# Patient Record
Sex: MALE | Race: WHITE | Employment: OTHER | URBAN - METROPOLITAN AREA
[De-identification: names, ages, dates, MRNs, and addresses within clinical notes are randomized per-mention and may not be internally consistent; named-entity substitution may affect disease eponyms.]

---

## 2017-01-16 ENCOUNTER — OFFICE VISIT (OUTPATIENT)
Dept: FAMILY MEDICINE | Facility: CLINIC | Age: 77
End: 2017-01-16
Payer: COMMERCIAL

## 2017-01-16 VITALS
BODY MASS INDEX: 41.95 KG/M2 | TEMPERATURE: 97 F | WEIGHT: 293 LBS | SYSTOLIC BLOOD PRESSURE: 123 MMHG | HEIGHT: 70 IN | DIASTOLIC BLOOD PRESSURE: 83 MMHG | OXYGEN SATURATION: 97 % | HEART RATE: 70 BPM

## 2017-01-16 DIAGNOSIS — G70.00 MYASTHENIA GRAVIS WITHOUT EXACERBATION (H): ICD-10-CM

## 2017-01-16 DIAGNOSIS — A31.0 MYCOBACTERIUM AVIUM COMPLEX (H): ICD-10-CM

## 2017-01-16 DIAGNOSIS — I10 BENIGN ESSENTIAL HYPERTENSION: Primary | ICD-10-CM

## 2017-01-16 LAB
BASOPHILS # BLD AUTO: 0 10E9/L (ref 0–0.2)
BASOPHILS NFR BLD AUTO: 0.4 %
DIFFERENTIAL METHOD BLD: NORMAL
EOSINOPHIL # BLD AUTO: 0.2 10E9/L (ref 0–0.7)
EOSINOPHIL NFR BLD AUTO: 3.1 %
ERYTHROCYTE [DISTWIDTH] IN BLOOD BY AUTOMATED COUNT: 14.2 % (ref 10–15)
HCT VFR BLD AUTO: 44.9 % (ref 40–53)
HGB BLD-MCNC: 14.5 G/DL (ref 13.3–17.7)
LYMPHOCYTES # BLD AUTO: 1.8 10E9/L (ref 0.8–5.3)
LYMPHOCYTES NFR BLD AUTO: 26.2 %
MCH RBC QN AUTO: 29.5 PG (ref 26.5–33)
MCHC RBC AUTO-ENTMCNC: 32.3 G/DL (ref 31.5–36.5)
MCV RBC AUTO: 91 FL (ref 78–100)
MONOCYTES # BLD AUTO: 0.6 10E9/L (ref 0–1.3)
MONOCYTES NFR BLD AUTO: 9.6 %
NEUTROPHILS # BLD AUTO: 4.1 10E9/L (ref 1.6–8.3)
NEUTROPHILS NFR BLD AUTO: 60.7 %
PLATELET # BLD AUTO: 223 10E9/L (ref 150–450)
RBC # BLD AUTO: 4.91 10E12/L (ref 4.4–5.9)
WBC # BLD AUTO: 6.7 10E9/L (ref 4–11)

## 2017-01-16 PROCEDURE — 83516 IMMUNOASSAY NONANTIBODY: CPT | Mod: 90 | Performed by: PREVENTIVE MEDICINE

## 2017-01-16 PROCEDURE — 85025 COMPLETE CBC W/AUTO DIFF WBC: CPT | Performed by: PREVENTIVE MEDICINE

## 2017-01-16 PROCEDURE — 99000 SPECIMEN HANDLING OFFICE-LAB: CPT | Performed by: PREVENTIVE MEDICINE

## 2017-01-16 PROCEDURE — 83519 RIA NONANTIBODY: CPT | Mod: 90 | Performed by: PREVENTIVE MEDICINE

## 2017-01-16 PROCEDURE — 99214 OFFICE O/P EST MOD 30 MIN: CPT | Performed by: PREVENTIVE MEDICINE

## 2017-01-16 PROCEDURE — 80053 COMPREHEN METABOLIC PANEL: CPT | Performed by: PREVENTIVE MEDICINE

## 2017-01-16 PROCEDURE — 86255 FLUORESCENT ANTIBODY SCREEN: CPT | Mod: 90 | Performed by: PREVENTIVE MEDICINE

## 2017-01-16 PROCEDURE — 36415 COLL VENOUS BLD VENIPUNCTURE: CPT | Performed by: PREVENTIVE MEDICINE

## 2017-01-16 RX ORDER — LOSARTAN POTASSIUM 50 MG/1
50 TABLET ORAL DAILY
Qty: 60 TABLET | Refills: 3 | Status: SHIPPED | OUTPATIENT
Start: 2017-01-16 | End: 2017-01-16

## 2017-01-16 RX ORDER — HYDROCHLOROTHIAZIDE 25 MG/1
25 TABLET ORAL DAILY
Qty: 90 TABLET | Refills: 1 | Status: SHIPPED | OUTPATIENT
Start: 2017-01-16 | End: 2017-07-13

## 2017-01-16 RX ORDER — LOSARTAN POTASSIUM 50 MG/1
50 TABLET ORAL DAILY
Qty: 90 TABLET | Refills: 3 | Status: SHIPPED | OUTPATIENT
Start: 2017-01-16 | End: 2017-10-16

## 2017-01-16 NOTE — PROGRESS NOTES
"SUBJECTIVE:  Thanh JUAN Kp, a 76 year old male scheduled an appointment to discuss the following issues:     Myasthenia gravis without exacerbation (H)  Mycobacterium avium complex (H)  Benign essential hypertension  Pt here for follow up feels well    Medical, social, surgical, and family histories reviewed.    ROS:  C: NEGATIVE for fever, chills  E: NEGATIVE for vision changes   R: NEGATIVE for significant cough or SOB  CV: NEGATIVE for chest pain, palpitations   GI: NEGATIVE for nausea, abdominal pain, heartburn, or change in bowel habits  : NEGATIVE for frequency, dysuria, or hematuria  M: NEGATIVE for significant arthralgias or myalgia  N: NEGATIVE for weakness, dizziness or paresthesias or headache    OBJECTIVE:  /83 mmHg  Pulse 70  Temp(Src) 97  F (36.1  C) (Oral)  Ht 5' 10\" (1.778 m)  Wt 293 lb (132.904 kg)  BMI 42.04 kg/m2  SpO2 97%  EXAM:  GENERAL APPEARANCE: healthy, alert and no distress  EYES: EOMI,  PERRL  HENT: ear canals and TM's normal and nose and mouth without ulcers or lesions  RESP: lungs clear to auscultation - no rales, rhonchi or wheezes  CV: regular rates and rhythm, normal S1 S2, no S3 or S4 and no murmur, click or rub -  ABDOMEN:  soft, nontender, no HSM or masses and bowel sounds normal    ASSESSMENT/PLAN:  (I10) Benign essential hypertension  (primary encounter diagnosis)  Plan: hydrochlorothiazide (HYDRODIURIL) 25 MG tablet,        CBC with platelets and differential,         Comprehensive metabolic panel, losartan         (COZAAR) 50 MG tablet    (G70.00) Myasthenia gravis without exacerbation (H)  Plan: Striated muscle antibody IgG, Acetylcholine         modulating antibody, Acetylcholine receptor         binding    (A31.0) Mycobacterium avium complex (H)    Above issues are stable, med changes pending labs    25 minutes spent with patient, over 50% time counseling, coordinating care and explaining about nature of the patient's conditions.    All risks, benefits of " treatment and further evaluation was reviewed with patient.  Pt expressed understanding.  Pt was in agreement with this plan.  Christ Haile MD

## 2017-01-16 NOTE — MR AVS SNAPSHOT
"              After Visit Summary   1/16/2017    Thanh Goodrich    MRN: [id number]           Patient Information     Date Of Birth          1940        Visit Information        Provider Department      1/16/2017 3:30 PM Christ Haile MD [facility]        Today's Diagnoses     HYPERTENSION    -  1        Follow-ups after your visit        Who to contact     If you have questions or need follow up information about today's clinic visit or your schedule please contact [facility] directly at [phone].  Normal or non-critical lab and imaging results will be communicated to you by MyChart, letter or phone within 4 business days after the clinic has received the results. If you do not hear from us within 7 days, please contact the clinic through [employer] or phone. If you have a critical or abnormal lab result, we will notify you by phone as soon as possible.  Submit refill requests through [employer] or call your pharmacy and they will forward the refill request to us. Please allow 3 business days for your refill to be completed.          Additional Information About Your Visit        MyChart Information     [employer] gives you secure access to your electronic health record. If you see a primary care provider, you can also send messages to your care team and make appointments. If you have questions, please call your primary care clinic.  If you do not have a primary care provider, please call [phone] and they will assist you.        Care EveryWhere ID     This is your Care EveryWhere ID. This could be used by other organizations to access your [place] medical records  [id number]        Your Vitals Were     Pulse Temperature Height BMI (Body Mass Index) Pulse Oximetry       70 97  F (36.1  C) (Oral) 5' 10\" (1.778 m) 42.04 kg/m2 97%        Blood Pressure from Last 3 Encounters:   01/16/17 123/83   06/23/16 124/71   04/18/16 134/77    Weight from Last 3 Encounters: " "  01/16/17 293 lb (132.904 kg)   06/23/16 295 lb (133.811 kg)   04/18/16 295 lb (133.811 kg)              Today, you had the following     No orders found for display       Primary Care Provider Office Phone # Fax #    Christ Leiva Branden Haile [phone]       [facility] [address] [facility] [address]  [facility] [address]        Thank you!     Thank you for choosing [facility]  for your care. Our goal is always to provide you with excellent care. Hearing back from our patients is one way we can continue to improve our services. Please take a few minutes to complete the written survey that you may receive in the mail after your visit with us. Thank you!             Your Updated Medication List - Protect others around you: Learn how to safely use, store and throw away your medicines at www.disposemymeds.org.          This list is accurate as of: 1/16/17  3:42 PM.  Always use your most recent med list.                   Brand Name Dispense Instructions for use    ascorbic acid 1000 MG Tabs    vitamin C     Take 1 tablet by mouth daily.       ASPIRIN PO      Take 81 mg by mouth 2 times daily       calcium carbonate-vitamin D 600-400 MG-UNIT Chew      Take 1 chew tab by mouth 2 times daily.       CELLCEPT tablet      Take 1,000 mg by mouth 2 times daily 2 tabs in Am. ! Tab in Pm       CHROMIUM PICOLINATE      Take 1 tablet by mouth daily.       hydrochlorothiazide 25 MG tablet    HYDRODIURIL    90 tablet    TAKE 1 TABLET BY MOUTH DAILY       losartan 50 MG tablet    COZAAR    60 tablet    TAKE 1 TABLET BY MOUTH EVERY DAY       NONFORMULARY      \"Life extension Multivitamin\" Take one tablet by mouth twice daily.       POMEGRANATE PO      Take 1 tablet by mouth 2 times daily.       TYLENOL PO          VITAMIN D3 PO      Take 2,000 Units by mouth 2 times daily.         "

## 2017-01-16 NOTE — NURSING NOTE
"Chief Complaint   Patient presents with     Follow Up For     lab test     Recheck Medication     mycophenolate (CELLCEPT) 500 MG tablet       Initial /83 mmHg  Pulse 70  Temp(Src) 97  F (36.1  C) (Oral)  Ht 5' 10\" (1.778 m)  Wt 293 lb (132.904 kg)  BMI 42.04 kg/m2  SpO2 97% Estimated body mass index is 42.04 kg/(m^2) as calculated from the following:    Height as of this encounter: 5' 10\" (1.778 m).    Weight as of this encounter: 293 lb (132.904 kg).  BP completed using cuff size: regular, left arm.    Desi Hancock CMA      "

## 2017-01-17 LAB
ALBUMIN SERPL-MCNC: 3.6 G/DL (ref 3.4–5)
ALP SERPL-CCNC: 69 U/L (ref 40–150)
ALT SERPL W P-5'-P-CCNC: 18 U/L (ref 0–70)
ANION GAP SERPL CALCULATED.3IONS-SCNC: 5 MMOL/L (ref 3–14)
AST SERPL W P-5'-P-CCNC: 15 U/L (ref 0–45)
BILIRUB SERPL-MCNC: 0.5 MG/DL (ref 0.2–1.3)
BUN SERPL-MCNC: 19 MG/DL (ref 7–30)
CALCIUM SERPL-MCNC: 9.3 MG/DL (ref 8.5–10.1)
CHLORIDE SERPL-SCNC: 103 MMOL/L (ref 94–109)
CO2 SERPL-SCNC: 32 MMOL/L (ref 20–32)
CREAT SERPL-MCNC: 0.86 MG/DL (ref 0.66–1.25)
GFR SERPL CREATININE-BSD FRML MDRD: 86 ML/MIN/1.7M2
GLUCOSE SERPL-MCNC: 96 MG/DL (ref 70–99)
POTASSIUM SERPL-SCNC: 4.2 MMOL/L (ref 3.4–5.3)
PROT SERPL-MCNC: 7.2 G/DL (ref 6.8–8.8)
SODIUM SERPL-SCNC: 140 MMOL/L (ref 133–144)

## 2017-01-17 RX ORDER — LOSARTAN POTASSIUM 50 MG/1
TABLET ORAL
Qty: 90 TABLET | Refills: 3 | OUTPATIENT
Start: 2017-01-17

## 2017-01-20 LAB — ACHR BIND AB SER-SCNC: 21 NMOL/L

## 2017-01-21 LAB — ACHR MOD AB/ACHR TOTAL SFR SER: 87 %

## 2017-01-22 LAB
STRIA MUS IGG SER QL IF: ABNORMAL
STRIA MUS IGG TITR SER IF: ABNORMAL {TITER}

## 2017-02-08 RX ORDER — LOSARTAN POTASSIUM 50 MG/1
TABLET ORAL
Qty: 60 TABLET | Refills: 0
Start: 2017-02-08

## 2017-03-22 ENCOUNTER — TRANSFERRED RECORDS (OUTPATIENT)
Dept: HEALTH INFORMATION MANAGEMENT | Facility: CLINIC | Age: 77
End: 2017-03-22

## 2017-06-01 ENCOUNTER — TELEPHONE (OUTPATIENT)
Dept: FAMILY MEDICINE | Facility: CLINIC | Age: 77
End: 2017-06-01

## 2017-06-01 NOTE — TELEPHONE ENCOUNTER
Dr. Haile are you able to fit this pt in, or is team ok?          Appointment Request From: Thanh Goodrich           With Provider: Christ Haile MD, MD [-Primary Care Physician-]          Preferred Date Range: From 6/2/2017 To 6/22/2017            Preferred Times: Monday Afternoon, Tuesday Afternoon, Wednesday Afternoon, Thursday Afternoon, Friday Afternoon          Reason for visit: Request an Appointment          Comments:     I would like to see Dr. Haile regarding my painful shoulders. It seems to be tendinitis in the area of the rotator cuffs, especially with the left arm.        The only date that does not work for me is Monday, June 12th.

## 2017-06-14 ENCOUNTER — RADIANT APPOINTMENT (OUTPATIENT)
Dept: GENERAL RADIOLOGY | Facility: CLINIC | Age: 77
End: 2017-06-14
Attending: PREVENTIVE MEDICINE
Payer: COMMERCIAL

## 2017-06-14 ENCOUNTER — OFFICE VISIT (OUTPATIENT)
Dept: FAMILY MEDICINE | Facility: CLINIC | Age: 77
End: 2017-06-14
Payer: COMMERCIAL

## 2017-06-14 VITALS
HEART RATE: 85 BPM | SYSTOLIC BLOOD PRESSURE: 112 MMHG | TEMPERATURE: 97.3 F | HEIGHT: 70 IN | WEIGHT: 296 LBS | BODY MASS INDEX: 42.37 KG/M2 | DIASTOLIC BLOOD PRESSURE: 81 MMHG | OXYGEN SATURATION: 97 %

## 2017-06-14 DIAGNOSIS — A31.0 MYCOBACTERIUM AVIUM COMPLEX (H): ICD-10-CM

## 2017-06-14 DIAGNOSIS — R09.89 ABNORMAL LUNG SOUNDS: Primary | ICD-10-CM

## 2017-06-14 DIAGNOSIS — I10 ESSENTIAL HYPERTENSION, BENIGN: ICD-10-CM

## 2017-06-14 DIAGNOSIS — R09.89 ABNORMAL LUNG SOUNDS: ICD-10-CM

## 2017-06-14 DIAGNOSIS — Z00.00 ENCOUNTER FOR ROUTINE ADULT HEALTH EXAMINATION WITHOUT ABNORMAL FINDINGS: ICD-10-CM

## 2017-06-14 DIAGNOSIS — L30.4 INTERTRIGO: ICD-10-CM

## 2017-06-14 DIAGNOSIS — C61 MALIGNANT NEOPLASM OF PROSTATE (H): ICD-10-CM

## 2017-06-14 DIAGNOSIS — G70.00 MYASTHENIA GRAVIS WITHOUT EXACERBATION (H): ICD-10-CM

## 2017-06-14 LAB
BASOPHILS # BLD AUTO: 0 10E9/L (ref 0–0.2)
BASOPHILS NFR BLD AUTO: 0.6 %
DIFFERENTIAL METHOD BLD: NORMAL
EOSINOPHIL # BLD AUTO: 0.1 10E9/L (ref 0–0.7)
EOSINOPHIL NFR BLD AUTO: 2.3 %
ERYTHROCYTE [DISTWIDTH] IN BLOOD BY AUTOMATED COUNT: 14.2 % (ref 10–15)
HCT VFR BLD AUTO: 45 % (ref 40–53)
HGB BLD-MCNC: 14.6 G/DL (ref 13.3–17.7)
LYMPHOCYTES # BLD AUTO: 1.6 10E9/L (ref 0.8–5.3)
LYMPHOCYTES NFR BLD AUTO: 25.5 %
MCH RBC QN AUTO: 29.1 PG (ref 26.5–33)
MCHC RBC AUTO-ENTMCNC: 32.4 G/DL (ref 31.5–36.5)
MCV RBC AUTO: 90 FL (ref 78–100)
MONOCYTES # BLD AUTO: 0.6 10E9/L (ref 0–1.3)
MONOCYTES NFR BLD AUTO: 9.6 %
NEUTROPHILS # BLD AUTO: 3.8 10E9/L (ref 1.6–8.3)
NEUTROPHILS NFR BLD AUTO: 62 %
PLATELET # BLD AUTO: 224 10E9/L (ref 150–450)
RBC # BLD AUTO: 5.02 10E12/L (ref 4.4–5.9)
WBC # BLD AUTO: 6.2 10E9/L (ref 4–11)

## 2017-06-14 PROCEDURE — 99000 SPECIMEN HANDLING OFFICE-LAB: CPT | Performed by: PREVENTIVE MEDICINE

## 2017-06-14 PROCEDURE — 85025 COMPLETE CBC W/AUTO DIFF WBC: CPT | Performed by: PREVENTIVE MEDICINE

## 2017-06-14 PROCEDURE — 83516 IMMUNOASSAY NONANTIBODY: CPT | Mod: 59 | Performed by: PREVENTIVE MEDICINE

## 2017-06-14 PROCEDURE — 83519 RIA NONANTIBODY: CPT | Mod: 90 | Performed by: PREVENTIVE MEDICINE

## 2017-06-14 PROCEDURE — 36415 COLL VENOUS BLD VENIPUNCTURE: CPT | Performed by: PREVENTIVE MEDICINE

## 2017-06-14 PROCEDURE — 71020 XR CHEST 2 VW: CPT

## 2017-06-14 PROCEDURE — 80053 COMPREHEN METABOLIC PANEL: CPT | Performed by: PREVENTIVE MEDICINE

## 2017-06-14 PROCEDURE — 99397 PER PM REEVAL EST PAT 65+ YR: CPT | Performed by: PREVENTIVE MEDICINE

## 2017-06-14 PROCEDURE — 86255 FLUORESCENT ANTIBODY SCREEN: CPT | Mod: 90 | Performed by: PREVENTIVE MEDICINE

## 2017-06-14 PROCEDURE — 80061 LIPID PANEL: CPT | Performed by: PREVENTIVE MEDICINE

## 2017-06-14 RX ORDER — NYSTATIN 100000 [USP'U]/G
POWDER TOPICAL 3 TIMES DAILY PRN
Qty: 60 G | Refills: 1 | Status: SHIPPED | OUTPATIENT
Start: 2017-06-14 | End: 2017-10-16

## 2017-06-14 NOTE — PROGRESS NOTES
SUBJECTIVE:                                                            Thanh Goodrich is a 76 year old male who presents for Preventive Visit.      Are you in the first 12 months of your Medicare coverage?  No    Physical   Annual:     Getting at least 3 servings of Calcium per day::  Yes    Bi-annual eye exam::  Yes    Dental care twice a year::  NO    Sleep apnea or symptoms of sleep apnea::  None    Frequency of exercise::  1 day/week    Duration of exercise::  Less than 15 minutes    Taking medications regularly::  Yes    Medication side effects::  None    Additional concerns today::  YES      COGNITIVE SCREEN  1) Repeat 3 items (Banana, Sunrise, Chair)    2) Clock draw: NORMAL  3) 3 item recall: Recalls 3 objects  Results: 3 items recalled: COGNITIVE IMPAIRMENT LESS LIKELY    Mini-CogTM Copyright S [employer]. Licensed by the author for use in [employer] [employer]; reprinted with permission (jackson@[facility]). All rights reserved.        Reviewed and updated as needed this visit by clinical staff  Tobacco  Allergies  Meds  Med Hx  Surg Hx  Fam Hx  Soc Hx        Reviewed and updated as needed this visit by Provider        Social History   Substance Use Topics     Smoking status: Never Smoker     Smokeless tobacco: Never Used     Alcohol use 0.0 oz/week     0 Standard drinks or equivalent per week      Comment: once monthly       The patient does not drink >3 drinks per day nor >7 drinks per week.            Today's PHQ-2 Score:   PHQ-2 ( 1999 [employer]) 6/12/2017   Q1: Little interest or pleasure in doing things Several days   Q2: Feeling down, depressed or hopeless Several days   PHQ-2 Score 2       Do you feel safe in your environment - Yes    Do you have a Health Care Directive?: No    Current providers sharing in care for this patient include:   Patient Care Team:  Christ Haile MD as PCP - General (Internal Medicine)      Hearing impairment: Yes, some pitches are hard to hear    Ability  "to successfully perform activities of daily living: Yes, no assistance needed     Fall risk:       Home safety:  none identified  Nicky Rowland MA      The following health maintenance items are reviewed in Epic and correct as of today:  Health Maintenance   Topic Date Due     AORTIC ANEURYSM SCREENING (SYSTEM ASSIGNED)  12/05/2007     ADVANCE DIRECTIVE PLANNING Q5 YRS  06/06/2017     INFLUENZA VACCINE (SYSTEM ASSIGNED)  09/01/2017     FALL RISK ASSESSMENT  01/16/2018     DEXA Q2 YR  05/10/2018     LIPID SCREEN Q5 YR MALE (SYSTEM ASSIGNED)  04/18/2021     TETANUS IMMUNIZATION (SYSTEM ASSIGNED)  06/06/2022     COLON CANCER SCREEN (SYSTEM ASSIGNED)  06/06/2022     PNEUMOCOCCAL  Completed              ROS:  Constitutional, HEENT, cardiovascular, pulmonary, GI, , musculoskeletal, neuro, skin, endocrine and psych systems are negative, except as otherwise noted.    Problem list, Medication list, Allergies, and Medical/Social/Surgical histories reviewed in [facility] and updated as appropriate.  OBJECTIVE:                                                            /81 (BP Location: Right arm, Cuff Size: Adult Large)  Pulse 85  Temp 97.3  F (36.3  C) (Oral)  Ht 5' 10\" (1.778 m)  Wt 296 lb (134.3 kg)  SpO2 97%  BMI 42.47 kg/m2 Estimated body mass index is 42.47 kg/(m^2) as calculated from the following:    Height as of this encounter: 5' 10\" (1.778 m).    Weight as of this encounter: 296 lb (134.3 kg).  EXAM:   GENERAL: healthy, alert and no distress  EYES: Eyes grossly normal to inspection, PERRL and conjunctivae and sclerae normal  HENT: ear canals and TM's normal, nose and mouth without ulcers or lesions  NECK: no adenopathy, no asymmetry, masses, or scars and thyroid normal to palpation  RESP: lungs with decreased breath sounds at right base  CV: regular rate and rhythm, normal S1 S2, no S3 or S4, no murmur, click or rub, no peripheral edema and peripheral pulses strong  ABDOMEN: soft, nontender, no " "hepatosplenomegaly, no masses and bowel sounds normal  MS: no gross musculoskeletal defects noted, no edema  SKIN: no suspicious lesions or rashes except irritation in r inguinal crease  NEURO: Normal strength and tone, mentation intact and speech normal  PSYCH: mentation appears normal, affect normal/bright    ASSESSMENT / PLAN:                                                            1. Abnormal lung sounds  ?effusion, will get cxr  - XR Chest 2 Views; Future    2. Mycobacterium avium complex (H)      3. HYPERTENSION      4. 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 Myasthenia gravis without exacerbation (H)  Stable  - Striated muscle antibody IgG  - Acetylcholine modulating antibody  - Acetylcholine receptor binding    6. Encounter for routine adult health examination without abnormal findings  - CBC with platelets and differential  - Comprehensive metabolic panel  - Lipid Profile with reflex to direct LDL    7. Intertrigo  Will prescribe nystatin powder  - nystatin (MYCOSTATIN) 709828 UNIT/GM POWD; Apply topically 3 times daily as needed  Dispense: 60 g; Refill: 1    End of Life Planning:  Patient currently has an advanced directive: Yes.  Practitioner is supportive of decision.    COUNSELING:  Reviewed preventive health counseling, as reflected in patient instructions       Regular exercise       Healthy diet/nutrition       Vision screening       Hearing screening       Dental care       Safe sex practices/STD prevention       Hepatitis C screening       HIV screening for high risk patient       Consider lung cancer screening for ages 55-80 years and 30 pack-year smoking history        Colon cancer screening       Prostate cancer screening        Estimated body mass index is 42.47 kg/(m^2) as calculated from the following:    Height as of this encounter: 5' 10\" (1.778 m).    Weight as of this encounter: 296 lb (134.3 kg).  Weight management plan: Discussed healthy diet and exercise guidelines " and patient will follow up in 6 months in clinic to re-evaluate.   reports that he has never smoked. He has never used smokeless tobacco.      Appropriate preventive services were discussed with this patient, including applicable screening as appropriate for cardiovascular disease, diabetes, osteopenia/osteoporosis, and glaucoma.  As appropriate for age/gender, discussed screening for colorectal cancer, prostate cancer, breast cancer, and cervical cancer. Checklist reviewing preventive services available has been given to the patient.    Reviewed patients plan of care and provided an AVS. The Basic Care Plan (routine screening as documented in Health Maintenance) for Thanh meets the Care Plan requirement. This Care Plan has been established and reviewed with the Patient.    Counseling Resources:  ATP IV Guidelines  Pooled Cohorts Equation Calculator  Breast Cancer Risk Calculator  FRAX Risk Assessment  ICSI Preventive Guidelines  Dietary Guidelines for Americans, 2010  USDA's MyPlate  ASA Prophylaxis  Lung CA Screening    Christ Haile MD, MD  [facility]

## 2017-06-14 NOTE — NURSING NOTE
"Chief Complaint   Patient presents with     Wellness Visit       Initial /81 (BP Location: Right arm, Cuff Size: Adult Large)  Pulse 85  Temp 97.3  F (36.3  C) (Oral)  Ht 5' 10\" (1.778 m)  Wt 296 lb (134.3 kg)  SpO2 97%  BMI 42.47 kg/m2 Estimated body mass index is 42.47 kg/(m^2) as calculated from the following:    Height as of this encounter: 5' 10\" (1.778 m).    Weight as of this encounter: 296 lb (134.3 kg).  Medication Reconciliation: complete     Nicky Rowland MA    "

## 2017-06-14 NOTE — MR AVS SNAPSHOT
"              After Visit Summary   6/14/2017    Thanh Goodrich    MRN: [id number]           Patient Information     Date Of Birth          1940        Visit Information        Provider Department      6/14/2017 2:30 PM [facility]         Follow-ups after your visit        Who to contact     If you have questions or need follow up information about today's clinic visit or your schedule please contact [facility] directly at [phone].  Normal or non-critical lab and imaging results will be communicated to you by MyChart, letter or phone within 4 business days after the clinic has received the results. If you do not hear from us within 7 days, please contact the clinic through [employer] or phone. If you have a critical or abnormal lab result, we will notify you by phone as soon as possible.  Submit refill requests through [employer] or call your pharmacy and they will forward the refill request to us. Please allow 3 business days for your refill to be completed.          Additional Information About Your Visit        MyChart Information     [employer] gives you secure access to your electronic health record. If you see a primary care provider, you can also send messages to your care team and make appointments. If you have questions, please call your primary care clinic.  If you do not have a primary care provider, please call [phone] and they will assist you.        Care EveryWhere ID     This is your Care EveryWhere ID. This could be used by other organizations to access your [place] medical records  [id number]        Your Vitals Were     Pulse Temperature Height Pulse Oximetry BMI (Body Mass Index)       85 97.3  F (36.3  C) (Oral) 5' 10\" (1.778 m) 97% 42.47 kg/m2        Blood Pressure from Last 3 Encounters:   06/14/17 112/81   01/16/17 123/83   06/23/16 124/71    Weight from Last 3 Encounters:   06/14/17 296 lb (134.3 kg)   01/16/17 293 lb " "(132.9 kg)   06/23/16 295 lb (133.8 kg)              Today, you had the following     No orders found for display         Today's Medication Changes          These changes are accurate as of: 6/14/17  2:35 PM.  If you have any questions, ask your nurse or doctor.               Stop taking these medicines if you haven't already. Please contact your care team if you have questions.     ascorbic acid 1000 MG Tabs   Commonly known as:  vitamin C   Stopped by:  Christ Haile MD                    Primary Care Provider Office Phone # Fax #    Christ Haile [phone]       [facility] [address] [facility] [address]  [facility] [address]        Thank you!     Thank you for choosing [facility]  for your care. Our goal is always to provide you with excellent care. Hearing back from our patients is one way we can continue to improve our services. Please take a few minutes to complete the written survey that you may receive in the mail after your visit with us. Thank you!             Your Updated Medication List - Protect others around you: Learn how to safely use, store and throw away your medicines at www.disposemymeds.org.          This list is accurate as of: 6/14/17  2:35 PM.  Always use your most recent med list.                   Brand Name Dispense Instructions for use    ASPIRIN PO      Take 81 mg by mouth 2 times daily       calcium carbonate-vitamin D 600-400 MG-UNIT Chew      Take 1 chew tab by mouth 2 times daily.       CELLCEPT tablet      Take 1,000 mg by mouth 2 times daily 2 tabs in Am. ! Tab in Pm       CHROMIUM PICOLINATE      Take 1 tablet by mouth daily.       hydrochlorothiazide 25 MG tablet    HYDRODIURIL    90 tablet    Take 1 tablet (25 mg) by mouth daily       losartan 50 MG tablet    COZAAR    90 tablet    Take 1 tablet (50 mg) by mouth daily       NONFORMULARY      \"Life extension Multivitamin\" Take one tablet by mouth twice " daily.       POMEGRANATE PO      Take 1 tablet by mouth 2 times daily.       TYLENOL PO          VITAMIN D3 PO      Take 2,000 Units by mouth 2 times daily.

## 2017-06-15 LAB
ALBUMIN SERPL-MCNC: 3.9 G/DL (ref 3.4–5)
ALP SERPL-CCNC: 71 U/L (ref 40–150)
ALT SERPL W P-5'-P-CCNC: 15 U/L (ref 0–70)
ANION GAP SERPL CALCULATED.3IONS-SCNC: 11 MMOL/L (ref 3–14)
AST SERPL W P-5'-P-CCNC: 14 U/L (ref 0–45)
BILIRUB SERPL-MCNC: 0.7 MG/DL (ref 0.2–1.3)
BUN SERPL-MCNC: 19 MG/DL (ref 7–30)
CALCIUM SERPL-MCNC: 9.3 MG/DL (ref 8.5–10.1)
CHLORIDE SERPL-SCNC: 104 MMOL/L (ref 94–109)
CHOLEST SERPL-MCNC: 184 MG/DL
CO2 SERPL-SCNC: 25 MMOL/L (ref 20–32)
CREAT SERPL-MCNC: 0.76 MG/DL (ref 0.66–1.25)
GFR SERPL CREATININE-BSD FRML MDRD: NORMAL ML/MIN/1.7M2
GLUCOSE SERPL-MCNC: 98 MG/DL (ref 70–99)
HDLC SERPL-MCNC: 63 MG/DL
LDLC SERPL CALC-MCNC: 106 MG/DL
NONHDLC SERPL-MCNC: 121 MG/DL
POTASSIUM SERPL-SCNC: 3.8 MMOL/L (ref 3.4–5.3)
PROT SERPL-MCNC: 7.3 G/DL (ref 6.8–8.8)
SODIUM SERPL-SCNC: 140 MMOL/L (ref 133–144)
TRIGL SERPL-MCNC: 76 MG/DL

## 2017-06-16 LAB — ACHR BIND AB SER-SCNC: 23.5 NMOL/L

## 2017-06-17 LAB
STRIA MUS IGG SER QL IF: ABNORMAL
STRIA MUS IGG TITR SER IF: ABNORMAL {TITER}

## 2017-06-20 LAB — ACHR MOD AB/ACHR TOTAL SFR SER: 84 %

## 2017-07-13 DIAGNOSIS — I10 BENIGN ESSENTIAL HYPERTENSION: ICD-10-CM

## 2017-07-13 NOTE — TELEPHONE ENCOUNTER
Pending Prescriptions:                       Disp   Refills    hydrochlorothiazide (HYDRODIURIL) 25 MG t*90 tab*1            Sig: Take 1 tablet (25 mg) by mouth daily      Last Written Prescription Date: 01/16/2017  Last Fill Quantity: 90, # refills: 1  Last Office Visit with [facility], [facility] or [facility] prescribing provider: 06/14/2017       Potassium   Date Value Ref Range Status   06/14/2017 3.8 3.4 - 5.3 mmol/L Final     Creatinine   Date Value Ref Range Status   06/14/2017 0.76 0.66 - 1.25 mg/dL Final     BP Readings from Last 3 Encounters:   06/14/17 112/81   01/16/17 123/83   06/23/16 124/71

## 2017-07-13 NOTE — TELEPHONE ENCOUNTER
RAMON ([facility]),  Please advise on refill  Former Dr Haile patient.  Up to date on visit and labs.  Please advise.  Shantelle DESAI RN

## 2017-07-14 RX ORDER — HYDROCHLOROTHIAZIDE 25 MG/1
25 TABLET ORAL DAILY
Qty: 90 TABLET | Refills: 0 | Status: SHIPPED | OUTPATIENT
Start: 2017-07-14 | End: 2017-10-16

## 2017-07-14 NOTE — TELEPHONE ENCOUNTER
Script filled for 90 days. Will need to establish care with new provider (assuming Dr. Toledo per below) before further refills will be issued. Please help him schedule.     Richelle Sotelo RN

## 2017-07-14 NOTE — TELEPHONE ENCOUNTER
Pt is calling and is very upset that he does not  Have the medication now. He said Mic told  Him that he would fix it so he would have enough  For another 6 months. He wants to see Dr Toledo   Now. Or have a MA take his BP and have them  Fill the med.  Could a nurse please call him back.

## 2017-08-07 ENCOUNTER — TRANSFERRED RECORDS (OUTPATIENT)
Dept: HEALTH INFORMATION MANAGEMENT | Facility: CLINIC | Age: 77
End: 2017-08-07

## 2017-10-16 ENCOUNTER — OFFICE VISIT (OUTPATIENT)
Dept: FAMILY MEDICINE | Facility: CLINIC | Age: 77
End: 2017-10-16
Payer: COMMERCIAL

## 2017-10-16 VITALS
BODY MASS INDEX: 42.37 KG/M2 | HEART RATE: 71 BPM | HEIGHT: 70 IN | OXYGEN SATURATION: 98 % | DIASTOLIC BLOOD PRESSURE: 79 MMHG | SYSTOLIC BLOOD PRESSURE: 124 MMHG | WEIGHT: 296 LBS | TEMPERATURE: 97.1 F

## 2017-10-16 DIAGNOSIS — C61 MALIGNANT NEOPLASM OF PROSTATE (H): Primary | ICD-10-CM

## 2017-10-16 DIAGNOSIS — I10 BENIGN ESSENTIAL HYPERTENSION: ICD-10-CM

## 2017-10-16 DIAGNOSIS — I77.810 DILATED AORTIC ROOT (H): ICD-10-CM

## 2017-10-16 DIAGNOSIS — I51.89 DIASTOLIC DYSFUNCTION: ICD-10-CM

## 2017-10-16 DIAGNOSIS — A31.0 MYCOBACTERIUM AVIUM COMPLEX (H): ICD-10-CM

## 2017-10-16 DIAGNOSIS — G70.00 MYASTHENIA GRAVIS WITHOUT EXACERBATION (H): ICD-10-CM

## 2017-10-16 DIAGNOSIS — M81.0 AGE-RELATED OSTEOPOROSIS WITHOUT CURRENT PATHOLOGICAL FRACTURE: ICD-10-CM

## 2017-10-16 PROCEDURE — 99214 OFFICE O/P EST MOD 30 MIN: CPT | Performed by: INTERNAL MEDICINE

## 2017-10-16 RX ORDER — HYDROCHLOROTHIAZIDE 25 MG/1
25 TABLET ORAL DAILY
Qty: 90 TABLET | Refills: 3 | Status: SHIPPED | OUTPATIENT
Start: 2017-10-16 | End: 2017-12-12

## 2017-10-16 RX ORDER — LOSARTAN POTASSIUM 50 MG/1
50 TABLET ORAL DAILY
Qty: 90 TABLET | Refills: 3 | Status: SHIPPED | OUTPATIENT
Start: 2017-10-16 | End: 2017-12-12

## 2017-10-16 NOTE — NURSING NOTE
"Chief Complaint   Patient presents with     New Patient       Initial /79 (BP Location: Right arm, Patient Position: Sitting, Cuff Size: Adult Regular)  Pulse 71  Temp 97.1  F (36.2  C) (Tympanic)  Ht 5' 10\" (1.778 m)  Wt 296 lb (134.3 kg)  SpO2 98%  BMI 42.47 kg/m2 Estimated body mass index is 42.47 kg/(m^2) as calculated from the following:    Height as of this encounter: 5' 10\" (1.778 m).    Weight as of this encounter: 296 lb (134.3 kg).  Medication Reconciliation: complete   Chika Casting- CMA      "

## 2017-10-16 NOTE — MR AVS SNAPSHOT
After Visit Summary   10/16/2017    Thanh Goodrich    MRN: [id number]           Patient Information     Date Of Birth          1940        Visit Information        Provider Department      10/16/2017 1:30 PM Luis Oliver MD [facility]        Today's Diagnoses     MALIGN NEOPL PROSTATE-3/07    -  1    Myasthenia gravis without exacerbation (H)        Mycobacterium avium complex (H)        Benign essential hypertension        Diastolic dysfunction        Dilated aortic root (H)        Age-related osteoporosis without current pathological fracture           Follow-ups after your visit        Who to contact     If you have questions or need follow up information about today's clinic visit or your schedule please contact [facility] directly at [phone].  Normal or non-critical lab and imaging results will be communicated to you by Ocutechart, letter or phone within 4 business days after the clinic has received the results. If you do not hear from us within 7 days, please contact the clinic through Ocutechart or phone. If you have a critical or abnormal lab result, we will notify you by phone as soon as possible.  Submit refill requests through [employer] or call your pharmacy and they will forward the refill request to us. Please allow 3 business days for your refill to be completed.          Additional Information About Your Visit        MyChart Information     [employer] gives you secure access to your electronic health record. If you see a primary care provider, you can also send messages to your care team and make appointments. If you have questions, please call your primary care clinic.  If you do not have a primary care provider, please call [phone] and they will assist you.        Care EveryWhere ID     This is your Care EveryWhere ID. This could be used by other organizations to access your [place] medical records  [id number]        Your Vitals Were     Pulse  "Temperature Height Pulse Oximetry BMI (Body Mass Index)       71 97.1  F (36.2  C) (Tympanic) 5' 10\" (1.778 m) 98% 42.47 kg/m2        Blood Pressure from Last 3 Encounters:   10/16/17 124/79   06/14/17 112/81   01/16/17 123/83    Weight from Last 3 Encounters:   10/16/17 296 lb (134.3 kg)   06/14/17 296 lb (134.3 kg)   01/16/17 293 lb (132.9 kg)              Today, you had the following     No orders found for display         Where to get your medicines      These medications were sent to [facility] [id number] [place], [place] [address] AT [facility] [place] [address] [facility] [address], [facility] [address]     Phone:  [phone]     hydrochlorothiazide 25 MG tablet    losartan 50 MG tablet          Primary Care Provider Office Phone # Fax #    Luis Oliver [phone] [address] [address]        Equal Access to Services     [facility] : Hadii david ku hadasho Soholger, waaxda luqadaha, qaybta kaalmada adeyoshiyada, amadeo chao . So [facility] [phone].    ATENCIÓN: Si habla español, tiene a de la paz disposición servicios gratuitos de asistencia lingüística. [facility] [phone].    We comply with applicable federal civil rights laws and [place] laws. We do not discriminate on the basis of race, color, national origin, age, disability, sex, sexual orientation, or gender identity.            Thank you!     Thank you for choosing [facility]  for your care. Our goal is always to provide you with excellent care. Hearing back from our patients is one way we can continue to improve our services. Please take a few minutes to complete the written survey that you may receive in the mail after your visit with us. Thank you!             Your Updated Medication List - Protect others around you: Learn how to safely use, store and throw away your medicines at www.Vistaaremymeds.org.          This list is accurate " "as of: 10/16/17 11:59 PM.  Always use your most recent med list.                   Brand Name Dispense Instructions for use Diagnosis    ASPIRIN PO      Take 81 mg by mouth 2 times daily        calcium carbonate-vitamin D 600-400 MG-UNIT Chew      Take 1 chew tab by mouth 2 times daily.        CELLCEPT 500 MG tablet      Take 1,000 mg by mouth 2 times daily 2 tabs in Am. ! Tab in Pm        CHROMIUM PICOLINATE      Take 1 tablet by mouth daily.        hydrochlorothiazide 25 MG tablet    HYDRODIURIL    90 tablet    Take 1 tablet (25 mg) by mouth daily    Benign essential hypertension       losartan 50 MG tablet    COZAAR    90 tablet    Take 1 tablet (50 mg) by mouth daily    Benign essential hypertension       NONFORMULARY      \"Life extension Multivitamin\" Take one tablet by mouth twice daily.        POMEGRANATE PO      Take 1 tablet by mouth 2 times daily.        TYLENOL PO           VITAMIN D3 PO      Take 2,000 Units by mouth 2 times daily.          "

## 2017-10-16 NOTE — PROGRESS NOTES
"  SUBJECTIVE:   Thanh Goodrich is a 76 year old male who presents to clinic today for the following health issues:      Patient with history of myasthenia gravis presents to the clinic to follow up on his toe pain and for medications. states that the myasthenia gravis is under control uses Cellcept for symptoms. Recommended increasing his calcium intake to 1,000 MG daily. Patient reports left toe pain with activity. States he loosened his shoe and pain has since resolved.    Problem list and histories reviewed & adjusted, as indicated.  Additional history: as documented    Current Outpatient Prescriptions   Medication Sig Dispense Refill     hydrochlorothiazide (HYDRODIURIL) 25 MG tablet Take 1 tablet (25 mg) by mouth daily 90 tablet 0     losartan (COZAAR) 50 MG tablet Take 1 tablet (50 mg) by mouth daily 90 tablet 3     Acetaminophen (TYLENOL PO)        mycophenolate (CELLCEPT) 500 MG tablet Take 1,000 mg by mouth 2 times daily 2 tabs in Am. ! Tab in Pm       ASPIRIN PO Take 81 mg by mouth 2 times daily        Cholecalciferol (VITAMIN D3 PO) Take 2,000 Units by mouth 2 times daily.         CHROMIUM PICOLINATE Take 1 tablet by mouth daily.         Pomegranate, Punica granatum, (POMEGRANATE PO) Take 1 tablet by mouth 2 times daily.         calcium carbonate-vitamin D 600-400 MG-UNIT CHEW Take 1 chew tab by mouth 2 times daily.         NONFORMULARY \"Life extension Multivitamin\" Take one tablet by mouth twice daily.        Allergies   Allergen Reactions     Ciprofloxacin Other (See Comments)     Contraindicated for myasthenia gravis patients     Procainamide Other (See Comments)     Contraindicated for myasthenia gravis patients     Quinidine Other (See Comments)     Contraindicated for myasthenia gravis patients     Quinine Other (See Comments)     Contraindicated for myasthenia gravis patients     Reviewed and updated as needed this visit by clinical staff  Allergies  Meds       Reviewed and updated as needed " "this visit by Provider         ROS:  Constitutional, HEENT, cardiovascular, pulmonary, gi and gu systems are negative, except as otherwise noted.    This document serves as a record of the services and decisions personally performed and made by Luis Oliver MD. It was created on his/her behalf by Kimberly Poon, a trained medical scribe. The creation of this document is based the provider's statements to the medical scribe.  Scribe Kimberly Poon 2:07 PM, October 16, 2017    OBJECTIVE:   /79 (BP Location: Right arm, Patient Position: Sitting, Cuff Size: Adult Regular)  Pulse 71  Temp 97.1  F (36.2  C) (Tympanic)  Ht 1.778 m (5' 10\")  Wt 134.3 kg (296 lb)  SpO2 98%  BMI 42.47 kg/m2  Body mass index is 42.47 kg/(m^2).    Pleasant morbidly obese white male in no acute distress  Neck was supple without adenopathy or thyromegaly his carotids were normal without bruits  Chest clear to auscultation and percussion  Cardiovascular S1 and S2 are physiologic without murmurs or gallops  Abdomen obese, bowel sounds were normal.  There is no palpable mass or organomegaly  Extremities nontender with 1+ pretibial edema and chronic venus stasis changes, right foot was normal  Pulses pedal pulses are as described otherwise his pulses are bilaterally symmetrical throughout without bruits  Skin without significant abnormality    Diagnostic Test Results:  none     ASSESSMENT/PLAN:     1. MALIGN NEOPL PROSTATE-3/07  -Patient taking pomegranate for preventive measures.    2. Myasthenia gravis without exacerbation (H)  -Will be reevaluated at next visit in December.    3. Mycobacterium avium complex (H)    4. Benign essential hypertension  -Well managed with current therapies.  - hydrochlorothiazide (HYDRODIURIL) 25 MG tablet; Take 1 tablet (25 mg) by mouth daily  Dispense: 90 tablet; Refill: 3  - losartan (COZAAR) 50 MG tablet; Take 1 tablet (50 mg) by mouth daily  Dispense: 90 tablet; Refill: 3    5. Diastolic " dysfunction    6. Dilated aortic root (H)    7. Age-related osteoporosis without current pathological fracture  -Recommended daily calcium intake of 1,000 MG.      -Patient will return in December to follow up on his myasthenia labs.    Luis Oliver MD  [facility]    The information in this document, created by the medical scribe for me, accurately reflects the services I personally performed and the decisions made by me. I have reviewed and approved this document for accuracy prior to leaving the patient care area.  Luis Oliver MD  2:16 PM, 10/16/17

## 2017-12-12 ENCOUNTER — OFFICE VISIT (OUTPATIENT)
Dept: FAMILY MEDICINE | Facility: CLINIC | Age: 77
End: 2017-12-12
Payer: COMMERCIAL

## 2017-12-12 VITALS
HEART RATE: 70 BPM | OXYGEN SATURATION: 98 % | BODY MASS INDEX: 42.76 KG/M2 | WEIGHT: 298 LBS | TEMPERATURE: 96.6 F | SYSTOLIC BLOOD PRESSURE: 131 MMHG | DIASTOLIC BLOOD PRESSURE: 81 MMHG

## 2017-12-12 DIAGNOSIS — M81.0 AGE-RELATED OSTEOPOROSIS WITHOUT CURRENT PATHOLOGICAL FRACTURE: ICD-10-CM

## 2017-12-12 DIAGNOSIS — I10 BENIGN ESSENTIAL HYPERTENSION: ICD-10-CM

## 2017-12-12 DIAGNOSIS — E78.5 HYPERLIPIDEMIA LDL GOAL <130: ICD-10-CM

## 2017-12-12 DIAGNOSIS — C61 MALIGNANT NEOPLASM OF PROSTATE (H): ICD-10-CM

## 2017-12-12 DIAGNOSIS — I10 ESSENTIAL HYPERTENSION, BENIGN: ICD-10-CM

## 2017-12-12 DIAGNOSIS — I77.810 DILATED AORTIC ROOT (H): ICD-10-CM

## 2017-12-12 DIAGNOSIS — G70.00 MYASTHENIA GRAVIS WITHOUT EXACERBATION (H): Primary | ICD-10-CM

## 2017-12-12 DIAGNOSIS — A31.0 MYCOBACTERIUM AVIUM COMPLEX (H): ICD-10-CM

## 2017-12-12 LAB
ERYTHROCYTE [DISTWIDTH] IN BLOOD BY AUTOMATED COUNT: 14.3 % (ref 10–15)
HCT VFR BLD AUTO: 44.1 % (ref 40–53)
HGB BLD-MCNC: 13.9 G/DL (ref 13.3–17.7)
MCH RBC QN AUTO: 29.2 PG (ref 26.5–33)
MCHC RBC AUTO-ENTMCNC: 31.5 G/DL (ref 31.5–36.5)
MCV RBC AUTO: 93 FL (ref 78–100)
PLATELET # BLD AUTO: 232 10E9/L (ref 150–450)
RBC # BLD AUTO: 4.76 10E12/L (ref 4.4–5.9)
WBC # BLD AUTO: 5.7 10E9/L (ref 4–11)

## 2017-12-12 PROCEDURE — 99214 OFFICE O/P EST MOD 30 MIN: CPT | Performed by: INTERNAL MEDICINE

## 2017-12-12 PROCEDURE — 83516 IMMUNOASSAY NONANTIBODY: CPT | Mod: 90 | Performed by: INTERNAL MEDICINE

## 2017-12-12 PROCEDURE — 85027 COMPLETE CBC AUTOMATED: CPT | Performed by: INTERNAL MEDICINE

## 2017-12-12 PROCEDURE — 80053 COMPREHEN METABOLIC PANEL: CPT | Performed by: INTERNAL MEDICINE

## 2017-12-12 PROCEDURE — 36415 COLL VENOUS BLD VENIPUNCTURE: CPT | Performed by: INTERNAL MEDICINE

## 2017-12-12 PROCEDURE — 99000 SPECIMEN HANDLING OFFICE-LAB: CPT | Performed by: INTERNAL MEDICINE

## 2017-12-12 PROCEDURE — 86255 FLUORESCENT ANTIBODY SCREEN: CPT | Mod: 90 | Performed by: INTERNAL MEDICINE

## 2017-12-12 RX ORDER — HYDROCHLOROTHIAZIDE 25 MG/1
25 TABLET ORAL DAILY
Qty: 90 TABLET | Refills: 3 | Status: SHIPPED | OUTPATIENT
Start: 2017-12-12 | End: 2018-10-12

## 2017-12-12 RX ORDER — LOSARTAN POTASSIUM 50 MG/1
50 TABLET ORAL DAILY
Qty: 90 TABLET | Refills: 3 | Status: SHIPPED | OUTPATIENT
Start: 2017-12-12 | End: 2018-12-26

## 2017-12-12 NOTE — PROGRESS NOTES
SUBJECTIVE:   Thanh Goodrich is a 77 year old male who presents to clinic today for the following health issues:    Hypertension Follow-up      Outpatient blood pressures are being checked at home.  Results are 120's-130's/ 80's.    Low Salt Diet: not monitoring salt but tries to do low salt    Amount of exercise or physical activity: 1-2 day/week for an average of 15 minutes    Problems taking medications regularly: No    Medication side effects: none    Diet: [employer] 1200 calories per day ( but does eat outside of that)    Patient has a history of hypertension and reports his blood pressure is generally slightly lower at home than in the clinic. In regards to exercise patient notes he will achieve 1833-0186 steps per day. He expresses difficultly walking due to body habitus. Denies exertional dyspnea. Patient follows a low salt diet which is limited to 1200 calories a day, and excludes red met and pork. Patient reports some dietary indiscretion outside of his prepared meal plan.    Patient notes right shoulder discomfort due to over use while exercising 2 weeks ago.  He reports previous tendon tear in right shoulder.    Problem list and histories reviewed & adjusted, as indicated.  Additional history: as documented    Current Outpatient Prescriptions   Medication Sig Dispense Refill     hydrochlorothiazide (HYDRODIURIL) 25 MG tablet Take 1 tablet (25 mg) by mouth daily 90 tablet 3     losartan (COZAAR) 50 MG tablet Take 1 tablet (50 mg) by mouth daily 90 tablet 3     Acetaminophen (TYLENOL PO)        mycophenolate (CELLCEPT) 500 MG tablet Take 1,000 mg by mouth 2 times daily 2 tabs in Am. ! Tab in Pm       ASPIRIN PO Take 81 mg by mouth 2 times daily        Cholecalciferol (VITAMIN D3 PO) Take 2,000 Units by mouth 2 times daily.         CHROMIUM PICOLINATE Take 1 tablet by mouth daily.         Pomegranate, Punica granatum, (POMEGRANATE PO) Take 1 tablet by mouth 2 times daily.         calcium  "carbonate-vitamin D 600-400 MG-UNIT CHEW Take 1 chew tab by mouth 2 times daily.         NONFORMULARY \"Life extension Multivitamin\" Take one tablet by mouth twice daily.        [DISCONTINUED] hydrochlorothiazide (HYDRODIURIL) 25 MG tablet Take 1 tablet (25 mg) by mouth daily 90 tablet 3     [DISCONTINUED] losartan (COZAAR) 50 MG tablet Take 1 tablet (50 mg) by mouth daily 90 tablet 3     Allergies   Allergen Reactions     Ciprofloxacin Other (See Comments)     Contraindicated for myasthenia gravis patients     Procainamide Other (See Comments)     Contraindicated for myasthenia gravis patients     Quinidine Other (See Comments)     Contraindicated for myasthenia gravis patients     Quinine Other (See Comments)     Contraindicated for myasthenia gravis patients       Reviewed and updated as needed this visit by clinical staffTobacco  Allergies  Meds  Soc Hx      Reviewed and updated as needed this visit by Provider         ROS:  Constitutional, HEENT, cardiovascular, pulmonary, gi and gu systems are negative, except as otherwise noted.    This document serves as a record of the services and decisions personally performed and made by Luis Oliver MD. It was created on his/her behalf by Colleen Blum, a trained medical scribe. The creation of this document is based the provider's statements to the medical scribe.  Scribsaul Blum 3:03 PM, December 12, 2017     OBJECTIVE:   /81 (BP Location: Left arm, Patient Position: Sitting, Cuff Size: Adult Regular)  Pulse 70  Temp 96.6  F (35.9  C) (Oral)  Wt 135.2 kg (298 lb)  SpO2 98%  BMI 42.76 kg/m2  Body mass index is 42.76 kg/(m^2).  Neck was supple without adenopathy or thyromegaly his carotids were normal without bruits  Chest clear to auscultation and percussion  Cardiovascular S1 and S2 are physiologic without murmurs or gallops  Abdomen mobidbowel sounds were normal.  There is no palpable mass or organomegaly  Extremities nontender with " trace-1+ pretibial edema  Good ROM crepitation with internal and external rotation External rotation is limited to 70 degrees. Unable to detect if he had a previous bicipital tear.  Pulses pedal pulses are as described otherwise his pulses are bilaterally symmetrical throughout without bruits  Skin without significant abnormality     ASSESSMENT/PLAN:   1. Myasthenia gravis without exacerbation (H)  Follow up in 6 months unless otherwise indicated with return of lab results.  - CBC with platelets  - Comprehensive metabolic panel  - Striated muscle antibody IgG  - ACETYLCHOLINE MODULATING ANTIBODY    2. Dilated aortic root (H)    3. Ulcer (H)    4. Mycobacterium avium complex (H)    5. MALIGN NEOPL PROSTATE-3/07    6. Hyperlipidemia LDL goal <130    7. HYPERTENSION    8. Age-related osteoporosis without current pathological fracture    9. Benign essential hypertension  Reportedly under good control outside of the office.   - hydrochlorothiazide (HYDRODIURIL) 25 MG tablet; Take 1 tablet (25 mg) by mouth daily  Dispense: 90 tablet; Refill: 3  - losartan (COZAAR) 50 MG tablet; Take 1 tablet (50 mg) by mouth daily  Dispense: 90 tablet; Refill: 3    Luis Oliver MD  [facility]    The information in this document, created by the medical scribe for me, accurately reflects the services I personally performed and the decisions made by me. I have reviewed and approved this document for accuracy prior to leaving the patient care area.  Luis Oliver MD  2:57 PM, 12/12/17

## 2017-12-12 NOTE — NURSING NOTE
"Chief Complaint   Patient presents with     RECHECK       Initial /81 (BP Location: Left arm, Patient Position: Sitting, Cuff Size: Adult Regular)  Pulse 70  Temp 96.6  F (35.9  C) (Oral)  Wt 298 lb (135.2 kg)  SpO2 98%  BMI 42.76 kg/m2 Estimated body mass index is 42.76 kg/(m^2) as calculated from the following:    Height as of 10/16/17: 5' 10\" (1.778 m).    Weight as of this encounter: 298 lb (135.2 kg).  Medication Reconciliation: complete   Chika [place]- CMA      "

## 2017-12-12 NOTE — MR AVS SNAPSHOT
After Visit Summary   12/12/2017    Thanh Goodrich    MRN: [id number]           Patient Information     Date Of Birth          1940        Visit Information        Provider Department      12/12/2017 2:30 PM Luis Oliver MD [facility]        Today's Diagnoses     Myasthenia gravis without exacerbation (H)    -  1    Dilated aortic root (H)        Ulcer (H)        Mycobacterium avium complex (H)        MALIGN NEOPL PROSTATE-3/07        Hyperlipidemia LDL goal <130        HYPERTENSION        Age-related osteoporosis without current pathological fracture        Benign essential hypertension           Follow-ups after your visit        Who to contact     If you have questions or need follow up information about today's clinic visit or your schedule please contact [facility] directly at [phone].  Normal or non-critical lab and imaging results will be communicated to you by Ask[employer], letter or phone within 4 business days after the clinic has received the results. If you do not hear from us within 7 days, please contact the clinic through MyChart or phone. If you have a critical or abnormal lab result, we will notify you by phone as soon as possible.  Submit refill requests through [employer] or call your pharmacy and they will forward the refill request to us. Please allow 3 business days for your refill to be completed.          Additional Information About Your Visit        MyChart Information     [employer] gives you secure access to your electronic health record. If you see a primary care provider, you can also send messages to your care team and make appointments. If you have questions, please call your primary care clinic.  If you do not have a primary care provider, please call [phone] and they will assist you.        Care EveryWhere ID     This is your Care EveryWhere ID. This could be used by other organizations to access your [facility]  records  [id number]        Your Vitals Were     Pulse Temperature Pulse Oximetry BMI (Body Mass Index)          70 96.6  F (35.9  C) (Oral) 98% 42.76 kg/m2         Blood Pressure from Last 3 Encounters:   12/12/17 131/81   10/16/17 124/79   06/14/17 112/81    Weight from Last 3 Encounters:   12/12/17 298 lb (135.2 kg)   10/16/17 296 lb (134.3 kg)   06/14/17 296 lb (134.3 kg)              We Performed the Following     ACETYLCHOLINE MODULATING ANTIBODY     CBC with platelets     Comprehensive metabolic panel     Striated muscle antibody IgG     Striated Muscle Antibody Titer          Where to get your medicines      These medications were sent to [facility] [address] [place], [place] [address] [address] [facility] [address] [place]  [address] [facility] [address] [facility] [address], [facility] [address]     Phone:  [phone]     hydrochlorothiazide 25 MG tablet    losartan 50 MG tablet          Primary Care Provider Office Phone # Fax #    Luis Oliver [phone] [address]  [facility] [address]        Equal Access to Services     NGUYEN LOAIZA AH: Hadii aad ku hadasho Sojhoanali, waaxda luqadaha, qaybta kaalmada adeegyada, amadeo grady. So [facility] [phone].    ATENCIÓN: Si habla español, tiene a de la paz disposición servicios gratuitos de asistencia lingüística. [facility] [phone].    We comply with applicable federal civil rights laws and [place] laws. We do not discriminate on the basis of race, color, national origin, age, disability, sex, sexual orientation, or gender identity.            Thank you!     Thank you for choosing [facility]  for your care. Our goal is always to provide you with excellent care. Hearing back from our patients is one way we can continue to improve our services. Please take a few minutes to complete the written survey that you may receive in the mail after your visit with us. Thank you!             Your Updated  "Medication List - Protect others around you: Learn how to safely use, store and throw away your medicines at www.disposemymeds.org.          This list is accurate as of: 12/12/17 11:59 PM.  Always use your most recent med list.                   Brand Name Dispense Instructions for use Diagnosis    ASPIRIN PO      Take 81 mg by mouth 2 times daily        calcium carbonate-vitamin D 600-400 MG-UNIT Chew      Take 1 chew tab by mouth 2 times daily.        CELLCEPT 500 MG tablet      Take 1,000 mg by mouth 2 times daily 2 tabs in Am. ! Tab in Pm        CHROMIUM PICOLINATE      Take 1 tablet by mouth daily.        hydrochlorothiazide 25 MG tablet    HYDRODIURIL    90 tablet    Take 1 tablet (25 mg) by mouth daily    Benign essential hypertension       losartan 50 MG tablet    COZAAR    90 tablet    Take 1 tablet (50 mg) by mouth daily    Benign essential hypertension       NONFORMULARY      \"Life extension Multivitamin\" Take one tablet by mouth twice daily.        POMEGRANATE PO      Take 1 tablet by mouth 2 times daily.        TYLENOL PO           VITAMIN D3 PO      Take 2,000 Units by mouth 2 times daily.          "

## 2017-12-13 LAB
ALBUMIN SERPL-MCNC: 3.4 G/DL (ref 3.4–5)
ALP SERPL-CCNC: 66 U/L (ref 40–150)
ALT SERPL W P-5'-P-CCNC: 15 U/L (ref 0–70)
ANION GAP SERPL CALCULATED.3IONS-SCNC: 5 MMOL/L (ref 3–14)
AST SERPL W P-5'-P-CCNC: 10 U/L (ref 0–45)
BILIRUB SERPL-MCNC: 0.5 MG/DL (ref 0.2–1.3)
BUN SERPL-MCNC: 20 MG/DL (ref 7–30)
CALCIUM SERPL-MCNC: 9.4 MG/DL (ref 8.5–10.1)
CHLORIDE SERPL-SCNC: 102 MMOL/L (ref 94–109)
CO2 SERPL-SCNC: 32 MMOL/L (ref 20–32)
CREAT SERPL-MCNC: 0.77 MG/DL (ref 0.66–1.25)
GFR SERPL CREATININE-BSD FRML MDRD: >90 ML/MIN/1.7M2
GLUCOSE SERPL-MCNC: 83 MG/DL (ref 70–99)
POTASSIUM SERPL-SCNC: 4.4 MMOL/L (ref 3.4–5.3)
PROT SERPL-MCNC: 6.9 G/DL (ref 6.8–8.8)
SODIUM SERPL-SCNC: 139 MMOL/L (ref 133–144)

## 2017-12-14 LAB
STRIA MUS IGG SER QL IF: DETECTED
STRIA MUS IGG TITR SER IF: ABNORMAL {TITER}

## 2017-12-15 LAB — ACHR MOD AB/ACHR TOTAL SFR SER: 71 %

## 2018-04-09 ENCOUNTER — TRANSFERRED RECORDS (OUTPATIENT)
Dept: HEALTH INFORMATION MANAGEMENT | Facility: CLINIC | Age: 78
End: 2018-04-09

## 2018-08-07 ENCOUNTER — DOCUMENTATION ONLY (OUTPATIENT)
Dept: LAB | Facility: CLINIC | Age: 78
End: 2018-08-07

## 2018-08-07 NOTE — PROGRESS NOTES
Patient has a lab appointment tomorrow at 1:00pm, but no orders are in.  Patient is requesting for the 6 month lab work order.  Please place orders for lab, if needed.  Thank you lab,

## 2018-08-08 ENCOUNTER — OFFICE VISIT (OUTPATIENT)
Dept: FAMILY MEDICINE | Facility: CLINIC | Age: 78
End: 2018-08-08
Payer: COMMERCIAL

## 2018-08-08 ENCOUNTER — TELEPHONE (OUTPATIENT)
Dept: FAMILY MEDICINE | Facility: CLINIC | Age: 78
End: 2018-08-08

## 2018-08-08 VITALS
TEMPERATURE: 97.9 F | BODY MASS INDEX: 41.09 KG/M2 | OXYGEN SATURATION: 97 % | DIASTOLIC BLOOD PRESSURE: 78 MMHG | HEIGHT: 70 IN | SYSTOLIC BLOOD PRESSURE: 127 MMHG | HEART RATE: 71 BPM | WEIGHT: 287 LBS

## 2018-08-08 DIAGNOSIS — I10 ESSENTIAL HYPERTENSION, BENIGN: ICD-10-CM

## 2018-08-08 DIAGNOSIS — C61 MALIGNANT NEOPLASM OF PROSTATE (H): ICD-10-CM

## 2018-08-08 DIAGNOSIS — I49.3 PVC'S (PREMATURE VENTRICULAR CONTRACTIONS): ICD-10-CM

## 2018-08-08 DIAGNOSIS — A31.0 MYCOBACTERIUM AVIUM COMPLEX (H): ICD-10-CM

## 2018-08-08 DIAGNOSIS — G70.00 MYASTHENIA GRAVIS WITHOUT EXACERBATION (H): Primary | ICD-10-CM

## 2018-08-08 DIAGNOSIS — M81.0 AGE-RELATED OSTEOPOROSIS WITHOUT CURRENT PATHOLOGICAL FRACTURE: ICD-10-CM

## 2018-08-08 DIAGNOSIS — L97.909 ULCER OF LOWER EXTREMITY, UNSPECIFIED LATERALITY, UNSPECIFIED ULCER STAGE (H): ICD-10-CM

## 2018-08-08 DIAGNOSIS — I10 BENIGN ESSENTIAL HYPERTENSION: ICD-10-CM

## 2018-08-08 DIAGNOSIS — I77.810 DILATED AORTIC ROOT (H): ICD-10-CM

## 2018-08-08 DIAGNOSIS — E78.5 HYPERLIPIDEMIA LDL GOAL <130: ICD-10-CM

## 2018-08-08 LAB
ERYTHROCYTE [DISTWIDTH] IN BLOOD BY AUTOMATED COUNT: 14 % (ref 10–15)
HCT VFR BLD AUTO: 45.1 % (ref 40–53)
HGB BLD-MCNC: 14.3 G/DL (ref 13.3–17.7)
MCH RBC QN AUTO: 28.9 PG (ref 26.5–33)
MCHC RBC AUTO-ENTMCNC: 31.7 G/DL (ref 31.5–36.5)
MCV RBC AUTO: 91 FL (ref 78–100)
PLATELET # BLD AUTO: 226 10E9/L (ref 150–450)
RBC # BLD AUTO: 4.95 10E12/L (ref 4.4–5.9)
WBC # BLD AUTO: 5.5 10E9/L (ref 4–11)

## 2018-08-08 PROCEDURE — 82306 VITAMIN D 25 HYDROXY: CPT | Performed by: INTERNAL MEDICINE

## 2018-08-08 PROCEDURE — 85027 COMPLETE CBC AUTOMATED: CPT | Performed by: INTERNAL MEDICINE

## 2018-08-08 PROCEDURE — 83516 IMMUNOASSAY NONANTIBODY: CPT | Mod: 90 | Performed by: INTERNAL MEDICINE

## 2018-08-08 PROCEDURE — 86255 FLUORESCENT ANTIBODY SCREEN: CPT | Mod: 90 | Performed by: INTERNAL MEDICINE

## 2018-08-08 PROCEDURE — 36415 COLL VENOUS BLD VENIPUNCTURE: CPT | Performed by: INTERNAL MEDICINE

## 2018-08-08 PROCEDURE — 80053 COMPREHEN METABOLIC PANEL: CPT | Performed by: INTERNAL MEDICINE

## 2018-08-08 PROCEDURE — 99214 OFFICE O/P EST MOD 30 MIN: CPT | Performed by: INTERNAL MEDICINE

## 2018-08-08 PROCEDURE — 99000 SPECIMEN HANDLING OFFICE-LAB: CPT | Performed by: INTERNAL MEDICINE

## 2018-08-08 PROCEDURE — 84153 ASSAY OF PSA TOTAL: CPT | Performed by: INTERNAL MEDICINE

## 2018-08-08 PROCEDURE — 80061 LIPID PANEL: CPT | Performed by: INTERNAL MEDICINE

## 2018-08-08 NOTE — PROGRESS NOTES
"  SUBJECTIVE:   Thanh Goodrich is a 77 year old male who presents to clinic today for the following health issues:    Obesity  For the past week patient claims that he has been on a diet program called [employer], who provides 5 meals a week. Thinks that it is working.     Back Pain  For the past week Thanh has experienced non sever right side low back pain that is exacerbated by standing up.     No symptoms of myasthenia     Exercise: Has been trying to increase the amount he walks. Currently 10 min at a time. Partial squats.     Problem list and histories reviewed & adjusted, as indicated.  Additional history: as documented    Current Outpatient Prescriptions   Medication Sig Dispense Refill     Acetaminophen (TYLENOL PO)        ASPIRIN PO Take 325 mg by mouth daily        calcium carbonate-vitamin D 600-400 MG-UNIT CHEW Take 1 chew tab by mouth 2 times daily.         Cholecalciferol (VITAMIN D3 PO) Take 2,000 Units by mouth 2 times daily.         CHROMIUM PICOLINATE Take 1 tablet by mouth daily.         hydrochlorothiazide (HYDRODIURIL) 25 MG tablet Take 1 tablet (25 mg) by mouth daily 90 tablet 3     losartan (COZAAR) 50 MG tablet Take 1 tablet (50 mg) by mouth daily 90 tablet 3     mycophenolate (CELLCEPT) 500 MG tablet Take 1,000 mg by mouth 2 times daily 2 tabs in Am. ! Tab in Pm       NONFORMULARY \"Life extension Multivitamin\" Take one tablet by mouth twice daily.        Pomegranate, Punica granatum, (POMEGRANATE PO) Take 1 tablet by mouth 2 times daily.         Allergies   Allergen Reactions     Ciprofloxacin Other (See Comments)     Contraindicated for myasthenia gravis patients     Procainamide Other (See Comments)     Contraindicated for myasthenia gravis patients     Quinidine Other (See Comments)     Contraindicated for myasthenia gravis patients     Quinine Other (See Comments)     Contraindicated for myasthenia gravis patients       Reviewed and updated as needed this visit by " "clinical staff  Tobacco  Allergies  Meds  Problems       Reviewed and updated as needed this visit by Provider         ROS:  CONSTITUTIONAL: POSITIVE for slight weight loss  ENT/MOUTH: NEGATIVE for ear, mouth and throat problems  RESP: POSITIVE for improvement in breathing the more he walks.   CV: POSITIVE for heart racing if he does not hydrate regularly  GI: NEGATIVE for nausea, abdominal pain, heartburn, or change in bowel habits  : NEGATIVE for frequency, dysuria, or hematuria  MUSCULOSKELETAL: POSITIVE for minor ankle swelling  NEURO: NEGATIVE for weakness, dizziness or paresthesias  PSYCHIATRIC: NEGATIVE for changes in mood or affect    This document serves as a record of the services and decisions personally performed and made by Luis Oliver MD. It was created on his behalf by Brendan Mark, a trained medical scribe.  The creation of this document is based on the scribe's personal observations and the provider's statements to the medical scribe.  Brendan Mark, August 8, 2018 1:20 PM    OBJECTIVE:     /78 (BP Location: Right arm, Cuff Size: Adult Large)  Pulse 71  Temp 97.9  F (36.6  C) (Tympanic)  Ht 1.778 m (5' 10\")  Wt 130.2 kg (287 lb)  SpO2 97%  BMI 41.18 kg/m2  Body mass index is 41.18 kg/(m^2).  GENERAL: healthy, alert and no distress, slight weight loss since previous visit   NECK: no adenopathy, no asymmetry, masses, or scars and thyroid normal to palpation  RESP: lungs clear to auscultation - no rales, rhonchi or wheezes  CV: regular rate and rhythm, normal S1 S2, no S3 or S4, no murmur, click or rub, peripheral pulses strong  ABDOMEN: soft, nontender, no hepatosplenomegaly, no masses and bowel sounds normal, Morbidly obese, without CVA tenderness  MS: no gross musculoskeletal defects noted,extremities non tender no edema, Use of walker to ambulate, unable to get onto examination table due to lack of strength.   SKIN: no suspicious lesions or rashes  NEURO: Normal strength and " tone, mentation intact and speech normal  PSYCH: mentation appears normal, affect normal/bright    Diagnostic Test Results:  Wt Readings from Last 10 Encounters:   08/08/18 130.2 kg (287 lb)   12/12/17 135.2 kg (298 lb)   10/16/17 134.3 kg (296 lb)   06/14/17 134.3 kg (296 lb)   01/16/17 132.9 kg (293 lb)   06/23/16 133.8 kg (295 lb)   04/18/16 133.8 kg (295 lb)   03/10/15 128.7 kg (283 lb 12.8 oz)   02/18/15 130 kg (286 lb 9.6 oz)   01/20/15 132.9 kg (293 lb)       Results for orders placed or performed in visit on 08/08/18 (from the past 24 hour(s))   CBC with platelets   Result Value Ref Range    WBC 5.5 4.0 - 11.0 10e9/L    RBC Count 4.95 4.4 - 5.9 10e12/L    Hemoglobin 14.3 13.3 - 17.7 g/dL    Hematocrit 45.1 40.0 - 53.0 %    MCV 91 78 - 100 fl    MCH 28.9 26.5 - 33.0 pg    MCHC 31.7 31.5 - 36.5 g/dL    RDW 14.0 10.0 - 15.0 %    Platelet Count 226 150 - 450 10e9/L       ASSESSMENT/PLAN:   1. Myasthenia gravis without exacerbation (H)  Labs today  - Striated muscle antibody IgG; Future  - Acetylcholine modulating antibody; Future  - Striated muscle antibody IgG  - Acetylcholine modulating antibody    2. 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 HYPERTENSION  controlled  BP Readings from Last 6 Encounters:   08/08/18 127/78   12/12/17 131/81   10/16/17 124/79   06/14/17 112/81   01/16/17 123/83   06/23/16 124/71   - CBC with platelets; Future  - Comprehensive metabolic panel; Future  - CBC with platelets  - Comprehensive metabolic panel    4. Hyperlipidemia LDL goal <130  Not on statin     - Lipid panel reflex to direct LDL Fasting; Future  - Lipid panel reflex to direct LDL Fasting    5. PVC's (premature ventricular contractions)  Controlled, continue current medication     6. Ulcer of lower extremity, unspecified laterality, unspecified ulcer stage (H)  Improving    7. Age-related osteoporosis without current pathological fracture    -  Vitamin D Deficiency; Future  - Vitamin D Deficiency    8. Dilated aortic root (H)  Controlled, continue current medication     9. Mycobacterium avium complex (H)      10. Benign essential hypertension  - CBC with platelets; Future  - Comprehensive metabolic panel; Future  - CBC with platelets  - Comprehensive metabolic panel    11. Low back pain   Apply heat to low back 2x daily to area of discomfort    The information in this document, created by a medical scribe for me, accurately reflects the services I personally performed and the decisions made by me. I have reviewed and approved this document for accuracy.  Dr. Luis Oliver, August 8, 2018 1:32 PM       Luis Oliver MD  [facility]

## 2018-08-08 NOTE — MR AVS SNAPSHOT
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  Normal or non-critical lab and imaging results will be communicated to you by Ernie'shart, letter or phone within 4 business days after the clinic has received the results. If you do not hear from us within 7 days, please contact the clinic through Ernie'shart or phone. If you have a critical or abnormal lab result, we will notify you by phone as soon as possible.  Submit refill requests through [employer] or call your pharmacy and they will forward the refill request to us. Please allow 3 business days for your refill to be completed.          Additional Information About Your Visit        MyChart Information     [employer] gives you secure access to your electronic health record. If you see a primary care provider, you can also send messages to your care team and make appointments. If you have questions, please call your primary care clinic.  If you do not have a primary care provider, please call [phone] and they will assist you.        Care EveryWhere ID     This is  "your Care EveryWhere ID. This could be used by other organizations to access your [place] medical records  [id number]        Your Vitals Were     Pulse Temperature Height Pulse Oximetry BMI (Body Mass Index)       71 97.9  F (36.6  C) (Tympanic) 5' 10\" (1.778 m) 97% 41.18 kg/m2        Blood Pressure from Last 3 Encounters:   08/08/18 127/78   12/12/17 131/81   10/16/17 124/79    Weight from Last 3 Encounters:   08/08/18 287 lb (130.2 kg)   12/12/17 298 lb (135.2 kg)   10/16/17 296 lb (134.3 kg)              We Performed the Following     Acetylcholine modulating antibody     CBC with platelets     Comprehensive metabolic panel     Lipid panel reflex to direct LDL Fasting     Prostate spec antigen screen     Striated muscle antibody IgG     Vitamin D Deficiency        Primary Care Provider Office Phone # Fax #    Luis Oliver [phone] [address] [address]        Equal Access to Services     [facility] : Hadii aad ku hadasho Soomaali, waaxda luqadaha, qaybta kaalmada adeegyada, waxay kristanin haykatelin chao . So [facility] [phone].    ATENCIÓN: Si habla español, tiene a de la paz disposición servicios gratuitos de asistencia lingüística. [facility] [phone].    We comply with applicable federal civil rights laws and [place] laws. We do not discriminate on the basis of race, color, national origin, age, disability, sex, sexual orientation, or gender identity.            Thank you!     Thank you for choosing [facility]  for your care. Our goal is always to provide you with excellent care. Hearing back from our patients is one way we can continue to improve our services. Please take a few minutes to complete the written survey that you may receive in the mail after your visit with us. Thank you!             Your Updated Medication List - Protect others around you: Learn how to safely use, store and throw away your medicines at " "www.disposemymeds.org.          This list is accurate as of 8/8/18 11:59 PM.  Always use your most recent med list.                   Brand Name Dispense Instructions for use Diagnosis    ASPIRIN PO      Take 325 mg by mouth daily        calcium carbonate-vitamin D 600-400 MG-UNIT Chew      Take 1 chew tab by mouth 2 times daily.        CELLCEPT 500 MG tablet      Take 1,000 mg by mouth 2 times daily 2 tabs in Am. ! Tab in Pm        CHROMIUM PICOLINATE      Take 1 tablet by mouth daily.        hydrochlorothiazide 25 MG tablet    HYDRODIURIL    90 tablet    Take 1 tablet (25 mg) by mouth daily    Benign essential hypertension       losartan 50 MG tablet    COZAAR    90 tablet    Take 1 tablet (50 mg) by mouth daily    Benign essential hypertension       NONFORMULARY      \"Life extension Multivitamin\" Take one tablet by mouth twice daily.        POMEGRANATE PO      Take 1 tablet by mouth 2 times daily.        TYLENOL PO           VITAMIN D3 PO      Take 2,000 Units by mouth 2 times daily.          "

## 2018-08-08 NOTE — TELEPHONE ENCOUNTER
Reason for Call:  Other appointment    Detailed comments: The patient was a Dr Haile patient and now is seeing Dr Oliver  He would like to become a patient of Dr Toledo because Dr Haile referred him and he waited   To make an appointment because that is what we told him to do at first and when he needed an appt  Dr Toledo was no longer taking new patient     Phone Number Patient can be reached at: Home number on file [phone] (home)    Best Time: anytime    Can we leave a detailed message on this number? YES    Call taken on 8/8/2018 at 1:24 PM by Yamileth Cruz

## 2018-08-09 LAB
ALBUMIN SERPL-MCNC: 3.8 G/DL (ref 3.4–5)
ALP SERPL-CCNC: 61 U/L (ref 40–150)
ALT SERPL W P-5'-P-CCNC: 16 U/L (ref 0–70)
ANION GAP SERPL CALCULATED.3IONS-SCNC: 8 MMOL/L (ref 3–14)
AST SERPL W P-5'-P-CCNC: 16 U/L (ref 0–45)
BILIRUB SERPL-MCNC: 0.5 MG/DL (ref 0.2–1.3)
BUN SERPL-MCNC: 17 MG/DL (ref 7–30)
CALCIUM SERPL-MCNC: 9.2 MG/DL (ref 8.5–10.1)
CHLORIDE SERPL-SCNC: 99 MMOL/L (ref 94–109)
CHOLEST SERPL-MCNC: 175 MG/DL
CO2 SERPL-SCNC: 32 MMOL/L (ref 20–32)
CREAT SERPL-MCNC: 0.77 MG/DL (ref 0.66–1.25)
DEPRECATED CALCIDIOL+CALCIFEROL SERPL-MC: 101 UG/L (ref 20–75)
GFR SERPL CREATININE-BSD FRML MDRD: >90 ML/MIN/1.7M2
GLUCOSE SERPL-MCNC: 91 MG/DL (ref 70–99)
HDLC SERPL-MCNC: 53 MG/DL
LDLC SERPL CALC-MCNC: 104 MG/DL
NONHDLC SERPL-MCNC: 122 MG/DL
POTASSIUM SERPL-SCNC: 3.8 MMOL/L (ref 3.4–5.3)
PROT SERPL-MCNC: 7.1 G/DL (ref 6.8–8.8)
PSA SERPL-ACNC: <0.01 UG/L (ref 0–4)
SODIUM SERPL-SCNC: 139 MMOL/L (ref 133–144)
TRIGL SERPL-MCNC: 90 MG/DL

## 2018-08-11 LAB
ACHR MOD AB/ACHR TOTAL SFR SER: 58 %
STRIA MUS IGG SER QL IF: DETECTED
STRIA MUS IGG TITR SER IF: ABNORMAL {TITER}

## 2018-08-24 ENCOUNTER — OFFICE VISIT (OUTPATIENT)
Dept: FAMILY MEDICINE | Facility: CLINIC | Age: 78
End: 2018-08-24
Payer: COMMERCIAL

## 2018-08-24 VITALS
WEIGHT: 288.1 LBS | DIASTOLIC BLOOD PRESSURE: 66 MMHG | HEIGHT: 70 IN | TEMPERATURE: 97 F | BODY MASS INDEX: 41.24 KG/M2 | HEART RATE: 74 BPM | OXYGEN SATURATION: 96 % | SYSTOLIC BLOOD PRESSURE: 122 MMHG

## 2018-08-24 DIAGNOSIS — G70.00 MYASTHENIA GRAVIS WITHOUT EXACERBATION (H): Primary | ICD-10-CM

## 2018-08-24 DIAGNOSIS — E66.01 MORBID OBESITY (H): ICD-10-CM

## 2018-08-24 DIAGNOSIS — M54.50 ACUTE LEFT-SIDED LOW BACK PAIN WITHOUT SCIATICA: ICD-10-CM

## 2018-08-24 DIAGNOSIS — I10 BENIGN ESSENTIAL HYPERTENSION: ICD-10-CM

## 2018-08-24 DIAGNOSIS — C61 MALIGNANT NEOPLASM OF PROSTATE (H): ICD-10-CM

## 2018-08-24 DIAGNOSIS — M81.0 AGE-RELATED OSTEOPOROSIS WITHOUT CURRENT PATHOLOGICAL FRACTURE: ICD-10-CM

## 2018-08-24 DIAGNOSIS — I77.810 DILATED AORTIC ROOT (H): ICD-10-CM

## 2018-08-24 PROBLEM — A31.0 MYCOBACTERIUM AVIUM COMPLEX (H): Status: RESOLVED | Noted: 2017-01-16 | Resolved: 2018-08-24

## 2018-08-24 PROCEDURE — 99214 OFFICE O/P EST MOD 30 MIN: CPT | Performed by: INTERNAL MEDICINE

## 2018-08-24 NOTE — MR AVS SNAPSHOT
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             Follow-ups after your visit        Future tests that were ordered for you today     Open Future Orders        Priority Expected Expires Ordered    Echocardiogram Complete Routine  8/24/2019 8/24/2018            Who to contact     If you have questions or need follow up information about today's clinic visit or your schedule please contact [facility] directly at [phone].  Normal or non-critical lab and imaging results will be communicated to you by BoostSuitehart, letter or phone within 4 business days after the clinic has received the results. If you do not hear from us within 7 days, please contact the clinic through [email or web address] or phone. If you have a critical or abnormal lab result, we will notify you by phone as soon as possible.  Submit refill requests through [employer] or call your pharmacy and they will forward the refill request to us. Please allow 3 business days for your refill to be completed.          Additional Information About Your Visit        MyChart Information     [employer] gives you secure access to your electronic health record. If you see a primary care provider, you can also send messages to your care team and make appointments. If you have questions, please call your primary care clinic.  If you do not have a primary care provider, " "please call [phone] and they will assist you.        Care EveryWhere ID     This is your Care EveryWhere ID. This could be used by other organizations to access your [place] medical records  [id number]        Your Vitals Were     Pulse Temperature Height Pulse Oximetry BMI (Body Mass Index)       74 97  F (36.1  C) (Oral) 5' 10\" (1.778 m) 96% 41.34 kg/m2        Blood Pressure from Last 3 Encounters:   08/24/18 122/66   08/08/18 127/78   12/12/17 131/81    Weight from Last 3 Encounters:   08/24/18 288 lb 1.6 oz (130.7 kg)   08/08/18 287 lb (130.2 kg)   12/12/17 298 lb (135.2 kg)               Primary Care Provider Office Phone # Fax #    Martinez Toledo [phone] [address]  [place] [address]        Equal Access to Services     [facility]: Hadii aad ku hadasho Soomaali, waaxda luqadaha, qaybta kaalmada adeegyada, waxay idiin hayedvinn clarissa chao . So [facility] [phone].    ATENCIÓN: Si habla español, tiene a de la paz disposición servicios gratuitos de asistencia lingüística. Llame al [phone].    We comply with applicable federal civil rights laws and [place] laws. We do not discriminate on the basis of race, color, national origin, age, disability, sex, sexual orientation, or gender identity.            Thank you!     Thank you for choosing [facility]  for your care. Our goal is always to provide you with excellent care. Hearing back from our patients is one way we can continue to improve our services. Please take a few minutes to complete the written survey that you may receive in the mail after your visit with us. Thank you!             Your Updated Medication List - Protect others around you: Learn how to safely use, store and throw away your medicines at www.disposemymeds.org.          This list is accurate as of 8/24/18  3:12 PM.  Always use your most recent med list.                   Brand Name Dispense Instructions for use Diagnosis    " "ASPIRIN PO      Take 325 mg by mouth daily        calcium carbonate-vitamin D 600-400 MG-UNIT Chew      Take 1 chew tab by mouth 2 times daily.        CELLCEPT 500 MG tablet      Take 1,000 mg by mouth 2 times daily 2 tabs in Am. ! Tab in Pm        CHROMIUM PICOLINATE      Take 1 tablet by mouth daily.        hydrochlorothiazide 25 MG tablet    HYDRODIURIL    90 tablet    Take 1 tablet (25 mg) by mouth daily    Benign essential hypertension       losartan 50 MG tablet    COZAAR    90 tablet    Take 1 tablet (50 mg) by mouth daily    Benign essential hypertension       NONFORMULARY      \"Life extension Multivitamin\" Take one tablet by mouth twice daily.        POMEGRANATE PO      Take 1 tablet by mouth 2 times daily.        TYLENOL PO           VITAMIN D3 PO      Take 2,000 Units by mouth 2 times daily.          "

## 2018-08-24 NOTE — PROGRESS NOTES
SUBJECTIVE:   Thanh Goodrich is a 77 year old male who presents to clinic today for the following health issues:      New Patient/Transfer of Care    Musculoskeletal problem/pain      Duration: 3 weeks    Description  Location: Mid-low back pain    Intensity:  moderate    Accompanying signs and symptoms: none    History  Previous similar problem: no   Previous evaluation:  none    Precipitating or alleviating factors:  Trauma or overuse: no   Aggravating factors include: sitting and position change in bed    Therapies tried and outcome: heat and acetaminophen somewhat helpful     Really nice juan with MG and morbid obesity  He was on prednisone for his MG and gained a lot of weight on prednisone  He has osteoporosis, but declines bisphosphonate therapy  He now takes cellcept for MG  He was discovered to have a dilated ascending aorta and has not had imaging for 4 years  He has hypertension as well    His back has been hurting mild to moderate pain with no injury or trauma antecedent   APAP and heat help  [place] [facility] [place] [facility] [place] and wanted to make sure his kidney was not the cause  He denies dysuria, hematuria, nausea, vomiting   His pain does no radiate to his legs  He denies leg numbness or weakness     Problem list and histories reviewed & adjusted, as indicated.  Additional history: as documented    Patient Active Problem List   Diagnosis     HYPERTENSION     Obesity     Myasthenia gravis without exacerbation (H)     MALIGN NEOPL PROSTATE-3/07     Edema     Osteoporosis     Incontinence of urine     Hyperlipidemia LDL goal <130     PVC's (premature ventricular contractions)     Advanced directives, counseling/discussion     Leg ulcer (H)     Cellulitis of left leg     Microscopic hematuria     Panniculitis     Ulcer (H)     Skin nodule     History of steroid therapy     Atypical mycobacterial infection     Pain of left scapula     Osteoporosis     RBBB     Diastolic dysfunction      "Dilated aortic root (H)     Neck pain     History of mycobacterial infection     Mycobacterium avium complex (H)     Benign essential hypertension     Past Surgical History:   Procedure Laterality Date     Prostate Cancer Cryotherapy  2007     TONSILLECTOMY  1945       Social History   Substance Use Topics     Smoking status: Never Smoker     Smokeless tobacco: Never Used     Alcohol use 0.0 oz/week     0 Standard drinks or equivalent per week      Comment: once monthly     Family History   Problem Relation Age of Onset     Hypertension Mother      Hypertension Father      Cancer Father      Prostate     Diabetes Maternal Grandmother      C.A.D. Maternal Grandmother      Cerebrovascular Disease Maternal Grandfather      Cancer Paternal Uncle      Prostate         Current Outpatient Prescriptions   Medication Sig Dispense Refill     Acetaminophen (TYLENOL PO)        ASPIRIN PO Take 325 mg by mouth daily        calcium carbonate-vitamin D 600-400 MG-UNIT CHEW Take 1 chew tab by mouth 2 times daily.         Cholecalciferol (VITAMIN D3 PO) Take 2,000 Units by mouth 2 times daily.         CHROMIUM PICOLINATE Take 1 tablet by mouth daily.         hydrochlorothiazide (HYDRODIURIL) 25 MG tablet Take 1 tablet (25 mg) by mouth daily 90 tablet 3     losartan (COZAAR) 50 MG tablet Take 1 tablet (50 mg) by mouth daily 90 tablet 3     mycophenolate (CELLCEPT) 500 MG tablet Take 1,000 mg by mouth 2 times daily 2 tabs in Am. ! Tab in Pm       NONFORMULARY \"Life extension Multivitamin\" Take one tablet by mouth twice daily.        Pomegranate, Punica granatum, (POMEGRANATE PO) Take 1 tablet by mouth 2 times daily.         Allergies   Allergen Reactions     Ciprofloxacin Other (See Comments)     Contraindicated for myasthenia gravis patients     Procainamide Other (See Comments)     Contraindicated for myasthenia gravis patients     Quinidine Other (See Comments)     Contraindicated for myasthenia gravis patients     Quinine Other " "(See Comments)     Contraindicated for myasthenia gravis patients       Reviewed and updated as needed this visit by clinical staff  Tobacco  Allergies  Meds  Med Hx  Surg Hx  Fam Hx  Soc Hx      Reviewed and updated as needed this visit by Provider  Tobacco  Med Hx  Surg Hx  Fam Hx  Soc Hx        ROS:  Constitutional, HEENT, cardiovascular, pulmonary, gi and gu systems are negative, except as otherwise noted.    OBJECTIVE:     /66 (BP Location: Right arm, Cuff Size: Adult Large)  Pulse 74  Temp 97  F (36.1  C) (Oral)  Ht 5' 10\" (1.778 m)  Wt 288 lb 1.6 oz (130.7 kg)  SpO2 96%  BMI 41.34 kg/m2  Body mass index is 41.34 kg/(m^2).  GENERAL: healthy, alert and no distress,   NECK: no adenopathy, no asymmetry, masses, or scars and thyroid normal to palpation  RESP: lungs clear to auscultation - no rales, rhonchi or wheezes  CV: regular rate and rhythm, normal S1 S2, no S3 or S4, no murmur, click or rub, peripheral pulses strong  ABDOMEN: soft, nontender, no hepatosplenomegaly, no masses and bowel sounds normal, Morbidly obese, without CVA tenderness  MS: Mild area of reproducible muscle tenderness in left lower back Paraspinous muscles no midline spine tenderness  SKIN: no suspicious lesions or rashes  NEURO: Normal strength and tone, mentation intact and speech normal  PSYCH: mentation appears normal, affect normal/bright    Diagnostic Test Results:  Results for orders placed or performed in visit on 08/08/18   CBC with platelets   Result Value Ref Range    WBC 5.5 4.0 - 11.0 10e9/L    RBC Count 4.95 4.4 - 5.9 10e12/L    Hemoglobin 14.3 13.3 - 17.7 g/dL    Hematocrit 45.1 40.0 - 53.0 %    MCV 91 78 - 100 fl    MCH 28.9 26.5 - 33.0 pg    MCHC 31.7 31.5 - 36.5 g/dL    RDW 14.0 10.0 - 15.0 %    Platelet Count 226 150 - 450 10e9/L   Comprehensive metabolic panel   Result Value Ref Range    Sodium 139 133 - 144 mmol/L    Potassium 3.8 3.4 - 5.3 mmol/L    Chloride 99 94 - 109 mmol/L    Carbon Dioxide 32 " 20 - 32 mmol/L    Anion Gap 8 3 - 14 mmol/L    Glucose 91 70 - 99 mg/dL    Urea Nitrogen 17 7 - 30 mg/dL    Creatinine 0.77 0.66 - 1.25 mg/dL    GFR Estimate >90 >60 mL/min/1.7m2    GFR Estimate If Black >90 >60 mL/min/1.7m2    Calcium 9.2 8.5 - 10.1 mg/dL    Bilirubin Total 0.5 0.2 - 1.3 mg/dL    Albumin 3.8 3.4 - 5.0 g/dL    Protein Total 7.1 6.8 - 8.8 g/dL    Alkaline Phosphatase 61 40 - 150 U/L    ALT 16 0 - 70 U/L    AST 16 0 - 45 U/L   Lipid panel reflex to direct LDL Fasting   Result Value Ref Range    Cholesterol 175 <200 mg/dL    Triglycerides 90 <150 mg/dL    HDL Cholesterol 53 >39 mg/dL    LDL Cholesterol Calculated 104 (H) <100 mg/dL    Non HDL Cholesterol 122 <130 mg/dL   Vitamin D Deficiency   Result Value Ref Range    Vitamin D Deficiency screening 101 (H) 20 - 75 ug/L   Prostate spec antigen screen   Result Value Ref Range    PSA <0.01 0 - 4 ug/L   Striated muscle antibody IgG   Result Value Ref Range    Striated Muscle Antibody IgG Detected (A) <1:40   Acetylcholine modulating antibody   Result Value Ref Range    AcetChol Modul Nadiya 58 (H) <=45 %   Striated Muscle Antibody Titer   Result Value Ref Range    Striated Muscle Antibody IgG Titer 1:320 (A) <1:40       ASSESSMENT/PLAN:       1. Myasthenia gravis without exacerbation (H)  Stable; continue follow up with neurologist ([facility]_    2. Morbid obesity (H)  Counseled on diet and exercise interventions to promote weight loss     3. Dilated aortic root (H)  Recheck echo   - Echocardiogram Complete; Future    4. Benign essential hypertension  Well controlled     5. Age-related osteoporosis without current pathological fracture  Discussed reducing vitamin D dose      6. MALIGN NEOPL PROSTATE-3/07  Undetectable PSA earlier this month    7. Acute left-sided low back pain without sciatica  Suspect lumbar strain (muscular etiology)  Recommended x-ray to exclude compression fracture given history of osteoporosis; he declined that test today, but  would consider if pain does not improve or symptoms worsen  No evidence to suggest a renal cause for pain       Shingrix recommended     He declines screening for colon cancer     Martinez Toledo MD  [facility]

## 2018-09-27 DIAGNOSIS — I10 BENIGN ESSENTIAL HYPERTENSION: ICD-10-CM

## 2018-09-27 RX ORDER — HYDROCHLOROTHIAZIDE 25 MG/1
TABLET ORAL
Qty: 90 TABLET | Refills: 3 | Status: SHIPPED | OUTPATIENT
Start: 2018-09-27 | End: 2019-07-05

## 2018-09-27 NOTE — TELEPHONE ENCOUNTER
"Last Written Prescription Date:  12/12/17  Last Fill Quantity: 90 tablet,  # refills: 3   Last office visit: 8/8/2018 with prescribing provider:  Melody   Future Office Visit:      Requested Prescriptions   Pending Prescriptions Disp Refills     hydrochlorothiazide (HYDRODIURIL) 25 MG tablet [Pharmacy Med Name: HYDROCHLOROTHIAZIDE 25MG TABLETS] 90 tablet 0     Sig: TAKE 1 TABLET(25 MG) BY MOUTH DAILY    Diuretics (Including Combos) Protocol Passed    9/27/2018 10:05 AM       Passed - Blood pressure under 140/90 in past 12 months    BP Readings from Last 3 Encounters:   08/24/18 122/66   08/08/18 127/78   12/12/17 131/81                Passed - Recent (12 mo) or future (30 days) visit within the authorizing provider's specialty    Patient had office visit in the last 12 months or has a visit in the next 30 days with authorizing provider or within the authorizing provider's specialty.  See \"Patient Info\" tab in inbasket, or \"Choose Columns\" in Meds & Orders section of the refill encounter.           Passed - Patient is age 18 or older       Passed - Normal serum creatinine on file in past 12 months    Recent Labs   Lab Test  08/08/18   1245   CR  0.77             Passed - Normal serum potassium on file in past 12 months    Recent Labs   Lab Test  08/08/18   1245   POTASSIUM  3.8                   Passed - Normal serum sodium on file in past 12 months    Recent Labs   Lab Test  08/08/18   1245   NA  139                "

## 2018-09-28 ENCOUNTER — HOSPITAL ENCOUNTER (OUTPATIENT)
Dept: CARDIOLOGY | Facility: CLINIC | Age: 78
Discharge: HOME | End: 2018-09-28
Attending: INTERNAL MEDICINE | Admitting: INTERNAL MEDICINE
Payer: MEDICARE

## 2018-09-28 DIAGNOSIS — I77.810 DILATED AORTIC ROOT (H): ICD-10-CM

## 2018-09-28 PROCEDURE — 93306 TTE W/DOPPLER COMPLETE: CPT

## 2018-09-28 PROCEDURE — 93306 TTE W/DOPPLER COMPLETE: CPT | Mod: 26 | Performed by: INTERNAL MEDICINE

## 2018-10-02 NOTE — PROGRESS NOTES
The following letter pertains to your most recent diagnostic tests:    The aortic root is moderately dilated, but not wide enough to warrant surgical repair.  We should recheck in one year to follow it closely.       Sincerely,    Dr. Toledo

## 2018-10-12 ENCOUNTER — OFFICE VISIT (OUTPATIENT)
Dept: FAMILY MEDICINE | Facility: CLINIC | Age: 78
End: 2018-10-12
Payer: COMMERCIAL

## 2018-10-12 ENCOUNTER — RADIANT APPOINTMENT (OUTPATIENT)
Dept: GENERAL RADIOLOGY | Facility: CLINIC | Age: 78
End: 2018-10-12
Attending: INTERNAL MEDICINE
Payer: COMMERCIAL

## 2018-10-12 VITALS
HEIGHT: 70 IN | BODY MASS INDEX: 41.82 KG/M2 | OXYGEN SATURATION: 97 % | TEMPERATURE: 97 F | DIASTOLIC BLOOD PRESSURE: 74 MMHG | SYSTOLIC BLOOD PRESSURE: 127 MMHG | HEART RATE: 66 BPM | WEIGHT: 292.1 LBS

## 2018-10-12 DIAGNOSIS — M54.9 LEFT-SIDED BACK PAIN, UNSPECIFIED BACK LOCATION, UNSPECIFIED CHRONICITY: ICD-10-CM

## 2018-10-12 DIAGNOSIS — M54.9 LEFT-SIDED BACK PAIN, UNSPECIFIED BACK LOCATION, UNSPECIFIED CHRONICITY: Primary | ICD-10-CM

## 2018-10-12 PROCEDURE — 72100 X-RAY EXAM L-S SPINE 2/3 VWS: CPT

## 2018-10-12 PROCEDURE — 72072 X-RAY EXAM THORAC SPINE 3VWS: CPT

## 2018-10-12 PROCEDURE — 99213 OFFICE O/P EST LOW 20 MIN: CPT | Performed by: INTERNAL MEDICINE

## 2018-10-12 NOTE — LETTER
[facility]  [address] [facility]  [address]  [place]  [address]  Tel: [phone]    October 15, 2018    Thanh Goodrich  [address]   [facility] [address]        Dear Mr. Goodrich,    The following letter pertains to your most recent diagnostic tests:    Good news! No obvious dangerous problems are identified in the lumbar spine.  There are severe degenerative changes noted in the lower back.  The upper back or thoracic spine looks normal.      Bottom line:  I think you should proceed with physical therapy as we discussed in clinic.  If the pain worsens with therapy or the pain fails to improved after 3 - 4 weeks of participating in therapy activities, then please contact me by MyChart to arrange for an MRI of the lumbar spine.     If you have any further questions or problems, please contact our office.      Sincerely,    Martinez Toledo MD/ Chrissy Brooks CMA  Results for orders placed or performed in visit on 10/12/18   XR Thoracic Spine 3 Views    Narrative    THORACIC SPINE THREE VIEWS 10/12/2018 5:43 PM     HISTORY: Left-sided back pain, unspecified back location, unspecified  chronicity.    COMPARISON: April 4, 2013    FINDINGS: Mild multilevel degenerative changes. Mild scoliosis of the  thoracic spine.  Unremarkable thoracic kyphosis. There is no acute  fracture or malalignment. There are no destructive or worrisome  sclerotic bony lesions.      Impression    IMPRESSION: No acute osseous abnormality demonstrated.    SAHRA GARZA MD               Enclosure: Lab Results

## 2018-10-12 NOTE — PROGRESS NOTES
SUBJECTIVE:   Thanh Goodrich is a 77 year old male who presents to clinic today for the following health issues:      F/up Left Low back pain x 2 months    This is a pleasant 77-year-old man with myasthenia gravis who had been on prednisone in the past, morbid obesity, hyperlipidemia, hypertension, diastolic dysfunction, dilated aortic root, prostate cancer.  He is a non-smoker.  He presents with a 2-month history of persistent pain in his left lower back that does not radiate down his legs.  The pain is moderate and intermittent.  Usually the pain is controlled by Tylenol.  Most days he does not have to take Tylenol.  He is most bothered by the fact that he is not sure what is causing the pain.  He denies associated leg numbness or weakness.  He denies new bowel or bladder symptoms.  He denies a rash overlying the area that is described as painful.    Problem list and histories reviewed & adjusted, as indicated.  Additional history: as documented    Patient Active Problem List   Diagnosis     HYPERTENSION     Myasthenia gravis without exacerbation (H)     MALIGN NEOPL PROSTATE-3/07     Edema     Osteoporosis     Incontinence of urine     Hyperlipidemia LDL goal <130     PVC's (premature ventricular contractions)     Advanced directives, counseling/discussion     Leg ulcer (H)     Cellulitis of left leg     Microscopic hematuria     Panniculitis     Ulcer     Skin nodule     History of steroid therapy     Atypical mycobacterial infection     Pain of left scapula     Osteoporosis     RBBB     Diastolic dysfunction     Dilated aortic root (H)     Neck pain     History of mycobacterial infection     Benign essential hypertension     Morbid obesity (H)     Past Surgical History:   Procedure Laterality Date     Prostate Cancer Cryotherapy  2007     TONSILLECTOMY  1945       Social History   Substance Use Topics     Smoking status: Never Smoker     Smokeless tobacco: Never Used     Alcohol use 0.0 oz/week     0  "Standard drinks or equivalent per week      Comment: once monthly     Family History   Problem Relation Age of Onset     Hypertension Mother      Hypertension Father      Cancer Father      Prostate     Diabetes Maternal Grandmother      C.A.D. Maternal Grandmother      Cerebrovascular Disease Maternal Grandfather      Cancer Paternal Uncle      Prostate         Current Outpatient Prescriptions   Medication Sig Dispense Refill     Acetaminophen (TYLENOL PO)        ASPIRIN PO Take 325 mg by mouth daily        calcium carbonate-vitamin D 600-400 MG-UNIT CHEW Take 1 chew tab by mouth 2 times daily.         Cholecalciferol (VITAMIN D3 PO) Take 2,000 Units by mouth 2 times daily.         CHROMIUM PICOLINATE Take 1 tablet by mouth daily.         hydrochlorothiazide (HYDRODIURIL) 25 MG tablet TAKE 1 TABLET(25 MG) BY MOUTH DAILY 90 tablet 3     losartan (COZAAR) 50 MG tablet Take 1 tablet (50 mg) by mouth daily 90 tablet 3     mycophenolate (CELLCEPT) 500 MG tablet Take 1,000 mg by mouth 2 times daily 2 tabs in Am. ! Tab in Pm       NONFORMULARY \"Life extension Multivitamin\" Take one tablet by mouth twice daily.        Pomegranate, Punica granatum, (POMEGRANATE PO) Take 1 tablet by mouth 2 times daily.         [DISCONTINUED] hydrochlorothiazide (HYDRODIURIL) 25 MG tablet Take 1 tablet (25 mg) by mouth daily 90 tablet 3     Allergies   Allergen Reactions     Ciprofloxacin Other (See Comments)     Contraindicated for myasthenia gravis patients     Procainamide Other (See Comments)     Contraindicated for myasthenia gravis patients     Quinidine Other (See Comments)     Contraindicated for myasthenia gravis patients     Quinine Other (See Comments)     Contraindicated for myasthenia gravis patients       Reviewed and updated as needed this visit by clinical staff       Reviewed and updated as needed this visit by Provider         ROS:  Constitutional, HEENT, cardiovascular, pulmonary, gi and gu systems are negative, except as " "otherwise noted.    OBJECTIVE:     /74 (BP Location: Right arm, Cuff Size: Adult Large)  Pulse 66  Temp 97  F (36.1  C) (Oral)  Ht 5' 10\" (1.778 m)  Wt 292 lb 1.6 oz (132.5 kg)  SpO2 97%  BMI 41.91 kg/m2  Body mass index is 41.91 kg/(m^2).  General: This is a well-appearing man in no acute distress.  BACK:  No tenderness to palpation over the spinous processes of the thoracic and lumbar spine, deep tendon reflexes are 2+ at the bilateral patella and Achilles tendons, there is 5 out of 5 muscle strength in all lower extremity muscle groups, there is normal sensation to light touch in all lower extremity dermatomes, straight leg raise does not elicit radicular symptoms bilaterally, there was mild tenderness to palpation over the paraspinous muscles of the lumbar spine. Gait is normal.     Diagnostic Test Results:  X-rays of the thoracic and lumbar spine are pending    ASSESSMENT/PLAN:     1. Left-sided back pain, unspecified back location, unspecified chronicity  Check x-rays to exclude compression fracture or metastatic disease.  If x-rays do not show any acutely concerning findings, trial of physical therapy.  Consider MRI imaging if pain persists despite 3-4 weeks of physical therapy or sooner if pain worsens with physical therapy.  Patient is to contact me if that is the case.  - [facility] PT, HAND, AND CHIROPRACTIC REFERRAL; Future  - XR Lumbar Spine 2/3 Views; Future  - XR Thoracic Spine 3 Views; Future        Martinez Toledo MD  [facility]  "

## 2018-10-12 NOTE — LETTER
[facility]  [address] [facility]  [address]  [place]  [address]  Tel: [phone]    October 15, 2018    Thanh Goodrich  [address]   [facility] [address]        Dear Mr. Goodrich,    The following letter pertains to your most recent diagnostic tests:    Good news! No obvious dangerous problems are identified in the lumbar spine.  There are severe degenerative changes noted in the lower back.  The upper back or thoracic spine looks normal.      Bottom line:  I think you should proceed with physical therapy as we discussed in clinic.  If the pain worsens with therapy or the pain fails to improved after 3 - 4 weeks of participating in therapy activities, then please contact me by MyChart to arrange for an MRI of the lumbar spine.     If you have any further questions or problems, please contact our office.      Sincerely,    Martinez Toledo MD/ Chrissy Brooks CMA  Results for orders placed or performed in visit on 10/12/18   XR Lumbar Spine 2/3 Views    Narrative    LUMBAR SPINE TWO-THREE  VIEWS  10/12/2018 5:43 PM     HISTORY: Left-sided back pain, unspecified back location, unspecified  chronicity.    COMPARISON: None.      Impression    IMPRESSION: Degenerative scoliosis with convexity towards the left in  the lumbar spine. Severe multilevel degenerative disc disease. No  gross fracture. If fracture is suspected, CT or MRI should be  performed.    SAHRA GARZA MD               Enclosure: Lab Results

## 2018-10-12 NOTE — MR AVS SNAPSHOT
"              After Visit Summary   10/12/2018    Thanh Goodrich    MRN: [id number]           Patient Information     Date Of Birth          1940        Visit Information        Provider Department      10/12/2018 4:30 PM Martinez Toledo MD [facility]        Today's Diagnoses     Left-sided back pain, unspecified back location, unspecified chronicity    -  1      Care Instructions    You should get the new shingles vaccine \"SHINGRIX\" (not Zostavax) at your pharmacy.            Follow-ups after your visit        Additional Services     [facility] PT, HAND, AND CHIROPRACTIC REFERRAL       Physical Therapy, Hand Therapy and Chiropractic Care are available through:  *[facility]  *Hand Therapy (Occupational Therapy or Physical Therapy)  *[facility]    Call one number to schedule at any of the above locations: [phone].    Physical therapy, Hand therapy and/or Chiropractic care has been recommended by your physician as an excellent treatment option to reduce pain and help people return to normal activities, including sports.  Therapy and/or chiropractic care services are a great complement or alternative to expensive and invasive surgery, injections, or long-term use of prescription medications. The primary goal is to identify the underlying problem and provide you the tools to manage your condition on your own.     Please be aware that coverage of these services is subject to the terms and limitations of your health insurance plan.  Call member services at your health plan with any benefit or coverage questions.      Please bring the following to your appointment:  *Your personal calendar for scheduling future appointments  *Comfortable clothing                  Follow-up notes from your care team     Return for 3-4 weeks if back pain no better with PT; preventive exam in 8/2019.      Future tests that were ordered for you today     Open Future Orders        " "Priority Expected Expires Ordered    XR Lumbar Spine 2/3 Views Routine 10/12/2018 10/12/2019 10/12/2018    XR Thoracic Spine 3 Views Routine 10/12/2018 10/12/2019 10/12/2018    COLE PT, HAND, AND CHIROPRACTIC REFERRAL Routine  10/12/2019 10/12/2018            Who to contact     If you have questions or need follow up information about today's clinic visit or your schedule please contact [facility] directly at [phone].  Normal or non-critical lab and imaging results will be communicated to you by [employer], letter or phone within 4 business days after the clinic has received the results. If you do not hear from us within 7 days, please contact the clinic through [employer] or phone. If you have a critical or abnormal lab result, we will notify you by phone as soon as possible.  Submit refill requests through [employer] or call your pharmacy and they will forward the refill request to us. Please allow 3 business days for your refill to be completed.          Additional Information About Your Visit        [employer] Information     [employer] gives you secure access to your electronic health record. If you see a primary care provider, you can also send messages to your care team and make appointments. If you have questions, please call your primary care clinic.  If you do not have a primary care provider, please call [phone] and they will assist you.        Care EveryWhere ID     This is your Care EveryWhere ID. This could be used by other organizations to access your [place] medical records  [id number]        Your Vitals Were     Pulse Temperature Height Pulse Oximetry BMI (Body Mass Index)       66 97  F (36.1  C) (Oral) 5' 10\" (1.778 m) 97% 41.91 kg/m2        Blood Pressure from Last 3 Encounters:   10/12/18 127/74   08/24/18 122/66   08/08/18 127/78    Weight from Last 3 Encounters:   10/12/18 292 lb 1.6 oz (132.5 kg)   08/24/18 288 lb 1.6 oz (130.7 kg)   08/08/18 287 lb (130.2 kg)                 Today's " Medication Changes          These changes are accurate as of 10/12/18  5:10 PM.  If you have any questions, ask your nurse or doctor.               These medicines have changed or have updated prescriptions.        Dose/Directions    hydrochlorothiazide 25 MG tablet   Commonly known as:  HYDRODIURIL   This may have changed:  Another medication with the same name was removed. Continue taking this medication, and follow the directions you see here.   Used for:  Benign essential hypertension   Changed by:  Martinez Toledo MD        TAKE 1 TABLET(25 MG) BY MOUTH DAILY   Quantity:  90 tablet   Refills:  3                Primary Care Provider Office Phone # Fax #    Martinez Toledo [phone] [address] [address]        Equal Access to Services     [facility]: Hadii david ray hadasho Soomaali, waaxda luqadaha, qaybta kaalmada adeegyada, amadeo chao . So [facility] [phone].    ATENCIÓN: Si habla español, tiene a de la paz disposición servicios gratuitos de asistencia lingüística. Llame al [phone].    We comply with applicable federal civil rights laws and [place] laws. We do not discriminate on the basis of race, color, national origin, age, disability, sex, sexual orientation, or gender identity.            Thank you!     Thank you for choosing [facility]  for your care. Our goal is always to provide you with excellent care. Hearing back from our patients is one way we can continue to improve our services. Please take a few minutes to complete the written survey that you may receive in the mail after your visit with us. Thank you!             Your Updated Medication List - Protect others around you: Learn how to safely use, store and throw away your medicines at www.disposemymeds.org.          This list is accurate as of 10/12/18  5:10 PM.  Always use your most recent med list.                   Brand Name Dispense Instructions for use  "Diagnosis    ASPIRIN PO      Take 325 mg by mouth daily        calcium carbonate-vitamin D 600-400 MG-UNIT Chew      Take 1 chew tab by mouth 2 times daily.        CELLCEPT 500 MG tablet      Take 1,000 mg by mouth 2 times daily 2 tabs in Am. ! Tab in Pm        CHROMIUM PICOLINATE      Take 1 tablet by mouth daily.        hydrochlorothiazide 25 MG tablet    HYDRODIURIL    90 tablet    TAKE 1 TABLET(25 MG) BY MOUTH DAILY    Benign essential hypertension       losartan 50 MG tablet    COZAAR    90 tablet    Take 1 tablet (50 mg) by mouth daily    Benign essential hypertension       NONFORMULARY      \"Life extension Multivitamin\" Take one tablet by mouth twice daily.        POMEGRANATE PO      Take 1 tablet by mouth 2 times daily.        TYLENOL PO           VITAMIN D3 PO      Take 2,000 Units by mouth 2 times daily.          "

## 2018-10-14 NOTE — PROGRESS NOTES
The following letter pertains to your most recent diagnostic tests:    Good news! No obvious dangerous problems are identified in the lumbar spine.  There are severe degenerative changes noted in the lower back.  The upper back or thoracic spine looks normal.      Bottom line:  I think you should proceed with physical therapy as we discussed in clinic.  If the pain worsens with therapy or the pain fails to improved after 3 - 4 weeks of participating in therapy activities, then please contact me by MyChart to arrange for an MRI of the lumbar spine.       Sincerely,    Dr. Toledo

## 2018-10-29 ENCOUNTER — THERAPY VISIT (OUTPATIENT)
Dept: PHYSICAL THERAPY | Facility: CLINIC | Age: 78
End: 2018-10-29
Payer: MEDICARE

## 2018-10-29 DIAGNOSIS — M54.50 LEFT-SIDED LOW BACK PAIN WITHOUT SCIATICA: ICD-10-CM

## 2018-10-29 PROCEDURE — G8982 BODY POS GOAL STATUS: HCPCS | Mod: GP | Performed by: PHYSICAL THERAPIST

## 2018-10-29 PROCEDURE — G8981 BODY POS CURRENT STATUS: HCPCS | Mod: GP | Performed by: PHYSICAL THERAPIST

## 2018-10-29 PROCEDURE — 97161 PT EVAL LOW COMPLEX 20 MIN: CPT | Mod: GP | Performed by: PHYSICAL THERAPIST

## 2018-10-29 PROCEDURE — 97112 NEUROMUSCULAR REEDUCATION: CPT | Mod: GP | Performed by: PHYSICAL THERAPIST

## 2018-10-29 PROCEDURE — 97110 THERAPEUTIC EXERCISES: CPT | Mod: GP | Performed by: PHYSICAL THERAPIST

## 2018-10-29 NOTE — PROGRESS NOTES
[facility] Initial Evaluation  Subjective:  Patient is a 77 year old male presenting with rehab back hpi.   Thanh Goodrich is a 77 year old male with a lumbar condition.      This is a chronic condition  10/12/18 saw MD for low back pain that began end of July 2018 for unknown reasons.  Has had episodes of R LBP about 1x/year for several years but this current pain is different as is only on the L side.  Due to myasthenia gravis has been on long term prednisone with weakness and weight gain.  Has been off prednisone for a couple years..    Patient reports pain:  Lumbar spine left.  Radiates to:  No radiation.  Pain is described as sharp and aching (dull/sharp ache, deep pain) and is intermittent and reported as 8/10.  Associated symptoms:  Loss of motion/stiffness. Pain is worse in the P.M. (when less active).  Symptoms are exacerbated by sitting (sitting 30 min, patient goal to improve endurance/activity tolerance) and relieved by other (sets timer at home so doesn't sit longer than 30 min, walking 10 min with walker (weakness limits)).  Since onset symptoms are gradually worsening.  Special tests:  X-ray (degenrative scoliosis L convex lumbar, severe multilevel DDD).      General health as reported by patient is fair.  Pertinent medical history includes:  Overweight, cancer, osteoporosis and high blood pressure (excess weight/skin mid section limits ability to be active (reports used to weight 100# more)morbid obesity).  Medical allergies: no.  Other surgeries include:  Cancer surgery (prostate-cryosurgery).  Current medications:  High blood pressure medication (meds for myasthenia gravis).  Current occupation is Retired  .        Barriers include:  Other (L[facility] in [facility], can do 3-4 stairs with railing).    Red flags:  Pain at night/rest.                        Objective:  Standing Alignment:        Lumbar:  Convex scoliosis L            Gait:    Weight Bearing Status:  WBAT   Assistive  Devices:  Walker  Deviations:  General Deviations:  Lisset decr               Lumbar/SI Evaluation  ROM:    AROM Lumbar:   Flexion:          Min loss with use of walker for balance  Ext:                    Mod loss with L LBP   Side Bend:        Left:     Right:   Rotation:           Left:     Right:   Side Glide:        Left:  No loss    Right:  No loss          Lumbar Myotomes:    T12-L3 (Hip Flex):  Left: 5-    Right: 5-  L2-4 (Quads):  Left:  5    Right:  5  L4 (Ankle DF):  Left:  4+    Right:  4+  L5 (Great Toe Ext): Left: 4+    Right: 4+   S1 (Toe Raise):  Left: 4+    Right: 4+      Lumbar Dermtomes:  not assessed                Neural Tension/Mobility:    Left side:  Slump positive.   Right side:   Slump positive.  Lumbar Palpation:    Tenderness present at Left:    Quadratus Lumborum and Erector Spinae  Tenderness not present at Left:    PSIS    Tenderness not present at Right:  Quadratus Lumborum; Erector Spinae or PSIS                                                       Osbaldo Lumbar Evaluation    Posture:  Sitting: fair  Standing: fair      Correction of Posture: no effect  Other Observations: no pain sitting    Test Movements:  FIS: Pretest Movements: 0/10    EIS: During: no effect  After: no effect    Repeat EIS: During: no effect  After: no effect  Mechanical Response: IncROM            Conclusion: derangement (potential derangement unilateral asymmetrical above knee)  Principle of Treatment:  Posture Correction: Educate keep neutral spine, use of lumbar support, slouch/over-correct, over-correct and back off 10% to neutral unsupported sitting, frequent change of position, educate how posture can affect pain, lifetime awareness of posture for prevention, educate (+) neural tension.        Other: initial slouch/over-correct for HEP, may progress to EIS and strengthening                                       ROS    Assessment/Plan:    Patient is a 77 year old male with lumbar complaints.    Patient  has the following significant findings with corresponding treatment plan.                Diagnosis 1:  L LBP    Pain -  manual therapy, self management, education and home program  Decreased ROM/flexibility - manual therapy, therapeutic exercise and home program  Decreased strength - therapeutic exercise, therapeutic activities and home program  Impaired balance - neuro re-education, gait training, therapeutic activities, adaptive equipment/assistive device and home program  Decreased proprioception - neuro re-education, gait training, therapeutic activities and home program  Decreased function - therapeutic activities and home program  Impaired posture - neuro re-education and home program    Therapy Evaluation Codes:   1) History comprised of:   Personal factors that impact the plan of care:      Past/current experiences.    Comorbidity factors that impact the plan of care are:      Overweight, Pain at night/rest, Weakness and myasthenia gravis.     Medications impacting care: Steroids.  2) Examination of Body Systems comprised of:   Body structures and functions that impact the plan of care:      Lumbar spine.   Activity limitations that impact the plan of care are:      Sitting, Walking and transition sit to stand.  3) Clinical presentation characteristics are:   Evolving/Changing.  4) Decision-Making    Low complexity using standardized patient assessment instrument and/or measureable assessment of functional outcome.  Cumulative Therapy Evaluation is: Low complexity.    Previous and current functional limitations:  (See Goal Flow Sheet for this information)    Short term and Long term goals: (See Goal Flow Sheet for this information)     Communication ability:  Patient appears to be able to clearly communicate and understand verbal and written communication and follow directions correctly.  Treatment Explanation - The following has been discussed with the patient:   RX ordered/plan of care  Anticipated  outcomes  Possible risks and side effects  This patient would benefit from PT intervention to resume normal activities.   Rehab potential is good.    Frequency:  1 X week, once daily  Duration:  for 6 weeks  Discharge Plan:  Achieve all LTG.  Independent in home treatment program.  Reach maximal therapeutic benefit.    Please refer to the daily flowsheet for treatment today, total treatment time and time spent performing 1:1 timed codes.

## 2018-10-29 NOTE — MR AVS SNAPSHOT
After Visit Summary   10/29/2018    Thanh Goodrich    MRN: [id number]           Patient Information     Date Of Birth          1940        Visit Information        Provider Department      10/29/2018 2:50 PM Jalyn Hartley PT [facility] Physical Therapy        Today's Diagnoses     Left-sided low back pain without sciatica           Follow-ups after your visit        Your next 10 appointments already scheduled     Nov 08, 2018  1:20 PM CST   COLE Spine with Jalyn Hartley PT   [facility] Physical Therapy ([facility] [place])    [address]  [facility] [place] [address] [phone]              Who to contact     If you have questions or need follow up information about today's clinic visit or your schedule please contact [facility] PH[facility] directly at [phone].  Normal or non-critical lab and imaging results will be communicated to you by [employer], letter or phone within 4 business days after the clinic has received the results. If you do not hear from us within 7 days, please contact the clinic through [employer] or phone. If you have a critical or abnormal lab result, we will notify you by phone as soon as possible.  Submit refill requests through [employer] or call your pharmacy and they will forward the refill request to us. Please allow 3 business days for your refill to be completed.          Additional Information About Your Visit        MyChart Information     [employer] gives you secure access to your electronic health record. If you see a primary care provider, you can also send messages to your care team and make appointments. If you have questions, please call your primary care clinic.  If you do not have a primary care provider, please call [phone] and they will assist you.        Care EveryWhere ID     This is your Care EveryWhere ID. This could  be used by other organizations to access your [place] medical records  [id number]         Blood Pressure from Last 3 Encounters:   10/12/18 127/74   08/24/18 122/66   08/08/18 127/78    Weight from Last 3 Encounters:   10/12/18 132.5 kg (292 lb 1.6 oz)   08/24/18 130.7 kg (288 lb 1.6 oz)   08/08/18 130.2 kg (287 lb)              We Performed the Following     HC PT EVAL, LOW COMPLEXITY     COLE CERT REPORT     COLE INITIAL EVAL REPORT     NEUROMUSCULAR RE-EDUCATION     THERAPEUTIC EXERCISES        Primary Care Provider Office Phone # Fax #    Martinez Toledo [phone] [address] [facility] [address]  [facility] [address]        Equal Access to Services     [facility] : Hadii david ray hadasho Soomaali, waaxda luqadaha, qaybta kaalmada adeegyada, waxay kristanin haykatelin chao . So [facility] [place] [phone].    ATENCIÓN: Si habla español, tiene a de la paz disposición servicios gratuitos de asistencia lingüística. LlSt. [facility] [phone].    We comply with applicable federal civil rights laws and [place] laws. We do not discriminate on the basis of race, color, national origin, age, disability, sex, sexual orientation, or gender identity.            Thank you!     Thank you for choosing [facility]  for your care. Our goal is always to provide you with excellent care. Hearing back from our patients is one way we can continue to improve our services. Please take a few minutes to complete the written survey that you may receive in the mail after your visit with us. Thank you!             Your Updated Medication List - Protect others around you: Learn how to safely use, store and throw away your medicines at www.disposemymeds.org.          This list is accurate as of 10/29/18  4:46 PM.  Always use your most recent med list.                   Brand Name Dispense Instructions for use Diagnosis    ASPIRIN PO      Take 325 mg by mouth daily        calcium  "carbonate-vitamin D 600-400 MG-UNIT Chew      Take 1 chew tab by mouth 2 times daily.        CELLCEPT 500 MG tablet      Take 1,000 mg by mouth 2 times daily 2 tabs in Am. ! Tab in Pm        CHROMIUM PICOLINATE      Take 1 tablet by mouth daily.        hydrochlorothiazide 25 MG tablet    HYDRODIURIL    90 tablet    TAKE 1 TABLET(25 MG) BY MOUTH DAILY    Benign essential hypertension       losartan 50 MG tablet    COZAAR    90 tablet    Take 1 tablet (50 mg) by mouth daily    Benign essential hypertension       NONFORMULARY      \"Life extension Multivitamin\" Take one tablet by mouth twice daily.        POMEGRANATE PO      Take 1 tablet by mouth 2 times daily.        TYLENOL PO           VITAMIN D3 PO      Take 2,000 Units by mouth 2 times daily.          "

## 2018-10-29 NOTE — LETTER
DEPARTMENT OF HEALTH AND HUMAN SERVICES  CENTERS FOR MEDICARE & MEDICAID SERVICES    PLAN/UPDATED PLAN OF PROGRESS FOR OUTPATIENT REHABILITATION    PATIENTS NAME:  Thanh Goodrich   : 1940  PROVIDER NUMBER:    [id number]    HICN:  [id number]      PROVIDER NAME: [place] [facility] [place] [facility]    MEDICAL RECORD NUMBER: [id number]     START OF CARE DATE:  SOC Date: 10/29/18   TYPE:  PT    PRIMARY/TREATMENT DIAGNOSIS: (Pertinent Medical Diagnosis)  Left-sided low back pain without sciatica    VISITS FROM START OF CARE:  Rxs Used: 1     [place] [facility] Initial Evaluation    Subjective:  Patient is a 77 year old male presenting with rehab back hpi.   Thanh Goodrich is a 77 year old male with a lumbar condition. This is a chronic condition  10/12/18 saw MD for low back pain that began end of 2018 for unknown reasons.  Has had episodes of R LBP about 1x/year for several years but this current pain is different as is only on the L side.  Due to myasthenia gravis has been on long term prednisone with weakness and weight gain.  Has been off prednisone for a couple years.    Patient reports pain:  Lumbar spine left.  Radiates to:  No radiation.  Pain is described as sharp and aching (dull/sharp ache, deep pain) and is intermittent and reported as 8/10.  Associated symptoms:  Loss of motion/stiffness. Pain is worse in the P.M. (when less active).  Symptoms are exacerbated by sitting (sitting 30 min, patient goal to improve endurance/activity tolerance) and relieved by other (sets timer at home so doesn't sit longer than 30 min, walking 10 min with walker (weakness limits)).  Since onset symptoms are gradually worsening.  Special tests:  X-ray (degenrative scoliosis L convex lumbar, severe multilevel DDD). General health as reported by patient is fair.  Pertinent medical history includes:  Overweight, cancer, osteoporosis and high blood pressure (excess weight/skin  mid section limits ability to be active (reports used to weight 100# more)morbid obesity).  Medical allergies: no.  Other surgeries include:  Cancer surgery (prostate-cryosurgery).  Current medications:  High blood pressure medication (meds for myasthenia gravis).  Current occupation is Retired.      Barriers include:  Other (Lives in [facility], can do 3-4 stairs with railing).  Red flags:  Pain at night/rest.                 Objective:  Standing Alignment:    Lumbar:  Convex scoliosis L  Gait:    Weight Bearing Status:  WBAT   Assistive Devices:  Walker    PATIENTS NAME:  Thanh Goodrich   : 1940    Deviations:  General Deviations:  Lisset decr       Lumbar/SI Evaluation  ROM:    AROM Lumbar:   Flexion:          Min loss with use of walker for balance  Ext:                    Mod loss with L LBP   Side Bend:        Left:     Right:   Rotation:           Left:     Right:   Side Glide:        Left:  No loss    Right:  No loss     Lumbar Myotomes:    T12-L3 (Hip Flex):  Left: 5-    Right: 5-  L2-4 (Quads):  Left:  5    Right:  5  L4 (Ankle DF):  Left:  4+    Right:  4+  L5 (Great Toe Ext): Left: 4+    Right: 4+   S1 (Toe Raise):  Left: 4+    Right: 4+  Lumbar Dermtomes:  not assessed  Neural Tension/Mobility:    Left side:  Slump positive.   Right side:   Slump positive.  Lumbar Palpation:    Tenderness present at Left:    Quadratus Lumborum and Erector Spinae  Tenderness not present at Left:    PSIS  Tenderness not present at Right:  Quadratus Lumborum; Erector Spinae or PSIS    Osbaldo Lumbar Evaluation  Posture:  Sitting: fair  Standing: fair  Correction of Posture: no effect  Other Observations: no pain sitting  Test Movements:  FIS: Pretest Movements: 0/10  EIS: During: no effect  After: no effect    Repeat EIS: During: no effect  After: no effect  Mechanical Response: IncROM  Conclusion: derangement (potential derangement unilateral asymmetrical above knee)  Principle of Treatment:  Posture Correction:  Educate keep neutral spine, use of lumbar support, slouch/over-correct, over-correct and back off 10% to neutral unsupported sitting, frequent change of position, educate how posture can affect pain, lifetime awareness of posture for prevention, educate (+) neural tension.  Other: initial slouch/over-correct for HEP, may progress to EIS and strengthening      Assessment/Plan:    Patient is a 77 year old male with lumbar complaints.    Patient has the following significant findings with corresponding treatment plan.                Diagnosis 1:  L LBP  Pain -  manual therapy, self management, education and home program  Decreased ROM/flexibility - manual therapy, therapeutic exercise and home program  Decreased strength - therapeutic exercise, therapeutic activities and home program  Impaired balance - neuro re-education, gait training, therapeutic activities, adaptive PATIENTS NAME:  Thanh Goodrich   : 1940    equipment/assistive device and home program  Decreased proprioception - neuro re-education, gait training, therapeutic activities and home program  Decreased function - therapeutic activities and home program  Impaired posture - neuro re-education and home program    Therapy Evaluation Codes:   1) History comprised of:   Personal factors that impact the plan of care:      Past/current experiences.    Comorbidity factors that impact the plan of care are:      Overweight, Pain at night/rest, Weakness and myasthenia gravis.     Medications impacting care: Steroids.  2) Examination of Body Systems comprised of:   Body structures and functions that impact the plan of care:      Lumbar spine.   Activity limitations that impact the plan of care are:      Sitting, Walking and transition sit to stand.  3) Clinical presentation characteristics are:   Evolving/Changing.  4) Decision-Making    Low complexity using standardized patient assessment instrument and/or measureable assessment of functional  "outcome.  Cumulative Therapy Evaluation is: Low complexity.    Previous and current functional limitations:  (See Goal Flow Sheet for this information)    Short term and Long term goals: (See Goal Flow Sheet for this information)     Communication ability:  Patient appears to be able to clearly communicate and understand verbal and written communication and follow directions correctly.  Treatment Explanation - The following has been discussed with the patient:   RX ordered/plan of care  Anticipated outcomes  Possible risks and side effects  This patient would benefit from PT intervention to resume normal activities.   Rehab potential is good.    Frequency:  1 X week, once daily  Duration:  for 6 weeks  Discharge Plan:  Achieve all LTG.  Independent in home treatment program.  Reach maximal therapeutic benefit.    Caregiver Signature/Credentials _____________________________ Date ________       Treating Provider: Jalyn Hartley DPT     I have reviewed and certified the need for these services and plan of treatment while under my care.        PATIENTS NAME:  Thanh Goodrich   : 1940    PHYSICIAN'S SIGNATURE:   _________________________________________  Date___________   Martinez Toledo MD    Certification period:  Beginning of Cert date period: 10/29/18 to  End of Cert period date: 19     Functional Level Progress Report: Please see attached \"Goal Flow sheet for Functional level.\"    ____X____ Continue Services or       ________ DC Services                Service dates: From  SOC Date: 10/29/18 date to present                         "

## 2018-12-26 DIAGNOSIS — I10 BENIGN ESSENTIAL HYPERTENSION: ICD-10-CM

## 2018-12-26 RX ORDER — LOSARTAN POTASSIUM 50 MG/1
TABLET ORAL
Qty: 90 TABLET | Refills: 0 | Status: SHIPPED | OUTPATIENT
Start: 2018-12-26 | End: 2019-03-25

## 2018-12-26 NOTE — TELEPHONE ENCOUNTER
"Last Written Prescription Date:  12/12/17  Last Fill Quantity: 90,  # refills: 3   Last office visit: 10/12/2018 with prescribing provider:     Future Office Visit:    Requested Prescriptions   Pending Prescriptions Disp Refills     losartan (COZAAR) 50 MG tablet [Pharmacy Med Name: LOSARTAN 50MG TABLETS] 90 tablet 0     Sig: TAKE 1 TABLET(50 MG) BY MOUTH DAILY    Angiotensin-II Receptors Passed - 12/26/2018  3:18 AM       Passed - Blood pressure under 140/90 in past 12 months    BP Readings from Last 3 Encounters:   10/12/18 127/74   08/24/18 122/66   08/08/18 127/78                Passed - Recent (12 mo) or future (30 days) visit within the authorizing provider's specialty    Patient had office visit in the last 12 months or has a visit in the next 30 days with authorizing provider or within the authorizing provider's specialty.  See \"Patient Info\" tab in inbasket, or \"Choose Columns\" in Meds & Orders section of the refill encounter.             Passed - Patient is age 18 or older       Passed - Normal serum creatinine on file in past 12 months    Recent Labs   Lab Test 08/08/18  1245   CR 0.77            Passed - Normal serum potassium on file in past 12 months    Recent Labs   Lab Test 08/08/18  1245   POTASSIUM 3.8                      "

## 2019-01-14 ENCOUNTER — TRANSFERRED RECORDS (OUTPATIENT)
Dept: HEALTH INFORMATION MANAGEMENT | Facility: CLINIC | Age: 79
End: 2019-01-14

## 2019-03-25 DIAGNOSIS — I10 BENIGN ESSENTIAL HYPERTENSION: ICD-10-CM

## 2019-03-25 NOTE — TELEPHONE ENCOUNTER
"losartan (COZAAR) 50 MG tablet    Last Written Prescription Date:  12/26/2018  Last Fill Quantity: 90,  # refills: 0   Last office visit: 10/12/2018 with prescribing provider:  Tre   Future Office Visit: unknown      Requested Prescriptions   Pending Prescriptions Disp Refills     losartan (COZAAR) 50 MG tablet [Pharmacy Med Name: LOSARTAN 50MG TABLETS] 90 tablet 0     Sig: TAKE 1 TABLET(50 MG) BY MOUTH DAILY    Angiotensin-II Receptors Passed - 3/25/2019  1:01 PM       Passed - Blood pressure under 140/90 in past 12 months    BP Readings from Last 3 Encounters:   10/12/18 127/74   08/24/18 122/66   08/08/18 127/78                Passed - Recent (12 mo) or future (30 days) visit within the authorizing provider's specialty    Patient had office visit in the last 12 months or has a visit in the next 30 days with authorizing provider or within the authorizing provider's specialty.  See \"Patient Info\" tab in inbasket, or \"Choose Columns\" in Meds & Orders section of the refill encounter.             Passed - Medication is active on med list       Passed - Patient is age 18 or older       Passed - Normal serum creatinine on file in past 12 months    Recent Labs   Lab Test 08/08/18  1245   CR 0.77            Passed - Normal serum potassium on file in past 12 months    Recent Labs   Lab Test 08/08/18  1245   POTASSIUM 3.8                      "

## 2019-03-27 RX ORDER — LOSARTAN POTASSIUM 50 MG/1
TABLET ORAL
Qty: 90 TABLET | Refills: 0 | Status: SHIPPED | OUTPATIENT
Start: 2019-03-27 | End: 2019-06-21

## 2019-04-18 PROBLEM — M54.50 LEFT-SIDED LOW BACK PAIN WITHOUT SCIATICA: Status: RESOLVED | Noted: 2018-10-29 | Resolved: 2019-04-18

## 2019-06-05 ENCOUNTER — TRANSFERRED RECORDS (OUTPATIENT)
Dept: HEALTH INFORMATION MANAGEMENT | Facility: CLINIC | Age: 79
End: 2019-06-05

## 2019-06-21 DIAGNOSIS — I10 BENIGN ESSENTIAL HYPERTENSION: ICD-10-CM

## 2019-06-21 RX ORDER — LOSARTAN POTASSIUM 50 MG/1
TABLET ORAL
Qty: 30 TABLET | Refills: 0 | Status: SHIPPED | OUTPATIENT
Start: 2019-06-21 | End: 2019-07-05

## 2019-06-21 NOTE — TELEPHONE ENCOUNTER
"losartan (COZAAR) 50 MG tablet    Last Written Prescription Date:  03/27/2019  Last Fill Quantity: 90,  # refills: 0   Last office visit: 10/12/2018 with prescribing provider:  Tre   Future Office Visit:   Next 5 appointments (look out 90 days)    Jul 05, 2019  3:00 PM CDT  Office Visit with Martinez Toledo MD  [facility] ([facility] [address] [facility] [id number] [phone]           Requested Prescriptions   Pending Prescriptions Disp Refills     losartan (COZAAR) 50 MG tablet [Pharmacy Med Name: LOSARTAN 50MG TABLETS] 90 tablet 0     Sig: TAKE 1 TABLET(50 MG) BY MOUTH DAILY       Angiotensin-II Receptors Passed - 6/21/2019 11:10 AM        Passed - Blood pressure under 140/90 in past 12 months     BP Readings from Last 3 Encounters:   10/12/18 127/74   08/24/18 122/66   08/08/18 127/78                 Passed - Recent (12 mo) or future (30 days) visit within the authorizing provider's specialty     Patient had office visit in the last 12 months or has a visit in the next 30 days with authorizing provider or within the authorizing provider's specialty.  See \"Patient Info\" tab in inbasket, or \"Choose Columns\" in Meds & Orders section of the refill encounter.              Passed - Medication is active on med list        Passed - Patient is age 18 or older        Passed - Normal serum creatinine on file in past 12 months     Recent Labs   Lab Test 08/08/18  1245   CR 0.77             Passed - Normal serum potassium on file in past 12 months     Recent Labs   Lab Test 08/08/18  1245   POTASSIUM 3.8                      "

## 2019-07-05 ENCOUNTER — OFFICE VISIT (OUTPATIENT)
Dept: FAMILY MEDICINE | Facility: CLINIC | Age: 79
End: 2019-07-05
Payer: COMMERCIAL

## 2019-07-05 VITALS
TEMPERATURE: 97.2 F | HEIGHT: 69 IN | SYSTOLIC BLOOD PRESSURE: 120 MMHG | OXYGEN SATURATION: 97 % | HEART RATE: 80 BPM | BODY MASS INDEX: 43.62 KG/M2 | WEIGHT: 294.5 LBS | DIASTOLIC BLOOD PRESSURE: 73 MMHG

## 2019-07-05 DIAGNOSIS — R27.0 ATAXIA: ICD-10-CM

## 2019-07-05 DIAGNOSIS — I10 BENIGN ESSENTIAL HYPERTENSION: ICD-10-CM

## 2019-07-05 DIAGNOSIS — M81.0 AGE-RELATED OSTEOPOROSIS WITHOUT CURRENT PATHOLOGICAL FRACTURE: ICD-10-CM

## 2019-07-05 DIAGNOSIS — L30.4 INTERTRIGO: ICD-10-CM

## 2019-07-05 DIAGNOSIS — Z00.00 ROUTINE GENERAL MEDICAL EXAMINATION AT A HEALTH CARE FACILITY: Primary | ICD-10-CM

## 2019-07-05 DIAGNOSIS — E66.01 MORBID OBESITY (H): ICD-10-CM

## 2019-07-05 DIAGNOSIS — C61 MALIGNANT NEOPLASM OF PROSTATE (H): ICD-10-CM

## 2019-07-05 DIAGNOSIS — I77.810 DILATED AORTIC ROOT (H): ICD-10-CM

## 2019-07-05 DIAGNOSIS — E78.5 HYPERLIPIDEMIA LDL GOAL <130: ICD-10-CM

## 2019-07-05 DIAGNOSIS — Z12.11 SCREEN FOR COLON CANCER: ICD-10-CM

## 2019-07-05 DIAGNOSIS — G70.00 MYASTHENIA GRAVIS WITHOUT EXACERBATION (H): ICD-10-CM

## 2019-07-05 DIAGNOSIS — H91.90 HEARING LOSS, UNSPECIFIED HEARING LOSS TYPE, UNSPECIFIED LATERALITY: ICD-10-CM

## 2019-07-05 LAB
ALBUMIN SERPL-MCNC: 3.4 G/DL (ref 3.4–5)
ALP SERPL-CCNC: 77 U/L (ref 40–150)
ALT SERPL W P-5'-P-CCNC: 16 U/L (ref 0–70)
ANION GAP SERPL CALCULATED.3IONS-SCNC: 8 MMOL/L (ref 3–14)
AST SERPL W P-5'-P-CCNC: 12 U/L (ref 0–45)
BASOPHILS # BLD AUTO: 0 10E9/L (ref 0–0.2)
BASOPHILS NFR BLD AUTO: 0.3 %
BILIRUB SERPL-MCNC: 0.8 MG/DL (ref 0.2–1.3)
BUN SERPL-MCNC: 14 MG/DL (ref 7–30)
CALCIUM SERPL-MCNC: 9.2 MG/DL (ref 8.5–10.1)
CHLORIDE SERPL-SCNC: 102 MMOL/L (ref 94–109)
CHOLEST SERPL-MCNC: 174 MG/DL
CO2 SERPL-SCNC: 27 MMOL/L (ref 20–32)
CREAT SERPL-MCNC: 0.79 MG/DL (ref 0.66–1.25)
DIFFERENTIAL METHOD BLD: NORMAL
EOSINOPHIL # BLD AUTO: 0.1 10E9/L (ref 0–0.7)
EOSINOPHIL NFR BLD AUTO: 1 %
ERYTHROCYTE [DISTWIDTH] IN BLOOD BY AUTOMATED COUNT: 14.6 % (ref 10–15)
GFR SERPL CREATININE-BSD FRML MDRD: 87 ML/MIN/{1.73_M2}
GLUCOSE SERPL-MCNC: 86 MG/DL (ref 70–99)
HCT VFR BLD AUTO: 44.4 % (ref 40–53)
HDLC SERPL-MCNC: 57 MG/DL
HGB BLD-MCNC: 14.3 G/DL (ref 13.3–17.7)
LDLC SERPL CALC-MCNC: 105 MG/DL
LYMPHOCYTES # BLD AUTO: 1.8 10E9/L (ref 0.8–5.3)
LYMPHOCYTES NFR BLD AUTO: 23.2 %
MCH RBC QN AUTO: 29 PG (ref 26.5–33)
MCHC RBC AUTO-ENTMCNC: 32.2 G/DL (ref 31.5–36.5)
MCV RBC AUTO: 90 FL (ref 78–100)
MONOCYTES # BLD AUTO: 0.8 10E9/L (ref 0–1.3)
MONOCYTES NFR BLD AUTO: 10.6 %
NEUTROPHILS # BLD AUTO: 5 10E9/L (ref 1.6–8.3)
NEUTROPHILS NFR BLD AUTO: 64.9 %
NONHDLC SERPL-MCNC: 117 MG/DL
PLATELET # BLD AUTO: 226 10E9/L (ref 150–450)
POTASSIUM SERPL-SCNC: 3.8 MMOL/L (ref 3.4–5.3)
PROT SERPL-MCNC: 7.3 G/DL (ref 6.8–8.8)
RBC # BLD AUTO: 4.93 10E12/L (ref 4.4–5.9)
SODIUM SERPL-SCNC: 137 MMOL/L (ref 133–144)
TRIGL SERPL-MCNC: 59 MG/DL
WBC # BLD AUTO: 7.6 10E9/L (ref 4–11)

## 2019-07-05 PROCEDURE — 80053 COMPREHEN METABOLIC PANEL: CPT | Performed by: INTERNAL MEDICINE

## 2019-07-05 PROCEDURE — 36415 COLL VENOUS BLD VENIPUNCTURE: CPT | Performed by: INTERNAL MEDICINE

## 2019-07-05 PROCEDURE — 85025 COMPLETE CBC W/AUTO DIFF WBC: CPT | Performed by: INTERNAL MEDICINE

## 2019-07-05 PROCEDURE — 99213 OFFICE O/P EST LOW 20 MIN: CPT | Mod: 25 | Performed by: INTERNAL MEDICINE

## 2019-07-05 PROCEDURE — G0439 PPPS, SUBSEQ VISIT: HCPCS | Performed by: INTERNAL MEDICINE

## 2019-07-05 PROCEDURE — 80061 LIPID PANEL: CPT | Performed by: INTERNAL MEDICINE

## 2019-07-05 RX ORDER — HYDROCHLOROTHIAZIDE 25 MG/1
25 TABLET ORAL DAILY
Qty: 90 TABLET | Refills: 3 | Status: SHIPPED | OUTPATIENT
Start: 2019-07-05 | End: 2020-01-01

## 2019-07-05 RX ORDER — PHENOL 1.4 %
1 AEROSOL, SPRAY (ML) MUCOUS MEMBRANE DAILY
COMMUNITY

## 2019-07-05 RX ORDER — NYSTATIN 100000 [USP'U]/G
POWDER TOPICAL 2 TIMES DAILY PRN
Qty: 60 G | Refills: 11 | Status: SHIPPED | OUTPATIENT
Start: 2019-07-05 | End: 2020-01-01

## 2019-07-05 RX ORDER — LOSARTAN POTASSIUM 50 MG/1
50 TABLET ORAL DAILY
Qty: 90 TABLET | Refills: 3 | Status: SHIPPED | OUTPATIENT
Start: 2019-07-05 | End: 2019-09-10

## 2019-07-05 ASSESSMENT — MIFFLIN-ST. JEOR: SCORE: 2056.22

## 2019-07-05 ASSESSMENT — ACTIVITIES OF DAILY LIVING (ADL): CURRENT_FUNCTION: HOUSEWORK REQUIRES ASSISTANCE

## 2019-07-05 NOTE — PROGRESS NOTES
"SUBJECTIVE:   Thanh Goodrich is a 78 year old male who presents for Preventive Visit.      Healthy Habits:     In general, how would you rate your overall health?  Fair    Frequency of exercise:  1 day/week (Walks hallway)    Duration of exercise:  Less than 15 minutes    Do you usually eat at least 4 servings of fruit and vegetables a day, include whole grains    & fiber and avoid regularly eating high fat or \"junk\" foods?  No (2 servings)    Taking medications regularly:  Yes    Barriers to taking medications:  None    Medication side effects:  None    Ability to successfully perform activities of daily living:  Housework requires assistance    Home Safety:  No safety concerns identified    Hearing Impairment:  Difficulty understanding soft or whispered speech and need to ask people to speak up or repeat themselves    In the past 6 months, have you been bothered by leaking of urine? Yes (Few times)    In general, how would you rate your overall mental or emotional health?  Good      PHQ-2 Total Score: 1    Additional concerns today:  Yes (Hearing assessment for hearing aids)      This is a really nice man with prostate cancer, morbid obesity, hypertension, hyperlipidemia and myasthenia gravis.  He is a non-smoker.  He presents for a preventive exam but has all list of questions.  He is concerned about what he refers to as \"intertrigo\" characterized by a red itchy rash between his skin folds.  He has been using barrier cream to manage symptoms, but wonders if there is anything that might work better?  He also has been noticing progressive hearing loss in both ears over the last several years and wonders if he might be hearing aids?  He wonders if he might need a hearing exam?  He has dilated aortic root and is due for a follow-up echocardiogram in September 2019.  While he was on prednisone for his myasthenia gravis he did have a low bone mineral density noted most recently in 2016 with high fracture risk but did " not tolerate bisphosphonates and wonders whether he might need a follow-up DEXA scan?  He has been taking calcium and vitamin D supplements.  He wonders if he needs a referral to physical therapy?  He describes general weakness in his legs and difficulty maintaining his balance due to what he perceives as weakness in his lower legs and ankles.  This weakness is present bilaterally.  He uses a walker.  He has not had any recent falls.        Do you feel safe in your environment? Yes    Do you have a Health Care Directive? Yes: Advance Directive has been received and scanned.      Fall risk  Fallen 2 or more times in the past year?: No  Any fall with injury in the past year?: No    Cognitive Screening   1) Repeat 3 items (Leader, Season, Table)    2) Clock draw: NORMAL  3) 3 item recall: Recalls 2 objects   Results: NORMAL clock, 1-2 items recalled: COGNITIVE IMPAIRMENT LESS LIKELY    Mini-CogTM Copyright S [employer]. Licensed by the author for use in [facility]; reprinted with permission (jackson@[facility]). All rights reserved.      Do you have sleep apnea, excessive snoring or daytime drowsiness?: no    Reviewed and updated as needed this visit by clinical staff  Tobacco  Allergies  Med Hx  Surg Hx  Fam Hx  Soc Hx        Reviewed and updated as needed this visit by Provider  Tobacco  Med Hx  Surg Hx  Fam Hx  Soc Hx       Social History     Tobacco Use     Smoking status: Never Smoker     Smokeless tobacco: Never Used   Substance Use Topics     Alcohol use: Yes     Alcohol/week: 0.0 oz     Comment: once monthly     If you drink alcohol do you typically have >3 drinks per day or >7 drinks per week? No    Alcohol Use 7/5/2019   Prescreen: >3 drinks/day or >7 drinks/week? No         Current providers sharing in care for this patient include:   Patient Care Team:  Martinez Toledo MD as PCP - General (Internal Medicine)  Martinez Toledo MD as Assigned PCP    The following health maintenance items are  reviewed in Epic and correct as of today:  Health Maintenance   Topic Date Due     ADVANCE CARE PLANNING  06/06/2017     MEDICARE ANNUAL WELLNESS VISIT  06/14/2018     INFLUENZA VACCINE (1) 09/01/2019     FALL RISK ASSESSMENT  10/12/2019     COLONOSCOPY  06/06/2022     DTAP/TDAP/TD IMMUNIZATION (4 - Td) 06/06/2022     LIPID  08/08/2023     PHQ-2  Completed     ZOSTER IMMUNIZATION  Completed     IPV IMMUNIZATION  Aged Out     MENINGITIS IMMUNIZATION  Aged Out     BP Readings from Last 3 Encounters:   07/05/19 120/73   10/12/18 127/74   08/24/18 122/66    Wt Readings from Last 3 Encounters:   07/05/19 133.6 kg (294 lb 8 oz)   10/12/18 132.5 kg (292 lb 1.6 oz)   08/24/18 130.7 kg (288 lb 1.6 oz)                  Patient Active Problem List   Diagnosis     HYPERTENSION     Myasthenia gravis without exacerbation (H)     MALIGN NEOPL PROSTATE-3/07     Edema     Osteoporosis     Incontinence of urine     Hyperlipidemia LDL goal <130     PVC's (premature ventricular contractions)     Advanced directives, counseling/discussion     Leg ulcer (H)     Cellulitis of left leg     Microscopic hematuria     Panniculitis     Ulcer     Skin nodule     History of steroid therapy     Atypical mycobacterial infection     Pain of left scapula     Osteoporosis     RBBB     Diastolic dysfunction     Dilated aortic root (H)     Neck pain     History of mycobacterial infection     Benign essential hypertension     Morbid obesity (H)     Past Surgical History:   Procedure Laterality Date     Prostate Cancer Cryotherapy  2007     TONSILLECTOMY  1945       Social History     Tobacco Use     Smoking status: Never Smoker     Smokeless tobacco: Never Used   Substance Use Topics     Alcohol use: Yes     Alcohol/week: 0.0 oz     Comment: once monthly     Family History   Problem Relation Age of Onset     Hypertension Mother      Hypertension Father      Cancer Father         Prostate     Diabetes Maternal Grandmother      C.A.D. Maternal Grandmother  "     Cerebrovascular Disease Maternal Grandfather      Cancer Paternal Uncle         Prostate         Current Outpatient Medications   Medication Sig Dispense Refill     Acetaminophen (TYLENOL PO)        calcium carbonate-vitamin D 600-400 MG-UNIT CHEW Take 1 chew tab by mouth 2 times daily.         Cholecalciferol (VITAMIN D3 PO) Take 2,000 Units by mouth 2 times daily.         CHROMIUM PICOLINATE Take 1 tablet by mouth daily.         hydrochlorothiazide (HYDRODIURIL) 25 MG tablet Take 1 tablet (25 mg) by mouth daily For profile only 90 tablet 3     losartan (COZAAR) 50 MG tablet Take 1 tablet (50 mg) by mouth daily 90 tablet 3     Multiple Vitamins-Minerals (MULTIVITAMIN ADULTS 50+) TABS Take by mouth daily       mycophenolate (CELLCEPT) 500 MG tablet Take 1,000 mg by mouth 2 times daily 2 tabs in Am. ! Tab in Pm       nystatin (MYCOSTATIN) 289632 UNIT/GM external powder Apply topically 2 times daily as needed 60 g 11     ASPIRIN PO Take 325 mg by mouth daily            Review of Systems  Constitutional, HEENT, cardiovascular, pulmonary, gi and gu systems are negative, except as otherwise noted.    OBJECTIVE:   /73 (BP Location: Right arm, Cuff Size: Adult Large)   Pulse 80   Temp 97.2  F (36.2  C) (Oral)   Ht 1.753 m (5' 9\")   Wt 133.6 kg (294 lb 8 oz)   SpO2 97%   BMI 43.49 kg/m   Estimated body mass index is 43.49 kg/m  as calculated from the following:    Height as of this encounter: 1.753 m (5' 9\").    Weight as of this encounter: 133.6 kg (294 lb 8 oz).  Physical Exam  GENERAL: healthy, alert and no distress  EYES: Eyes grossly normal to inspection, PERRL and conjunctivae and sclerae normal  HENT: ear canals and TM's normal, nose and mouth without ulcers or lesions  NECK: no adenopathy, no asymmetry, masses, or scars and thyroid normal to palpation  RESP: lungs clear to auscultation - no rales, rhonchi or wheezes  CV: Heart with regular rate and rhythm. No obvious murmur, but exam is difficult " due to body habitus.  No carotid bruits.    ABDOMEN: Obese, The abdomen is soft, without distention and non-tender with normal bowel sounds.   MS: no gross musculoskeletal defects noted, no edema  SKIN: he preferred not to show me the rash today because it is hard to get to   NEURO: mentation intact, symmetric strength, he walks with a walker   PSYCH: mentation appears normal, affect normal/bright    Labs pending     ASSESSMENT / PLAN:   1. Routine general medical examination at a health care facility        3. Myasthenia gravis without exacerbation (H)  Continue follow up with neurology     4. Dilated aortic root (H)  Recheck echocardiogram   - Echocardiogram Complete; Future    5. Morbid obesity (H)  Counseled on diet and exercise interventions to promote weight loss     6. Malignant neoplasm of prostate (H)  Continue follow up with urology     7. Benign essential hypertension  Well controlled; check labs   - hydrochlorothiazide (HYDRODIURIL) 25 MG tablet; Take 1 tablet (25 mg) by mouth daily For profile only  Dispense: 90 tablet; Refill: 3  - losartan (COZAAR) 50 MG tablet; Take 1 tablet (50 mg) by mouth daily  Dispense: 90 tablet; Refill: 3    8. Hyperlipidemia LDL goal <130  Check tody   - CBC with platelets and differential  - Comprehensive metabolic panel  - Lipid panel reflex to direct LDL Fasting    9. Intertrigo  Try Nystatin powder   - nystatin (MYCOSTATIN) 885625 UNIT/GM external powder; Apply topically 2 times daily as needed  Dispense: 60 g; Refill: 11    10. Age-related osteoporosis without current pathological fracture  Recheck DEXA, hope for improvement with having stopped prednisone   - DX Hip/Pelvis/Spine; Future    11. Ataxia  Try some PT for imbalance and deconditioning   - [facility] PT, HAND, AND CHIROPRACTIC REFERRAL; Future    12. Screen for colon cancer    - Fecal colorectal cancer screen FIT; Future    13. Hearing loss, unspecified hearing loss type, unspecified laterality  Referred for hearing  "test       End of Life Planning:  Patient currently has an advanced directive: Yes.  Practitioner is supportive of decision.    COUNSELING:  Reviewed preventive health counseling, as reflected in patient instructions  Special attention given to:       Regular exercise       Healthy diet/nutrition       Immunizations    Vaccines are up to date              Consider lung cancer screening for ages 55-80 years and 30 pack-year smoking history ; never smoker        Colon cancer screening; he has never had colon cancer screening; he again has great concerns about a colonoscopy, he fears that if there were a complication from the colonoscopy a laparotomy would not be able to be performed due to his body habitus, after a lengthy discussion he is willing to consider colonoscopy, but hopes he may have a negative FIT test which would allow him to not to have to have a colonoscopy for a year        Prostate cancer screening; he has prostate cancer and had a recent PSA that was undetectable with his urologist     Estimated body mass index is 43.49 kg/m  as calculated from the following:    Height as of this encounter: 1.753 m (5' 9\").    Weight as of this encounter: 133.6 kg (294 lb 8 oz).    Weight management plan: Discussed healthy diet and exercise guidelines     reports that he has never smoked. He has never used smokeless tobacco.      Appropriate preventive services were discussed with this patient, including applicable screening as appropriate for cardiovascular disease, diabetes, osteopenia/osteoporosis, and glaucoma.  As appropriate for age/gender, discussed screening for colorectal cancer, prostate cancer, breast cancer, and cervical cancer. Checklist reviewing preventive services available has been given to the patient.    Reviewed patients plan of care and provided an AVS. The Basic Care Plan (routine screening as documented in Health Maintenance) for Thanh meets the Care Plan requirement. This Care Plan has been " established and reviewed with the Patient.    Counseling Resources:  ATP IV Guidelines  Pooled Cohorts Equation Calculator  Breast Cancer Risk Calculator  FRAX Risk Assessment  ICSI Preventive Guidelines  Dietary Guidelines for Americans, 2010  USDA's MyPlate  ASA Prophylaxis  Lung CA Screening    Martinez Toledo MD  [facility]    Identified Health Risks:

## 2019-07-05 NOTE — LETTER
"[facility]  [address] [facility]  [address]  [place]  [address]  Tel: [phone]    July 9, 2019    Thanh Goodrich  [address]   [facility] [address]        Dear Mr. Goodrich,    The following letter pertains to your most recent diagnostic tests:    -Your total cholesterol is 174 which is at your goal of total cholesterol less than 200.    -Your triglycerides are 59 which are at your goal of triglycerides less than 150.    -Your HDL or \"good cholesterol\" is 57 which is at your goal of HDL cholesterol greater than 40.    -Your LDL cholesterol or \"bad cholesterol\" is 105 which is above  your goal of LDL cholesterol less than <100.  Your LDL goal is based on your risk factors for artery disease.     -Liver and gallbladder tests are normal for you. (ALT,AST, Alk phos, bilirubin), kidney function is normal for you (Creatinine, GFR), Sodium is normal, Potassium is normal for you, Calcium is normal for you, Glucose (blood sugar) is normal for you.      -Your complete blood counts including your hemoglobin returned normal for you.         Bottom line:  Based on your cholesterol levels and other risk factors, your calculated statistical risk for having a heart attack over the next 10 years is elevated.  I estimate a 25.3% chance of heart attack over the next 10 years.  Because we believe that statin medications such as atorvastatin (Lipitor) have protective effects in the blood vessels that extend beyond their ability to lower cholesterol, if you took atorvastatin (Lipitor) regularly, you could bring that 10-year statistical risk for heart attack down significantly.  Generally, we recommend statin cholesterol-lowering medications such as atorvastatin (Lipitor) to individuals with 10 year statistical risk for heart attack greater than 7.5%.  As such, I would recommend you starting that drug for prevention purposes.  You should be informed that a very small minority of people who take " atorvastatin can develop muscle pain and weakness as a side effect from that drug.  If you experience those side effects, then stop the  drug and contact me.  If you start the medication, you should have a follow up fasting cholesterol panel after you have been taking the medication for 2 months.  You can schedule a lab appointment for that purpose.  Please contact me if you would like to get that medication started so we can send an prescritpion and arrange for appropriate follow up.        The other lab results are stable and healthy.      Follow up:  Return for follow up cholesterol panel in 2 months if you decide to start the atorvastatin (Lipitor).      Sincerely,    Dr. Toledo    The 10-year ASCVD risk score (Naimaselvin GREWAL JrBrian, et al., 2013) is: 25.3%    Values used to calculate the score:      Age: 78 years      Sex: Male      Is Non- : No      Diabetic: No      Tobacco smoker: No      Systolic Blood Pressure: 120 mmHg      Is BP treated: Yes      HDL Cholesterol: 57 mg/dL      Total Cholesterol: 174 mg/dL    If you have any further questions or problems, please contact our office.      Sincerely,    Martinez Toledo MD/ Chrissy Brooks CMA  Results for orders placed or performed in visit on 07/05/19   CBC with platelets and differential   Result Value Ref Range    WBC 7.6 4.0 - 11.0 10e9/L    RBC Count 4.93 4.4 - 5.9 10e12/L    Hemoglobin 14.3 13.3 - 17.7 g/dL    Hematocrit 44.4 40.0 - 53.0 %    MCV 90 78 - 100 fl    MCH 29.0 26.5 - 33.0 pg    MCHC 32.2 31.5 - 36.5 g/dL    RDW 14.6 10.0 - 15.0 %    Platelet Count 226 150 - 450 10e9/L    % Neutrophils 64.9 %    % Lymphocytes 23.2 %    % Monocytes 10.6 %    % Eosinophils 1.0 %    % Basophils 0.3 %    Absolute Neutrophil 5.0 1.6 - 8.3 10e9/L    Absolute Lymphocytes 1.8 0.8 - 5.3 10e9/L    Absolute Monocytes 0.8 0.0 - 1.3 10e9/L    Absolute Eosinophils 0.1 0.0 - 0.7 10e9/L    Absolute Basophils 0.0 0.0 - 0.2 10e9/L    Diff Method Automated Method     Comprehensive metabolic panel   Result Value Ref Range    Sodium 137 133 - 144 mmol/L    Potassium 3.8 3.4 - 5.3 mmol/L    Chloride 102 94 - 109 mmol/L    Carbon Dioxide 27 20 - 32 mmol/L    Anion Gap 8 3 - 14 mmol/L    Glucose 86 70 - 99 mg/dL    Urea Nitrogen 14 7 - 30 mg/dL    Creatinine 0.79 0.66 - 1.25 mg/dL    GFR Estimate 87 >60 mL/min/[1.73_m2]    GFR Estimate If Black >90 >60 mL/min/[1.73_m2]    Calcium 9.2 8.5 - 10.1 mg/dL    Bilirubin Total 0.8 0.2 - 1.3 mg/dL    Albumin 3.4 3.4 - 5.0 g/dL    Protein Total 7.3 6.8 - 8.8 g/dL    Alkaline Phosphatase 77 40 - 150 U/L    ALT 16 0 - 70 U/L    AST 12 0 - 45 U/L   Lipid panel reflex to direct LDL Fasting   Result Value Ref Range    Cholesterol 174 <200 mg/dL    Triglycerides 59 <150 mg/dL    HDL Cholesterol 57 >39 mg/dL    LDL Cholesterol Calculated 105 (H) <100 mg/dL    Non HDL Cholesterol 117 <130 mg/dL               Enclosure: Lab Results

## 2019-07-09 NOTE — RESULT ENCOUNTER NOTE
"The following letter pertains to your most recent diagnostic tests:    -Your total cholesterol is 174 which is at your goal of total cholesterol less than 200.    -Your triglycerides are 59 which are at your goal of triglycerides less than 150.    -Your HDL or \"good cholesterol\" is 57 which is at your goal of HDL cholesterol greater than 40.    -Your LDL cholesterol or \"bad cholesterol\" is 105 which is above  your goal of LDL cholesterol less than <100.  Your LDL goal is based on your risk factors for artery disease.     -Liver and gallbladder tests are normal for you. (ALT,AST, Alk phos, bilirubin), kidney function is normal for you (Creatinine, GFR), Sodium is normal, Potassium is normal for you, Calcium is normal for you, Glucose (blood sugar) is normal for you.      -Your complete blood counts including your hemoglobin returned normal for you.         Bottom line:  Based on your cholesterol levels and other risk factors, your calculated statistical risk for having a heart attack over the next 10 years is elevated.  I estimate a 25.3% chance of heart attack over the next 10 years.  Because we believe that statin medications such as atorvastatin (Lipitor) have protective effects in the blood vessels that extend beyond their ability to lower cholesterol, if you took atorvastatin (Lipitor) regularly, you could bring that 10-year statistical risk for heart attack down significantly.  Generally, we recommend statin cholesterol-lowering medications such as atorvastatin (Lipitor) to individuals with 10 year statistical risk for heart attack greater than 7.5%.  As such, I would recommend you starting that drug for prevention purposes.  You should be informed that a very small minority of people who take atorvastatin can develop muscle pain and weakness as a side effect from that drug.  If you experience those side effects, then stop the  drug and contact me.  If you start the medication, you should have a follow up " fasting cholesterol panel after you have been taking the medication for 2 months.  You can schedule a lab appointment for that purpose.  Please contact me if you would like to get that medication started so we can send an prescritpion and arrange for appropriate follow up.        The other lab results are stable and healthy.      Follow up:  Return for follow up cholesterol panel in 2 months if you decide to start the atorvastatin (Lipitor).      Sincerely,    Dr. Toledo    The 10-year ASCVD risk score (Naima GREWAL Jr., et al., 2013) is: 25.3%    Values used to calculate the score:      Age: 78 years      Sex: Male      Is Non- : No      Diabetic: No      Tobacco smoker: No      Systolic Blood Pressure: 120 mmHg      Is BP treated: Yes      HDL Cholesterol: 57 mg/dL      Total Cholesterol: 174 mg/dL

## 2019-08-07 ENCOUNTER — OFFICE VISIT (OUTPATIENT)
Dept: FAMILY MEDICINE | Facility: CLINIC | Age: 79
End: 2019-08-07
Payer: COMMERCIAL

## 2019-08-07 VITALS
OXYGEN SATURATION: 97 % | DIASTOLIC BLOOD PRESSURE: 73 MMHG | TEMPERATURE: 97 F | HEART RATE: 71 BPM | WEIGHT: 291.4 LBS | BODY MASS INDEX: 43.16 KG/M2 | SYSTOLIC BLOOD PRESSURE: 132 MMHG | HEIGHT: 69 IN

## 2019-08-07 DIAGNOSIS — S80.12XA TRAUMATIC ECCHYMOSIS OF LEFT LOWER LEG, INITIAL ENCOUNTER: Primary | ICD-10-CM

## 2019-08-07 PROCEDURE — 99213 OFFICE O/P EST LOW 20 MIN: CPT | Performed by: NURSE PRACTITIONER

## 2019-08-07 ASSESSMENT — MIFFLIN-ST. JEOR: SCORE: 2042.16

## 2019-08-07 NOTE — PROGRESS NOTES
Subjective     Thanh Goodrich is a 78 year old male who presents to clinic today for the following health issues:    HPI   Chief Complaint   Patient presents with     Fall     pt states that slip last Sunday around noon, has some edema (left leg) and states that has hematoma.       This past Sunday the patient was standing in his bathroom with his leg on top of his bathtub while bathing when his left leg slipped and his medial lower leg violently struck the bathtub wall.  He had minimal swelling initially but did notice bruising.  Walking is not painful.  Bruised are is sore     Reports chronic swelling to his legs, with the left leg worse.  He reports worsening leg swelling recent following vacation to [place].  While on recent trip, he says that the combination of the car ride and diet change resulted in his left leg being quite swollen prior to his accident  He also reported he had a previous leg ulcer to his left lower leg after striking his shin in 2013, that was seen by a wound clinic. Repots it initially healed well but developed into cellulitis, resulting in hospitalization and followed by development additional leg sores to his lower left leg.    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     Cellulitis of left leg 9/23/2012     Edema 7/7/2009     History of steroid therapy 2/22/2010     Hyperlipidemia LDL goal <130 10/31/2010     HYPERTENSION 7/8/2003     Incontinence of urine 10/25/2010     Leg ulcer (H) 9/20/2012     MALIGN NEOPL PROSTATE-3/07 4/2/2007    T1C, [place] 8, initial PSA 39, s/p radiation seed implants 2007      Myasthenia gravis (H)      OBESITY 7/8/2003     Osteoporosis 8/18/2009    Refused bisphosphonates 2011      PVC's (premature ventricular contractions) 11/23/2011     RIGHT OCCIPITAL PAIN 7/8/2003     ROTATOR CUFF SYND NOS- RT 9/19/2005     Skin nodule 3/18/2013     ulcer left shin 10/8/2007     Family History   Problem Relation  Age of Onset     Hypertension Mother      Hypertension Father      Cancer Father         Prostate     Diabetes Maternal Grandmother      C.A.D. Maternal Grandmother      Cerebrovascular Disease Maternal Grandfather      Cancer Paternal Uncle         Prostate     Past Surgical History:   Procedure Laterality Date     Prostate Cancer Cryotherapy  2007     TONSILLECTOMY  1945     Social History     Tobacco Use     Smoking status: Never Smoker     Smokeless tobacco: Never Used   Substance Use Topics     Alcohol use: Yes     Alcohol/week: 0.0 oz     Comment: once monthly     Current Outpatient Medications   Medication Sig Dispense Refill     Acetaminophen (TYLENOL PO)        ASPIRIN PO Take 325 mg by mouth daily        calcium carbonate-vitamin D 600-400 MG-UNIT CHEW Take 1 chew tab by mouth 2 times daily.         Cholecalciferol (VITAMIN D3 PO) Take 2,000 Units by mouth 2 times daily.         CHROMIUM PICOLINATE Take 1 tablet by mouth daily.         hydrochlorothiazide (HYDRODIURIL) 25 MG tablet Take 1 tablet (25 mg) by mouth daily For profile only 90 tablet 3     losartan (COZAAR) 50 MG tablet Take 1 tablet (50 mg) by mouth daily 90 tablet 3     Multiple Vitamins-Minerals (MULTIVITAMIN ADULTS 50+) TABS Take by mouth daily       mycophenolate (CELLCEPT) 500 MG tablet Take 1,000 mg by mouth 2 times daily 2 tabs in Am. ! Tab in Pm       nystatin (MYCOSTATIN) 394607 UNIT/GM external powder Apply topically 2 times daily as needed 60 g 11     Allergies   Allergen Reactions     Ciprofloxacin Other (See Comments)     Contraindicated for myasthenia gravis patients     Procainamide Other (See Comments)     Contraindicated for myasthenia gravis patients     Quinidine Other (See Comments)     Contraindicated for myasthenia gravis patients     Quinine Other (See Comments)     Contraindicated for myasthenia gravis patients       Reviewed and updated as needed this visit by clinical staff and provider     Review of Systems   Detailed  "as above    Objective    /73 (BP Location: Left arm, Patient Position: Sitting, Cuff Size: Adult Large)   Pulse 71   Temp 97  F (36.1  C) (Oral)   Ht 1.753 m (5' 9\")   Wt 132.2 kg (291 lb 6.4 oz)   SpO2 97%   BMI 43.03 kg/m    Body mass index is 43.03 kg/m .  Physical Exam   Constitutional: He appears well-developed.   HENT:   Head: Normocephalic.   Cardiovascular:   Pulses:       Dorsalis pedis pulses are 2+ on the left side.   Pulmonary/Chest: Effort normal.   Musculoskeletal: Normal range of motion. He exhibits edema and tenderness.   Neurological: He is alert.   Skin: Skin is warm and dry.   Large area of ecchymosis to medial side of left lower leg expanding from his ankle to tibial tuberosity with an approximate 4 cm blood blister to central medial calf.   Psychiatric: He has a normal mood and affect. His behavior is normal. Judgment and thought content normal.        Assessment and Plan:       ICD-10-CM    1. Traumatic ecchymosis of left lower leg, initial encounter S80.12XA      Patient currently has significant swelling to his left lower leg, though he is ambulating with difficulty. Area does not appear to have a current infection and does not warrant antibiotics. Encouraged him to continue to use compression stockings to his lower legs, and to elevated his legs above the level of his heart as well to attempt to avoid any further trauma to the legs and to avoid puncturing the area of collected blood to his medial calf. If this area was to be punctured, he should cover area with gauze. He felt comfortable monitoring the area and will return with any new or worrisome concerns.    Pt seen in conjunction with Alexx Ybarra NP Student    ANIYA Davey, CNP  [facility]      "

## 2019-08-13 ENCOUNTER — HOSPITAL ENCOUNTER (EMERGENCY)
Facility: CLINIC | Age: 79
Discharge: HOME | End: 2019-08-13
Admitting: PHYSICIAN ASSISTANT
Payer: COMMERCIAL

## 2019-08-13 ENCOUNTER — APPOINTMENT (OUTPATIENT)
Dept: ULTRASOUND IMAGING | Facility: CLINIC | Age: 79
End: 2019-08-13
Attending: PHYSICIAN ASSISTANT
Payer: COMMERCIAL

## 2019-08-13 VITALS
TEMPERATURE: 97.7 F | HEART RATE: 89 BPM | RESPIRATION RATE: 18 BRPM | DIASTOLIC BLOOD PRESSURE: 78 MMHG | OXYGEN SATURATION: 97 % | SYSTOLIC BLOOD PRESSURE: 156 MMHG

## 2019-08-13 DIAGNOSIS — L03.116 CELLULITIS OF LEFT LOWER EXTREMITY: ICD-10-CM

## 2019-08-13 PROCEDURE — 93971 EXTREMITY STUDY: CPT | Mod: LT

## 2019-08-13 PROCEDURE — 25000132 ZZH RX MED GY IP 250 OP 250 PS 637: Performed by: PHYSICIAN ASSISTANT

## 2019-08-13 PROCEDURE — 99284 EMERGENCY DEPT VISIT MOD MDM: CPT | Mod: 25

## 2019-08-13 RX ORDER — CEPHALEXIN 500 MG/1
500 CAPSULE ORAL 4 TIMES DAILY
Qty: 39 CAPSULE | Refills: 0 | Status: SHIPPED | OUTPATIENT
Start: 2019-08-13 | End: 2019-08-23

## 2019-08-13 RX ORDER — CEPHALEXIN 500 MG/1
500 CAPSULE ORAL ONCE
Status: COMPLETED | OUTPATIENT
Start: 2019-08-13 | End: 2019-08-13

## 2019-08-13 RX ADMIN — CEPHALEXIN 500 MG: 500 CAPSULE ORAL at 10:17

## 2019-08-13 ASSESSMENT — ENCOUNTER SYMPTOMS
WOUND: 1
MYALGIAS: 1
SHORTNESS OF BREATH: 0
COLOR CHANGE: 1
FEVER: 1

## 2019-08-13 NOTE — ED AVS SNAPSHOT
Emergency Department  [address] [place] [id number]  Phone:  [phone]  Fax:  [phone]                                    Thanh Goodrich   MRN: [id number]    Department:   Emergency Department   Date of Visit:  8/13/2019           After Visit Summary Signature Page    I have received my discharge instructions, and my questions have been answered. I have discussed any challenges I see with this plan with the nurse or doctor.    ..........................................................................................................................................  Patient/Patient Representative Signature      ..........................................................................................................................................  Patient Representative Print Name and Relationship to Patient    ..................................................               ................................................  Date                                   Time    ..........................................................................................................................................  Reviewed by Signature/Title    ...................................................              ..............................................  Date                                               Time          22EPIC Rev 08/18

## 2019-08-13 NOTE — ED PROVIDER NOTES
History     Chief Complaint:  Leg Pain      HPI   Thanh Goodrich is a 78 year old male with a history of left leg cellulitis with a left leg ulcer, atypical mycobacterial infection, malignant neoplasm of prostate s/p cryotherapy prostate, and myasthenia gravis on Cellcept who presents to the emergency department for evaluation of leg pain. Patient states that he slipped in his bathtub 9 days ago, and sustained a hematoma to his left leg. Patient saw an NP five days ago, who did not think that patient had cellulitis at that time. However, the swelling has increased with significant redness to the leg.  He is concerned for cellulitis--he has called his PCP about this, who he is scheduled to see tomorrow. However, because of the worsening swelling and redness he comes in today for evaluation. He has pain to palpation to the leg, but not at rest.  The patient does note that he frequently develops edema in the lower extremities which is often more prominent on the left side as compared to the right.  Patient also adds that his temperature went to 99 and he endorses subjective fever. Denies chest pain and shortness of breath. Denies a history of diabetes.      PE/DVT Risk Factors:   Hx of PE/DVT:                        negative  Hx of clotting disorder:            negative  Hx of cancer:                          positive  Tobacco use:                          negative  Hormone therapy:                   negative  Pregnancy:                              negative  Prolonged immobilization:       negative  Recent surgery:                       negative  Recent travel:                          negative  Familial Hx of PE/DVT:           negative    Allergies:  Ciprofloxacin  Procainamide  Quinidine  Quinine      Medications:    Aspirin    Hydrochlorothiazide   Losartan   Cellcept  Nystatin powder      Past Medical History:    Atypical mycobacterial infection  Cellulitis of left leg   Edema   History of steroid therapy    Hypertension  Hyperlipidemia    Incontinence of urine   Leg ulcer   Malignant neoplasm of prostate  Myasthenia gravis  Obesity  Osteoporosis   Premature ventricular contractions  Right occipital pain   Rotator cuff syndrome   Skin nodule   Dilated aortic root   Diastolic dysfunction  Panniculitis     Past Surgical History:    Prostate cancer cryotherapy   Tonsillectomy     Family History:    Mother - hypertension  Father - hypertension, prostate cancer     Social History:  The patient was accompanied to the ED by a friend.  Smoking Status: Never  Smokeless Tobacco: Never  Alcohol Use: Yes  Drug use: No    Marital Status:  Single      Review of Systems   Constitutional: Positive for fever (subjective).   Respiratory: Negative for shortness of breath.    Cardiovascular: Positive for leg swelling. Negative for chest pain.   Musculoskeletal: Positive for myalgias.   Skin: Positive for color change and wound (hematoma - no skin break).   All other systems reviewed and are negative.    Physical Exam     Patient Vitals for the past 24 hrs:   BP Temp Temp src Pulse Resp SpO2   08/13/19 0955 (!) 156/78 97.7  F (36.5  C) Oral 89 18 97 %        Physical Exam  General: Alert and cooperative with exam. Resting comfortably on gurney  Head:  Scalp is NC/AT  Eyes:  No scleral icterus, PERRL  ENT:  The external nose and ears are normal.   Neck:  Normal range of motion without rigidity.  CV:  Regular rate and rhythm    No pathologic murmur, rubs, or gallops.  Resp:  Breath sounds are clear bilaterally.  No crackles, wheezes, rhonchi.    Non-labored, no retractions or accessory muscle use  GI:  Abdomen is soft, no distension, no tenderness, no masses. No peritoneal signs.  Bowel sounds present in all quadrants  :  No suprapubic or flank tenderness  MS:  Prominent unilateral pitting edema of the LLE with redness surrounding a small hematoma over the left anterior shin.  (Area was outlined)    Mildly tender to palpation. No  crepitance.     No pain out of proportion to exam. 2+ DP and PT pulses.   Skin:  Warm and dry, No rash or lesions noted.  Neuro: Oriented. No gross motor deficits.  Psych: Awake. Alert. Normal affect. Appropriate interactions.      Emergency Department Course     Imaging:  Radiology findings were communicated with the patient and family who voiced understanding of the findings.    US Lower Extremity Venous Duplex Left  IMPRESSION:  1. No evidence for DVT within the visualized left lower extremity  veins. The posterior tibial and peroneal veins are not visualized,  likely due to the presence of subcutaneous edema within the left lower  extremity.  2. Mildly complex left Baker's cyst.  Report per radiology     Interventions:  1017 - Keflex 500mg PO     Emergency Department Course:  Nursing notes and vitals reviewed.  The patient was sent for a US Lower Extremity Venous Duplex Left while in the emergency department, results above.      1003: I performed an exam of the patient as documented above.     1012: Outlined area of redness on patient's left leg.      1140: Patient rechecked and updated.      Findings and plan explained to the Patient. Patient discharged home with instructions regarding supportive care, medications, and reasons to return. The importance of close follow-up was reviewed. The patient was prescribed Keflex.     I personally reviewed the imaging results with the Patient and answered all related questions prior to discharge.    Impression & Plan      Medical Decision Makin-year-old male with a history of myasthenia gravis who presents with swelling and redness of his left lower leg.  Patient history and records reviewed.  On examination there is prominent redness, edema and mild tenderness that is suggestive of a possible cellulitis versus venous stasis.  There is no evidence of abscess or necrotizing soft tissue infection.  Given the unilateral swelling I did obtain an ultrasound to evaluate  for DVT which was negative.  He has no evidence of more serious systemic infection such as sepsis or septic shock and he is afebrile and not tachycardic.  Per the patient there has not been significant progression of the redness or pain over the last several days.  I will start him on Keflex and I do feel he is safe for outpatient management at this time.  He will follow-up with his primary care provider for wound recheck in 2 to 3 days.  He will return to the ED immediately if he develops significant spreading redness, severe increase in pain, fever or other new or worsening symptoms.    Diagnosis:    ICD-10-CM    1. Cellulitis of left lower extremity L03.116        Disposition:  Discharged to home    Discharge Medications:   Details   cephALEXin (KEFLEX) 500 MG capsule Take 1 capsule (500 mg) by mouth 4 times daily for 10 days, Disp-39 capsule, R-0, Local Print             Scribe Disclosure:  Betsy LANCE, am serving as a scribe at 9:59 AM on 8/13/2019 to document services personally performed by Julio Gupta PA-C based on my observations and the provider's statements to me.   8/13/2019    EMERGENCY DEPARTMENT       Julio Gupta PA-C  08/13/19 1211

## 2019-08-14 ENCOUNTER — ANCILLARY PROCEDURE (OUTPATIENT)
Dept: GENERAL RADIOLOGY | Facility: CLINIC | Age: 79
End: 2019-08-14
Attending: INTERNAL MEDICINE
Payer: COMMERCIAL

## 2019-08-14 ENCOUNTER — OFFICE VISIT (OUTPATIENT)
Dept: FAMILY MEDICINE | Facility: CLINIC | Age: 79
End: 2019-08-14
Payer: COMMERCIAL

## 2019-08-14 VITALS
OXYGEN SATURATION: 98 % | SYSTOLIC BLOOD PRESSURE: 131 MMHG | HEIGHT: 69 IN | BODY MASS INDEX: 42.52 KG/M2 | TEMPERATURE: 98.3 F | DIASTOLIC BLOOD PRESSURE: 74 MMHG | HEART RATE: 71 BPM | WEIGHT: 287.1 LBS

## 2019-08-14 DIAGNOSIS — I87.2 VENOUS (PERIPHERAL) INSUFFICIENCY: ICD-10-CM

## 2019-08-14 DIAGNOSIS — M79.662 PAIN OF LEFT LOWER LEG: Primary | ICD-10-CM

## 2019-08-14 DIAGNOSIS — T14.8XXA HEMATOMA OF SKIN: ICD-10-CM

## 2019-08-14 DIAGNOSIS — E66.01 MORBID OBESITY (H): ICD-10-CM

## 2019-08-14 DIAGNOSIS — M79.662 PAIN OF LEFT LOWER LEG: ICD-10-CM

## 2019-08-14 DIAGNOSIS — L03.116 CELLULITIS OF LEFT LEG: ICD-10-CM

## 2019-08-14 PROCEDURE — 99213 OFFICE O/P EST LOW 20 MIN: CPT | Performed by: INTERNAL MEDICINE

## 2019-08-14 PROCEDURE — 99207 C PAF COMPLETED  NO CHARGE: CPT | Performed by: INTERNAL MEDICINE

## 2019-08-14 PROCEDURE — 73590 X-RAY EXAM OF LOWER LEG: CPT | Mod: LT

## 2019-08-14 ASSESSMENT — MIFFLIN-ST. JEOR: SCORE: 2022.66

## 2019-08-14 NOTE — PROGRESS NOTES
Subjective     Thanh Goodrich is a 78 year old male who presents to clinic today for the following health issues:    HPI   ED/UC Followup:    Facility:   ED  Date of visit: 08/13/2019  Reason for visit:     Cellulitis of left lower extremity    Current Status: still having pain       Pleasant 78-year-old man with myasthenia gravis, morbid obesity, chronic venous insufficiency.  He was vacationing on [place] [employer] and admits to diet indiscretions with respect to diet sodium intake.  He developed bilateral leg swelling following his vacation.  Then he slipped in the shower and injured his distal left leg.  He was seen by a nurse practitioner in this clinic and diagnosed with a hematoma.  Later, he was seen in the emergency department yesterday and diagnosed with cellulitis.  He was started on oral Keflex.  His symptoms have not improved significantly since starting that medication just yesterday.  He describes moderate to severe pain in the distal leg associated with swelling and redness without fevers or chills.  He admits that he has not been elevating his leg as much as possible.  He had an ultrasound that excluded a DVT yesterday, but has not had a plain film of the tibia and fibula to exclude a fracture.    Patient Active Problem List   Diagnosis     HYPERTENSION     Myasthenia gravis without exacerbation (H)     MALIGN NEOPL PROSTATE-3/07     Edema     Osteoporosis     Incontinence of urine     Hyperlipidemia LDL goal <130     PVC's (premature ventricular contractions)     Advanced directives, counseling/discussion     Leg ulcer (H)     Cellulitis of left leg     Microscopic hematuria     Panniculitis     Ulcer     Skin nodule     History of steroid therapy     Atypical mycobacterial infection     Pain of left scapula     Osteoporosis     RBBB     Diastolic dysfunction     Dilated aortic root (H)     Neck pain     History of mycobacterial infection     Benign essential hypertension     Morbid obesity (H)      Past Surgical History:   Procedure Laterality Date     Prostate Cancer Cryotherapy  2007     TONSILLECTOMY  1945       Social History     Tobacco Use     Smoking status: Never Smoker     Smokeless tobacco: Never Used   Substance Use Topics     Alcohol use: Yes     Alcohol/week: 0.0 oz     Comment: once monthly     Family History   Problem Relation Age of Onset     Hypertension Mother      Hypertension Father      Cancer Father         Prostate     Diabetes Maternal Grandmother      C.A.D. Maternal Grandmother      Cerebrovascular Disease Maternal Grandfather      Cancer Paternal Uncle         Prostate         Current Outpatient Medications   Medication Sig Dispense Refill     Acetaminophen (TYLENOL PO)        calcium carbonate-vitamin D 600-400 MG-UNIT CHEW Take 1 chew tab by mouth 2 times daily.         cephALEXin (KEFLEX) 500 MG capsule Take 1 capsule (500 mg) by mouth 4 times daily for 10 days 39 capsule 0     Cholecalciferol (VITAMIN D3 PO) Take 2,000 Units by mouth 2 times daily.         CHROMIUM PICOLINATE Take 1 tablet by mouth daily.         hydrochlorothiazide (HYDRODIURIL) 25 MG tablet Take 1 tablet (25 mg) by mouth daily For profile only 90 tablet 3     losartan (COZAAR) 50 MG tablet Take 1 tablet (50 mg) by mouth daily 90 tablet 3     Multiple Vitamins-Minerals (MULTIVITAMIN ADULTS 50+) TABS Take by mouth daily       mycophenolate (CELLCEPT) 500 MG tablet Take 1,000 mg by mouth 2 times daily 2 tabs in Am. ! Tab in Pm       nystatin (MYCOSTATIN) 380820 UNIT/GM external powder Apply topically 2 times daily as needed 60 g 11     Allergies   Allergen Reactions     Ciprofloxacin Other (See Comments)     Contraindicated for myasthenia gravis patients     Procainamide Other (See Comments)     Contraindicated for myasthenia gravis patients     Quinidine Other (See Comments)     Contraindicated for myasthenia gravis patients     Quinine Other (See Comments)     Contraindicated for myasthenia gravis patients  "      Reviewed and updated as needed this visit by Provider         Review of Systems         Objective    /74 (BP Location: Right arm, Patient Position: Sitting, Cuff Size: Adult Regular)   Pulse 71   Temp 98.3  F (36.8  C) (Tympanic)   Ht 1.753 m (5' 9\")   Wt 130.2 kg (287 lb 1.6 oz)   SpO2 98%   BMI 42.40 kg/m    Body mass index is 42.4 kg/m .  Physical Exam   General: This is a well-appearing, nontoxic appearing man in no acute distress who appears quite comfortable.  There is 2+ pitting edema extending to just below the knee on the left side there is a well-demarcated area of erythema surrounding a large hematoma on the medial aspect of the distal left leg.  Distal circulation sensation and motor function seem to be intact.  The area of skin erythema seems to be well within the pen marks that were drawn on the area out yesterday in the emergency department.    X-ray of the tibia and fibula are pending        Assessment & Plan       ICD-10-CM    1. Pain of left lower leg M79.662 XR Tibia & Fibula Left 2 Views   2. Cellulitis of left leg L03.116    3. Hematoma of skin T14.8XXA    4. Morbid obesity (H) E66.01    5. Venous (peripheral) insufficiency I87.2      This is a patient who has chronic venous insufficiency and lower extremity edema resulting from that.  He suffered a traumatic injury to the distal left leg resulting in a large hematoma and acutely exacerbated swelling in the left leg associated with pain with a negative Doppler ultrasound for DVT.  He should have a plain film to exclude a tibia or fibula fracture associated with this injury.  If the x-ray is negative, continue the course of Keflex as prescribed by the emergency department in case there is an element of infection associated with this injury.  The most important thing is for him to keep his leg elevated.  I suspect that this may take several weeks to over a month before it resolves completely.  I shared this prognosis with him.  " "There should be slow improvement over time and if symptoms worsen despite these interventions, the patient should inform us.  Also he should inform us if new symptoms develop such as fevers or chills or weakness or numbness in the distal lower extremity.     BMI:   Estimated body mass index is 42.4 kg/m  as calculated from the following:    Height as of this encounter: 1.753 m (5' 9\").    Weight as of this encounter: 130.2 kg (287 lb 1.6 oz).   Weight management plan: Discussed healthy diet and exercise guidelines            Return in about 1 month (around 9/14/2019) for If symptoms fail to resolve or sooner pending symptoms and x-ray findings.    Martinez Toledo MD  [facility]      Total face to face contact time was greater than 20 minutes of which more than 50% of this time was spent counseling and coordinating care regarding the above topics.    "

## 2019-08-16 ENCOUNTER — TELEPHONE (OUTPATIENT)
Dept: FAMILY MEDICINE | Facility: CLINIC | Age: 79
End: 2019-08-16

## 2019-08-16 NOTE — TELEPHONE ENCOUNTER
"Called and spoke with patient.     Patient asking for advise for hematoma on distal left leg.   Today--a small area on hematoma opened and \"leaking a small amt of blood\".      Patient was seen in ED 8-13-19---with follow up OV in clinic 8-14-19.   Patient Rx'd Keflex 5--mg QID in ED 8-13-19.      Patient denies any new redness, swelling, pain.     Patient asking how to dress the wound, since it is \"leaking a little blood this morning\".     Patient has gauze dressings at home.  Advised patient to keep area clean and dry---and apply gauze dressing.  Monitor closely and seek med attn if symptoms worsen at all---increased drainage, pain, redness, fever.     Patient stated understanding and agreement with advise/plan.      Felicity HDZ RN,BSN    "

## 2019-08-16 NOTE — TELEPHONE ENCOUNTER
Reason for call:  Patient reporting a symptom    Symptom or request: pt has a hemotoma and said during the night it had started to leak.Could you advise him on how to clean/take care of it. Patient does have it covered now.    Duration (how long have symptoms been present):     Have you been treated for this before? Yes    Additional comments:     Phone Number patient can be reached at:  Home number on file [phone] (home)    Best Time:  any    Can we leave a detailed message on this number:  YES    Call taken on 8/16/2019 at 8:24 AM by Rosa M Sinclair

## 2019-08-18 ENCOUNTER — HOSPITAL ENCOUNTER (EMERGENCY)
Facility: CLINIC | Age: 79
Discharge: HOME | End: 2019-08-18
Attending: EMERGENCY MEDICINE | Admitting: EMERGENCY MEDICINE
Payer: COMMERCIAL

## 2019-08-18 VITALS
RESPIRATION RATE: 16 BRPM | OXYGEN SATURATION: 95 % | DIASTOLIC BLOOD PRESSURE: 75 MMHG | HEART RATE: 103 BPM | SYSTOLIC BLOOD PRESSURE: 135 MMHG | TEMPERATURE: 98.2 F

## 2019-08-18 DIAGNOSIS — T14.8XXA BLEEDING FROM WOUND: ICD-10-CM

## 2019-08-18 PROCEDURE — 99282 EMERGENCY DEPT VISIT SF MDM: CPT

## 2019-08-18 ASSESSMENT — ENCOUNTER SYMPTOMS: WOUND: 1

## 2019-08-18 NOTE — ED PROVIDER NOTES
History     Chief Complaint:  Wound Check      HPI   Thanh Goodrich is a 78 year old male who presents to the emergency department today for evaluation of wound check. The patient arrives here in the ED due to his wound breaking two nights ago and continuously draining blood. He now has 5 more days left of his antibiotic and does see symptoms improving, but is here to seek a follow up on the wound and wound care instructions.  No fever or chills.    The patient was recently seen here in the ED on 8/13. Patient states that he slipped in his bathtub 14 days ago now, and sustained a hematoma to his left leg. Patient saw an NP 10 days ago, who did not think that patient had cellulitis at that time. However, the swelling had increased with significant redness to the leg, prompting his ED visit on 8/13. The patient does note that he frequently develops edema in the lower extremities which is often more prominent on the left side as compared to the right.      Allergies:  Ciprofloxacin  Procainamide  Quinidine  Quinine       Medications:    Aspirin    Hydrochlorothiazide   Losartan   Cellcept  Nystatin powder       Past Medical History:    Atypical mycobacterial infection  Cellulitis of left leg   Edema   History of steroid therapy   Hypertension  Hyperlipidemia    Incontinence of urine   Leg ulcer   Malignant neoplasm of prostate  Myasthenia gravis  Obesity  Osteoporosis   Premature ventricular contractions  Right occipital pain   Rotator cuff syndrome   Skin nodule   Dilated aortic root   Diastolic dysfunction  Panniculitis      Past Surgical History:    Prostate cancer cryotherapy   Tonsillectomy      Family History:    Mother - hypertension  Father - hypertension, prostate cancer      Social History:  The patient was accompanied to the ED by a friend.  Smoking Status: Never  Smokeless Tobacco: Never  Alcohol Use: Yes  Drug use: No    Marital Status:  Single       Review of Systems   Skin: Positive for wound.   All  other systems reviewed and are negative.    Physical Exam     Patient Vitals for the past 24 hrs:   BP Temp Temp src Pulse Resp SpO2   08/18/19 1214 135/75 98.2  F (36.8  C) Oral 103 16 95 %        Physical Exam  Nursing note and vitals reviewed.  Constitutional:  Appears well-developed and well-nourished.   HENT:   Head:    Atraumatic.   Mouth/Throat:   Oropharynx is clear and moist. No oropharyngeal exudate.   Eyes:    Pupils are equal, round, and reactive to light.   Neck:    Normal range of motion. Neck supple.      No tracheal deviation present. No thyromegaly present.   Cardiovascular:  Normal rate, regular rhythm, no murmur   Pulmonary/Chest: Breath sounds are clear and equal without wheezes or crackles.  Abdominal:   Soft. Bowel sounds are normal. Exhibits no distension and      no mass. There is no tenderness.      There is no rebound and no guarding.   Musculoskeletal:  4 cm diameter hematoma to the mid-anterior Tibia with a small open wound to the inferior aspect of the hematoma which is leaking a small amount of bloody fluid. There is pink skin discoloration surround the anterior aspect of the calf.  The redness has receded from the pen markings outline the previous area of redness. No pus seen. No foul smell to the drainage.   Lymphadenopathy:  No cervical adenopathy.   Neurological:   Alert and oriented to person, place, and time.   Skin:    Skin is warm and dry. No rash noted. No pallor.      Emergency Department Course     1211 Nursing notes and vitals reviewed.    1230 I performed an exam of the patient as documented above.     1250 I discussed the treatment plan with the patient. They expressed understanding of this plan and consented to discharge. They will be discharged home with instructions for care and follow up. In addition, the patient will return to the emergency department if their symptoms persist, worsen, if new symptoms arise or if there is any concern.  All questions were answered.      Impression & Plan      Medical Decision Making:  Thanh Goodrich is a 78 year old male who presents to the emergency department today for evaluation of resolving cellulitis with some bloody drainage from the hematoma. The drainage was a small amount since the hematoma has broken open, and I discussed with him, that he should continue and finish the keflex since he is half-way through and the cellulitis has improved. Wound care was performed here and I instructed wound care and dressings with him. He was told to follow up with his primary care provider this week.     Diagnosis:    ICD-10-CM    1. Bleeding from wound T14.8XXA         Disposition:   The patient is discharged to home.     Scribe Disclosure:  I, Mina Mendoza, am serving as a scribe at 12:25 PM on 8/18/2019 to document services personally performed by Karla Lafleur MD based on my observations and the provider's statements to me.      EMERGENCY DEPARTMENT       Karla Lafleur MD  08/18/19 [id number]       Karla Lafleur MD  08/18/19 [id number]

## 2019-08-18 NOTE — ED AVS SNAPSHOT
Emergency Department  [address] [place] [address]  Phone:  [phone]  Fax:  [phone]                                    Thanh Goodrich   MRN: [id number]    Department:   Emergency Department   Date of Visit:  8/18/2019           After Visit Summary Signature Page    I have received my discharge instructions, and my questions have been answered. I have discussed any challenges I see with this plan with the nurse or doctor.    ..........................................................................................................................................  Patient/Patient Representative Signature      ..........................................................................................................................................  Patient Representative Print Name and Relationship to Patient    ..................................................               ................................................  Date                                   Time    ..........................................................................................................................................  Reviewed by Signature/Title    ...................................................              ..............................................  Date                                               Time          22EPIC Rev 08/18

## 2019-08-18 NOTE — ED TRIAGE NOTES
Was seen recent  for wound check with work up; the redness has not gotten worse but the hematoma opened up

## 2019-08-18 NOTE — DISCHARGE INSTRUCTIONS
Clean wound 1-2 times daily and apply gauze dressing    Keep your legs elevated as much as possible.

## 2019-08-19 NOTE — RESULT ENCOUNTER NOTE
The following letter pertains to your most recent diagnostic tests:    Good news! The x-ray does not show a fracture in your tibia or fibula.        Sincerely,    Dr. Toledo

## 2019-08-23 ENCOUNTER — OFFICE VISIT (OUTPATIENT)
Dept: FAMILY MEDICINE | Facility: CLINIC | Age: 79
End: 2019-08-23
Payer: COMMERCIAL

## 2019-08-23 VITALS
DIASTOLIC BLOOD PRESSURE: 73 MMHG | TEMPERATURE: 99.1 F | OXYGEN SATURATION: 95 % | HEIGHT: 69 IN | WEIGHT: 285.1 LBS | HEART RATE: 76 BPM | SYSTOLIC BLOOD PRESSURE: 119 MMHG | BODY MASS INDEX: 42.23 KG/M2

## 2019-08-23 DIAGNOSIS — L03.116 CELLULITIS OF LEFT LEG: Primary | ICD-10-CM

## 2019-08-23 PROCEDURE — 99213 OFFICE O/P EST LOW 20 MIN: CPT | Performed by: INTERNAL MEDICINE

## 2019-08-23 RX ORDER — CEPHALEXIN 500 MG/1
500 CAPSULE ORAL 4 TIMES DAILY
Qty: 40 CAPSULE | Refills: 0 | Status: SHIPPED | OUTPATIENT
Start: 2019-08-23 | End: 2020-01-01

## 2019-08-23 ASSESSMENT — MIFFLIN-ST. JEOR: SCORE: 2013.59

## 2019-08-23 NOTE — PROGRESS NOTES
Subjective     Thanh Goodrich is a 78 year old male who presents to clinic today for the following health issues:    HPI   ED/UC Followup:    Facility:   ED  Date of visit: 8/18/2019  Reason for visit: Wound Check  Current Status: Cellulitis has improved, pt wondering if there's an extent for Cephalexin or wait and see if there's any improve, had fever last night and hematoma seems to be smaller but has been leaking for a while in small amounts with a dark/black spot in it.        Pleasant 78-year-old man with myasthenia gravis, morbid obesity, recent diagnosis of cellulitis of the distal lower extremity after traumatic injury to the lower extremities while slipping in the bathtub.  He was seen in the emergency department because of bleeding associated with his lower extremity cellulitis.  He has completed the course of Keflex, but notes persistent erythema and swelling in the leg although it is much improved from when he saw me last.  Again, his x-rays show no evidence of fracture.  He is trying to keep his leg elevated as much as possible.  He does describe a low-grade temperature elevations when compared to his baseline although no temperatures greater than 101.    Patient Active Problem List   Diagnosis     HYPERTENSION     Myasthenia gravis without exacerbation (H)     MALIGN NEOPL PROSTATE-3/07     Edema     Osteoporosis     Incontinence of urine     Hyperlipidemia LDL goal <130     PVC's (premature ventricular contractions)     Advanced directives, counseling/discussion     Leg ulcer (H)     Cellulitis of left leg     Microscopic hematuria     Panniculitis     Ulcer     Skin nodule     History of steroid therapy     Atypical mycobacterial infection     Pain of left scapula     Osteoporosis     RBBB     Diastolic dysfunction     Dilated aortic root (H)     Neck pain     History of mycobacterial infection     Benign essential hypertension     Morbid obesity (H)     Past Surgical History:   Procedure  Laterality Date     Prostate Cancer Cryotherapy  2007     TONSILLECTOMY  1945       Social History     Tobacco Use     Smoking status: Never Smoker     Smokeless tobacco: Never Used   Substance Use Topics     Alcohol use: Yes     Alcohol/week: 0.0 oz     Comment: once monthly     Family History   Problem Relation Age of Onset     Hypertension Mother      Hypertension Father      Cancer Father         Prostate     Diabetes Maternal Grandmother      C.A.D. Maternal Grandmother      Cerebrovascular Disease Maternal Grandfather      Cancer Paternal Uncle         Prostate         Current Outpatient Medications   Medication Sig Dispense Refill     Acetaminophen (TYLENOL PO)        calcium carbonate-vitamin D 600-400 MG-UNIT CHEW Take 1 chew tab by mouth 2 times daily.         cephALEXin (KEFLEX) 500 MG capsule Take 1 capsule (500 mg) by mouth 4 times daily 40 capsule 0     Cholecalciferol (VITAMIN D3 PO) Take 2,000 Units by mouth 2 times daily.         CHROMIUM PICOLINATE Take 1 tablet by mouth daily.         hydrochlorothiazide (HYDRODIURIL) 25 MG tablet Take 1 tablet (25 mg) by mouth daily For profile only 90 tablet 3     losartan (COZAAR) 50 MG tablet Take 1 tablet (50 mg) by mouth daily 90 tablet 3     Multiple Vitamins-Minerals (MULTIVITAMIN ADULTS 50+) TABS Take by mouth daily       mycophenolate (CELLCEPT) 500 MG tablet Take 1,000 mg by mouth 2 times daily 2 tabs in Am. ! Tab in Pm       nystatin (MYCOSTATIN) 833244 UNIT/GM external powder Apply topically 2 times daily as needed 60 g 11     Allergies   Allergen Reactions     Ciprofloxacin Other (See Comments)     Contraindicated for myasthenia gravis patients     Procainamide Other (See Comments)     Contraindicated for myasthenia gravis patients     Quinidine Other (See Comments)     Contraindicated for myasthenia gravis patients     Quinine Other (See Comments)     Contraindicated for myasthenia gravis patients       Reviewed and updated as needed this visit by  "Provider         Review of Systems   No diarrhea, chest pain or shortness of breath      Objective    /73 (BP Location: Right arm, Patient Position: Sitting, Cuff Size: Adult Regular)   Pulse 76   Temp 99.1  F (37.3  C) (Tympanic)   Ht 1.753 m (5' 9\")   Wt 129.3 kg (285 lb 1.6 oz)   SpO2 95%   BMI 42.10 kg/m    Body mass index is 42.1 kg/m .  Physical Exam   General: This is a well-appearing man who does not appear toxic.  Extremity: There is significant improvement in the erythema and edema noted in the distal left lower extremity, there is coagulated blood overlying what used to be a blister in the skin when I last examined the skin.  There are no areas of fluctuance or obvious skin abscesses.  Homans sign is negative.  Distal circulation sensation and motor function seem to be intact.  Overall, the leg has a much improved appearance from when it was last examined by myself several weeks ago.            Assessment & Plan     1. Cellulitis of left leg  Making slow progress, there is still some soft tissue erythema and edema consistent with mild residual infection that seems to be responding to but not resolved with the Keflex.  At this point, I think it might be reasonable to try a slightly longer course of Keflex.  He was reminded to continue to keep his leg elevated.  He is using topical triple antibiotic ointment on the blister that popped prior to his visit to the emergency department.  I think that this might be irritating the skin around the ruptured blister.  I recommended that he stop using the topical antibiotic and start using bacitracin covered by gauze over the area.  I suspect that it may take an additional 14 to 21 days for symptoms to resolve completely.  He will call sooner if symptoms worsen despite my recommendations and ongoing antibiotics.  - cephALEXin (KEFLEX) 500 MG capsule; Take 1 capsule (500 mg) by mouth 4 times daily  Dispense: 40 capsule; Refill: 0     BMI:   Estimated body " "mass index is 42.1 kg/m  as calculated from the following:    Height as of this encounter: 1.753 m (5' 9\").    Weight as of this encounter: 129.3 kg (285 lb 1.6 oz).   Weight management plan: Discussed healthy diet and exercise guidelines            Return in about 3 weeks (around 9/13/2019) for If symptoms fail to respond to prescribe plan or sooner if symptoms worsen.    Martinez Toledo MD  [facility]    "

## 2019-09-01 ENCOUNTER — HOSPITAL ENCOUNTER (EMERGENCY)
Facility: CLINIC | Age: 79
Discharge: HOME | End: 2019-09-01
Attending: EMERGENCY MEDICINE | Admitting: EMERGENCY MEDICINE
Payer: COMMERCIAL

## 2019-09-01 VITALS
HEART RATE: 99 BPM | WEIGHT: 281 LBS | TEMPERATURE: 98.2 F | SYSTOLIC BLOOD PRESSURE: 137 MMHG | RESPIRATION RATE: 18 BRPM | HEIGHT: 70 IN | BODY MASS INDEX: 40.23 KG/M2 | DIASTOLIC BLOOD PRESSURE: 78 MMHG | OXYGEN SATURATION: 95 %

## 2019-09-01 DIAGNOSIS — S81.802A OPEN WOUND OF LOWER LIMB, LEFT, INITIAL ENCOUNTER: ICD-10-CM

## 2019-09-01 DIAGNOSIS — L03.116 CELLULITIS OF LEFT LOWER EXTREMITY: ICD-10-CM

## 2019-09-01 PROCEDURE — 99283 EMERGENCY DEPT VISIT LOW MDM: CPT

## 2019-09-01 RX ORDER — CEPHALEXIN 500 MG/1
500 CAPSULE ORAL 4 TIMES DAILY
Qty: 28 CAPSULE | Refills: 0 | Status: SHIPPED | OUTPATIENT
Start: 2019-09-01 | End: 2019-09-09

## 2019-09-01 ASSESSMENT — ENCOUNTER SYMPTOMS
FEVER: 1
COLOR CHANGE: 1
WOUND: 1
ROS SKIN COMMENTS: WOUND DRAINAGE

## 2019-09-01 ASSESSMENT — MIFFLIN-ST. JEOR: SCORE: 2010.86

## 2019-09-01 NOTE — ED AVS SNAPSHOT
Emergency Department  [address] [place] [address]  Phone:  [phone]  Fax:  [phone]                                    Thanh Goodrich   MRN: [id number]    Department:   Emergency Department   Date of Visit:  9/1/2019           After Visit Summary Signature Page    I have received my discharge instructions, and my questions have been answered. I have discussed any challenges I see with this plan with the nurse or doctor.    ..........................................................................................................................................  Patient/Patient Representative Signature      ..........................................................................................................................................  Patient Representative Print Name and Relationship to Patient    ..................................................               ................................................  Date                                   Time    ..........................................................................................................................................  Reviewed by Signature/Title    ...................................................              ..............................................  Date                                               Time          22EPIC Rev 08/18

## 2019-09-01 NOTE — ED PROVIDER NOTES
History     Chief Complaint:  Wound Check    HPI   Thanh Goodrich is a 78 year old male who presents to the ED for a wound check. [facility] for a wound check for a wound check and was diagnosed with cellulitis in his left lower leg; he was prescribed Cephalexin (500 mg dose, 4 times per day). He presented again on 8/18/2019 for his wound breaking and draining blood, and was told that his hematoma has broken open. The patient presents today because he feels that he needs more time on antibiotics in order for his wound to keep healing. He reports that he ran a low grade fever for a few days. He also reports wound drainage and color change around the site. He reports that he has been cleaning the wound, using Vaseline and gauze over the top of it.The patient reports that he is set to run out of his Cephalexin after two doses tomorrow. The patient denies any history of kidney problems.    Allergies:  Ciprofloxacin, myasthenia gravis patient  Procainamide, myasthenia gravis patient  Quinidine, myasthenia gravis patient  Quinine, myasthenia gravis patient     Medications:    Keflex  Vitamin D3  Chromium Picolinate  Hydrodiuril  Cozaar  Multivitamins  Cellcept  Mycostatin    Past Medical History:    Atypical mycobacterial infection  Cellulitis of left leg  Edema  Hx of steroid therapy  Hyperlipidemia  Hypertension  Leg ulcer  Malignant neoplasm of prostate  Myasthenia gravis  Obesity  Osteoporosis  Premature ventricular contractions  Rotator cuff synd nos  Skin nodule  RBBB    Past Surgical History:    Prostate cancer cryotherapy  Tonsillectomy     Family History:    Hypertension, mother and father  Prostate cancer, father    Social History:  Negative for tobacco use.  Positive for alcohol use.   Negative for drug use.  Marital Status:  Single [1]     Review of Systems   Constitutional: Positive for fever.   Skin: Positive for color change and wound.        Wound drainage  "  Hematological:        Hematoma   All other systems reviewed and are negative.        Physical Exam     Patient Vitals for the past 24 hrs:   BP Temp Temp src Pulse Resp SpO2 Height Weight   09/01/19 1607 137/78 98.2  F (36.8  C) Oral 99 18 95 % 1.778 m (5' 10\") 127.5 kg (281 lb)     Physical Exam  Eyes:  The pupils are equal and round    Conjunctivae and sclerae are normal  ENT:    The nose is normal    Pinnae are normal  CV:  Regular rate and rhythm     No edema  Resp:  Lungs are clear    Non-labored    No rales    No wheezing   GI:  Abdomen is soft and non-tender, there is no rigidity  MS:  Normal muscular tone  Skin:  Wound on anterior left shin, oozing bloody fluid  Neuro:   Awake, alert.      Speech is normal and fluent.    Face is symmetric.     Moves all extremities    SILT in bilateral lower extremities    Emergency Department Course   Emergency Department Course:  Nursing notes and vitals reviewed. (1610) I performed an exam of the patient as documented above.     1705 I rechecked the patient and discussed the results of his workup thus far.     Findings and plan explained to the Patient. Patient discharged home with instructions regarding supportive care, medications, and reasons to return. The importance of close follow-up was reviewed. The patient was prescribed Cephalexin.    Impression & Plan    Medical Decision Making:  Thanh Goodrich is a 78 year old male who presents the emergency department with wound of his left leg.  He is currently being treated with Keflex and thinks that he will need a longer course.  Overall, he feels improved.  He does take medication called CellCept for his myasthenia gravis.  Is been caring for the wound at home with Vaseline and gauze.  Overall the wound appears well but it does still have erythema surrounding the wound suspicious for healing cellulitis.  Given his immune suppression I do think it is reasonable to continue his antibiotics for several more days.  " Advised him to follow-up with his doctor.  I did place a wound care order for him for assessment at home and also requested our  to follow-up with him next week.  His wound was cleansed here and redressed.  He was discharged home and encouraged return with any new or concerning symptoms.    Diagnosis:    ICD-10-CM    1. Open wound of lower limb, left, initial encounter S81.802A WOUND CARE REFERRAL   2. Cellulitis of left lower extremity L03.116        Disposition:  The patient was discharged to home.    Discharge Medications:  New Prescriptions    CEPHALEXIN (KEFLEX) 500 MG CAPSULE    Take 1 capsule (500 mg) by mouth 4 times daily for 7 days     Scribe Disclosure:  I, Katie Rebollar, am serving as a scribe on 9/1/2019 at 4:10 PM to personally document services performed by Junito Pollock MD based on my observations and the provider's statements to me.     Katie Rebollar  9/1/2019    EMERGENCY DEPARTMENT       Junito Pollock MD  09/01/19 1937

## 2019-09-01 NOTE — ED TRIAGE NOTES
On going cellulitis to LLE, states cellulitis is getting better but still there a little and he could use more abx. States he has a hematoma to the area as well, but now state it is leaking large amts since 2 day ago

## 2019-09-03 ENCOUNTER — PATIENT OUTREACH (OUTPATIENT)
Dept: CARE COORDINATION | Facility: CLINIC | Age: 79
End: 2019-09-03

## 2019-09-03 DIAGNOSIS — L03.116 CELLULITIS OF LEFT LEG: Primary | ICD-10-CM

## 2019-09-03 ASSESSMENT — ACTIVITIES OF DAILY LIVING (ADL): DEPENDENT_IADLS:: INDEPENDENT

## 2019-09-03 NOTE — PROGRESS NOTES
Clinic Care Coordination Contact  [facility]/Voicemail    Referral Source: ED Follow-Up  Clinical Data: Care Coordinator Outreach    Outreach attempted x 1.  Left message on patient's voicemail with call back information and requested return call.    Plan: Care Coordinator will try to reach patient again in 1-2 business days.    NOEMI Moura, [facility]  Clinic Care Coordinator  [facility]  [phone]  [email or web address]

## 2019-09-03 NOTE — PROGRESS NOTES
Clinic Care Coordination Contact    Clinic Care Coordination Contact  OUTREACH    Referral Information:  Referral Source: ED Follow-Up    Primary Diagnosis: Injury/Fall    Chief Complaint   Patient presents with     Clinic Care Coordination - [facility]      [place] Utilization: 3 ED visits in the past 90 days  Clinic Utilization  Difficulty keeping appointments:: No  Compliance Concerns: No  No-Show Concerns: No  No PCP office visit in Past Year: No  Utilization    Last refreshed: 9/3/2019 12:18 PM:  Hospital Admissions 0           Last refreshed: 9/3/2019 12:18 PM:  ED Visits 3           Last refreshed: 9/3/2019 12:18 PM:  No Show Count (past year) 0              Current as of: 9/3/2019 12:18 PM          Clinical Concerns:  ANUJA MENDOZA spoke with pt regarding his recent ED visit. Pt shared that he has questions about how to increase the healing. He was given Keflex to take for 1 more week. He is interested in using Juliann but he would like to consult with Dr. Toledo before starting this. He plans to ask this questing through [facility].    He was given a referral for wound care but after calling the phone number he discovered it is in [place], [place]. He is hesitant to use the wound care in [place] because he has had trouble with them in the past.    Pt plans to attend his appointment with Dr. Toledo on 9/9. He declines additional support needed at this time from ANUJA MENDOZA.     Resources and Interventions:  Community Resources: None  Referrals Placed: None     Future Appointments              In 6 days Martinez Toledo MD [facility]      Plan: No further outreaches will be made at this time unless a new referral is made or a change in the pt's status occurs. Patient was provided with ANUJA MENDOZA contact information and encouraged to call with any questions or concerns.    NOEMI Moura, LGSW  Clinic Care Coordinator  [facility] for [facility]  [phone]  [email or web address]

## 2019-09-03 NOTE — LETTER
[facility] CARE COORDINATION    September 3, 2019    Thanh Goodrich  [address]   [place] [address]      Dear Thanh,    I am a clinic care coordinator who works with Martinez Toledo MD at [facility]. Thank you for speaking with me today. I wanted to provide you with my contact information so that you can call me with questions or concerns about your health care. Below is a description of clinic care coordination and how I can further assist you.     The clinic care coordinator is a registered nurse and/or  who understand the health care system. The goal of clinic care coordination is to help you manage your health and improve access to the [facility] system in the most efficient manner. The registered nurse can assist you in meeting your health care goals by providing education, coordinating services, and strengthening the communication among your providers. The  can assist you with financial, behavioral, psychosocial, chemical dependency, counseling, and/or psychiatric resources.    Please feel free to contact me at [phone], with any questions or concerns. We at [facility] are focused on providing you with the highest-quality healthcare experience possible and that all starts with you.     Sincerely,     NAREN Moura

## 2019-09-09 ENCOUNTER — OFFICE VISIT (OUTPATIENT)
Dept: FAMILY MEDICINE | Facility: CLINIC | Age: 79
End: 2019-09-09
Payer: COMMERCIAL

## 2019-09-09 VITALS
BODY MASS INDEX: 40.11 KG/M2 | DIASTOLIC BLOOD PRESSURE: 76 MMHG | HEART RATE: 76 BPM | WEIGHT: 280.2 LBS | SYSTOLIC BLOOD PRESSURE: 120 MMHG | OXYGEN SATURATION: 97 % | HEIGHT: 70 IN | TEMPERATURE: 97 F

## 2019-09-09 DIAGNOSIS — L03.116 CELLULITIS OF LEFT LEG: Primary | ICD-10-CM

## 2019-09-09 DIAGNOSIS — L97.909 ULCER OF LOWER EXTREMITY, UNSPECIFIED LATERALITY, UNSPECIFIED ULCER STAGE (H): ICD-10-CM

## 2019-09-09 PROCEDURE — 99213 OFFICE O/P EST LOW 20 MIN: CPT | Performed by: INTERNAL MEDICINE

## 2019-09-09 PROCEDURE — 87077 CULTURE AEROBIC IDENTIFY: CPT | Performed by: INTERNAL MEDICINE

## 2019-09-09 PROCEDURE — 87070 CULTURE OTHR SPECIMN AEROBIC: CPT | Performed by: INTERNAL MEDICINE

## 2019-09-09 PROCEDURE — 87186 SC STD MICRODIL/AGAR DIL: CPT | Performed by: INTERNAL MEDICINE

## 2019-09-09 RX ORDER — CEPHALEXIN 500 MG/1
500 CAPSULE ORAL 4 TIMES DAILY
Qty: 56 CAPSULE | Refills: 0 | Status: SHIPPED | OUTPATIENT
Start: 2019-09-09 | End: 2020-01-01

## 2019-09-09 ASSESSMENT — MIFFLIN-ST. JEOR: SCORE: 2007.23

## 2019-09-09 NOTE — LETTER
[place] [address] [facility]  [address]  [place]  [address]  Tel: [phone]    September 12, 2019    Thanh Goodrich  [address]   [place] [address]        Dear Mr. Goodrich,    The following letter pertains to your most recent diagnostic tests:    The wound culture grew two bacteria that should respond the cephalexin (Keflex) that was prescribed for your leg infection.          Sincerely,    Dr. Toledo/JULIO CESAR          Enclosure: Lab Results

## 2019-09-09 NOTE — PROGRESS NOTES
Subjective     Thanh Goodrich is a 78 year old male who presents to clinic today for the following health issues:    HPI   ED/UC Followup:    Facility:   ED  Date of visit: 9/1/2019  Reason for visit: Wound Check   Current Status: Patient reports there's not much bleeding, does not report any pain.        78-year-old man with myasthenia gravis and morbid obesity presents in follow-up for a left leg injury.  He had a large scab on his anterior distal left leg with surrounding skin erythema and was being treated for cellulitis.  Unfortunately, the scab ruptured and now he has a deep ulcer underneath the scab.  He has been using Vaseline gauze with daily dressing changes and taking Keflex.  He was seen in the emergency department and received an extension of his Keflex.  He has now been on Keflex for over a month.  He denies fevers or chills or any systemic symptoms.  Ultrasound was negative for DVT and x-ray did not show any fracture or osteomyelitis.    Patient Active Problem List   Diagnosis     HYPERTENSION     Myasthenia gravis without exacerbation (H)     MALIGN NEOPL PROSTATE-3/07     Edema     Osteoporosis     Incontinence of urine     Hyperlipidemia LDL goal <130     PVC's (premature ventricular contractions)     Advanced directives, counseling/discussion     Leg ulcer (H)     Cellulitis of left leg     Microscopic hematuria     Panniculitis     Ulcer     Skin nodule     History of steroid therapy     Atypical mycobacterial infection     Pain of left scapula     Osteoporosis     RBBB     Diastolic dysfunction     Dilated aortic root (H)     Neck pain     History of mycobacterial infection     Benign essential hypertension     Morbid obesity (H)     Past Surgical History:   Procedure Laterality Date     Prostate Cancer Cryotherapy  2007     TONSILLECTOMY  1945       Social History     Tobacco Use     Smoking status: Never Smoker     Smokeless tobacco: Never Used   Substance Use Topics     Alcohol use: Yes      Alcohol/week: 0.0 oz     Comment: once monthly     Family History   Problem Relation Age of Onset     Hypertension Mother      Hypertension Father      Cancer Father         Prostate     Diabetes Maternal Grandmother      C.A.D. Maternal Grandmother      Cerebrovascular Disease Maternal Grandfather      Cancer Paternal Uncle         Prostate         Current Outpatient Medications   Medication Sig Dispense Refill     Acetaminophen (TYLENOL PO)        calcium carbonate-vitamin D 600-400 MG-UNIT CHEW Take 1 chew tab by mouth 2 times daily.         cephALEXin (KEFLEX) 500 MG capsule Take 1 capsule (500 mg) by mouth 4 times daily 56 capsule 0     cephALEXin (KEFLEX) 500 MG capsule Take 1 capsule (500 mg) by mouth 4 times daily 40 capsule 0     Cholecalciferol (VITAMIN D3 PO) Take 2,000 Units by mouth 2 times daily.         CHROMIUM PICOLINATE Take 1 tablet by mouth daily.         hydrochlorothiazide (HYDRODIURIL) 25 MG tablet Take 1 tablet (25 mg) by mouth daily For profile only 90 tablet 3     losartan (COZAAR) 50 MG tablet Take 1 tablet (50 mg) by mouth daily 90 tablet 3     Multiple Vitamins-Minerals (MULTIVITAMIN ADULTS 50+) TABS Take by mouth daily       mycophenolate (CELLCEPT) 500 MG tablet Take 1,000 mg by mouth 2 times daily 2 tabs in Am. ! Tab in Pm       nystatin (MYCOSTATIN) 662424 UNIT/GM external powder Apply topically 2 times daily as needed 60 g 11     Allergies   Allergen Reactions     Ciprofloxacin Other (See Comments)     Contraindicated for myasthenia gravis patients     Procainamide Other (See Comments)     Contraindicated for myasthenia gravis patients     Quinidine Other (See Comments)     Contraindicated for myasthenia gravis patients     Quinine Other (See Comments)     Contraindicated for myasthenia gravis patients       Reviewed and updated as needed this visit by Provider         Review of Systems   ROS COMP:       Objective    /76 (BP Location: Right arm, Patient Position: Sitting,  "Cuff Size: Adult Regular)   Pulse 76   Temp 97  F (36.1  C) (Oral)   Ht 1.778 m (5' 10\")   Wt 127.1 kg (280 lb 3.2 oz)   SpO2 97%   BMI 40.20 kg/m    Body mass index is 40.2 kg/m .  Physical Exam   General: This is an unchanged appearing, well-appearing man in no acute distress he does not appear toxic.  Extremity: There is a deep ulcer on the anterior surface of the left distal lower extremity with mild and improving amounts of surrounding skin erythema and generalized edema of the left lower leg.  At the base of the ulcer there is granulation tissue without areas of fluctuance.    Wound culture pending        Assessment & Plan     1. Cellulitis of left leg  Extend antibiotic course, check wound culture to make sure that resistant bacteria are not present.  Change antibiotics pending result.  Follow-up wound in 3 weeks.  He would benefit from a wound care clinic, but it may take several weeks to get in.  He was referred to 2 different wound care clinics.  Continue daily dressing changes with Vaseline gauze.  - WOUND CARE REFERRAL  - WOUND CARE REFERRAL  - cephALEXin (KEFLEX) 500 MG capsule; Take 1 capsule (500 mg) by mouth 4 times daily  Dispense: 56 capsule; Refill: 0  - Wound Culture Aerobic Bacterial    2. Ulcer of lower extremity, unspecified laterality, unspecified ulcer stage (H)    Total face to face contact time was greater than 20 minutes of which more than 50% of this time was spent counseling and coordinating care regarding the above topics.       BMI:   Estimated body mass index is 40.2 kg/m  as calculated from the following:    Height as of this encounter: 1.778 m (5' 10\").    Weight as of this encounter: 127.1 kg (280 lb 3.2 oz).   Weight management plan: Discussed healthy diet and exercise guidelines            Return in about 2 weeks (around 9/23/2019) for wound recheck .  Or return sooner as needed    Martinez Toledo MD  [facility]    "

## 2019-09-12 LAB
BACTERIA SPEC CULT: ABNORMAL
BACTERIA SPEC CULT: ABNORMAL
SPECIMEN SOURCE: ABNORMAL

## 2019-09-12 NOTE — RESULT ENCOUNTER NOTE
The following letter pertains to your most recent diagnostic tests:    The wound culture grew two bacteria that should respond the cephalexin (Keflex) that was prescribed for your leg infection.          Sincerely,    Dr. Toledo

## 2019-10-02 ENCOUNTER — HEALTH MAINTENANCE LETTER (OUTPATIENT)
Age: 79
End: 2019-10-02

## 2019-11-20 ENCOUNTER — TRANSFERRED RECORDS (OUTPATIENT)
Dept: HEALTH INFORMATION MANAGEMENT | Facility: CLINIC | Age: 79
End: 2019-11-20

## 2019-12-15 ENCOUNTER — HEALTH MAINTENANCE LETTER (OUTPATIENT)
Age: 79
End: 2019-12-15

## 2020-01-01 ENCOUNTER — MYC MEDICAL ADVICE (OUTPATIENT)
Dept: FAMILY MEDICINE | Facility: CLINIC | Age: 80
End: 2020-01-01

## 2020-01-01 ENCOUNTER — HOSPITAL ENCOUNTER (EMERGENCY)
Facility: CLINIC | Age: 80
Discharge: HOME | End: 2020-12-21
Payer: COMMERCIAL

## 2020-01-01 ENCOUNTER — OFFICE VISIT (OUTPATIENT)
Dept: FAMILY MEDICINE | Facility: CLINIC | Age: 80
End: 2020-01-01
Payer: COMMERCIAL

## 2020-01-01 ENCOUNTER — NURSE TRIAGE (OUTPATIENT)
Dept: FAMILY MEDICINE | Facility: CLINIC | Age: 80
End: 2020-01-01

## 2020-01-01 ENCOUNTER — VIRTUAL VISIT (OUTPATIENT)
Dept: FAMILY MEDICINE | Facility: CLINIC | Age: 80
End: 2020-01-01
Payer: COMMERCIAL

## 2020-01-01 ENCOUNTER — DOCUMENTATION ONLY (OUTPATIENT)
Dept: FAMILY MEDICINE | Facility: CLINIC | Age: 80
End: 2020-01-01

## 2020-01-01 VITALS
BODY MASS INDEX: 43.05 KG/M2 | OXYGEN SATURATION: 96 % | HEART RATE: 74 BPM | DIASTOLIC BLOOD PRESSURE: 90 MMHG | TEMPERATURE: 98.1 F | SYSTOLIC BLOOD PRESSURE: 133 MMHG | WEIGHT: 300 LBS

## 2020-01-01 VITALS
HEART RATE: 74 BPM | BODY MASS INDEX: 42 KG/M2 | SYSTOLIC BLOOD PRESSURE: 163 MMHG | TEMPERATURE: 97.4 F | OXYGEN SATURATION: 96 % | WEIGHT: 300 LBS | DIASTOLIC BLOOD PRESSURE: 65 MMHG | HEIGHT: 71 IN | RESPIRATION RATE: 16 BRPM

## 2020-01-01 VITALS
OXYGEN SATURATION: 97 % | SYSTOLIC BLOOD PRESSURE: 120 MMHG | WEIGHT: 287 LBS | DIASTOLIC BLOOD PRESSURE: 80 MMHG | HEART RATE: 75 BPM | TEMPERATURE: 97.9 F | BODY MASS INDEX: 41.09 KG/M2 | HEIGHT: 70 IN

## 2020-01-01 DIAGNOSIS — G70.00 MYASTHENIA GRAVIS WITHOUT EXACERBATION (H): Primary | ICD-10-CM

## 2020-01-01 DIAGNOSIS — Z85.46 HISTORY OF PROSTATE CANCER: ICD-10-CM

## 2020-01-01 DIAGNOSIS — G70.00 MYASTHENIA GRAVIS WITHOUT EXACERBATION (H): ICD-10-CM

## 2020-01-01 DIAGNOSIS — E87.1 HYPONATREMIA: ICD-10-CM

## 2020-01-01 DIAGNOSIS — E87.1 HYPONATREMIA: Primary | ICD-10-CM

## 2020-01-01 DIAGNOSIS — I77.810 DILATED AORTIC ROOT (H): ICD-10-CM

## 2020-01-01 DIAGNOSIS — M25.461 SWELLING OF JOINT OF RIGHT KNEE: Primary | ICD-10-CM

## 2020-01-01 DIAGNOSIS — I10 BENIGN ESSENTIAL HYPERTENSION: ICD-10-CM

## 2020-01-01 DIAGNOSIS — L30.4 INTERTRIGO: ICD-10-CM

## 2020-01-01 DIAGNOSIS — L03.116 CELLULITIS OF LEFT LEG: ICD-10-CM

## 2020-01-01 DIAGNOSIS — I10 BENIGN ESSENTIAL HYPERTENSION: Primary | ICD-10-CM

## 2020-01-01 DIAGNOSIS — C61 MALIGNANT NEOPLASM OF PROSTATE (H): ICD-10-CM

## 2020-01-01 DIAGNOSIS — E66.01 MORBID OBESITY (H): ICD-10-CM

## 2020-01-01 DIAGNOSIS — Z12.5 SCREENING FOR PROSTATE CANCER: ICD-10-CM

## 2020-01-01 DIAGNOSIS — T79.2XXA SEROMA DUE TO TRAUMA (H): ICD-10-CM

## 2020-01-01 DIAGNOSIS — Z23 NEED FOR PROPHYLACTIC VACCINATION AND INOCULATION AGAINST INFLUENZA: ICD-10-CM

## 2020-01-01 DIAGNOSIS — T14.8XXA HEMATOMA: ICD-10-CM

## 2020-01-01 DIAGNOSIS — E78.5 HYPERLIPIDEMIA LDL GOAL <130: ICD-10-CM

## 2020-01-01 LAB
ALBUMIN SERPL-MCNC: 3.4 G/DL (ref 3.4–5)
ALP SERPL-CCNC: 74 U/L (ref 40–150)
ALT SERPL W P-5'-P-CCNC: 37 U/L (ref 0–70)
ANION GAP SERPL CALCULATED.3IONS-SCNC: 4 MMOL/L (ref 3–14)
ANION GAP SERPL CALCULATED.3IONS-SCNC: 7 MMOL/L (ref 3–14)
AST SERPL W P-5'-P-CCNC: 42 U/L (ref 0–45)
BILIRUB SERPL-MCNC: 0.7 MG/DL (ref 0.2–1.3)
BUN SERPL-MCNC: 11 MG/DL (ref 7–30)
BUN SERPL-MCNC: 14 MG/DL (ref 7–30)
CALCIUM SERPL-MCNC: 8.5 MG/DL (ref 8.5–10.1)
CALCIUM SERPL-MCNC: 8.9 MG/DL (ref 8.5–10.1)
CHLORIDE SERPL-SCNC: 83 MMOL/L (ref 94–109)
CHLORIDE SERPL-SCNC: 96 MMOL/L (ref 94–109)
CHOLEST SERPL-MCNC: 141 MG/DL
CO2 SERPL-SCNC: 31 MMOL/L (ref 20–32)
CO2 SERPL-SCNC: 32 MMOL/L (ref 20–32)
CREAT SERPL-MCNC: 0.6 MG/DL (ref 0.66–1.25)
CREAT SERPL-MCNC: 0.67 MG/DL (ref 0.66–1.25)
ERYTHROCYTE [DISTWIDTH] IN BLOOD BY AUTOMATED COUNT: 13.4 % (ref 10–15)
GFR SERPL CREATININE-BSD FRML MDRD: >90 ML/MIN/{1.73_M2}
GFR SERPL CREATININE-BSD FRML MDRD: >90 ML/MIN/{1.73_M2}
GLUCOSE SERPL-MCNC: 120 MG/DL (ref 70–99)
GLUCOSE SERPL-MCNC: 86 MG/DL (ref 70–99)
HCT VFR BLD AUTO: 38.6 % (ref 40–53)
HDLC SERPL-MCNC: 70 MG/DL
HGB BLD-MCNC: 12.9 G/DL (ref 13.3–17.7)
LDLC SERPL CALC-MCNC: 60 MG/DL
MCH RBC QN AUTO: 29.5 PG (ref 26.5–33)
MCHC RBC AUTO-ENTMCNC: 33.4 G/DL (ref 31.5–36.5)
MCV RBC AUTO: 88 FL (ref 78–100)
NONHDLC SERPL-MCNC: 71 MG/DL
PLATELET # BLD AUTO: 266 10E9/L (ref 150–450)
POTASSIUM SERPL-SCNC: 4 MMOL/L (ref 3.4–5.3)
POTASSIUM SERPL-SCNC: 4.6 MMOL/L (ref 3.4–5.3)
PROT SERPL-MCNC: 6.5 G/DL (ref 6.8–8.8)
PSA SERPL-ACNC: 0.01 UG/L (ref 0–4)
RBC # BLD AUTO: 4.37 10E12/L (ref 4.4–5.9)
SODIUM SERPL-SCNC: 121 MMOL/L (ref 133–144)
SODIUM SERPL-SCNC: 132 MMOL/L (ref 133–144)
TRIGL SERPL-MCNC: 55 MG/DL
WBC # BLD AUTO: 6.7 10E9/L (ref 4–11)

## 2020-01-01 PROCEDURE — 36415 COLL VENOUS BLD VENIPUNCTURE: CPT | Performed by: NURSE PRACTITIONER

## 2020-01-01 PROCEDURE — 80061 LIPID PANEL: CPT | Performed by: NURSE PRACTITIONER

## 2020-01-01 PROCEDURE — G0103 PSA SCREENING: HCPCS | Performed by: NURSE PRACTITIONER

## 2020-01-01 PROCEDURE — 80053 COMPREHEN METABOLIC PANEL: CPT | Performed by: NURSE PRACTITIONER

## 2020-01-01 PROCEDURE — 80048 BASIC METABOLIC PNL TOTAL CA: CPT | Performed by: NURSE PRACTITIONER

## 2020-01-01 PROCEDURE — 90662 IIV NO PRSV INCREASED AG IM: CPT | Performed by: NURSE PRACTITIONER

## 2020-01-01 PROCEDURE — 99442 PR PHYSICIAN TELEPHONE EVALUATION 11-20 MIN: CPT | Mod: 95 | Performed by: INTERNAL MEDICINE

## 2020-01-01 PROCEDURE — G0008 ADMIN INFLUENZA VIRUS VAC: HCPCS | Performed by: NURSE PRACTITIONER

## 2020-01-01 PROCEDURE — 99443 PR PHYSICIAN TELEPHONE EVALUATION 21-30 MIN: CPT | Mod: 95 | Performed by: INTERNAL MEDICINE

## 2020-01-01 PROCEDURE — 99214 OFFICE O/P EST MOD 30 MIN: CPT | Mod: 25 | Performed by: NURSE PRACTITIONER

## 2020-01-01 PROCEDURE — 85027 COMPLETE CBC AUTOMATED: CPT | Performed by: NURSE PRACTITIONER

## 2020-01-01 RX ORDER — TORSEMIDE 5 MG/1
5 TABLET ORAL DAILY
Qty: 90 TABLET | Refills: 3 | Status: SHIPPED | OUTPATIENT
Start: 2020-01-01 | End: 2021-01-01

## 2020-01-01 RX ORDER — CLOTRIMAZOLE 1 %
CREAM (GRAM) TOPICAL 2 TIMES DAILY
Qty: 113 G | Refills: 11 | Status: ON HOLD | OUTPATIENT
Start: 2020-01-01 | End: 2021-01-01

## 2020-01-01 RX ORDER — CEPHALEXIN 500 MG/1
500 CAPSULE ORAL 3 TIMES DAILY
Qty: 21 CAPSULE | Refills: 0 | Status: SHIPPED | OUTPATIENT
Start: 2020-01-01 | End: 2020-01-01

## 2020-01-01 RX ORDER — HYDROCHLOROTHIAZIDE 25 MG/1
25 TABLET ORAL DAILY
Qty: 90 TABLET | Refills: 0 | Status: SHIPPED | OUTPATIENT
Start: 2020-01-01 | End: 2020-01-01

## 2020-01-01 RX ORDER — HYDROCHLOROTHIAZIDE 25 MG/1
TABLET ORAL
Qty: 90 TABLET | Refills: 0 | Status: SHIPPED | OUTPATIENT
Start: 2020-01-01 | End: 2020-01-01

## 2020-01-01 RX ORDER — LOSARTAN POTASSIUM 50 MG/1
50 TABLET ORAL DAILY
Qty: 90 TABLET | Refills: 3 | Status: ON HOLD | OUTPATIENT
Start: 2020-01-01 | End: 2021-01-01

## 2020-01-01 RX ORDER — LOSARTAN POTASSIUM 50 MG/1
TABLET ORAL
Qty: 30 TABLET | Refills: 0 | Status: SHIPPED | OUTPATIENT
Start: 2020-01-01 | End: 2020-01-01

## 2020-01-01 RX ORDER — SULFAMETHOXAZOLE/TRIMETHOPRIM 800-160 MG
1 TABLET ORAL 2 TIMES DAILY
Status: ON HOLD | COMMUNITY
Start: 2020-01-01 | End: 2021-01-01

## 2020-01-01 ASSESSMENT — ENCOUNTER SYMPTOMS
FEVER: 0
CONSTIPATION: 0
DIFFICULTY URINATING: 0
DIARRHEA: 0
SHORTNESS OF BREATH: 0
HEMATURIA: 0
WHEEZING: 0
DIAPHORESIS: 0
FATIGUE: 1
DYSURIA: 0
CHEST TIGHTNESS: 0
APPETITE CHANGE: 0
ACTIVITY CHANGE: 0
VOMITING: 0
ABDOMINAL PAIN: 0
COUGH: 0
NAUSEA: 0
CONFUSION: 0
CHILLS: 0
FLANK PAIN: 0

## 2020-01-01 ASSESSMENT — MIFFLIN-ST. JEOR
SCORE: 2094.98
SCORE: 2033.07

## 2020-01-29 ENCOUNTER — MYC MEDICAL ADVICE (OUTPATIENT)
Dept: FAMILY MEDICINE | Facility: CLINIC | Age: 80
End: 2020-01-29

## 2020-01-29 NOTE — TELEPHONE ENCOUNTER
To PCP please read patient message.   I spoke to him and he states the Tylenol doesn't help the pain.  He asked for Oxycodone and I told him he would need to be seen to have that prescribed.  I encouraged him to try OTC Ibuprofen with food in his stomach and in the meantime I told him I would send this message on to you for your advise.  Says he would need a friend to pick it up as difficult to walk with walker and get to his car right now.  Denies pain in calf or foot, just behind the knee where he thinks he hyperextended it some. Not really swollen, no redness or heat associated.  States now his shoulders are sore from putting weight on his walker more.    Please advise.  Shyann Aquino RN

## 2020-01-29 NOTE — TELEPHONE ENCOUNTER
I can't diagnose this problem by email, I recommend an office visit appointment with me or team provider to evaluate and treat the new problem

## 2020-09-14 NOTE — TELEPHONE ENCOUNTER
Routing refill request to provider for review/approval because:  Labs not current:    Patient needs to be seen because it has been more than 1 year since last office visit.      Added in pharm comments to schedule.    Please authorize if appropriate.  Thanks,  Noelle Wallace RN

## 2020-09-15 NOTE — TELEPHONE ENCOUNTER
Medication is being filled for 1 time refill only due to:  Patient needs to be seen because it has been more than one year since last visit.    Sent [employer] reminder to schedule appointment.    Josefina Carmen RN

## 2020-10-08 NOTE — PROGRESS NOTES
"Thanh Goodrich is a 79 year old male who is being evaluated via a billable telephone visit.      The patient has been notified of following:     \"This telephone visit will be conducted via a call between you and your physician/provider. We have found that certain health care needs can be provided without the need for a physical exam.  This service lets us provide the care you need with a short phone conversation.  If a prescription is necessary we can send it directly to your pharmacy.  If lab work is needed we can place an order for that and you can then stop by our lab to have the test done at a later time.    Telephone visits are billed at different rates depending on your insurance coverage. During this emergency period, for some insurers they may be billed the same as an in-person visit.  Please reach out to your insurance provider with any questions.    If during the course of the call the physician/provider feels a telephone visit is not appropriate, you will not be charged for this service.\"    Patient has given verbal consent for Telephone visit?  Yes    What phone number would you like to be contacted at? [phone]    How would you like to obtain your AVS? MyChart    Subjective     Thanh Goodrich is a 79 year old male who presents via phone visit today for the following health issues:    HPI     Follow up chronic health cares    Red skin and itchiness, mild pain in skin folds present for years   Has been using barrier cream without success  No fevers or chills  Needs refills  Has not been exercising enough               Review of Systems   Constitutional, HEENT, cardiovascular, pulmonary, gi and gu systems are negative, except as otherwise noted.       Objective          Vitals:  No vitals were obtained today due to virtual visit.    healthy, alert and no distress  PSYCH: Alert and oriented times 3; coherent speech, normal   rate and volume, able to articulate logical thoughts, able   to abstract " "reason, no tangential thoughts, no hallucinations   or delusions  His affect is normal  RESP: No cough, no audible wheezing, able to talk in full sentences  Remainder of exam unable to be completed due to telephone visits            Assessment/Plan:    Assessment & Plan     Myasthenia gravis without exacerbation (H)  On immunosuppressive agents, very high risk for severe illness from COVID-19    Dilated aortic root (H)  Recheck when safer vis a vi the pandemic     Morbid obesity (H)  Counseled on diet and exercise interventions to promote weight loss     Malignant neoplasm of prostate (H)  Stable     Benign essential hypertension  Historically has been well controlled; continue current drugs refills provided  Recheck in clinic when safer along with labs     Intertrigo  Try topical clotrimazole cream BID       BMI:   Estimated body mass index is 41.18 kg/m  as calculated from the following:    Height as of this encounter: 1.778 m (5' 10\").    Weight as of this encounter: 130.2 kg (287 lb).   Weight management plan: Discussed healthy diet and exercise guidelines             Return in about 1 year (around 10/8/2021) for Preventive Visit.   Patient instructed to return to clinic or contact us sooner if symptoms worsen or new symptoms develop.   Martinez Toledo MD  [facility]    Phone call duration:  21 minutes              "

## 2020-12-11 NOTE — TELEPHONE ENCOUNTER
I sent an prescription for Keflex  However, I would not just start it based on the presence of a laceration  I would only advise starting and completing Keflex if there is redness, drainage, fever or other signs of infection

## 2020-12-11 NOTE — TELEPHONE ENCOUNTER
Pt had a fall on 12/03.    States he fell from standing as hit R knee on the wheel of his walker. Of note, EMTs came out to lift assist from ground, but pt did not go to the hospital at that time.    Today pt continues to have a knee swelling and swelling of his R leg. Pt states he has had a lot of trouble moving around due to pain in leg. No pain at rest.    Also concerns of skin tear from injury. No redness, warmth, or other signs of infection.    RN recommended UC today per protocol as no OV available. Pt states he will not go to UC today due to transportation issues. States he may seek out [facility] this weekend if not better. RN advised on home care and ED precautions due to concerns as well. Pt states understanding of ED precautions.     At this time pt was scheduled for VV 12/14 morning to discuss continued swelling and risk of infection for skin tear at pt request.     Pt concerns:    1) Pt would like medication for his swelling  2) Pt would like referral for home care from [facility] (PT, home care)  3) Pt would like prophylactic abx for wound.     Concerns regarding skin tear:    See     Additional Information    Negative: Major bleeding (actively dripping or spurting) that can't be stopped    Negative: Bullet, stabbed by knife or other serious penetrating wound    Negative: Looks like a dislocated joint (crooked or deformed)    Negative: Sounds like a life-threatening emergency to the triager    Negative: Wound looks infected    Negative: Can't stand (bear weight) or walk    Negative: Skin is split open or gaping (length > 1/2 inch or 12 mm)    Negative: Bleeding won't stop after 10 minutes of direct pressure (using correct technique)    Negative: Dirt in the wound and not removed after 15 minutes of scrubbing    Negative: Sounds like a serious injury to the triager    Negative: Looks infected (e.g., spreading redness, pus, red streak)    Negative: SEVERE pain (e.g., excruciating)    Negative: No prior  tetanus shots (or is not fully vaccinated) and any wound (e.g., cut or scrape)    Negative: Patient wants to be seen    Negative: Injury interferes with work or school    Negative: A 'snap' or 'pop' was heard at the time of injury    Large swelling or bruise and size > palm of person's hand    Negative: Major bleeding (actively dripping or spurting) that can't be stopped    Negative: Amputation of toe    Negative: Sounds like a life-threatening emergency to the triager    Negative: Looks infected (e.g., spreading redness, pus, red streak)    Negative: High pressure injection injury (e.g., from paint gun, usually work-related    Negative: Looks like a broken bone or dislocated joint (e.g., crooked or deformed)    Negative: Skin is split open or gaping (length > 1/2 inch or 12 mm)    Negative: Bleeding won't stop after 10 minutes of direct pressure (using correct technique)    Negative: Dirt in the wound and not removed after 15 minutes of scrubbing    Negative: Sounds like a serious injury to the triager    Negative: Looks infected (e.g., spreading redness, pus, red streak)    Negative: Toenail is completely torn off    Negative: Base of toenail has popped out from under skin fold    Negative: SEVERE pain (e.g., excruciating)    Negative: MODERATE-SEVERE pain and blood present under the nail (usually > 50% of nail bed)    Negative: No prior tetanus shots (or is not fully vaccinated) and any wound (e.g., cut or scrape)    Negative: Last tetanus shot > 5 years ago and DIRTY cut or scrape    Negative: Last tetanus shot >10 years ago and CLEAN cut or scrape    Negative: Bad limp or can't wear shoes/sandals    Negative: Patient wants to be seen    Negative: Injury interferes with work or school    Negative: Injury and pain has not improved after 3 days    Negative: Injury is still painful or swollen after 2 weeks    Negative: Small cut or scrape and has diabetes mellitus    Minor toe injury    Answer Assessment - Initial  "Assessment Questions  1. MECHANISM: \"How did the injury happen?\" (e.g., twisting injury, direct blow)       Direct blow    2. ONSET: \"When did the injury happen?\" (Minutes or hours ago)       Last Thursday    3. LOCATION: \"Where is the injury located?\"       R knee    4. APPEARANCE of INJURY: \"What does the injury look like?\"       Knee swollen    5. SEVERITY: \"Can you put weight on that leg?\" \"Can you walk?\"       Yes with trouble    6. SIZE: For cuts, bruises, or swelling, ask: \"How large is it?\" (e.g., inches or centimeters;  entire joint)       Whole knee swollen    7. PAIN: \"Is there pain?\" If so, ask: \"How bad is the pain?\"    (e.g., Scale 1-10; or mild, moderate, severe)      Moderate with walking    8. TETANUS: For any breaks in the skin, ask: \"When was the last tetanus booster?\"      N/a    9. OTHER SYMPTOMS: \"Do you have any other symptoms?\"  (e.g., \"pop\" when knee injured, swelling, locking, buckling)       Leg swelling    10. PREGNANCY: \"Is there any chance you are pregnant?\" \"When was your last menstrual period?\"        N/a    Answer Assessment - Initial Assessment Questions  1. MECHANISM: \"How did the injury happen?\"       Tear after injury from walker    2. ONSET: \"When did the injury happen?\" (Minutes or hours ago)       Few days ago    3. LOCATION: \"What part of the toe is injured?\" \"Is the nail damaged?\"       R toe    4. APPEARANCE of TOE INJURY: \"What does the injury look like?\"       Popped hematoma     5. SEVERITY: \"Can you use the foot normally?\" \"Can you walk?\"       Yes    6. SIZE: For cuts, bruises, or swelling, ask: \"How large is it?\" (e.g., inches or centimeters;  entire toe)       Quarter    7. PAIN: \"Is there pain?\" If so, ask: \"How bad is the pain?\"   (e.g., Scale 1-10; or mild, moderate, severe)      N/a    8. TETANUS: For any breaks in the skin, ask: \"When was the last tetanus booster?\"      Na    9. DIABETES: \"Do you have a history of diabetes or poor circulation in the feet?\"      " "N/a    10. OTHER SYMPTOMS: \"Do you have any other symptoms?\"         No    11. PREGNANCY: \"Is there any chance you are pregnant?\" \"When was your last menstrual period?\"        N/a    Protocols used: KNEE INJURY-A-OH, TOE INJURY-A-OH      "

## 2020-12-14 NOTE — PROGRESS NOTES
"Thanh Goodrich is a 80 year old male who is being evaluated via a billable telephone visit.      The patient has been notified of following:     \"This telephone visit will be conducted via a call between you and your physician/provider. We have found that certain health care needs can be provided without the need for a physical exam.  This service lets us provide the care you need with a short phone conversation.  If a prescription is necessary we can send it directly to your pharmacy.  If lab work is needed we can place an order for that and you can then stop by our lab to have the test done at a later time.    Telephone visits are billed at different rates depending on your insurance coverage. During this emergency period, for some insurers they may be billed the same as an in-person visit.  Please reach out to your insurance provider with any questions.    If during the course of the call the physician/provider feels a telephone visit is not appropriate, you will not be charged for this service.\"    Patient has given verbal consent for Telephone visit?  Yes    What phone number would you like to be contacted at? [phone]    How would you like to obtain your AVS? Sampsonhart    Subjective     Thanh Goodrich is a 80 year old male who presents via phone visit today for the following health issues:    [facility]     ED/UC Followup:    Facility:  [facility]  Date of visit: 12-  Reason for visit: Knee  Current Status: Swelling continues     Went to [facility] Friday and X-ray showed no bony changes and was diagnosed with cellulitis involving right lower extremity and treated with Bactrim.  Fell 12/03 and no pain in legs, but swewlling increasing and he is trying to limit sodium intake to help with edema.  He is not taking Keflex which was prescribed earlier.  No fever.  He has not had labs checked since July 2019.  Walking with use of walker.      Review of Systems   Constitutional, HEENT, cardiovascular, pulmonary, gi " and gu systems are negative, except as otherwise noted.       Objective          Vitals:  No vitals were obtained today due to virtual visit.    healthy, alert and no distress  PSYCH: Alert and oriented times 3; coherent speech, normal   rate and volume, able to articulate logical thoughts, able   to abstract reason, no tangential thoughts, no hallucinations   or delusions  His affect is normal  RESP: No cough, no audible wheezing, able to talk in full sentences  Remainder of exam unable to be completed due to telephone visits    No results found for this or any previous visit (from the past 24 hour(s)).        Assessment/Plan:    Assessment & Plan     Myasthenia gravis without exacerbation (H)  Plan to continue Cellcept    Cellulitis of left leg  Continue current antibiotics and will check back in 2 weeks              No follow-ups on file.    Marc Renner MD, MD  [facility]    Phone call duration: 16 minutes

## 2020-12-16 NOTE — PROGRESS NOTES
Subjective     Thanh Goodrich is a 80 year old male who presents to clinic today for the following health issues:    HPI         Musculoskeletal problem/pain  Onset/Duration: last week  Description  Location: knee - right  Joint Swelling: YES  Redness: no  Pain: no  Warmth: YES slightly  Intensity:  moderate, severe  Progression of Symptoms:  worsening  Accompanying signs and symptoms:   Fevers: no  Numbness/tingling/weakness: YES- weakness  History  Trauma to the area: YES  Recent illness:  no  Previous similar problem: no  [facility]:  [facility]  Precipitating or alleviating factors:  Aggravating factors include: sitting and standing  Therapies tried and outcome: bactrim, tylenol      Fall on the 3rd and hit his right knee on a rolling walker, skin remained intact  Went to [facility] on 12/12, diagnosed with cellulitis of right knee and given bactrim rx  Started the Bactrim on Saturday  Started to feel weak, fatigued, and tired Saturday evening  Concerned the antibiotic was causing the decreased urine and weakness, stopped last night  Completed 5 days of bactrim  Usually urinates every couple hours, but had decreased urination Saturday and Monday  Increased urinary frequency last night, voiding every 1-2 hours. Feels like his urination is returning to normal  Knee is not painful, but still swollen and limited ROM  Able to walk    Review of Systems   Constitutional: Positive for fatigue. Negative for activity change, appetite change, chills, diaphoresis and fever.   Respiratory: Negative for cough, chest tightness, shortness of breath and wheezing.    Cardiovascular: Negative for chest pain and peripheral edema.   Gastrointestinal: Negative for abdominal pain, constipation, diarrhea, nausea and vomiting.   Genitourinary: Negative for difficulty urinating, dysuria, flank pain and hematuria.   Psychiatric/Behavioral: Negative for confusion.          Objective    BP (!) 133/90 (BP Location: Right  arm, Cuff Size: Adult Large)   Pulse 74   Temp 98.1  F (36.7  C) (Tympanic)   Wt 136.1 kg (300 lb)   SpO2 96%   BMI 43.05 kg/m    Body mass index is 43.05 kg/m .  Physical Exam  Constitutional:       Appearance: Normal appearance. He is obese.   HENT:      Head: Normocephalic and atraumatic.   Eyes:      Conjunctiva/sclera: Conjunctivae normal.   Cardiovascular:      Rate and Rhythm: Normal rate and regular rhythm.   Pulmonary:      Effort: Pulmonary effort is normal. No respiratory distress.      Breath sounds: Normal breath sounds. No wheezing.   Musculoskeletal:      Comments: Right knee; +3 non-pitting edema localized to anterior knee. No erythema, no warmth, skin intact, no flatulence, no area of induration.    Skin:     General: Skin is warm and dry.   Neurological:      Mental Status: He is alert.   Psychiatric:         Mood and Affect: Mood normal.         Judgment: Judgment normal.          Results for orders placed or performed in visit on 12/16/20   CBC with platelets     Status: Abnormal   Result Value Ref Range    WBC 6.7 4.0 - 11.0 10e9/L    RBC Count 4.37 (L) 4.4 - 5.9 10e12/L    Hemoglobin 12.9 (L) 13.3 - 17.7 g/dL    Hematocrit 38.6 (L) 40.0 - 53.0 %    MCV 88 78 - 100 fl    MCH 29.5 26.5 - 33.0 pg    MCHC 33.4 31.5 - 36.5 g/dL    RDW 13.4 10.0 - 15.0 %    Platelet Count 266 150 - 450 10e9/L           Assessment & Plan     Swelling of joint of right knee  Patient was evaluated by orthopedics on 12/12 with negative X-ray for fracture. DDX includes septic joint, knee sprain or strain. No red flag symptoms for septic joint- no erythema or warmth and patient does not appear toxic. Low suspicion decreased urination was related to antibiotic. Patient decreased fluid consumption prior to ortho appts. Patient's main concerns today were fatigue and infection. Reassured pt R knee does not appear infected. Fatigue may be related to decreased activity. Will complete labs today to further assess kidney  function and WBC  RTC with new or worsening symptoms  - CBC with platelets  - Comprehensive metabolic panel (BMP + Alb, Alk Phos, ALT, AST, Total. Bili, TP)    History of prostate CA, Screening for prostate cancer  - PSA, screen    Hyperlipidemia LDL goal <130  Routine screening. Will call with results  - Lipid panel reflex to direct LDL Fasting    Need for prophylactic vaccination and inoculation against influenza  - FLUZONE HIGH DOSE 65+  [91179]  - ADMIN INFLUENZA (For MEDICARE Patients ONLY) []      Seen in conjunction with Catina Garza, RN, NP student     ANIYA Cabral [facility]

## 2020-12-17 NOTE — TELEPHONE ENCOUNTER
Mely wants pt seen by rashida next week but first opening is 30th.  Will see how pt is doing and go from there.

## 2020-12-17 NOTE — RESULT ENCOUNTER NOTE
Rigboerto, your sodium is really low. This could be part of why you have more swelling. You need to limit your water intake to no more than 1200ml (or about 40oz) per day. This needs to be rechecked again next week. If you are feeling worse, I would recommend going to the ER. This can be a serious thing so I wouldn't want to wait if you feel worse. I do not think the antibiotic will help you. Please also get an appointment with Dr Toledo as soon as possible.   Let me know what citlali you have  Mely

## 2020-12-18 NOTE — TELEPHONE ENCOUNTER
Mely - see below update from patient     Has upcoming appt with Dr Oliver 12/29    Asking if you can approve orders for him to recheck labs?    Elin YOUNG RN

## 2020-12-21 NOTE — ED TRIAGE NOTES
Patient reports injured right knee December 3rd. States swelling and pain since then.     Patient was seen at ortho [facility].    Patient followed up with PMD after that and was told sodium was low.

## 2020-12-22 NOTE — TELEPHONE ENCOUNTER
Looks like labs are already in process for BMP     But per below, pt requesting a CBC     Elin YOUNG RN

## 2020-12-23 NOTE — RESULT ENCOUNTER NOTE
The following letter pertains to your most recent diagnostic tests:    The sodium is out of the danger zone.  That being said, as previously stated, I think we should try a different 'water pill' diuretic to manage your swelling.  I recommend stopping hydrochlorothiazide and starting a medication called demedex (toresemide) one tablet daily as early in the day as you can.   I recommend a follow up sodium level after you have been taking the new diuretic for 7-14 days.  You can schedule a lab appointment for that purpose.   I sent an prescription for torsemide to your pharmacy.        Sincerely,    Dr. Toledo

## 2021-01-01 ENCOUNTER — TELEPHONE (OUTPATIENT)
Dept: GERIATRICS | Facility: CLINIC | Age: 81
End: 2021-01-01

## 2021-01-01 ENCOUNTER — NURSING HOME VISIT (OUTPATIENT)
Dept: GERIATRICS | Facility: CLINIC | Age: 81
End: 2021-01-01
Payer: COMMERCIAL

## 2021-01-01 ENCOUNTER — APPOINTMENT (OUTPATIENT)
Dept: PHYSICAL THERAPY | Facility: CLINIC | Age: 81
DRG: 207 | End: 2021-01-01
Payer: COMMERCIAL

## 2021-01-01 ENCOUNTER — APPOINTMENT (OUTPATIENT)
Dept: ULTRASOUND IMAGING | Facility: CLINIC | Age: 81
DRG: 175 | End: 2021-01-01
Attending: SURGERY
Payer: COMMERCIAL

## 2021-01-01 ENCOUNTER — APPOINTMENT (OUTPATIENT)
Dept: OCCUPATIONAL THERAPY | Facility: CLINIC | Age: 81
DRG: 207 | End: 2021-01-01
Attending: NURSE PRACTITIONER
Payer: COMMERCIAL

## 2021-01-01 ENCOUNTER — LAB REQUISITION (OUTPATIENT)
Dept: LAB | Facility: CLINIC | Age: 81
DRG: 291 | End: 2021-01-01
Payer: COMMERCIAL

## 2021-01-01 ENCOUNTER — APPOINTMENT (OUTPATIENT)
Dept: PHYSICAL THERAPY | Facility: CLINIC | Age: 81
DRG: 056 | End: 2021-01-01
Attending: OBSTETRICS & GYNECOLOGY
Payer: COMMERCIAL

## 2021-01-01 ENCOUNTER — APPOINTMENT (OUTPATIENT)
Dept: GENERAL RADIOLOGY | Facility: CLINIC | Age: 81
DRG: 207 | End: 2021-01-01
Attending: PHYSICIAN ASSISTANT
Payer: COMMERCIAL

## 2021-01-01 ENCOUNTER — LAB REQUISITION (OUTPATIENT)
Dept: LAB | Facility: CLINIC | Age: 81
End: 2021-01-01
Payer: COMMERCIAL

## 2021-01-01 ENCOUNTER — APPOINTMENT (OUTPATIENT)
Dept: OCCUPATIONAL THERAPY | Facility: CLINIC | Age: 81
DRG: 207 | End: 2021-01-01
Payer: COMMERCIAL

## 2021-01-01 ENCOUNTER — HOSPITAL ENCOUNTER (EMERGENCY)
Facility: CLINIC | Age: 81
Discharge: NURSING, REHAB OR LONG-TERM CARE | End: 2021-07-14
Attending: EMERGENCY MEDICINE | Admitting: EMERGENCY MEDICINE
Payer: COMMERCIAL

## 2021-01-01 ENCOUNTER — APPOINTMENT (OUTPATIENT)
Dept: GENERAL RADIOLOGY | Facility: CLINIC | Age: 81
DRG: 207 | End: 2021-01-01
Attending: STUDENT IN AN ORGANIZED HEALTH CARE EDUCATION/TRAINING PROGRAM
Payer: COMMERCIAL

## 2021-01-01 ENCOUNTER — APPOINTMENT (OUTPATIENT)
Dept: OCCUPATIONAL THERAPY | Facility: CLINIC | Age: 81
DRG: 056 | End: 2021-01-01
Payer: COMMERCIAL

## 2021-01-01 ENCOUNTER — APPOINTMENT (OUTPATIENT)
Dept: OCCUPATIONAL THERAPY | Facility: CLINIC | Age: 81
DRG: 056 | End: 2021-01-01
Attending: NURSE PRACTITIONER
Payer: COMMERCIAL

## 2021-01-01 ENCOUNTER — APPOINTMENT (OUTPATIENT)
Dept: GENERAL RADIOLOGY | Facility: CLINIC | Age: 81
DRG: 056 | End: 2021-01-01
Attending: INTERNAL MEDICINE
Payer: COMMERCIAL

## 2021-01-01 ENCOUNTER — PATIENT OUTREACH (OUTPATIENT)
Dept: CARE COORDINATION | Facility: CLINIC | Age: 81
End: 2021-01-01

## 2021-01-01 ENCOUNTER — APPOINTMENT (OUTPATIENT)
Dept: SPEECH THERAPY | Facility: CLINIC | Age: 81
DRG: 056 | End: 2021-01-01
Attending: INTERNAL MEDICINE
Payer: COMMERCIAL

## 2021-01-01 ENCOUNTER — APPOINTMENT (OUTPATIENT)
Dept: PHYSICAL THERAPY | Facility: CLINIC | Age: 81
DRG: 056 | End: 2021-01-01
Payer: COMMERCIAL

## 2021-01-01 ENCOUNTER — ANESTHESIA (OUTPATIENT)
Dept: INTENSIVE CARE | Facility: CLINIC | Age: 81
DRG: 056 | End: 2021-01-01
Payer: COMMERCIAL

## 2021-01-01 ENCOUNTER — APPOINTMENT (OUTPATIENT)
Dept: GENERAL RADIOLOGY | Facility: CLINIC | Age: 81
End: 2021-01-01
Attending: EMERGENCY MEDICINE
Payer: COMMERCIAL

## 2021-01-01 ENCOUNTER — APPOINTMENT (OUTPATIENT)
Dept: OCCUPATIONAL THERAPY | Facility: CLINIC | Age: 81
DRG: 056 | End: 2021-01-01
Attending: OBSTETRICS & GYNECOLOGY
Payer: COMMERCIAL

## 2021-01-01 ENCOUNTER — APPOINTMENT (OUTPATIENT)
Dept: GENERAL RADIOLOGY | Facility: CLINIC | Age: 81
DRG: 056 | End: 2021-01-01
Attending: OBSTETRICS & GYNECOLOGY
Payer: COMMERCIAL

## 2021-01-01 ENCOUNTER — APPOINTMENT (OUTPATIENT)
Dept: SPEECH THERAPY | Facility: CLINIC | Age: 81
DRG: 207 | End: 2021-01-01
Attending: INTERNAL MEDICINE
Payer: COMMERCIAL

## 2021-01-01 ENCOUNTER — APPOINTMENT (OUTPATIENT)
Dept: GENERAL RADIOLOGY | Facility: CLINIC | Age: 81
DRG: 189 | End: 2021-01-01
Attending: EMERGENCY MEDICINE
Payer: COMMERCIAL

## 2021-01-01 ENCOUNTER — APPOINTMENT (OUTPATIENT)
Dept: CT IMAGING | Facility: CLINIC | Age: 81
DRG: 207 | End: 2021-01-01
Attending: EMERGENCY MEDICINE
Payer: COMMERCIAL

## 2021-01-01 ENCOUNTER — HOSPITAL ENCOUNTER (INPATIENT)
Facility: CLINIC | Age: 81
LOS: 11 days | Discharge: NURSING, REHAB OR LONG-TERM CARE | DRG: 207 | End: 2021-03-15
Attending: EMERGENCY MEDICINE | Admitting: STUDENT IN AN ORGANIZED HEALTH CARE EDUCATION/TRAINING PROGRAM
Payer: COMMERCIAL

## 2021-01-01 ENCOUNTER — APPOINTMENT (OUTPATIENT)
Dept: PHYSICAL THERAPY | Facility: CLINIC | Age: 81
DRG: 056 | End: 2021-01-01
Attending: NURSE PRACTITIONER
Payer: COMMERCIAL

## 2021-01-01 ENCOUNTER — HOSPITAL ENCOUNTER (INPATIENT)
Facility: CLINIC | Age: 81
LOS: 1 days | Discharge: NURSING, REHAB OR LONG-TERM CARE | DRG: 189 | End: 2021-07-11
Attending: EMERGENCY MEDICINE | Admitting: INTERNAL MEDICINE
Payer: COMMERCIAL

## 2021-01-01 ENCOUNTER — DOCUMENTATION ONLY (OUTPATIENT)
Dept: OTHER | Facility: CLINIC | Age: 81
End: 2021-01-01

## 2021-01-01 ENCOUNTER — APPOINTMENT (OUTPATIENT)
Dept: CT IMAGING | Facility: CLINIC | Age: 81
DRG: 175 | End: 2021-01-01
Attending: EMERGENCY MEDICINE
Payer: COMMERCIAL

## 2021-01-01 ENCOUNTER — ANESTHESIA EVENT (OUTPATIENT)
Dept: INTENSIVE CARE | Facility: CLINIC | Age: 81
DRG: 056 | End: 2021-01-01
Payer: COMMERCIAL

## 2021-01-01 ENCOUNTER — MEDICAL CORRESPONDENCE (OUTPATIENT)
Dept: HEALTH INFORMATION MANAGEMENT | Facility: CLINIC | Age: 81
End: 2021-01-01

## 2021-01-01 ENCOUNTER — HOSPITAL ENCOUNTER (INPATIENT)
Facility: CLINIC | Age: 81
LOS: 1 days | DRG: 291 | End: 2021-07-18
Attending: EMERGENCY MEDICINE | Admitting: HOSPITALIST
Payer: COMMERCIAL

## 2021-01-01 ENCOUNTER — HEALTH MAINTENANCE LETTER (OUTPATIENT)
Age: 81
End: 2021-01-01

## 2021-01-01 ENCOUNTER — APPOINTMENT (OUTPATIENT)
Dept: GENERAL RADIOLOGY | Facility: CLINIC | Age: 81
DRG: 207 | End: 2021-01-01
Attending: INTERNAL MEDICINE
Payer: COMMERCIAL

## 2021-01-01 ENCOUNTER — DOCUMENTATION ONLY (OUTPATIENT)
Dept: HOME HEALTH SERVICES | Facility: CLINIC | Age: 81
End: 2021-01-01

## 2021-01-01 ENCOUNTER — TRANSFERRED RECORDS (OUTPATIENT)
Dept: HEALTH INFORMATION MANAGEMENT | Facility: CLINIC | Age: 81
End: 2021-01-01

## 2021-01-01 ENCOUNTER — VIRTUAL VISIT (OUTPATIENT)
Dept: FAMILY MEDICINE | Facility: CLINIC | Age: 81
End: 2021-01-01
Payer: COMMERCIAL

## 2021-01-01 ENCOUNTER — TELEPHONE (OUTPATIENT)
Dept: FAMILY MEDICINE | Facility: CLINIC | Age: 81
End: 2021-01-01

## 2021-01-01 ENCOUNTER — ANESTHESIA (OUTPATIENT)
Dept: INTENSIVE CARE | Facility: CLINIC | Age: 81
DRG: 207 | End: 2021-01-01
Payer: COMMERCIAL

## 2021-01-01 ENCOUNTER — APPOINTMENT (OUTPATIENT)
Dept: INTERVENTIONAL RADIOLOGY/VASCULAR | Facility: CLINIC | Age: 81
DRG: 056 | End: 2021-01-01
Attending: INTERNAL MEDICINE
Payer: COMMERCIAL

## 2021-01-01 ENCOUNTER — DOCUMENTATION ONLY (OUTPATIENT)
Dept: CARE COORDINATION | Facility: CLINIC | Age: 81
End: 2021-01-01

## 2021-01-01 ENCOUNTER — HOSPITAL ENCOUNTER (INPATIENT)
Facility: CLINIC | Age: 81
LOS: 24 days | Discharge: NURSING, REHAB OR LONG-TERM CARE | DRG: 056 | End: 2021-04-13
Attending: PHYSICIAN ASSISTANT | Admitting: INTERNAL MEDICINE
Payer: COMMERCIAL

## 2021-01-01 ENCOUNTER — APPOINTMENT (OUTPATIENT)
Dept: GENERAL RADIOLOGY | Facility: CLINIC | Age: 81
DRG: 175 | End: 2021-01-01
Attending: EMERGENCY MEDICINE
Payer: COMMERCIAL

## 2021-01-01 ENCOUNTER — APPOINTMENT (OUTPATIENT)
Dept: GENERAL RADIOLOGY | Facility: CLINIC | Age: 81
DRG: 291 | End: 2021-01-01
Attending: EMERGENCY MEDICINE
Payer: COMMERCIAL

## 2021-01-01 ENCOUNTER — APPOINTMENT (OUTPATIENT)
Dept: ULTRASOUND IMAGING | Facility: CLINIC | Age: 81
DRG: 056 | End: 2021-01-01
Attending: PHYSICIAN ASSISTANT
Payer: COMMERCIAL

## 2021-01-01 ENCOUNTER — APPOINTMENT (OUTPATIENT)
Dept: CARDIOLOGY | Facility: CLINIC | Age: 81
DRG: 207 | End: 2021-01-01
Attending: NURSE PRACTITIONER
Payer: COMMERCIAL

## 2021-01-01 ENCOUNTER — APPOINTMENT (OUTPATIENT)
Dept: GENERAL RADIOLOGY | Facility: CLINIC | Age: 81
DRG: 207 | End: 2021-01-01
Attending: EMERGENCY MEDICINE
Payer: COMMERCIAL

## 2021-01-01 ENCOUNTER — APPOINTMENT (OUTPATIENT)
Dept: CARDIOLOGY | Facility: CLINIC | Age: 81
DRG: 056 | End: 2021-01-01
Payer: COMMERCIAL

## 2021-01-01 ENCOUNTER — APPOINTMENT (OUTPATIENT)
Dept: GENERAL RADIOLOGY | Facility: CLINIC | Age: 81
DRG: 207 | End: 2021-01-01
Attending: NURSE PRACTITIONER
Payer: COMMERCIAL

## 2021-01-01 ENCOUNTER — APPOINTMENT (OUTPATIENT)
Dept: GENERAL RADIOLOGY | Facility: CLINIC | Age: 81
DRG: 056 | End: 2021-01-01
Attending: PHYSICIAN ASSISTANT
Payer: COMMERCIAL

## 2021-01-01 ENCOUNTER — APPOINTMENT (OUTPATIENT)
Dept: SPEECH THERAPY | Facility: CLINIC | Age: 81
DRG: 056 | End: 2021-01-01
Payer: COMMERCIAL

## 2021-01-01 ENCOUNTER — HOSPITAL ENCOUNTER (INPATIENT)
Facility: CLINIC | Age: 81
LOS: 5 days | Discharge: NURSING, REHAB OR LONG-TERM CARE | DRG: 175 | End: 2021-06-14
Attending: EMERGENCY MEDICINE | Admitting: SURGERY
Payer: COMMERCIAL

## 2021-01-01 ENCOUNTER — APPOINTMENT (OUTPATIENT)
Dept: CARDIOLOGY | Facility: CLINIC | Age: 81
DRG: 175 | End: 2021-01-01
Attending: HOSPITALIST
Payer: COMMERCIAL

## 2021-01-01 ENCOUNTER — ANESTHESIA EVENT (OUTPATIENT)
Dept: INTENSIVE CARE | Facility: CLINIC | Age: 81
DRG: 207 | End: 2021-01-01
Payer: COMMERCIAL

## 2021-01-01 VITALS
OXYGEN SATURATION: 97 % | HEART RATE: 69 BPM | WEIGHT: 278.5 LBS | DIASTOLIC BLOOD PRESSURE: 86 MMHG | HEIGHT: 70 IN | TEMPERATURE: 97.3 F | BODY MASS INDEX: 39.87 KG/M2 | RESPIRATION RATE: 18 BRPM | SYSTOLIC BLOOD PRESSURE: 130 MMHG

## 2021-01-01 VITALS
DIASTOLIC BLOOD PRESSURE: 82 MMHG | WEIGHT: 269.6 LBS | OXYGEN SATURATION: 96 % | SYSTOLIC BLOOD PRESSURE: 128 MMHG | HEIGHT: 70 IN | BODY MASS INDEX: 38.6 KG/M2 | RESPIRATION RATE: 18 BRPM | HEART RATE: 77 BPM | TEMPERATURE: 97.9 F

## 2021-01-01 VITALS
DIASTOLIC BLOOD PRESSURE: 58 MMHG | HEART RATE: 89 BPM | RESPIRATION RATE: 20 BRPM | OXYGEN SATURATION: 13 % | BODY MASS INDEX: 39.16 KG/M2 | WEIGHT: 272.93 LBS | TEMPERATURE: 98.7 F | SYSTOLIC BLOOD PRESSURE: 124 MMHG

## 2021-01-01 VITALS
HEART RATE: 68 BPM | TEMPERATURE: 98.1 F | HEIGHT: 70 IN | DIASTOLIC BLOOD PRESSURE: 76 MMHG | OXYGEN SATURATION: 96 % | BODY MASS INDEX: 38.84 KG/M2 | RESPIRATION RATE: 19 BRPM | WEIGHT: 271.3 LBS | SYSTOLIC BLOOD PRESSURE: 147 MMHG

## 2021-01-01 VITALS
HEIGHT: 70 IN | RESPIRATION RATE: 17 BRPM | TEMPERATURE: 98.2 F | DIASTOLIC BLOOD PRESSURE: 65 MMHG | HEART RATE: 76 BPM | OXYGEN SATURATION: 98 % | SYSTOLIC BLOOD PRESSURE: 110 MMHG | BODY MASS INDEX: 38.22 KG/M2 | WEIGHT: 267 LBS

## 2021-01-01 VITALS
OXYGEN SATURATION: 96 % | BODY MASS INDEX: 40 KG/M2 | TEMPERATURE: 98.4 F | RESPIRATION RATE: 18 BRPM | DIASTOLIC BLOOD PRESSURE: 72 MMHG | WEIGHT: 279.4 LBS | SYSTOLIC BLOOD PRESSURE: 126 MMHG | HEART RATE: 89 BPM | HEIGHT: 70 IN

## 2021-01-01 VITALS
BODY MASS INDEX: 38.22 KG/M2 | WEIGHT: 267 LBS | OXYGEN SATURATION: 98 % | HEART RATE: 71 BPM | SYSTOLIC BLOOD PRESSURE: 142 MMHG | HEIGHT: 70 IN | DIASTOLIC BLOOD PRESSURE: 79 MMHG | RESPIRATION RATE: 19 BRPM | TEMPERATURE: 98 F

## 2021-01-01 VITALS
HEART RATE: 67 BPM | OXYGEN SATURATION: 96 % | DIASTOLIC BLOOD PRESSURE: 69 MMHG | RESPIRATION RATE: 16 BRPM | HEIGHT: 70 IN | TEMPERATURE: 97.4 F | WEIGHT: 273.6 LBS | SYSTOLIC BLOOD PRESSURE: 125 MMHG | BODY MASS INDEX: 39.17 KG/M2

## 2021-01-01 VITALS
HEART RATE: 65 BPM | BODY MASS INDEX: 40 KG/M2 | TEMPERATURE: 97.2 F | WEIGHT: 279.4 LBS | SYSTOLIC BLOOD PRESSURE: 143 MMHG | HEIGHT: 70 IN | RESPIRATION RATE: 18 BRPM | DIASTOLIC BLOOD PRESSURE: 89 MMHG | OXYGEN SATURATION: 97 %

## 2021-01-01 VITALS
BODY MASS INDEX: 37.9 KG/M2 | RESPIRATION RATE: 20 BRPM | HEIGHT: 71 IN | DIASTOLIC BLOOD PRESSURE: 70 MMHG | WEIGHT: 270.73 LBS | SYSTOLIC BLOOD PRESSURE: 136 MMHG | TEMPERATURE: 97.7 F | HEART RATE: 86 BPM | OXYGEN SATURATION: 93 %

## 2021-01-01 VITALS
HEART RATE: 72 BPM | RESPIRATION RATE: 16 BRPM | SYSTOLIC BLOOD PRESSURE: 140 MMHG | HEIGHT: 70 IN | DIASTOLIC BLOOD PRESSURE: 68 MMHG | OXYGEN SATURATION: 98 % | WEIGHT: 300.49 LBS | BODY MASS INDEX: 43.02 KG/M2 | TEMPERATURE: 96 F

## 2021-01-01 VITALS
WEIGHT: 267 LBS | DIASTOLIC BLOOD PRESSURE: 81 MMHG | TEMPERATURE: 97.2 F | OXYGEN SATURATION: 95 % | HEART RATE: 90 BPM | BODY MASS INDEX: 38.22 KG/M2 | SYSTOLIC BLOOD PRESSURE: 144 MMHG | HEIGHT: 70 IN | RESPIRATION RATE: 18 BRPM

## 2021-01-01 VITALS
OXYGEN SATURATION: 94 % | DIASTOLIC BLOOD PRESSURE: 68 MMHG | RESPIRATION RATE: 17 BRPM | HEART RATE: 72 BPM | SYSTOLIC BLOOD PRESSURE: 118 MMHG | TEMPERATURE: 97.9 F

## 2021-01-01 VITALS
OXYGEN SATURATION: 98 % | HEIGHT: 70 IN | DIASTOLIC BLOOD PRESSURE: 76 MMHG | SYSTOLIC BLOOD PRESSURE: 138 MMHG | BODY MASS INDEX: 39.8 KG/M2 | TEMPERATURE: 98 F | HEART RATE: 84 BPM | WEIGHT: 278 LBS | RESPIRATION RATE: 18 BRPM

## 2021-01-01 VITALS
WEIGHT: 271.8 LBS | SYSTOLIC BLOOD PRESSURE: 118 MMHG | TEMPERATURE: 97.8 F | HEIGHT: 70 IN | RESPIRATION RATE: 21 BRPM | DIASTOLIC BLOOD PRESSURE: 72 MMHG | BODY MASS INDEX: 38.91 KG/M2 | OXYGEN SATURATION: 91 % | HEART RATE: 77 BPM

## 2021-01-01 VITALS
HEIGHT: 70 IN | HEART RATE: 73 BPM | DIASTOLIC BLOOD PRESSURE: 72 MMHG | RESPIRATION RATE: 16 BRPM | TEMPERATURE: 98.1 F | BODY MASS INDEX: 39.16 KG/M2 | SYSTOLIC BLOOD PRESSURE: 135 MMHG | OXYGEN SATURATION: 94 % | WEIGHT: 273.5 LBS

## 2021-01-01 VITALS
RESPIRATION RATE: 20 BRPM | DIASTOLIC BLOOD PRESSURE: 71 MMHG | SYSTOLIC BLOOD PRESSURE: 140 MMHG | HEIGHT: 70 IN | BODY MASS INDEX: 38.84 KG/M2 | TEMPERATURE: 97.3 F | WEIGHT: 271.3 LBS | HEART RATE: 66 BPM | OXYGEN SATURATION: 92 %

## 2021-01-01 VITALS
RESPIRATION RATE: 22 BRPM | HEART RATE: 107 BPM | SYSTOLIC BLOOD PRESSURE: 142 MMHG | DIASTOLIC BLOOD PRESSURE: 72 MMHG | OXYGEN SATURATION: 92 % | TEMPERATURE: 97.4 F

## 2021-01-01 VITALS
WEIGHT: 282.8 LBS | HEART RATE: 76 BPM | DIASTOLIC BLOOD PRESSURE: 74 MMHG | OXYGEN SATURATION: 95 % | SYSTOLIC BLOOD PRESSURE: 122 MMHG | RESPIRATION RATE: 18 BRPM | HEIGHT: 70 IN | BODY MASS INDEX: 40.49 KG/M2 | TEMPERATURE: 98.2 F

## 2021-01-01 VITALS
SYSTOLIC BLOOD PRESSURE: 121 MMHG | RESPIRATION RATE: 18 BRPM | DIASTOLIC BLOOD PRESSURE: 72 MMHG | BODY MASS INDEX: 38.84 KG/M2 | HEART RATE: 72 BPM | OXYGEN SATURATION: 99 % | TEMPERATURE: 99 F | HEIGHT: 70 IN | WEIGHT: 271.3 LBS

## 2021-01-01 VITALS
BODY MASS INDEX: 39.87 KG/M2 | RESPIRATION RATE: 24 BRPM | DIASTOLIC BLOOD PRESSURE: 83 MMHG | OXYGEN SATURATION: 91 % | SYSTOLIC BLOOD PRESSURE: 150 MMHG | TEMPERATURE: 96.3 F | WEIGHT: 278.5 LBS | HEART RATE: 77 BPM | HEIGHT: 70 IN

## 2021-01-01 VITALS
WEIGHT: 279.98 LBS | TEMPERATURE: 97.9 F | HEIGHT: 70 IN | HEART RATE: 83 BPM | OXYGEN SATURATION: 97 % | DIASTOLIC BLOOD PRESSURE: 67 MMHG | RESPIRATION RATE: 16 BRPM | SYSTOLIC BLOOD PRESSURE: 118 MMHG | BODY MASS INDEX: 40.08 KG/M2

## 2021-01-01 DIAGNOSIS — I50.32 CHRONIC DIASTOLIC CONGESTIVE HEART FAILURE (H): ICD-10-CM

## 2021-01-01 DIAGNOSIS — Z85.46 HISTORY OF PROSTATE CANCER: ICD-10-CM

## 2021-01-01 DIAGNOSIS — J96.21 ACUTE ON CHRONIC RESPIRATORY FAILURE WITH HYPOXIA AND HYPERCAPNIA (H): ICD-10-CM

## 2021-01-01 DIAGNOSIS — G70.00 MYASTHENIA GRAVIS WITHOUT EXACERBATION (H): Primary | ICD-10-CM

## 2021-01-01 DIAGNOSIS — I10 BENIGN ESSENTIAL HYPERTENSION: ICD-10-CM

## 2021-01-01 DIAGNOSIS — R33.9 URINARY RETENTION: ICD-10-CM

## 2021-01-01 DIAGNOSIS — Z97.8 FOLEY CATHETER IN PLACE: ICD-10-CM

## 2021-01-01 DIAGNOSIS — M81.0 AGE-RELATED OSTEOPOROSIS WITHOUT CURRENT PATHOLOGICAL FRACTURE: ICD-10-CM

## 2021-01-01 DIAGNOSIS — G70.00 MYASTHENIA GRAVIS (H): ICD-10-CM

## 2021-01-01 DIAGNOSIS — I50.32 CHRONIC DIASTOLIC HEART FAILURE (H): ICD-10-CM

## 2021-01-01 DIAGNOSIS — J96.22 ACUTE ON CHRONIC RESPIRATORY FAILURE WITH HYPOXIA AND HYPERCAPNIA (H): ICD-10-CM

## 2021-01-01 DIAGNOSIS — G70.00 MYASTHENIA GRAVIS WITHOUT EXACERBATION (H): ICD-10-CM

## 2021-01-01 DIAGNOSIS — J96.11 CHRONIC RESPIRATORY FAILURE WITH HYPOXIA AND HYPERCAPNIA (H): ICD-10-CM

## 2021-01-01 DIAGNOSIS — I77.810 DILATED AORTIC ROOT (H): ICD-10-CM

## 2021-01-01 DIAGNOSIS — I26.94 MULTIPLE SUBSEGMENTAL PULMONARY EMBOLI WITHOUT ACUTE COR PULMONALE (H): Primary | ICD-10-CM

## 2021-01-01 DIAGNOSIS — I50.32 CHRONIC DIASTOLIC (CONGESTIVE) HEART FAILURE (H): ICD-10-CM

## 2021-01-01 DIAGNOSIS — F32.9 REACTIVE DEPRESSION: ICD-10-CM

## 2021-01-01 DIAGNOSIS — R53.81 PHYSICAL DECONDITIONING: ICD-10-CM

## 2021-01-01 DIAGNOSIS — M79.3 PANNICULITIS: ICD-10-CM

## 2021-01-01 DIAGNOSIS — J96.22 ACUTE AND CHRONIC RESPIRATORY FAILURE WITH HYPERCAPNIA (H): ICD-10-CM

## 2021-01-01 DIAGNOSIS — I50.30 HEART FAILURE WITH PRESERVED EJECTION FRACTION, NYHA CLASS I (H): ICD-10-CM

## 2021-01-01 DIAGNOSIS — D64.9 ANEMIA, UNSPECIFIED: ICD-10-CM

## 2021-01-01 DIAGNOSIS — Z86.69 HISTORY OF MYASTHENIA GRAVIS: ICD-10-CM

## 2021-01-01 DIAGNOSIS — J96.22 ACUTE ON CHRONIC RESPIRATORY FAILURE WITH HYPERCAPNIA (H): ICD-10-CM

## 2021-01-01 DIAGNOSIS — E66.812 CLASS 2 SEVERE OBESITY WITH SERIOUS COMORBIDITY AND BODY MASS INDEX (BMI) OF 39.0 TO 39.9 IN ADULT, UNSPECIFIED OBESITY TYPE (H): ICD-10-CM

## 2021-01-01 DIAGNOSIS — R00.0 SINUS TACHYCARDIA: ICD-10-CM

## 2021-01-01 DIAGNOSIS — R31.9 HEMATURIA, UNSPECIFIED TYPE: ICD-10-CM

## 2021-01-01 DIAGNOSIS — I26.94 MULTIPLE SUBSEGMENTAL PULMONARY EMBOLI WITHOUT ACUTE COR PULMONALE (H): ICD-10-CM

## 2021-01-01 DIAGNOSIS — E66.01 MORBID OBESITY (H): ICD-10-CM

## 2021-01-01 DIAGNOSIS — I50.32 CHRONIC DIASTOLIC HEART FAILURE (H): Primary | ICD-10-CM

## 2021-01-01 DIAGNOSIS — D64.9 ANEMIA, UNSPECIFIED TYPE: ICD-10-CM

## 2021-01-01 DIAGNOSIS — J96.21 ACUTE ON CHRONIC RESPIRATORY FAILURE WITH HYPOXIA AND HYPERCAPNIA (H): Primary | ICD-10-CM

## 2021-01-01 DIAGNOSIS — G70.00 MYASTHENIA GRAVIS (H): Primary | ICD-10-CM

## 2021-01-01 DIAGNOSIS — G70.01 MYASTHENIA GRAVIS WITH EXACERBATION (H): Primary | ICD-10-CM

## 2021-01-01 DIAGNOSIS — G47.00 INSOMNIA, UNSPECIFIED TYPE: ICD-10-CM

## 2021-01-01 DIAGNOSIS — J96.22 ACUTE ON CHRONIC RESPIRATORY FAILURE WITH HYPERCAPNIA (H): Primary | ICD-10-CM

## 2021-01-01 DIAGNOSIS — J18.9 PNEUMONIA OF LEFT LOWER LOBE DUE TO INFECTIOUS ORGANISM: ICD-10-CM

## 2021-01-01 DIAGNOSIS — J96.12 CHRONIC RESPIRATORY FAILURE WITH HYPOXIA AND HYPERCAPNIA (H): ICD-10-CM

## 2021-01-01 DIAGNOSIS — I50.9 CONGESTIVE HEART FAILURE, UNSPECIFIED HF CHRONICITY, UNSPECIFIED HEART FAILURE TYPE (H): ICD-10-CM

## 2021-01-01 DIAGNOSIS — I26.99 OTHER PULMONARY EMBOLISM WITHOUT ACUTE COR PULMONALE, UNSPECIFIED CHRONICITY (H): ICD-10-CM

## 2021-01-01 DIAGNOSIS — I50.33 ACUTE ON CHRONIC DIASTOLIC HEART FAILURE (H): Primary | ICD-10-CM

## 2021-01-01 DIAGNOSIS — U07.1 COVID-19: ICD-10-CM

## 2021-01-01 DIAGNOSIS — E66.01 CLASS 2 SEVERE OBESITY WITH SERIOUS COMORBIDITY AND BODY MASS INDEX (BMI) OF 39.0 TO 39.9 IN ADULT, UNSPECIFIED OBESITY TYPE (H): ICD-10-CM

## 2021-01-01 DIAGNOSIS — J81.0 ACUTE PULMONARY EDEMA (H): ICD-10-CM

## 2021-01-01 DIAGNOSIS — I10 ESSENTIAL HYPERTENSION, BENIGN: ICD-10-CM

## 2021-01-01 DIAGNOSIS — G93.41 METABOLIC ENCEPHALOPATHY: ICD-10-CM

## 2021-01-01 DIAGNOSIS — I50.42 CHRONIC COMBINED SYSTOLIC (CONGESTIVE) AND DIASTOLIC (CONGESTIVE) HEART FAILURE (H): ICD-10-CM

## 2021-01-01 DIAGNOSIS — I50.33 ACUTE ON CHRONIC DIASTOLIC HEART FAILURE (H): ICD-10-CM

## 2021-01-01 DIAGNOSIS — C61 MALIGNANT NEOPLASM OF PROSTATE (H): ICD-10-CM

## 2021-01-01 DIAGNOSIS — I49.3 PVC'S (PREMATURE VENTRICULAR CONTRACTIONS): ICD-10-CM

## 2021-01-01 DIAGNOSIS — L30.4 INTERTRIGO: ICD-10-CM

## 2021-01-01 DIAGNOSIS — I10 BENIGN ESSENTIAL HYPERTENSION: Primary | ICD-10-CM

## 2021-01-01 DIAGNOSIS — G70.01 MYASTHENIC CRISIS (H): ICD-10-CM

## 2021-01-01 DIAGNOSIS — A31.9 ATYPICAL MYCOBACTERIAL INFECTION: ICD-10-CM

## 2021-01-01 DIAGNOSIS — F41.9 ANXIETY: ICD-10-CM

## 2021-01-01 DIAGNOSIS — J96.21 ACUTE ON CHRONIC RESPIRATORY FAILURE WITH HYPOXIA (H): ICD-10-CM

## 2021-01-01 DIAGNOSIS — R79.81 ELEVATED CO2 LEVEL: ICD-10-CM

## 2021-01-01 DIAGNOSIS — Z86.19 HISTORY OF MYCOBACTERIAL INFECTION: ICD-10-CM

## 2021-01-01 DIAGNOSIS — M89.8X1 PAIN OF LEFT SCAPULA: ICD-10-CM

## 2021-01-01 DIAGNOSIS — E87.1 HYPONATREMIA: ICD-10-CM

## 2021-01-01 DIAGNOSIS — E78.5 HYPERLIPIDEMIA LDL GOAL <130: ICD-10-CM

## 2021-01-01 DIAGNOSIS — Z29.89 NEED FOR PNEUMOCYSTIS PROPHYLAXIS: ICD-10-CM

## 2021-01-01 DIAGNOSIS — R82.90 ABNORMAL URINALYSIS: ICD-10-CM

## 2021-01-01 DIAGNOSIS — R06.02 SOB (SHORTNESS OF BREATH): Primary | ICD-10-CM

## 2021-01-01 DIAGNOSIS — J96.22 ACUTE ON CHRONIC RESPIRATORY FAILURE WITH HYPOXIA AND HYPERCAPNIA (H): Primary | ICD-10-CM

## 2021-01-01 DIAGNOSIS — R06.02 SHORTNESS OF BREATH: ICD-10-CM

## 2021-01-01 DIAGNOSIS — R41.0 CONFUSION: ICD-10-CM

## 2021-01-01 DIAGNOSIS — E87.6 HYPOKALEMIA: ICD-10-CM

## 2021-01-01 DIAGNOSIS — T14.8XXA HEMATOMA: ICD-10-CM

## 2021-01-01 DIAGNOSIS — M25.469 SWELLING OF KNEE: ICD-10-CM

## 2021-01-01 DIAGNOSIS — I51.89 DIASTOLIC DYSFUNCTION: Primary | ICD-10-CM

## 2021-01-01 LAB
ABO + RH BLD: NORMAL
ABO + RH BLD: NORMAL
ALBUMIN SERPL-MCNC: 2 G/DL (ref 3.4–5)
ALBUMIN SERPL-MCNC: 2.1 G/DL (ref 3.4–5)
ALBUMIN SERPL-MCNC: 2.2 G/DL (ref 3.4–5)
ALBUMIN SERPL-MCNC: 2.4 G/DL (ref 3.4–5)
ALBUMIN SERPL-MCNC: 2.4 G/DL (ref 3.4–5)
ALBUMIN SERPL-MCNC: 2.8 G/DL (ref 3.4–5)
ALBUMIN SERPL-MCNC: 2.9 G/DL (ref 3.4–5)
ALBUMIN SERPL-MCNC: 3 G/DL (ref 3.4–5)
ALBUMIN SERPL-MCNC: 3.6 G/DL (ref 3.4–5)
ALBUMIN UR-MCNC: 30 MG/DL
ALBUMIN UR-MCNC: 70 MG/DL
ALBUMIN UR-MCNC: 70 MG/DL
ALBUMIN UR-MCNC: NEGATIVE MG/DL
ALP SERPL-CCNC: 42 U/L (ref 40–150)
ALP SERPL-CCNC: 45 U/L (ref 40–150)
ALP SERPL-CCNC: 48 U/L (ref 40–150)
ALP SERPL-CCNC: 50 U/L (ref 40–150)
ALP SERPL-CCNC: 51 U/L (ref 40–150)
ALP SERPL-CCNC: 59 U/L (ref 40–150)
ALP SERPL-CCNC: 69 U/L (ref 40–150)
ALT SERPL W P-5'-P-CCNC: 22 U/L (ref 0–70)
ALT SERPL W P-5'-P-CCNC: 23 U/L (ref 0–70)
ALT SERPL W P-5'-P-CCNC: 27 U/L (ref 0–70)
ALT SERPL W P-5'-P-CCNC: 27 U/L (ref 0–70)
ALT SERPL W P-5'-P-CCNC: 30 U/L (ref 0–70)
ALT SERPL W P-5'-P-CCNC: 33 U/L (ref 0–70)
ALT SERPL W P-5'-P-CCNC: 35 U/L (ref 0–70)
ALT SERPL W P-5'-P-CCNC: 52 U/L (ref 0–70)
ALT SERPL W P-5'-P-CCNC: 56 U/L (ref 0–70)
ANION GAP SERPL CALCULATED.3IONS-SCNC: 1 MMOL/L (ref 3–14)
ANION GAP SERPL CALCULATED.3IONS-SCNC: 1 MMOL/L (ref 3–14)
ANION GAP SERPL CALCULATED.3IONS-SCNC: 2 MMOL/L (ref 3–14)
ANION GAP SERPL CALCULATED.3IONS-SCNC: 3 MMOL/L (ref 3–14)
ANION GAP SERPL CALCULATED.3IONS-SCNC: 4 MMOL/L (ref 3–14)
ANION GAP SERPL CALCULATED.3IONS-SCNC: 5 MMOL/L (ref 3–14)
ANION GAP SERPL CALCULATED.3IONS-SCNC: 7 MMOL/L (ref 3–14)
ANION GAP SERPL CALCULATED.3IONS-SCNC: 9 MMOL/L (ref 3–14)
ANION GAP SERPL CALCULATED.3IONS-SCNC: 9 MMOL/L (ref 3–14)
ANION GAP SERPL CALCULATED.3IONS-SCNC: <1 MMOL/L (ref 3–14)
ANION GAP SERPL CALCULATED.3IONS-SCNC: ABNORMAL MMOL/L (ref 3–14)
ANION GAP SERPL CALCULATED.3IONS-SCNC: NORMAL MMOL/L (ref 6–17)
APPEARANCE UR: ABNORMAL
APPEARANCE UR: CLEAR
APTT PPP: 31 SEC (ref 22–37)
APTT PPP: 33 SEC (ref 22–37)
AST SERPL W P-5'-P-CCNC: 15 U/L (ref 0–45)
AST SERPL W P-5'-P-CCNC: 17 U/L (ref 0–45)
AST SERPL W P-5'-P-CCNC: 18 U/L (ref 0–45)
AST SERPL W P-5'-P-CCNC: 21 U/L (ref 0–45)
AST SERPL W P-5'-P-CCNC: 22 U/L (ref 0–45)
AST SERPL W P-5'-P-CCNC: 24 U/L (ref 0–45)
AST SERPL W P-5'-P-CCNC: 30 U/L (ref 0–45)
AST SERPL W P-5'-P-CCNC: 44 U/L (ref 0–45)
AST SERPL W P-5'-P-CCNC: 45 U/L (ref 0–45)
ATRIAL RATE - MUSE: 79 BPM
ATRIAL RATE - MUSE: 87 BPM
ATRIAL RATE - MUSE: 87 BPM
BACTERIA #/AREA URNS HPF: ABNORMAL /HPF
BACTERIA SPEC CULT: ABNORMAL
BACTERIA SPEC CULT: NO GROWTH
BASE EXCESS BLDA CALC-SCNC: 10.1 MMOL/L
BASE EXCESS BLDA CALC-SCNC: 12.4 MMOL/L
BASE EXCESS BLDA CALC-SCNC: 12.5 MMOL/L
BASE EXCESS BLDA CALC-SCNC: 14.1 MMOL/L
BASE EXCESS BLDA CALC-SCNC: 17.9 MMOL/L
BASE EXCESS BLDA CALC-SCNC: 19.1 MMOL/L
BASE EXCESS BLDA CALC-SCNC: 19.8 MMOL/L
BASE EXCESS BLDA CALC-SCNC: 21.8 MMOL/L
BASE EXCESS BLDA CALC-SCNC: 22 MMOL/L
BASE EXCESS BLDA CALC-SCNC: 22.1 MMOL/L
BASE EXCESS BLDA CALC-SCNC: 22.9 MMOL/L (ref -9–1.8)
BASE EXCESS BLDA CALC-SCNC: 22.9 MMOL/L (ref -9–1.8)
BASE EXCESS BLDA CALC-SCNC: 24.8 MMOL/L
BASE EXCESS BLDA CALC-SCNC: 24.8 MMOL/L
BASE EXCESS BLDA CALC-SCNC: 25.4 MMOL/L
BASE EXCESS BLDA CALC-SCNC: 27 MMOL/L
BASE EXCESS BLDA CALC-SCNC: 27.2 MMOL/L
BASE EXCESS BLDA CALC-SCNC: 27.2 MMOL/L
BASE EXCESS BLDA CALC-SCNC: 3.8 MMOL/L
BASE EXCESS BLDA CALC-SCNC: 4.2 MMOL/L
BASE EXCESS BLDA CALC-SCNC: 6.3 MMOL/L
BASE EXCESS BLDA CALC-SCNC: 7.6 MMOL/L
BASE EXCESS BLDV CALC-SCNC: 10.3 MMOL/L
BASE EXCESS BLDV CALC-SCNC: 12.5 MMOL/L
BASE EXCESS BLDV CALC-SCNC: 14.1 MMOL/L
BASE EXCESS BLDV CALC-SCNC: 17.2 MMOL/L
BASE EXCESS BLDV CALC-SCNC: 17.4 MMOL/L
BASE EXCESS BLDV CALC-SCNC: 18 MMOL/L
BASE EXCESS BLDV CALC-SCNC: 18.2 MMOL/L (ref -7.7–1.9)
BASE EXCESS BLDV CALC-SCNC: 18.3 MMOL/L
BASE EXCESS BLDV CALC-SCNC: 18.4 MMOL/L (ref -7.7–1.9)
BASE EXCESS BLDV CALC-SCNC: 18.8 MMOL/L
BASE EXCESS BLDV CALC-SCNC: 21 MMOL/L
BASE EXCESS BLDV CALC-SCNC: 21.5 MMOL/L
BASE EXCESS BLDV CALC-SCNC: 22.9 MMOL/L
BASE EXCESS BLDV CALC-SCNC: 24.4 MMOL/L
BASE EXCESS BLDV CALC-SCNC: 25 MMOL/L
BASE EXCESS BLDV CALC-SCNC: 26.6 MMOL/L
BASE EXCESS BLDV CALC-SCNC: 28.4 MMOL/L
BASE EXCESS BLDV CALC-SCNC: 29.9 MMOL/L
BASE EXCESS BLDV CALC-SCNC: 3.3 MMOL/L
BASE EXCESS BLDV CALC-SCNC: 5.3 MMOL/L
BASE EXCESS BLDV CALC-SCNC: 8.8 MMOL/L
BASOPHILS # BLD AUTO: 0 10E3/UL (ref 0–0.2)
BASOPHILS # BLD AUTO: 0 10E3/UL (ref 0–0.2)
BASOPHILS # BLD AUTO: 0 10E9/L (ref 0–0.2)
BASOPHILS NFR BLD AUTO: 0 %
BASOPHILS NFR BLD AUTO: 0 %
BASOPHILS NFR BLD AUTO: 0.1 %
BASOPHILS NFR BLD AUTO: 0.2 %
BASOPHILS NFR BLD AUTO: 0.3 %
BILIRUB SERPL-MCNC: 0.4 MG/DL (ref 0.2–1.3)
BILIRUB SERPL-MCNC: 0.6 MG/DL (ref 0.2–1.3)
BILIRUB SERPL-MCNC: 0.7 MG/DL (ref 0.2–1.3)
BILIRUB SERPL-MCNC: 0.8 MG/DL (ref 0.2–1.3)
BILIRUB SERPL-MCNC: 1 MG/DL (ref 0.2–1.3)
BILIRUB UR QL STRIP: NEGATIVE
BLD GP AB SCN SERPL QL: NORMAL
BLD PROD TYP BPU: NORMAL
BLD UNIT ID BPU: 0
BLOOD BANK CMNT PATIENT-IMP: NORMAL
BLOOD PRODUCT CODE: NORMAL
BPU ID: NORMAL
BUN SERPL-MCNC: 10 MG/DL (ref 7–30)
BUN SERPL-MCNC: 11 MG/DL (ref 7–30)
BUN SERPL-MCNC: 12 MG/DL (ref 7–30)
BUN SERPL-MCNC: 12 MG/DL (ref 7–30)
BUN SERPL-MCNC: 13 MG/DL (ref 7–30)
BUN SERPL-MCNC: 13 MG/DL (ref 7–30)
BUN SERPL-MCNC: 14 MG/DL (ref 7–30)
BUN SERPL-MCNC: 14 MG/DL (ref 7–30)
BUN SERPL-MCNC: 15 MG/DL (ref 7–30)
BUN SERPL-MCNC: 16 MG/DL (ref 7–30)
BUN SERPL-MCNC: 17 MG/DL (ref 7–30)
BUN SERPL-MCNC: 18 MG/DL (ref 7–30)
BUN SERPL-MCNC: 19 MG/DL (ref 7–30)
BUN SERPL-MCNC: 19 MG/DL (ref 7–30)
BUN SERPL-MCNC: 20 MG/DL (ref 7–30)
BUN SERPL-MCNC: 21 MG/DL (ref 7–30)
BUN SERPL-MCNC: 21 MG/DL (ref 7–30)
BUN SERPL-MCNC: 22 MG/DL (ref 7–26)
BUN SERPL-MCNC: 22 MG/DL (ref 7–30)
BUN SERPL-MCNC: 24 MG/DL (ref 7–30)
BUN SERPL-MCNC: 25 MG/DL (ref 7–30)
BUN SERPL-MCNC: 25 MG/DL (ref 7–30)
BUN SERPL-MCNC: 27 MG/DL (ref 7–30)
BUN SERPL-MCNC: 27 MG/DL (ref 7–30)
BUN SERPL-MCNC: 29 MG/DL (ref 7–30)
BUN SERPL-MCNC: 31 MG/DL (ref 7–30)
BUN SERPL-MCNC: 32 MG/DL (ref 7–30)
BUN SERPL-MCNC: 33 MG/DL (ref 7–30)
BUN SERPL-MCNC: 35 MG/DL (ref 7–30)
BUN SERPL-MCNC: 35 MG/DL (ref 7–30)
BUN SERPL-MCNC: NORMAL MG/DL (ref 7–30)
CALCIUM SERPL-MCNC: 7.8 MG/DL (ref 8.5–10.1)
CALCIUM SERPL-MCNC: 8 MG/DL (ref 8.5–10.1)
CALCIUM SERPL-MCNC: 8.1 MG/DL (ref 8.5–10.1)
CALCIUM SERPL-MCNC: 8.2 MG/DL (ref 8.5–10.1)
CALCIUM SERPL-MCNC: 8.3 MG/DL (ref 8.5–10.1)
CALCIUM SERPL-MCNC: 8.4 MG/DL (ref 8.5–10.1)
CALCIUM SERPL-MCNC: 8.5 MG/DL (ref 8.5–10.1)
CALCIUM SERPL-MCNC: 8.6 MG/DL (ref 8.5–10.1)
CALCIUM SERPL-MCNC: 8.7 MG/DL (ref 8.5–10.1)
CALCIUM SERPL-MCNC: 8.8 MG/DL (ref 8.5–10.1)
CALCIUM SERPL-MCNC: 9 MG/DL (ref 8.4–10.4)
CALCIUM SERPL-MCNC: 9 MG/DL (ref 8.5–10.1)
CALCIUM SERPL-MCNC: 9.1 MG/DL (ref 8.5–10.1)
CALCIUM SERPL-MCNC: 9.1 MG/DL (ref 8.5–10.1)
CALCIUM SERPL-MCNC: 9.2 MG/DL (ref 8.5–10.1)
CALCIUM SERPL-MCNC: 9.3 MG/DL (ref 8.5–10.1)
CALCIUM SERPL-MCNC: 9.9 MG/DL (ref 8.5–10.1)
CALCIUM SERPL-MCNC: NORMAL MG/DL (ref 8.5–10.1)
CAOX CRY #/AREA URNS HPF: ABNORMAL /HPF
CHLORIDE BLD-SCNC: 86 MMOL/L (ref 94–109)
CHLORIDE BLD-SCNC: 87 MMOL/L (ref 94–109)
CHLORIDE BLD-SCNC: 87 MMOL/L (ref 94–109)
CHLORIDE BLD-SCNC: 88 MMOL/L (ref 94–109)
CHLORIDE SERPL-SCNC: 100 MMOL/L (ref 94–109)
CHLORIDE SERPL-SCNC: 101 MMOL/L (ref 94–109)
CHLORIDE SERPL-SCNC: 104 MMOL/L (ref 94–109)
CHLORIDE SERPL-SCNC: 104 MMOL/L (ref 94–109)
CHLORIDE SERPL-SCNC: 105 MMOL/L (ref 94–109)
CHLORIDE SERPL-SCNC: 106 MMOL/L (ref 94–109)
CHLORIDE SERPL-SCNC: 106 MMOL/L (ref 94–109)
CHLORIDE SERPL-SCNC: 84 MMOL/L (ref 94–109)
CHLORIDE SERPL-SCNC: 86 MMOL/L (ref 94–109)
CHLORIDE SERPL-SCNC: 87 MMOL/L (ref 94–109)
CHLORIDE SERPL-SCNC: 88 MMOL/L (ref 94–109)
CHLORIDE SERPL-SCNC: 89 MMOL/L (ref 94–109)
CHLORIDE SERPL-SCNC: 90 MMOL/L (ref 94–109)
CHLORIDE SERPL-SCNC: 91 MMOL/L (ref 94–109)
CHLORIDE SERPL-SCNC: 92 MMOL/L (ref 94–109)
CHLORIDE SERPL-SCNC: 94 MMOL/L (ref 94–109)
CHLORIDE SERPL-SCNC: 95 MMOL/L (ref 94–109)
CHLORIDE SERPL-SCNC: 96 MMOL/L (ref 94–109)
CHLORIDE SERPL-SCNC: 97 MMOL/L (ref 94–109)
CHLORIDE SERPL-SCNC: 98 MMOL/L (ref 94–109)
CHLORIDE SERPL-SCNC: 99 MMOL/L (ref 94–109)
CHLORIDE SERPL-SCNC: NORMAL MMOL/L (ref 94–109)
CHLORIDE SERPLBLD-SCNC: 90 MMOL/L (ref 98–109)
CK SERPL-CCNC: 195 U/L (ref 30–300)
CO2 BLDCOV-SCNC: 49 MMOL/L (ref 21–28)
CO2 BLDCOV-SCNC: 56 MMOL/L (ref 21–28)
CO2 BLDCOV-SCNC: 56 MMOL/L (ref 21–28)
CO2 SERPL-SCNC: 27 MMOL/L (ref 20–32)
CO2 SERPL-SCNC: 29 MMOL/L (ref 20–32)
CO2 SERPL-SCNC: 29 MMOL/L (ref 20–32)
CO2 SERPL-SCNC: 32 MMOL/L (ref 20–32)
CO2 SERPL-SCNC: 32 MMOL/L (ref 20–32)
CO2 SERPL-SCNC: 34 MMOL/L (ref 20–32)
CO2 SERPL-SCNC: 35 MMOL/L (ref 20–32)
CO2 SERPL-SCNC: 37 MMOL/L (ref 20–32)
CO2 SERPL-SCNC: 38 MMOL/L (ref 20–32)
CO2 SERPL-SCNC: 38 MMOL/L (ref 20–32)
CO2 SERPL-SCNC: 39 MMOL/L (ref 20–32)
CO2 SERPL-SCNC: 40 MMOL/L (ref 20–32)
CO2 SERPL-SCNC: 41 MMOL/L (ref 20–32)
CO2 SERPL-SCNC: 42 MMOL/L (ref 20–32)
CO2 SERPL-SCNC: 44 MMOL/L (ref 20–32)
CO2 SERPL-SCNC: 45 MMOL/L (ref 20–32)
CO2 SERPL-SCNC: 45 MMOL/L (ref 20–32)
CO2 SERPL-SCNC: >45 MMOL/L (ref 20–29)
CO2 SERPL-SCNC: >45 MMOL/L (ref 20–32)
CO2 SERPL-SCNC: NORMAL MMOL/L (ref 20–32)
COLOR UR AUTO: ABNORMAL
COLOR UR AUTO: YELLOW
CORTIS SERPL-MCNC: 28.7 UG/DL (ref 4–22)
CPB POCT: NO
CREAT SERPL-MCNC: 0.46 MG/DL (ref 0.66–1.25)
CREAT SERPL-MCNC: 0.48 MG/DL (ref 0.66–1.25)
CREAT SERPL-MCNC: 0.49 MG/DL (ref 0.66–1.25)
CREAT SERPL-MCNC: 0.5 MG/DL (ref 0.66–1.25)
CREAT SERPL-MCNC: 0.52 MG/DL (ref 0.66–1.25)
CREAT SERPL-MCNC: 0.52 MG/DL (ref 0.66–1.25)
CREAT SERPL-MCNC: 0.53 MG/DL (ref 0.66–1.25)
CREAT SERPL-MCNC: 0.53 MG/DL (ref 0.73–1.18)
CREAT SERPL-MCNC: 0.54 MG/DL (ref 0.66–1.25)
CREAT SERPL-MCNC: 0.56 MG/DL (ref 0.66–1.25)
CREAT SERPL-MCNC: 0.56 MG/DL (ref 0.66–1.25)
CREAT SERPL-MCNC: 0.57 MG/DL (ref 0.66–1.25)
CREAT SERPL-MCNC: 0.58 MG/DL (ref 0.66–1.25)
CREAT SERPL-MCNC: 0.58 MG/DL (ref 0.66–1.25)
CREAT SERPL-MCNC: 0.59 MG/DL (ref 0.66–1.25)
CREAT SERPL-MCNC: 0.59 MG/DL (ref 0.66–1.25)
CREAT SERPL-MCNC: 0.6 MG/DL (ref 0.66–1.25)
CREAT SERPL-MCNC: 0.61 MG/DL (ref 0.66–1.25)
CREAT SERPL-MCNC: 0.62 MG/DL (ref 0.66–1.25)
CREAT SERPL-MCNC: 0.63 MG/DL (ref 0.66–1.25)
CREAT SERPL-MCNC: 0.64 MG/DL (ref 0.66–1.25)
CREAT SERPL-MCNC: 0.64 MG/DL (ref 0.66–1.25)
CREAT SERPL-MCNC: 0.65 MG/DL (ref 0.66–1.25)
CREAT SERPL-MCNC: 0.66 MG/DL (ref 0.66–1.25)
CREAT SERPL-MCNC: 0.68 MG/DL (ref 0.66–1.25)
CREAT SERPL-MCNC: 0.7 MG/DL (ref 0.66–1.25)
CREAT SERPL-MCNC: 0.71 MG/DL (ref 0.66–1.25)
CREAT SERPL-MCNC: 0.71 MG/DL (ref 0.66–1.25)
CREAT SERPL-MCNC: 0.72 MG/DL (ref 0.66–1.25)
CREAT SERPL-MCNC: 0.72 MG/DL (ref 0.66–1.25)
CREAT SERPL-MCNC: 0.86 MG/DL (ref 0.66–1.25)
CREAT SERPL-MCNC: 0.97 MG/DL (ref 0.66–1.25)
CREAT SERPL-MCNC: 1.14 MG/DL (ref 0.66–1.25)
CREAT SERPL-MCNC: 1.41 MG/DL (ref 0.66–1.25)
CREAT SERPL-MCNC: NORMAL MG/DL (ref 0.66–1.25)
CRP SERPL-MCNC: 13.9 MG/L (ref 0–8)
D DIMER PPP FEU-MCNC: 1.1 UG/ML FEU (ref 0–0.5)
DIASTOLIC BLOOD PRESSURE - MUSE: NORMAL MMHG
DIFFERENTIAL METHOD BLD: ABNORMAL
EOSINOPHIL # BLD AUTO: 0 10E3/UL (ref 0–0.7)
EOSINOPHIL # BLD AUTO: 0 10E3/UL (ref 0–0.7)
EOSINOPHIL # BLD AUTO: 0 10E9/L (ref 0–0.7)
EOSINOPHIL NFR BLD AUTO: 0 %
EOSINOPHIL NFR BLD AUTO: 0.1 %
EOSINOPHIL NFR BLD AUTO: 0.9 %
EOSINOPHIL NFR BLD AUTO: 1 %
ERYTHROCYTE [DISTWIDTH] IN BLOOD BY AUTOMATED COUNT: 13.2 % (ref 10–15)
ERYTHROCYTE [DISTWIDTH] IN BLOOD BY AUTOMATED COUNT: 13.2 % (ref 10–15)
ERYTHROCYTE [DISTWIDTH] IN BLOOD BY AUTOMATED COUNT: 13.3 % (ref 10–15)
ERYTHROCYTE [DISTWIDTH] IN BLOOD BY AUTOMATED COUNT: 13.3 % (ref 10–15)
ERYTHROCYTE [DISTWIDTH] IN BLOOD BY AUTOMATED COUNT: 13.4 % (ref 10–15)
ERYTHROCYTE [DISTWIDTH] IN BLOOD BY AUTOMATED COUNT: 14.5 % (ref 10–15)
ERYTHROCYTE [DISTWIDTH] IN BLOOD BY AUTOMATED COUNT: 14.6 % (ref 10–15)
ERYTHROCYTE [DISTWIDTH] IN BLOOD BY AUTOMATED COUNT: 14.7 % (ref 10–15)
ERYTHROCYTE [DISTWIDTH] IN BLOOD BY AUTOMATED COUNT: 14.7 % (ref 10–15)
ERYTHROCYTE [DISTWIDTH] IN BLOOD BY AUTOMATED COUNT: 14.8 % (ref 10–15)
ERYTHROCYTE [DISTWIDTH] IN BLOOD BY AUTOMATED COUNT: 14.9 % (ref 10–15)
ERYTHROCYTE [DISTWIDTH] IN BLOOD BY AUTOMATED COUNT: 14.9 % (ref 10–15)
ERYTHROCYTE [DISTWIDTH] IN BLOOD BY AUTOMATED COUNT: 15 % (ref 10–15)
ERYTHROCYTE [DISTWIDTH] IN BLOOD BY AUTOMATED COUNT: 15 % (ref 10–15)
ERYTHROCYTE [DISTWIDTH] IN BLOOD BY AUTOMATED COUNT: 15.1 % (ref 10–15)
ERYTHROCYTE [DISTWIDTH] IN BLOOD BY AUTOMATED COUNT: 15.2 % (ref 10–15)
ERYTHROCYTE [DISTWIDTH] IN BLOOD BY AUTOMATED COUNT: 15.2 % (ref 10–15)
ERYTHROCYTE [DISTWIDTH] IN BLOOD BY AUTOMATED COUNT: 15.3 % (ref 10–15)
ERYTHROCYTE [DISTWIDTH] IN BLOOD BY AUTOMATED COUNT: 15.6 % (ref 11.9–15.5)
ERYTHROCYTE [DISTWIDTH] IN BLOOD BY AUTOMATED COUNT: 15.8 % (ref 10–15)
ERYTHROCYTE [DISTWIDTH] IN BLOOD BY AUTOMATED COUNT: 16.8 % (ref 10–15)
ERYTHROCYTE [DISTWIDTH] IN BLOOD BY AUTOMATED COUNT: 16.8 % (ref 10–15)
ERYTHROCYTE [DISTWIDTH] IN BLOOD BY AUTOMATED COUNT: 17 % (ref 10–15)
ERYTHROCYTE [DISTWIDTH] IN BLOOD BY AUTOMATED COUNT: 17 % (ref 10–15)
ERYTHROCYTE [DISTWIDTH] IN BLOOD BY AUTOMATED COUNT: 17.1 % (ref 10–15)
ERYTHROCYTE [DISTWIDTH] IN BLOOD BY AUTOMATED COUNT: 17.2 % (ref 10–15)
ERYTHROCYTE [DISTWIDTH] IN BLOOD BY AUTOMATED COUNT: 18.3 % (ref 10–15)
ERYTHROCYTE [DISTWIDTH] IN BLOOD BY AUTOMATED COUNT: 18.5 % (ref 10–15)
ERYTHROCYTE [DISTWIDTH] IN BLOOD BY AUTOMATED COUNT: 18.5 % (ref 10–15)
ERYTHROCYTE [DISTWIDTH] IN BLOOD BY AUTOMATED COUNT: 19 % (ref 10–15)
ERYTHROCYTE [DISTWIDTH] IN BLOOD BY AUTOMATED COUNT: 19.9 % (ref 10–15)
ERYTHROCYTE [DISTWIDTH] IN BLOOD BY AUTOMATED COUNT: 20.3 % (ref 10–15)
ERYTHROCYTE [DISTWIDTH] IN BLOOD BY AUTOMATED COUNT: 20.3 % (ref 10–15)
ERYTHROCYTE [DISTWIDTH] IN BLOOD BY AUTOMATED COUNT: 20.4 % (ref 10–15)
ERYTHROCYTE [DISTWIDTH] IN BLOOD BY AUTOMATED COUNT: 20.6 % (ref 10–15)
ERYTHROCYTE [DISTWIDTH] IN BLOOD BY AUTOMATED COUNT: 20.7 % (ref 10–15)
ERYTHROCYTE [DISTWIDTH] IN BLOOD BY AUTOMATED COUNT: 20.9 % (ref 10–15)
ERYTHROCYTE [DISTWIDTH] IN BLOOD BY AUTOMATED COUNT: 21.2 % (ref 10–15)
ERYTHROCYTE [DISTWIDTH] IN BLOOD BY AUTOMATED COUNT: 21.3 % (ref 10–15)
ERYTHROCYTE [DISTWIDTH] IN BLOOD BY AUTOMATED COUNT: 21.4 % (ref 10–15)
ERYTHROCYTE [DISTWIDTH] IN BLOOD BY AUTOMATED COUNT: 21.8 % (ref 10–15)
ERYTHROCYTE [DISTWIDTH] IN BLOOD BY AUTOMATED COUNT: NORMAL % (ref 10–15)
FIBRINOGEN PPP-MCNC: 118 MG/DL (ref 200–420)
FIBRINOGEN PPP-MCNC: 139 MG/DL (ref 200–420)
FOLATE SERPL-MCNC: 29.4 NG/ML
GFR SERPL CREATININE-BSD FRML MDRD: 47 ML/MIN/{1.73_M2}
GFR SERPL CREATININE-BSD FRML MDRD: 61 ML/MIN/{1.73_M2}
GFR SERPL CREATININE-BSD FRML MDRD: 74 ML/MIN/{1.73_M2}
GFR SERPL CREATININE-BSD FRML MDRD: 83 ML/MIN/{1.73_M2}
GFR SERPL CREATININE-BSD FRML MDRD: 89 ML/MIN/{1.73_M2}
GFR SERPL CREATININE-BSD FRML MDRD: 89 ML/MIN/{1.73_M2}
GFR SERPL CREATININE-BSD FRML MDRD: 90 ML/MIN/{1.73_M2}
GFR SERPL CREATININE-BSD FRML MDRD: 90 ML/MIN/{1.73_M2}
GFR SERPL CREATININE-BSD FRML MDRD: >60 ML/MIN/1.73M2
GFR SERPL CREATININE-BSD FRML MDRD: >90 ML/MIN/1.73M2
GFR SERPL CREATININE-BSD FRML MDRD: >90 ML/MIN/{1.73_M2}
GFR SERPL CREATININE-BSD FRML MDRD: NORMAL ML/MIN/{1.73_M2}
GLUCOSE BLD-MCNC: 106 MG/DL (ref 70–99)
GLUCOSE BLD-MCNC: 137 MG/DL (ref 70–99)
GLUCOSE BLD-MCNC: 79 MG/DL (ref 70–99)
GLUCOSE BLD-MCNC: 91 MG/DL (ref 70–99)
GLUCOSE BLDC GLUCOMTR-MCNC: 100 MG/DL (ref 70–99)
GLUCOSE BLDC GLUCOMTR-MCNC: 101 MG/DL (ref 70–99)
GLUCOSE BLDC GLUCOMTR-MCNC: 105 MG/DL (ref 70–99)
GLUCOSE BLDC GLUCOMTR-MCNC: 107 MG/DL (ref 70–99)
GLUCOSE BLDC GLUCOMTR-MCNC: 107 MG/DL (ref 70–99)
GLUCOSE BLDC GLUCOMTR-MCNC: 109 MG/DL (ref 70–99)
GLUCOSE BLDC GLUCOMTR-MCNC: 109 MG/DL (ref 70–99)
GLUCOSE BLDC GLUCOMTR-MCNC: 110 MG/DL (ref 70–99)
GLUCOSE BLDC GLUCOMTR-MCNC: 111 MG/DL (ref 70–99)
GLUCOSE BLDC GLUCOMTR-MCNC: 112 MG/DL (ref 70–99)
GLUCOSE BLDC GLUCOMTR-MCNC: 114 MG/DL (ref 70–99)
GLUCOSE BLDC GLUCOMTR-MCNC: 114 MG/DL (ref 70–99)
GLUCOSE BLDC GLUCOMTR-MCNC: 115 MG/DL (ref 70–99)
GLUCOSE BLDC GLUCOMTR-MCNC: 116 MG/DL (ref 70–99)
GLUCOSE BLDC GLUCOMTR-MCNC: 117 MG/DL (ref 70–99)
GLUCOSE BLDC GLUCOMTR-MCNC: 118 MG/DL (ref 70–99)
GLUCOSE BLDC GLUCOMTR-MCNC: 119 MG/DL (ref 70–99)
GLUCOSE BLDC GLUCOMTR-MCNC: 120 MG/DL (ref 70–99)
GLUCOSE BLDC GLUCOMTR-MCNC: 123 MG/DL (ref 70–99)
GLUCOSE BLDC GLUCOMTR-MCNC: 128 MG/DL (ref 70–99)
GLUCOSE BLDC GLUCOMTR-MCNC: 129 MG/DL (ref 70–99)
GLUCOSE BLDC GLUCOMTR-MCNC: 130 MG/DL (ref 70–99)
GLUCOSE BLDC GLUCOMTR-MCNC: 132 MG/DL (ref 70–99)
GLUCOSE BLDC GLUCOMTR-MCNC: 137 MG/DL (ref 70–99)
GLUCOSE BLDC GLUCOMTR-MCNC: 137 MG/DL (ref 70–99)
GLUCOSE BLDC GLUCOMTR-MCNC: 142 MG/DL (ref 70–99)
GLUCOSE BLDC GLUCOMTR-MCNC: 144 MG/DL (ref 70–99)
GLUCOSE BLDC GLUCOMTR-MCNC: 157 MG/DL (ref 70–99)
GLUCOSE BLDC GLUCOMTR-MCNC: 181 MG/DL (ref 70–99)
GLUCOSE BLDC GLUCOMTR-MCNC: 195 MG/DL (ref 70–99)
GLUCOSE BLDC GLUCOMTR-MCNC: 229 MG/DL (ref 70–99)
GLUCOSE BLDC GLUCOMTR-MCNC: 67 MG/DL (ref 70–99)
GLUCOSE BLDC GLUCOMTR-MCNC: 74 MG/DL (ref 70–99)
GLUCOSE BLDC GLUCOMTR-MCNC: 81 MG/DL (ref 70–99)
GLUCOSE BLDC GLUCOMTR-MCNC: 82 MG/DL (ref 70–99)
GLUCOSE BLDC GLUCOMTR-MCNC: 85 MG/DL (ref 70–99)
GLUCOSE BLDC GLUCOMTR-MCNC: 86 MG/DL (ref 70–99)
GLUCOSE BLDC GLUCOMTR-MCNC: 86 MG/DL (ref 70–99)
GLUCOSE BLDC GLUCOMTR-MCNC: 88 MG/DL (ref 70–99)
GLUCOSE BLDC GLUCOMTR-MCNC: 90 MG/DL (ref 70–99)
GLUCOSE BLDC GLUCOMTR-MCNC: 91 MG/DL (ref 70–99)
GLUCOSE BLDC GLUCOMTR-MCNC: 91 MG/DL (ref 70–99)
GLUCOSE BLDC GLUCOMTR-MCNC: 92 MG/DL (ref 70–99)
GLUCOSE BLDC GLUCOMTR-MCNC: 95 MG/DL (ref 70–99)
GLUCOSE BLDC GLUCOMTR-MCNC: 96 MG/DL (ref 70–99)
GLUCOSE BLDC GLUCOMTR-MCNC: 98 MG/DL (ref 70–99)
GLUCOSE SERPL-MCNC: 100 MG/DL (ref 70–99)
GLUCOSE SERPL-MCNC: 100 MG/DL (ref 70–99)
GLUCOSE SERPL-MCNC: 102 MG/DL (ref 70–99)
GLUCOSE SERPL-MCNC: 102 MG/DL (ref 70–99)
GLUCOSE SERPL-MCNC: 104 MG/DL (ref 70–99)
GLUCOSE SERPL-MCNC: 110 MG/DL (ref 70–99)
GLUCOSE SERPL-MCNC: 111 MG/DL (ref 70–99)
GLUCOSE SERPL-MCNC: 118 MG/DL (ref 70–99)
GLUCOSE SERPL-MCNC: 120 MG/DL (ref 70–99)
GLUCOSE SERPL-MCNC: 124 MG/DL (ref 70–99)
GLUCOSE SERPL-MCNC: 135 MG/DL (ref 70–99)
GLUCOSE SERPL-MCNC: 139 MG/DL (ref 70–99)
GLUCOSE SERPL-MCNC: 142 MG/DL (ref 70–99)
GLUCOSE SERPL-MCNC: 231 MG/DL (ref 70–99)
GLUCOSE SERPL-MCNC: 66 MG/DL (ref 70–99)
GLUCOSE SERPL-MCNC: 69 MG/DL (ref 70–99)
GLUCOSE SERPL-MCNC: 70 MG/DL (ref 70–99)
GLUCOSE SERPL-MCNC: 71 MG/DL (ref 70–99)
GLUCOSE SERPL-MCNC: 72 MG/DL (ref 70–100)
GLUCOSE SERPL-MCNC: 72 MG/DL (ref 70–99)
GLUCOSE SERPL-MCNC: 75 MG/DL (ref 70–99)
GLUCOSE SERPL-MCNC: 75 MG/DL (ref 70–99)
GLUCOSE SERPL-MCNC: 76 MG/DL (ref 70–99)
GLUCOSE SERPL-MCNC: 76 MG/DL (ref 70–99)
GLUCOSE SERPL-MCNC: 77 MG/DL (ref 70–99)
GLUCOSE SERPL-MCNC: 79 MG/DL (ref 70–99)
GLUCOSE SERPL-MCNC: 80 MG/DL (ref 70–99)
GLUCOSE SERPL-MCNC: 81 MG/DL (ref 70–99)
GLUCOSE SERPL-MCNC: 82 MG/DL (ref 70–99)
GLUCOSE SERPL-MCNC: 85 MG/DL (ref 70–99)
GLUCOSE SERPL-MCNC: 85 MG/DL (ref 70–99)
GLUCOSE SERPL-MCNC: 87 MG/DL (ref 70–99)
GLUCOSE SERPL-MCNC: 88 MG/DL (ref 70–99)
GLUCOSE SERPL-MCNC: 88 MG/DL (ref 70–99)
GLUCOSE SERPL-MCNC: 90 MG/DL (ref 70–99)
GLUCOSE SERPL-MCNC: 91 MG/DL (ref 70–99)
GLUCOSE SERPL-MCNC: 93 MG/DL (ref 70–99)
GLUCOSE SERPL-MCNC: 97 MG/DL (ref 70–99)
GLUCOSE SERPL-MCNC: 98 MG/DL (ref 70–99)
GLUCOSE SERPL-MCNC: NORMAL MG/DL (ref 70–99)
GLUCOSE UR STRIP-MCNC: NEGATIVE MG/DL
GRAM STN SPEC: ABNORMAL
GRAM STN SPEC: ABNORMAL
HCO3 BLD-SCNC: 30 MMOL/L (ref 21–28)
HCO3 BLD-SCNC: 31 MMOL/L (ref 21–28)
HCO3 BLD-SCNC: 32 MMOL/L (ref 21–28)
HCO3 BLD-SCNC: 34 MMOL/L (ref 21–28)
HCO3 BLD-SCNC: 37 MMOL/L (ref 21–28)
HCO3 BLD-SCNC: 38 MMOL/L (ref 21–28)
HCO3 BLD-SCNC: 39 MMOL/L (ref 21–28)
HCO3 BLD-SCNC: 41 MMOL/L (ref 21–28)
HCO3 BLD-SCNC: 42 MMOL/L (ref 21–28)
HCO3 BLD-SCNC: 47 MMOL/L (ref 21–28)
HCO3 BLD-SCNC: 50 MMOL/L (ref 21–28)
HCO3 BLD-SCNC: 50 MMOL/L (ref 21–28)
HCO3 BLD-SCNC: 52 MMOL/L (ref 21–28)
HCO3 BLD-SCNC: 53 MMOL/L (ref 21–28)
HCO3 BLD-SCNC: 54 MMOL/L (ref 21–28)
HCO3 BLD-SCNC: 55 MMOL/L (ref 21–28)
HCO3 BLD-SCNC: 56 MMOL/L (ref 21–28)
HCO3 BLDV-SCNC: 34 MMOL/L (ref 21–28)
HCO3 BLDV-SCNC: 36 MMOL/L (ref 21–28)
HCO3 BLDV-SCNC: 38 MMOL/L (ref 21–28)
HCO3 BLDV-SCNC: 45 MMOL/L (ref 21–28)
HCO3 BLDV-SCNC: 47 MMOL/L (ref 21–28)
HCO3 BLDV-SCNC: 47 MMOL/L (ref 21–28)
HCO3 BLDV-SCNC: 48 MMOL/L (ref 21–28)
HCO3 BLDV-SCNC: 49 MMOL/L (ref 21–28)
HCO3 BLDV-SCNC: 49 MMOL/L (ref 21–28)
HCO3 BLDV-SCNC: 50 MMOL/L (ref 21–28)
HCO3 BLDV-SCNC: 50 MMOL/L (ref 21–28)
HCO3 BLDV-SCNC: 52 MMOL/L (ref 21–28)
HCO3 BLDV-SCNC: 52 MMOL/L (ref 21–28)
HCO3 BLDV-SCNC: 54 MMOL/L (ref 21–28)
HCO3 BLDV-SCNC: 58 MMOL/L (ref 21–28)
HCO3 BLDV-SCNC: 58 MMOL/L (ref 21–28)
HCO3 BLDV-SCNC: 59 MMOL/L (ref 21–28)
HCO3 BLDV-SCNC: 59 MMOL/L (ref 21–28)
HCO3 BLDV-SCNC: 60 MMOL/L (ref 21–28)
HCT VFR BLD AUTO: 31.8 % (ref 40–53)
HCT VFR BLD AUTO: 34.2 % (ref 40–53)
HCT VFR BLD AUTO: 35.2 % (ref 40–53)
HCT VFR BLD AUTO: 35.6 % (ref 40–53)
HCT VFR BLD AUTO: 35.9 % (ref 40–53)
HCT VFR BLD AUTO: 36 % (ref 40–53)
HCT VFR BLD AUTO: 36 % (ref 40–53)
HCT VFR BLD AUTO: 36.1 % (ref 40–53)
HCT VFR BLD AUTO: 36.2 % (ref 40–53)
HCT VFR BLD AUTO: 36.5 % (ref 40–53)
HCT VFR BLD AUTO: 36.7 % (ref 40–53)
HCT VFR BLD AUTO: 37 % (ref 40–53)
HCT VFR BLD AUTO: 37.2 % (ref 40–53)
HCT VFR BLD AUTO: 37.2 % (ref 40–53)
HCT VFR BLD AUTO: 37.5 % (ref 40–53)
HCT VFR BLD AUTO: 37.5 % (ref 40–53)
HCT VFR BLD AUTO: 37.8 % (ref 40–53)
HCT VFR BLD AUTO: 37.9 % (ref 40–53)
HCT VFR BLD AUTO: 37.9 % (ref 40–53)
HCT VFR BLD AUTO: 38.1 % (ref 40–53)
HCT VFR BLD AUTO: 38.5 % (ref 40–53)
HCT VFR BLD AUTO: 38.6 % (ref 40–53)
HCT VFR BLD AUTO: 39 % (ref 40–53)
HCT VFR BLD AUTO: 39 % (ref 40–53)
HCT VFR BLD AUTO: 39.2 % (ref 40–53)
HCT VFR BLD AUTO: 39.7 % (ref 40–53)
HCT VFR BLD AUTO: 39.8 % (ref 40–53)
HCT VFR BLD AUTO: 39.9 % (ref 40–53)
HCT VFR BLD AUTO: 39.9 % (ref 40–53)
HCT VFR BLD AUTO: 40.9 % (ref 40–53)
HCT VFR BLD AUTO: 41 % (ref 40–53)
HCT VFR BLD AUTO: 41 % (ref 40–53)
HCT VFR BLD AUTO: 41.7 % (ref 40–53)
HCT VFR BLD AUTO: 41.8 % (ref 40–53)
HCT VFR BLD AUTO: 42 % (ref 40–53)
HCT VFR BLD AUTO: 42.6 % (ref 40–53)
HCT VFR BLD AUTO: 42.7 % (ref 40–53)
HCT VFR BLD AUTO: 42.7 % (ref 40–53)
HCT VFR BLD AUTO: 43.2 % (ref 40–53)
HCT VFR BLD AUTO: 43.7 % (ref 40–53)
HCT VFR BLD AUTO: 43.8 % (ref 40–53)
HCT VFR BLD AUTO: 44.9 % (ref 40–53)
HCT VFR BLD AUTO: 45 % (ref 40–53)
HCT VFR BLD AUTO: 45 % (ref 40–53)
HCT VFR BLD AUTO: 45.7 % (ref 40–53)
HCT VFR BLD AUTO: 46.3 % (ref 38.8–50)
HCT VFR BLD AUTO: 47 % (ref 40–53)
HCT VFR BLD AUTO: 48.3 % (ref 40–53)
HCT VFR BLD AUTO: 48.7 % (ref 40–53)
HCT VFR BLD AUTO: NORMAL % (ref 40–53)
HCT VFR BLD CALC: 42 % (ref 40–53)
HEMOGLOBIN: 12.6 G/DL (ref 13.5–17.5)
HGB BLD-MCNC: 10.2 G/DL (ref 13.3–17.7)
HGB BLD-MCNC: 10.4 G/DL (ref 13.3–17.7)
HGB BLD-MCNC: 10.5 G/DL (ref 13.3–17.7)
HGB BLD-MCNC: 10.5 G/DL (ref 13.3–17.7)
HGB BLD-MCNC: 10.7 G/DL (ref 13.3–17.7)
HGB BLD-MCNC: 10.8 G/DL (ref 13.3–17.7)
HGB BLD-MCNC: 10.9 G/DL (ref 13.3–17.7)
HGB BLD-MCNC: 11 G/DL (ref 13.3–17.7)
HGB BLD-MCNC: 11.2 G/DL (ref 13.3–17.7)
HGB BLD-MCNC: 11.3 G/DL (ref 13.3–17.7)
HGB BLD-MCNC: 11.6 G/DL (ref 13.3–17.7)
HGB BLD-MCNC: 11.7 G/DL (ref 13.3–17.7)
HGB BLD-MCNC: 11.8 G/DL (ref 13.3–17.7)
HGB BLD-MCNC: 11.9 G/DL (ref 13.3–17.7)
HGB BLD-MCNC: 12 G/DL (ref 13.3–17.7)
HGB BLD-MCNC: 12 G/DL (ref 13.3–17.7)
HGB BLD-MCNC: 12.1 G/DL (ref 13.3–17.7)
HGB BLD-MCNC: 12.1 G/DL (ref 13.3–17.7)
HGB BLD-MCNC: 12.2 G/DL (ref 13.3–17.7)
HGB BLD-MCNC: 12.2 G/DL (ref 13.3–17.7)
HGB BLD-MCNC: 12.3 G/DL (ref 13.3–17.7)
HGB BLD-MCNC: 12.5 G/DL (ref 13.3–17.7)
HGB BLD-MCNC: 12.5 G/DL (ref 13.3–17.7)
HGB BLD-MCNC: 12.6 G/DL (ref 13.3–17.7)
HGB BLD-MCNC: 12.8 G/DL (ref 13.3–17.7)
HGB BLD-MCNC: 12.8 G/DL (ref 13.3–17.7)
HGB BLD-MCNC: 13 G/DL (ref 13.3–17.7)
HGB BLD-MCNC: 13.1 G/DL (ref 13.3–17.7)
HGB BLD-MCNC: 13.1 G/DL (ref 13.3–17.7)
HGB BLD-MCNC: 13.2 G/DL (ref 13.3–17.7)
HGB BLD-MCNC: 13.2 G/DL (ref 13.3–17.7)
HGB BLD-MCNC: 13.4 G/DL (ref 13.3–17.7)
HGB BLD-MCNC: 13.4 G/DL (ref 13.3–17.7)
HGB BLD-MCNC: 14.3 G/DL (ref 13.3–17.7)
HGB BLD-MCNC: 9.8 G/DL (ref 13.3–17.7)
HGB BLD-MCNC: NORMAL G/DL (ref 13.3–17.7)
HGB UR QL STRIP: ABNORMAL
HGB UR QL STRIP: NEGATIVE
HOLD SPECIMEN: NORMAL
HYALINE CASTS #/AREA URNS LPF: 3 /LPF (ref 0–2)
IMM GRANULOCYTES # BLD: 0 10E3/UL
IMM GRANULOCYTES # BLD: 0 10E9/L (ref 0–0.4)
IMM GRANULOCYTES # BLD: 0 10E9/L (ref 0–0.4)
IMM GRANULOCYTES # BLD: 0.1 10E3/UL
IMM GRANULOCYTES # BLD: 0.1 10E9/L (ref 0–0.4)
IMM GRANULOCYTES NFR BLD: 0.3 %
IMM GRANULOCYTES NFR BLD: 0.7 %
IMM GRANULOCYTES NFR BLD: 1 %
IMM GRANULOCYTES NFR BLD: 1 %
IMM GRANULOCYTES NFR BLD: 1.1 %
IMM GRANULOCYTES NFR BLD: 1.2 %
IMM GRANULOCYTES NFR BLD: 1.2 %
INR PPP: 1.07 (ref 0.85–1.15)
INR PPP: 1.1 (ref 0.86–1.14)
INR PPP: 1.2 (ref 0.86–1.14)
INTERPRETATION ECG - MUSE: NORMAL
KETONES UR STRIP-MCNC: 5 MG/DL
KETONES UR STRIP-MCNC: NEGATIVE MG/DL
LABORATORY COMMENT REPORT: NORMAL
LACTATE BLD-SCNC: 0.7 MMOL/L (ref 0.7–2)
LACTATE BLD-SCNC: 0.7 MMOL/L (ref 0.7–2.1)
LACTATE BLD-SCNC: 0.8 MMOL/L (ref 0.7–2)
LACTATE BLD-SCNC: 1 MMOL/L (ref 0.7–2.1)
LACTATE BLD-SCNC: 1.2 MMOL/L (ref 0.7–2.1)
LACTATE BLD-SCNC: 1.4 MMOL/L (ref 0.7–2)
LACTATE BLD-SCNC: 1.4 MMOL/L (ref 0.7–2)
LACTATE SERPL-SCNC: 1 MMOL/L (ref 0.7–2)
LEUKOCYTE ESTERASE UR QL STRIP: ABNORMAL
LEUKOCYTE ESTERASE UR QL STRIP: ABNORMAL
LEUKOCYTE ESTERASE UR QL STRIP: NEGATIVE
LEUKOCYTE ESTERASE UR QL STRIP: NEGATIVE
LIPASE SERPL-CCNC: 85 U/L (ref 73–393)
LYMPHOCYTES # BLD AUTO: 0.3 10E9/L (ref 0.8–5.3)
LYMPHOCYTES # BLD AUTO: 0.7 10E9/L (ref 0.8–5.3)
LYMPHOCYTES # BLD AUTO: 0.8 10E3/UL (ref 0.8–5.3)
LYMPHOCYTES # BLD AUTO: 0.9 10E9/L (ref 0.8–5.3)
LYMPHOCYTES # BLD AUTO: 0.9 10E9/L (ref 0.8–5.3)
LYMPHOCYTES # BLD AUTO: 1.5 10E3/UL (ref 0.8–5.3)
LYMPHOCYTES # BLD AUTO: 1.7 10E9/L (ref 0.8–5.3)
LYMPHOCYTES NFR BLD AUTO: 10.4 %
LYMPHOCYTES NFR BLD AUTO: 12 %
LYMPHOCYTES NFR BLD AUTO: 12.5 %
LYMPHOCYTES NFR BLD AUTO: 19.6 %
LYMPHOCYTES NFR BLD AUTO: 22 %
LYMPHOCYTES NFR BLD AUTO: 23.4 %
LYMPHOCYTES NFR BLD AUTO: 4.4 %
Lab: ABNORMAL
Lab: NORMAL
Lab: NORMAL
MAGNESIUM SERPL-MCNC: 1.7 MG/DL (ref 1.6–2.3)
MAGNESIUM SERPL-MCNC: 1.8 MG/DL (ref 1.6–2.3)
MAGNESIUM SERPL-MCNC: 1.9 MG/DL (ref 1.6–2.3)
MAGNESIUM SERPL-MCNC: 2 MG/DL (ref 1.6–2.3)
MAGNESIUM SERPL-MCNC: 2.1 MG/DL (ref 1.6–2.3)
MAGNESIUM SERPL-MCNC: 2.2 MG/DL (ref 1.6–2.3)
MAGNESIUM SERPL-MCNC: 2.2 MG/DL (ref 1.6–2.3)
MAGNESIUM SERPL-MCNC: 2.4 MG/DL (ref 1.6–2.3)
MAGNESIUM SERPL-MCNC: 2.4 MG/DL (ref 1.6–2.3)
MCH RBC QN AUTO: 25.6 PG (ref 26.5–33)
MCH RBC QN AUTO: 25.8 PG (ref 26.5–33)
MCH RBC QN AUTO: 25.9 PG (ref 26.5–33)
MCH RBC QN AUTO: 26 PG (ref 26.5–33)
MCH RBC QN AUTO: 26.1 PG (ref 26.5–33)
MCH RBC QN AUTO: 26.1 PG (ref 26.5–33)
MCH RBC QN AUTO: 26.2 PG (ref 26.5–33)
MCH RBC QN AUTO: 26.2 PG (ref 26.5–33)
MCH RBC QN AUTO: 26.3 PG (ref 26.5–33)
MCH RBC QN AUTO: 26.4 PG (ref 26.5–33)
MCH RBC QN AUTO: 26.5 PG (ref 26.5–33)
MCH RBC QN AUTO: 26.6 PG (ref 26.5–33)
MCH RBC QN AUTO: 26.6 PG (ref 26.5–33)
MCH RBC QN AUTO: 26.6 PG (ref 27.6–33.3)
MCH RBC QN AUTO: 26.7 PG (ref 26.5–33)
MCH RBC QN AUTO: 26.8 PG (ref 26.5–33)
MCH RBC QN AUTO: 26.9 PG (ref 26.5–33)
MCH RBC QN AUTO: 27 PG (ref 26.5–33)
MCH RBC QN AUTO: 27.1 PG (ref 26.5–33)
MCH RBC QN AUTO: 27.1 PG (ref 26.5–33)
MCH RBC QN AUTO: 27.2 PG (ref 26.5–33)
MCH RBC QN AUTO: 27.3 PG (ref 26.5–33)
MCH RBC QN AUTO: 27.4 PG (ref 26.5–33)
MCH RBC QN AUTO: 27.4 PG (ref 26.5–33)
MCH RBC QN AUTO: 30.3 PG (ref 26.5–33)
MCH RBC QN AUTO: 30.4 PG (ref 26.5–33)
MCH RBC QN AUTO: 30.8 PG (ref 26.5–33)
MCH RBC QN AUTO: 30.9 PG (ref 26.5–33)
MCH RBC QN AUTO: 31.1 PG (ref 26.5–33)
MCH RBC QN AUTO: 31.2 PG (ref 26.5–33)
MCH RBC QN AUTO: 31.2 PG (ref 26.5–33)
MCH RBC QN AUTO: 31.3 PG (ref 26.5–33)
MCH RBC QN AUTO: 31.4 PG (ref 26.5–33)
MCH RBC QN AUTO: 31.9 PG (ref 26.5–33)
MCH RBC QN AUTO: NORMAL PG (ref 26.5–33)
MCHC RBC AUTO-ENTMCNC: 26.9 G/DL (ref 31.5–36.5)
MCHC RBC AUTO-ENTMCNC: 27.2 G/DL (ref 31.5–35.2)
MCHC RBC AUTO-ENTMCNC: 27.4 G/DL (ref 31.5–36.5)
MCHC RBC AUTO-ENTMCNC: 27.7 G/DL (ref 31.5–36.5)
MCHC RBC AUTO-ENTMCNC: 27.9 G/DL (ref 31.5–36.5)
MCHC RBC AUTO-ENTMCNC: 27.9 G/DL (ref 31.5–36.5)
MCHC RBC AUTO-ENTMCNC: 28.1 G/DL (ref 31.5–36.5)
MCHC RBC AUTO-ENTMCNC: 28.4 G/DL (ref 31.5–36.5)
MCHC RBC AUTO-ENTMCNC: 28.4 G/DL (ref 31.5–36.5)
MCHC RBC AUTO-ENTMCNC: 28.5 G/DL (ref 31.5–36.5)
MCHC RBC AUTO-ENTMCNC: 28.5 G/DL (ref 31.5–36.5)
MCHC RBC AUTO-ENTMCNC: 28.7 G/DL (ref 31.5–36.5)
MCHC RBC AUTO-ENTMCNC: 28.7 G/DL (ref 31.5–36.5)
MCHC RBC AUTO-ENTMCNC: 28.9 G/DL (ref 31.5–36.5)
MCHC RBC AUTO-ENTMCNC: 29 G/DL (ref 31.5–36.5)
MCHC RBC AUTO-ENTMCNC: 29.1 G/DL (ref 31.5–36.5)
MCHC RBC AUTO-ENTMCNC: 29.3 G/DL (ref 31.5–36.5)
MCHC RBC AUTO-ENTMCNC: 29.5 G/DL (ref 31.5–36.5)
MCHC RBC AUTO-ENTMCNC: 29.6 G/DL (ref 31.5–36.5)
MCHC RBC AUTO-ENTMCNC: 29.7 G/DL (ref 31.5–36.5)
MCHC RBC AUTO-ENTMCNC: 29.8 G/DL (ref 31.5–36.5)
MCHC RBC AUTO-ENTMCNC: 29.9 G/DL (ref 31.5–36.5)
MCHC RBC AUTO-ENTMCNC: 30 G/DL (ref 31.5–36.5)
MCHC RBC AUTO-ENTMCNC: 30 G/DL (ref 31.5–36.5)
MCHC RBC AUTO-ENTMCNC: 30.1 G/DL (ref 31.5–36.5)
MCHC RBC AUTO-ENTMCNC: 30.1 G/DL (ref 31.5–36.5)
MCHC RBC AUTO-ENTMCNC: 30.3 G/DL (ref 31.5–36.5)
MCHC RBC AUTO-ENTMCNC: 30.4 G/DL (ref 31.5–36.5)
MCHC RBC AUTO-ENTMCNC: 30.4 G/DL (ref 31.5–36.5)
MCHC RBC AUTO-ENTMCNC: 30.5 G/DL (ref 31.5–36.5)
MCHC RBC AUTO-ENTMCNC: 30.6 G/DL (ref 31.5–36.5)
MCHC RBC AUTO-ENTMCNC: 30.7 G/DL (ref 31.5–36.5)
MCHC RBC AUTO-ENTMCNC: 30.8 G/DL (ref 31.5–36.5)
MCHC RBC AUTO-ENTMCNC: 31 G/DL (ref 31.5–36.5)
MCHC RBC AUTO-ENTMCNC: 31.1 G/DL (ref 31.5–36.5)
MCHC RBC AUTO-ENTMCNC: 31.2 G/DL (ref 31.5–36.5)
MCHC RBC AUTO-ENTMCNC: 31.3 G/DL (ref 31.5–36.5)
MCHC RBC AUTO-ENTMCNC: 31.3 G/DL (ref 31.5–36.5)
MCHC RBC AUTO-ENTMCNC: NORMAL G/DL (ref 31.5–36.5)
MCV RBC AUTO: 101 FL (ref 78–100)
MCV RBC AUTO: 102 FL (ref 78–100)
MCV RBC AUTO: 103 FL (ref 78–100)
MCV RBC AUTO: 105 FL (ref 78–100)
MCV RBC AUTO: 83 FL (ref 78–100)
MCV RBC AUTO: 84 FL (ref 78–100)
MCV RBC AUTO: 85 FL (ref 78–100)
MCV RBC AUTO: 86 FL (ref 78–100)
MCV RBC AUTO: 86 FL (ref 78–100)
MCV RBC AUTO: 87 FL (ref 78–100)
MCV RBC AUTO: 88 FL (ref 78–100)
MCV RBC AUTO: 89 FL (ref 78–100)
MCV RBC AUTO: 90 FL (ref 78–100)
MCV RBC AUTO: 91 FL (ref 78–100)
MCV RBC AUTO: 92 FL (ref 78–100)
MCV RBC AUTO: 93 FL (ref 78–100)
MCV RBC AUTO: 94 FL (ref 78–100)
MCV RBC AUTO: 95 FL (ref 78–100)
MCV RBC AUTO: 96 FL (ref 78–100)
MCV RBC AUTO: 97 FL (ref 78–100)
MCV RBC AUTO: 97.9 FL (ref 80–100)
MCV RBC AUTO: 99 FL (ref 78–100)
MCV RBC AUTO: NORMAL FL (ref 78–100)
MONOCYTES # BLD AUTO: 0.2 10E9/L (ref 0–1.3)
MONOCYTES # BLD AUTO: 0.3 10E9/L (ref 0–1.3)
MONOCYTES # BLD AUTO: 0.5 10E9/L (ref 0–1.3)
MONOCYTES # BLD AUTO: 0.6 10E9/L (ref 0–1.3)
MONOCYTES # BLD AUTO: 0.7 10E3/UL (ref 0–1.3)
MONOCYTES # BLD AUTO: 0.8 10E3/UL (ref 0–1.3)
MONOCYTES # BLD AUTO: 0.8 10E9/L (ref 0–1.3)
MONOCYTES NFR BLD AUTO: 10.9 %
MONOCYTES NFR BLD AUTO: 11 %
MONOCYTES NFR BLD AUTO: 12 %
MONOCYTES NFR BLD AUTO: 3 %
MONOCYTES NFR BLD AUTO: 5.4 %
MONOCYTES NFR BLD AUTO: 7.6 %
MONOCYTES NFR BLD AUTO: 8.8 %
MUCOUS THREADS #/AREA URNS LPF: PRESENT /LPF
NEUTROPHILS # BLD AUTO: 3 10E9/L (ref 1.6–8.3)
NEUTROPHILS # BLD AUTO: 4.5 10E3/UL (ref 1.6–8.3)
NEUTROPHILS # BLD AUTO: 4.5 10E9/L (ref 1.6–8.3)
NEUTROPHILS # BLD AUTO: 5 10E3/UL (ref 1.6–8.3)
NEUTROPHILS # BLD AUTO: 5 10E9/L (ref 1.6–8.3)
NEUTROPHILS # BLD AUTO: 5.2 10E9/L (ref 1.6–8.3)
NEUTROPHILS # BLD AUTO: 6.9 10E9/L (ref 1.6–8.3)
NEUTROPHILS NFR BLD AUTO: 65 %
NEUTROPHILS NFR BLD AUTO: 67.6 %
NEUTROPHILS NFR BLD AUTO: 67.7 %
NEUTROPHILS NFR BLD AUTO: 75 %
NEUTROPHILS NFR BLD AUTO: 80.2 %
NEUTROPHILS NFR BLD AUTO: 80.8 %
NEUTROPHILS NFR BLD AUTO: 91.2 %
NITRATE UR QL: NEGATIVE
NITRATE UR QL: POSITIVE
NRBC # BLD AUTO: 0 10*3/UL
NRBC # BLD AUTO: 0 10E3/UL
NRBC # BLD AUTO: 0 10E3/UL
NRBC BLD AUTO-RTO: 0 /100
NT-PROBNP SERPL-MCNC: 1323 PG/ML (ref 0–1800)
NT-PROBNP SERPL-MCNC: 1639 PG/ML (ref 0–1800)
NT-PROBNP SERPL-MCNC: 3369 PG/ML (ref 0–1800)
NT-PROBNP SERPL-MCNC: 3996 PG/ML (ref 0–1800)
NT-PROBNP SERPL-MCNC: 741 PG/ML (ref 0–1800)
NT-PROBNP SERPL-MCNC: 862 PG/ML (ref 0–1800)
NT-PROBNP SERPL-MCNC: 938 PG/ML (ref 0–1800)
O2/TOTAL GAS SETTING VFR VENT: 100 %
O2/TOTAL GAS SETTING VFR VENT: 2 %
O2/TOTAL GAS SETTING VFR VENT: 30 %
O2/TOTAL GAS SETTING VFR VENT: ABNORMAL %
OXYHGB MFR BLD: 100 % (ref 92–100)
OXYHGB MFR BLD: 78 % (ref 92–100)
OXYHGB MFR BLD: 95 % (ref 92–100)
OXYHGB MFR BLD: 95 % (ref 92–100)
OXYHGB MFR BLD: 96 % (ref 92–100)
OXYHGB MFR BLD: 97 % (ref 92–100)
OXYHGB MFR BLD: 98 % (ref 92–100)
OXYHGB MFR BLD: 99 % (ref 92–100)
OXYHGB MFR BLDV: 25 %
OXYHGB MFR BLDV: 26 %
OXYHGB MFR BLDV: 28 %
OXYHGB MFR BLDV: 38 %
OXYHGB MFR BLDV: 39 %
OXYHGB MFR BLDV: 40 % (ref 70–75)
OXYHGB MFR BLDV: 51 %
OXYHGB MFR BLDV: 57 %
OXYHGB MFR BLDV: 61 %
OXYHGB MFR BLDV: 63 %
OXYHGB MFR BLDV: 64 %
OXYHGB MFR BLDV: 69 %
OXYHGB MFR BLDV: 74 %
OXYHGB MFR BLDV: 75 %
OXYHGB MFR BLDV: 77 %
OXYHGB MFR BLDV: 85 %
OXYHGB MFR BLDV: 85 %
OXYHGB MFR BLDV: 94 %
OXYHGB MFR BLDV: 95 %
OXYHGB MFR BLDV: 96 %
P AXIS - MUSE: 20 DEGREES
P AXIS - MUSE: 45 DEGREES
P AXIS - MUSE: NORMAL DEGREES
PCO2 BLD: 107 MM HG (ref 35–45)
PCO2 BLD: 116 MM HG (ref 35–45)
PCO2 BLD: 122 MM HG (ref 35–45)
PCO2 BLD: 36 MM HG (ref 35–45)
PCO2 BLD: 41 MM HG (ref 35–45)
PCO2 BLD: 44 MM HG (ref 35–45)
PCO2 BLD: 51 MM HG (ref 35–45)
PCO2 BLD: 51 MM HG (ref 35–45)
PCO2 BLD: 52 MM HG (ref 35–45)
PCO2 BLD: 54 MM HG (ref 35–45)
PCO2 BLD: 54 MM HG (ref 35–45)
PCO2 BLD: 58 MM HG (ref 35–45)
PCO2 BLD: 60 MM HG (ref 35–45)
PCO2 BLD: 60 MM HG (ref 35–45)
PCO2 BLD: 63 MM HG (ref 35–45)
PCO2 BLD: 64 MM HG (ref 35–45)
PCO2 BLD: 65 MM HG (ref 35–45)
PCO2 BLD: 72 MM HG (ref 35–45)
PCO2 BLD: 90 MM HG (ref 35–45)
PCO2 BLD: 94 MM HG (ref 35–45)
PCO2 BLD: 99 MM HG (ref 35–45)
PCO2 BLD: 99 MM HG (ref 35–45)
PCO2 BLDV: 103 MM HG (ref 40–50)
PCO2 BLDV: 104 MM HG (ref 40–50)
PCO2 BLDV: 108 MM HG (ref 40–50)
PCO2 BLDV: 114 MM HG (ref 40–50)
PCO2 BLDV: 125 MM HG (ref 40–50)
PCO2 BLDV: 127 MM HG (ref 40–50)
PCO2 BLDV: 128 MM HG (ref 40–50)
PCO2 BLDV: 44 MM HG (ref 40–50)
PCO2 BLDV: 51 MM HG (ref 40–50)
PCO2 BLDV: 63 MM HG (ref 40–50)
PCO2 BLDV: 64 MM HG (ref 40–50)
PCO2 BLDV: 66 MM HG (ref 40–50)
PCO2 BLDV: 66 MM HG (ref 40–50)
PCO2 BLDV: 82 MM HG (ref 40–50)
PCO2 BLDV: 83 MM HG (ref 40–50)
PCO2 BLDV: 85 MM HG (ref 40–50)
PCO2 BLDV: 87 MM HG (ref 40–50)
PCO2 BLDV: 87 MM HG (ref 40–50)
PCO2 BLDV: 88 MM HG (ref 40–50)
PCO2 BLDV: 91 MM HG (ref 40–50)
PCO2 BLDV: 93 MM HG (ref 40–50)
PCO2 BLDV: 93 MM HG (ref 40–50)
PCO2 BLDV: 98 MM HG (ref 40–50)
PH BLD: 7.26 PH (ref 7.35–7.45)
PH BLD: 7.28 PH (ref 7.35–7.45)
PH BLD: 7.29 PH (ref 7.35–7.45)
PH BLD: 7.3 PH (ref 7.35–7.45)
PH BLD: 7.34 PH (ref 7.35–7.45)
PH BLD: 7.35 [PH] (ref 7.35–7.45)
PH BLD: 7.35 [PH] (ref 7.35–7.45)
PH BLD: 7.37 PH (ref 7.35–7.45)
PH BLD: 7.37 PH (ref 7.35–7.45)
PH BLD: 7.38 PH (ref 7.35–7.45)
PH BLD: 7.41 PH (ref 7.35–7.45)
PH BLD: 7.41 PH (ref 7.35–7.45)
PH BLD: 7.42 PH (ref 7.35–7.45)
PH BLD: 7.43 PH (ref 7.35–7.45)
PH BLD: 7.46 PH (ref 7.35–7.45)
PH BLD: 7.54 PH (ref 7.35–7.45)
PH BLD: 7.54 PH (ref 7.35–7.45)
PH BLD: 7.59 PH (ref 7.35–7.45)
PH BLD: 7.6 PH (ref 7.35–7.45)
PH BLD: 7.6 PH (ref 7.35–7.45)
PH BLD: 7.62 PH (ref 7.35–7.45)
PH BLD: 7.7 PH (ref 7.35–7.45)
PH BLDV: 7.08 PH (ref 7.32–7.43)
PH BLDV: 7.27 PH (ref 7.32–7.43)
PH BLDV: 7.28 PH (ref 7.32–7.43)
PH BLDV: 7.29 [PH] (ref 7.32–7.43)
PH BLDV: 7.31 PH (ref 7.32–7.43)
PH BLDV: 7.31 [PH] (ref 7.32–7.43)
PH BLDV: 7.32 PH (ref 7.32–7.43)
PH BLDV: 7.33 PH (ref 7.32–7.43)
PH BLDV: 7.33 PH (ref 7.32–7.43)
PH BLDV: 7.35 PH (ref 7.32–7.43)
PH BLDV: 7.35 [PH] (ref 7.32–7.43)
PH BLDV: 7.36 PH (ref 7.32–7.43)
PH BLDV: 7.37 PH (ref 7.32–7.43)
PH BLDV: 7.39 PH (ref 7.32–7.43)
PH BLDV: 7.4 PH (ref 7.32–7.43)
PH BLDV: 7.43 PH (ref 7.32–7.43)
PH BLDV: 7.44 PH (ref 7.32–7.43)
PH BLDV: 7.45 PH (ref 7.32–7.43)
PH BLDV: 7.48 PH (ref 7.32–7.43)
PH BLDV: 7.54 PH (ref 7.32–7.43)
PH UR STRIP: 5.5 PH (ref 5–7)
PH UR STRIP: 7 PH (ref 5–7)
PHOSPHATE SERPL-MCNC: 1.4 MG/DL (ref 2.5–4.5)
PHOSPHATE SERPL-MCNC: 1.5 MG/DL (ref 2.5–4.5)
PHOSPHATE SERPL-MCNC: 1.8 MG/DL (ref 2.5–4.5)
PHOSPHATE SERPL-MCNC: 1.8 MG/DL (ref 2.5–4.5)
PHOSPHATE SERPL-MCNC: 1.9 MG/DL (ref 2.5–4.5)
PHOSPHATE SERPL-MCNC: 2 MG/DL (ref 2.5–4.5)
PHOSPHATE SERPL-MCNC: 2.2 MG/DL (ref 2.5–4.5)
PHOSPHATE SERPL-MCNC: 2.3 MG/DL (ref 2.5–4.5)
PHOSPHATE SERPL-MCNC: 2.4 MG/DL (ref 2.5–4.5)
PHOSPHATE SERPL-MCNC: 2.5 MG/DL (ref 2.5–4.5)
PHOSPHATE SERPL-MCNC: 2.6 MG/DL (ref 2.5–4.5)
PHOSPHATE SERPL-MCNC: 2.6 MG/DL (ref 2.5–4.5)
PHOSPHATE SERPL-MCNC: 2.7 MG/DL (ref 2.5–4.5)
PHOSPHATE SERPL-MCNC: 3 MG/DL (ref 2.5–4.5)
PHOSPHATE SERPL-MCNC: 3.1 MG/DL (ref 2.5–4.5)
PHOSPHATE SERPL-MCNC: 3.1 MG/DL (ref 2.5–4.5)
PHOSPHATE SERPL-MCNC: 3.2 MG/DL (ref 2.5–4.5)
PHOSPHATE SERPL-MCNC: 3.3 MG/DL (ref 2.5–4.5)
PHOSPHATE SERPL-MCNC: 3.4 MG/DL (ref 2.5–4.5)
PHOSPHATE SERPL-MCNC: 3.5 MG/DL (ref 2.5–4.5)
PLATELET # BLD AUTO: 102 10E9/L (ref 150–450)
PLATELET # BLD AUTO: 111 10E9/L (ref 150–450)
PLATELET # BLD AUTO: 112 10E9/L (ref 150–450)
PLATELET # BLD AUTO: 115 10E9/L (ref 150–450)
PLATELET # BLD AUTO: 118 10E9/L (ref 150–450)
PLATELET # BLD AUTO: 118 10E9/L (ref 150–450)
PLATELET # BLD AUTO: 122 10E9/L (ref 150–450)
PLATELET # BLD AUTO: 123 10E9/L (ref 150–450)
PLATELET # BLD AUTO: 125 10E9/L (ref 150–450)
PLATELET # BLD AUTO: 125 10E9/L (ref 150–450)
PLATELET # BLD AUTO: 126 10E9/L (ref 150–450)
PLATELET # BLD AUTO: 129 10E9/L (ref 150–450)
PLATELET # BLD AUTO: 132 10E9/L (ref 150–450)
PLATELET # BLD AUTO: 133 10E9/L (ref 150–450)
PLATELET # BLD AUTO: 152 10E9/L (ref 150–450)
PLATELET # BLD AUTO: 154 10E9/L (ref 150–450)
PLATELET # BLD AUTO: 156 10E9/L (ref 150–450)
PLATELET # BLD AUTO: 157 10E9/L (ref 150–450)
PLATELET # BLD AUTO: 158 10E9/L (ref 150–450)
PLATELET # BLD AUTO: 162 10E9/L (ref 150–450)
PLATELET # BLD AUTO: 163 10E9/L (ref 150–450)
PLATELET # BLD AUTO: 163 10E9/L (ref 150–450)
PLATELET # BLD AUTO: 169 10E9/L (ref 150–450)
PLATELET # BLD AUTO: 172 10E9/L (ref 150–450)
PLATELET # BLD AUTO: 173 10E9/L (ref 150–450)
PLATELET # BLD AUTO: 175 10E9/L (ref 150–450)
PLATELET # BLD AUTO: 176 10E9/L (ref 150–450)
PLATELET # BLD AUTO: 177 10E9/L (ref 150–450)
PLATELET # BLD AUTO: 180 10E9/L (ref 150–450)
PLATELET # BLD AUTO: 188 10E9/L (ref 150–450)
PLATELET # BLD AUTO: 196 10E9/L (ref 150–450)
PLATELET # BLD AUTO: 203 10E9/L (ref 150–450)
PLATELET # BLD AUTO: 213 10E9/L (ref 150–450)
PLATELET # BLD AUTO: 215 10E9/L (ref 150–450)
PLATELET # BLD AUTO: 216 10E9/L (ref 150–450)
PLATELET # BLD AUTO: 218 10E9/L (ref 150–450)
PLATELET # BLD AUTO: 218 10E9/L (ref 150–450)
PLATELET # BLD AUTO: 219 10E3/UL (ref 150–450)
PLATELET # BLD AUTO: 220 10E3/UL (ref 150–450)
PLATELET # BLD AUTO: 220 10^9/L (ref 150–450)
PLATELET # BLD AUTO: 233 10E9/L (ref 150–450)
PLATELET # BLD AUTO: 239 10E3/UL (ref 150–450)
PLATELET # BLD AUTO: 241 10E9/L (ref 150–450)
PLATELET # BLD AUTO: 276 10E9/L (ref 150–450)
PLATELET # BLD AUTO: 292 10E9/L (ref 150–450)
PLATELET # BLD AUTO: 76 10E9/L (ref 150–450)
PLATELET # BLD AUTO: 81 10E9/L (ref 150–450)
PLATELET # BLD AUTO: 85 10E9/L (ref 150–450)
PLATELET # BLD AUTO: 89 10E9/L (ref 150–450)
PLATELET # BLD AUTO: 95 10E9/L (ref 150–450)
PLATELET # BLD AUTO: NORMAL 10E9/L (ref 150–450)
PO2 BLD: 103 MM HG (ref 80–105)
PO2 BLD: 108 MM HG (ref 80–105)
PO2 BLD: 112 MM HG (ref 80–105)
PO2 BLD: 113 MM HG (ref 80–105)
PO2 BLD: 129 MM HG (ref 80–105)
PO2 BLD: 140 MM HG (ref 80–105)
PO2 BLD: 144 MM HG (ref 80–105)
PO2 BLD: 145 MM HG (ref 80–105)
PO2 BLD: 158 MM HG (ref 80–105)
PO2 BLD: 159 MM HG (ref 80–105)
PO2 BLD: 168 MM HG (ref 80–105)
PO2 BLD: 190 MM HG (ref 80–105)
PO2 BLD: 45 MM HG (ref 80–105)
PO2 BLD: 45 MM HG (ref 80–105)
PO2 BLD: 76 MM HG (ref 80–105)
PO2 BLD: 83 MM HG (ref 80–105)
PO2 BLD: 84 MM HG (ref 80–105)
PO2 BLD: 87 MM HG (ref 80–105)
PO2 BLD: 88 MM HG (ref 80–105)
PO2 BLD: 92 MM HG (ref 80–105)
PO2 BLD: 95 MM HG (ref 80–105)
PO2 BLD: 97 MM HG (ref 80–105)
PO2 BLDV: 19 MM HG (ref 25–47)
PO2 BLDV: 19 MM HG (ref 25–47)
PO2 BLDV: 20 MM HG (ref 25–47)
PO2 BLDV: 21 MM HG (ref 25–47)
PO2 BLDV: 22 MM HG (ref 25–47)
PO2 BLDV: 24 MM HG (ref 25–47)
PO2 BLDV: 25 MM HG (ref 25–47)
PO2 BLDV: 31 MM HG (ref 25–47)
PO2 BLDV: 31 MM HG (ref 25–47)
PO2 BLDV: 32 MM HG (ref 25–47)
PO2 BLDV: 34 MM HG (ref 25–47)
PO2 BLDV: 35 MM HG (ref 25–47)
PO2 BLDV: 36 MM HG (ref 25–47)
PO2 BLDV: 36 MM HG (ref 25–47)
PO2 BLDV: 41 MM HG (ref 25–47)
PO2 BLDV: 43 MM HG (ref 25–47)
PO2 BLDV: 45 MM HG (ref 25–47)
PO2 BLDV: 46 MM HG (ref 25–47)
PO2 BLDV: 58 MM HG (ref 25–47)
PO2 BLDV: 60 MM HG (ref 25–47)
PO2 BLDV: 68 MM HG (ref 25–47)
PO2 BLDV: 76 MM HG (ref 25–47)
PO2 BLDV: 82 MM HG (ref 25–47)
POTASSIUM BLD-SCNC: 3.4 MMOL/L (ref 3.4–5.3)
POTASSIUM BLD-SCNC: 3.8 MMOL/L (ref 3.4–5.3)
POTASSIUM BLD-SCNC: 3.9 MMOL/L (ref 3.4–5.3)
POTASSIUM BLD-SCNC: 4.3 MMOL/L (ref 3.4–5.3)
POTASSIUM BLD-SCNC: 4.9 MMOL/L (ref 3.4–5.3)
POTASSIUM BLD-SCNC: 5.8 MMOL/L (ref 3.4–5.3)
POTASSIUM SERPL-SCNC: 2.6 MMOL/L (ref 3.4–5.3)
POTASSIUM SERPL-SCNC: 2.8 MMOL/L (ref 3.4–5.3)
POTASSIUM SERPL-SCNC: 2.9 MMOL/L (ref 3.4–5.3)
POTASSIUM SERPL-SCNC: 3.2 MMOL/L (ref 3.4–5.3)
POTASSIUM SERPL-SCNC: 3.3 MMOL/L (ref 3.4–5.3)
POTASSIUM SERPL-SCNC: 3.4 MMOL/L (ref 3.4–5.3)
POTASSIUM SERPL-SCNC: 3.5 MMOL/L (ref 3.4–5.3)
POTASSIUM SERPL-SCNC: 3.6 MMOL/L (ref 3.4–5.3)
POTASSIUM SERPL-SCNC: 3.7 MMOL/L (ref 3.4–5.3)
POTASSIUM SERPL-SCNC: 3.8 MMOL/L (ref 3.4–5.3)
POTASSIUM SERPL-SCNC: 3.9 MMOL/L (ref 3.4–5.3)
POTASSIUM SERPL-SCNC: 4 MMOL/L (ref 3.4–5.3)
POTASSIUM SERPL-SCNC: 4.1 MMOL/L (ref 3.4–5.3)
POTASSIUM SERPL-SCNC: 4.2 MMOL/L (ref 3.4–5.3)
POTASSIUM SERPL-SCNC: 4.2 MMOL/L (ref 3.5–5.1)
POTASSIUM SERPL-SCNC: 4.4 MMOL/L (ref 3.4–5.3)
POTASSIUM SERPL-SCNC: 4.7 MMOL/L (ref 3.4–5.3)
POTASSIUM SERPL-SCNC: 4.7 MMOL/L (ref 3.4–5.3)
POTASSIUM SERPL-SCNC: 4.9 MMOL/L (ref 3.4–5.3)
POTASSIUM SERPL-SCNC: NORMAL MMOL/L (ref 3.4–5.3)
PR INTERVAL - MUSE: 152 MS
PR INTERVAL - MUSE: 172 MS
PR INTERVAL - MUSE: 208 MS
PROCALCITONIN SERPL-MCNC: 0.05 NG/ML
PROCALCITONIN SERPL-MCNC: <0.05 NG/ML
PROCALCITONIN SERPL-MCNC: NORMAL NG/ML
PROT SERPL-MCNC: 5.2 G/DL (ref 6.8–8.8)
PROT SERPL-MCNC: 5.5 G/DL (ref 6.8–8.8)
PROT SERPL-MCNC: 5.8 G/DL (ref 6.8–8.8)
PROT SERPL-MCNC: 6 G/DL (ref 6.8–8.8)
PROT SERPL-MCNC: 6.1 G/DL (ref 6.8–8.8)
PROT SERPL-MCNC: 6.4 G/DL (ref 6.8–8.8)
PROT SERPL-MCNC: 6.7 G/DL (ref 6.8–8.8)
PROT SERPL-MCNC: 6.7 G/DL (ref 6.8–8.8)
PROT SERPL-MCNC: 6.8 G/DL (ref 6.8–8.8)
PROT SERPL-MCNC: 6.9 G/DL (ref 6.8–8.8)
QRS DURATION - MUSE: 106 MS
QRS DURATION - MUSE: 124 MS
QRS DURATION - MUSE: 146 MS
QT - MUSE: 376 MS
QTC - MUSE: 431 MS
QTC - MUSE: 452 MS
QTC - MUSE: 452 MS
R AXIS - MUSE: -20 DEGREES
R AXIS - MUSE: -25 DEGREES
R AXIS - MUSE: -26 DEGREES
RADIOLOGIST FLAGS: ABNORMAL
RBC # BLD AUTO: 3.6 10E12/L (ref 4.4–5.9)
RBC # BLD AUTO: 3.81 10E12/L (ref 4.4–5.9)
RBC # BLD AUTO: 3.83 10E12/L (ref 4.4–5.9)
RBC # BLD AUTO: 3.87 10E12/L (ref 4.4–5.9)
RBC # BLD AUTO: 3.88 10E12/L (ref 4.4–5.9)
RBC # BLD AUTO: 3.89 10E12/L (ref 4.4–5.9)
RBC # BLD AUTO: 3.9 10E12/L (ref 4.4–5.9)
RBC # BLD AUTO: 3.98 10E12/L (ref 4.4–5.9)
RBC # BLD AUTO: 4.01 10E12/L (ref 4.4–5.9)
RBC # BLD AUTO: 4.02 10E12/L (ref 4.4–5.9)
RBC # BLD AUTO: 4.03 10E12/L (ref 4.4–5.9)
RBC # BLD AUTO: 4.06 10E12/L (ref 4.4–5.9)
RBC # BLD AUTO: 4.09 10E6/UL (ref 4.4–5.9)
RBC # BLD AUTO: 4.1 10E12/L (ref 4.4–5.9)
RBC # BLD AUTO: 4.11 10E12/L (ref 4.4–5.9)
RBC # BLD AUTO: 4.12 10E12/L (ref 4.4–5.9)
RBC # BLD AUTO: 4.16 10E12/L (ref 4.4–5.9)
RBC # BLD AUTO: 4.17 10E12/L (ref 4.4–5.9)
RBC # BLD AUTO: 4.17 10E12/L (ref 4.4–5.9)
RBC # BLD AUTO: 4.19 10E12/L (ref 4.4–5.9)
RBC # BLD AUTO: 4.2 10E6/UL (ref 4.4–5.9)
RBC # BLD AUTO: 4.21 10E6/UL (ref 4.4–5.9)
RBC # BLD AUTO: 4.25 10E12/L (ref 4.4–5.9)
RBC # BLD AUTO: 4.26 10E12/L (ref 4.4–5.9)
RBC # BLD AUTO: 4.29 10E12/L (ref 4.4–5.9)
RBC # BLD AUTO: 4.32 10E12/L (ref 4.4–5.9)
RBC # BLD AUTO: 4.36 10E12/L (ref 4.4–5.9)
RBC # BLD AUTO: 4.37 10E12/L (ref 4.4–5.9)
RBC # BLD AUTO: 4.38 10E12/L (ref 4.4–5.9)
RBC # BLD AUTO: 4.41 10E12/L (ref 4.4–5.9)
RBC # BLD AUTO: 4.42 10E12/L (ref 4.4–5.9)
RBC # BLD AUTO: 4.45 10E12/L (ref 4.4–5.9)
RBC # BLD AUTO: 4.52 10E12/L (ref 4.4–5.9)
RBC # BLD AUTO: 4.55 10E12/L (ref 4.4–5.9)
RBC # BLD AUTO: 4.55 10E12/L (ref 4.4–5.9)
RBC # BLD AUTO: 4.71 10E12/L (ref 4.4–5.9)
RBC # BLD AUTO: 4.73 10^12/L (ref 4.32–5.72)
RBC # BLD AUTO: 4.76 10E12/L (ref 4.4–5.9)
RBC # BLD AUTO: 4.77 10E12/L (ref 4.4–5.9)
RBC # BLD AUTO: 4.86 10E12/L (ref 4.4–5.9)
RBC # BLD AUTO: 4.86 10E12/L (ref 4.4–5.9)
RBC # BLD AUTO: 4.97 10E12/L (ref 4.4–5.9)
RBC # BLD AUTO: 5 10E12/L (ref 4.4–5.9)
RBC # BLD AUTO: 5.01 10E12/L (ref 4.4–5.9)
RBC # BLD AUTO: 5.1 10E12/L (ref 4.4–5.9)
RBC # BLD AUTO: NORMAL 10E12/L (ref 4.4–5.9)
RBC #/AREA URNS AUTO: 3 /HPF (ref 0–2)
RBC #/AREA URNS AUTO: >182 /HPF (ref 0–2)
SAO2 % BLDV FROM PO2: 26 %
SAO2 % BLDV FROM PO2: 36 %
SAO2 % BLDV FROM PO2: 74 %
SAO2 % BLDV: 27 % (ref 94–100)
SARS-COV-2 RNA RESP QL NAA+PROBE: NEGATIVE
SODIUM BLD-SCNC: 133 MMOL/L (ref 133–144)
SODIUM SERPL-SCNC: 130 MMOL/L (ref 133–144)
SODIUM SERPL-SCNC: 131 MMOL/L (ref 133–144)
SODIUM SERPL-SCNC: 131 MMOL/L (ref 133–144)
SODIUM SERPL-SCNC: 132 MMOL/L (ref 133–144)
SODIUM SERPL-SCNC: 133 MMOL/L (ref 133–144)
SODIUM SERPL-SCNC: 133 MMOL/L (ref 133–144)
SODIUM SERPL-SCNC: 134 MMOL/L (ref 133–144)
SODIUM SERPL-SCNC: 135 MMOL/L (ref 133–144)
SODIUM SERPL-SCNC: 135 MMOL/L (ref 133–144)
SODIUM SERPL-SCNC: 136 MMOL/L (ref 133–144)
SODIUM SERPL-SCNC: 137 MMOL/L (ref 133–144)
SODIUM SERPL-SCNC: 138 MMOL/L (ref 133–144)
SODIUM SERPL-SCNC: 139 MMOL/L (ref 133–144)
SODIUM SERPL-SCNC: 140 MMOL/L (ref 133–144)
SODIUM SERPL-SCNC: 140 MMOL/L (ref 133–144)
SODIUM SERPL-SCNC: 142 MMOL/L (ref 133–144)
SODIUM SERPL-SCNC: 144 MMOL/L (ref 136–145)
SODIUM SERPL-SCNC: NORMAL MMOL/L (ref 133–144)
SOURCE: ABNORMAL
SP GR UR STRIP: 1.02 (ref 1–1.03)
SP GR UR STRIP: 1.03 (ref 1–1.03)
SPECIMEN EXP DATE BLD: NORMAL
SPECIMEN SOURCE: ABNORMAL
SPECIMEN SOURCE: NORMAL
SQUAMOUS #/AREA URNS AUTO: 0 /HPF (ref 0–1)
SQUAMOUS #/AREA URNS AUTO: 0 /HPF (ref 0–1)
SQUAMOUS #/AREA URNS AUTO: 1 /HPF (ref 0–1)
SQUAMOUS #/AREA URNS AUTO: 3 /HPF (ref 0–1)
SYSTOLIC BLOOD PRESSURE - MUSE: NORMAL MMHG
T AXIS - MUSE: -23 DEGREES
T AXIS - MUSE: -7 DEGREES
T AXIS - MUSE: 2 DEGREES
TRANSFUSION STATUS PATIENT QL: NORMAL
TROPONIN I SERPL-MCNC: 0.01 UG/L (ref 0–0.04)
TROPONIN I SERPL-MCNC: 0.02 UG/L (ref 0–0.04)
TROPONIN I SERPL-MCNC: 0.03 UG/L (ref 0–0.04)
TROPONIN I SERPL-MCNC: 0.06 UG/L (ref 0–0.04)
TROPONIN I SERPL-MCNC: <0.015 UG/L (ref 0–0.04)
UFH PPP CHRO-ACNC: 0.6 IU/ML
UFH PPP CHRO-ACNC: 0.73 IU/ML
UFH PPP CHRO-ACNC: >1.1 IU/ML
UROBILINOGEN UR STRIP-MCNC: 0 MG/DL (ref 0–2)
UROBILINOGEN UR STRIP-MCNC: NORMAL MG/DL (ref 0–2)
VENTRICULAR RATE- MUSE: 79 BPM
VENTRICULAR RATE- MUSE: 87 BPM
VENTRICULAR RATE- MUSE: 87 BPM
VIT B12 SERPL-MCNC: 463 PG/ML (ref 193–986)
WBC # BLD AUTO: 3.7 10E9/L (ref 4–11)
WBC # BLD AUTO: 3.8 10E9/L (ref 4–11)
WBC # BLD AUTO: 3.8 10E9/L (ref 4–11)
WBC # BLD AUTO: 4 10E9/L (ref 4–11)
WBC # BLD AUTO: 4.1 10E9/L (ref 4–11)
WBC # BLD AUTO: 4.2 10E9/L (ref 4–11)
WBC # BLD AUTO: 4.5 10E9/L (ref 4–11)
WBC # BLD AUTO: 4.6 10E9/L (ref 4–11)
WBC # BLD AUTO: 4.6 10E9/L (ref 4–11)
WBC # BLD AUTO: 4.8 10E9/L (ref 4–11)
WBC # BLD AUTO: 4.9 10E9/L (ref 4–11)
WBC # BLD AUTO: 4.9 10E9/L (ref 4–11)
WBC # BLD AUTO: 5 10E9/L (ref 4–11)
WBC # BLD AUTO: 5.3 10E9/L (ref 4–11)
WBC # BLD AUTO: 5.5 10E9/L (ref 4–11)
WBC # BLD AUTO: 5.5 10E9/L (ref 4–11)
WBC # BLD AUTO: 5.6 10E9/L (ref 4–11)
WBC # BLD AUTO: 5.6 10E9/L (ref 4–11)
WBC # BLD AUTO: 5.7 10E9/L (ref 4–11)
WBC # BLD AUTO: 5.7 10E9/L (ref 4–11)
WBC # BLD AUTO: 5.8 10E9/L (ref 4–11)
WBC # BLD AUTO: 6 10E9/L (ref 4–11)
WBC # BLD AUTO: 6.1 10E9/L (ref 4–11)
WBC # BLD AUTO: 6.1 10^9/L (ref 3.5–10.5)
WBC # BLD AUTO: 6.2 10E9/L (ref 4–11)
WBC # BLD AUTO: 6.3 10E9/L (ref 4–11)
WBC # BLD AUTO: 6.6 10E3/UL (ref 4–11)
WBC # BLD AUTO: 6.8 10E3/UL (ref 4–11)
WBC # BLD AUTO: 6.8 10E9/L (ref 4–11)
WBC # BLD AUTO: 6.9 10E9/L (ref 4–11)
WBC # BLD AUTO: 7 10E9/L (ref 4–11)
WBC # BLD AUTO: 7 10E9/L (ref 4–11)
WBC # BLD AUTO: 7.2 10E9/L (ref 4–11)
WBC # BLD AUTO: 7.4 10E9/L (ref 4–11)
WBC # BLD AUTO: 7.5 10E9/L (ref 4–11)
WBC # BLD AUTO: 7.9 10E9/L (ref 4–11)
WBC # BLD AUTO: 8.2 10E3/UL (ref 4–11)
WBC # BLD AUTO: 8.2 10E9/L (ref 4–11)
WBC # BLD AUTO: 8.6 10E9/L (ref 4–11)
WBC # BLD AUTO: 9.1 10E9/L (ref 4–11)
WBC # BLD AUTO: 9.2 10E9/L (ref 4–11)
WBC # BLD AUTO: 9.6 10E9/L (ref 4–11)
WBC # BLD AUTO: 9.9 10E9/L (ref 4–11)
WBC # BLD AUTO: NORMAL 10E9/L (ref 4–11)
WBC #/AREA URNS AUTO: 1 /HPF (ref 0–5)
WBC #/AREA URNS AUTO: 60 /HPF (ref 0–5)
WBC #/AREA URNS AUTO: >182 /HPF (ref 0–5)
WBC #/AREA URNS AUTO: >182 /HPF (ref 0–5)
WBC CLUMPS #/AREA URNS HPF: PRESENT /HPF

## 2021-01-01 PROCEDURE — 93005 ELECTROCARDIOGRAM TRACING: CPT

## 2021-01-01 PROCEDURE — 99233 SBSQ HOSP IP/OBS HIGH 50: CPT | Performed by: INTERNAL MEDICINE

## 2021-01-01 PROCEDURE — 250N000013 HC RX MED GY IP 250 OP 250 PS 637: Performed by: INTERNAL MEDICINE

## 2021-01-01 PROCEDURE — 250N000011 HC RX IP 250 OP 636: Performed by: INTERNAL MEDICINE

## 2021-01-01 PROCEDURE — 250N000013 HC RX MED GY IP 250 OP 250 PS 637: Performed by: STUDENT IN AN ORGANIZED HEALTH CARE EDUCATION/TRAINING PROGRAM

## 2021-01-01 PROCEDURE — 999N000157 HC STATISTIC RCP TIME EA 10 MIN

## 2021-01-01 PROCEDURE — 71045 X-RAY EXAM CHEST 1 VIEW: CPT

## 2021-01-01 PROCEDURE — 83605 ASSAY OF LACTIC ACID: CPT

## 2021-01-01 PROCEDURE — 80048 BASIC METABOLIC PNL TOTAL CA: CPT | Mod: ORL | Performed by: NURSE PRACTITIONER

## 2021-01-01 PROCEDURE — 84100 ASSAY OF PHOSPHORUS: CPT | Performed by: INTERNAL MEDICINE

## 2021-01-01 PROCEDURE — 120N000001 HC R&B MED SURG/OB

## 2021-01-01 PROCEDURE — 999N000065 XR CHEST PORT 1 VW

## 2021-01-01 PROCEDURE — 80048 BASIC METABOLIC PNL TOTAL CA: CPT | Performed by: INTERNAL MEDICINE

## 2021-01-01 PROCEDURE — 250N000011 HC RX IP 250 OP 636: Performed by: STUDENT IN AN ORGANIZED HEALTH CARE EDUCATION/TRAINING PROGRAM

## 2021-01-01 PROCEDURE — 250N000011 HC RX IP 250 OP 636: Performed by: NURSE PRACTITIONER

## 2021-01-01 PROCEDURE — 250N000012 HC RX MED GY IP 250 OP 636 PS 637: Performed by: NURSE PRACTITIONER

## 2021-01-01 PROCEDURE — 250N000012 HC RX MED GY IP 250 OP 636 PS 637: Performed by: SURGERY

## 2021-01-01 PROCEDURE — 99285 EMERGENCY DEPT VISIT HI MDM: CPT | Mod: 25

## 2021-01-01 PROCEDURE — 36600 WITHDRAWAL OF ARTERIAL BLOOD: CPT

## 2021-01-01 PROCEDURE — 250N000012 HC RX MED GY IP 250 OP 636 PS 637: Performed by: STUDENT IN AN ORGANIZED HEALTH CARE EDUCATION/TRAINING PROGRAM

## 2021-01-01 PROCEDURE — 999N000009 HC STATISTIC AIRWAY CARE

## 2021-01-01 PROCEDURE — 99309 SBSQ NF CARE MODERATE MDM 30: CPT | Performed by: NURSE PRACTITIONER

## 2021-01-01 PROCEDURE — 36514 APHERESIS PLASMA: CPT

## 2021-01-01 PROCEDURE — 84145 PROCALCITONIN (PCT): CPT | Performed by: INTERNAL MEDICINE

## 2021-01-01 PROCEDURE — 93308 TTE F-UP OR LMTD: CPT

## 2021-01-01 PROCEDURE — 83735 ASSAY OF MAGNESIUM: CPT | Performed by: INTERNAL MEDICINE

## 2021-01-01 PROCEDURE — 80053 COMPREHEN METABOLIC PANEL: CPT | Performed by: INTERNAL MEDICINE

## 2021-01-01 PROCEDURE — 87040 BLOOD CULTURE FOR BACTERIA: CPT | Performed by: NURSE PRACTITIONER

## 2021-01-01 PROCEDURE — 272N000083 HC NUTRITION PRODUCT SEMIELEM INTERMED LITER

## 2021-01-01 PROCEDURE — 250N000013 HC RX MED GY IP 250 OP 250 PS 637: Performed by: NURSE PRACTITIONER

## 2021-01-01 PROCEDURE — 82805 BLOOD GASES W/O2 SATURATION: CPT | Performed by: INTERNAL MEDICINE

## 2021-01-01 PROCEDURE — 80048 BASIC METABOLIC PNL TOTAL CA: CPT | Performed by: PHYSICIAN ASSISTANT

## 2021-01-01 PROCEDURE — 36415 COLL VENOUS BLD VENIPUNCTURE: CPT | Performed by: INTERNAL MEDICINE

## 2021-01-01 PROCEDURE — 96375 TX/PRO/DX INJ NEW DRUG ADDON: CPT

## 2021-01-01 PROCEDURE — 250N000011 HC RX IP 250 OP 636: Performed by: EMERGENCY MEDICINE

## 2021-01-01 PROCEDURE — 250N000011 HC RX IP 250 OP 636: Performed by: OBSTETRICS & GYNECOLOGY

## 2021-01-01 PROCEDURE — 85027 COMPLETE CBC AUTOMATED: CPT | Performed by: PHYSICIAN ASSISTANT

## 2021-01-01 PROCEDURE — 80048 BASIC METABOLIC PNL TOTAL CA: CPT | Performed by: NURSE PRACTITIONER

## 2021-01-01 PROCEDURE — 258N000003 HC RX IP 258 OP 636: Performed by: INTERNAL MEDICINE

## 2021-01-01 PROCEDURE — 94660 CPAP INITIATION&MGMT: CPT

## 2021-01-01 PROCEDURE — 51702 INSERT TEMP BLADDER CATH: CPT

## 2021-01-01 PROCEDURE — 200N000001 HC R&B ICU

## 2021-01-01 PROCEDURE — 80048 BASIC METABOLIC PNL TOTAL CA: CPT | Performed by: OBSTETRICS & GYNECOLOGY

## 2021-01-01 PROCEDURE — 99291 CRITICAL CARE FIRST HOUR: CPT | Performed by: INTERNAL MEDICINE

## 2021-01-01 PROCEDURE — 250N000009 HC RX 250: Performed by: INTERNAL MEDICINE

## 2021-01-01 PROCEDURE — 81001 URINALYSIS AUTO W/SCOPE: CPT | Performed by: PHYSICIAN ASSISTANT

## 2021-01-01 PROCEDURE — 999N000065 XR ABDOMEN PORT 1 VW

## 2021-01-01 PROCEDURE — 83735 ASSAY OF MAGNESIUM: CPT | Performed by: OBSTETRICS & GYNECOLOGY

## 2021-01-01 PROCEDURE — 36415 COLL VENOUS BLD VENIPUNCTURE: CPT | Performed by: NURSE PRACTITIONER

## 2021-01-01 PROCEDURE — 36415 COLL VENOUS BLD VENIPUNCTURE: CPT | Mod: ORL | Performed by: NURSE PRACTITIONER

## 2021-01-01 PROCEDURE — 94150 VITAL CAPACITY TEST: CPT

## 2021-01-01 PROCEDURE — 83880 ASSAY OF NATRIURETIC PEPTIDE: CPT | Performed by: EMERGENCY MEDICINE

## 2021-01-01 PROCEDURE — 255N000002 HC RX 255 OP 636: Performed by: SURGERY

## 2021-01-01 PROCEDURE — 250N000011 HC RX IP 250 OP 636

## 2021-01-01 PROCEDURE — 87635 SARS-COV-2 COVID-19 AMP PRB: CPT | Performed by: EMERGENCY MEDICINE

## 2021-01-01 PROCEDURE — C9803 HOPD COVID-19 SPEC COLLECT: HCPCS

## 2021-01-01 PROCEDURE — 93321 DOPPLER ECHO F-UP/LMTD STD: CPT | Mod: 26 | Performed by: INTERNAL MEDICINE

## 2021-01-01 PROCEDURE — 82533 TOTAL CORTISOL: CPT | Performed by: INTERNAL MEDICINE

## 2021-01-01 PROCEDURE — 36514 APHERESIS PLASMA: CPT | Performed by: INTERNAL MEDICINE

## 2021-01-01 PROCEDURE — 999N000105 HC STATISTIC NO DOCUMENTATION TO SUPPORT CHARGE

## 2021-01-01 PROCEDURE — 85027 COMPLETE CBC AUTOMATED: CPT | Performed by: HOSPITALIST

## 2021-01-01 PROCEDURE — 85049 AUTOMATED PLATELET COUNT: CPT | Performed by: PHYSICIAN ASSISTANT

## 2021-01-01 PROCEDURE — 31500 INSERT EMERGENCY AIRWAY: CPT | Performed by: INTERNAL MEDICINE

## 2021-01-01 PROCEDURE — 250N000013 HC RX MED GY IP 250 OP 250 PS 637: Performed by: SURGERY

## 2021-01-01 PROCEDURE — 99291 CRITICAL CARE FIRST HOUR: CPT | Mod: GC | Performed by: PSYCHIATRY & NEUROLOGY

## 2021-01-01 PROCEDURE — 94002 VENT MGMT INPAT INIT DAY: CPT

## 2021-01-01 PROCEDURE — 250N000012 HC RX MED GY IP 250 OP 636 PS 637: Performed by: INTERNAL MEDICINE

## 2021-01-01 PROCEDURE — 250N000012 HC RX MED GY IP 250 OP 636 PS 637: Performed by: PHYSICIAN ASSISTANT

## 2021-01-01 PROCEDURE — 83605 ASSAY OF LACTIC ACID: CPT | Performed by: INTERNAL MEDICINE

## 2021-01-01 PROCEDURE — 87635 SARS-COV-2 COVID-19 AMP PRB: CPT | Performed by: PHYSICIAN ASSISTANT

## 2021-01-01 PROCEDURE — 250N000013 HC RX MED GY IP 250 OP 250 PS 637: Performed by: PHYSICIAN ASSISTANT

## 2021-01-01 PROCEDURE — U0005 INFEC AGEN DETEC AMPLI PROBE: HCPCS | Performed by: INTERNAL MEDICINE

## 2021-01-01 PROCEDURE — P9041 ALBUMIN (HUMAN),5%, 50ML: HCPCS | Performed by: INTERNAL MEDICINE

## 2021-01-01 PROCEDURE — 92526 ORAL FUNCTION THERAPY: CPT | Mod: GN

## 2021-01-01 PROCEDURE — 99233 SBSQ HOSP IP/OBS HIGH 50: CPT | Mod: GC | Performed by: PSYCHIATRY & NEUROLOGY

## 2021-01-01 PROCEDURE — 87040 BLOOD CULTURE FOR BACTERIA: CPT | Performed by: EMERGENCY MEDICINE

## 2021-01-01 PROCEDURE — 999N000208 ECHOCARDIOGRAM COMPLETE

## 2021-01-01 PROCEDURE — 84100 ASSAY OF PHOSPHORUS: CPT | Performed by: EMERGENCY MEDICINE

## 2021-01-01 PROCEDURE — 258N000003 HC RX IP 258 OP 636: Performed by: PHYSICIAN ASSISTANT

## 2021-01-01 PROCEDURE — 99222 1ST HOSP IP/OBS MODERATE 55: CPT | Mod: 25 | Performed by: INTERNAL MEDICINE

## 2021-01-01 PROCEDURE — 84484 ASSAY OF TROPONIN QUANT: CPT | Performed by: EMERGENCY MEDICINE

## 2021-01-01 PROCEDURE — 999N001017 HC STATISTIC GLUCOSE BY METER IP

## 2021-01-01 PROCEDURE — P9604 ONE-WAY ALLOW PRORATED TRIP: HCPCS | Mod: ORL | Performed by: NURSE PRACTITIONER

## 2021-01-01 PROCEDURE — 31500 INSERT EMERGENCY AIRWAY: CPT

## 2021-01-01 PROCEDURE — 94640 AIRWAY INHALATION TREATMENT: CPT

## 2021-01-01 PROCEDURE — 250N000012 HC RX MED GY IP 250 OP 636 PS 637

## 2021-01-01 PROCEDURE — 94003 VENT MGMT INPAT SUBQ DAY: CPT

## 2021-01-01 PROCEDURE — 97530 THERAPEUTIC ACTIVITIES: CPT | Mod: GP | Performed by: PHYSICAL THERAPIST

## 2021-01-01 PROCEDURE — 94640 AIRWAY INHALATION TREATMENT: CPT | Mod: 76

## 2021-01-01 PROCEDURE — 84145 PROCALCITONIN (PCT): CPT | Performed by: HOSPITALIST

## 2021-01-01 PROCEDURE — 97110 THERAPEUTIC EXERCISES: CPT | Mod: GO | Performed by: OCCUPATIONAL THERAPIST

## 2021-01-01 PROCEDURE — 99291 CRITICAL CARE FIRST HOUR: CPT | Mod: 25

## 2021-01-01 PROCEDURE — 83605 ASSAY OF LACTIC ACID: CPT | Performed by: NURSE PRACTITIONER

## 2021-01-01 PROCEDURE — 99291 CRITICAL CARE FIRST HOUR: CPT | Mod: 25 | Performed by: INTERNAL MEDICINE

## 2021-01-01 PROCEDURE — 36415 COLL VENOUS BLD VENIPUNCTURE: CPT | Performed by: SURGERY

## 2021-01-01 PROCEDURE — 87186 SC STD MICRODIL/AGAR DIL: CPT | Performed by: INTERNAL MEDICINE

## 2021-01-01 PROCEDURE — 99291 CRITICAL CARE FIRST HOUR: CPT | Performed by: PSYCHIATRY & NEUROLOGY

## 2021-01-01 PROCEDURE — 97166 OT EVAL MOD COMPLEX 45 MIN: CPT | Mod: GO

## 2021-01-01 PROCEDURE — C9113 INJ PANTOPRAZOLE SODIUM, VIA: HCPCS | Performed by: INTERNAL MEDICINE

## 2021-01-01 PROCEDURE — 99232 SBSQ HOSP IP/OBS MODERATE 35: CPT | Mod: GC | Performed by: PSYCHIATRY & NEUROLOGY

## 2021-01-01 PROCEDURE — 99310 SBSQ NF CARE HIGH MDM 45: CPT | Performed by: NURSE PRACTITIONER

## 2021-01-01 PROCEDURE — 93926 LOWER EXTREMITY STUDY: CPT | Mod: LT

## 2021-01-01 PROCEDURE — 71275 CT ANGIOGRAPHY CHEST: CPT

## 2021-01-01 PROCEDURE — 82805 BLOOD GASES W/O2 SATURATION: CPT | Performed by: HOSPITALIST

## 2021-01-01 PROCEDURE — G0463 HOSPITAL OUTPT CLINIC VISIT: HCPCS

## 2021-01-01 PROCEDURE — 250N000011 HC RX IP 250 OP 636: Performed by: PHYSICIAN ASSISTANT

## 2021-01-01 PROCEDURE — 97535 SELF CARE MNGMENT TRAINING: CPT | Mod: GO

## 2021-01-01 PROCEDURE — 93308 TTE F-UP OR LMTD: CPT | Mod: 26 | Performed by: INTERNAL MEDICINE

## 2021-01-01 PROCEDURE — 250N000009 HC RX 250: Performed by: EMERGENCY MEDICINE

## 2021-01-01 PROCEDURE — 85027 COMPLETE CBC AUTOMATED: CPT | Performed by: INTERNAL MEDICINE

## 2021-01-01 PROCEDURE — 84100 ASSAY OF PHOSPHORUS: CPT | Performed by: PHYSICIAN ASSISTANT

## 2021-01-01 PROCEDURE — 81001 URINALYSIS AUTO W/SCOPE: CPT | Performed by: EMERGENCY MEDICINE

## 2021-01-01 PROCEDURE — 96365 THER/PROPH/DIAG IV INF INIT: CPT

## 2021-01-01 PROCEDURE — 85520 HEPARIN ASSAY: CPT | Performed by: INTERNAL MEDICINE

## 2021-01-01 PROCEDURE — 250N000011 HC RX IP 250 OP 636: Performed by: SURGERY

## 2021-01-01 PROCEDURE — 84145 PROCALCITONIN (PCT): CPT | Performed by: OBSTETRICS & GYNECOLOGY

## 2021-01-01 PROCEDURE — 86140 C-REACTIVE PROTEIN: CPT | Performed by: HOSPITALIST

## 2021-01-01 PROCEDURE — 82803 BLOOD GASES ANY COMBINATION: CPT | Performed by: EMERGENCY MEDICINE

## 2021-01-01 PROCEDURE — 84132 ASSAY OF SERUM POTASSIUM: CPT | Performed by: STUDENT IN AN ORGANIZED HEALTH CARE EDUCATION/TRAINING PROGRAM

## 2021-01-01 PROCEDURE — 99223 1ST HOSP IP/OBS HIGH 75: CPT | Mod: AI | Performed by: INTERNAL MEDICINE

## 2021-01-01 PROCEDURE — 84100 ASSAY OF PHOSPHORUS: CPT | Performed by: NURSE PRACTITIONER

## 2021-01-01 PROCEDURE — 87086 URINE CULTURE/COLONY COUNT: CPT | Performed by: PHYSICIAN ASSISTANT

## 2021-01-01 PROCEDURE — 92526 ORAL FUNCTION THERAPY: CPT | Mod: GN | Performed by: SPEECH-LANGUAGE PATHOLOGIST

## 2021-01-01 PROCEDURE — 82805 BLOOD GASES W/O2 SATURATION: CPT | Performed by: OBSTETRICS & GYNECOLOGY

## 2021-01-01 PROCEDURE — 82550 ASSAY OF CK (CPK): CPT | Performed by: NURSE PRACTITIONER

## 2021-01-01 PROCEDURE — 250N000013 HC RX MED GY IP 250 OP 250 PS 637: Performed by: HOSPITALIST

## 2021-01-01 PROCEDURE — 250N000011 HC RX IP 250 OP 636: Performed by: NURSE ANESTHETIST, CERTIFIED REGISTERED

## 2021-01-01 PROCEDURE — 84132 ASSAY OF SERUM POTASSIUM: CPT | Performed by: INTERNAL MEDICINE

## 2021-01-01 PROCEDURE — 99232 SBSQ HOSP IP/OBS MODERATE 35: CPT | Performed by: HOSPITALIST

## 2021-01-01 PROCEDURE — 82607 VITAMIN B-12: CPT | Mod: ORL | Performed by: NURSE PRACTITIONER

## 2021-01-01 PROCEDURE — 82746 ASSAY OF FOLIC ACID SERUM: CPT | Mod: ORL | Performed by: NURSE PRACTITIONER

## 2021-01-01 PROCEDURE — 85027 COMPLETE CBC AUTOMATED: CPT | Performed by: NURSE PRACTITIONER

## 2021-01-01 PROCEDURE — 82805 BLOOD GASES W/O2 SATURATION: CPT | Performed by: EMERGENCY MEDICINE

## 2021-01-01 PROCEDURE — 84484 ASSAY OF TROPONIN QUANT: CPT | Performed by: NURSE PRACTITIONER

## 2021-01-01 PROCEDURE — 83735 ASSAY OF MAGNESIUM: CPT | Performed by: HOSPITALIST

## 2021-01-01 PROCEDURE — 93325 DOPPLER ECHO COLOR FLOW MAPG: CPT | Mod: 26 | Performed by: INTERNAL MEDICINE

## 2021-01-01 PROCEDURE — 93010 ELECTROCARDIOGRAM REPORT: CPT | Performed by: INTERNAL MEDICINE

## 2021-01-01 PROCEDURE — 250N000011 HC RX IP 250 OP 636: Performed by: PSYCHIATRY & NEUROLOGY

## 2021-01-01 PROCEDURE — 92610 EVALUATE SWALLOWING FUNCTION: CPT | Mod: GN

## 2021-01-01 PROCEDURE — C1769 GUIDE WIRE: HCPCS

## 2021-01-01 PROCEDURE — 99239 HOSP IP/OBS DSCHRG MGMT >30: CPT | Performed by: INTERNAL MEDICINE

## 2021-01-01 PROCEDURE — 99152 MOD SED SAME PHYS/QHP 5/>YRS: CPT

## 2021-01-01 PROCEDURE — 99222 1ST HOSP IP/OBS MODERATE 55: CPT | Performed by: NURSE PRACTITIONER

## 2021-01-01 PROCEDURE — 999N000011 HC STATISTIC ANESTHESIA CASE

## 2021-01-01 PROCEDURE — 255N000002 HC RX 255 OP 636: Performed by: STUDENT IN AN ORGANIZED HEALTH CARE EDUCATION/TRAINING PROGRAM

## 2021-01-01 PROCEDURE — 87070 CULTURE OTHR SPECIMN AEROBIC: CPT | Performed by: INTERNAL MEDICINE

## 2021-01-01 PROCEDURE — 250N000013 HC RX MED GY IP 250 OP 250 PS 637: Performed by: PSYCHIATRY & NEUROLOGY

## 2021-01-01 PROCEDURE — 85027 COMPLETE CBC AUTOMATED: CPT | Performed by: OBSTETRICS & GYNECOLOGY

## 2021-01-01 PROCEDURE — 86901 BLOOD TYPING SEROLOGIC RH(D): CPT | Performed by: PHYSICIAN ASSISTANT

## 2021-01-01 PROCEDURE — 96366 THER/PROPH/DIAG IV INF ADDON: CPT

## 2021-01-01 PROCEDURE — 87086 URINE CULTURE/COLONY COUNT: CPT | Performed by: EMERGENCY MEDICINE

## 2021-01-01 PROCEDURE — 82805 BLOOD GASES W/O2 SATURATION: CPT | Performed by: STUDENT IN AN ORGANIZED HEALTH CARE EDUCATION/TRAINING PROGRAM

## 2021-01-01 PROCEDURE — 83605 ASSAY OF LACTIC ACID: CPT | Performed by: PHYSICIAN ASSISTANT

## 2021-01-01 PROCEDURE — 85384 FIBRINOGEN ACTIVITY: CPT | Performed by: INTERNAL MEDICINE

## 2021-01-01 PROCEDURE — 250N000012 HC RX MED GY IP 250 OP 636 PS 637: Performed by: HOSPITALIST

## 2021-01-01 PROCEDURE — 99233 SBSQ HOSP IP/OBS HIGH 50: CPT | Performed by: STUDENT IN AN ORGANIZED HEALTH CARE EDUCATION/TRAINING PROGRAM

## 2021-01-01 PROCEDURE — 80053 COMPREHEN METABOLIC PANEL: CPT | Performed by: PHYSICIAN ASSISTANT

## 2021-01-01 PROCEDURE — G0378 HOSPITAL OBSERVATION PER HR: HCPCS

## 2021-01-01 PROCEDURE — 87086 URINE CULTURE/COLONY COUNT: CPT | Performed by: STUDENT IN AN ORGANIZED HEALTH CARE EDUCATION/TRAINING PROGRAM

## 2021-01-01 PROCEDURE — 82803 BLOOD GASES ANY COMBINATION: CPT

## 2021-01-01 PROCEDURE — 84145 PROCALCITONIN (PCT): CPT | Performed by: NURSE PRACTITIONER

## 2021-01-01 PROCEDURE — P9012 CRYOPRECIPITATE EACH UNIT: HCPCS | Performed by: INTERNAL MEDICINE

## 2021-01-01 PROCEDURE — 99238 HOSP IP/OBS DSCHRG MGMT 30/<: CPT | Performed by: INTERNAL MEDICINE

## 2021-01-01 PROCEDURE — 93970 EXTREMITY STUDY: CPT

## 2021-01-01 PROCEDURE — 999N000208 ECHOCARDIOGRAM LIMITED

## 2021-01-01 PROCEDURE — 85730 THROMBOPLASTIN TIME PARTIAL: CPT | Performed by: INTERNAL MEDICINE

## 2021-01-01 PROCEDURE — 96374 THER/PROPH/DIAG INJ IV PUSH: CPT

## 2021-01-01 PROCEDURE — 99232 SBSQ HOSP IP/OBS MODERATE 35: CPT | Performed by: INTERNAL MEDICINE

## 2021-01-01 PROCEDURE — 84100 ASSAY OF PHOSPHORUS: CPT | Performed by: OBSTETRICS & GYNECOLOGY

## 2021-01-01 PROCEDURE — 85520 HEPARIN ASSAY: CPT | Performed by: EMERGENCY MEDICINE

## 2021-01-01 PROCEDURE — 02HV33Z INSERTION OF INFUSION DEVICE INTO SUPERIOR VENA CAVA, PERCUTANEOUS APPROACH: ICD-10-PCS | Performed by: RADIOLOGY

## 2021-01-01 PROCEDURE — 99239 HOSP IP/OBS DSCHRG MGMT >30: CPT | Performed by: HOSPITALIST

## 2021-01-01 PROCEDURE — 82565 ASSAY OF CREATININE: CPT | Performed by: HOSPITALIST

## 2021-01-01 PROCEDURE — 99291 CRITICAL CARE FIRST HOUR: CPT | Performed by: STUDENT IN AN ORGANIZED HEALTH CARE EDUCATION/TRAINING PROGRAM

## 2021-01-01 PROCEDURE — 5A09357 ASSISTANCE WITH RESPIRATORY VENTILATION, LESS THAN 24 CONSECUTIVE HOURS, CONTINUOUS POSITIVE AIRWAY PRESSURE: ICD-10-PCS | Performed by: EMERGENCY MEDICINE

## 2021-01-01 PROCEDURE — 82805 BLOOD GASES W/O2 SATURATION: CPT | Performed by: PHYSICIAN ASSISTANT

## 2021-01-01 PROCEDURE — 5A1955Z RESPIRATORY VENTILATION, GREATER THAN 96 CONSECUTIVE HOURS: ICD-10-PCS | Performed by: HOSPITALIST

## 2021-01-01 PROCEDURE — 250N000011 HC RX IP 250 OP 636: Performed by: RADIOLOGY

## 2021-01-01 PROCEDURE — 99291 CRITICAL CARE FIRST HOUR: CPT | Performed by: NURSE PRACTITIONER

## 2021-01-01 PROCEDURE — 250N000013 HC RX MED GY IP 250 OP 250 PS 637: Performed by: OBSTETRICS & GYNECOLOGY

## 2021-01-01 PROCEDURE — 31500 INSERT EMERGENCY AIRWAY: CPT | Performed by: NURSE ANESTHETIST, CERTIFIED REGISTERED

## 2021-01-01 PROCEDURE — 84145 PROCALCITONIN (PCT): CPT | Performed by: EMERGENCY MEDICINE

## 2021-01-01 PROCEDURE — 36415 COLL VENOUS BLD VENIPUNCTURE: CPT | Performed by: EMERGENCY MEDICINE

## 2021-01-01 PROCEDURE — 96361 HYDRATE IV INFUSION ADD-ON: CPT

## 2021-01-01 PROCEDURE — 99223 1ST HOSP IP/OBS HIGH 75: CPT | Performed by: INTERNAL MEDICINE

## 2021-01-01 PROCEDURE — 99291 CRITICAL CARE FIRST HOUR: CPT | Mod: 25 | Performed by: OBSTETRICS & GYNECOLOGY

## 2021-01-01 PROCEDURE — 97530 THERAPEUTIC ACTIVITIES: CPT | Mod: GO | Performed by: OCCUPATIONAL THERAPIST

## 2021-01-01 PROCEDURE — 210N000002 HC R&B HEART CARE

## 2021-01-01 PROCEDURE — 0JH63XZ INSERTION OF TUNNELED VASCULAR ACCESS DEVICE INTO CHEST SUBCUTANEOUS TISSUE AND FASCIA, PERCUTANEOUS APPROACH: ICD-10-PCS | Performed by: RADIOLOGY

## 2021-01-01 PROCEDURE — 120N000004 HC R&B MS OVERFLOW

## 2021-01-01 PROCEDURE — 97161 PT EVAL LOW COMPLEX 20 MIN: CPT | Mod: GP

## 2021-01-01 PROCEDURE — 80048 BASIC METABOLIC PNL TOTAL CA: CPT | Performed by: EMERGENCY MEDICINE

## 2021-01-01 PROCEDURE — 99231 SBSQ HOSP IP/OBS SF/LOW 25: CPT | Performed by: INTERNAL MEDICINE

## 2021-01-01 PROCEDURE — 97530 THERAPEUTIC ACTIVITIES: CPT | Mod: GP

## 2021-01-01 PROCEDURE — 99222 1ST HOSP IP/OBS MODERATE 55: CPT | Performed by: HOSPITALIST

## 2021-01-01 PROCEDURE — 36592 COLLECT BLOOD FROM PICC: CPT | Performed by: EMERGENCY MEDICINE

## 2021-01-01 PROCEDURE — 71046 X-RAY EXAM CHEST 2 VIEWS: CPT

## 2021-01-01 PROCEDURE — 83735 ASSAY OF MAGNESIUM: CPT | Performed by: PHYSICIAN ASSISTANT

## 2021-01-01 PROCEDURE — 99207 PR CDG-CHARGE REQUIRED MANUAL ENTRY: CPT | Performed by: INTERNAL MEDICINE

## 2021-01-01 PROCEDURE — 36415 COLL VENOUS BLD VENIPUNCTURE: CPT | Performed by: PHYSICIAN ASSISTANT

## 2021-01-01 PROCEDURE — 82805 BLOOD GASES W/O2 SATURATION: CPT | Performed by: SURGERY

## 2021-01-01 PROCEDURE — 99207 PR MOONLIGHTING INDICATOR: CPT | Performed by: HOSPITALIST

## 2021-01-01 PROCEDURE — 85025 COMPLETE CBC W/AUTO DIFF WBC: CPT | Performed by: PHYSICIAN ASSISTANT

## 2021-01-01 PROCEDURE — 85610 PROTHROMBIN TIME: CPT | Performed by: INTERNAL MEDICINE

## 2021-01-01 PROCEDURE — 99214 OFFICE O/P EST MOD 30 MIN: CPT | Mod: 95 | Performed by: INTERNAL MEDICINE

## 2021-01-01 PROCEDURE — 97530 THERAPEUTIC ACTIVITIES: CPT | Mod: GO

## 2021-01-01 PROCEDURE — 258N000003 HC RX IP 258 OP 636

## 2021-01-01 PROCEDURE — 82803 BLOOD GASES ANY COMBINATION: CPT | Performed by: NURSE PRACTITIONER

## 2021-01-01 PROCEDURE — 80053 COMPREHEN METABOLIC PANEL: CPT | Performed by: HOSPITALIST

## 2021-01-01 PROCEDURE — 84484 ASSAY OF TROPONIN QUANT: CPT | Performed by: PHYSICIAN ASSISTANT

## 2021-01-01 PROCEDURE — 02HV33Z INSERTION OF INFUSION DEVICE INTO SUPERIOR VENA CAVA, PERCUTANEOUS APPROACH: ICD-10-PCS | Performed by: INTERNAL MEDICINE

## 2021-01-01 PROCEDURE — 96376 TX/PRO/DX INJ SAME DRUG ADON: CPT

## 2021-01-01 PROCEDURE — 99233 SBSQ HOSP IP/OBS HIGH 50: CPT | Performed by: NURSE PRACTITIONER

## 2021-01-01 PROCEDURE — 83605 ASSAY OF LACTIC ACID: CPT | Performed by: EMERGENCY MEDICINE

## 2021-01-01 PROCEDURE — 85025 COMPLETE CBC W/AUTO DIFF WBC: CPT | Performed by: INTERNAL MEDICINE

## 2021-01-01 PROCEDURE — C1750 CATH, HEMODIALYSIS,LONG-TERM: HCPCS

## 2021-01-01 PROCEDURE — 272N000602 HC WOUND GLUE CR1

## 2021-01-01 PROCEDURE — 85025 COMPLETE CBC W/AUTO DIFF WBC: CPT | Performed by: EMERGENCY MEDICINE

## 2021-01-01 PROCEDURE — 80053 COMPREHEN METABOLIC PANEL: CPT | Performed by: EMERGENCY MEDICINE

## 2021-01-01 PROCEDURE — 87077 CULTURE AEROBIC IDENTIFY: CPT | Performed by: INTERNAL MEDICINE

## 2021-01-01 PROCEDURE — 97110 THERAPEUTIC EXERCISES: CPT | Mod: GP | Performed by: PHYSICAL THERAPIST

## 2021-01-01 PROCEDURE — 99233 SBSQ HOSP IP/OBS HIGH 50: CPT | Performed by: HOSPITALIST

## 2021-01-01 PROCEDURE — 76937 US GUIDE VASCULAR ACCESS: CPT

## 2021-01-01 PROCEDURE — 99292 CRITICAL CARE ADDL 30 MIN: CPT

## 2021-01-01 PROCEDURE — 82565 ASSAY OF CREATININE: CPT | Performed by: NURSE PRACTITIONER

## 2021-01-01 PROCEDURE — 99442 PR PHYSICIAN TELEPHONE EVALUATION 11-20 MIN: CPT | Mod: 95 | Performed by: INTERNAL MEDICINE

## 2021-01-01 PROCEDURE — 84132 ASSAY OF SERUM POTASSIUM: CPT | Performed by: PSYCHIATRY & NEUROLOGY

## 2021-01-01 PROCEDURE — 36415 COLL VENOUS BLD VENIPUNCTURE: CPT | Performed by: HOSPITALIST

## 2021-01-01 PROCEDURE — 83690 ASSAY OF LIPASE: CPT | Performed by: INTERNAL MEDICINE

## 2021-01-01 PROCEDURE — 255N000002 HC RX 255 OP 636: Performed by: INTERNAL MEDICINE

## 2021-01-01 PROCEDURE — 250N000009 HC RX 250: Performed by: OBSTETRICS & GYNECOLOGY

## 2021-01-01 PROCEDURE — 99223 1ST HOSP IP/OBS HIGH 75: CPT | Mod: AI | Performed by: HOSPITALIST

## 2021-01-01 PROCEDURE — 82962 GLUCOSE BLOOD TEST: CPT

## 2021-01-01 PROCEDURE — 30243S1 TRANSFUSION OF NONAUTOLOGOUS GLOBULIN INTO CENTRAL VEIN, PERCUTANEOUS APPROACH: ICD-10-PCS | Performed by: HOSPITALIST

## 2021-01-01 PROCEDURE — 87205 SMEAR GRAM STAIN: CPT | Performed by: INTERNAL MEDICINE

## 2021-01-01 PROCEDURE — 85379 FIBRIN DEGRADATION QUANT: CPT | Performed by: EMERGENCY MEDICINE

## 2021-01-01 PROCEDURE — 258N000003 HC RX IP 258 OP 636: Performed by: EMERGENCY MEDICINE

## 2021-01-01 PROCEDURE — 84155 ASSAY OF PROTEIN SERUM: CPT | Performed by: INTERNAL MEDICINE

## 2021-01-01 PROCEDURE — 99291 CRITICAL CARE FIRST HOUR: CPT

## 2021-01-01 PROCEDURE — 272N000196 HC ACCESSORY CR5

## 2021-01-01 PROCEDURE — 99309 SBSQ NF CARE MODERATE MDM 30: CPT | Performed by: INTERNAL MEDICINE

## 2021-01-01 PROCEDURE — 5A1945Z RESPIRATORY VENTILATION, 24-96 CONSECUTIVE HOURS: ICD-10-PCS | Performed by: HOSPITALIST

## 2021-01-01 PROCEDURE — 84132 ASSAY OF SERUM POTASSIUM: CPT | Performed by: NURSE PRACTITIONER

## 2021-01-01 PROCEDURE — 5A1955Z RESPIRATORY VENTILATION, GREATER THAN 96 CONSECUTIVE HOURS: ICD-10-PCS | Performed by: INTERNAL MEDICINE

## 2021-01-01 PROCEDURE — 97110 THERAPEUTIC EXERCISES: CPT | Mod: GP

## 2021-01-01 PROCEDURE — 85027 COMPLETE CBC AUTOMATED: CPT | Mod: ORL | Performed by: NURSE PRACTITIONER

## 2021-01-01 PROCEDURE — 5A09357 ASSISTANCE WITH RESPIRATORY VENTILATION, LESS THAN 24 CONSECUTIVE HOURS, CONTINUOUS POSITIVE AIRWAY PRESSURE: ICD-10-PCS | Performed by: HOSPITALIST

## 2021-01-01 PROCEDURE — 83880 ASSAY OF NATRIURETIC PEPTIDE: CPT | Performed by: PHYSICIAN ASSISTANT

## 2021-01-01 PROCEDURE — 99207 PR APP CREDIT; MD BILLING SHARED VISIT: CPT | Performed by: INTERNAL MEDICINE

## 2021-01-01 PROCEDURE — 250N000009 HC RX 250: Performed by: NURSE ANESTHETIST, CERTIFIED REGISTERED

## 2021-01-01 PROCEDURE — 81001 URINALYSIS AUTO W/SCOPE: CPT | Performed by: NURSE PRACTITIONER

## 2021-01-01 PROCEDURE — 93306 TTE W/DOPPLER COMPLETE: CPT | Mod: 26 | Performed by: INTERNAL MEDICINE

## 2021-01-01 PROCEDURE — 99238 HOSP IP/OBS DSCHRG MGMT 30/<: CPT | Performed by: HOSPITALIST

## 2021-01-01 PROCEDURE — 86900 BLOOD TYPING SEROLOGIC ABO: CPT | Performed by: PHYSICIAN ASSISTANT

## 2021-01-01 PROCEDURE — 86850 RBC ANTIBODY SCREEN: CPT | Performed by: PHYSICIAN ASSISTANT

## 2021-01-01 PROCEDURE — 99305 1ST NF CARE MODERATE MDM 35: CPT | Mod: AI | Performed by: INTERNAL MEDICINE

## 2021-01-01 PROCEDURE — 36556 INSERT NON-TUNNEL CV CATH: CPT | Performed by: INTERNAL MEDICINE

## 2021-01-01 PROCEDURE — 250N000011 HC RX IP 250 OP 636: Performed by: HOSPITALIST

## 2021-01-01 PROCEDURE — 99207 PR CONSULT E&M CHANGED TO INITIAL LEVEL: CPT | Performed by: NURSE PRACTITIONER

## 2021-01-01 PROCEDURE — 999N001063 HC STATISTIC THAWING COMPONENT: Performed by: INTERNAL MEDICINE

## 2021-01-01 PROCEDURE — 94799 UNLISTED PULMONARY SVC/PX: CPT

## 2021-01-01 PROCEDURE — U0003 INFECTIOUS AGENT DETECTION BY NUCLEIC ACID (DNA OR RNA); SEVERE ACUTE RESPIRATORY SYNDROME CORONAVIRUS 2 (SARS-COV-2) (CORONAVIRUS DISEASE [COVID-19]), AMPLIFIED PROBE TECHNIQUE, MAKING USE OF HIGH THROUGHPUT TECHNOLOGIES AS DESCRIBED BY CMS-2020-01-R: HCPCS | Performed by: INTERNAL MEDICINE

## 2021-01-01 PROCEDURE — 85610 PROTHROMBIN TIME: CPT | Performed by: EMERGENCY MEDICINE

## 2021-01-01 PROCEDURE — 99207 PR APP CREDIT; MD BILLING SHARED VISIT: CPT | Performed by: PHYSICIAN ASSISTANT

## 2021-01-01 PROCEDURE — 36620 INSERTION CATHETER ARTERY: CPT | Performed by: OBSTETRICS & GYNECOLOGY

## 2021-01-01 PROCEDURE — 85520 HEPARIN ASSAY: CPT | Performed by: SURGERY

## 2021-01-01 PROCEDURE — 96367 TX/PROPH/DG ADDL SEQ IV INF: CPT

## 2021-01-01 PROCEDURE — 93325 DOPPLER ECHO COLOR FLOW MAPG: CPT

## 2021-01-01 PROCEDURE — 99223 1ST HOSP IP/OBS HIGH 75: CPT | Mod: AI | Performed by: NURSE PRACTITIONER

## 2021-01-01 PROCEDURE — 82805 BLOOD GASES W/O2 SATURATION: CPT | Performed by: NURSE PRACTITIONER

## 2021-01-01 PROCEDURE — 80048 BASIC METABOLIC PNL TOTAL CA: CPT | Performed by: HOSPITALIST

## 2021-01-01 PROCEDURE — 83735 ASSAY OF MAGNESIUM: CPT | Performed by: STUDENT IN AN ORGANIZED HEALTH CARE EDUCATION/TRAINING PROGRAM

## 2021-01-01 PROCEDURE — 83735 ASSAY OF MAGNESIUM: CPT | Performed by: NURSE PRACTITIONER

## 2021-01-01 PROCEDURE — U0003 INFECTIOUS AGENT DETECTION BY NUCLEIC ACID (DNA OR RNA); SEVERE ACUTE RESPIRATORY SYNDROME CORONAVIRUS 2 (SARS-COV-2) (CORONAVIRUS DISEASE [COVID-19]), AMPLIFIED PROBE TECHNIQUE, MAKING USE OF HIGH THROUGHPUT TECHNOLOGIES AS DESCRIBED BY CMS-2020-01-R: HCPCS | Mod: ORL | Performed by: NURSE PRACTITIONER

## 2021-01-01 PROCEDURE — 36415 COLL VENOUS BLD VENIPUNCTURE: CPT | Performed by: STUDENT IN AN ORGANIZED HEALTH CARE EDUCATION/TRAINING PROGRAM

## 2021-01-01 PROCEDURE — 250N000009 HC RX 250: Performed by: NURSE PRACTITIONER

## 2021-01-01 PROCEDURE — 83880 ASSAY OF NATRIURETIC PEPTIDE: CPT | Performed by: INTERNAL MEDICINE

## 2021-01-01 PROCEDURE — 96372 THER/PROPH/DIAG INJ SC/IM: CPT | Performed by: NURSE PRACTITIONER

## 2021-01-01 PROCEDURE — 258N000003 HC RX IP 258 OP 636: Performed by: OBSTETRICS & GYNECOLOGY

## 2021-01-01 PROCEDURE — 85018 HEMOGLOBIN: CPT | Performed by: EMERGENCY MEDICINE

## 2021-01-01 PROCEDURE — 97162 PT EVAL MOD COMPLEX 30 MIN: CPT | Mod: GP | Performed by: PHYSICAL THERAPIST

## 2021-01-01 PROCEDURE — 30243S1 TRANSFUSION OF NONAUTOLOGOUS GLOBULIN INTO CENTRAL VEIN, PERCUTANEOUS APPROACH: ICD-10-PCS | Performed by: PSYCHIATRY & NEUROLOGY

## 2021-01-01 PROCEDURE — U0005 INFEC AGEN DETEC AMPLI PROBE: HCPCS | Performed by: NURSE PRACTITIONER

## 2021-01-01 PROCEDURE — 87186 SC STD MICRODIL/AGAR DIL: CPT | Performed by: PHYSICIAN ASSISTANT

## 2021-01-01 PROCEDURE — 36589 REMOVAL TUNNELED CV CATH: CPT

## 2021-01-01 PROCEDURE — 87088 URINE BACTERIA CULTURE: CPT | Performed by: PHYSICIAN ASSISTANT

## 2021-01-01 PROCEDURE — 84484 ASSAY OF TROPONIN QUANT: CPT | Performed by: OBSTETRICS & GYNECOLOGY

## 2021-01-01 PROCEDURE — 97166 OT EVAL MOD COMPLEX 45 MIN: CPT | Mod: GO | Performed by: OCCUPATIONAL THERAPIST

## 2021-01-01 PROCEDURE — 84132 ASSAY OF SERUM POTASSIUM: CPT | Performed by: HOSPITALIST

## 2021-01-01 RX ORDER — MYCOPHENOLATE MOFETIL 500 MG/1
1000 TABLET ORAL EVERY MORNING
Status: DISCONTINUED | OUTPATIENT
Start: 2021-01-01 | End: 2021-01-01 | Stop reason: ALTCHOICE

## 2021-01-01 RX ORDER — LANOLIN ALCOHOL/MO/W.PET/CERES
3 CREAM (GRAM) TOPICAL AT BEDTIME
COMMUNITY

## 2021-01-01 RX ORDER — CALCIUM GLUCONATE 94 MG/ML
1 INJECTION, SOLUTION INTRAVENOUS
Status: DISCONTINUED | OUTPATIENT
Start: 2021-01-01 | End: 2021-01-01 | Stop reason: HOSPADM

## 2021-01-01 RX ORDER — ACETAMINOPHEN 500 MG
500-1000 TABLET ORAL EVERY 6 HOURS PRN
Status: ON HOLD | COMMUNITY
End: 2021-01-01

## 2021-01-01 RX ORDER — PREDNISONE 20 MG/1
30 TABLET ORAL DAILY
DISCHARGE
Start: 2021-01-01 | End: 2021-01-01

## 2021-01-01 RX ORDER — ACETAMINOPHEN 650 MG/1
650 SUPPOSITORY RECTAL EVERY 6 HOURS PRN
Status: DISCONTINUED | OUTPATIENT
Start: 2021-01-01 | End: 2021-01-01 | Stop reason: HOSPADM

## 2021-01-01 RX ORDER — FUROSEMIDE 20 MG
20 TABLET ORAL 2 TIMES DAILY
Status: ON HOLD
Start: 2021-01-01 | End: 2021-01-01

## 2021-01-01 RX ORDER — CALCIUM GLUCONATE 94 MG/ML
1 INJECTION, SOLUTION INTRAVENOUS
Status: DISCONTINUED | OUTPATIENT
Start: 2021-01-01 | End: 2021-01-01

## 2021-01-01 RX ORDER — FUROSEMIDE 10 MG/ML
20 INJECTION INTRAMUSCULAR; INTRAVENOUS ONCE
Status: COMPLETED | OUTPATIENT
Start: 2021-01-01 | End: 2021-01-01

## 2021-01-01 RX ORDER — PROPOFOL 10 MG/ML
10-20 INJECTION, EMULSION INTRAVENOUS EVERY 30 MIN PRN
Status: DISCONTINUED | OUTPATIENT
Start: 2021-01-01 | End: 2021-01-01

## 2021-01-01 RX ORDER — ONDANSETRON 2 MG/ML
4 INJECTION INTRAMUSCULAR; INTRAVENOUS EVERY 6 HOURS PRN
Status: DISCONTINUED | OUTPATIENT
Start: 2021-01-01 | End: 2021-01-01

## 2021-01-01 RX ORDER — PIPERACILLIN SODIUM, TAZOBACTAM SODIUM 3; .375 G/15ML; G/15ML
3.38 INJECTION, POWDER, LYOPHILIZED, FOR SOLUTION INTRAVENOUS EVERY 6 HOURS
Status: DISCONTINUED | OUTPATIENT
Start: 2021-01-01 | End: 2021-01-01

## 2021-01-01 RX ORDER — ONDANSETRON 4 MG/1
4 TABLET, ORALLY DISINTEGRATING ORAL EVERY 6 HOURS PRN
Status: DISCONTINUED | OUTPATIENT
Start: 2021-01-01 | End: 2021-01-01 | Stop reason: HOSPADM

## 2021-01-01 RX ORDER — ATROPINE SULFATE 10 MG/ML
2 SOLUTION/ DROPS OPHTHALMIC EVERY 4 HOURS PRN
Status: DISCONTINUED | OUTPATIENT
Start: 2021-01-01 | End: 2021-01-01 | Stop reason: HOSPADM

## 2021-01-01 RX ORDER — METHYLPREDNISOLONE SODIUM SUCCINATE 40 MG/ML
40 INJECTION, POWDER, LYOPHILIZED, FOR SOLUTION INTRAMUSCULAR; INTRAVENOUS EVERY 24 HOURS
Status: DISCONTINUED | OUTPATIENT
Start: 2021-01-01 | End: 2021-01-01

## 2021-01-01 RX ORDER — CEFTRIAXONE 1 G/1
1 INJECTION, POWDER, FOR SOLUTION INTRAMUSCULAR; INTRAVENOUS EVERY 24 HOURS
Status: DISCONTINUED | OUTPATIENT
Start: 2021-01-01 | End: 2021-01-01

## 2021-01-01 RX ORDER — MYCOPHENOLATE MOFETIL 500 MG/1
500 TABLET ORAL EVERY EVENING
Status: DISCONTINUED | OUTPATIENT
Start: 2021-01-01 | End: 2021-01-01 | Stop reason: ALTCHOICE

## 2021-01-01 RX ORDER — ONDANSETRON 2 MG/ML
4 INJECTION INTRAMUSCULAR; INTRAVENOUS EVERY 6 HOURS PRN
Status: DISCONTINUED | OUTPATIENT
Start: 2021-01-01 | End: 2021-01-01 | Stop reason: HOSPADM

## 2021-01-01 RX ORDER — ACETAMINOPHEN 325 MG/1
650 TABLET ORAL EVERY 4 HOURS PRN
Status: DISCONTINUED | OUTPATIENT
Start: 2021-01-01 | End: 2021-01-01

## 2021-01-01 RX ORDER — FUROSEMIDE 10 MG/ML
40 INJECTION INTRAMUSCULAR; INTRAVENOUS ONCE
Status: COMPLETED | OUTPATIENT
Start: 2021-01-01 | End: 2021-01-01

## 2021-01-01 RX ORDER — SPIRONOLACTONE 25 MG/1
25 TABLET ORAL DAILY
Start: 2021-01-01

## 2021-01-01 RX ORDER — PROPOFOL 10 MG/ML
10-20 INJECTION, EMULSION INTRAVENOUS EVERY 30 MIN PRN
Status: DISCONTINUED | OUTPATIENT
Start: 2021-01-01 | End: 2021-01-01 | Stop reason: CLARIF

## 2021-01-01 RX ORDER — VITAMIN B COMPLEX
100 TABLET ORAL DAILY
Status: DISCONTINUED | OUTPATIENT
Start: 2021-01-01 | End: 2021-01-01

## 2021-01-01 RX ORDER — PIPERACILLIN SODIUM, TAZOBACTAM SODIUM 4; .5 G/20ML; G/20ML
4.5 INJECTION, POWDER, LYOPHILIZED, FOR SOLUTION INTRAVENOUS EVERY 6 HOURS
Status: DISCONTINUED | OUTPATIENT
Start: 2021-01-01 | End: 2021-01-01

## 2021-01-01 RX ORDER — METHYLPREDNISOLONE SODIUM SUCCINATE 125 MG/2ML
125 INJECTION, POWDER, LYOPHILIZED, FOR SOLUTION INTRAMUSCULAR; INTRAVENOUS ONCE
Status: COMPLETED | OUTPATIENT
Start: 2021-01-01 | End: 2021-01-01

## 2021-01-01 RX ORDER — BISACODYL 10 MG
10 SUPPOSITORY, RECTAL RECTAL DAILY PRN
Status: DISCONTINUED | OUTPATIENT
Start: 2021-01-01 | End: 2021-01-01 | Stop reason: HOSPADM

## 2021-01-01 RX ORDER — FUROSEMIDE 10 MG/ML
20 INJECTION INTRAMUSCULAR; INTRAVENOUS EVERY 12 HOURS
Status: DISCONTINUED | OUTPATIENT
Start: 2021-01-01 | End: 2021-01-01

## 2021-01-01 RX ORDER — PREDNISONE 10 MG/1
10 TABLET ORAL DAILY
Status: DISCONTINUED | OUTPATIENT
Start: 2021-01-01 | End: 2021-01-01 | Stop reason: HOSPADM

## 2021-01-01 RX ORDER — PROPOFOL 10 MG/ML
INJECTION, EMULSION INTRAVENOUS
Status: COMPLETED
Start: 2021-01-01 | End: 2021-01-01

## 2021-01-01 RX ORDER — FUROSEMIDE 10 MG/ML
40 INJECTION INTRAMUSCULAR; INTRAVENOUS EVERY 12 HOURS
Status: DISCONTINUED | OUTPATIENT
Start: 2021-01-01 | End: 2021-01-01

## 2021-01-01 RX ORDER — AMOXICILLIN 250 MG
2 CAPSULE ORAL 2 TIMES DAILY PRN
Status: DISCONTINUED | OUTPATIENT
Start: 2021-01-01 | End: 2021-01-01 | Stop reason: HOSPADM

## 2021-01-01 RX ORDER — NICOTINE POLACRILEX 4 MG
15-30 LOZENGE BUCCAL
Status: DISCONTINUED | OUTPATIENT
Start: 2021-01-01 | End: 2021-01-01 | Stop reason: HOSPADM

## 2021-01-01 RX ORDER — LIDOCAINE 40 MG/G
CREAM TOPICAL
Status: DISCONTINUED | OUTPATIENT
Start: 2021-01-01 | End: 2021-01-01 | Stop reason: HOSPADM

## 2021-01-01 RX ORDER — POTASSIUM CHLORIDE 1.5 G/1.58G
20 POWDER, FOR SOLUTION ORAL ONCE
Status: COMPLETED | OUTPATIENT
Start: 2021-01-01 | End: 2021-01-01

## 2021-01-01 RX ORDER — DIPHENHYDRAMINE HCL 25 MG
50 CAPSULE ORAL ONCE
Status: COMPLETED | OUTPATIENT
Start: 2021-01-01 | End: 2021-01-01

## 2021-01-01 RX ORDER — LOSARTAN POTASSIUM 50 MG/1
50 TABLET ORAL DAILY
Status: CANCELLED | OUTPATIENT
Start: 2021-01-01

## 2021-01-01 RX ORDER — POTASSIUM CHLORIDE 750 MG/1
20 TABLET, EXTENDED RELEASE ORAL DAILY
Start: 2021-01-01 | End: 2021-01-01

## 2021-01-01 RX ORDER — PREDNISONE 20 MG/1
60 TABLET ORAL DAILY
Status: DISCONTINUED | OUTPATIENT
Start: 2021-01-01 | End: 2021-01-01

## 2021-01-01 RX ORDER — METHYLPREDNISOLONE SODIUM SUCCINATE 125 MG/2ML
60 INJECTION, POWDER, LYOPHILIZED, FOR SOLUTION INTRAMUSCULAR; INTRAVENOUS EVERY 8 HOURS
Status: DISCONTINUED | OUTPATIENT
Start: 2021-01-01 | End: 2021-01-01

## 2021-01-01 RX ORDER — CHLORHEXIDINE GLUCONATE ORAL RINSE 1.2 MG/ML
15 SOLUTION DENTAL EVERY 12 HOURS
Status: DISCONTINUED | OUTPATIENT
Start: 2021-01-01 | End: 2021-01-01

## 2021-01-01 RX ORDER — POTASSIUM CHLORIDE 750 MG/1
40 TABLET, EXTENDED RELEASE ORAL DAILY
Status: ON HOLD
Start: 2021-01-01 | End: 2021-01-01

## 2021-01-01 RX ORDER — PYRIDOSTIGMINE BROMIDE 60 MG/1
60 TABLET ORAL 3 TIMES DAILY
DISCHARGE
Start: 2021-01-01

## 2021-01-01 RX ORDER — SULFAMETHOXAZOLE AND TRIMETHOPRIM 200; 40 MG/5ML; MG/5ML
20 SUSPENSION ORAL DAILY
Status: CANCELLED | OUTPATIENT
Start: 2021-01-01

## 2021-01-01 RX ORDER — IPRATROPIUM BROMIDE AND ALBUTEROL SULFATE 2.5; .5 MG/3ML; MG/3ML
3 SOLUTION RESPIRATORY (INHALATION)
Status: DISCONTINUED | OUTPATIENT
Start: 2021-01-01 | End: 2021-01-01 | Stop reason: HOSPADM

## 2021-01-01 RX ORDER — SPIRONOLACTONE 25 MG/1
25 TABLET ORAL DAILY
Status: DISCONTINUED | OUTPATIENT
Start: 2021-01-01 | End: 2021-01-01 | Stop reason: HOSPADM

## 2021-01-01 RX ORDER — ACETAMINOPHEN 325 MG/1
650 TABLET ORAL EVERY 6 HOURS PRN
Status: DISCONTINUED | OUTPATIENT
Start: 2021-01-01 | End: 2021-01-01 | Stop reason: HOSPADM

## 2021-01-01 RX ORDER — MYCOPHENOLATE MOFETIL 500 MG/1
500 TABLET, FILM COATED ORAL EVERY EVENING
Status: ON HOLD | COMMUNITY
End: 2021-01-01

## 2021-01-01 RX ORDER — PROPOFOL 10 MG/ML
5-75 INJECTION, EMULSION INTRAVENOUS CONTINUOUS
Status: DISCONTINUED | OUTPATIENT
Start: 2021-01-01 | End: 2021-01-01

## 2021-01-01 RX ORDER — ACETAZOLAMIDE 500 MG/5ML
500 INJECTION, POWDER, LYOPHILIZED, FOR SOLUTION INTRAVENOUS ONCE
Status: COMPLETED | OUTPATIENT
Start: 2021-01-01 | End: 2021-01-01

## 2021-01-01 RX ORDER — PREDNISONE 20 MG/1
40 TABLET ORAL DAILY
Status: DISCONTINUED | OUTPATIENT
Start: 2021-01-01 | End: 2021-01-01 | Stop reason: HOSPADM

## 2021-01-01 RX ORDER — POLYETHYLENE GLYCOL 3350 17 G/17G
17 POWDER, FOR SOLUTION ORAL DAILY
Status: DISCONTINUED | OUTPATIENT
Start: 2021-01-01 | End: 2021-01-01 | Stop reason: HOSPADM

## 2021-01-01 RX ORDER — PREDNISONE 20 MG/1
60 TABLET ORAL DAILY
Status: DISCONTINUED | OUTPATIENT
Start: 2021-01-01 | End: 2021-01-01 | Stop reason: HOSPADM

## 2021-01-01 RX ORDER — DIPHENHYDRAMINE HYDROCHLORIDE 50 MG/ML
50 INJECTION INTRAMUSCULAR; INTRAVENOUS
Status: DISCONTINUED | OUTPATIENT
Start: 2021-01-01 | End: 2021-01-01

## 2021-01-01 RX ORDER — ACETAMINOPHEN 325 MG/10.15ML
650 LIQUID ORAL
Status: DISCONTINUED | OUTPATIENT
Start: 2021-01-01 | End: 2021-01-01

## 2021-01-01 RX ORDER — PIPERACILLIN SODIUM, TAZOBACTAM SODIUM 3; .375 G/15ML; G/15ML
3.38 INJECTION, POWDER, LYOPHILIZED, FOR SOLUTION INTRAVENOUS ONCE
Status: COMPLETED | OUTPATIENT
Start: 2021-01-01 | End: 2021-01-01

## 2021-01-01 RX ORDER — MYCOPHENOLATE MOFETIL 200 MG/ML
1000 POWDER, FOR SUSPENSION ORAL EVERY MORNING
Status: DISCONTINUED | OUTPATIENT
Start: 2021-01-01 | End: 2021-01-01

## 2021-01-01 RX ORDER — HEPARIN SODIUM 1000 [USP'U]/ML
1.6 INJECTION, SOLUTION INTRAVENOUS; SUBCUTANEOUS
Status: DISCONTINUED | OUTPATIENT
Start: 2021-01-01 | End: 2021-01-01

## 2021-01-01 RX ORDER — FUROSEMIDE 20 MG
20 TABLET ORAL DAILY
Status: DISCONTINUED | OUTPATIENT
Start: 2021-01-01 | End: 2021-01-01 | Stop reason: ALTCHOICE

## 2021-01-01 RX ORDER — FUROSEMIDE 10 MG/ML
40 INJECTION INTRAMUSCULAR; INTRAVENOUS 2 TIMES DAILY
Status: DISCONTINUED | OUTPATIENT
Start: 2021-01-01 | End: 2021-01-01

## 2021-01-01 RX ORDER — MORPHINE SULFATE 20 MG/ML
5-10 SOLUTION ORAL
Status: DISCONTINUED | OUTPATIENT
Start: 2021-01-01 | End: 2021-01-01 | Stop reason: HOSPADM

## 2021-01-01 RX ORDER — ALBUTEROL SULFATE 0.83 MG/ML
2.5 SOLUTION RESPIRATORY (INHALATION)
Status: DISCONTINUED | OUTPATIENT
Start: 2021-01-01 | End: 2021-01-01 | Stop reason: HOSPADM

## 2021-01-01 RX ORDER — DEXTROSE MONOHYDRATE 25 G/50ML
25-50 INJECTION, SOLUTION INTRAVENOUS
Status: DISCONTINUED | OUTPATIENT
Start: 2021-01-01 | End: 2021-01-01 | Stop reason: HOSPADM

## 2021-01-01 RX ORDER — MYCOPHENOLATE MOFETIL 500 MG/1
1000 TABLET ORAL EVERY MORNING
Status: DISCONTINUED | OUTPATIENT
Start: 2021-01-01 | End: 2021-01-01

## 2021-01-01 RX ORDER — NOREPINEPHRINE BITARTRATE 0.02 MG/ML
.03-.125 INJECTION, SOLUTION INTRAVENOUS CONTINUOUS
Status: DISCONTINUED | OUTPATIENT
Start: 2021-01-01 | End: 2021-01-01

## 2021-01-01 RX ORDER — ACETAMINOPHEN 325 MG/1
650 TABLET ORAL ONCE
Status: COMPLETED | OUTPATIENT
Start: 2021-01-01 | End: 2021-01-01

## 2021-01-01 RX ORDER — BUSPIRONE HYDROCHLORIDE 5 MG/1
5 TABLET ORAL 2 TIMES DAILY
COMMUNITY

## 2021-01-01 RX ORDER — NITROGLYCERIN 20 MG/100ML
10-200 INJECTION INTRAVENOUS CONTINUOUS
Status: DISCONTINUED | OUTPATIENT
Start: 2021-01-01 | End: 2021-01-01

## 2021-01-01 RX ORDER — DEXTROSE MONOHYDRATE 25 G/50ML
25-50 INJECTION, SOLUTION INTRAVENOUS
Status: DISCONTINUED | OUTPATIENT
Start: 2021-01-01 | End: 2021-01-01

## 2021-01-01 RX ORDER — SERTRALINE HYDROCHLORIDE 25 MG/1
50 TABLET, FILM COATED ORAL DAILY
Start: 2021-01-01

## 2021-01-01 RX ORDER — PREDNISONE 10 MG/1
10 TABLET ORAL DAILY
Status: DISCONTINUED | OUTPATIENT
Start: 2021-01-01 | End: 2021-01-01

## 2021-01-01 RX ORDER — PROCHLORPERAZINE 25 MG
12.5 SUPPOSITORY, RECTAL RECTAL EVERY 12 HOURS PRN
Status: DISCONTINUED | OUTPATIENT
Start: 2021-01-01 | End: 2021-01-01 | Stop reason: HOSPADM

## 2021-01-01 RX ORDER — DIPHENHYDRAMINE HYDROCHLORIDE 50 MG/ML
50 INJECTION INTRAMUSCULAR; INTRAVENOUS
Status: DISCONTINUED | OUTPATIENT
Start: 2021-01-01 | End: 2021-01-01 | Stop reason: HOSPADM

## 2021-01-01 RX ORDER — ACETAMINOPHEN 325 MG/10.15ML
1000 LIQUID ORAL 3 TIMES DAILY
Status: DISCONTINUED | OUTPATIENT
Start: 2021-01-01 | End: 2021-01-01

## 2021-01-01 RX ORDER — MYCOPHENOLATE MOFETIL 500 MG/1
500 TABLET ORAL EVERY EVENING
Status: DISCONTINUED | OUTPATIENT
Start: 2021-01-01 | End: 2021-01-01

## 2021-01-01 RX ORDER — MULTIVITAMIN,THERAPEUTIC
1 TABLET ORAL DAILY
Status: DISCONTINUED | OUTPATIENT
Start: 2021-01-01 | End: 2021-01-01 | Stop reason: HOSPADM

## 2021-01-01 RX ORDER — PYRIDOSTIGMINE BROMIDE 60 MG/1
60 TABLET ORAL EVERY 8 HOURS SCHEDULED
Status: DISCONTINUED | OUTPATIENT
Start: 2021-01-01 | End: 2021-01-01

## 2021-01-01 RX ORDER — MYCOPHENOLATE MOFETIL 500 MG/1
1000 TABLET ORAL EVERY MORNING
Status: ON HOLD | COMMUNITY
End: 2021-01-01

## 2021-01-01 RX ORDER — CARBOXYMETHYLCELLULOSE SODIUM 5 MG/ML
1 SOLUTION/ DROPS OPHTHALMIC
Status: DISCONTINUED | OUTPATIENT
Start: 2021-01-01 | End: 2021-01-01

## 2021-01-01 RX ORDER — MYCOPHENOLATE MOFETIL 200 MG/ML
1000 POWDER, FOR SUSPENSION ORAL EVERY MORNING
Status: DISCONTINUED | OUTPATIENT
Start: 2021-01-01 | End: 2021-01-01 | Stop reason: HOSPADM

## 2021-01-01 RX ORDER — PREDNISONE 50 MG/1
50 TABLET ORAL DAILY
Status: ON HOLD | DISCHARGE
Start: 2021-01-01 | End: 2021-01-01

## 2021-01-01 RX ORDER — NALOXONE HYDROCHLORIDE 0.4 MG/ML
0.2 INJECTION, SOLUTION INTRAMUSCULAR; INTRAVENOUS; SUBCUTANEOUS
Status: DISCONTINUED | OUTPATIENT
Start: 2021-01-01 | End: 2021-01-01

## 2021-01-01 RX ORDER — ALBUMIN HUMAN 25 %
4250 INTRAVENOUS SOLUTION INTRAVENOUS
Status: COMPLETED | OUTPATIENT
Start: 2021-01-01 | End: 2021-01-01

## 2021-01-01 RX ORDER — SODIUM CHLORIDE, SODIUM LACTATE, POTASSIUM CHLORIDE, CALCIUM CHLORIDE 600; 310; 30; 20 MG/100ML; MG/100ML; MG/100ML; MG/100ML
INJECTION, SOLUTION INTRAVENOUS CONTINUOUS
Status: DISCONTINUED | OUTPATIENT
Start: 2021-01-01 | End: 2021-01-01

## 2021-01-01 RX ORDER — LOSARTAN POTASSIUM 50 MG/1
50 TABLET ORAL DAILY
Status: DISCONTINUED | OUTPATIENT
Start: 2021-01-01 | End: 2021-01-01

## 2021-01-01 RX ORDER — POTASSIUM CHLORIDE 1500 MG/1
20 TABLET, EXTENDED RELEASE ORAL DAILY
Status: DISCONTINUED | OUTPATIENT
Start: 2021-01-01 | End: 2021-01-01

## 2021-01-01 RX ORDER — FUROSEMIDE 20 MG
20 TABLET ORAL DAILY
Status: DISCONTINUED | OUTPATIENT
Start: 2021-01-01 | End: 2021-01-01 | Stop reason: HOSPADM

## 2021-01-01 RX ORDER — POTASSIUM CHLORIDE 29.8 MG/ML
20 INJECTION INTRAVENOUS ONCE
Status: COMPLETED | OUTPATIENT
Start: 2021-01-01 | End: 2021-01-01

## 2021-01-01 RX ORDER — SULFAMETHOXAZOLE AND TRIMETHOPRIM 400; 80 MG/1; MG/1
1 TABLET ORAL DAILY
Status: DISCONTINUED | OUTPATIENT
Start: 2021-01-01 | End: 2021-01-01

## 2021-01-01 RX ORDER — DEXTROSE MONOHYDRATE 100 MG/ML
INJECTION, SOLUTION INTRAVENOUS CONTINUOUS PRN
Status: DISCONTINUED | OUTPATIENT
Start: 2021-01-01 | End: 2021-01-01 | Stop reason: HOSPADM

## 2021-01-01 RX ORDER — FUROSEMIDE 40 MG
40 TABLET ORAL DAILY
Status: DISCONTINUED | OUTPATIENT
Start: 2021-01-01 | End: 2021-01-01 | Stop reason: HOSPADM

## 2021-01-01 RX ORDER — ONDANSETRON 4 MG/1
4 TABLET, ORALLY DISINTEGRATING ORAL EVERY 6 HOURS PRN
Status: DISCONTINUED | OUTPATIENT
Start: 2021-01-01 | End: 2021-01-01

## 2021-01-01 RX ORDER — SERTRALINE HYDROCHLORIDE 25 MG/1
25 TABLET, FILM COATED ORAL DAILY
Status: DISCONTINUED | OUTPATIENT
Start: 2021-01-01 | End: 2021-01-01 | Stop reason: HOSPADM

## 2021-01-01 RX ORDER — DIPHENHYDRAMINE HYDROCHLORIDE 50 MG/ML
50 INJECTION INTRAMUSCULAR; INTRAVENOUS ONCE
Status: COMPLETED | OUTPATIENT
Start: 2021-01-01 | End: 2021-01-01

## 2021-01-01 RX ORDER — ACETAMINOPHEN 650 MG/1
650 SUPPOSITORY RECTAL ONCE
Status: COMPLETED | OUTPATIENT
Start: 2021-01-01 | End: 2021-01-01

## 2021-01-01 RX ORDER — AMOXICILLIN 250 MG
1 CAPSULE ORAL 2 TIMES DAILY PRN
Status: DISCONTINUED | OUTPATIENT
Start: 2021-01-01 | End: 2021-01-01

## 2021-01-01 RX ORDER — HEPARIN SODIUM 10000 [USP'U]/100ML
0-5000 INJECTION, SOLUTION INTRAVENOUS CONTINUOUS
Status: DISPENSED | OUTPATIENT
Start: 2021-01-01 | End: 2021-01-01

## 2021-01-01 RX ORDER — MYCOPHENOLATE MOFETIL 500 MG/1
1000 TABLET ORAL 2 TIMES DAILY
Status: CANCELLED | OUTPATIENT
Start: 2021-01-01

## 2021-01-01 RX ORDER — MAGNESIUM SULFATE HEPTAHYDRATE 40 MG/ML
2 INJECTION, SOLUTION INTRAVENOUS ONCE
Status: COMPLETED | OUTPATIENT
Start: 2021-01-01 | End: 2021-01-01

## 2021-01-01 RX ORDER — MULTIPLE VITAMINS W/ MINERALS TAB 9MG-400MCG
1 TAB ORAL DAILY
Status: DISCONTINUED | OUTPATIENT
Start: 2021-01-01 | End: 2021-01-01 | Stop reason: HOSPADM

## 2021-01-01 RX ORDER — POTASSIUM CHLORIDE 20MEQ/15ML
40 LIQUID (ML) ORAL ONCE
Status: COMPLETED | OUTPATIENT
Start: 2021-01-01 | End: 2021-01-01

## 2021-01-01 RX ORDER — NALOXONE HYDROCHLORIDE 0.4 MG/ML
0.4 INJECTION, SOLUTION INTRAMUSCULAR; INTRAVENOUS; SUBCUTANEOUS
Status: DISCONTINUED | OUTPATIENT
Start: 2021-01-01 | End: 2021-01-01 | Stop reason: HOSPADM

## 2021-01-01 RX ORDER — METOPROLOL TARTRATE 25 MG/1
12.5 TABLET, FILM COATED ORAL 2 TIMES DAILY
DISCHARGE
Start: 2021-01-01

## 2021-01-01 RX ORDER — FUROSEMIDE 20 MG
20 TABLET ORAL DAILY
Status: DISCONTINUED | OUTPATIENT
Start: 2021-01-01 | End: 2021-01-01

## 2021-01-01 RX ORDER — FUROSEMIDE 20 MG
20 TABLET ORAL DAILY
Status: CANCELLED | OUTPATIENT
Start: 2021-01-01

## 2021-01-01 RX ORDER — METHYLPREDNISOLONE SODIUM SUCCINATE 125 MG/2ML
125 INJECTION, POWDER, LYOPHILIZED, FOR SOLUTION INTRAMUSCULAR; INTRAVENOUS
Status: DISCONTINUED | OUTPATIENT
Start: 2021-01-01 | End: 2021-01-01

## 2021-01-01 RX ORDER — AMPICILLIN AND SULBACTAM 2; 1 G/1; G/1
3 INJECTION, POWDER, FOR SOLUTION INTRAMUSCULAR; INTRAVENOUS EVERY 6 HOURS
Status: DISCONTINUED | OUTPATIENT
Start: 2021-01-01 | End: 2021-01-01

## 2021-01-01 RX ORDER — NITROGLYCERIN 0.4 MG/1
0.4 TABLET SUBLINGUAL EVERY 5 MIN PRN
Status: DISCONTINUED | OUTPATIENT
Start: 2021-01-01 | End: 2021-01-01 | Stop reason: HOSPADM

## 2021-01-01 RX ORDER — FUROSEMIDE 20 MG
20 TABLET ORAL DAILY
DISCHARGE
Start: 2021-01-01 | End: 2021-01-01

## 2021-01-01 RX ORDER — MORPHINE SULFATE 2 MG/ML
1-2 INJECTION, SOLUTION INTRAMUSCULAR; INTRAVENOUS
Status: DISCONTINUED | OUTPATIENT
Start: 2021-01-01 | End: 2021-01-01 | Stop reason: HOSPADM

## 2021-01-01 RX ORDER — OLANZAPINE 5 MG/1
5 TABLET, ORALLY DISINTEGRATING ORAL EVERY 6 HOURS PRN
Status: DISCONTINUED | OUTPATIENT
Start: 2021-01-01 | End: 2021-01-01 | Stop reason: HOSPADM

## 2021-01-01 RX ORDER — HEPARIN SODIUM 1000 [USP'U]/ML
6 INJECTION, SOLUTION INTRAVENOUS; SUBCUTANEOUS
Status: COMPLETED | OUTPATIENT
Start: 2021-01-01 | End: 2021-01-01

## 2021-01-01 RX ORDER — PYRIDOSTIGMINE BROMIDE 60 MG/1
60 TABLET ORAL 3 TIMES DAILY
Status: DISCONTINUED | OUTPATIENT
Start: 2021-01-01 | End: 2021-01-01 | Stop reason: HOSPADM

## 2021-01-01 RX ORDER — MYCOPHENOLATE MOFETIL 200 MG/ML
500 POWDER, FOR SUSPENSION ORAL EVERY EVENING
Status: DISCONTINUED | OUTPATIENT
Start: 2021-01-01 | End: 2021-01-01 | Stop reason: HOSPADM

## 2021-01-01 RX ORDER — WATER 10 ML/10ML
INJECTION INTRAMUSCULAR; INTRAVENOUS; SUBCUTANEOUS
Status: DISPENSED
Start: 2021-01-01 | End: 2021-01-01

## 2021-01-01 RX ORDER — DIPHENHYDRAMINE HCL 25 MG
50 CAPSULE ORAL
Status: DISCONTINUED | OUTPATIENT
Start: 2021-01-01 | End: 2021-01-01

## 2021-01-01 RX ORDER — CARBOXYMETHYLCELLULOSE SODIUM 5 MG/ML
1 SOLUTION/ DROPS OPHTHALMIC
Status: DISCONTINUED | OUTPATIENT
Start: 2021-01-01 | End: 2021-01-01 | Stop reason: HOSPADM

## 2021-01-01 RX ORDER — NYSTATIN 100000 [USP'U]/G
POWDER TOPICAL
COMMUNITY

## 2021-01-01 RX ORDER — DIPHENHYDRAMINE HCL 25 MG
50 CAPSULE ORAL EVERY 24 HOURS
Status: COMPLETED | OUTPATIENT
Start: 2021-01-01 | End: 2021-01-01

## 2021-01-01 RX ORDER — NALOXONE HYDROCHLORIDE 0.4 MG/ML
0.4 INJECTION, SOLUTION INTRAMUSCULAR; INTRAVENOUS; SUBCUTANEOUS
Status: DISCONTINUED | OUTPATIENT
Start: 2021-01-01 | End: 2021-01-01

## 2021-01-01 RX ORDER — ACETAMINOPHEN 325 MG/1
650 TABLET ORAL EVERY 6 HOURS
Status: DISCONTINUED | OUTPATIENT
Start: 2021-01-01 | End: 2021-01-01 | Stop reason: HOSPADM

## 2021-01-01 RX ORDER — SPIRONOLACTONE 25 MG/1
25 TABLET ORAL DAILY
Status: CANCELLED | OUTPATIENT
Start: 2021-01-01

## 2021-01-01 RX ORDER — METOPROLOL TARTRATE 25 MG/1
25 TABLET, FILM COATED ORAL 2 TIMES DAILY
Status: DISCONTINUED | OUTPATIENT
Start: 2021-01-01 | End: 2021-01-01

## 2021-01-01 RX ORDER — ETOMIDATE 2 MG/ML
30 INJECTION INTRAVENOUS ONCE
Status: COMPLETED | OUTPATIENT
Start: 2021-01-01 | End: 2021-01-01

## 2021-01-01 RX ORDER — MYCOPHENOLATE MOFETIL 500 MG/1
TABLET, FILM COATED ORAL 2 TIMES DAILY
Status: ON HOLD | COMMUNITY
End: 2021-01-01

## 2021-01-01 RX ORDER — PREDNISONE 20 MG/1
20 TABLET ORAL DAILY
Status: DISCONTINUED | OUTPATIENT
Start: 2021-01-01 | End: 2021-01-01 | Stop reason: HOSPADM

## 2021-01-01 RX ORDER — LIDOCAINE HYDROCHLORIDE 20 MG/ML
JELLY TOPICAL
Status: DISCONTINUED
Start: 2021-01-01 | End: 2021-01-01 | Stop reason: HOSPADM

## 2021-01-01 RX ORDER — SULFAMETHOXAZOLE AND TRIMETHOPRIM 400; 80 MG/1; MG/1
1 TABLET ORAL DAILY
Status: DISCONTINUED | OUTPATIENT
Start: 2021-01-01 | End: 2021-01-01 | Stop reason: HOSPADM

## 2021-01-01 RX ORDER — PROCHLORPERAZINE MALEATE 5 MG
5 TABLET ORAL EVERY 6 HOURS PRN
Status: DISCONTINUED | OUTPATIENT
Start: 2021-01-01 | End: 2021-01-01 | Stop reason: HOSPADM

## 2021-01-01 RX ORDER — FUROSEMIDE 20 MG
20 TABLET ORAL 2 TIMES DAILY
Status: DISCONTINUED | OUTPATIENT
Start: 2021-01-01 | End: 2021-01-01

## 2021-01-01 RX ORDER — VITAMIN B COMPLEX
100 TABLET ORAL DAILY
Status: DISCONTINUED | OUTPATIENT
Start: 2021-01-01 | End: 2021-01-01 | Stop reason: HOSPADM

## 2021-01-01 RX ORDER — CEFTRIAXONE 2 G/1
2 INJECTION, POWDER, FOR SOLUTION INTRAMUSCULAR; INTRAVENOUS ONCE
Status: COMPLETED | OUTPATIENT
Start: 2021-01-01 | End: 2021-01-01

## 2021-01-01 RX ORDER — FENTANYL CITRATE 50 UG/ML
25 INJECTION, SOLUTION INTRAMUSCULAR; INTRAVENOUS
Status: DISCONTINUED | OUTPATIENT
Start: 2021-01-01 | End: 2021-01-01 | Stop reason: CLARIF

## 2021-01-01 RX ORDER — POLYETHYLENE GLYCOL 3350 17 G/17G
17 POWDER, FOR SOLUTION ORAL DAILY PRN
Status: DISCONTINUED | OUTPATIENT
Start: 2021-01-01 | End: 2021-01-01 | Stop reason: HOSPADM

## 2021-01-01 RX ORDER — ALBUTEROL SULFATE 0.83 MG/ML
2.5 SOLUTION RESPIRATORY (INHALATION) 4 TIMES DAILY PRN
Status: DISCONTINUED | OUTPATIENT
Start: 2021-01-01 | End: 2021-01-01 | Stop reason: HOSPADM

## 2021-01-01 RX ORDER — PREDNISONE 20 MG/1
60 TABLET ORAL DAILY
Status: ON HOLD | DISCHARGE
Start: 2021-01-01 | End: 2021-01-01

## 2021-01-01 RX ORDER — PREDNISONE 10 MG/1
40 TABLET ORAL DAILY
Status: DISCONTINUED | OUTPATIENT
Start: 2021-01-01 | End: 2021-01-01

## 2021-01-01 RX ORDER — FUROSEMIDE 20 MG
20 TABLET ORAL ONCE
Status: CANCELLED | OUTPATIENT
Start: 2021-01-01

## 2021-01-01 RX ORDER — ALBUMIN HUMAN 25 %
4000 INTRAVENOUS SOLUTION INTRAVENOUS
Status: COMPLETED | OUTPATIENT
Start: 2021-01-01 | End: 2021-01-01

## 2021-01-01 RX ORDER — MYCOPHENOLATE MOFETIL 500 MG/1
1000 TABLET ORAL 2 TIMES DAILY
Status: DISCONTINUED | OUTPATIENT
Start: 2021-01-01 | End: 2021-01-01 | Stop reason: HOSPADM

## 2021-01-01 RX ORDER — ACETAMINOPHEN 325 MG/1
650 TABLET ORAL EVERY 4 HOURS PRN
Status: DISCONTINUED | OUTPATIENT
Start: 2021-01-01 | End: 2021-01-01 | Stop reason: HOSPADM

## 2021-01-01 RX ORDER — ACETAMINOPHEN 325 MG/1
650 TABLET ORAL EVERY 4 HOURS PRN
COMMUNITY

## 2021-01-01 RX ORDER — PANTOPRAZOLE SODIUM 40 MG/1
40 TABLET, DELAYED RELEASE ORAL
Status: DISCONTINUED | OUTPATIENT
Start: 2021-01-01 | End: 2021-01-01 | Stop reason: CLARIF

## 2021-01-01 RX ORDER — PHENOL 1.4 %
1 AEROSOL, SPRAY (ML) MUCOUS MEMBRANE DAILY
Status: CANCELLED | OUTPATIENT
Start: 2021-01-01

## 2021-01-01 RX ORDER — LORAZEPAM 2 MG/ML
1 INJECTION INTRAMUSCULAR
Status: DISCONTINUED | OUTPATIENT
Start: 2021-01-01 | End: 2021-01-01 | Stop reason: HOSPADM

## 2021-01-01 RX ORDER — ACETAMINOPHEN 325 MG/1
650 TABLET ORAL EVERY 4 HOURS PRN
Status: CANCELLED | OUTPATIENT
Start: 2021-01-01

## 2021-01-01 RX ORDER — LANOLIN ALCOHOL/MO/W.PET/CERES
3 CREAM (GRAM) TOPICAL
Start: 2021-01-01 | End: 2021-01-01

## 2021-01-01 RX ORDER — AMOXICILLIN 250 MG
1 CAPSULE ORAL 2 TIMES DAILY PRN
Status: DISCONTINUED | OUTPATIENT
Start: 2021-01-01 | End: 2021-01-01 | Stop reason: HOSPADM

## 2021-01-01 RX ORDER — FAMOTIDINE 20 MG/1
20 TABLET, FILM COATED ORAL 2 TIMES DAILY
Status: DISCONTINUED | OUTPATIENT
Start: 2021-01-01 | End: 2021-01-01

## 2021-01-01 RX ORDER — SULFAMETHOXAZOLE/TRIMETHOPRIM 800-160 MG
1 TABLET ORAL DAILY
Status: CANCELLED | OUTPATIENT
Start: 2021-01-01

## 2021-01-01 RX ORDER — AZITHROMYCIN 500 MG/5ML
500 INJECTION, POWDER, LYOPHILIZED, FOR SOLUTION INTRAVENOUS ONCE
Status: COMPLETED | OUTPATIENT
Start: 2021-01-01 | End: 2021-01-01

## 2021-01-01 RX ORDER — MYCOPHENOLATE MOFETIL 500 MG/1
500 TABLET ORAL AT BEDTIME
Status: ON HOLD | COMMUNITY
End: 2021-01-01

## 2021-01-01 RX ORDER — POTASSIUM CHLORIDE 1500 MG/1
20 TABLET, EXTENDED RELEASE ORAL DAILY
Status: CANCELLED | OUTPATIENT
Start: 2021-01-01

## 2021-01-01 RX ORDER — HEPARIN SODIUM 1000 [USP'U]/ML
1700 INJECTION, SOLUTION INTRAVENOUS; SUBCUTANEOUS
Status: DISCONTINUED | OUTPATIENT
Start: 2021-01-01 | End: 2021-01-01 | Stop reason: HOSPADM

## 2021-01-01 RX ORDER — AMOXICILLIN 250 MG
2 CAPSULE ORAL 2 TIMES DAILY PRN
Status: DISCONTINUED | OUTPATIENT
Start: 2021-01-01 | End: 2021-01-01

## 2021-01-01 RX ORDER — ACETAMINOPHEN 325 MG/1
650 TABLET ORAL
Status: DISCONTINUED | OUTPATIENT
Start: 2021-01-01 | End: 2021-01-01 | Stop reason: HOSPADM

## 2021-01-01 RX ORDER — GUAIFENESIN 600 MG/1
1200 TABLET, EXTENDED RELEASE ORAL 2 TIMES DAILY PRN
Status: DISCONTINUED | OUTPATIENT
Start: 2021-01-01 | End: 2021-01-01

## 2021-01-01 RX ORDER — HYDROCHLOROTHIAZIDE 25 MG/1
25 TABLET ORAL DAILY
Qty: 90 TABLET | Refills: 3 | Status: ON HOLD | OUTPATIENT
Start: 2021-01-01 | End: 2021-01-01

## 2021-01-01 RX ORDER — ALBUTEROL SULFATE 0.83 MG/ML
2.5 SOLUTION RESPIRATORY (INHALATION) EVERY 6 HOURS PRN
Status: DISCONTINUED | OUTPATIENT
Start: 2021-01-01 | End: 2021-01-01 | Stop reason: HOSPADM

## 2021-01-01 RX ORDER — MYCOPHENOLATE MOFETIL 500 MG/1
1000 TABLET, FILM COATED ORAL EVERY MORNING
Status: DISCONTINUED | OUTPATIENT
Start: 2021-01-01 | End: 2021-01-01

## 2021-01-01 RX ORDER — LABETALOL HYDROCHLORIDE 5 MG/ML
20 INJECTION, SOLUTION INTRAVENOUS EVERY 4 HOURS PRN
Status: DISCONTINUED | OUTPATIENT
Start: 2021-01-01 | End: 2021-01-01 | Stop reason: HOSPADM

## 2021-01-01 RX ORDER — MULTIVITAMIN,THERAPEUTIC
1 TABLET ORAL DAILY
DISCHARGE
Start: 2021-01-01 | End: 2021-01-01

## 2021-01-01 RX ORDER — FUROSEMIDE 20 MG
40 TABLET ORAL DAILY
Start: 2021-01-01

## 2021-01-01 RX ORDER — PYRIDOSTIGMINE BROMIDE 60 MG/1
60 TABLET ORAL 3 TIMES DAILY
Status: CANCELLED | OUTPATIENT
Start: 2021-01-01

## 2021-01-01 RX ORDER — CARBOXYMETHYLCELLULOSE SODIUM 5 MG/ML
2 SOLUTION/ DROPS OPHTHALMIC
Status: DISCONTINUED | OUTPATIENT
Start: 2021-01-01 | End: 2021-01-01 | Stop reason: HOSPADM

## 2021-01-01 RX ORDER — PREDNISONE 20 MG/1
60 TABLET ORAL DAILY
Start: 2021-01-01

## 2021-01-01 RX ORDER — FENTANYL CITRATE 50 UG/ML
0.5 INJECTION, SOLUTION INTRAMUSCULAR; INTRAVENOUS
Status: DISCONTINUED | OUTPATIENT
Start: 2021-01-01 | End: 2021-01-01

## 2021-01-01 RX ORDER — PREDNISONE 20 MG/1
20 TABLET ORAL DAILY
Status: DISCONTINUED | OUTPATIENT
Start: 2021-01-01 | End: 2021-01-01

## 2021-01-01 RX ORDER — ALBUMIN HUMAN 25 %
6250 INTRAVENOUS SOLUTION INTRAVENOUS
Status: COMPLETED | OUTPATIENT
Start: 2021-01-01 | End: 2021-01-01

## 2021-01-01 RX ORDER — MAGNESIUM OXIDE 400 MG/1
400 TABLET ORAL 2 TIMES DAILY
Status: COMPLETED | OUTPATIENT
Start: 2021-01-01 | End: 2021-01-01

## 2021-01-01 RX ORDER — SULFAMETHOXAZOLE AND TRIMETHOPRIM 400; 80 MG/1; MG/1
1 TABLET ORAL DAILY
Start: 2021-01-01

## 2021-01-01 RX ORDER — ALBUTEROL SULFATE 0.83 MG/ML
2.5 SOLUTION RESPIRATORY (INHALATION) 4 TIMES DAILY PRN
Start: 2021-01-01

## 2021-01-01 RX ORDER — SULFAMETHOXAZOLE AND TRIMETHOPRIM 400; 80 MG/1; MG/1
1 TABLET ORAL DAILY
DISCHARGE
Start: 2021-01-01 | End: 2021-01-01

## 2021-01-01 RX ORDER — NYSTATIN 100000 [USP'U]/G
POWDER TOPICAL 2 TIMES DAILY
Start: 2021-01-01 | End: 2021-01-01

## 2021-01-01 RX ORDER — SODIUM CHLORIDE, SODIUM LACTATE, POTASSIUM CHLORIDE, CALCIUM CHLORIDE 600; 310; 30; 20 MG/100ML; MG/100ML; MG/100ML; MG/100ML
INJECTION, SOLUTION INTRAVENOUS CONTINUOUS
Status: ACTIVE | OUTPATIENT
Start: 2021-01-01 | End: 2021-01-01

## 2021-01-01 RX ORDER — PIPERACILLIN SODIUM, TAZOBACTAM SODIUM 3; .375 G/15ML; G/15ML
3.38 INJECTION, POWDER, LYOPHILIZED, FOR SOLUTION INTRAVENOUS EVERY 8 HOURS
Status: DISCONTINUED | OUTPATIENT
Start: 2021-01-01 | End: 2021-01-01

## 2021-01-01 RX ORDER — CEFAZOLIN SODIUM IN 0.9 % NACL 3 G/100 ML
3 INTRAVENOUS SOLUTION, PIGGYBACK (ML) INTRAVENOUS
Status: COMPLETED | OUTPATIENT
Start: 2021-01-01 | End: 2021-01-01

## 2021-01-01 RX ORDER — LORAZEPAM 1 MG/1
1 TABLET ORAL
Status: DISCONTINUED | OUTPATIENT
Start: 2021-01-01 | End: 2021-01-01 | Stop reason: HOSPADM

## 2021-01-01 RX ORDER — ETOMIDATE 2 MG/ML
INJECTION INTRAVENOUS PRN
Status: DISCONTINUED | OUTPATIENT
Start: 2021-01-01 | End: 2021-01-01

## 2021-01-01 RX ORDER — MYCOPHENOLATE MOFETIL 500 MG/1
1000 TABLET ORAL 2 TIMES DAILY
DISCHARGE
Start: 2021-01-01

## 2021-01-01 RX ORDER — ACETAMINOPHEN 325 MG/1
650 TABLET ORAL
Status: DISCONTINUED | OUTPATIENT
Start: 2021-01-01 | End: 2021-01-01

## 2021-01-01 RX ORDER — PROPOFOL 10 MG/ML
INJECTION, EMULSION INTRAVENOUS PRN
Status: DISCONTINUED | OUTPATIENT
Start: 2021-01-01 | End: 2021-01-01

## 2021-01-01 RX ORDER — HEPARIN SODIUM 5000 [USP'U]/.5ML
5000 INJECTION, SOLUTION INTRAVENOUS; SUBCUTANEOUS EVERY 8 HOURS
Status: DISCONTINUED | OUTPATIENT
Start: 2021-01-01 | End: 2021-01-01 | Stop reason: HOSPADM

## 2021-01-01 RX ORDER — GUAIFENESIN 600 MG/1
1200 TABLET, EXTENDED RELEASE ORAL 2 TIMES DAILY PRN
Status: ON HOLD
Start: 2021-01-01 | End: 2021-01-01

## 2021-01-01 RX ORDER — FUROSEMIDE 10 MG/ML
60 INJECTION INTRAMUSCULAR; INTRAVENOUS EVERY 8 HOURS
Status: DISCONTINUED | OUTPATIENT
Start: 2021-01-01 | End: 2021-01-01

## 2021-01-01 RX ORDER — OXYMETAZOLINE HYDROCHLORIDE 0.05 G/100ML
2 SPRAY NASAL 2 TIMES DAILY
Status: CANCELLED | OUTPATIENT
Start: 2021-01-01

## 2021-01-01 RX ORDER — POTASSIUM CHLORIDE 1.5 G/1.58G
40 POWDER, FOR SOLUTION ORAL ONCE
Status: COMPLETED | OUTPATIENT
Start: 2021-01-01 | End: 2021-01-01

## 2021-01-01 RX ORDER — FENTANYL CITRATE 50 UG/ML
100 INJECTION, SOLUTION INTRAMUSCULAR; INTRAVENOUS ONCE
Status: COMPLETED | OUTPATIENT
Start: 2021-01-01 | End: 2021-01-01

## 2021-01-01 RX ORDER — HYDROXYZINE HYDROCHLORIDE 25 MG/1
25 TABLET, FILM COATED ORAL EVERY 6 HOURS PRN
COMMUNITY

## 2021-01-01 RX ORDER — NITROGLYCERIN 20 MG/100ML
INJECTION INTRAVENOUS
Status: COMPLETED
Start: 2021-01-01 | End: 2021-01-01

## 2021-01-01 RX ORDER — DEXTROSE MONOHYDRATE 100 MG/ML
INJECTION, SOLUTION INTRAVENOUS CONTINUOUS PRN
Status: DISCONTINUED | OUTPATIENT
Start: 2021-01-01 | End: 2021-01-01

## 2021-01-01 RX ORDER — IOPAMIDOL 755 MG/ML
83 INJECTION, SOLUTION INTRAVASCULAR ONCE
Status: COMPLETED | OUTPATIENT
Start: 2021-01-01 | End: 2021-01-01

## 2021-01-01 RX ORDER — ALBUTEROL SULFATE 0.83 MG/ML
2.5 SOLUTION RESPIRATORY (INHALATION) 4 TIMES DAILY PRN
Status: CANCELLED | OUTPATIENT
Start: 2021-01-01

## 2021-01-01 RX ORDER — MYCOPHENOLATE MOFETIL 500 MG/1
1000 TABLET, FILM COATED ORAL 2 TIMES DAILY
Status: DISCONTINUED | OUTPATIENT
Start: 2021-01-01 | End: 2021-01-01 | Stop reason: HOSPADM

## 2021-01-01 RX ORDER — PREDNISONE 10 MG/1
20 TABLET ORAL DAILY
Status: DISCONTINUED | OUTPATIENT
Start: 2021-01-01 | End: 2021-01-01

## 2021-01-01 RX ORDER — AMOXICILLIN 500 MG/1
500 CAPSULE ORAL EVERY 8 HOURS SCHEDULED
Status: COMPLETED | OUTPATIENT
Start: 2021-01-01 | End: 2021-01-01

## 2021-01-01 RX ORDER — MYCOPHENOLATE MOFETIL 200 MG/ML
500 POWDER, FOR SUSPENSION ORAL AT BEDTIME
Status: DISCONTINUED | OUTPATIENT
Start: 2021-01-01 | End: 2021-01-01

## 2021-01-01 RX ORDER — SODIUM CHLORIDE 9 MG/ML
INJECTION, SOLUTION INTRAVENOUS CONTINUOUS
Status: DISCONTINUED | OUTPATIENT
Start: 2021-01-01 | End: 2021-01-01

## 2021-01-01 RX ORDER — MYCOPHENOLATE MOFETIL 500 MG/1
1000 TABLET ORAL 2 TIMES DAILY
Status: DISCONTINUED | OUTPATIENT
Start: 2021-01-01 | End: 2021-01-01

## 2021-01-01 RX ORDER — ACETAMINOPHEN 325 MG/1
650 TABLET ORAL EVERY 6 HOURS
Status: DISCONTINUED | OUTPATIENT
Start: 2021-01-01 | End: 2021-01-01

## 2021-01-01 RX ORDER — HYDROXYZINE HYDROCHLORIDE 25 MG/1
25 TABLET, FILM COATED ORAL EVERY 6 HOURS PRN
Status: DISCONTINUED | OUTPATIENT
Start: 2021-01-01 | End: 2021-01-01 | Stop reason: HOSPADM

## 2021-01-01 RX ORDER — PREDNISONE 20 MG/1
40 TABLET ORAL DAILY
Status: DISCONTINUED | OUTPATIENT
Start: 2021-01-01 | End: 2021-01-01

## 2021-01-01 RX ORDER — POTASSIUM CHLORIDE 750 MG/1
10 TABLET, EXTENDED RELEASE ORAL DAILY
DISCHARGE
Start: 2021-01-01 | End: 2021-01-01

## 2021-01-01 RX ORDER — FENTANYL CITRATE 50 UG/ML
50-100 INJECTION, SOLUTION INTRAMUSCULAR; INTRAVENOUS
Status: DISCONTINUED | OUTPATIENT
Start: 2021-01-01 | End: 2021-01-01

## 2021-01-01 RX ORDER — ACETAMINOPHEN 325 MG/10.15ML
650 LIQUID ORAL EVERY 24 HOURS
Status: COMPLETED | OUTPATIENT
Start: 2021-01-01 | End: 2021-01-01

## 2021-01-01 RX ORDER — PYRIDOSTIGMINE BROMIDE 60 MG/5ML
60 SOLUTION ORAL EVERY 8 HOURS SCHEDULED
Status: DISCONTINUED | OUTPATIENT
Start: 2021-01-01 | End: 2021-01-01

## 2021-01-01 RX ORDER — LANOLIN ALCOHOL/MO/W.PET/CERES
3 CREAM (GRAM) TOPICAL
Status: DISCONTINUED | OUTPATIENT
Start: 2021-01-01 | End: 2021-01-01

## 2021-01-01 RX ORDER — NALOXONE HYDROCHLORIDE 0.4 MG/ML
0.2 INJECTION, SOLUTION INTRAMUSCULAR; INTRAVENOUS; SUBCUTANEOUS
Status: DISCONTINUED | OUTPATIENT
Start: 2021-01-01 | End: 2021-01-01 | Stop reason: HOSPADM

## 2021-01-01 RX ORDER — ACETAZOLAMIDE 250 MG/1
500 TABLET ORAL
Status: COMPLETED | OUTPATIENT
Start: 2021-01-01 | End: 2021-01-01

## 2021-01-01 RX ORDER — ACETAMINOPHEN 500 MG
1000 TABLET ORAL 3 TIMES DAILY
Status: DISCONTINUED | OUTPATIENT
Start: 2021-01-01 | End: 2021-01-01 | Stop reason: HOSPADM

## 2021-01-01 RX ORDER — FUROSEMIDE 10 MG/ML
40 INJECTION INTRAMUSCULAR; INTRAVENOUS 2 TIMES DAILY
Status: COMPLETED | OUTPATIENT
Start: 2021-01-01 | End: 2021-01-01

## 2021-01-01 RX ORDER — PROPOFOL 10 MG/ML
INJECTION, EMULSION INTRAVENOUS
Status: DISCONTINUED
Start: 2021-01-01 | End: 2021-01-01 | Stop reason: HOSPADM

## 2021-01-01 RX ORDER — DIPHENHYDRAMINE HYDROCHLORIDE 50 MG/ML
50 INJECTION INTRAMUSCULAR; INTRAVENOUS EVERY 24 HOURS
Status: COMPLETED | OUTPATIENT
Start: 2021-01-01 | End: 2021-01-01

## 2021-01-01 RX ORDER — MICONAZOLE NITRATE 20 MG/G
CREAM TOPICAL 2 TIMES DAILY
Status: DISCONTINUED | OUTPATIENT
Start: 2021-01-01 | End: 2021-01-01 | Stop reason: HOSPADM

## 2021-01-01 RX ORDER — SERTRALINE HYDROCHLORIDE 25 MG/1
25 TABLET, FILM COATED ORAL DAILY
Start: 2021-01-01 | End: 2021-01-01

## 2021-01-01 RX ORDER — FENTANYL CITRATE 50 UG/ML
25-50 INJECTION, SOLUTION INTRAMUSCULAR; INTRAVENOUS EVERY 5 MIN PRN
Status: DISCONTINUED | OUTPATIENT
Start: 2021-01-01 | End: 2021-01-01 | Stop reason: HOSPADM

## 2021-01-01 RX ORDER — NICOTINE POLACRILEX 4 MG
15-30 LOZENGE BUCCAL
Status: DISCONTINUED | OUTPATIENT
Start: 2021-01-01 | End: 2021-01-01

## 2021-01-01 RX ORDER — HEPARIN SODIUM 1000 [USP'U]/ML
8000 INJECTION, SOLUTION INTRAVENOUS; SUBCUTANEOUS ONCE
Status: COMPLETED | OUTPATIENT
Start: 2021-01-01 | End: 2021-01-01

## 2021-01-01 RX ORDER — CHLORHEXIDINE GLUCONATE ORAL RINSE 1.2 MG/ML
15 SOLUTION DENTAL EVERY 12 HOURS
Status: DISCONTINUED | OUTPATIENT
Start: 2021-01-01 | End: 2021-01-01 | Stop reason: CLARIF

## 2021-01-01 RX ORDER — POLYETHYLENE GLYCOL 3350 17 G/17G
17 POWDER, FOR SOLUTION ORAL DAILY PRN
Status: DISCONTINUED | OUTPATIENT
Start: 2021-01-01 | End: 2021-01-01

## 2021-01-01 RX ORDER — FLUMAZENIL 0.1 MG/ML
0.2 INJECTION, SOLUTION INTRAVENOUS
Status: DISCONTINUED | OUTPATIENT
Start: 2021-01-01 | End: 2021-01-01 | Stop reason: HOSPADM

## 2021-01-01 RX ORDER — POTASSIUM CHLORIDE 1500 MG/1
40 TABLET, EXTENDED RELEASE ORAL ONCE
Status: COMPLETED | OUTPATIENT
Start: 2021-01-01 | End: 2021-01-01

## 2021-01-01 RX ORDER — BUSPIRONE HYDROCHLORIDE 5 MG/1
5 TABLET ORAL 2 TIMES DAILY
Status: DISCONTINUED | OUTPATIENT
Start: 2021-01-01 | End: 2021-01-01 | Stop reason: HOSPADM

## 2021-01-01 RX ORDER — IOPAMIDOL 755 MG/ML
126 INJECTION, SOLUTION INTRAVASCULAR ONCE
Status: DISCONTINUED | OUTPATIENT
Start: 2021-01-01 | End: 2021-01-01

## 2021-01-01 RX ORDER — PIPERACILLIN SODIUM, TAZOBACTAM SODIUM 4; .5 G/20ML; G/20ML
4.5 INJECTION, POWDER, LYOPHILIZED, FOR SOLUTION INTRAVENOUS ONCE
Status: COMPLETED | OUTPATIENT
Start: 2021-01-01 | End: 2021-01-01

## 2021-01-01 RX ORDER — GUAIFENESIN 600 MG/1
600 TABLET, EXTENDED RELEASE ORAL 2 TIMES DAILY PRN
Status: DISCONTINUED | OUTPATIENT
Start: 2021-01-01 | End: 2021-01-01 | Stop reason: HOSPADM

## 2021-01-01 RX ORDER — LANOLIN ALCOHOL/MO/W.PET/CERES
3 CREAM (GRAM) TOPICAL
Status: DISCONTINUED | OUTPATIENT
Start: 2021-01-01 | End: 2021-01-01 | Stop reason: HOSPADM

## 2021-01-01 RX ORDER — GUAIFENESIN 600 MG/1
600 TABLET, EXTENDED RELEASE ORAL 2 TIMES DAILY PRN
COMMUNITY

## 2021-01-01 RX ORDER — CHROMIUM PICOLINATE 200 MCG
200 TABLET ORAL DAILY
Status: DISCONTINUED | OUTPATIENT
Start: 2021-01-01 | End: 2021-01-01 | Stop reason: CLARIF

## 2021-01-01 RX ORDER — HEPARIN SODIUM 1000 [USP'U]/ML
2000 INJECTION, SOLUTION INTRAVENOUS; SUBCUTANEOUS
Status: DISCONTINUED | OUTPATIENT
Start: 2021-01-01 | End: 2021-01-01

## 2021-01-01 RX ORDER — MORPHINE SULFATE 10 MG/5ML
5-10 SOLUTION ORAL
Status: DISCONTINUED | OUTPATIENT
Start: 2021-01-01 | End: 2021-01-01 | Stop reason: HOSPADM

## 2021-01-01 RX ORDER — NALOXONE HYDROCHLORIDE 0.4 MG/ML
0.1 INJECTION, SOLUTION INTRAMUSCULAR; INTRAVENOUS; SUBCUTANEOUS
Status: DISCONTINUED | OUTPATIENT
Start: 2021-01-01 | End: 2021-01-01 | Stop reason: HOSPADM

## 2021-01-01 RX ORDER — MYCOPHENOLATE MOFETIL 200 MG/ML
500 POWDER, FOR SUSPENSION ORAL EVERY EVENING
Status: DISCONTINUED | OUTPATIENT
Start: 2021-01-01 | End: 2021-01-01

## 2021-01-01 RX ORDER — MYCOPHENOLATE MOFETIL 200 MG/ML
1000 POWDER, FOR SUSPENSION ORAL DAILY
Status: DISCONTINUED | OUTPATIENT
Start: 2021-01-01 | End: 2021-01-01

## 2021-01-01 RX ORDER — PROPOFOL 10 MG/ML
5-75 INJECTION, EMULSION INTRAVENOUS CONTINUOUS
Status: DISCONTINUED | OUTPATIENT
Start: 2021-01-01 | End: 2021-01-01 | Stop reason: CLARIF

## 2021-01-01 RX ORDER — MYCOPHENOLATE MOFETIL 500 MG/1
1000 TABLET, FILM COATED ORAL EVERY MORNING
Status: ON HOLD | COMMUNITY
End: 2021-01-01

## 2021-01-01 RX ORDER — METHYLPREDNISOLONE SODIUM SUCCINATE 125 MG/2ML
60 INJECTION, POWDER, LYOPHILIZED, FOR SOLUTION INTRAMUSCULAR; INTRAVENOUS EVERY 12 HOURS
Status: DISCONTINUED | OUTPATIENT
Start: 2021-01-01 | End: 2021-01-01

## 2021-01-01 RX ORDER — AZITHROMYCIN 250 MG/1
250 TABLET, FILM COATED ORAL EVERY EVENING
Status: DISCONTINUED | OUTPATIENT
Start: 2021-01-01 | End: 2021-01-01

## 2021-01-01 RX ORDER — SODIUM CHLORIDE 9 MG/ML
INJECTION, SOLUTION INTRAVENOUS CONTINUOUS
Status: DISCONTINUED | OUTPATIENT
Start: 2021-01-01 | End: 2021-01-01 | Stop reason: HOSPADM

## 2021-01-01 RX ORDER — MYCOPHENOLATE MOFETIL 200 MG/ML
1000 POWDER, FOR SUSPENSION ORAL 2 TIMES DAILY
Status: DISCONTINUED | OUTPATIENT
Start: 2021-01-01 | End: 2021-01-01

## 2021-01-01 RX ORDER — CHOLECALCIFEROL (VITAMIN D3) 50 MCG
4000 TABLET ORAL DAILY
Status: DISCONTINUED | OUTPATIENT
Start: 2021-01-01 | End: 2021-01-01 | Stop reason: HOSPADM

## 2021-01-01 RX ORDER — PANTOPRAZOLE SODIUM 40 MG/1
40 TABLET, DELAYED RELEASE ORAL
Status: DISCONTINUED | OUTPATIENT
Start: 2021-01-01 | End: 2021-01-01 | Stop reason: ALTCHOICE

## 2021-01-01 RX ORDER — BISACODYL 10 MG
10 SUPPOSITORY, RECTAL RECTAL ONCE
Status: COMPLETED | OUTPATIENT
Start: 2021-01-01 | End: 2021-01-01

## 2021-01-01 RX ORDER — POTASSIUM CHLORIDE 750 MG/1
20 TABLET, EXTENDED RELEASE ORAL 2 TIMES DAILY
Status: ON HOLD
Start: 2021-01-01 | End: 2021-01-01

## 2021-01-01 RX ORDER — METHYLPREDNISOLONE SODIUM SUCCINATE 125 MG/2ML
125 INJECTION, POWDER, LYOPHILIZED, FOR SOLUTION INTRAMUSCULAR; INTRAVENOUS EVERY 24 HOURS
Status: DISCONTINUED | OUTPATIENT
Start: 2021-01-01 | End: 2021-01-01

## 2021-01-01 RX ORDER — LIDOCAINE 40 MG/G
CREAM TOPICAL
Status: DISCONTINUED | OUTPATIENT
Start: 2021-01-01 | End: 2021-01-01

## 2021-01-01 RX ORDER — NOREPINEPHRINE BITARTRATE 0.06 MG/ML
0.03-0.4 INJECTION, SOLUTION INTRAVENOUS CONTINUOUS
Status: DISCONTINUED | OUTPATIENT
Start: 2021-01-01 | End: 2021-01-01

## 2021-01-01 RX ADMIN — METHYLPREDNISOLONE SODIUM SUCCINATE 62.5 MG: 125 INJECTION, POWDER, FOR SOLUTION INTRAMUSCULAR; INTRAVENOUS at 09:08

## 2021-01-01 RX ADMIN — ONDANSETRON 4 MG: 2 INJECTION INTRAMUSCULAR; INTRAVENOUS at 15:30

## 2021-01-01 RX ADMIN — POTASSIUM CHLORIDE 20 MEQ: 1.5 POWDER, FOR SOLUTION ORAL at 16:44

## 2021-01-01 RX ADMIN — Medication 100 MCG: at 10:32

## 2021-01-01 RX ADMIN — PREDNISONE 60 MG: 20 TABLET ORAL at 09:50

## 2021-01-01 RX ADMIN — METHYLPREDNISOLONE SODIUM SUCCINATE 125 MG: 125 INJECTION, POWDER, FOR SOLUTION INTRAMUSCULAR; INTRAVENOUS at 19:55

## 2021-01-01 RX ADMIN — HEPARIN SODIUM 5000 UNITS: 5000 INJECTION, SOLUTION INTRAVENOUS; SUBCUTANEOUS at 18:31

## 2021-01-01 RX ADMIN — MYCOPHENOLATE MOFETIL 500 MG: 200 POWDER, FOR SUSPENSION ORAL at 21:56

## 2021-01-01 RX ADMIN — MYCOPHENOLATE MOFETIL 500 MG: 200 POWDER, FOR SUSPENSION ORAL at 20:27

## 2021-01-01 RX ADMIN — PREDNISONE 60 MG: 20 TABLET ORAL at 10:06

## 2021-01-01 RX ADMIN — ACETAMINOPHEN 650 MG: 325 SUSPENSION ORAL at 03:42

## 2021-01-01 RX ADMIN — SULFAMETHOXAZOLE AND TRIMETHOPRIM 1 TABLET: 400; 80 TABLET ORAL at 09:20

## 2021-01-01 RX ADMIN — Medication 1 ML: at 23:10

## 2021-01-01 RX ADMIN — MYCOPHENOLATE MOFETIL 1000 MG: 200 POWDER, FOR SUSPENSION ORAL at 08:17

## 2021-01-01 RX ADMIN — POTASSIUM & SODIUM PHOSPHATES POWDER PACK 280-160-250 MG 1 PACKET: 280-160-250 PACK at 17:45

## 2021-01-01 RX ADMIN — FUROSEMIDE 20 MG: 20 TABLET ORAL at 11:31

## 2021-01-01 RX ADMIN — PROPOFOL 35 MCG/KG/MIN: 10 INJECTION, EMULSION INTRAVENOUS at 06:34

## 2021-01-01 RX ADMIN — MYCOPHENOLATE MOFETIL 1000 MG: 500 TABLET, FILM COATED ORAL at 20:10

## 2021-01-01 RX ADMIN — Medication 100 MCG: at 09:21

## 2021-01-01 RX ADMIN — SULFAMETHOXAZOLE AND TRIMETHOPRIM 1 TABLET: 400; 80 TABLET ORAL at 08:15

## 2021-01-01 RX ADMIN — MULTIPLE VITAMINS W/ MINERALS TAB 1 TABLET: TAB at 07:39

## 2021-01-01 RX ADMIN — SULFAMETHOXAZOLE AND TRIMETHOPRIM 1 TABLET: 400; 80 TABLET ORAL at 10:31

## 2021-01-01 RX ADMIN — PREDNISONE 60 MG: 20 TABLET ORAL at 07:40

## 2021-01-01 RX ADMIN — FENTANYL CITRATE 50 MCG: 50 INJECTION, SOLUTION INTRAMUSCULAR; INTRAVENOUS at 05:03

## 2021-01-01 RX ADMIN — CHLORHEXIDINE GLUCONATE 0.12% ORAL RINSE 15 ML: 1.2 LIQUID ORAL at 21:43

## 2021-01-01 RX ADMIN — POTASSIUM & SODIUM PHOSPHATES POWDER PACK 280-160-250 MG 1 PACKET: 280-160-250 PACK at 15:33

## 2021-01-01 RX ADMIN — Medication 100 MCG: at 11:00

## 2021-01-01 RX ADMIN — ENOXAPARIN SODIUM 40 MG: 40 INJECTION SUBCUTANEOUS at 12:22

## 2021-01-01 RX ADMIN — HEPARIN SODIUM 5000 UNITS: 5000 INJECTION, SOLUTION INTRAVENOUS; SUBCUTANEOUS at 02:30

## 2021-01-01 RX ADMIN — CALCIUM CARBONATE 600 MG (1,500 MG)-VITAMIN D3 400 UNIT TABLET 1 TABLET: at 11:13

## 2021-01-01 RX ADMIN — FUROSEMIDE 20 MG: 20 TABLET ORAL at 10:07

## 2021-01-01 RX ADMIN — METOPROLOL TARTRATE 25 MG: 25 TABLET, FILM COATED ORAL at 00:51

## 2021-01-01 RX ADMIN — ACETAZOLAMIDE SODIUM 500 MG: 500 INJECTION, POWDER, LYOPHILIZED, FOR SOLUTION INTRAVENOUS at 19:26

## 2021-01-01 RX ADMIN — MYCOPHENOLATE MOFETIL 500 MG: 200 POWDER, FOR SUSPENSION ORAL at 20:08

## 2021-01-01 RX ADMIN — PYRIDOSTIGMINE BROMIDE 60 MG: 60 TABLET ORAL at 09:18

## 2021-01-01 RX ADMIN — CHLORHEXIDINE GLUCONATE 0.12% ORAL RINSE 15 ML: 1.2 LIQUID ORAL at 20:09

## 2021-01-01 RX ADMIN — SENNOSIDES 5 ML: 8.8 SYRUP ORAL at 11:29

## 2021-01-01 RX ADMIN — PROPOFOL 30 MCG/KG/MIN: 10 INJECTION, EMULSION INTRAVENOUS at 06:54

## 2021-01-01 RX ADMIN — ACETAMINOPHEN 650 MG: 325 TABLET, FILM COATED ORAL at 08:18

## 2021-01-01 RX ADMIN — Medication 1 ML: at 00:31

## 2021-01-01 RX ADMIN — FENTANYL CITRATE 100 MCG: 50 INJECTION, SOLUTION INTRAMUSCULAR; INTRAVENOUS at 23:08

## 2021-01-01 RX ADMIN — IPRATROPIUM BROMIDE AND ALBUTEROL SULFATE 3 ML: .5; 3 SOLUTION RESPIRATORY (INHALATION) at 12:03

## 2021-01-01 RX ADMIN — ALBUMIN HUMAN 4000 ML: 0.05 INJECTION, SOLUTION INTRAVENOUS at 14:47

## 2021-01-01 RX ADMIN — MYCOPHENOLATE MOFETIL 1000 MG: 200 POWDER, FOR SUSPENSION ORAL at 08:25

## 2021-01-01 RX ADMIN — FAMOTIDINE 20 MG: 10 INJECTION, SOLUTION INTRAVENOUS at 04:25

## 2021-01-01 RX ADMIN — AZITHROMYCIN 250 MG: 250 TABLET, FILM COATED ORAL at 20:39

## 2021-01-01 RX ADMIN — ACETAMINOPHEN 1000 MG: 500 TABLET, FILM COATED ORAL at 22:36

## 2021-01-01 RX ADMIN — CALCIUM CARBONATE 600 MG (1,500 MG)-VITAMIN D3 400 UNIT TABLET 1 TABLET: at 17:34

## 2021-01-01 RX ADMIN — HEPARIN SODIUM 5000 UNITS: 5000 INJECTION, SOLUTION INTRAVENOUS; SUBCUTANEOUS at 02:22

## 2021-01-01 RX ADMIN — ACETAMINOPHEN 650 MG: 325 TABLET, FILM COATED ORAL at 05:05

## 2021-01-01 RX ADMIN — CHLORHEXIDINE GLUCONATE 0.12% ORAL RINSE 15 ML: 1.2 LIQUID ORAL at 22:01

## 2021-01-01 RX ADMIN — FAMOTIDINE 20 MG: 10 INJECTION, SOLUTION INTRAVENOUS at 07:37

## 2021-01-01 RX ADMIN — ACETAMINOPHEN 650 MG: 325 TABLET, FILM COATED ORAL at 21:14

## 2021-01-01 RX ADMIN — MAGNESIUM SULFATE HEPTAHYDRATE 2 G: 40 INJECTION, SOLUTION INTRAVENOUS at 06:01

## 2021-01-01 RX ADMIN — SULFAMETHOXAZOLE AND TRIMETHOPRIM 1 TABLET: 400; 80 TABLET ORAL at 11:00

## 2021-01-01 RX ADMIN — FUROSEMIDE 40 MG: 10 INJECTION, SOLUTION INTRAVENOUS at 12:43

## 2021-01-01 RX ADMIN — FUROSEMIDE 40 MG: 40 TABLET ORAL at 08:25

## 2021-01-01 RX ADMIN — MULTIPLE VITAMINS W/ MINERALS TAB 1 TABLET: TAB at 15:00

## 2021-01-01 RX ADMIN — PYRIDOSTIGMINE BROMIDE ORAL 60 MG: 60 SOLUTION ORAL at 15:37

## 2021-01-01 RX ADMIN — MYCOPHENOLATE MOFETIL 500 MG: 200 POWDER, FOR SUSPENSION ORAL at 20:03

## 2021-01-01 RX ADMIN — Medication 15 ML: at 07:43

## 2021-01-01 RX ADMIN — PYRIDOSTIGMINE BROMIDE 60 MG: 60 TABLET ORAL at 21:33

## 2021-01-01 RX ADMIN — Medication 4000 UNITS: at 07:40

## 2021-01-01 RX ADMIN — IPRATROPIUM BROMIDE AND ALBUTEROL SULFATE 3 ML: .5; 3 SOLUTION RESPIRATORY (INHALATION) at 07:17

## 2021-01-01 RX ADMIN — METOPROLOL TARTRATE 12.5 MG: 25 TABLET, FILM COATED ORAL at 20:04

## 2021-01-01 RX ADMIN — SULFAMETHOXAZOLE AND TRIMETHOPRIM 1 TABLET: 400; 80 TABLET ORAL at 08:20

## 2021-01-01 RX ADMIN — PYRIDOSTIGMINE BROMIDE ORAL 60 MG: 60 SOLUTION ORAL at 06:35

## 2021-01-01 RX ADMIN — PYRIDOSTIGMINE BROMIDE 60 MG: 60 TABLET ORAL at 21:43

## 2021-01-01 RX ADMIN — POTASSIUM CHLORIDE 20 MEQ: 1.5 POWDER, FOR SOLUTION ORAL at 08:08

## 2021-01-01 RX ADMIN — CHLORHEXIDINE GLUCONATE 0.12% ORAL RINSE 15 ML: 1.2 LIQUID ORAL at 07:35

## 2021-01-01 RX ADMIN — AMOXICILLIN 500 MG: 500 CAPSULE ORAL at 06:35

## 2021-01-01 RX ADMIN — PIPERACILLIN SODIUM AND TAZOBACTAM SODIUM 3.38 G: 3; .375 INJECTION, POWDER, LYOPHILIZED, FOR SOLUTION INTRAVENOUS at 11:49

## 2021-01-01 RX ADMIN — FUROSEMIDE 40 MG: 10 INJECTION, SOLUTION INTRAVENOUS at 12:33

## 2021-01-01 RX ADMIN — PREDNISONE 40 MG: 20 TABLET ORAL at 08:41

## 2021-01-01 RX ADMIN — HUMAN IMMUNOGLOBULIN G 30 G: 20 LIQUID INTRAVENOUS at 18:18

## 2021-01-01 RX ADMIN — PYRIDOSTIGMINE BROMIDE ORAL 60 MG: 60 SOLUTION ORAL at 14:28

## 2021-01-01 RX ADMIN — Medication 50 MCG/HR: at 03:09

## 2021-01-01 RX ADMIN — MYCOPHENOLATE MOFETIL 1000 MG: 500 TABLET ORAL at 10:00

## 2021-01-01 RX ADMIN — MICONAZOLE NITRATE: 20 POWDER TOPICAL at 01:06

## 2021-01-01 RX ADMIN — SULFAMETHOXAZOLE AND TRIMETHOPRIM 1 TABLET: 400; 80 TABLET ORAL at 09:17

## 2021-01-01 RX ADMIN — CHLORHEXIDINE GLUCONATE 0.12% ORAL RINSE 15 ML: 1.2 LIQUID ORAL at 19:55

## 2021-01-01 RX ADMIN — ACETAMINOPHEN 650 MG: 325 TABLET, FILM COATED ORAL at 06:20

## 2021-01-01 RX ADMIN — RIVAROXABAN 15 MG: 15 TABLET, FILM COATED ORAL at 09:30

## 2021-01-01 RX ADMIN — HUMAN IMMUNOGLOBULIN G 30 G: 20 LIQUID INTRAVENOUS at 17:29

## 2021-01-01 RX ADMIN — MYCOPHENOLATE MOFETIL 1000 MG: 500 TABLET ORAL at 22:33

## 2021-01-01 RX ADMIN — RIVAROXABAN 15 MG: 15 TABLET, FILM COATED ORAL at 20:10

## 2021-01-01 RX ADMIN — ACETAMINOPHEN 650 MG: 325 TABLET, FILM COATED ORAL at 23:03

## 2021-01-01 RX ADMIN — ETOMIDATE INJECTION 30 MG: 2 SOLUTION INTRAVENOUS at 23:16

## 2021-01-01 RX ADMIN — PANTOPRAZOLE SODIUM 40 MG: 40 TABLET, DELAYED RELEASE ORAL at 09:46

## 2021-01-01 RX ADMIN — HUMAN ALBUMIN MICROSPHERES AND PERFLUTREN 9 ML: 10; .22 INJECTION, SOLUTION INTRAVENOUS at 10:38

## 2021-01-01 RX ADMIN — TAZOBACTAM SODIUM AND PIPERACILLIN SODIUM 3.38 G: 375; 3 INJECTION, SOLUTION INTRAVENOUS at 17:04

## 2021-01-01 RX ADMIN — RIVAROXABAN 15 MG: 15 TABLET, FILM COATED ORAL at 07:40

## 2021-01-01 RX ADMIN — PROPOFOL 30 MG/HR: 10 INJECTION, EMULSION INTRAVENOUS at 23:24

## 2021-01-01 RX ADMIN — FUROSEMIDE 20 MG: 20 TABLET ORAL at 08:31

## 2021-01-01 RX ADMIN — PYRIDOSTIGMINE BROMIDE ORAL 60 MG: 60 SOLUTION ORAL at 15:30

## 2021-01-01 RX ADMIN — SPIRONOLACTONE 25 MG: 25 TABLET ORAL at 08:25

## 2021-01-01 RX ADMIN — MAGNESIUM OXIDE TAB 400 MG (241.3 MG ELEMENTAL MG) 400 MG: 400 (241.3 MG) TAB at 23:07

## 2021-01-01 RX ADMIN — CALCIUM CARBONATE 600 MG (1,500 MG)-VITAMIN D3 400 UNIT TABLET 1 TABLET: at 08:18

## 2021-01-01 RX ADMIN — FAMOTIDINE 20 MG: 10 INJECTION, SOLUTION INTRAVENOUS at 05:03

## 2021-01-01 RX ADMIN — PROPOFOL 15 MCG/KG/MIN: 10 INJECTION, EMULSION INTRAVENOUS at 23:20

## 2021-01-01 RX ADMIN — CALCIUM CARBONATE 600 MG (1,500 MG)-VITAMIN D3 400 UNIT TABLET 1 TABLET: at 07:40

## 2021-01-01 RX ADMIN — CHLORHEXIDINE GLUCONATE 0.12% ORAL RINSE 15 ML: 1.2 LIQUID ORAL at 21:05

## 2021-01-01 RX ADMIN — MORPHINE SULFATE 2 MG: 2 INJECTION, SOLUTION INTRAMUSCULAR; INTRAVENOUS at 08:15

## 2021-01-01 RX ADMIN — SULFAMETHOXAZOLE AND TRIMETHOPRIM 1 TABLET: 400; 80 TABLET ORAL at 08:06

## 2021-01-01 RX ADMIN — HEPARIN SODIUM 5000 UNITS: 5000 INJECTION, SOLUTION INTRAVENOUS; SUBCUTANEOUS at 05:51

## 2021-01-01 RX ADMIN — METOPROLOL TARTRATE 12.5 MG: 25 TABLET, FILM COATED ORAL at 11:01

## 2021-01-01 RX ADMIN — LIDOCAINE HYDROCHLORIDE 8 ML: 10 INJECTION, SOLUTION INFILTRATION; PERINEURAL at 11:21

## 2021-01-01 RX ADMIN — FAMOTIDINE 20 MG: 10 INJECTION, SOLUTION INTRAVENOUS at 16:45

## 2021-01-01 RX ADMIN — PROPOFOL 150 MG: 10 INJECTION, EMULSION INTRAVENOUS at 17:03

## 2021-01-01 RX ADMIN — AMPICILLIN SODIUM AND SULBACTAM SODIUM 3 G: 2; 1 INJECTION, POWDER, FOR SOLUTION INTRAMUSCULAR; INTRAVENOUS at 12:19

## 2021-01-01 RX ADMIN — AMPICILLIN SODIUM AND SULBACTAM SODIUM 3 G: 2; 1 INJECTION, POWDER, FOR SOLUTION INTRAMUSCULAR; INTRAVENOUS at 00:11

## 2021-01-01 RX ADMIN — SODIUM CHLORIDE 1000 ML: 9 INJECTION, SOLUTION INTRAVENOUS at 23:22

## 2021-01-01 RX ADMIN — PREDNISONE 60 MG: 20 TABLET ORAL at 08:08

## 2021-01-01 RX ADMIN — METOPROLOL TARTRATE 12.5 MG: 25 TABLET, FILM COATED ORAL at 20:13

## 2021-01-01 RX ADMIN — MYCOPHENOLATE MOFETIL 1000 MG: 500 TABLET, FILM COATED ORAL at 08:44

## 2021-01-01 RX ADMIN — AMOXICILLIN 500 MG: 500 CAPSULE ORAL at 21:25

## 2021-01-01 RX ADMIN — MYCOPHENOLATE MOFETIL 500 MG: 200 POWDER, FOR SUSPENSION ORAL at 20:09

## 2021-01-01 RX ADMIN — FENTANYL CITRATE 100 MCG: 50 INJECTION, SOLUTION INTRAMUSCULAR; INTRAVENOUS at 04:27

## 2021-01-01 RX ADMIN — METOPROLOL TARTRATE 12.5 MG: 25 TABLET, FILM COATED ORAL at 09:18

## 2021-01-01 RX ADMIN — DIPHENHYDRAMINE HYDROCHLORIDE 50 MG: 50 INJECTION, SOLUTION INTRAMUSCULAR; INTRAVENOUS at 16:28

## 2021-01-01 RX ADMIN — FENTANYL CITRATE 50 MCG: 50 INJECTION, SOLUTION INTRAMUSCULAR; INTRAVENOUS at 17:45

## 2021-01-01 RX ADMIN — CHLORHEXIDINE GLUCONATE 0.12% ORAL RINSE 15 ML: 1.2 LIQUID ORAL at 08:40

## 2021-01-01 RX ADMIN — FUROSEMIDE 20 MG: 20 TABLET ORAL at 08:06

## 2021-01-01 RX ADMIN — POTASSIUM & SODIUM PHOSPHATES POWDER PACK 280-160-250 MG 2 PACKET: 280-160-250 PACK at 21:27

## 2021-01-01 RX ADMIN — RIVAROXABAN 15 MG: 15 TABLET, FILM COATED ORAL at 17:27

## 2021-01-01 RX ADMIN — THERA TABS 1 TABLET: TAB at 08:18

## 2021-01-01 RX ADMIN — MULTIPLE VITAMINS W/ MINERALS TAB 1 TABLET: TAB at 09:29

## 2021-01-01 RX ADMIN — ACETAMINOPHEN 650 MG: 325 SUSPENSION ORAL at 16:27

## 2021-01-01 RX ADMIN — ENOXAPARIN SODIUM 40 MG: 40 INJECTION SUBCUTANEOUS at 11:30

## 2021-01-01 RX ADMIN — FAMOTIDINE 20 MG: 20 TABLET ORAL at 08:17

## 2021-01-01 RX ADMIN — AMPICILLIN SODIUM AND SULBACTAM SODIUM 3 G: 2; 1 INJECTION, POWDER, FOR SOLUTION INTRAMUSCULAR; INTRAVENOUS at 11:47

## 2021-01-01 RX ADMIN — MYCOPHENOLATE MOFETIL 1000 MG: 200 POWDER, FOR SUSPENSION ORAL at 08:31

## 2021-01-01 RX ADMIN — HEPARIN SODIUM 5000 UNITS: 5000 INJECTION, SOLUTION INTRAVENOUS; SUBCUTANEOUS at 02:13

## 2021-01-01 RX ADMIN — PYRIDOSTIGMINE BROMIDE ORAL 60 MG: 60 SOLUTION ORAL at 22:41

## 2021-01-01 RX ADMIN — Medication 100 MCG: at 09:14

## 2021-01-01 RX ADMIN — PYRIDOSTIGMINE BROMIDE 60 MG: 60 TABLET ORAL at 08:44

## 2021-01-01 RX ADMIN — MAGNESIUM OXIDE TAB 400 MG (241.3 MG ELEMENTAL MG) 400 MG: 400 (241.3 MG) TAB at 11:39

## 2021-01-01 RX ADMIN — MYCOPHENOLATE MOFETIL 1000 MG: 200 POWDER, FOR SUSPENSION ORAL at 20:01

## 2021-01-01 RX ADMIN — POTASSIUM & SODIUM PHOSPHATES POWDER PACK 280-160-250 MG 1 PACKET: 280-160-250 PACK at 22:11

## 2021-01-01 RX ADMIN — CALCIUM GLUCONATE 4 G: 98 INJECTION, SOLUTION INTRAVENOUS at 15:08

## 2021-01-01 RX ADMIN — Medication 100 MCG: at 07:42

## 2021-01-01 RX ADMIN — AMOXICILLIN 500 MG: 500 CAPSULE ORAL at 06:50

## 2021-01-01 RX ADMIN — HEPARIN SODIUM 5000 UNITS: 5000 INJECTION, SOLUTION INTRAVENOUS; SUBCUTANEOUS at 18:44

## 2021-01-01 RX ADMIN — FUROSEMIDE 40 MG: 10 INJECTION, SOLUTION INTRAVENOUS at 01:14

## 2021-01-01 RX ADMIN — ACETAMINOPHEN 650 MG: 325 TABLET, FILM COATED ORAL at 13:28

## 2021-01-01 RX ADMIN — PREDNISONE 60 MG: 10 TABLET ORAL at 17:58

## 2021-01-01 RX ADMIN — PYRIDOSTIGMINE BROMIDE 60 MG: 60 TABLET ORAL at 17:05

## 2021-01-01 RX ADMIN — FUROSEMIDE 40 MG: 10 INJECTION, SOLUTION INTRAVENOUS at 08:25

## 2021-01-01 RX ADMIN — Medication 3 MG: at 20:38

## 2021-01-01 RX ADMIN — MYCOPHENOLATE MOFETIL 1000 MG: 200 POWDER, FOR SUSPENSION ORAL at 10:02

## 2021-01-01 RX ADMIN — MYCOPHENOLATE MOFETIL 500 MG: 200 POWDER, FOR SUSPENSION ORAL at 20:58

## 2021-01-01 RX ADMIN — ACETAMINOPHEN 650 MG: 325 TABLET, FILM COATED ORAL at 07:35

## 2021-01-01 RX ADMIN — CHLORHEXIDINE GLUCONATE 0.12% ORAL RINSE 15 ML: 1.2 LIQUID ORAL at 08:20

## 2021-01-01 RX ADMIN — SERTRALINE HYDROCHLORIDE 25 MG: 25 TABLET ORAL at 11:01

## 2021-01-01 RX ADMIN — SODIUM CHLORIDE, POTASSIUM CHLORIDE, SODIUM LACTATE AND CALCIUM CHLORIDE: 600; 310; 30; 20 INJECTION, SOLUTION INTRAVENOUS at 03:42

## 2021-01-01 RX ADMIN — AMOXICILLIN 500 MG: 500 CAPSULE ORAL at 15:15

## 2021-01-01 RX ADMIN — PYRIDOSTIGMINE BROMIDE 60 MG: 60 TABLET ORAL at 21:01

## 2021-01-01 RX ADMIN — PYRIDOSTIGMINE BROMIDE 60 MG: 60 TABLET ORAL at 22:23

## 2021-01-01 RX ADMIN — ACETAMINOPHEN 650 MG: 325 TABLET, FILM COATED ORAL at 12:27

## 2021-01-01 RX ADMIN — ACETAMINOPHEN 1000 MG: 325 SUSPENSION ORAL at 21:22

## 2021-01-01 RX ADMIN — ANTICOAGULANT CITRATE DEXTROSE SOLUTION FORMULA A 2000 ML: 12.25; 11; 3.65 SOLUTION INTRAVENOUS at 12:41

## 2021-01-01 RX ADMIN — PYRIDOSTIGMINE BROMIDE ORAL 60 MG: 60 SOLUTION ORAL at 23:51

## 2021-01-01 RX ADMIN — MYCOPHENOLATE MOFETIL 1000 MG: 500 TABLET, FILM COATED ORAL at 07:38

## 2021-01-01 RX ADMIN — RIVAROXABAN 15 MG: 15 TABLET, FILM COATED ORAL at 17:04

## 2021-01-01 RX ADMIN — PREDNISONE 60 MG: 10 TABLET ORAL at 08:17

## 2021-01-01 RX ADMIN — MYCOPHENOLATE MOFETIL 1000 MG: 500 TABLET, FILM COATED ORAL at 20:00

## 2021-01-01 RX ADMIN — PROPOFOL 20 MCG/KG/MIN: 10 INJECTION, EMULSION INTRAVENOUS at 17:09

## 2021-01-01 RX ADMIN — ALBUMIN HUMAN 4250 ML: 0.05 INJECTION, SOLUTION INTRAVENOUS at 11:55

## 2021-01-01 RX ADMIN — AMPICILLIN SODIUM AND SULBACTAM SODIUM 3 G: 2; 1 INJECTION, POWDER, FOR SOLUTION INTRAMUSCULAR; INTRAVENOUS at 23:30

## 2021-01-01 RX ADMIN — Medication 15 ML: at 08:20

## 2021-01-01 RX ADMIN — FAMOTIDINE 20 MG: 10 INJECTION, SOLUTION INTRAVENOUS at 04:57

## 2021-01-01 RX ADMIN — Medication 100 MCG: at 10:07

## 2021-01-01 RX ADMIN — MYCOPHENOLATE MOFETIL 500 MG: 200 POWDER, FOR SUSPENSION ORAL at 21:21

## 2021-01-01 RX ADMIN — CALCIUM CARBONATE 600 MG (1,500 MG)-VITAMIN D3 400 UNIT TABLET 1 TABLET: at 08:48

## 2021-01-01 RX ADMIN — ACETAMINOPHEN 650 MG: 325 TABLET, FILM COATED ORAL at 17:58

## 2021-01-01 RX ADMIN — PYRIDOSTIGMINE BROMIDE 60 MG: 60 TABLET ORAL at 17:50

## 2021-01-01 RX ADMIN — MYCOPHENOLATE MOFETIL 1000 MG: 200 POWDER, FOR SUSPENSION ORAL at 09:15

## 2021-01-01 RX ADMIN — POTASSIUM & SODIUM PHOSPHATES POWDER PACK 280-160-250 MG 1 PACKET: 280-160-250 PACK at 10:07

## 2021-01-01 RX ADMIN — PYRIDOSTIGMINE BROMIDE 60 MG: 60 TABLET ORAL at 09:49

## 2021-01-01 RX ADMIN — PREDNISONE 60 MG: 20 TABLET ORAL at 10:07

## 2021-01-01 RX ADMIN — ALBUTEROL SULFATE 2.5 MG: 2.5 SOLUTION RESPIRATORY (INHALATION) at 05:10

## 2021-01-01 RX ADMIN — MYCOPHENOLATE MOFETIL 1000 MG: 500 TABLET ORAL at 11:05

## 2021-01-01 RX ADMIN — ENOXAPARIN SODIUM 40 MG: 40 INJECTION SUBCUTANEOUS at 12:15

## 2021-01-01 RX ADMIN — FAMOTIDINE 20 MG: 10 INJECTION, SOLUTION INTRAVENOUS at 18:28

## 2021-01-01 RX ADMIN — PREDNISONE 60 MG: 20 TABLET ORAL at 08:11

## 2021-01-01 RX ADMIN — PANTOPRAZOLE SODIUM 40 MG: 40 INJECTION, POWDER, FOR SOLUTION INTRAVENOUS at 08:24

## 2021-01-01 RX ADMIN — METOPROLOL TARTRATE 12.5 MG: 25 TABLET, FILM COATED ORAL at 08:07

## 2021-01-01 RX ADMIN — METOPROLOL TARTRATE 12.5 MG: 25 TABLET, FILM COATED ORAL at 21:04

## 2021-01-01 RX ADMIN — HEPARIN SODIUM 5000 UNITS: 5000 INJECTION, SOLUTION INTRAVENOUS; SUBCUTANEOUS at 02:48

## 2021-01-01 RX ADMIN — MYCOPHENOLATE MOFETIL 1000 MG: 200 POWDER, FOR SUSPENSION ORAL at 08:54

## 2021-01-01 RX ADMIN — HEPARIN SODIUM 5000 UNITS: 5000 INJECTION, SOLUTION INTRAVENOUS; SUBCUTANEOUS at 03:01

## 2021-01-01 RX ADMIN — METOPROLOL TARTRATE 12.5 MG: 25 TABLET, FILM COATED ORAL at 21:09

## 2021-01-01 RX ADMIN — Medication 100 MCG: at 08:04

## 2021-01-01 RX ADMIN — ANTICOAGULANT CITRATE DEXTROSE SOLUTION FORMULA A 750 ML: 12.25; 11; 3.65 SOLUTION INTRAVENOUS at 11:54

## 2021-01-01 RX ADMIN — MYCOPHENOLATE MOFETIL 1000 MG: 500 TABLET, FILM COATED ORAL at 21:04

## 2021-01-01 RX ADMIN — RIVAROXABAN 15 MG: 15 TABLET, FILM COATED ORAL at 16:41

## 2021-01-01 RX ADMIN — CHLORHEXIDINE GLUCONATE 0.12% ORAL RINSE 15 ML: 1.2 LIQUID ORAL at 08:31

## 2021-01-01 RX ADMIN — MYCOPHENOLATE MOFETIL 1000 MG: 500 TABLET ORAL at 09:50

## 2021-01-01 RX ADMIN — MYCOPHENOLATE MOFETIL 1000 MG: 200 POWDER, FOR SUSPENSION ORAL at 12:19

## 2021-01-01 RX ADMIN — RIVAROXABAN 20 MG: 20 TABLET, FILM COATED ORAL at 17:17

## 2021-01-01 RX ADMIN — PANTOPRAZOLE SODIUM 40 MG: 40 TABLET, DELAYED RELEASE ORAL at 07:40

## 2021-01-01 RX ADMIN — PREDNISONE 60 MG: 20 TABLET ORAL at 08:01

## 2021-01-01 RX ADMIN — MYCOPHENOLATE MOFETIL 500 MG: 200 POWDER, FOR SUSPENSION ORAL at 20:04

## 2021-01-01 RX ADMIN — HEPARIN SODIUM 5000 UNITS: 5000 INJECTION, SOLUTION INTRAVENOUS; SUBCUTANEOUS at 11:30

## 2021-01-01 RX ADMIN — PYRIDOSTIGMINE BROMIDE 60 MG: 60 TABLET ORAL at 08:32

## 2021-01-01 RX ADMIN — PROPOFOL 30 MCG/KG/MIN: 10 INJECTION, EMULSION INTRAVENOUS at 00:12

## 2021-01-01 RX ADMIN — ACETAMINOPHEN 1000 MG: 500 TABLET, FILM COATED ORAL at 22:23

## 2021-01-01 RX ADMIN — PROPOFOL 30 MCG/KG/MIN: 10 INJECTION, EMULSION INTRAVENOUS at 08:33

## 2021-01-01 RX ADMIN — PREDNISONE 60 MG: 20 TABLET ORAL at 08:15

## 2021-01-01 RX ADMIN — HUMAN ALBUMIN MICROSPHERES AND PERFLUTREN 9 ML: 10; .22 INJECTION, SOLUTION INTRAVENOUS at 14:03

## 2021-01-01 RX ADMIN — POTASSIUM CHLORIDE 40 MEQ: 20 SOLUTION ORAL at 16:55

## 2021-01-01 RX ADMIN — MYCOPHENOLATE MOFETIL 1000 MG: 500 TABLET ORAL at 21:22

## 2021-01-01 RX ADMIN — MYCOPHENOLATE MOFETIL 500 MG: 200 POWDER, FOR SUSPENSION ORAL at 20:41

## 2021-01-01 RX ADMIN — METOPROLOL TARTRATE 12.5 MG: 25 TABLET, FILM COATED ORAL at 08:28

## 2021-01-01 RX ADMIN — SULFAMETHOXAZOLE AND TRIMETHOPRIM 1 TABLET: 400; 80 TABLET ORAL at 09:45

## 2021-01-01 RX ADMIN — METOPROLOL TARTRATE 12.5 MG: 25 TABLET, FILM COATED ORAL at 20:10

## 2021-01-01 RX ADMIN — METOPROLOL TARTRATE 25 MG: 25 TABLET, FILM COATED ORAL at 20:02

## 2021-01-01 RX ADMIN — HEPARIN SODIUM 5000 UNITS: 5000 INJECTION, SOLUTION INTRAVENOUS; SUBCUTANEOUS at 11:49

## 2021-01-01 RX ADMIN — ENOXAPARIN SODIUM 40 MG: 40 INJECTION SUBCUTANEOUS at 12:31

## 2021-01-01 RX ADMIN — PIPERACILLIN SODIUM AND TAZOBACTAM SODIUM 3.38 G: 3; .375 INJECTION, POWDER, LYOPHILIZED, FOR SOLUTION INTRAVENOUS at 22:32

## 2021-01-01 RX ADMIN — PREDNISONE 60 MG: 20 TABLET ORAL at 08:44

## 2021-01-01 RX ADMIN — PANTOPRAZOLE SODIUM 40 MG: 40 INJECTION, POWDER, FOR SOLUTION INTRAVENOUS at 08:03

## 2021-01-01 RX ADMIN — PYRIDOSTIGMINE BROMIDE ORAL 60 MG: 60 SOLUTION ORAL at 07:44

## 2021-01-01 RX ADMIN — MYCOPHENOLATE MOFETIL 1000 MG: 500 TABLET ORAL at 09:45

## 2021-01-01 RX ADMIN — PREDNISONE 60 MG: 20 TABLET ORAL at 09:17

## 2021-01-01 RX ADMIN — PREDNISONE 60 MG: 20 TABLET ORAL at 08:17

## 2021-01-01 RX ADMIN — Medication 1 MG: at 21:14

## 2021-01-01 RX ADMIN — PYRIDOSTIGMINE BROMIDE 60 MG: 60 TABLET ORAL at 20:00

## 2021-01-01 RX ADMIN — PROPOFOL 20 MCG/KG/MIN: 10 INJECTION, EMULSION INTRAVENOUS at 10:22

## 2021-01-01 RX ADMIN — PROPOFOL 40 MCG/KG/MIN: 10 INJECTION, EMULSION INTRAVENOUS at 10:19

## 2021-01-01 RX ADMIN — CEFTRIAXONE SODIUM 1 G: 1 INJECTION, POWDER, FOR SOLUTION INTRAMUSCULAR; INTRAVENOUS at 12:08

## 2021-01-01 RX ADMIN — MYCOPHENOLATE MOFETIL 1000 MG: 200 POWDER, FOR SUSPENSION ORAL at 09:13

## 2021-01-01 RX ADMIN — FUROSEMIDE 20 MG: 20 TABLET ORAL at 09:18

## 2021-01-01 RX ADMIN — PYRIDOSTIGMINE BROMIDE 60 MG: 60 TABLET ORAL at 09:44

## 2021-01-01 RX ADMIN — MICONAZOLE NITRATE: 20 POWDER TOPICAL at 11:59

## 2021-01-01 RX ADMIN — FUROSEMIDE 20 MG: 20 TABLET ORAL at 08:42

## 2021-01-01 RX ADMIN — ACETAMINOPHEN 1000 MG: 325 SUSPENSION ORAL at 02:11

## 2021-01-01 RX ADMIN — ACETAMINOPHEN 1000 MG: 325 SUSPENSION ORAL at 20:00

## 2021-01-01 RX ADMIN — LOSARTAN POTASSIUM 50 MG: 50 TABLET, FILM COATED ORAL at 08:17

## 2021-01-01 RX ADMIN — MELATONIN 5 MG TABLET 5 MG: at 21:49

## 2021-01-01 RX ADMIN — ACETAMINOPHEN 650 MG: 325 SUSPENSION ORAL at 03:55

## 2021-01-01 RX ADMIN — SODIUM CHLORIDE: 9 INJECTION, SOLUTION INTRAVENOUS at 06:56

## 2021-01-01 RX ADMIN — THERA TABS 1 TABLET: TAB at 08:41

## 2021-01-01 RX ADMIN — ACETAMINOPHEN 650 MG: 325 TABLET, FILM COATED ORAL at 08:29

## 2021-01-01 RX ADMIN — SODIUM CHLORIDE, POTASSIUM CHLORIDE, SODIUM LACTATE AND CALCIUM CHLORIDE: 600; 310; 30; 20 INJECTION, SOLUTION INTRAVENOUS at 20:58

## 2021-01-01 RX ADMIN — METOPROLOL TARTRATE 12.5 MG: 25 TABLET, FILM COATED ORAL at 10:07

## 2021-01-01 RX ADMIN — ACETAMINOPHEN 650 MG: 325 TABLET ORAL at 13:37

## 2021-01-01 RX ADMIN — ACETAMINOPHEN 650 MG: 325 SUSPENSION ORAL at 16:47

## 2021-01-01 RX ADMIN — PYRIDOSTIGMINE BROMIDE 60 MG: 60 TABLET ORAL at 10:32

## 2021-01-01 RX ADMIN — PYRIDOSTIGMINE BROMIDE ORAL 60 MG: 60 SOLUTION ORAL at 09:40

## 2021-01-01 RX ADMIN — HEPARIN SODIUM 6000 UNITS: 1000 INJECTION INTRAVENOUS; SUBCUTANEOUS at 14:50

## 2021-01-01 RX ADMIN — SODIUM CHLORIDE 250 ML: 9 INJECTION, SOLUTION INTRAVENOUS at 04:27

## 2021-01-01 RX ADMIN — ETOMIDATE 10 MG: 2 INJECTION, SOLUTION INTRAVENOUS at 04:09

## 2021-01-01 RX ADMIN — LOSARTAN POTASSIUM 50 MG: 50 TABLET, FILM COATED ORAL at 12:33

## 2021-01-01 RX ADMIN — PIPERACILLIN SODIUM AND TAZOBACTAM SODIUM 3.38 G: 3; .375 INJECTION, POWDER, LYOPHILIZED, FOR SOLUTION INTRAVENOUS at 10:53

## 2021-01-01 RX ADMIN — PREDNISONE 60 MG: 20 TABLET ORAL at 09:59

## 2021-01-01 RX ADMIN — Medication 100 MCG: at 08:15

## 2021-01-01 RX ADMIN — MYCOPHENOLATE MOFETIL 1000 MG: 500 TABLET ORAL at 15:27

## 2021-01-01 RX ADMIN — ACETAMINOPHEN 650 MG: 325 TABLET, FILM COATED ORAL at 20:11

## 2021-01-01 RX ADMIN — PANTOPRAZOLE SODIUM 40 MG: 40 TABLET, DELAYED RELEASE ORAL at 06:21

## 2021-01-01 RX ADMIN — AMOXICILLIN 500 MG: 500 CAPSULE ORAL at 17:13

## 2021-01-01 RX ADMIN — PYRIDOSTIGMINE BROMIDE 60 MG: 60 TABLET ORAL at 18:16

## 2021-01-01 RX ADMIN — FUROSEMIDE 20 MG: 20 TABLET ORAL at 09:44

## 2021-01-01 RX ADMIN — POTASSIUM & SODIUM PHOSPHATES POWDER PACK 280-160-250 MG 1 PACKET: 280-160-250 PACK at 08:38

## 2021-01-01 RX ADMIN — Medication 100 MCG: at 08:20

## 2021-01-01 RX ADMIN — POTASSIUM CHLORIDE 40 MEQ: 1500 TABLET, EXTENDED RELEASE ORAL at 13:39

## 2021-01-01 RX ADMIN — TAZOBACTAM SODIUM AND PIPERACILLIN SODIUM 4.5 G: 500; 4 INJECTION, SOLUTION INTRAVENOUS at 01:42

## 2021-01-01 RX ADMIN — MYCOPHENOLATE MOFETIL 1000 MG: 500 TABLET ORAL at 20:39

## 2021-01-01 RX ADMIN — AMPICILLIN SODIUM AND SULBACTAM SODIUM 3 G: 2; 1 INJECTION, POWDER, FOR SOLUTION INTRAMUSCULAR; INTRAVENOUS at 06:39

## 2021-01-01 RX ADMIN — MYCOPHENOLATE MOFETIL 1000 MG: 500 TABLET ORAL at 21:01

## 2021-01-01 RX ADMIN — POTASSIUM & SODIUM PHOSPHATES POWDER PACK 280-160-250 MG 1 PACKET: 280-160-250 PACK at 16:34

## 2021-01-01 RX ADMIN — SERTRALINE HYDROCHLORIDE 25 MG: 25 TABLET ORAL at 08:24

## 2021-01-01 RX ADMIN — POTASSIUM CHLORIDE 20 MEQ: 1.5 POWDER, FOR SOLUTION ORAL at 05:56

## 2021-01-01 RX ADMIN — POTASSIUM CHLORIDE 40 MEQ: 1.5 POWDER, FOR SOLUTION ORAL at 05:39

## 2021-01-01 RX ADMIN — MYCOPHENOLATE MOFETIL 500 MG: 200 POWDER, FOR SUSPENSION ORAL at 19:55

## 2021-01-01 RX ADMIN — ACETAMINOPHEN 650 MG: 325 SUSPENSION ORAL at 18:05

## 2021-01-01 RX ADMIN — AMPICILLIN SODIUM AND SULBACTAM SODIUM 3 G: 2; 1 INJECTION, POWDER, FOR SOLUTION INTRAMUSCULAR; INTRAVENOUS at 17:59

## 2021-01-01 RX ADMIN — METOPROLOL TARTRATE 12.5 MG: 25 TABLET, FILM COATED ORAL at 10:06

## 2021-01-01 RX ADMIN — SODIUM CHLORIDE, POTASSIUM CHLORIDE, SODIUM LACTATE AND CALCIUM CHLORIDE: 600; 310; 30; 20 INJECTION, SOLUTION INTRAVENOUS at 06:47

## 2021-01-01 RX ADMIN — MAGNESIUM OXIDE TAB 400 MG (241.3 MG ELEMENTAL MG) 400 MG: 400 (241.3 MG) TAB at 22:11

## 2021-01-01 RX ADMIN — HEPARIN SODIUM 5000 UNITS: 5000 INJECTION, SOLUTION INTRAVENOUS; SUBCUTANEOUS at 18:28

## 2021-01-01 RX ADMIN — MYCOPHENOLATE MOFETIL 1000 MG: 200 POWDER, FOR SUSPENSION ORAL at 08:07

## 2021-01-01 RX ADMIN — Medication 100 MCG: at 08:06

## 2021-01-01 RX ADMIN — PYRIDOSTIGMINE BROMIDE 60 MG: 60 TABLET ORAL at 16:45

## 2021-01-01 RX ADMIN — METOPROLOL TARTRATE 12.5 MG: 25 TABLET, FILM COATED ORAL at 08:24

## 2021-01-01 RX ADMIN — IPRATROPIUM BROMIDE AND ALBUTEROL SULFATE 3 ML: .5; 3 SOLUTION RESPIRATORY (INHALATION) at 19:53

## 2021-01-01 RX ADMIN — CALCIUM CARBONATE 600 MG (1,500 MG)-VITAMIN D3 400 UNIT TABLET 1 TABLET: at 18:11

## 2021-01-01 RX ADMIN — MYCOPHENOLATE MOFETIL 1000 MG: 500 TABLET ORAL at 11:38

## 2021-01-01 RX ADMIN — CALCIUM GLUCONATE 4.5 G: 98 INJECTION, SOLUTION INTRAVENOUS at 16:59

## 2021-01-01 RX ADMIN — AZITHROMYCIN MONOHYDRATE 500 MG: 500 INJECTION, POWDER, LYOPHILIZED, FOR SOLUTION INTRAVENOUS at 02:12

## 2021-01-01 RX ADMIN — PYRIDOSTIGMINE BROMIDE 60 MG: 60 TABLET ORAL at 21:29

## 2021-01-01 RX ADMIN — PYRIDOSTIGMINE BROMIDE 60 MG: 60 TABLET ORAL at 07:40

## 2021-01-01 RX ADMIN — CHLORHEXIDINE GLUCONATE 0.12% ORAL RINSE 15 ML: 1.2 LIQUID ORAL at 08:17

## 2021-01-01 RX ADMIN — LOSARTAN POTASSIUM 50 MG: 50 TABLET, FILM COATED ORAL at 07:35

## 2021-01-01 RX ADMIN — MYCOPHENOLATE MOFETIL 500 MG: 200 POWDER, FOR SUSPENSION ORAL at 21:05

## 2021-01-01 RX ADMIN — MICONAZOLE NITRATE: 20 POWDER TOPICAL at 08:02

## 2021-01-01 RX ADMIN — SULFAMETHOXAZOLE AND TRIMETHOPRIM 1 TABLET: 400; 80 TABLET ORAL at 09:59

## 2021-01-01 RX ADMIN — MYCOPHENOLATE MOFETIL 500 MG: 200 POWDER, FOR SUSPENSION ORAL at 21:13

## 2021-01-01 RX ADMIN — Medication 4000 UNITS: at 08:46

## 2021-01-01 RX ADMIN — FENTANYL CITRATE 25 MCG: 50 INJECTION, SOLUTION INTRAMUSCULAR; INTRAVENOUS at 13:18

## 2021-01-01 RX ADMIN — MYCOPHENOLATE MOFETIL 1000 MG: 200 POWDER, FOR SUSPENSION ORAL at 10:40

## 2021-01-01 RX ADMIN — SULFAMETHOXAZOLE AND TRIMETHOPRIM 1 TABLET: 400; 80 TABLET ORAL at 09:47

## 2021-01-01 RX ADMIN — HEPARIN SODIUM 5000 UNITS: 5000 INJECTION, SOLUTION INTRAVENOUS; SUBCUTANEOUS at 17:59

## 2021-01-01 RX ADMIN — BISACODYL 10 MG: 10 SUPPOSITORY RECTAL at 08:26

## 2021-01-01 RX ADMIN — HEPARIN SODIUM 5000 UNITS: 5000 INJECTION, SOLUTION INTRAVENOUS; SUBCUTANEOUS at 18:24

## 2021-01-01 RX ADMIN — PANTOPRAZOLE SODIUM 40 MG: 40 TABLET, DELAYED RELEASE ORAL at 06:50

## 2021-01-01 RX ADMIN — MYCOPHENOLATE MOFETIL 1000 MG: 200 POWDER, FOR SUSPENSION ORAL at 08:12

## 2021-01-01 RX ADMIN — MYCOPHENOLATE MOFETIL 1000 MG: 500 TABLET ORAL at 09:21

## 2021-01-01 RX ADMIN — PREDNISONE 60 MG: 20 TABLET ORAL at 08:04

## 2021-01-01 RX ADMIN — PYRIDOSTIGMINE BROMIDE 60 MG: 60 TABLET ORAL at 23:23

## 2021-01-01 RX ADMIN — MYCOPHENOLATE MOFETIL 1000 MG: 500 TABLET ORAL at 21:30

## 2021-01-01 RX ADMIN — FUROSEMIDE 40 MG: 40 TABLET ORAL at 08:44

## 2021-01-01 RX ADMIN — MYCOPHENOLATE MOFETIL 1000 MG: 500 TABLET ORAL at 08:06

## 2021-01-01 RX ADMIN — HEPARIN SODIUM 5000 UNITS: 5000 INJECTION, SOLUTION INTRAVENOUS; SUBCUTANEOUS at 08:08

## 2021-01-01 RX ADMIN — HEPARIN SODIUM 8000 UNITS: 1000 INJECTION INTRAVENOUS; SUBCUTANEOUS at 11:20

## 2021-01-01 RX ADMIN — CEFTRIAXONE SODIUM 2 G: 2 INJECTION, POWDER, FOR SOLUTION INTRAMUSCULAR; INTRAVENOUS at 00:36

## 2021-01-01 RX ADMIN — Medication 100 MCG: at 09:49

## 2021-01-01 RX ADMIN — ANTICOAGULANT CITRATE DEXTROSE SOLUTION FORMULA A 989 ML: 12.25; 11; 3.65 SOLUTION INTRAVENOUS at 16:57

## 2021-01-01 RX ADMIN — MYCOPHENOLATE MOFETIL 1000 MG: 200 POWDER, FOR SUSPENSION ORAL at 07:44

## 2021-01-01 RX ADMIN — FAMOTIDINE 20 MG: 10 INJECTION, SOLUTION INTRAVENOUS at 18:13

## 2021-01-01 RX ADMIN — DIPHENHYDRAMINE HYDROCHLORIDE 50 MG: 25 CAPSULE ORAL at 16:56

## 2021-01-01 RX ADMIN — MICONAZOLE NITRATE: 20 POWDER TOPICAL at 11:52

## 2021-01-01 RX ADMIN — FENTANYL CITRATE 100 MCG: 50 INJECTION, SOLUTION INTRAMUSCULAR; INTRAVENOUS at 23:58

## 2021-01-01 RX ADMIN — ACETAMINOPHEN 1000 MG: 500 TABLET, FILM COATED ORAL at 21:33

## 2021-01-01 RX ADMIN — FAMOTIDINE 20 MG: 10 INJECTION, SOLUTION INTRAVENOUS at 06:23

## 2021-01-01 RX ADMIN — Medication 50 MCG/HR: at 02:15

## 2021-01-01 RX ADMIN — PYRIDOSTIGMINE BROMIDE 60 MG: 60 TABLET ORAL at 17:04

## 2021-01-01 RX ADMIN — HEPARIN SODIUM 5000 UNITS: 5000 INJECTION, SOLUTION INTRAVENOUS; SUBCUTANEOUS at 09:17

## 2021-01-01 RX ADMIN — POTASSIUM & SODIUM PHOSPHATES POWDER PACK 280-160-250 MG 2 PACKET: 280-160-250 PACK at 08:25

## 2021-01-01 RX ADMIN — PYRIDOSTIGMINE BROMIDE 60 MG: 60 TABLET ORAL at 11:02

## 2021-01-01 RX ADMIN — PIPERACILLIN SODIUM AND TAZOBACTAM SODIUM 3.38 G: 3; .375 INJECTION, POWDER, LYOPHILIZED, FOR SOLUTION INTRAVENOUS at 03:54

## 2021-01-01 RX ADMIN — MYCOPHENOLATE MOFETIL 1000 MG: 500 TABLET ORAL at 08:15

## 2021-01-01 RX ADMIN — MYCOPHENOLATE MOFETIL 500 MG: 200 POWDER, FOR SUSPENSION ORAL at 20:11

## 2021-01-01 RX ADMIN — HEPARIN SODIUM 5000 UNITS: 5000 INJECTION, SOLUTION INTRAVENOUS; SUBCUTANEOUS at 17:13

## 2021-01-01 RX ADMIN — PREDNISONE 60 MG: 20 TABLET ORAL at 08:06

## 2021-01-01 RX ADMIN — HEPARIN SODIUM 5000 UNITS: 5000 INJECTION, SOLUTION INTRAVENOUS; SUBCUTANEOUS at 17:36

## 2021-01-01 RX ADMIN — ACETAMINOPHEN 1000 MG: 325 SUSPENSION ORAL at 14:58

## 2021-01-01 RX ADMIN — FUROSEMIDE 20 MG: 10 INJECTION, SOLUTION INTRAVENOUS at 11:47

## 2021-01-01 RX ADMIN — Medication 1 ML: at 15:54

## 2021-01-01 RX ADMIN — THERA TABS 1 TABLET: TAB at 09:13

## 2021-01-01 RX ADMIN — FAMOTIDINE 20 MG: 10 INJECTION, SOLUTION INTRAVENOUS at 08:30

## 2021-01-01 RX ADMIN — POTASSIUM & SODIUM PHOSPHATES POWDER PACK 280-160-250 MG 2 PACKET: 280-160-250 PACK at 08:24

## 2021-01-01 RX ADMIN — PROPOFOL 30 MCG/KG/MIN: 10 INJECTION, EMULSION INTRAVENOUS at 03:50

## 2021-01-01 RX ADMIN — PYRIDOSTIGMINE BROMIDE ORAL 60 MG: 60 SOLUTION ORAL at 00:04

## 2021-01-01 RX ADMIN — METOPROLOL TARTRATE 12.5 MG: 25 TABLET, FILM COATED ORAL at 09:48

## 2021-01-01 RX ADMIN — PYRIDOSTIGMINE BROMIDE 60 MG: 60 TABLET ORAL at 16:02

## 2021-01-01 RX ADMIN — FUROSEMIDE 40 MG: 40 TABLET ORAL at 09:28

## 2021-01-01 RX ADMIN — ACETAMINOPHEN 650 MG: 325 SUSPENSION ORAL at 08:20

## 2021-01-01 RX ADMIN — ACETAMINOPHEN 1000 MG: 500 TABLET, FILM COATED ORAL at 17:44

## 2021-01-01 RX ADMIN — MYCOPHENOLATE MOFETIL 500 MG: 200 POWDER, FOR SUSPENSION ORAL at 21:27

## 2021-01-01 RX ADMIN — ACETAMINOPHEN 1000 MG: 325 SUSPENSION ORAL at 14:08

## 2021-01-01 RX ADMIN — Medication 1 ML: at 14:16

## 2021-01-01 RX ADMIN — PREDNISONE 60 MG: 20 TABLET ORAL at 09:30

## 2021-01-01 RX ADMIN — SULFAMETHOXAZOLE AND TRIMETHOPRIM 1 TABLET: 400; 80 TABLET ORAL at 07:41

## 2021-01-01 RX ADMIN — PREDNISONE 60 MG: 20 TABLET ORAL at 10:02

## 2021-01-01 RX ADMIN — AMOXICILLIN 500 MG: 500 CAPSULE ORAL at 14:28

## 2021-01-01 RX ADMIN — PREDNISONE 60 MG: 10 TABLET ORAL at 08:30

## 2021-01-01 RX ADMIN — FENTANYL CITRATE 100 MCG: 50 INJECTION, SOLUTION INTRAMUSCULAR; INTRAVENOUS at 19:32

## 2021-01-01 RX ADMIN — ONDANSETRON 4 MG: 2 INJECTION INTRAMUSCULAR; INTRAVENOUS at 18:01

## 2021-01-01 RX ADMIN — PIPERACILLIN SODIUM AND TAZOBACTAM SODIUM 3.38 G: 3; .375 INJECTION, POWDER, LYOPHILIZED, FOR SOLUTION INTRAVENOUS at 01:56

## 2021-01-01 RX ADMIN — Medication 1 ML: at 18:30

## 2021-01-01 RX ADMIN — MYCOPHENOLATE MOFETIL 500 MG: 200 POWDER, FOR SUSPENSION ORAL at 20:14

## 2021-01-01 RX ADMIN — METOPROLOL TARTRATE 25 MG: 25 TABLET, FILM COATED ORAL at 09:47

## 2021-01-01 RX ADMIN — ACETAMINOPHEN 650 MG: 325 SUSPENSION ORAL at 16:56

## 2021-01-01 RX ADMIN — METOPROLOL TARTRATE 12.5 MG: 25 TABLET, FILM COATED ORAL at 20:38

## 2021-01-01 RX ADMIN — PYRIDOSTIGMINE BROMIDE ORAL 60 MG: 60 SOLUTION ORAL at 23:08

## 2021-01-01 RX ADMIN — ACETAMINOPHEN 650 MG: 325 TABLET, FILM COATED ORAL at 20:03

## 2021-01-01 RX ADMIN — ACETAMINOPHEN 650 MG: 325 TABLET, FILM COATED ORAL at 02:24

## 2021-01-01 RX ADMIN — SENNOSIDES AND DOCUSATE SODIUM 1 TABLET: 8.6; 5 TABLET ORAL at 15:23

## 2021-01-01 RX ADMIN — CHLORHEXIDINE GLUCONATE 0.12% ORAL RINSE 15 ML: 1.2 LIQUID ORAL at 07:37

## 2021-01-01 RX ADMIN — PYRIDOSTIGMINE BROMIDE 60 MG: 60 TABLET ORAL at 16:57

## 2021-01-01 RX ADMIN — PYRIDOSTIGMINE BROMIDE ORAL 60 MG: 60 SOLUTION ORAL at 00:02

## 2021-01-01 RX ADMIN — CALCIUM CARBONATE 600 MG (1,500 MG)-VITAMIN D3 400 UNIT TABLET 1 TABLET: at 18:30

## 2021-01-01 RX ADMIN — POTASSIUM & SODIUM PHOSPHATES POWDER PACK 280-160-250 MG 1 PACKET: 280-160-250 PACK at 21:20

## 2021-01-01 RX ADMIN — PYRIDOSTIGMINE BROMIDE 60 MG: 60 TABLET ORAL at 17:23

## 2021-01-01 RX ADMIN — FENTANYL CITRATE 100 MCG: 50 INJECTION, SOLUTION INTRAMUSCULAR; INTRAVENOUS at 00:02

## 2021-01-01 RX ADMIN — POTASSIUM & SODIUM PHOSPHATES POWDER PACK 280-160-250 MG 1 PACKET: 280-160-250 PACK at 22:13

## 2021-01-01 RX ADMIN — TAZOBACTAM SODIUM AND PIPERACILLIN SODIUM 3.38 G: 375; 3 INJECTION, SOLUTION INTRAVENOUS at 02:00

## 2021-01-01 RX ADMIN — PIPERACILLIN SODIUM AND TAZOBACTAM SODIUM 3.38 G: 3; .375 INJECTION, POWDER, LYOPHILIZED, FOR SOLUTION INTRAVENOUS at 04:01

## 2021-01-01 RX ADMIN — HEPARIN SODIUM 5000 UNITS: 5000 INJECTION, SOLUTION INTRAVENOUS; SUBCUTANEOUS at 10:22

## 2021-01-01 RX ADMIN — FAMOTIDINE 20 MG: 10 INJECTION, SOLUTION INTRAVENOUS at 16:44

## 2021-01-01 RX ADMIN — PYRIDOSTIGMINE BROMIDE 60 MG: 60 TABLET ORAL at 21:05

## 2021-01-01 RX ADMIN — MYCOPHENOLATE MOFETIL 1000 MG: 200 POWDER, FOR SUSPENSION ORAL at 08:15

## 2021-01-01 RX ADMIN — FAMOTIDINE 20 MG: 10 INJECTION, SOLUTION INTRAVENOUS at 19:26

## 2021-01-01 RX ADMIN — Medication 4000 UNITS: at 08:14

## 2021-01-01 RX ADMIN — PYRIDOSTIGMINE BROMIDE 60 MG: 60 TABLET ORAL at 21:44

## 2021-01-01 RX ADMIN — PYRIDOSTIGMINE BROMIDE 60 MG: 60 TABLET ORAL at 09:59

## 2021-01-01 RX ADMIN — MYCOPHENOLATE MOFETIL 1000 MG: 500 TABLET ORAL at 10:31

## 2021-01-01 RX ADMIN — MYCOPHENOLATE MOFETIL 1000 MG: 500 TABLET ORAL at 08:24

## 2021-01-01 RX ADMIN — HUMAN IMMUNOGLOBULIN G 30 G: 40 LIQUID INTRAVENOUS at 09:11

## 2021-01-01 RX ADMIN — FAMOTIDINE 20 MG: 10 INJECTION, SOLUTION INTRAVENOUS at 05:18

## 2021-01-01 RX ADMIN — LORAZEPAM 1 MG: 2 INJECTION INTRAMUSCULAR; INTRAVENOUS at 08:46

## 2021-01-01 RX ADMIN — PYRIDOSTIGMINE BROMIDE ORAL 60 MG: 60 SOLUTION ORAL at 15:23

## 2021-01-01 RX ADMIN — MULTIPLE VITAMINS W/ MINERALS TAB 1 TABLET: TAB at 08:44

## 2021-01-01 RX ADMIN — Medication 15 ML: at 12:53

## 2021-01-01 RX ADMIN — MYCOPHENOLATE MOFETIL 500 MG: 200 POWDER, FOR SUSPENSION ORAL at 20:02

## 2021-01-01 RX ADMIN — ACETAMINOPHEN 1000 MG: 500 TABLET, FILM COATED ORAL at 18:13

## 2021-01-01 RX ADMIN — ACETAMINOPHEN 650 MG: 325 TABLET, FILM COATED ORAL at 09:00

## 2021-01-01 RX ADMIN — MYCOPHENOLATE MOFETIL 500 MG: 200 POWDER, FOR SUSPENSION ORAL at 20:47

## 2021-01-01 RX ADMIN — CALCIUM CARBONATE 600 MG (1,500 MG)-VITAMIN D3 400 UNIT TABLET 1 TABLET: at 14:56

## 2021-01-01 RX ADMIN — PANTOPRAZOLE SODIUM 40 MG: 40 TABLET, DELAYED RELEASE ORAL at 07:46

## 2021-01-01 RX ADMIN — FUROSEMIDE 40 MG: 10 INJECTION, SOLUTION INTRAMUSCULAR; INTRAVENOUS at 06:35

## 2021-01-01 RX ADMIN — PREDNISONE 30 MG: 20 TABLET ORAL at 08:24

## 2021-01-01 RX ADMIN — HUMAN IMMUNOGLOBULIN G 30 G: 20 LIQUID INTRAVENOUS at 18:41

## 2021-01-01 RX ADMIN — MELATONIN 3 MG: 3 TAB ORAL at 00:51

## 2021-01-01 RX ADMIN — FENTANYL CITRATE 50 MCG: 50 INJECTION, SOLUTION INTRAMUSCULAR; INTRAVENOUS at 13:17

## 2021-01-01 RX ADMIN — ACETAZOLAMIDE SODIUM 500 MG: 500 INJECTION, POWDER, LYOPHILIZED, FOR SOLUTION INTRAVENOUS at 08:09

## 2021-01-01 RX ADMIN — PYRIDOSTIGMINE BROMIDE 60 MG: 60 TABLET ORAL at 16:37

## 2021-01-01 RX ADMIN — MIDAZOLAM HYDROCHLORIDE 2 MG: 1 INJECTION, SOLUTION INTRAMUSCULAR; INTRAVENOUS at 04:26

## 2021-01-01 RX ADMIN — Medication 1 ML: at 18:05

## 2021-01-01 RX ADMIN — ACETAMINOPHEN 650 MG: 325 TABLET, FILM COATED ORAL at 20:04

## 2021-01-01 RX ADMIN — ENOXAPARIN SODIUM 40 MG: 40 INJECTION SUBCUTANEOUS at 12:29

## 2021-01-01 RX ADMIN — ACETAMINOPHEN 1000 MG: 500 TABLET, FILM COATED ORAL at 21:44

## 2021-01-01 RX ADMIN — Medication 100 MCG: at 09:18

## 2021-01-01 RX ADMIN — PIPERACILLIN SODIUM AND TAZOBACTAM SODIUM 4.5 G: 4; .5 INJECTION, POWDER, LYOPHILIZED, FOR SOLUTION INTRAVENOUS at 16:37

## 2021-01-01 RX ADMIN — METOPROLOL TARTRATE 12.5 MG: 25 TABLET, FILM COATED ORAL at 21:05

## 2021-01-01 RX ADMIN — ACETAZOLAMIDE 500 MG: 250 TABLET ORAL at 08:25

## 2021-01-01 RX ADMIN — POTASSIUM & SODIUM PHOSPHATES POWDER PACK 280-160-250 MG 1 PACKET: 280-160-250 PACK at 16:21

## 2021-01-01 RX ADMIN — ACETAMINOPHEN 1000 MG: 325 SUSPENSION ORAL at 20:41

## 2021-01-01 RX ADMIN — CHLORHEXIDINE GLUCONATE 0.12% ORAL RINSE 15 ML: 1.2 LIQUID ORAL at 20:02

## 2021-01-01 RX ADMIN — VANCOMYCIN HYDROCHLORIDE 2500 MG: 5 INJECTION, POWDER, LYOPHILIZED, FOR SOLUTION INTRAVENOUS at 17:11

## 2021-01-01 RX ADMIN — Medication 100 MCG: at 08:17

## 2021-01-01 RX ADMIN — MYCOPHENOLATE MOFETIL 1000 MG: 500 TABLET ORAL at 10:07

## 2021-01-01 RX ADMIN — PREDNISONE 60 MG: 20 TABLET ORAL at 08:03

## 2021-01-01 RX ADMIN — FENTANYL CITRATE 50 MCG: 50 INJECTION, SOLUTION INTRAMUSCULAR; INTRAVENOUS at 07:35

## 2021-01-01 RX ADMIN — FUROSEMIDE 20 MG: 20 TABLET ORAL at 20:42

## 2021-01-01 RX ADMIN — PANTOPRAZOLE SODIUM 40 MG: 40 INJECTION, POWDER, FOR SOLUTION INTRAVENOUS at 08:01

## 2021-01-01 RX ADMIN — POLYETHYLENE GLYCOL 3350 17 G: 17 POWDER, FOR SOLUTION ORAL at 11:29

## 2021-01-01 RX ADMIN — HEPARIN SODIUM 5000 UNITS: 5000 INJECTION, SOLUTION INTRAVENOUS; SUBCUTANEOUS at 01:56

## 2021-01-01 RX ADMIN — PROPOFOL 75 MCG/KG/MIN: 10 INJECTION, EMULSION INTRAVENOUS at 00:56

## 2021-01-01 RX ADMIN — FAMOTIDINE 20 MG: 20 TABLET ORAL at 08:04

## 2021-01-01 RX ADMIN — THERA TABS 1 TABLET: TAB at 11:13

## 2021-01-01 RX ADMIN — Medication 0.03 MCG/KG/MIN: at 10:06

## 2021-01-01 RX ADMIN — HEPARIN SODIUM 5000 UNITS: 5000 INJECTION, SOLUTION INTRAVENOUS; SUBCUTANEOUS at 01:35

## 2021-01-01 RX ADMIN — FUROSEMIDE 40 MG: 10 INJECTION, SOLUTION INTRAVENOUS at 06:13

## 2021-01-01 RX ADMIN — ANTICOAGULANT CITRATE DEXTROSE SOLUTION FORMULA A 900 ML: 12.25; 11; 3.65 SOLUTION INTRAVENOUS at 14:47

## 2021-01-01 RX ADMIN — PYRIDOSTIGMINE BROMIDE ORAL 60 MG: 60 SOLUTION ORAL at 15:33

## 2021-01-01 RX ADMIN — CHLORHEXIDINE GLUCONATE 0.12% ORAL RINSE 15 ML: 1.2 LIQUID ORAL at 08:30

## 2021-01-01 RX ADMIN — MYCOPHENOLATE MOFETIL 500 MG: 200 POWDER, FOR SUSPENSION ORAL at 20:05

## 2021-01-01 RX ADMIN — METOPROLOL TARTRATE 12.5 MG: 25 TABLET, FILM COATED ORAL at 20:27

## 2021-01-01 RX ADMIN — CHLORHEXIDINE GLUCONATE 0.12% ORAL RINSE 15 ML: 1.2 LIQUID ORAL at 08:11

## 2021-01-01 RX ADMIN — CHLORHEXIDINE GLUCONATE 0.12% ORAL RINSE 15 ML: 1.2 LIQUID ORAL at 19:44

## 2021-01-01 RX ADMIN — THERA TABS 1 TABLET: TAB at 09:48

## 2021-01-01 RX ADMIN — MICONAZOLE NITRATE: 20 POWDER TOPICAL at 09:37

## 2021-01-01 RX ADMIN — MYCOPHENOLATE MOFETIL 1000 MG: 200 POWDER, FOR SUSPENSION ORAL at 11:00

## 2021-01-01 RX ADMIN — SODIUM CHLORIDE 100 ML: 9 INJECTION, SOLUTION INTRAVENOUS at 18:29

## 2021-01-01 RX ADMIN — VANCOMYCIN HYDROCHLORIDE 2500 MG: 5 INJECTION, POWDER, LYOPHILIZED, FOR SOLUTION INTRAVENOUS at 02:45

## 2021-01-01 RX ADMIN — MYCOPHENOLATE MOFETIL 1000 MG: 500 TABLET ORAL at 21:44

## 2021-01-01 RX ADMIN — ACETAMINOPHEN 650 MG: 325 TABLET, FILM COATED ORAL at 09:48

## 2021-01-01 RX ADMIN — HEPARIN SODIUM 1500 UNITS/HR: 10000 INJECTION, SOLUTION INTRAVENOUS at 07:50

## 2021-01-01 RX ADMIN — POTASSIUM & SODIUM PHOSPHATES POWDER PACK 280-160-250 MG 1 PACKET: 280-160-250 PACK at 09:48

## 2021-01-01 RX ADMIN — ACETAMINOPHEN 1000 MG: 500 TABLET, FILM COATED ORAL at 21:29

## 2021-01-01 RX ADMIN — METOPROLOL TARTRATE 12.5 MG: 25 TABLET, FILM COATED ORAL at 20:00

## 2021-01-01 RX ADMIN — SULFAMETHOXAZOLE AND TRIMETHOPRIM 1 TABLET: 400; 80 TABLET ORAL at 10:07

## 2021-01-01 RX ADMIN — METOPROLOL TARTRATE 12.5 MG: 25 TABLET, FILM COATED ORAL at 08:01

## 2021-01-01 RX ADMIN — PYRIDOSTIGMINE BROMIDE ORAL 60 MG: 60 SOLUTION ORAL at 08:31

## 2021-01-01 RX ADMIN — PYRIDOSTIGMINE BROMIDE ORAL 60 MG: 60 SOLUTION ORAL at 16:16

## 2021-01-01 RX ADMIN — Medication 50 MCG: at 15:02

## 2021-01-01 RX ADMIN — MYCOPHENOLATE MOFETIL 1000 MG: 200 POWDER, FOR SUSPENSION ORAL at 10:08

## 2021-01-01 RX ADMIN — ACETAZOLAMIDE 500 MG: 250 TABLET ORAL at 00:16

## 2021-01-01 RX ADMIN — MYCOPHENOLATE MOFETIL 500 MG: 200 POWDER, FOR SUSPENSION ORAL at 19:44

## 2021-01-01 RX ADMIN — METOPROLOL TARTRATE 12.5 MG: 25 TABLET, FILM COATED ORAL at 14:56

## 2021-01-01 RX ADMIN — HEPARIN SODIUM 800 UNITS/HR: 10000 INJECTION, SOLUTION INTRAVENOUS at 12:19

## 2021-01-01 RX ADMIN — ACETAMINOPHEN 650 MG: 325 SUSPENSION ORAL at 21:43

## 2021-01-01 RX ADMIN — ACETAMINOPHEN 1000 MG: 500 TABLET, FILM COATED ORAL at 09:44

## 2021-01-01 RX ADMIN — CHLORHEXIDINE GLUCONATE 0.12% ORAL RINSE 15 ML: 1.2 LIQUID ORAL at 20:58

## 2021-01-01 RX ADMIN — ACETAZOLAMIDE SODIUM 500 MG: 500 INJECTION, POWDER, LYOPHILIZED, FOR SOLUTION INTRAVENOUS at 11:38

## 2021-01-01 RX ADMIN — PYRIDOSTIGMINE BROMIDE 60 MG: 60 TABLET ORAL at 21:04

## 2021-01-01 RX ADMIN — POTASSIUM & SODIUM PHOSPHATES POWDER PACK 280-160-250 MG 2 PACKET: 280-160-250 PACK at 22:33

## 2021-01-01 RX ADMIN — HEPARIN SODIUM 1800 UNITS/HR: 10000 INJECTION, SOLUTION INTRAVENOUS at 19:35

## 2021-01-01 RX ADMIN — MYCOPHENOLATE MOFETIL 1000 MG: 200 POWDER, FOR SUSPENSION ORAL at 08:20

## 2021-01-01 RX ADMIN — HEPARIN SODIUM 5000 UNITS: 5000 INJECTION, SOLUTION INTRAVENOUS; SUBCUTANEOUS at 09:40

## 2021-01-01 RX ADMIN — AMOXICILLIN 500 MG: 500 CAPSULE ORAL at 20:10

## 2021-01-01 RX ADMIN — ENOXAPARIN SODIUM 40 MG: 40 INJECTION SUBCUTANEOUS at 12:11

## 2021-01-01 RX ADMIN — CHLORHEXIDINE GLUCONATE 0.12% ORAL RINSE 15 ML: 1.2 LIQUID ORAL at 08:04

## 2021-01-01 RX ADMIN — POTASSIUM CHLORIDE 20 MEQ: 1.5 POWDER, FOR SOLUTION ORAL at 22:58

## 2021-01-01 RX ADMIN — PIPERACILLIN SODIUM AND TAZOBACTAM SODIUM 3.38 G: 3; .375 INJECTION, POWDER, LYOPHILIZED, FOR SOLUTION INTRAVENOUS at 22:07

## 2021-01-01 RX ADMIN — CALCIUM CARBONATE 600 MG (1,500 MG)-VITAMIN D3 400 UNIT TABLET 1 TABLET: at 09:48

## 2021-01-01 RX ADMIN — MYCOPHENOLATE MOFETIL 1000 MG: 200 POWDER, FOR SUSPENSION ORAL at 08:57

## 2021-01-01 RX ADMIN — NITROGLYCERIN 10 MCG/MIN: 20 INJECTION INTRAVENOUS at 08:23

## 2021-01-01 RX ADMIN — HEPARIN SODIUM 5000 UNITS: 5000 INJECTION, SOLUTION INTRAVENOUS; SUBCUTANEOUS at 17:37

## 2021-01-01 RX ADMIN — HEPARIN SODIUM 5000 UNITS: 5000 INJECTION, SOLUTION INTRAVENOUS; SUBCUTANEOUS at 18:00

## 2021-01-01 RX ADMIN — PYRIDOSTIGMINE BROMIDE 60 MG: 60 TABLET ORAL at 17:44

## 2021-01-01 RX ADMIN — Medication 15 ML: at 08:05

## 2021-01-01 RX ADMIN — PYRIDOSTIGMINE BROMIDE 60 MG: 60 TABLET ORAL at 22:36

## 2021-01-01 RX ADMIN — PYRIDOSTIGMINE BROMIDE ORAL 60 MG: 60 SOLUTION ORAL at 22:12

## 2021-01-01 RX ADMIN — CALCIUM CARBONATE 600 MG (1,500 MG)-VITAMIN D3 400 UNIT TABLET 1 TABLET: at 08:30

## 2021-01-01 RX ADMIN — PROPOFOL 45 MCG/KG/MIN: 10 INJECTION, EMULSION INTRAVENOUS at 04:33

## 2021-01-01 RX ADMIN — POTASSIUM CHLORIDE 20 MEQ: 1.5 POWDER, FOR SOLUTION ORAL at 08:30

## 2021-01-01 RX ADMIN — ACETAMINOPHEN 650 MG: 325 TABLET, FILM COATED ORAL at 13:48

## 2021-01-01 RX ADMIN — PROPOFOL 30 MCG/KG/MIN: 10 INJECTION, EMULSION INTRAVENOUS at 20:14

## 2021-01-01 RX ADMIN — IPRATROPIUM BROMIDE AND ALBUTEROL SULFATE 3 ML: .5; 3 SOLUTION RESPIRATORY (INHALATION) at 11:31

## 2021-01-01 RX ADMIN — CALCIUM GLUCONATE 6.5 G: 98 INJECTION, SOLUTION INTRAVENOUS at 12:40

## 2021-01-01 RX ADMIN — FUROSEMIDE 20 MG: 20 TABLET ORAL at 10:02

## 2021-01-01 RX ADMIN — MYCOPHENOLATE MOFETIL 1000 MG: 200 POWDER, FOR SUSPENSION ORAL at 21:43

## 2021-01-01 RX ADMIN — SODIUM CHLORIDE, POTASSIUM CHLORIDE, SODIUM LACTATE AND CALCIUM CHLORIDE: 600; 310; 30; 20 INJECTION, SOLUTION INTRAVENOUS at 13:38

## 2021-01-01 RX ADMIN — MYCOPHENOLATE MOFETIL 1000 MG: 200 POWDER, FOR SUSPENSION ORAL at 08:47

## 2021-01-01 RX ADMIN — HEPARIN SODIUM 5000 UNITS: 5000 INJECTION, SOLUTION INTRAVENOUS; SUBCUTANEOUS at 10:53

## 2021-01-01 RX ADMIN — PIPERACILLIN SODIUM AND TAZOBACTAM SODIUM 4.5 G: 4; .5 INJECTION, POWDER, LYOPHILIZED, FOR SOLUTION INTRAVENOUS at 10:51

## 2021-01-01 RX ADMIN — PYRIDOSTIGMINE BROMIDE ORAL 60 MG: 60 SOLUTION ORAL at 22:33

## 2021-01-01 RX ADMIN — AMOXICILLIN 500 MG: 500 CAPSULE ORAL at 22:41

## 2021-01-01 RX ADMIN — Medication 100 MCG: at 10:02

## 2021-01-01 RX ADMIN — PREDNISONE 60 MG: 10 TABLET ORAL at 08:07

## 2021-01-01 RX ADMIN — METOPROLOL TARTRATE 12.5 MG: 25 TABLET, FILM COATED ORAL at 21:03

## 2021-01-01 RX ADMIN — AMOXICILLIN 500 MG: 500 CAPSULE ORAL at 06:21

## 2021-01-01 RX ADMIN — HUMAN IMMUNOGLOBULIN G 30 G: 20 LIQUID INTRAVENOUS at 14:11

## 2021-01-01 RX ADMIN — FUROSEMIDE 40 MG: 10 INJECTION, SOLUTION INTRAVENOUS at 14:55

## 2021-01-01 RX ADMIN — POTASSIUM & SODIUM PHOSPHATES POWDER PACK 280-160-250 MG 1 PACKET: 280-160-250 PACK at 08:08

## 2021-01-01 RX ADMIN — METOPROLOL TARTRATE 12.5 MG: 25 TABLET, FILM COATED ORAL at 07:40

## 2021-01-01 RX ADMIN — ACETAMINOPHEN 650 MG: 325 TABLET, FILM COATED ORAL at 08:41

## 2021-01-01 RX ADMIN — PYRIDOSTIGMINE BROMIDE 60 MG: 60 TABLET ORAL at 17:03

## 2021-01-01 RX ADMIN — PROPOFOL 10 MCG/KG/MIN: 10 INJECTION, EMULSION INTRAVENOUS at 14:02

## 2021-01-01 RX ADMIN — PYRIDOSTIGMINE BROMIDE ORAL 60 MG: 60 SOLUTION ORAL at 09:02

## 2021-01-01 RX ADMIN — MIDAZOLAM HYDROCHLORIDE 2 MG: 1 INJECTION, SOLUTION INTRAMUSCULAR; INTRAVENOUS at 23:25

## 2021-01-01 RX ADMIN — PREDNISONE 60 MG: 20 TABLET ORAL at 10:32

## 2021-01-01 RX ADMIN — PREDNISONE 60 MG: 20 TABLET ORAL at 07:42

## 2021-01-01 RX ADMIN — MYCOPHENOLATE MOFETIL 500 MG: 200 POWDER, FOR SUSPENSION ORAL at 21:10

## 2021-01-01 RX ADMIN — CALCIUM CARBONATE 600 MG (1,500 MG)-VITAMIN D3 400 UNIT TABLET 1 TABLET: at 08:44

## 2021-01-01 RX ADMIN — PREDNISONE 60 MG: 20 TABLET ORAL at 08:20

## 2021-01-01 RX ADMIN — CHLORHEXIDINE GLUCONATE 0.12% ORAL RINSE 15 ML: 1.2 LIQUID ORAL at 19:57

## 2021-01-01 RX ADMIN — CALCIUM CARBONATE 600 MG (1,500 MG)-VITAMIN D3 400 UNIT TABLET 1 TABLET: at 20:04

## 2021-01-01 RX ADMIN — PREDNISONE 60 MG: 20 TABLET ORAL at 11:00

## 2021-01-01 RX ADMIN — FUROSEMIDE 40 MG: 10 INJECTION, SOLUTION INTRAVENOUS at 21:36

## 2021-01-01 RX ADMIN — DIPHENHYDRAMINE HYDROCHLORIDE 50 MG: 50 INJECTION, SOLUTION INTRAMUSCULAR; INTRAVENOUS at 16:44

## 2021-01-01 RX ADMIN — PREDNISONE 60 MG: 20 TABLET ORAL at 14:57

## 2021-01-01 RX ADMIN — HUMAN ALBUMIN MICROSPHERES AND PERFLUTREN 3 ML: 10; .22 INJECTION, SOLUTION INTRAVENOUS at 15:42

## 2021-01-01 RX ADMIN — Medication 100 MCG/HR: at 22:26

## 2021-01-01 RX ADMIN — Medication 1 MG: at 22:23

## 2021-01-01 RX ADMIN — AMPICILLIN SODIUM AND SULBACTAM SODIUM 3 G: 2; 1 INJECTION, POWDER, FOR SOLUTION INTRAMUSCULAR; INTRAVENOUS at 05:50

## 2021-01-01 RX ADMIN — PYRIDOSTIGMINE BROMIDE 60 MG: 60 TABLET ORAL at 10:07

## 2021-01-01 RX ADMIN — PYRIDOSTIGMINE BROMIDE ORAL 60 MG: 60 SOLUTION ORAL at 11:01

## 2021-01-01 RX ADMIN — PYRIDOSTIGMINE BROMIDE 60 MG: 60 TABLET ORAL at 08:06

## 2021-01-01 RX ADMIN — CHLORHEXIDINE GLUCONATE 0.12% ORAL RINSE 15 ML: 1.2 LIQUID ORAL at 07:58

## 2021-01-01 RX ADMIN — PREDNISONE 60 MG: 20 TABLET ORAL at 09:44

## 2021-01-01 RX ADMIN — FENTANYL CITRATE 50 MCG: 50 INJECTION, SOLUTION INTRAMUSCULAR; INTRAVENOUS at 04:21

## 2021-01-01 RX ADMIN — HEPARIN SODIUM 5000 UNITS: 5000 INJECTION, SOLUTION INTRAVENOUS; SUBCUTANEOUS at 17:53

## 2021-01-01 RX ADMIN — CHLORHEXIDINE GLUCONATE 0.12% ORAL RINSE 15 ML: 1.2 LIQUID ORAL at 20:11

## 2021-01-01 RX ADMIN — ACETAMINOPHEN 1000 MG: 500 TABLET, FILM COATED ORAL at 09:20

## 2021-01-01 RX ADMIN — METHYLPREDNISOLONE SODIUM SUCCINATE 125 MG: 125 INJECTION, POWDER, FOR SOLUTION INTRAMUSCULAR; INTRAVENOUS at 00:51

## 2021-01-01 RX ADMIN — FUROSEMIDE 40 MG: 40 TABLET ORAL at 11:02

## 2021-01-01 RX ADMIN — SULFAMETHOXAZOLE AND TRIMETHOPRIM 1 TABLET: 400; 80 TABLET ORAL at 08:25

## 2021-01-01 RX ADMIN — MYCOPHENOLATE MOFETIL 1000 MG: 500 TABLET ORAL at 21:05

## 2021-01-01 RX ADMIN — CHLORHEXIDINE GLUCONATE 0.12% ORAL RINSE 15 ML: 1.2 LIQUID ORAL at 19:59

## 2021-01-01 RX ADMIN — MYCOPHENOLATE MOFETIL 1000 MG: 500 TABLET ORAL at 21:04

## 2021-01-01 RX ADMIN — ACETAZOLAMIDE 500 MG: 250 TABLET ORAL at 16:47

## 2021-01-01 RX ADMIN — CALCIUM CARBONATE 600 MG (1,500 MG)-VITAMIN D3 400 UNIT TABLET 1 TABLET: at 09:14

## 2021-01-01 RX ADMIN — CALCIUM CARBONATE 600 MG (1,500 MG)-VITAMIN D3 400 UNIT TABLET 1 TABLET: at 08:08

## 2021-01-01 RX ADMIN — ACETAMINOPHEN 650 MG: 325 TABLET, FILM COATED ORAL at 14:20

## 2021-01-01 RX ADMIN — FUROSEMIDE 20 MG: 20 TABLET ORAL at 10:00

## 2021-01-01 RX ADMIN — FUROSEMIDE 20 MG: 20 TABLET ORAL at 20:10

## 2021-01-01 RX ADMIN — Medication 4000 UNITS: at 09:31

## 2021-01-01 RX ADMIN — HEPARIN SODIUM 5000 UNITS: 5000 INJECTION, SOLUTION INTRAVENOUS; SUBCUTANEOUS at 18:22

## 2021-01-01 RX ADMIN — Medication 1 ML: at 17:39

## 2021-01-01 RX ADMIN — ACETAMINOPHEN 650 MG: 325 TABLET, FILM COATED ORAL at 21:09

## 2021-01-01 RX ADMIN — DIPHENHYDRAMINE HYDROCHLORIDE 50 MG: 50 INJECTION, SOLUTION INTRAMUSCULAR; INTRAVENOUS at 17:58

## 2021-01-01 RX ADMIN — PREDNISONE 40 MG: 20 TABLET ORAL at 09:14

## 2021-01-01 RX ADMIN — Medication 100 MCG: at 09:45

## 2021-01-01 RX ADMIN — MYCOPHENOLATE MOFETIL 1000 MG: 200 POWDER, FOR SUSPENSION ORAL at 08:05

## 2021-01-01 RX ADMIN — METOPROLOL TARTRATE 12.5 MG: 25 TABLET, FILM COATED ORAL at 11:29

## 2021-01-01 RX ADMIN — AMOXICILLIN 500 MG: 500 CAPSULE ORAL at 13:39

## 2021-01-01 RX ADMIN — FENTANYL CITRATE 50 MCG: 50 INJECTION, SOLUTION INTRAMUSCULAR; INTRAVENOUS at 22:23

## 2021-01-01 RX ADMIN — PREDNISONE 60 MG: 20 TABLET ORAL at 09:14

## 2021-01-01 RX ADMIN — FAMOTIDINE 20 MG: 20 TABLET ORAL at 21:43

## 2021-01-01 RX ADMIN — FAMOTIDINE 20 MG: 10 INJECTION, SOLUTION INTRAVENOUS at 17:45

## 2021-01-01 RX ADMIN — Medication 50 MCG/HR: at 04:39

## 2021-01-01 RX ADMIN — MYCOPHENOLATE MOFETIL 1000 MG: 200 POWDER, FOR SUSPENSION ORAL at 08:26

## 2021-01-01 RX ADMIN — PYRIDOSTIGMINE BROMIDE 60 MG: 60 TABLET ORAL at 16:16

## 2021-01-01 RX ADMIN — MYCOPHENOLATE MOFETIL 1000 MG: 500 TABLET, FILM COATED ORAL at 09:29

## 2021-01-01 RX ADMIN — FAMOTIDINE 20 MG: 10 INJECTION, SOLUTION INTRAVENOUS at 05:56

## 2021-01-01 RX ADMIN — Medication 100 MCG: at 08:37

## 2021-01-01 RX ADMIN — Medication 100 MCG: at 08:07

## 2021-01-01 RX ADMIN — POTASSIUM & SODIUM PHOSPHATES POWDER PACK 280-160-250 MG 2 PACKET: 280-160-250 PACK at 08:11

## 2021-01-01 RX ADMIN — AMOXICILLIN 500 MG: 500 CAPSULE ORAL at 06:28

## 2021-01-01 RX ADMIN — Medication 100 MCG: at 09:59

## 2021-01-01 RX ADMIN — POTASSIUM & SODIUM PHOSPHATES POWDER PACK 280-160-250 MG 2 PACKET: 280-160-250 PACK at 16:16

## 2021-01-01 RX ADMIN — MAGNESIUM OXIDE TAB 400 MG (241.3 MG ELEMENTAL MG) 400 MG: 400 (241.3 MG) TAB at 12:31

## 2021-01-01 RX ADMIN — PIPERACILLIN SODIUM AND TAZOBACTAM SODIUM 4.5 G: 4; .5 INJECTION, POWDER, LYOPHILIZED, FOR SOLUTION INTRAVENOUS at 22:59

## 2021-01-01 RX ADMIN — PYRIDOSTIGMINE BROMIDE 60 MG: 60 TABLET ORAL at 16:41

## 2021-01-01 RX ADMIN — PREDNISONE 60 MG: 10 TABLET ORAL at 08:47

## 2021-01-01 RX ADMIN — SULFAMETHOXAZOLE AND TRIMETHOPRIM 1 TABLET: 400; 80 TABLET ORAL at 08:34

## 2021-01-01 RX ADMIN — IPRATROPIUM BROMIDE AND ALBUTEROL SULFATE 3 ML: .5; 3 SOLUTION RESPIRATORY (INHALATION) at 15:44

## 2021-01-01 RX ADMIN — TAZOBACTAM SODIUM AND PIPERACILLIN SODIUM 3.38 G: 375; 3 INJECTION, SOLUTION INTRAVENOUS at 08:18

## 2021-01-01 RX ADMIN — ACETAMINOPHEN 650 MG: 325 TABLET, FILM COATED ORAL at 01:33

## 2021-01-01 RX ADMIN — FUROSEMIDE 40 MG: 40 TABLET ORAL at 08:08

## 2021-01-01 RX ADMIN — HUMAN IMMUNOGLOBULIN G 30 G: 20 LIQUID INTRAVENOUS at 16:45

## 2021-01-01 RX ADMIN — CALCIUM CARBONATE 600 MG (1,500 MG)-VITAMIN D3 400 UNIT TABLET 1 TABLET: at 09:28

## 2021-01-01 RX ADMIN — THERA TABS 1 TABLET: TAB at 08:28

## 2021-01-01 RX ADMIN — ACETAMINOPHEN 1000 MG: 325 SUSPENSION ORAL at 15:23

## 2021-01-01 RX ADMIN — PROPOFOL 10 MCG/KG/MIN: 10 INJECTION, EMULSION INTRAVENOUS at 04:30

## 2021-01-01 RX ADMIN — ENOXAPARIN SODIUM 40 MG: 40 INJECTION SUBCUTANEOUS at 12:33

## 2021-01-01 RX ADMIN — PANTOPRAZOLE SODIUM 40 MG: 40 TABLET, DELAYED RELEASE ORAL at 06:35

## 2021-01-01 RX ADMIN — ACETAMINOPHEN 650 MG: 325 TABLET, FILM COATED ORAL at 14:54

## 2021-01-01 RX ADMIN — METOPROLOL TARTRATE 12.5 MG: 25 TABLET, FILM COATED ORAL at 21:07

## 2021-01-01 RX ADMIN — Medication 15 ML: at 08:31

## 2021-01-01 RX ADMIN — POTASSIUM & SODIUM PHOSPHATES POWDER PACK 280-160-250 MG 2 PACKET: 280-160-250 PACK at 16:56

## 2021-01-01 RX ADMIN — MORPHINE SULFATE 2 MG: 2 INJECTION, SOLUTION INTRAMUSCULAR; INTRAVENOUS at 06:07

## 2021-01-01 RX ADMIN — PROPOFOL 70 MCG/KG/MIN: 10 INJECTION, EMULSION INTRAVENOUS at 02:34

## 2021-01-01 RX ADMIN — ENOXAPARIN SODIUM 40 MG: 40 INJECTION SUBCUTANEOUS at 12:43

## 2021-01-01 RX ADMIN — PANTOPRAZOLE SODIUM 40 MG: 40 TABLET, DELAYED RELEASE ORAL at 06:28

## 2021-01-01 RX ADMIN — CHLORHEXIDINE GLUCONATE 0.12% ORAL RINSE 15 ML: 1.2 LIQUID ORAL at 08:47

## 2021-01-01 RX ADMIN — SODIUM PHOSPHATE, MONOBASIC, MONOHYDRATE 15 MMOL: 276; 142 INJECTION, SOLUTION INTRAVENOUS at 13:38

## 2021-01-01 RX ADMIN — ENOXAPARIN SODIUM 40 MG: 40 INJECTION SUBCUTANEOUS at 12:27

## 2021-01-01 RX ADMIN — Medication 1 ML: at 17:07

## 2021-01-01 RX ADMIN — POTASSIUM & SODIUM PHOSPHATES POWDER PACK 280-160-250 MG 1 PACKET: 280-160-250 PACK at 22:23

## 2021-01-01 RX ADMIN — SODIUM CHLORIDE, POTASSIUM CHLORIDE, SODIUM LACTATE AND CALCIUM CHLORIDE: 600; 310; 30; 20 INJECTION, SOLUTION INTRAVENOUS at 16:30

## 2021-01-01 RX ADMIN — Medication 2 DROP: at 10:13

## 2021-01-01 RX ADMIN — POTASSIUM CHLORIDE 20 MEQ: 1.5 POWDER, FOR SOLUTION ORAL at 08:03

## 2021-01-01 RX ADMIN — RIVAROXABAN 20 MG: 20 TABLET, FILM COATED ORAL at 17:03

## 2021-01-01 RX ADMIN — CALCIUM GLUCONATE 4 G: 98 INJECTION, SOLUTION INTRAVENOUS at 14:48

## 2021-01-01 RX ADMIN — PYRIDOSTIGMINE BROMIDE 60 MG: 60 TABLET ORAL at 17:15

## 2021-01-01 RX ADMIN — POTASSIUM CHLORIDE 20 MEQ: 29.8 INJECTION, SOLUTION INTRAVENOUS at 06:01

## 2021-01-01 RX ADMIN — Medication 100 MCG: at 08:11

## 2021-01-01 RX ADMIN — ACETAMINOPHEN 650 MG: 325 TABLET, FILM COATED ORAL at 07:37

## 2021-01-01 RX ADMIN — HYDROCORTISONE SODIUM SUCCINATE 100 MG: 100 INJECTION, POWDER, FOR SOLUTION INTRAMUSCULAR; INTRAVENOUS at 08:27

## 2021-01-01 RX ADMIN — PROPOFOL 20 MCG/KG/MIN: 10 INJECTION, EMULSION INTRAVENOUS at 22:20

## 2021-01-01 RX ADMIN — CHLORHEXIDINE GLUCONATE 0.12% ORAL RINSE 15 ML: 1.2 LIQUID ORAL at 08:15

## 2021-01-01 RX ADMIN — MORPHINE SULFATE 2 MG: 2 INJECTION, SOLUTION INTRAMUSCULAR; INTRAVENOUS at 06:33

## 2021-01-01 RX ADMIN — MYCOPHENOLATE MOFETIL 1000 MG: 200 POWDER, FOR SUSPENSION ORAL at 08:40

## 2021-01-01 RX ADMIN — IOPAMIDOL 83 ML: 755 INJECTION, SOLUTION INTRAVENOUS at 05:48

## 2021-01-01 RX ADMIN — METOPROLOL TARTRATE 12.5 MG: 25 TABLET, FILM COATED ORAL at 08:42

## 2021-01-01 RX ADMIN — PYRIDOSTIGMINE BROMIDE 60 MG: 60 TABLET ORAL at 21:22

## 2021-01-01 RX ADMIN — RIVAROXABAN 15 MG: 15 TABLET, FILM COATED ORAL at 08:08

## 2021-01-01 RX ADMIN — MYCOPHENOLATE MOFETIL 500 MG: 200 POWDER, FOR SUSPENSION ORAL at 21:09

## 2021-01-01 RX ADMIN — Medication 4000 UNITS: at 14:57

## 2021-01-01 RX ADMIN — POTASSIUM & SODIUM PHOSPHATES POWDER PACK 280-160-250 MG 2 PACKET: 280-160-250 PACK at 22:32

## 2021-01-01 RX ADMIN — MYCOPHENOLATE MOFETIL 1000 MG: 500 TABLET, FILM COATED ORAL at 09:46

## 2021-01-01 RX ADMIN — PYRIDOSTIGMINE BROMIDE ORAL 60 MG: 60 SOLUTION ORAL at 07:33

## 2021-01-01 RX ADMIN — PREDNISONE 50 MG: 20 TABLET ORAL at 09:49

## 2021-01-01 RX ADMIN — PYRIDOSTIGMINE BROMIDE ORAL 60 MG: 60 SOLUTION ORAL at 18:42

## 2021-01-01 RX ADMIN — ALBUMIN HUMAN 4250 ML: 0.05 INJECTION, SOLUTION INTRAVENOUS at 16:58

## 2021-01-01 RX ADMIN — METOPROLOL TARTRATE 12.5 MG: 25 TABLET, FILM COATED ORAL at 08:37

## 2021-01-01 RX ADMIN — PANTOPRAZOLE SODIUM 40 MG: 40 TABLET, DELAYED RELEASE ORAL at 06:39

## 2021-01-01 RX ADMIN — POTASSIUM CHLORIDE 20 MEQ: 1.5 POWDER, FOR SOLUTION ORAL at 20:04

## 2021-01-01 RX ADMIN — PANTOPRAZOLE SODIUM 40 MG: 40 TABLET, DELAYED RELEASE ORAL at 06:42

## 2021-01-01 RX ADMIN — MYCOPHENOLATE MOFETIL 1000 MG: 500 TABLET, FILM COATED ORAL at 08:08

## 2021-01-01 RX ADMIN — Medication 3 G: at 10:26

## 2021-01-01 RX ADMIN — PYRIDOSTIGMINE BROMIDE 60 MG: 60 TABLET ORAL at 06:36

## 2021-01-01 RX ADMIN — METOPROLOL TARTRATE 25 MG: 25 TABLET, FILM COATED ORAL at 08:24

## 2021-01-01 RX ADMIN — FUROSEMIDE 20 MG: 20 TABLET ORAL at 20:27

## 2021-01-01 RX ADMIN — PYRIDOSTIGMINE BROMIDE 60 MG: 60 TABLET ORAL at 09:30

## 2021-01-01 RX ADMIN — HEPARIN SODIUM 5000 UNITS: 5000 INJECTION, SOLUTION INTRAVENOUS; SUBCUTANEOUS at 10:24

## 2021-01-01 RX ADMIN — SODIUM CHLORIDE, POTASSIUM CHLORIDE, SODIUM LACTATE AND CALCIUM CHLORIDE: 600; 310; 30; 20 INJECTION, SOLUTION INTRAVENOUS at 10:34

## 2021-01-01 RX ADMIN — METOPROLOL TARTRATE 12.5 MG: 25 TABLET, FILM COATED ORAL at 10:02

## 2021-01-01 RX ADMIN — MYCOPHENOLATE MOFETIL 1000 MG: 200 POWDER, FOR SUSPENSION ORAL at 09:53

## 2021-01-01 RX ADMIN — ACETAMINOPHEN 650 MG: 325 TABLET, FILM COATED ORAL at 09:14

## 2021-01-01 RX ADMIN — FAMOTIDINE 20 MG: 10 INJECTION, SOLUTION INTRAVENOUS at 04:21

## 2021-01-01 RX ADMIN — ENOXAPARIN SODIUM 40 MG: 40 INJECTION SUBCUTANEOUS at 14:20

## 2021-01-01 RX ADMIN — SULFAMETHOXAZOLE AND TRIMETHOPRIM 1 TABLET: 400; 80 TABLET ORAL at 09:49

## 2021-01-01 RX ADMIN — DIPHENHYDRAMINE HYDROCHLORIDE 50 MG: 50 INJECTION, SOLUTION INTRAMUSCULAR; INTRAVENOUS at 13:38

## 2021-01-01 RX ADMIN — METOPROLOL TARTRATE 12.5 MG: 25 TABLET, FILM COATED ORAL at 20:47

## 2021-01-01 RX ADMIN — FUROSEMIDE 20 MG: 10 INJECTION, SOLUTION INTRAVENOUS at 00:51

## 2021-01-01 RX ADMIN — SULFAMETHOXAZOLE AND TRIMETHOPRIM 1 TABLET: 400; 80 TABLET ORAL at 11:02

## 2021-01-01 RX ADMIN — CALCIUM CARBONATE 600 MG (1,500 MG)-VITAMIN D3 400 UNIT TABLET 1 TABLET: at 08:07

## 2021-01-01 RX ADMIN — PREDNISONE 60 MG: 10 TABLET ORAL at 08:15

## 2021-01-01 RX ADMIN — FUROSEMIDE 40 MG: 10 INJECTION, SOLUTION INTRAVENOUS at 16:39

## 2021-01-01 RX ADMIN — Medication 100 MCG: at 08:34

## 2021-01-01 RX ADMIN — PYRIDOSTIGMINE BROMIDE 60 MG: 60 TABLET ORAL at 21:03

## 2021-01-01 RX ADMIN — FUROSEMIDE 40 MG: 40 TABLET ORAL at 07:40

## 2021-01-01 RX ADMIN — ACETAMINOPHEN 1000 MG: 500 TABLET, FILM COATED ORAL at 09:59

## 2021-01-01 RX ADMIN — CHLORHEXIDINE GLUCONATE 0.12% ORAL RINSE 15 ML: 1.2 LIQUID ORAL at 08:03

## 2021-01-01 RX ADMIN — PREDNISONE 60 MG: 20 TABLET ORAL at 08:37

## 2021-01-01 RX ADMIN — FUROSEMIDE 20 MG: 20 TABLET ORAL at 09:50

## 2021-01-01 RX ADMIN — AMOXICILLIN 500 MG: 500 CAPSULE ORAL at 22:12

## 2021-01-01 RX ADMIN — NOREPINEPHRINE BITARTRATE 4 MG/250 ML (16 MCG/ML) IN 0.9 % NACL IV 0.05 MCG/KG/MIN: at 23:27

## 2021-01-01 RX ADMIN — PROPOFOL 10 MCG/KG/MIN: 10 INJECTION, EMULSION INTRAVENOUS at 03:50

## 2021-01-01 RX ADMIN — FUROSEMIDE 60 MG: 10 INJECTION, SOLUTION INTRAVENOUS at 01:02

## 2021-01-01 RX ADMIN — MYCOPHENOLATE MOFETIL 1000 MG: 500 TABLET ORAL at 09:17

## 2021-01-01 RX ADMIN — METOPROLOL TARTRATE 12.5 MG: 25 TABLET, FILM COATED ORAL at 21:01

## 2021-01-01 RX ADMIN — PREDNISONE 50 MG: 20 TABLET ORAL at 08:28

## 2021-01-01 RX ADMIN — HEPARIN SODIUM 5000 UNITS: 5000 INJECTION, SOLUTION INTRAVENOUS; SUBCUTANEOUS at 09:15

## 2021-01-01 RX ADMIN — PROPOFOL 45 MCG/KG/MIN: 10 INJECTION, EMULSION INTRAVENOUS at 01:56

## 2021-01-01 RX ADMIN — PREDNISONE 60 MG: 20 TABLET ORAL at 08:34

## 2021-01-01 RX ADMIN — PYRIDOSTIGMINE BROMIDE ORAL 60 MG: 60 SOLUTION ORAL at 06:23

## 2021-01-01 RX ADMIN — PREDNISONE 60 MG: 20 TABLET ORAL at 09:20

## 2021-01-01 RX ADMIN — ACETAMINOPHEN 650 MG: 325 TABLET, FILM COATED ORAL at 12:54

## 2021-01-01 RX ADMIN — AMPICILLIN SODIUM AND SULBACTAM SODIUM 3 G: 2; 1 INJECTION, POWDER, FOR SOLUTION INTRAMUSCULAR; INTRAVENOUS at 18:22

## 2021-01-01 RX ADMIN — MYCOPHENOLATE MOFETIL 1000 MG: 500 TABLET ORAL at 22:20

## 2021-01-01 RX ADMIN — THERA TABS 1 TABLET: TAB at 08:07

## 2021-01-01 RX ADMIN — HUMAN IMMUNOGLOBULIN G 30 G: 20 LIQUID INTRAVENOUS at 12:27

## 2021-01-01 RX ADMIN — SUCCINYLCHOLINE CHLORIDE 150 MG: 20 INJECTION, SOLUTION INTRAMUSCULAR; INTRAVENOUS; PARENTERAL at 23:17

## 2021-01-01 RX ADMIN — FUROSEMIDE 20 MG: 10 INJECTION, SOLUTION INTRAVENOUS at 15:54

## 2021-01-01 RX ADMIN — HEPARIN SODIUM 5000 UNITS: 5000 INJECTION, SOLUTION INTRAVENOUS; SUBCUTANEOUS at 10:02

## 2021-01-01 RX ADMIN — HEPARIN SODIUM 5000 UNITS: 5000 INJECTION, SOLUTION INTRAVENOUS; SUBCUTANEOUS at 01:22

## 2021-01-01 RX ADMIN — MYCOPHENOLATE MOFETIL 1000 MG: 500 TABLET ORAL at 20:40

## 2021-01-01 RX ADMIN — FAMOTIDINE 20 MG: 10 INJECTION, SOLUTION INTRAVENOUS at 17:37

## 2021-01-01 RX ADMIN — METHYLPREDNISOLONE SODIUM SUCCINATE 62.5 MG: 125 INJECTION, POWDER, FOR SOLUTION INTRAMUSCULAR; INTRAVENOUS at 08:31

## 2021-01-01 RX ADMIN — NOREPINEPHRINE BITARTRATE 4 MG/250 ML (16 MCG/ML) IN 0.9 % NACL IV 0.03 MCG/KG/MIN: at 07:41

## 2021-01-01 RX ADMIN — HEPARIN SODIUM 5000 UNITS: 5000 INJECTION, SOLUTION INTRAVENOUS; SUBCUTANEOUS at 02:39

## 2021-01-01 RX ADMIN — LOSARTAN POTASSIUM 50 MG: 50 TABLET, FILM COATED ORAL at 11:13

## 2021-01-01 RX ADMIN — MICONAZOLE NITRATE: 20 CREAM TOPICAL at 10:56

## 2021-01-01 RX ADMIN — POTASSIUM & SODIUM PHOSPHATES POWDER PACK 280-160-250 MG 1 PACKET: 280-160-250 PACK at 09:14

## 2021-01-01 RX ADMIN — TAZOBACTAM SODIUM AND PIPERACILLIN SODIUM 3.38 G: 375; 3 INJECTION, SOLUTION INTRAVENOUS at 09:48

## 2021-01-01 RX ADMIN — POTASSIUM CHLORIDE 40 MEQ: 1.5 POWDER, FOR SOLUTION ORAL at 06:21

## 2021-01-01 RX ADMIN — SULFAMETHOXAZOLE AND TRIMETHOPRIM 1 TABLET: 400; 80 TABLET ORAL at 08:04

## 2021-01-01 RX ADMIN — FUROSEMIDE 20 MG: 20 TABLET ORAL at 08:08

## 2021-01-01 RX ADMIN — IPRATROPIUM BROMIDE AND ALBUTEROL SULFATE 3 ML: .5; 3 SOLUTION RESPIRATORY (INHALATION) at 07:36

## 2021-01-01 RX ADMIN — MORPHINE SULFATE 2 MG: 2 INJECTION, SOLUTION INTRAMUSCULAR; INTRAVENOUS at 08:46

## 2021-01-01 RX ADMIN — ACETAMINOPHEN 650 MG: 325 TABLET, FILM COATED ORAL at 20:00

## 2021-01-01 RX ADMIN — SULFAMETHOXAZOLE AND TRIMETHOPRIM 1 TABLET: 400; 80 TABLET ORAL at 08:17

## 2021-01-01 RX ADMIN — PIPERACILLIN SODIUM AND TAZOBACTAM SODIUM 3.38 G: 3; .375 INJECTION, POWDER, LYOPHILIZED, FOR SOLUTION INTRAVENOUS at 16:28

## 2021-01-01 RX ADMIN — PROPOFOL 45 MCG/KG/MIN: 10 INJECTION, EMULSION INTRAVENOUS at 03:54

## 2021-01-01 RX ADMIN — PREDNISONE 60 MG: 10 TABLET ORAL at 08:24

## 2021-01-01 RX ADMIN — FAMOTIDINE 20 MG: 10 INJECTION, SOLUTION INTRAVENOUS at 16:58

## 2021-01-01 RX ADMIN — IOPAMIDOL 83 ML: 755 INJECTION, SOLUTION INTRAVENOUS at 18:25

## 2021-01-01 RX ADMIN — MICONAZOLE NITRATE: 20 POWDER TOPICAL at 21:21

## 2021-01-01 RX ADMIN — MYCOPHENOLATE MOFETIL 1000 MG: 500 TABLET, FILM COATED ORAL at 00:51

## 2021-01-01 RX ADMIN — FUROSEMIDE 40 MG: 10 INJECTION, SOLUTION INTRAVENOUS at 14:57

## 2021-01-01 RX ADMIN — PROPOFOL 10 MCG/KG/MIN: 10 INJECTION, EMULSION INTRAVENOUS at 16:30

## 2021-01-01 RX ADMIN — POTASSIUM & SODIUM PHOSPHATES POWDER PACK 280-160-250 MG 2 PACKET: 280-160-250 PACK at 16:58

## 2021-01-01 RX ADMIN — METOPROLOL TARTRATE 12.5 MG: 25 TABLET, FILM COATED ORAL at 09:14

## 2021-01-01 RX ADMIN — METOPROLOL TARTRATE 12.5 MG: 25 TABLET, FILM COATED ORAL at 20:41

## 2021-01-01 RX ADMIN — MIDAZOLAM HYDROCHLORIDE 2 MG: 1 INJECTION, SOLUTION INTRAMUSCULAR; INTRAVENOUS at 23:24

## 2021-01-01 RX ADMIN — METOPROLOL TARTRATE 12.5 MG: 25 TABLET, FILM COATED ORAL at 09:29

## 2021-01-01 RX ADMIN — BUSPIRONE HYDROCHLORIDE 5 MG: 5 TABLET ORAL at 21:01

## 2021-01-01 RX ADMIN — MYCOPHENOLATE MOFETIL 1000 MG: 500 TABLET ORAL at 21:33

## 2021-01-01 RX ADMIN — ENOXAPARIN SODIUM 40 MG: 40 INJECTION SUBCUTANEOUS at 12:38

## 2021-01-01 RX ADMIN — CHLORHEXIDINE GLUCONATE 0.12% ORAL RINSE 15 ML: 1.2 LIQUID ORAL at 20:03

## 2021-01-01 RX ADMIN — Medication 1 ML: at 08:16

## 2021-01-01 RX ADMIN — PYRIDOSTIGMINE BROMIDE 60 MG: 60 TABLET ORAL at 18:13

## 2021-01-01 RX ADMIN — ENOXAPARIN SODIUM 40 MG: 40 INJECTION SUBCUTANEOUS at 11:39

## 2021-01-01 RX ADMIN — PYRIDOSTIGMINE BROMIDE ORAL 60 MG: 60 SOLUTION ORAL at 14:43

## 2021-01-01 RX ADMIN — FAMOTIDINE 20 MG: 10 INJECTION, SOLUTION INTRAVENOUS at 05:00

## 2021-01-01 RX ADMIN — PIPERACILLIN SODIUM AND TAZOBACTAM SODIUM 3.38 G: 3; .375 INJECTION, POWDER, LYOPHILIZED, FOR SOLUTION INTRAVENOUS at 16:57

## 2021-01-01 RX ADMIN — HEPARIN SODIUM 5000 UNITS: 5000 INJECTION, SOLUTION INTRAVENOUS; SUBCUTANEOUS at 02:14

## 2021-01-01 RX ADMIN — ANTICOAGULANT CITRATE DEXTROSE SOLUTION FORMULA A 1000 ML: 12.25; 11; 3.65 SOLUTION INTRAVENOUS at 15:07

## 2021-01-01 RX ADMIN — HUMAN IMMUNOGLOBULIN G 30 G: 20 LIQUID INTRAVENOUS at 17:41

## 2021-01-01 RX ADMIN — CALCIUM CARBONATE 600 MG (1,500 MG)-VITAMIN D3 400 UNIT TABLET 1 TABLET: at 17:45

## 2021-01-01 RX ADMIN — CALCIUM GLUCONATE 4 G: 98 INJECTION, SOLUTION INTRAVENOUS at 11:54

## 2021-01-01 RX ADMIN — MICONAZOLE NITRATE: 20 CREAM TOPICAL at 21:59

## 2021-01-01 RX ADMIN — POTASSIUM CHLORIDE 20 MEQ: 1.5 POWDER, FOR SOLUTION ORAL at 10:40

## 2021-01-01 RX ADMIN — MAGNESIUM SULFATE HEPTAHYDRATE 2 G: 40 INJECTION, SOLUTION INTRAVENOUS at 06:54

## 2021-01-01 RX ADMIN — ACETAMINOPHEN 650 MG: 650 SUPPOSITORY RECTAL at 16:44

## 2021-01-01 RX ADMIN — ACETAMINOPHEN 650 MG: 325 SUSPENSION ORAL at 08:06

## 2021-01-01 RX ADMIN — MYCOPHENOLATE MOFETIL 1000 MG: 200 POWDER, FOR SUSPENSION ORAL at 08:14

## 2021-01-01 RX ADMIN — ACETAMINOPHEN 1000 MG: 500 TABLET, FILM COATED ORAL at 16:16

## 2021-01-01 RX ADMIN — MICONAZOLE NITRATE: 20 POWDER TOPICAL at 10:17

## 2021-01-01 RX ADMIN — METHYLPREDNISOLONE SODIUM SUCCINATE 125 MG: 125 INJECTION, POWDER, FOR SOLUTION INTRAMUSCULAR; INTRAVENOUS at 01:41

## 2021-01-01 RX ADMIN — PYRIDOSTIGMINE BROMIDE 60 MG: 60 TABLET ORAL at 17:13

## 2021-01-01 RX ADMIN — METOPROLOL TARTRATE 12.5 MG: 25 TABLET, FILM COATED ORAL at 08:44

## 2021-01-01 RX ADMIN — TAZOBACTAM SODIUM AND PIPERACILLIN SODIUM 3.38 G: 375; 3 INJECTION, SOLUTION INTRAVENOUS at 20:31

## 2021-01-01 RX ADMIN — SPIRONOLACTONE 25 MG: 25 TABLET ORAL at 11:01

## 2021-01-01 RX ADMIN — FAMOTIDINE 20 MG: 10 INJECTION, SOLUTION INTRAVENOUS at 16:02

## 2021-01-01 RX ADMIN — FAMOTIDINE 20 MG: 10 INJECTION, SOLUTION INTRAVENOUS at 01:43

## 2021-01-01 RX ADMIN — METOPROLOL TARTRATE 12.5 MG: 25 TABLET, FILM COATED ORAL at 21:20

## 2021-01-01 RX ADMIN — PYRIDOSTIGMINE BROMIDE 60 MG: 60 TABLET ORAL at 21:28

## 2021-01-01 RX ADMIN — PYRIDOSTIGMINE BROMIDE 60 MG: 60 TABLET ORAL at 08:08

## 2021-01-01 RX ADMIN — AMPICILLIN SODIUM AND SULBACTAM SODIUM 3 G: 2; 1 INJECTION, POWDER, FOR SOLUTION INTRAMUSCULAR; INTRAVENOUS at 11:33

## 2021-01-01 RX ADMIN — FAMOTIDINE 20 MG: 20 TABLET ORAL at 20:01

## 2021-01-01 RX ADMIN — HEPARIN SODIUM 5000 UNITS: 5000 INJECTION, SOLUTION INTRAVENOUS; SUBCUTANEOUS at 10:07

## 2021-01-01 RX ADMIN — ALBUMIN HUMAN 6250 ML: 0.05 INJECTION, SOLUTION INTRAVENOUS at 12:40

## 2021-01-01 RX ADMIN — Medication 0.03 MCG/KG/MIN: at 06:32

## 2021-01-01 RX ADMIN — PYRIDOSTIGMINE BROMIDE 60 MG: 60 TABLET ORAL at 08:15

## 2021-01-01 RX ADMIN — ACETAMINOPHEN 650 MG: 325 SUSPENSION ORAL at 07:33

## 2021-01-01 RX ADMIN — PROPOFOL 10 MCG/KG/MIN: 10 INJECTION, EMULSION INTRAVENOUS at 04:58

## 2021-01-01 RX ADMIN — Medication 1 MG: at 22:39

## 2021-01-01 RX ADMIN — PREDNISONE 60 MG: 20 TABLET ORAL at 08:24

## 2021-01-01 RX ADMIN — CHLORHEXIDINE GLUCONATE 0.12% ORAL RINSE 15 ML: 1.2 LIQUID ORAL at 20:18

## 2021-01-01 RX ADMIN — MYCOPHENOLATE MOFETIL 1000 MG: 200 POWDER, FOR SUSPENSION ORAL at 12:52

## 2021-01-01 RX ADMIN — ALBUMIN HUMAN 4250 ML: 0.05 INJECTION, SOLUTION INTRAVENOUS at 15:07

## 2021-01-01 RX ADMIN — MULTIPLE VITAMINS W/ MINERALS TAB 1 TABLET: TAB at 08:08

## 2021-01-01 RX ADMIN — POTASSIUM CHLORIDE 40 MEQ: 1.5 POWDER, FOR SOLUTION ORAL at 05:44

## 2021-01-01 RX ADMIN — PYRIDOSTIGMINE BROMIDE ORAL 60 MG: 60 SOLUTION ORAL at 06:28

## 2021-01-01 RX ADMIN — PYRIDOSTIGMINE BROMIDE 60 MG: 60 TABLET ORAL at 09:21

## 2021-01-01 RX ADMIN — SODIUM CHLORIDE, POTASSIUM CHLORIDE, SODIUM LACTATE AND CALCIUM CHLORIDE: 600; 310; 30; 20 INJECTION, SOLUTION INTRAVENOUS at 16:23

## 2021-01-01 ASSESSMENT — ACTIVITIES OF DAILY LIVING (ADL)
WALKING_OR_CLIMBING_STAIRS: AMBULATION DIFFICULTY, REQUIRES EQUIPMENT
ADLS_ACUITY_SCORE: 18
ADLS_ACUITY_SCORE: 15
ADLS_ACUITY_SCORE: 15
ADLS_ACUITY_SCORE: 16
ADLS_ACUITY_SCORE: 18
ADLS_ACUITY_SCORE: 17
ADLS_ACUITY_SCORE: 16
ADLS_ACUITY_SCORE: 21
ADLS_ACUITY_SCORE: 16
ADLS_ACUITY_SCORE: 19
ADLS_ACUITY_SCORE: 18
ADLS_ACUITY_SCORE: 17
ADLS_ACUITY_SCORE: 18
ADLS_ACUITY_SCORE: 19
ADLS_ACUITY_SCORE: 21
ADLS_ACUITY_SCORE: 18
ADLS_ACUITY_SCORE: 19
ADLS_ACUITY_SCORE: 18
ADLS_ACUITY_SCORE: 19
ADLS_ACUITY_SCORE: 18
ADLS_ACUITY_SCORE: 19
ADLS_ACUITY_SCORE: 16
ADLS_ACUITY_SCORE: 18
ADLS_ACUITY_SCORE: 18
ADLS_ACUITY_SCORE: 20
ADLS_ACUITY_SCORE: 19
WEAR_GLASSES_OR_BLIND: YES
ADLS_ACUITY_SCORE: 18
ADLS_ACUITY_SCORE: 15
ADLS_ACUITY_SCORE: 17
ADLS_ACUITY_SCORE: 16
ADLS_ACUITY_SCORE: 21
ADLS_ACUITY_SCORE: 18
ADLS_ACUITY_SCORE: 18
ADLS_ACUITY_SCORE: 16
ADLS_ACUITY_SCORE: 18
ADLS_ACUITY_SCORE: 19
ADLS_ACUITY_SCORE: 16
DRESSING/BATHING: BATHING DIFFICULTY, ASSISTANCE 1 PERSON
ADLS_ACUITY_SCORE: 22
ADLS_ACUITY_SCORE: 16
ADLS_ACUITY_SCORE: 17
ADLS_ACUITY_SCORE: 19
ADLS_ACUITY_SCORE: 22
ADLS_ACUITY_SCORE: 19
ADLS_ACUITY_SCORE: 19
ADLS_ACUITY_SCORE: 16
FALL_HISTORY_WITHIN_LAST_SIX_MONTHS: YES
ADLS_ACUITY_SCORE: 20
ADLS_ACUITY_SCORE: 17
ADLS_ACUITY_SCORE: 18
ADLS_ACUITY_SCORE: 16
ADLS_ACUITY_SCORE: 19
ADLS_ACUITY_SCORE: 16
ADLS_ACUITY_SCORE: 16
ADLS_ACUITY_SCORE: 19
ADLS_ACUITY_SCORE: 20
ADLS_ACUITY_SCORE: 22
ADLS_ACUITY_SCORE: 20
ADLS_ACUITY_SCORE: 19
DIFFICULTY_EATING/SWALLOWING: YES
ADLS_ACUITY_SCORE: 18
ADLS_ACUITY_SCORE: 18
ADLS_ACUITY_SCORE: 23
ADLS_ACUITY_SCORE: 16
ADLS_ACUITY_SCORE: 23
ADLS_ACUITY_SCORE: 18
ADLS_ACUITY_SCORE: 20
ADLS_ACUITY_SCORE: 16
ADLS_ACUITY_SCORE: 16
ADLS_ACUITY_SCORE: 18
ADLS_ACUITY_SCORE: 21
ADLS_ACUITY_SCORE: 19
ADLS_ACUITY_SCORE: 20
ADLS_ACUITY_SCORE: 16
DRESSING/BATHING_DIFFICULTY: YES
ADLS_ACUITY_SCORE: 16
ADLS_ACUITY_SCORE: 18
ADLS_ACUITY_SCORE: 20
ADLS_ACUITY_SCORE: 20
ADLS_ACUITY_SCORE: 18
ADLS_ACUITY_SCORE: 16
ADLS_ACUITY_SCORE: 18
ADLS_ACUITY_SCORE: 17
ADLS_ACUITY_SCORE: 18
ADLS_ACUITY_SCORE: 18
ADLS_ACUITY_SCORE: 23
ADLS_ACUITY_SCORE: 23
ADLS_ACUITY_SCORE: 18
ADLS_ACUITY_SCORE: 18
DEPENDENT_IADLS:: CLEANING;COOKING;LAUNDRY;SHOPPING;MEAL PREPARATION;MEDICATION MANAGEMENT;TRANSPORTATION
WALKING_OR_CLIMBING_STAIRS_DIFFICULTY: YES
ADLS_ACUITY_SCORE: 18
ADLS_ACUITY_SCORE: 22
ADLS_ACUITY_SCORE: 19
ADLS_ACUITY_SCORE: 18
ADLS_ACUITY_SCORE: 21
ADLS_ACUITY_SCORE: 18
ADLS_ACUITY_SCORE: 22
ADLS_ACUITY_SCORE: 16
ADLS_ACUITY_SCORE: 24
DEPENDENT_IADLS:: INDEPENDENT
ADLS_ACUITY_SCORE: 16
ADLS_ACUITY_SCORE: 19
ADLS_ACUITY_SCORE: 18
ADLS_ACUITY_SCORE: 17
TOILETING_ISSUES: NO
ADLS_ACUITY_SCORE: 16
ADLS_ACUITY_SCORE: 16
ADLS_ACUITY_SCORE: 21
ADLS_ACUITY_SCORE: 18
ADLS_ACUITY_SCORE: 18
ADLS_ACUITY_SCORE: 20
ADLS_ACUITY_SCORE: 21
ADLS_ACUITY_SCORE: 20
ADLS_ACUITY_SCORE: 18
ADLS_ACUITY_SCORE: 21
ADLS_ACUITY_SCORE: 21
ADLS_ACUITY_SCORE: 18
ADLS_ACUITY_SCORE: 18
DRESSING/BATHING_DIFFICULTY: NO
ADLS_ACUITY_SCORE: 18
FALL_HISTORY_WITHIN_LAST_SIX_MONTHS: YES
ADLS_ACUITY_SCORE: 18
ADLS_ACUITY_SCORE: 17
ADLS_ACUITY_SCORE: 21
PATIENT_/_FAMILY_COMMUNICATION_STYLE: SPOKEN LANGUAGE (ENGLISH OR BILINGUAL)
ADLS_ACUITY_SCORE: 20
ADLS_ACUITY_SCORE: 18
ADLS_ACUITY_SCORE: 23
ADLS_ACUITY_SCORE: 22
ADLS_ACUITY_SCORE: 16
ADLS_ACUITY_SCORE: 16
ADLS_ACUITY_SCORE: 18
ADLS_ACUITY_SCORE: 20
ADLS_ACUITY_SCORE: 19
ADLS_ACUITY_SCORE: 18
ADLS_ACUITY_SCORE: 16
ADLS_ACUITY_SCORE: 23
EQUIPMENT_CURRENTLY_USED_AT_HOME: WALKER, ROLLING
DIFFICULTY_COMMUNICATING: NO
ADLS_ACUITY_SCORE: 20
ADLS_ACUITY_SCORE: 18
ADLS_ACUITY_SCORE: 21
ADLS_ACUITY_SCORE: 22
ADLS_ACUITY_SCORE: 18
ADLS_ACUITY_SCORE: 16
ADLS_ACUITY_SCORE: 15
ADLS_ACUITY_SCORE: 15
ADLS_ACUITY_SCORE: 17
ADLS_ACUITY_SCORE: 17
ADLS_ACUITY_SCORE: 18
ADLS_ACUITY_SCORE: 19
ADLS_ACUITY_SCORE: 21
ADLS_ACUITY_SCORE: 21
ADLS_ACUITY_SCORE: 20
ADLS_ACUITY_SCORE: 18
EQUIPMENT_CURRENTLY_USED_AT_HOME: WALKER, ROLLING
ADLS_ACUITY_SCORE: 16
HEARING_DIFFICULTY_OR_DEAF: NO
ADLS_ACUITY_SCORE: 16
ADLS_ACUITY_SCORE: 18
ADLS_ACUITY_SCORE: 20
ADLS_ACUITY_SCORE: 18
ADLS_ACUITY_SCORE: 20
ADLS_ACUITY_SCORE: 18
ADLS_ACUITY_SCORE: 24
ADLS_ACUITY_SCORE: 20
ADLS_ACUITY_SCORE: 18
VISION_MANAGEMENT: GLASSES
ADLS_ACUITY_SCORE: 16
ADLS_ACUITY_SCORE: 16
ADLS_ACUITY_SCORE: 20
ADLS_ACUITY_SCORE: 18
ADLS_ACUITY_SCORE: 19
ADLS_ACUITY_SCORE: 23
ADLS_ACUITY_SCORE: 18
ADLS_ACUITY_SCORE: 19
ADLS_ACUITY_SCORE: 15
ADLS_ACUITY_SCORE: 19
ADLS_ACUITY_SCORE: 23
ADLS_ACUITY_SCORE: 18
ADLS_ACUITY_SCORE: 18
ADLS_ACUITY_SCORE: 19
ADLS_ACUITY_SCORE: 20
ADLS_ACUITY_SCORE: 18
ADLS_ACUITY_SCORE: 18
ADLS_ACUITY_SCORE: 19
ADLS_ACUITY_SCORE: 21
ADLS_ACUITY_SCORE: 19
ADLS_ACUITY_SCORE: 18
ADLS_ACUITY_SCORE: 16
ADLS_ACUITY_SCORE: 18
ADLS_ACUITY_SCORE: 18
ADLS_ACUITY_SCORE: 15
ADLS_ACUITY_SCORE: 18
ADLS_ACUITY_SCORE: 19
ADLS_ACUITY_SCORE: 15
ADLS_ACUITY_SCORE: 18
ADLS_ACUITY_SCORE: 18
ADLS_ACUITY_SCORE: 19
ADLS_ACUITY_SCORE: 18
ADLS_ACUITY_SCORE: 16
DOING_ERRANDS_INDEPENDENTLY_DIFFICULTY: YES
TOILETING_ISSUES: YES;OTHER (SEE COMMENTS)
ADLS_ACUITY_SCORE: 18
ADLS_ACUITY_SCORE: 18
ADLS_ACUITY_SCORE: 19
ADLS_ACUITY_SCORE: 18
ADLS_ACUITY_SCORE: 16
ADLS_ACUITY_SCORE: 16
ADLS_ACUITY_SCORE: 18
ADLS_ACUITY_SCORE: 16
ADLS_ACUITY_SCORE: 20
ADLS_ACUITY_SCORE: 18
NUMBER_OF_TIMES_PATIENT_HAS_FALLEN_WITHIN_LAST_SIX_MONTHS: 3
ADLS_ACUITY_SCORE: 20
TOILETING_ASSISTANCE: TOILETING DIFFICULTY, REQUIRES EQUIPMENT;TOILETING DIFFICULTY, ASSISTANCE 1 PERSON
EATING/SWALLOWING: EATING
ADLS_ACUITY_SCORE: 16
ADLS_ACUITY_SCORE: 18
ADLS_ACUITY_SCORE: 16
CONCENTRATING,_REMEMBERING_OR_MAKING_DECISIONS_DIFFICULTY: NO
ADLS_ACUITY_SCORE: 18
ADLS_ACUITY_SCORE: 17
ADLS_ACUITY_SCORE: 18
ADLS_ACUITY_SCORE: 18
ADLS_ACUITY_SCORE: 16
WHICH_OF_THE_ABOVE_FUNCTIONAL_RISKS_HAD_A_RECENT_ONSET_OR_CHANGE?: AMBULATION;TRANSFERRING;TOILETING;BATHING;DRESSING;FALL HISTORY
ADLS_ACUITY_SCORE: 19
ADLS_ACUITY_SCORE: 22
ADLS_ACUITY_SCORE: 19
ADLS_ACUITY_SCORE: 18
ADLS_ACUITY_SCORE: 19
ADLS_ACUITY_SCORE: 18
NUMBER_OF_TIMES_PATIENT_HAS_FALLEN_WITHIN_LAST_SIX_MONTHS: 1
ADLS_ACUITY_SCORE: 18
ADLS_ACUITY_SCORE: 18
ADLS_ACUITY_SCORE: 20
ADLS_ACUITY_SCORE: 23
ADLS_ACUITY_SCORE: 18
ADLS_ACUITY_SCORE: 16
ADLS_ACUITY_SCORE: 21
ADLS_ACUITY_SCORE: 21
ADLS_ACUITY_SCORE: 18
WALKING_OR_CLIMBING_STAIRS_DIFFICULTY: YES
ADLS_ACUITY_SCORE: 18
ADLS_ACUITY_SCORE: 19
ADLS_ACUITY_SCORE: 22
ADLS_ACUITY_SCORE: 16
ADLS_ACUITY_SCORE: 16
ADLS_ACUITY_SCORE: 20
ADLS_ACUITY_SCORE: 21
DOING_ERRANDS_INDEPENDENTLY_DIFFICULTY: YES
ADLS_ACUITY_SCORE: 19
ADLS_ACUITY_SCORE: 18
ADLS_ACUITY_SCORE: 18
ADLS_ACUITY_SCORE: 16
ADLS_ACUITY_SCORE: 18
ADLS_ACUITY_SCORE: 19
ADLS_ACUITY_SCORE: 18
ADLS_ACUITY_SCORE: 15
ADLS_ACUITY_SCORE: 21
WHICH_OF_THE_ABOVE_FUNCTIONAL_RISKS_HAD_A_RECENT_ONSET_OR_CHANGE?: FALL HISTORY

## 2021-01-01 ASSESSMENT — ENCOUNTER SYMPTOMS
CONFUSION: 1
DYSURIA: 0
WEAKNESS: 1
DIARRHEA: 0
ABDOMINAL PAIN: 0
BACK PAIN: 0
ARTHRALGIAS: 0
ABDOMINAL PAIN: 0
PALPITATIONS: 0
FEVER: 0
COUGH: 0
WEAKNESS: 1
SHORTNESS OF BREATH: 1
NECK PAIN: 0
ABDOMINAL PAIN: 0
VOICE CHANGE: 1
WEAKNESS: 1
MYALGIAS: 0
SHORTNESS OF BREATH: 0
SHORTNESS OF BREATH: 1
FEVER: 0
FEVER: 0
COUGH: 0
COUGH: 0
VOMITING: 0
BACK PAIN: 0
SHORTNESS OF BREATH: 1

## 2021-01-01 ASSESSMENT — MIFFLIN-ST. JEOR
SCORE: 1929.25
SCORE: 2003.25
SCORE: 1937.35
SCORE: 2206.25
SCORE: 2089.25
SCORE: 1937.35
SCORE: 1959.13
SCORE: 2156.25
SCORE: 2005.39
SCORE: 1956.86
SCORE: 2166.25
SCORE: 2046.25
SCORE: 1967.29
SCORE: 1929.25
SCORE: 2156.25
SCORE: 1970.13
SCORE: 1956.86
SCORE: 2176.25
SCORE: 2041.68
SCORE: 1996.25
SCORE: 1989.52
SCORE: 1975.13
SCORE: 2123.33
SCORE: 2009.02
SCORE: 1956.86
SCORE: 1994.25
SCORE: 1937.35
SCORE: 2167.25
SCORE: 2015.25
SCORE: 1984.25
SCORE: 1993.6
SCORE: 1993.6
SCORE: 2153.27
SCORE: 1949.15
SCORE: 1887.46
SCORE: 2147.82
SCORE: 1987.25
SCORE: 1966.84
SCORE: 2046.25
SCORE: 1929.25
SCORE: 1989.52
SCORE: 1993.25

## 2021-01-04 NOTE — PROGRESS NOTES
Thanh Goodrich is a 80 year old male who is being evaluated via a billable telephone visit.      What phone number would you like to be contacted at? [phone]  How would you like to obtain your AVS? Mail a copy  Assessment & Plan     Myasthenia gravis without exacerbation (H)  I think he is homebound and therefore entitled to home care, but he has to weigh his needs for home care against the risk of getting COVID from home care providers coming to his house, he will consider this carefully  - HOME CARE NURSING REFERRAL    Dilated aortic root (H)  This should be rechecked by [facility], when he has been vaccinated for COVID    Morbid obesity (H)      Malignant neoplasm of prostate (H)  Stable PSA    Swelling of knee  Unclear etiology  The test of time would argues against septic arthritis  Beyond that, the differential diagnosis is vast and an accurate diagnosis could not be made by telephone   The symptoms are improving which is encouraging, if symptom remain troublesome an office visit appointment would be needed to further characterize    Hyponatremia  Resolved  He needs another BMP on loop diuretic, schedule that for this week                 30 minutes spent on the date of the encounter doing chart review, history and exam, documentation and further activities as noted above               Return in about 3 days (around 1/7/2021) for Non-fasting Lab Only Visit.    Martinez Toledo MD  [facility]    Subjective     Thanh Goodrich is a 80 year old male who presents to clinic today for the following health issues     HPI       Chief Complaint   Patient presents with     Follow Up       This a nice man with morbid obesity, Myasthenia Gravis, chronic edema, hypertension, prostate cancer   It seems he has had a swollen knee for more than one month  He was seen at orthopedic urgent care on two occassions   He was treated with anabiotics  He was referred to the ER , but left before getting  treatment  He saw an NP here and hyponatremia was noted  His hydrochlorothiazide was switched to a loop diuretic  His knee swelling is improving   He denies fevers  He has generalized weakness, but he does not think it is from MG  He requests help in his home  He has trouble walking more than a few feet without fatigue or even falls  He is very nervous about COVID      Review of Systems   Pertinent +/-'s in HPI       Objective           Vitals:  No vitals were obtained today due to virtual visit.    Physical Exam   healthy, alert and no distress  PSYCH: Alert and oriented times 3; coherent speech, normal   rate and volume, able to articulate logical thoughts, able   to abstract reason, no tangential thoughts, no hallucinations   or delusions  His affect is normal  RESP: No cough, no audible wheezing, able to talk in full sentences  Remainder of exam unable to be completed due to telephone visits                Phone call duration: 26 minutes

## 2021-01-05 NOTE — TELEPHONE ENCOUNTER
Reason for Call:  Other FYI    Detailed comments: Vilma from [facility] called and stated pt refused Home care services. If questions please call Vilma at [phone]    Phone Number Patient can be reached at: Other phone number:  [phone]*    Best Time: n/a    Can we leave a detailed message on this number? Not Applicable    Call taken on 1/5/2021 at 4:26 PM by Josue Hadley

## 2021-02-08 NOTE — PROGRESS NOTES
Rigoberto Goodrich is a 80 year old who is being evaluated via a billable telephone visit.      What phone number would you like to be contacted at? [phone]  How would you like to obtain your AVS? MyChart  Assessment & Plan     Benign essential hypertension  - hydrochlorothiazide (HYDRODIURIL) 25 MG tablet; Take 1 tablet (25 mg) by mouth daily  - **Basic metabolic panel FUTURE 14d; Future  Hyponatremia    OK to switch back to hydrochlorothiazide, but need Na recheck in 1-2 weeks                20 minutes spent on the date of the encounter doing chart review, history and exam, documentation and further activities as noted above               Return in about 2 weeks (around 2/22/2021) for Non-fasting Lab Only Visit for sodium recheck .  Patient instructed to return to clinic or contact us sooner if symptoms worsen or new symptoms develop.     Martinez Toledo MD  [facility]    Subjective     Rigoberto Goodrich is a 80 year old who presents to clinic today for the following health issues     HPI       Chief Complaint   Patient presents with     Medication Follow-up     Torsemide - pt report side effects: redness, soreness in legs skin peeling off, would like to switch back to HYDROCHLOROTHIAZIDE       Feels that torsemide is having adverse affects on skin  Wants to switch back to hydrochlorothiazide   Feels previous low sodium had to do with acute illness and poor nutrition leading to blood test  Has had normal sodium on hydrochlorothiazide for a long track record      Review of Systems         Objective    Vitals - Patient Reported  Systolic (Patient Reported): 128  Diastolic (Patient Reported): 82  SpO2 (Patient Reported): 80      Vitals:  No vitals were obtained today due to virtual visit.    Physical Exam   healthy, alert and no distress  PSYCH: Alert and oriented times 3; coherent speech, normal   rate and volume, able to articulate logical thoughts, able   to abstract reason, no tangential thoughts, no  hallucinations   or delusions  His affect is normal  RESP: No cough, no audible wheezing, able to talk in full sentences  Remainder of exam unable to be completed due to telephone visits                Phone call duration: 13 minutes

## 2021-03-04 PROBLEM — J81.0 ACUTE PULMONARY EDEMA (H): Status: ACTIVE | Noted: 2021-01-01

## 2021-03-04 PROBLEM — R06.02 SHORTNESS OF BREATH: Status: ACTIVE | Noted: 2021-01-01

## 2021-03-04 NOTE — ED TRIAGE NOTES
Weakness, not drinking water as he should, not walking well.    Pt A&O x 3, CMS x 3, ABCD's adequate

## 2021-03-04 NOTE — ED NOTES
Pt resting on stretcher,  No change in condition, pt denies any needs at this time, rn will continue to monitor.

## 2021-03-04 NOTE — PROGRESS NOTES
RECEIVING UNIT ED HANDOFF REVIEW    ED Nurse Handoff Report was reviewed by: Calvin Varela RN on March 4, 2021 at 11:36 AM

## 2021-03-04 NOTE — H&P
"[facility]    History and Physical  Hospitalist       Date of Admission:  3/4/2021    Assessment & Plan   Thanh Goodrich is a 80 year old male who presented to [facility] ED on 3/2/2021 for evaluation and management of generalized weakness and increasing edema. Emergency Department evaluation is concerning for acute hypoxic respiration failure with CT PE study concerning for pulmonary hypertension and generalized prominence of pulmonary vasculature and probable scattered interstitial edema concerning for fluid overload. He received Lasix 40- mg IV x 1 in ED and is being admitted to inpatient for further evaluation and management.    Acute hypoxic respiratory failure likely secondary to decompensated diastolic heart failure  Concern for pulmonary hypertension based on chest CT  Mr. Goodrich has noted persistently worsening generalized weakness over the past 10- days to 2- weeks and this morning could not get out of his chair. He self- discontinued PO hydrochlorothiazide several weeks ago as he is concerned about \"side effects\" and notes his lower extremity edema has worsened since then. On exam he has 2+ edema to at least the hips and likely into his abdomen. Area of redness on right lower extremity without obvious other sign of infection. He notes increasing abdominal girth since stopping hydrochlorothiazide. Last echocardiogram in 2018 with EF 60-65% and Grade I diastolic dysfunction. EKG today with previously known right bundle branch block and without obvious acute myocardial ischemia.   - admit to inpatient  - Lasix 40- mg IV every 12- hours   - serial troponin   - BMP, CBC in AM  - echocardiogram  - daily weight  - telemetry  - cardiac diet   - CORE clinic evaluation as OP diuretic compliance is an issue   - continue home Losartan 50- mg by mouth daily with hold parameters  - electrolyte repletion per protocol   - compression stockings     Myasthenia gravis, chronic  He endorsed medication " compliance. No obvious difficulties with speech and he is able to take full deep breaths.   - continue mycophenolate 1000- mg AM and 500- mg PM    Hyponatremia, chronic   Likely secondary to chronic diuretic use. He was as low as 121 in December 2020 which was felt to be related to hydrochlorothiazide therefore hydrochlorothiazide was switched to torsemide. Mr. Goodrich was on torsemide until several weeks ago, then resumed hydrochlorothiazide which he has since stopped due to perceived side effects.   - BMP in AM and continue to monitor     Generalized weakness, likely due to heart failure  Impaired mobility and debility, chronic  Ms. Goodrich lives in his own apartment locally, minimal family support. His emergency contact is a friend. He has been driving until recently and uses a walker for mobility. On exam he appears deconditioned. He is open to TCU at discharge if indicated.   - PT/OT to evaluate and treat  - Social Work/Care Coordinator consult for anticipated discharge planning     Aneurysm, ascending thoracic aorta, 4.2- cm  Last surveillance on echocardiogram in 2018 with noted moderate, 4.4- cm, dilation.   - echocardiogram today as above     COVID, pending  Ms. Goodrich is being admitted in the middle of a pandemic. He has no recent known exposure, has no shortness of breath, and no obvious radiographic evidence of COVID infection.   - swab collected in ED and pending   - no obvious need to retest     DVT Prophylaxis: Enoxaparin (Lovenox) SQ  Code Status: Full Code; discussed. No family other than cousins. His emergency contact is a friend.     Disposition: anticipate 3- 4 days, may need TCU at discharge     The above assessment and plan has been discussed with Dr. Sewell, who is in agreement with the above assessment and plan.     ANIYA Castro, CNP  Hospitalist Service, House Officer  [facility]     Text Page  Pager: [phone]    Primary Care Physician   Dr. Martinez Toledo    Chief  Complaint   Weakness     History is obtained from the patient.     History of Present Illness   Thanh Goodrich is a 80 year old male who presented to [facility] ED on 3/2/2021 for evaluation and management of generalized weakness and increasing edema. Mr. Goodrich shares with me has been weak for the past 10- days to 2- weeks and this morning could not get out of a chair which prompted him to call 911. He was changed from torsemide to hydrochlorothiazide several weeks ago due to his concern of side effect and has since stopped taking hydrochlorothiazide due to his concern of ongoing side effects. Mr. Goodrich tells me his lower extremity edema has worsened over the past several weeks and his pants are fitting tighter without significant change in PO intake. He specifically denies changes in vision, headaches, cough, shortness of breath, orthopnea, and chest pain.     Emergency Department course included labs which revealed sodium 132, BNP 1639, and undetectable troponin. He was noted to be hypoxic on room air. CT PE study is did not reveal PE, but is notable for enlargement of central pulmonary arteries, mild bibasilar opacities, prominence of pulmonary vasculature with scattered interstitial edema, mild aneurysmal dilation of the ascending thoracic aorta, and severe coronary artery calcification. EKG is notable for old right bundle branch block and without obvious acute myocardial ischemia. He was given Lasix 40- mg IV x 1.     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     Cellulitis of left leg 9/23/2012     Edema 7/7/2009     History of steroid therapy 2/22/2010     Hyperlipidemia LDL goal <130 10/31/2010     HYPERTENSION 7/8/2003     Incontinence of urine 10/25/2010     Leg ulcer (H) 9/20/2012     MALIGN NEOPL PROSTATE-3/07 4/2/2007    T1C, [place] 8, initial PSA 39, s/p radiation seed implants 2007       Myasthenia gravis (H)      OBESITY 7/8/2003     Osteoporosis 8/18/2009    Refused bisphosphonates 2011      PVC's (premature ventricular contractions) 11/23/2011     RIGHT OCCIPITAL PAIN 7/8/2003     ROTATOR CUFF SYND NOS- RT 9/19/2005     Skin nodule 3/18/2013     ulcer left shin 10/8/2007     Past Surgical History   I personally reviewed history with patient:  Past Surgical History:   Procedure Laterality Date     Prostate Cancer Cryotherapy  2007     TONSILLECTOMY  1945     Prior to Admission Medications   Prior to Admission Medications   Prescriptions Last Dose Informant Patient Reported? Taking?   Acetaminophen (TYLENOL PO)   Yes No   CHROMIUM PICOLINATE  Self Yes No   Sig: Take 1 tablet by mouth daily.     Cholecalciferol (VITAMIN D3 PO)  Self Yes No   Sig: Take 2,000 Units by mouth 2 times daily.     Multiple Vitamins-Minerals (MULTIVITAMIN ADULTS 50+) TABS   Yes No   Sig: Take by mouth daily   calcium carbonate-vitamin D 600-400 MG-UNIT CHEW   Yes No   Sig: Take 1 chew tab by mouth 2 times daily.     clotrimazole (LOTRIMIN) 1 % external cream   No No   Sig: Apply topically 2 times daily   hydrochlorothiazide (HYDRODIURIL) 25 MG tablet   No No   Sig: Take 1 tablet (25 mg) by mouth daily   losartan (COZAAR) 50 MG tablet   No No   Sig: Take 1 tablet (50 mg) by mouth daily   mycophenolate (CELLCEPT) 500 MG tablet   Yes No   Sig: Take 1,000 mg by mouth 2 times daily 2 tabs in Am. ! Tab in Pm   sulfamethoxazole-trimethoprim (BACTRIM DS) 800-160 MG tablet   Yes No   Sig: Take 1 tablet by mouth 2 times daily      Facility-Administered Medications: None     Allergies   Allergies   Allergen Reactions     Ciprofloxacin Other (See Comments)     Contraindicated for myasthenia gravis patients     Procainamide Other (See Comments)     Contraindicated for myasthenia gravis patients     Quinidine Other (See Comments)     Contraindicated for myasthenia gravis patients     Quinine Other (See Comments)     Contraindicated for  myasthenia gravis patients       Social History   I personally reviewed history with patient:  - lives locally in an apartment  - driving until recently, gets his own groceries  - friend is emergency contact   - lifelong non-smoker  - denies abuse of alcohol and drugs  - retired      Family History   I personally reviewed history with patient:  - hypertension     Review of Systems   The 10 point Review of Systems is negative other than noted in the HPI or here.     Physical Exam   Temp: 98.4  F (36.9  C) Temp src: Oral BP: 122/63 Pulse: 88   Resp: 18 SpO2: 92 % O2 Device: Nasal cannula Oxygen Delivery: 3 LPM  Vital Signs with Ranges  Temp:  [98.4  F (36.9  C)] 98.4  F (36.9  C)  Pulse:  [83-88] 88  Resp:  [18-20] 18  BP: (119-137)/(62-68) 122/63  SpO2:  [87 %-98 %] 92 %  0 lbs 0 oz    General: Appears stated age, no acute distress. He looks tired.   Skin:  Warm, dry. Area of redness of right lower extremity without obvious other signs of infection.   HEENT:  Normocephalic, atraumatic.  Chest:  Bilateral anterior and posterior lung fields with fine crackles in the bases. No increased work of breathing. Does require supplemental oxygen.  Cardiovascular:  Regular rate and rhythm, without murmur, rub, or gallop. Bilateral upper and lower distal pulses palpable and appear adequately perfused.  Abdomen:  Soft, non-tender, concern for edema to about mid-abdomen. Bowel sounds present.   Musculoskeletal:  Moves all four extremities.   Neurological:  Alert and oriented x 4. Cranial nerves II-XII grossly intact. Generalized weakness.   Psychiatric:  Affect and mood congruent.    Data   Data reviewed today:  I personally reviewed the EKG tracing showing no obviosus acute myocardial ischemia with old RBBB and the chest CT image(s) showing concern for fluid overload.     Recent Labs   Lab 03/04/21  0320   WBC 8.6   HGB 13.2*   MCV 88      *   POTASSIUM 4.0   CHLORIDE 95   CO2 32   BUN 32*   CR 0.86    ANIONGAP 5   NOAH 9.2   GLC 91   ALBUMIN 3.6   PROTTOTAL 6.8   BILITOTAL 0.7   ALKPHOS 69   ALT 35   AST 44   TROPI <0.015       Imaging:  Recent Results (from the past 24 hour(s))   XR Chest 2 Views    Narrative    EXAM: CHEST 2 VIEWS  LOCATION: [facility]  DATE/TIME: 3/4/2021 4:10 AM    INDICATION: Weakness.    COMPARISON: None.    FINDINGS: A few band-like opacities are present in bilateral lung bases. Blunting of the right costophrenic angle again noted and most likely related to scarring. Possible cardiomegaly. Atherosclerotic calcification in the thoracic aorta. Prominence of   the central pulmonary vasculature. Moderate elevation of the left hemidiaphragm.      Impression    IMPRESSION:  1. A few band-like opacities in bilateral lung bases are nonspecific and could represent atelectasis, scarring or pneumonia.  2. No other findings suspicious for acute cardiopulmonary disease.    CT Chest Pulmonary Embolism w Contrast    Narrative    EXAM: CT CHEST PULMONARY EMBOLISM W CONTRAST  LOCATION: [facility]  DATE/TIME: 3/4/2021 5:48 AM    INDICATION: Hypoxia, weakness.    COMPARISON: None.    TECHNIQUE: CT chest pulmonary angiogram during arterial phase injection of IV contrast. Multiplanar reformats and MIP reconstructions were performed. Dose reduction techniques were used.     CONTRAST: 83 mL Isovue-370.    FINDINGS:  ANGIOGRAM CHEST: Negative for pulmonary embolism. Mild enlargement of the central pulmonary arteries can be seen with pulmonary arterial hypertension. Thoracic aorta is negative for dissection. No CT evidence of right heart strain.    LUNGS AND PLEURA: Bibasilar opacities favored to be due to atelectasis versus less likely infiltrate. Clinical correlation. Prominence of the pulmonary vasculature. There are also some subtle scattered ground-glass opacities probably due to some   interstitial edema. Clinical correlation for any signs of CHF or fluid overload. No pleural  effusions.    MEDIASTINUM/AXILLAE: No adenopathy. Heart size is within normal limits. No pericardial effusion. 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 Descending thoracic aorta normal in caliber. Esophagus is grossly   negative.    CORONARY ARTERY CALCIFICATION: Severe.    UPPER ABDOMEN: Normal.    MUSCULOSKELETAL: Mild degenerative changes in the spine.      Impression    IMPRESSION:  1.  Negative for pulmonary embolism.    2.  Enlargement of the central pulmonary arteries can be seen with pulmonary arterial hypertension.    3.  Mild bibasilar opacities most likely due to atelectasis versus less likely infiltrate. Clinical correlation.    4.  There is some generalized prominence of the pulmonary vasculature and some probable scattered interstitial edema. Findings could be seen with CHF or fluid overload. Clinical correlation.    5.  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    6.  Severe coronary artery calcification.

## 2021-03-04 NOTE — UTILIZATION REVIEW
Inpatient appropriate    Admission Status; Secondary Review Determination      Under the authority of the Utilization Management Committee, the utilization review process indicated a secondary review on the above patient. The review outcome is based on review of the medical records, discussions with staff, and applying clinical experience noted on the date of the review.    (x) Inpatient Status Appropriate - This patient's medical care is consistent with medical management for inpatient care and reasonable inpatient medical practice.    RATIONALE FOR DETERMINATION  80-year-old male with history of myasthenia gravis, chronic hyponatremia, hypertension was admitted to [facility] on 3/4/2021 with worsening lower extremity edema and generalized weakness.  He was found to be in acute hypoxic respiratory failure, currently requiring oxygen at 3 L/min.    At the time of admission with the information available to the attending physician more than 2 nights Hospital complex care was anticipated, based on patient risk of adverse outcome if treated as outpatient and complex care required. Inpatient admission is appropriate based on the Medicare guidelines.  He has been placed on IV Lasix 40 mg twice a day.  He will need ongoing hospitalization for adequate diuresis, and further evaluation of his heart failure.  Inpatient care is appropriate at this time.    This document was produced using voice recognition software      The information on this document is developed by the utilization review team in order for the business office to ensure compliance. This only denotes the appropriateness of proper admission status and does not reflect the quality of care rendered.  The definitions of Inpatient Status and Observation Status used in making the determination above are those provided in the CMS Coverage Manual, Chapter 1 and Chapter 6, section 70.4.    Sincerely,    Utilization Review  Physician Advisor  [facility].

## 2021-03-04 NOTE — PHARMACY-ADMISSION MEDICATION HISTORY
Pharmacy Medication History  Admission medication history interview status for the 3/4/2021  admission is complete. See EPIC admission navigator for prior to admission medications     Location of Interview: Patient room  Medication history sources: Patient and Surescripts    Significant changes made to the medication list:  Deleted: lotrimin, htcz, bactrim  Changed dose of tylenol, changed mycophenolate directions to 2 tabs in AM, 1 tab in PM, changed vit d3 dose to 4000 units daily, changed calcium-carbonate-d3 to once daily    In the past week, patient estimated taking medication this percent of the time: greater than 90%    Additional medication history information:   NA    Medication reconciliation completed by provider prior to medication history? No    Time spent in this activity: 20 minutes    Prior to Admission medications    Medication Sig Last Dose Taking? Auth Provider   acetaminophen (TYLENOL) 500 MG tablet Take 500-1,000 mg by mouth every 6 hours as needed for mild pain or pain 3/3/2021 at pm Yes Unknown, Entered By History   calcium carbonate 600 mg-vitamin D 400 units (CALTRATE) 600-400 MG-UNIT per tablet Take 1 tablet by mouth daily 3/2/2021 at pm Yes Unknown, Entered By History   CELLCEPT (BRAND) 500 MG tablet Take 1,000 mg by mouth every morning 3/3/2021 at am Yes Unknown, Entered By History   CELLCEPT (BRAND) 500 MG tablet Take 500 mg by mouth every evening 3/2/2021 at pm Yes Unknown, Entered By History   Cholecalciferol 100 MCG (4000 UT) CAPS Take 4,000 Units by mouth daily 3/2/2021 at pm Yes Unknown, Entered By History   CHROMIUM PICOLINATE Take 1 tablet by mouth daily.   3/2/2021 Yes Unknown, Entered By History   losartan (COZAAR) 50 MG tablet Take 1 tablet (50 mg) by mouth daily 3/3/2021 at pm Yes Martinez Toledo MD   Multiple Vitamins-Minerals (MULTIVITAMIN ADULTS 50+) TABS Take by mouth daily 3/2/2021 at pm  Reported, Patient       The information provided in this note is only as accurate  as the sources available at the time of update(s)

## 2021-03-04 NOTE — ED PROVIDER NOTES
History     Chief Complaint:  Lower Extremity Weakness     HPI  Thanh Goodrich is a 80 year old male with a history of myasthenia gravis, hypertension, hyperlipidemia, edema who presents for evaluation of lower extremity weakness. The patient states that over the last week his activities of daily living have been impaired by his worsening thigh weakness and discomfort. He is unable to ambulate about his house with his walker. He does have chronic edema and appreciates no deviations. Of note the patient has been tracking his home O2 sats and has found them to be in the upper 80s. He was placed on O2 per EMS. He is not on home O2. He denies any dysuria, cough, shortness of breath, fever, back pain, or chest pain.     Review of Systems   Constitutional: Negative for fever.   Respiratory: Negative for cough and shortness of breath.    Genitourinary: Negative for dysuria.   Musculoskeletal: Positive for gait problem. Negative for back pain.   Neurological: Positive for weakness.   All other systems reviewed and are negative.        Allergies:  Ciprofloxacin  Procainamide  Quinidine  Quinine     Medications:   Hydrochlorothiazide   Cozaar  Cellcept     Medical History:   MI   Cellulitis   Edema  Hypertension   Hyperlipidemia    Obesity   Osteoporosis  right  bundle branch block   Prostate cancer    Surgical History   Tonsillectomy    Prostate cancer cryotherapy     Family History:   Mother: Hypertension   Father:  Hypertension, Prostate cancer    Social History:  Patient presents alone.  PCP: Martinez Toledo     Physical Exam     Patient Vitals for the past 24 hrs:   BP Temp Temp src Pulse Resp SpO2   03/04/21 0314 137/68 98.4  F (36.9  C) Oral 83 20 98 %          Physical Exam  Vitals: reviewed by me  General: Pt seen on [facility], [facility], cooperative, and alert to conversation.  Chronically ill-appearing.  Eyes: Tracking well, clear conjunctiva BL  ENT: MMM, midline trachea.   Lungs:   Mild tachypnea and  respiratory distress noted.  Speaking in full sentences.  CV: Rate as above, regular rhythm.    Abd: Soft, non tender, no guarding, no rebound. Non distended.  Obese  MSK: Edema noted throughout bilateral lower extremities, and over his pannus.  No evidence of trauma  Skin: No rash, normal turgor and temperature  Neuro: Clear speech and no facial droop.  Psych: Not RIS, no e/o AH/VH      Emergency Department Course     ECG:  ECG taken at 0317  Sinus rhythm with 1st degree AV block   right  bundle branch block   Inferior infarct, age undetermined   Abnormal ECG  Rate 80 bpm. VT interval 214 ms. QRS duration 132 ms. QT/QTc 402/463 ms. P-R-T axes 51 -5 -4.     Imaging:    XR Chest Port 1 View   Final Result   IMPRESSION: ET tube 4 cm above the gabino.      CT Chest Pulmonary Embolism w Contrast   Final Result   IMPRESSION:   1.  Negative for pulmonary embolism.      2.  Enlargement of the central pulmonary arteries can be seen with pulmonary arterial hypertension.      3.  Mild bibasilar opacities most likely due to atelectasis versus less likely infiltrate. Clinical correlation.      4.  There is some generalized prominence of the pulmonary vasculature and some probable scattered interstitial edema. Findings could be seen with CHF or fluid overload. Clinical correlation.      5.  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      6.  Severe coronary artery calcification.         XR Chest 2 Views   Final Result   IMPRESSION:   1. A few band-like opacities in bilateral lung bases are nonspecific and could represent atelectasis, scarring or pneumonia.   2. No other findings suspicious for acute cardiopulmonary disease.       Echocardiogram Complete    (Results Pending)     Reading per radiology     Laboratory:    CBC: WBC 8.6, HGB 13.2 (L) ,    CMP: BUN 32 (L) Na 132 (L) o/w WNL (Creatinine 0.86)       Lactic: 1.4  Nt BNP: 1639  Troponin: (Collected 0320) <0.015      Asymptomatic COVID-19 Virus (Coronavirus), PCR NP Swab: pending        Emergency Department Course:    Reviewed:  I reviewed nursing notes, vitals and past medical history    Assessments:  0320 I assessed the patient as above.     Consults:    I spoke with Dr. Young  of the hospitalist service regarding patient's presentation, findings, and plan of care.      Interventions:  0427  mL IV     Disposition:  Admitted    Impression & Plan     Medical Decision Making:  This is a very pleasant 80-year-old male presents emergency room with worsening shortness of breath for about a week, in the setting of myasthenia gravis, and significant edema.  I appreciate a normal BNP, but with his oxygen saturation in the 80s on room air, I did do a CT scan and this shows what appears to be pulmonary edema.  I gave Lasix, and I do think he needs to be admitted to the hospital as he does not feel comfortable going home, nor do I feel comfortable sending him home without oxygen.  This represents an acute change.  He does not seem to be in myasthenic crisis currently.   I spoke to Dr. Young of the hospitalist team who is kindly agreed accept care of the patient.  I see no evidence of an infection, he does not have a white count or fever, antibiotics are withheld at this time.  Patient himself is okay with this plan, will plan for admission as above.    Covid-19  Thanh Goodrich was evaluated during a global COVID-19 pandemic, which necessitated consideration that the patient might be at risk for infection with the SARS-CoV-2 virus that causes COVID-19.   Applicable protocols for evaluation were followed during the patient's care.   COVID-19 was considered as part of the patient's evaluation. The plan for testing is:  a test was obtained during this visit.    Diagnosis:     ICD-10-CM    1. Shortness of breath  R06.02    2. Acute pulmonary edema (H)  J81.0         Scribe Disclosure:  I, Prince Manzano, am serving as a scribe  at 3:24 AM on 3/4/2021 to document services personally performed by Hunter Field MD based on my observations and the provider's statements to me.     [facility]  March 4, 2021      Hunter Field MD  03/05/21 0541

## 2021-03-04 NOTE — CONSULTS
Received standard consult to see patient per Provider Order --> Heart Failure - Dietitian to instruct patient on 2 gram sodium diet    Noted patient currently Observation status, echo pending.    Will provided CHF ed closer to discharge if indicated.    Zarina Rivera RD, LD, [facility]   Clinical Dietitian - [facility]

## 2021-03-04 NOTE — ED NOTES
Bed: ED25  Expected date:   Expected time:   Means of arrival:   Comments:  [address] 80M weakness

## 2021-03-04 NOTE — ED NOTES
[facility]  ED Nurse Handoff Report    ED Chief complaint: Generalized Weakness      ED Diagnosis: hypoxia  Final diagnoses:   None       Code Status: orders to come from inpt team    Allergies:   Allergies   Allergen Reactions     Ciprofloxacin Other (See Comments)     Contraindicated for myasthenia gravis patients     Procainamide Other (See Comments)     Contraindicated for myasthenia gravis patients     Quinidine Other (See Comments)     Contraindicated for myasthenia gravis patients     Quinine Other (See Comments)     Contraindicated for myasthenia gravis patients       Patient Story: pt reports generalized weakness and poor intake of fluid over the last 2 days, ems was called to pts apartment because he could not get out of his chair without assistance.  Focused Assessment:  Pt is alert and oriented x4, speaking full clear sentences, respirations non-labored, skin warm dry and intact.  Strong pulses throughout, pt moving all extremities but weak.  Pt 89% on room air, ith 2 litr nc spo2 high 90s.    Treatments and/or interventions provided: xray looked normal, labs wnl but bnp was 1639  Patient's response to treatments and/or interventions: pt tolerated all procedures well.    To be done/followed up on inpatient unit:  unknown    Does this patient have any cognitive concerns?:     Activity level - Baseline/Home:  Independent  Activity Level - Current:   Unknown    Patient's Preferred language: English   Needed?: No    Isolation: None  Infection: Not Applicable  Patient tested for COVID 19 prior to admission: YES  Bariatric?: No    Vital Signs:   Vitals:    03/04/21 0314 03/04/21 0419 03/04/21 0431   BP: 137/68  119/62   Pulse: 83  87   Resp: 20 18 18   Temp: 98.4  F (36.9  C)     TempSrc: Oral     SpO2: 98% (!) 89% 98%       Cardiac Rhythm:     Was the PSS-3 completed:   Yes  What interventions are required if any?               Family Comments:   OBS brochure/video  discussed/provided to patient/family: Yes              Name of person given brochure if not patient:               Relationship to patient:     For the majority of the shift this patient's behavior was Green.   Behavioral interventions performed were     ED NURSE PHONE NUMBER: [phone]

## 2021-03-05 NOTE — CONSULTS
"CLINICAL NUTRITION SERVICES  -  ASSESSMENT NOTE      Recommendations Ordered by Registered Dietitian (RD):   Begin TF Peptamen Intense VHP at 15 mL/hr;  Increase by 15 mL every 12 hrs to goal 60 mL/hr = 1440 kcals (11 kcal/kg), 132 gm pro (1.8 gm/kg), 1210 mL H20, 6 gm fiber, 109 gm CHO  Total (TF + Propofol):  1890 kcals (15 kcal/kg and 104% energy needs)  Free H20 60 mL every 4 hrs   Malnutrition:   % Weight Loss:  None noted  % Intake:  </= 50% for >/= 5 days (severe malnutrition) - On 3/6  Subcutaneous Fat Loss:  None observed  Muscle Loss:  Temporal region mild depletion and Clavicle bone region mild depletion  Fluid Retention:  Mild     Malnutrition Diagnosis: Non-Severe malnutrition  In Context of:  Acute illness or injury  Chronic illness or disease        REASON FOR ASSESSMENT  Thanh Goodrich is a 80 year old male seen by Registered Dietitian for Provider Order - Registered Dietitian to Assess and Order TF per Medical Nutrition Therapy Protocol      NUTRITION HISTORY  - Unable to obtain nutrition history as patient intubated and no family available.  Per Epic records, patient had decreased oral intake for 2 days PTA.  No food allergies/intolerances.  Pt with generalized weakness.    CURRENT NUTRITION ORDERS  Diet Order:     NPO   Was asked to initiate TF via OG for patient.    Current Intake/Tolerance:  NPO x 2    3/4:  Intubated   3/5:  AXR = Orogastric tube tip extends to the region of the gastric body (OG)     NUTRITION FOCUSED PHYSICAL ASSESSMENT FOR DIAGNOSING MALNUTRITION)  Yes         Observed:    Muscle wasting (refer to documentation in Malnutrition section)  Fluid retention (refer to documentation in Malnutrition section)    Obtained from Chart/Interdisciplinary Team:  Edema  2-3 + BLE (chronic)    ANTHROPOMETRICS  Height: 5' 10\"  Admit Weight:  242.2 kg  Body mass index is 45 kg/m .  Weight Status:  Obesity Grade III BMI >40  IBW:  75.5 kg  % IBW:  188%  Weight History:  Weight up 12 kg from " usual body weight    Wt Readings from Last 10 Encounters:   03/05/21 143 kg (315 lb 4.1 oz)   12/16/20 136.1 kg (300 lb)   10/08/20 130.2 kg (287 lb)   09/09/19 127.1 kg (280 lb 3.2 oz)   09/01/19 127.5 kg (281 lb)   08/23/19 129.3 kg (285 lb 1.6 oz)   08/14/19 130.2 kg (287 lb 1.6 oz)   08/07/19 132.2 kg (291 lb 6.4 oz)   07/05/19 133.6 kg (294 lb 8 oz)   10/12/18 132.5 kg (292 lb 1.6 oz)     LABS  Labs reviewed    MEDICATIONS  Medications reviewed  Norepinephrine - for hypotension  Propofol at 17.1 mL/hr = 451 kcals (lipid) - for sedation    ASSESSED NUTRITION NEEDS PER APPROVED PRACTICE GUIDELINES:    Dosing Weight:  130.2 kg (UBW for energy), 75.5 kg (IBW for pro)   Estimated Energy Needs:  9678-8939 kcals (11-14 Kcal/Kg)  Justification: obese and vented  Estimated Protein Needs:  113-151 grams protein (1.5-2 g pro/Kg)  Justification: Repletion, hypercatabolism with critical illness and obesity guidelines   Estimated Fluid Needs:  0101-8043 mL (1 mL/Kcal)  Justification: maintenance    MALNUTRITION:  % Weight Loss:  None noted  % Intake:  </= 50% for >/= 5 days (severe malnutrition) - On 3/6  Subcutaneous Fat Loss:  None observed  Muscle Loss:  Temporal region mild depletion and Clavicle bone region mild depletion  Fluid Retention:  Mild     Malnutrition Diagnosis: Non-Severe malnutrition  In Context of:  Acute illness or injury  Chronic illness or disease    NUTRITION DIAGNOSIS:  Inadequate protein-energy intake related to intubation as evidenced by limited nutritional intake over past 4 days, Propofol meeting 32% energy and 0% protein needs, and TF planned.    NUTRITION INTERVENTIONS  Recommendations / Nutrition Prescription  Begin TF Peptamen Intense VHP at 15 mL/hr;  Increase by 15 mL every 12 hrs to goal 60 mL/hr = 1440 kcals (11 kcal/kg), 132 gm pro (1.8 gm/kg), 1210 mL H20, 6 gm fiber, 109 gm CHO  Total (TF + Propofol):  1890 kcals (15 kcal/kg and 104% energy needs)  Free H20 60 mL every 4  hrs    Implementation  Nutrition education: Not appropriate at this time due to patient condition  Collaboration and Referral of Nutrition care - Pt was discussed during ICU interdisciplinary rounds this morning.  EN Composition, EN Schedule, and Feeding Tube Flush - Orders entered in Epic as above    Nutrition Goals  TF + Propofol will meet 9-110% estimated needs    MONITORING AND EVALUATION:  Progress towards goals will be monitored and evaluated per protocol and Practice Guidelines    Kalyani Eaton, RD, LD, CNSC

## 2021-03-05 NOTE — PROCEDURES
Procedure:   Central venous catheter placement        Location:   Right internal jugular vein         Indication:   Hypotension, need for IV access.         Consent:   Omitted due to medical emergency.         Procedure Summary:   US guidance: YES  Protection/Precaution measures: Mask with eye protection, cap, gown, sterile gloves were used: Yes  A time-out was performed. The central line was flushed with sterile saline. The patient's right region was prepped and draped in conventional sterile fashion. Anesthesia was achieved with 1% lidocaine. The finder needle was inserted into the right area and venous blood was withdrawn. The syringe was removed and the guide wire was advanced through the introducer needle. The introducer needle was removed and a small incision was made with a scalpel. A dilator was introduced thru the wire followed by the triple lumen venous catheter at 18 cm rebekah. It was sutured into place and a covered with a dressing.     Good blood return from each port.         Complications:   No immediate complications.  CXR ordered - Right internal jugular central venous catheter tip projects over the distal SVC. There is no pneumothorax.     This procedure is performed by EZE Corona.     I was present for the entirety of the procedure. I agree with documentation as noted above. Right internal jugular placed using seldinger technique. No complications.     Golden Klein DO

## 2021-03-05 NOTE — PLAN OF CARE
A/O x4, arouses to gentle touch, RASS 0 to -1. AVSS on CMA-AC 40/5. Tele SR w/BBB and 1 degree AVB. Erythema on panus, groin, and L calf. Wound under L breast, R hip, and Coccyx, cleaned per plan of care. Numbness/tingling present - baseline, pia, +2/+3 edema BLE. Assist x 2-3 w/lift, turn/repo q2h, no BM this shift, alberto patent, adequate output. CVC R internal jugular triple lumen, WDL, infusing propofol at 20 mcg/kg/min, fentanyl at 100mcg/hr, and levo at 0.06 mch/kg/min. OG started continuous TF at 1600 rate 15ml/hr, flush 60ml q4h, x-ray placement confirmed, BG 89, 88, 95. Held lasix this shift per intensivist. Family/friend updated with plan of care. Neuro following. Will continue to monitor.

## 2021-03-05 NOTE — PLAN OF CARE
Pt transferred from Obs unit to ICU for Bipap around 2230, patient obtunded and minimally responsive upon arrival, within an hour of arrival pt ended up being intubated and sedated and a central line placed. Pt sedated on propofol and fentanyl, levo for BP. Pt MONTEZ but not following commands, pupils 3mm, PERRL. In restrains due to pulling at tubes. Beckett placed, started with dark red/brown output, urine dark pasquale now. Bariatric bed ordered, k and mg to be replaced this AM

## 2021-03-05 NOTE — CONSULTS
"[facility] Nurse Inpatient Wound Assessment   Reason for consultation: Evaluate and treat ble wounds    Assessment  BLE wounds due to lymphedema scars no open wounds   Status: initial assessment  Concerns from nurses about his buttocks and pannus and folds   Treatment Plan  Left Breast Rash BID   Cleanse with wound cleanser  Apply antifungal powder and rub it in  Apply calvilon skin prep     Pannus - QSHIFT   Cleanse pannus with barrier wipes  Fill with interdry Ag sheets and leave some out to facilitate the wicking action    SACRUM  1. Clean wound with saline or MicroKlenz Spray, pat dry  2. Wipe / \"clean\" the surrounding periwound tissue with several skin preps to help with dressing sticking  3. Press a Mepilex Sacral Dressing (PS#[id number]) to the area, making sure to conform nicely to skin curvatures.   4. Time and date dressing change  -REPOSITION ALL PATIENTS SIDE TO SIDE ONLY WHEN IN BED, EVERY 2 HOURS,   -HEELS MUST BE KEPT ELEVATED AT ALL TIMES, USING AT LEAST 2 PILLOWS UNDER EACH CALF, ASSURING HEELS ARE FLOATING  -WHEN UP TO THE CHAIR PT NEEDS TO FULLY OFF LOAD EVERY 2 HOURS    Orders Written  Recommended provider order: None, at this time  WO Nurse follow-up plan:weekly  Nursing to notify the Provider(s) and re-consult the WO Nurse if wound(s) deteriorates or new skin concern.    Patient History  According to provider note(s):  Thanh Goodrich is a 80 year old male who presented to [facility] ED on 3/2/2021 for evaluation and management of generalized weakness and increasing edema. Emergency Department evaluation is concerning for acute hypoxic respiration failure with CT PE study concerning for pulmonary hypertension and generalized prominence of pulmonary vasculature and probable scattered interstitial edema concerning for fluid overload. He received Lasix 40- mg IV x 1 in ED and is being admitted to inpatient for further evaluation and management.      Objective Data  Containment of urine/stool: Incontinence " Protocol    Active Diet Order  Orders Placed This Encounter      NPO for Medical/Clinical Reasons Except for: No Exceptions      Output:   I/O last 3 completed shifts:  In: 1840.39 [I.V.:775.39; NG/GT:65; IV Piggyback:1000]  Out: 1610 [Urine:1610]    Risk Assessment:   Sensory Perception: 1-->completely limited  Moisture: 2-->very moist  Activity: 1-->bedfast  Mobility: 1-->completely immobile  Nutrition: 2-->probably inadequate  Friction and Shear: 1-->problem  Marc Score: 8                          Labs:   Recent Labs   Lab 03/05/21  0450 03/04/21  0320 03/04/21  0320   ALBUMIN  --   --  3.6   HGB 11.6*   < > 13.2*   WBC 9.9   < > 8.6    < > = values in this interval not displayed.       Physical Exam  Areas of skin assessed: full head to toe inspection completed                Buttocks - intertrigio intact and not open    Wound Location: Left breast fold    Date of last photo none taken today  Wound History: found on admission     Rash with satellite lesions         Measurements (length x width x depth, in cm) 3.7  x 5.5  x  0.0 cm        Wound Location:  Right side of pannus  Date of last photo none taken   Wound History: found on admission - intertrigo and from shear  Wound Base: 100 % dermis this area is comprised of 3 lines one on top of the other     Palpation of the wound bed: normal      Drainage: scant     Description of drainage: serosanguinous     Measurements (length x width x depth, in cm) 4.0  x 3.6  x  0.1 cm      Periwound skin: pendulous pannus      Color: pale      Temperature: normal   Odor: mild  Pain: unable to assess due to  sedation     Interventions  Visual inspection and assessment completed on admission   Wound Care Rationale Provide protection  and Decrease bacterial load  Wound Care: completed by RN  Supplies: floor stock and discussed with RN  Current off-loading measures: Pillows under calves and Wedge positioning system  Current support surface: Standard  Low air loss mattress with  pulsation   Education provided to: wound progress, Infection prevention , Moisture management and Off-loading pressure  Discussed plan of care with Nurse    Che Baldwin RN BS CWON

## 2021-03-05 NOTE — PROCEDURES
"[facility] Procedure Note          Intubation:      Date/Time: 3:57 AM  Performed by: Amber Becerril    Consent: The procedure was performed in an emergent situation.  Risks and benefits: risks, benefits and alternatives were discussed.   Patient identity confirmed: with arm band  Time out: Immediately prior to procedure a \"time out\" was called to verify the correct patient, procedure, equipment, support staff and site/side marked as required.    Indication:  Respiratory failure and airway protection.    Patient was preoxygenated with BiPAP. RSI was initiated with pushes of 20 mg Etomidate. No paralytic was used/needed. Cords were visualized with video laryngoscopy and a 8.0 ETT was placed on the first attempt. Tube placement was confirmed with tube fog, chest rise, bilateral breath sounds, and capnography. CXR was ordered and showed the ETT at 4 cm above the gabino.     Complications:  None    Patient tolerance: Patient tolerated the procedure well with no immediate complications.      Amber Becerril  MS4, [employer]  3/5/2021    I was present for the entirety of the procedure. Patient intubated with LoPro 3 blade, grade A view. Endotracheal tube successfully placed without any significant events.    Golden Klein, DO  "

## 2021-03-05 NOTE — CONSULTS
"[facility]    Neuro Critical Consult Note    Reason for Consult:  Myasthenia gravis     Chief Complaint: Generalized Weakness       HPI  Thanh Goodrich is a 80 year old male with pmh of myasthenia gravis and is taking Cellcept 1000/500 who presents with generalized weakness and was intubated after developing acute hypercapnic respiratory failure and was transferred to the ICU. Neuro Critical Care was consulted to assess if his myasthenia gravis is to blame for his current clinical picture.     On examine, intubated but off sedation, patient is relatively calm and is able to write. He states that his symptoms has been ongoing for 4 weeks now and started shortly after his 1st COVID vaccination dose. His last MG exacerbation was ~10 years ago and he only required being intubated once before due to MG. He states that he has been having some problem with controlling secretions, diplopia, worsening weakness, and difficulty breathing since the vaccination that has been steadily declining, he states that he probably waited too long to come in to [place].    Patient stated that he finished a course of cephalexin in December due to skin infection/rash after a fall, however, cephalexin is not known to cause/trigger MG symptoms.      Impression  #Myasthenia Gravis Exacerbation    NIF -20, unable to obtain VC and unsafe, patient kept triggering apnea alarm       Recommendations  Will start prednisolone 60 mg x7 days and wean by 10 mg every week.   Continue mycophenolate 1000 mg AM/500 mg PM  Will start IVIG 400 mg/kg daily x 5 days   NIF/VC BID to assess response to treatment   Continue pepcid      Patient Follow-up    - final recommendation pending work-up    Thank you for this consult. We will continue to follow.     The Stroke Staff is ALBERTA Rojas.    Glenn Sargent MD  Vascular Neurology Fellow  To page me or covering stroke neurology team member, click here: AMCOM   Choose \"On Call\" tab " "at top, then search dropdown box for \"Neurology Adult\", select location, press Enter, then look for stroke/neuro ICU/telestroke.    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     Cellulitis of left leg 9/23/2012     Depressive disorder 2020     Edema 7/7/2009     History of steroid therapy 2/22/2010     Hyperlipidemia LDL goal <130 10/31/2010     HYPERTENSION 7/8/2003     Incontinence of urine 10/25/2010     Leg ulcer (H) 9/20/2012     MALIGN NEOPL PROSTATE-3/07 4/2/2007    T1C, Shaina 8, initial PSA 39, s/p radiation seed implants 2007      Myasthenia gravis (H)      OBESITY 7/8/2003     Osteoporosis 8/18/2009    Refused bisphosphonates 2011      PVC's (premature ventricular contractions) 11/23/2011     RIGHT OCCIPITAL PAIN 7/8/2003     ROTATOR CUFF SYND NOS- RT 9/19/2005     Skin nodule 3/18/2013     ulcer left shin 10/8/2007     Uncomplicated asthma 1944?    childhood only     Past Surgical History   Past Surgical History:   Procedure Laterality Date     BIOPSY  2013?    dealt with     Prostate Cancer Cryotherapy  2007     TONSILLECTOMY  1945     Medications   Home Meds  Prior to Admission medications    Medication Sig Start Date End Date Taking? Authorizing Provider   acetaminophen (TYLENOL) 500 MG tablet Take 500-1,000 mg by mouth every 6 hours as needed for mild pain or pain   Yes Unknown, Entered By History   calcium carbonate 600 mg-vitamin D 400 units (CALTRATE) 600-400 MG-UNIT per tablet Take 1 tablet by mouth daily   Yes Unknown, Entered By History   Cholecalciferol 100 MCG (4000 UT) CAPS Take 4,000 Units by mouth daily   Yes Unknown, Entered By History   CHROMIUM PICOLINATE Take 1 tablet by mouth daily.     Yes Unknown, Entered By History   losartan (COZAAR) 50 MG tablet Take 1 tablet (50 mg) by mouth daily 10/8/20  Yes Martinez Toledo MD   mycophenolate (GENERIC EQUIVALENT) 500 MG " tablet Take 1,000 mg by mouth every morning   Yes Unknown, Entered By History   mycophenolate (GENERIC EQUIVALENT) 500 MG tablet Take 500 mg by mouth every evening   Yes Unknown, Entered By History   Multiple Vitamins-Minerals (MULTIVITAMIN ADULTS 50+) TABS Take 1 tablet by mouth daily     Reported, Patient       Scheduled Meds    acetaminophen  650 mg Oral Once     chlorhexidine  15 mL Mouth/Throat Q12H     diphenhydrAMINE  50 mg Oral Once    Or     diphenhydrAMINE  50 mg Intravenous Once     enoxaparin ANTICOAGULANT  40 mg Subcutaneous Q24H     famotidine  20 mg Intravenous Q12H     furosemide  40 mg Intravenous Q12H     immune globulin - sucrose free  0.4 g/kg (Ideal) Intravenous Q24H     [Held by provider] losartan  50 mg Oral Daily     [START ON 3/6/2021] mycophenolate  1,000 mg Oral or Feeding Tube QAM     mycophenolate  500 mg Oral or Feeding Tube QPM     predniSONE  60 mg Oral Daily     sodium chloride 0.9 %  100 mL Intravenous Once       Infusion Meds    Continuing ACE inhibitor/ARB/ARNI from home medication list OR ACE inhibitor/ARB order already placed during this visit       fentaNYL 100 mcg/hr (03/05/21 0739)     - MEDICATION INSTRUCTIONS -       norepinephrine 0.09 mcg/kg/min (03/05/21 1345)     propofol (DIPRIVAN) infusion 20 mcg/kg/min (03/05/21 1636)     BETA BLOCKER NOT PRESCRIBED       sodium chloride 10 mL/hr at 03/05/21 0957       PRN Meds  acetaminophen, acetaminophen, artificial tears ophthalmic solution, Continuing ACE inhibitor/ARB/ARNI from home medication list OR ACE inhibitor/ARB order already placed during this visit, glucose **OR** dextrose **OR** glucagon, diphenhydrAMINE **OR** diphenhydrAMINE, diphenhydrAMINE, EPINEPHrine, famotidine, fentaNYL, HOLD MEDICATION, HOLD MEDICATION, lidocaine 4%, lidocaine (buffered or not buffered), melatonin, methylPREDNISolone, miconazole, naloxone **OR** naloxone **OR** naloxone **OR** naloxone, - MEDICATION INSTRUCTIONS -, ondansetron **OR**  ondansetron, polyethylene glycol, prochlorperazine **OR** prochlorperazine **OR** prochlorperazine, propofol (DIPRIVAN) infusion **AND** propofol **AND** CK total **AND** Triglycerides, BETA BLOCKER NOT PRESCRIBED, sodium chloride (PF), sodium chloride (PF)    Allergies   Allergies   Allergen Reactions     Ciprofloxacin Other (See Comments)     Contraindicated for myasthenia gravis patients     Procainamide Other (See Comments)     Contraindicated for myasthenia gravis patients     Quinidine Other (See Comments)     Contraindicated for myasthenia gravis patients     Quinine Other (See Comments)     Contraindicated for myasthenia gravis patients     Family History   Family History   Problem Relation Age of Onset     Hypertension Mother      Depression Mother      Substance Abuse Mother         alcohol     Hypertension Father      Cancer Father         Prostate     Prostate Cancer Father      Substance Abuse Father         alcohol     Obesity Father      Diabetes Maternal Grandmother      C.A.D. Maternal Grandmother      Cerebrovascular Disease Maternal Grandfather      Cancer Paternal Uncle         Prostate     Prostate Cancer Other      Social History   Social History     Tobacco Use     Smoking status: Never Smoker     Smokeless tobacco: Never Used   Substance Use Topics     Alcohol use: Yes     Alcohol/week: 0.0 standard drinks     Comment: very rarely     Drug use: No       Review of Systems   The 10 point Review of Systems is negative other than noted in the HPI or here.        PHYSICAL EXAMINATION   Temp:  [96.5  F (35.8  C)-100  F (37.8  C)] 99.3  F (37.4  C)  Pulse:  [] 106  Resp:  [0-32] 19  BP: ()/() 136/78  FiO2 (%):  [40 %-90 %] 40 %  SpO2:  [89 %-100 %] 96 %    Neurologic  Mental Status:  alert, oriented x 3, follows commands, intubated  Cranial Nerves:  visual fields intact, PERRL, EOMI with normal smooth pursuit, facial sensation intact and symmetric, facial movements symmetric,  hearing not formally tested but intact to conversation, palate elevation symmetric and uvula midline, shoulder shrug strong bilaterally, tongue protrusion midline  Fatigable ptosis with diplopia 10 seconds after sustain upgazed  Neck: 3/5 strength    Motor:  normal muscle tone and bulk, no abnormal movements, able to move all limbs spontaneously, no pronator drift, positive arm pumping test bilaterally   Reflexes:  toes down-going  Sensory:  light touch sensation intact and symmetric throughout upper and lower extremities, no extinction on double simultaneous stimulation   Coordination:  normal finger-to-nose and heel-to-shin bilaterally without dysmetria  Station/Gait:  deferred    Dysphagia Screen  Per Nursing      Imaging  I personally reviewed all imaging; relevant findings per HPI.    Labs Data   CBC  Recent Labs   Lab 03/05/21 0450 03/04/21 2243 03/04/21 2124   WBC 9.9 9.6 Canceled, Test credited, specimen discarded   RBC 4.42 5.00 Canceled, Test credited, specimen discarded   HGB 11.6* 13.4 Canceled, Test credited, specimen discarded   HCT 39.2* 47.0 Canceled, Test credited, specimen discarded    276 Canceled, Test credited, specimen discarded     Basic Metabolic Panel   Recent Labs   Lab 03/05/21 0450 03/04/21 2243 03/04/21 2124    134 Canceled, Test credited, specimen discarded   POTASSIUM 3.6 4.2 Canceled, Test credited, specimen discarded   CHLORIDE 96 94 Canceled, Test credited, specimen discarded   CO2 29 35* Canceled, Test credited, specimen discarded   BUN 31* 33* Canceled, Test credited, specimen discarded   CR 1.14 1.41* Canceled, Test credited, specimen discarded   GLC 88 104* Canceled, Test credited, specimen discarded   NOAH 8.5 9.0 Canceled, Test credited, specimen discarded     Liver Panel  Recent Labs   Lab 03/04/21  0320   PROTTOTAL 6.8   ALBUMIN 3.6   BILITOTAL 0.7   ALKPHOS 69   AST 44   ALT 35     INR  No lab results found.   Lipid Profile    Recent Labs   Lab Test  12/16/20  1455 07/05/19  1558 08/08/18  1245 11/22/13  1523 11/22/13  1523   CHOL 141 174 175   < > 196   HDL 70 57 53   < > 61   LDL 60 105* 104*   < > 117   TRIG 55 59 90   < > 88   CHOLHDLRATIO  --   --   --   --  3.2    < > = values in this interval not displayed.     A1C  No lab results found.  Troponin I    Recent Labs   Lab 03/04/21  1629 03/04/21  1235 03/04/21  0320   TROPI <0.015 <0.015 <0.015

## 2021-03-05 NOTE — PROGRESS NOTES
[facility] ICU RESPIRATORY NOTE        Date of Admission: 3/4/2021    Date of Intubation (most recent): 3/4/21    Reason for Mechanical Ventilation: Respiratory Failure    Number of Days on Mechanical Ventilation: 2    Met Criteria for Spontaneous Breathing Trial: No    Reason for No Spontaneous Breathing Trial: Per MD    Significant Events Today: None    ABG Results:   Recent Labs   Lab 03/05/21  0230 03/04/21 2124   PH 7.54*  --    PCO2 36  --    PO2 168*  --    HCO3 31*  --    O2PER  --  Stefano       Current Vent Settings: Ventilation Mode: CMV/AC  (Continuous Mandatory Ventilation/ Assist Control)  FiO2 (%): 40 %  Rate Set (breaths/minute): 14 breaths/min  Tidal Volume Set (mL): 550 mL  PEEP (cm H2O): 5 cmH2O  Oxygen Concentration (%): 40 %  Resp: 9      Skin Assessment: No Skin Issues    Plan: Continue vent support and daily wean assessments    Jossie Jaramillo, RT

## 2021-03-05 NOTE — PROGRESS NOTES
[facility] ICU RESPIRATORY NOTE    Date of Admission: 3/4/2021    Date of Intubation (most recent): 3/4/2021    Reason for Mechanical Ventilation: Respiratory Failure    Number of Days on Mechanical Ventilation: 1    Met Criteria for Pressure Support Trial: No    Reason for No Pressure Support Trial: Per MD    Significant Events Today: Pt transferred to unit on bipap and intubated.     ABG Results:Results for PINEDA GONZALEZ (MRN [id number]) as of 3/5/2021 03:16   Ref. Range 3/5/2021 02:30   pH Arterial Latest Ref Range: 7.35 - 7.45 pH 7.54 (H)   pCO2 Arterial Latest Ref Range: 35 - 45 mm Hg 36   PO2 Arterial Latest Ref Range: 80 - 105 mm Hg 168 (H)   Bicarbonate Arterial Latest Ref Range: 21 - 28 mmol/L 31 (H)   Base Excess Art Latest Units: mmol/L 7.6   Oxyhemoglobin Arterial Latest Ref Range: 92 - 100 % 100     Vent Settings: CMV 14 550 45% 5P    Plan:  Continue on full support wean as tolerated

## 2021-03-05 NOTE — PROGRESS NOTES
House [facility] brief note:    Was paged by nursing at 2025 to evaluate pt for noted somnolence.  Upon arrival, pt lying in bed, ill appearing, in mild respiratory distress, eyes half opened.  Pt PERRL, minimally opens eyes to voice and sternal rub, intermittently moves his mouth in a chewing motion.  Pt with noted muscle contraction of bilateral toes to noxious stimuli.  Pt does not follow commands initially; however, with noxious stimuli, it appears pt attempts to protrude tongue and lift bilateral thumbs to command.  Pt mildly tachypneic, shallow inspiratory effort, fine crackles in ANGELITO, otherwise, clear throughout.  Pt's axillary temp 98, rectal temp 97, HR 90s, RR low 20s, SBP 120s, O2 sats 93% on 3L O2 via NC.      Acute encephalopathy 2/2 critical hypercapnia.  Differential diagnoses considered: Hypoglycemia, seizure, sepsis, acute CVA/ICH  Acute hypoxic hypercapnic respiratory failure suspect 2/2 decompensated HFpEF, now with RAQUEL, concern for possible pulmonary HTN, suspected obesity hypoventilation syndrome in setting of PMH myasthenia gravis.    Interventions:  - BG - 105  - Stat CBC, BMP, lactic acid, procalcitonin, VBG, blood culture x1 (will obtain 2nd set if labs concerning for possible infection or if spikes fever)  - Pending laboratory results, may proceed with CT head without contrast to evaluate for acute intracranial abnormality   - Pt continues on telemetry monitoring, will initiate continuous pulse oximetry and currently on Q4H VS    Addendum: 2140: Pt's VBG notes pH 7.08, pCO2 127.  In setting of encephalopathy and critical VBG results, after discussion with intensivist, Dr. Klein, will transfer pt to ICU for continuous Bipap therapy with AVAPS support.  Appreciate intensivist's recommendations.  Also updated pt's friend, Pranay, primary emergency contact, regarding above.  Pranay notes pt does have a cousin, but unsure if this cousin notes pt's wishes.  Pt does not have a HCD on file; however, CODE  STATUS was discussed with pt and confirmed by colleague on admission as FULL CODE.      Additional interventions:  - Will place alberto catheter while in ICU for strict I/O in setting of diuresis, also noted skin breakdown  - Will order for NIF test if/when pt able to participate   - Will repeat VBG in 1 hour after Bipap application  - Will repeat UA/UC after alberto catheter placement  - Will obtain 2nd set blood culture  - While still present on unit, pt's mentation continues to worsen, minimal respiratory drive at this time.  Updated intensivist who presents to pt's bedside for intubation.    - Updated pt's friend, Pranay, regarding need for intubation.  All questions answered  - At this time, the hospitalist service will sign off.  Please consult hospitalist service when transfer of care deemed appropriate.  Greatly appreciate intensivist's further management of critically ill pt.      ANIYA Damian, CNP  [facility]    I spent 45 critical care min at pt's bedside managing and coordinating pt's overall plan of care.

## 2021-03-05 NOTE — PLAN OF CARE
Pt came up from ED around 12 pm. A/O x4. VSS, on 3L NC, satting in low 90s. TANNER. LS: coarse and crackles throughout. IS teaching provided and use encouraged. IV lasix given, diuresing well. Beckett patent with adequate clear yellow output.BS active, passing flatus. Denies pain. Low fat/low sodium diet, no caffeine. No appetite. Denies N/V. Pt states he is really tired and has been resting/sleeping most of this shift. Plan for ECHO. Plan to admit patient inpatient and transfer out of Obs. Plan to discharge pending, possibly to TCU, PT to eval.

## 2021-03-05 NOTE — PHARMACY-CONSULT NOTE
March 5, 2021 Asked By Neurology if IVIG is dosed using Ideal Body Weight.  Yes per [facility] policy this is dosed using IDBW.    [facility]  ICU

## 2021-03-05 NOTE — PROGRESS NOTES
Critical values from lab from VBG: pH 7.08 and pCO2 127. Verbally reported to RN and NP, Noman Reyes, at bedside.

## 2021-03-05 NOTE — CONSULTS
[facility] Intensive Care Unit  History and Physical  March 5, 2021  Thanh Goodrich MRN# [id number]   Age: 80 year old YOB: 1940     Date of Admission: 3/4/2021    Primary care provider: Martinez Toledo          Assessment and plan :   Thanh Goodrich is a 80 year old male admitted on 3/4/2021 for generalized weakness, subsequently developing worsening mental status and hypotension.    Chief Complaint   Patient presents with     Generalized Weakness   . My assessment and plan for this patient is as follows:    1.Neurology: metabolic encephalopathy with rising pCO2. Etiology is unclear. H/o myasthenia gravis, on cellcept  - intubated for airway protection, was noted to be agonally breathing at time of intubation  - no evidence of pneumonia  - would question myasthenia crisis, but nothing in his history to suggest this    2. Cardiovascular: now with shock. Likely vasodilatory due to medications. On multiple home medications for high blood pressure  - hold home ARB  - no further diuresis  - echocardiogram in the am    3. Pulmonary/Ventilator Management: hypercapnic respiratory failure, likely due to his body habitus.   -intubated for hypercapnic respiratory failure  -continue with mechanical ventilation  - obtain ABG  - no evidence of pneumonia    4. GI and Nutrition : no significant finding on physical exam.   - GI prophylaxis  - start TF in the am    5. Renal/Fluids/Electrolytes: .Assessment: RAQUEL, likely multifactorial  - continue to monitor UOP  - no further fluids or diuresis  - monitor UOP    7. Endocrine: obesity with hyperglycemia  - eval and treat blood glucose    8. Hematology/Oncology: DVT prophylaxis, GI prophylaxis    10. IV/Access: right IJ    11. Prophylaxis: pepcid and heparin    13. Billing: total time spend providing critical care was 65 min managing metabolic encephalopathy, acute hypercapnic respiratory failure, and shock.         Chief Complaint/ Reason for ICU Admission and  "HPI     Admitted for :   Chief Complaint   Patient presents with     Generalized Weakness     History obtained from medical record  History of Present Illness:                     \"Thanh Goodrich is a 80 year old male who presented to [facility] ED on 3/2/2021 for evaluation and management of generalized weakness and increasing edema. Mr. Goodrich shares with me has been weak for the past 10- days to 2- weeks and this morning could not get out of a chair which prompted him to call 911. He was changed from torsemide to hydrochlorothiazide several weeks ago due to his concern of side effect and has since stopped taking hydrochlorothiazide due to his concern of ongoing side effects. Mr. Goodrich tells me his lower extremity edema has worsened over the past several weeks and his pants are fitting tighter without significant change in PO intake. He specifically denies changes in vision, headaches, cough, shortness of breath, orthopnea, and chest pain.      Emergency Department course included labs which revealed sodium 132, BNP 1639, and undetectable troponin. He was noted to be hypoxic on room air. CT PE study is did not reveal PE, but is notable for enlargement of central pulmonary arteries, mild bibasilar opacities, prominence of pulmonary vasculature with scattered interstitial edema, mild aneurysmal dilation of the ascending thoracic aorta, and severe coronary artery calcification. EKG is notable for old right bundle branch block and without obvious acute myocardial ischemia. He was given Lasix 40- mg IV x 1.\"    Upon arrival to ICU, patient obtunded and not able to protect his airway and so intubated. 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     Cellulitis of left leg 9/23/2012     Depressive disorder 2020     Edema 7/7/2009     History of steroid therapy 2/22/2010     Hyperlipidemia LDL goal <130 10/31/2010     " HYPERTENSION 7/8/2003     Incontinence of urine 10/25/2010     Leg ulcer (H) 9/20/2012     MALIGN NEOPL PROSTATE-3/07 4/2/2007    T1C, [place] 8, initial PSA 39, s/p radiation seed implants 2007      Myasthenia gravis (H)      OBESITY 7/8/2003     Osteoporosis 8/18/2009    Refused bisphosphonates 2011      PVC's (premature ventricular contractions) 11/23/2011     RIGHT OCCIPITAL PAIN 7/8/2003     ROTATOR CUFF SYND NOS- RT 9/19/2005     Skin nodule 3/18/2013     ulcer left shin 10/8/2007     Uncomplicated asthma 1944?    childhood only            Past Surgical History:     Past Surgical History:   Procedure Laterality Date     BIOPSY  2013?    dealt with     Prostate Cancer Cryotherapy  2007     TONSILLECTOMY  1945            Social History:     Social History     Socioeconomic History     Marital status: Single     Spouse name: Not on file     Number of children: Not on file     Years of education: Not on file     Highest education level: Not on file   Occupational History     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     Employer: [address] [place] [address]   Social Needs     Financial resource strain: Not on file     Food insecurity     Worry: Not on file     Inability: Not on file     Transportation needs     Medical: Not on file     Non-medical: Not on file   Tobacco Use     Smoking status: Never Smoker     Smokeless tobacco: Never Used   Substance and Sexual Activity     Alcohol use: Yes     Alcohol/week: 0.0 standard drinks     Comment: very rarely     Drug use: No     Sexual activity: Not Currently     Partners: Female   Lifestyle     Physical activity     Days per week: Not on file     Minutes per session: Not on file     Stress: Not on file   Relationships     Social connections     Talks on phone: Not on file     Gets together: Not on file     Attends Restorationism service: Not on file     Active member of club or organization: Not on file     Attends meetings of clubs or organizations: Not on file     Relationship status: Not on  file     Intimate partner violence     Fear of current or ex partner: Not on file     Emotionally abused: Not on file     Physically abused: Not on file     Forced sexual activity: Not on file   Other Topics Concern     Parent/sibling w/ CABG, MI or angioplasty before 65F 55M? No   Social History Narrative    The patient has a 0 pk yr tobacco hx.  He has no active use.  Alcohol use is rare alcoholic drinks per week.  He denies use of recreational drugs.          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        The patient is single.  Has 0 children.        Hot Tub Exposure: NO    Recent Travel: NO     Hx of incarceration:  NO    Bird Exposure:   NO    Animal Exposure:  3 Cats    Inhalation Exposure:  + asbestos exposure in past             Smoking: unknown.   History   Smoking Status     Never Smoker   Smokeless Tobacco     Never Used     Alcohol: unknown  Social History    Substance and Sexual Activity      Alcohol use: Yes        Alcohol/week: 0.0 standard drinks        Comment: very rarely    Drug:  unknown   History   Drug Use No            Family History:     Family History   Problem Relation Age of Onset     Hypertension Mother      Depression Mother      Substance Abuse Mother         alcohol     Hypertension Father      Cancer Father         Prostate     Prostate Cancer Father      Substance Abuse Father         alcohol     Obesity Father      Diabetes Maternal Grandmother      C.A.D. Maternal Grandmother      Cerebrovascular Disease Maternal Grandfather      Cancer Paternal Uncle         Prostate     Prostate Cancer Other             Allergies:   Please see allergy list which was reviewed this admission.  Allergies   Allergen Reactions     Ciprofloxacin Other (See Comments)     Contraindicated for myasthenia gravis patients     Procainamide Other (See Comments)     Contraindicated for myasthenia gravis patients     Quinidine Other (See Comments)     Contraindicated for myasthenia gravis  patients     Quinine Other (See Comments)     Contraindicated for myasthenia gravis patients            Medications:     Medications Prior to Admission   Medication Sig Dispense Refill Last Dose     acetaminophen (TYLENOL) 500 MG tablet Take 500-1,000 mg by mouth every 6 hours as needed for mild pain or pain   3/3/2021 at pm     calcium carbonate 600 mg-vitamin D 400 units (CALTRATE) 600-400 MG-UNIT per tablet Take 1 tablet by mouth daily   3/2/2021 at pm     Cholecalciferol 100 MCG (4000 UT) CAPS Take 4,000 Units by mouth daily   3/2/2021 at pm     CHROMIUM PICOLINATE Take 1 tablet by mouth daily.     3/2/2021     losartan (COZAAR) 50 MG tablet Take 1 tablet (50 mg) by mouth daily 90 tablet 3 3/3/2021 at pm     mycophenolate (GENERIC EQUIVALENT) 500 MG tablet Take 1,000 mg by mouth every morning   3/2/2021 at PM     mycophenolate (GENERIC EQUIVALENT) 500 MG tablet Take 500 mg by mouth every evening   3/3/2021 at AM     Multiple Vitamins-Minerals (MULTIVITAMIN ADULTS 50+) TABS Take 1 tablet by mouth daily    3/2/2021 at pm            Review of Systems:   Unable to complete review of systems.         Physical Exam:   Vitals were reviewed  Temp:  [96.5  F (35.8  C)-99.9  F (37.7  C)] 99.9  F (37.7  C)  Pulse:  [] 98  Resp:  [6-32] 10  BP: ()/(41-81) 103/59  FiO2 (%):  [90 %] 90 %  SpO2:  [87 %-100 %] 100 %    Intake/Output Summary (Last 24 hours) at 3/5/2021 0327  Last data filed at 3/5/2021 0300  Gross per 24 hour   Intake 1326.49 ml   Output 1475 ml   Net -148.51 ml     Constitutional:   Appears older than stated age, obtunded       General:   Obtunded   Neurologic:   Confused, limited exam     Lungs:   Symmetrical chest shape and movements with each tidal breath   Cardiovascular:   Normal S1,S2, and no gallop, rub or murmur   Abdomen:   Distended:  Yes . Bowel sound present and normal: Yes . Soft: Yes . Tender: Yes . Rebound:tendeness or guarding present Yes   Hernia present: No   Extremities:    Clubbing present: No, Edema present: Yes , Acrocyanosis present: No, Mottling present: No, Normal capillary refill: Yes    Skin:   Warm, dry.  No rash on limited exam.    Lines/Drain:    Central line present: Yes   Arterial line present: No  External ventricular Drain present: No  Chest tube present: No  PATRICIA present: No  PEG present: No           Ancillary Data:   All laboratory and imaging data reviewed.     ABG/vent data:   Ventilation Mode: CMV/AC  (Continuous Mandatory Ventilation/ Assist Control)  FiO2 (%): 90 %  Rate Set (breaths/minute): 14 breaths/min  Tidal Volume Set (mL): 550 mL  PEEP (cm H2O): 5 cmH2O  Oxygen Concentration (%): 45 %  Resp: 10    Recent Labs   Lab 03/05/21 0230 03/04/21 2124   PH 7.54*  --    PCO2 36  --    PO2 168*  --    HCO3 31*  --    O2PER  --  Stefano        ROUTINE IP LABS (Last four results)  BMP  Recent Labs   Lab 03/04/21 2243 03/04/21 2124 03/04/21  1235 03/04/21  0320    Canceled, Test credited, specimen discarded  --  132*   POTASSIUM 4.2 Canceled, Test credited, specimen discarded 3.8 4.0   CHLORIDE 94 Canceled, Test credited, specimen discarded  --  95   NOAH 9.0 Canceled, Test credited, specimen discarded  --  9.2   CO2 35* Canceled, Test credited, specimen discarded  --  32   BUN 33* Canceled, Test credited, specimen discarded  --  32*   CR 1.41* Canceled, Test credited, specimen discarded 0.97 0.86   * Canceled, Test credited, specimen discarded  --  91     CBC  Recent Labs   Lab 03/04/21 2243 03/04/21 2124 03/04/21  0320   WBC 9.6 Canceled, Test credited, specimen discarded 8.6   RBC 5.00 Canceled, Test credited, specimen discarded 4.97   HGB 13.4 Canceled, Test credited, specimen discarded 13.2*   HCT 47.0 Canceled, Test credited, specimen discarded 43.8   MCV 94 Canceled, Test credited, specimen discarded 88   MCH 26.8 Canceled, Test credited, specimen discarded 26.6   MCHC 28.5* Canceled, Test credited, specimen discarded 30.1*   RDW 14.5 Canceled, Test  credited, specimen discarded 14.6    Canceled, Test credited, specimen discarded 292     INRNo lab results found in last 7 days.    Other Lab Data:  All labs personally reviewed in apic.    All imaging studies reviewed by me. CXR with central line and ETT placed appropriately    Golden Klein MD March 5, 2021

## 2021-03-05 NOTE — PROGRESS NOTES
Covid negative. Disoriented X4. Non verbal, arouses to speech and sternal rub. Eyes rolling back in head, very lethargic and sleepy. VSS on 3L O2. Rectal temp 97.8. External alberto removed. PIV saline locked. IV Lasixs available. Cardiac diet. Full skin assessment, no open wounds. Notified charge with patient condition @ 8:15 PM, charge notified house hospitalist Noman Reyes @ 8:20 PM, [place] NP arrived to unit @ approximately 8:25 PM to do an assessment. Labs order, critical Ph values @ 7.08, PcO2 127, PO2 60, Bicarb 38. Recommending transfer to ICU. Orders to be NPO, hold all medications, glucose checks, tele, BIPAP. Lab called with question about CBC, said they would recommend ordering another CBC STAT, as CBC values had significantly changed, I notified Noman, she put in orders for STAT CBC. Report given to ICU nurse. Patient transferred off the unit @ 10:11 PM.

## 2021-03-06 NOTE — PROGRESS NOTES
"ICU Progress Note    Subjective  Patient was extubated this morning after pressure support trial to nasal cannula. Was placed back on BiPAP early afternoon for borderline NIF/VC and tolerated it well. Discussed with patient at bedside that his MG exacerbation will likely continue for the next several days and offered option of elective intubation vs continuous BiPAP. Patient preferred to be intubated again.     Objective  /61   Pulse 96   Temp 99  F (37.2  C)   Resp 14   Ht 1.778 m (5' 10\")   Wt 143 kg (315 lb 4.1 oz)   SpO2 97%   BMI 45.23 kg/m    FiO2 30%  General: Laying in bed, comfortable  HEENT: No scleral icterus  Heart: Normal rate, regular rhythm  Lungs: Clear bilaterally  Neuro: Answering questions appropriately.    Labs reviewed    Cr 0.72  WBC 7.5 Hgb 11.7 Plt 218    VBG 7.31/66    Assessment and Plan  Thanh Goodrich is a 80 year old male with history of myasthenia gravis admitted on 3/4/2021 for generalized weakness and found to be in myasthenia gravis exacerbation.      1.Neurology/Pulmonary  Myasthenia Gravis Exacerbation  --Extubated this AM but with NIF of -20 and FVC of 1000ml afterwards. Discussed with neurology who anticipates it may be several days prior to resolution of exacerbation. Discussed with patient the option of elective intubation vs continuous BiPAP and patient prefers to be reintubated.   --Plan for reintubation today. Start propofol for sedation afterwards. Patient had tolerated being awake with prior intubation.   --Appreciate neurology recs: Continue IVIG, MMF, steroids.      2. Cardiovascular: Hypotensive on admission, but now resolved.   - Holding home ARB     3. GI and Nutrition :   - Resume TF after intubation.     4. Renal/Fluids/Electrolytes:   RAQUEL on admission, now improved.     5. Endocrine: No acute issues     6. Hematology/Oncology: DVT prophylaxis, GI prophylaxis     IV/Access: right IJ  Prophylaxis: pepcid and enoxaparin    FULL CODE  Cousin Gaby " updated on phone this morning and afternoon prior to reintubation.    Gaby Aquino MD  Critical Care Staff    Critical Care Time: 60 minutes

## 2021-03-06 NOTE — PLAN OF CARE
SLP: orders received, chart reviewed. Pt extubated this AM after 3 day intubated 2/2 myasthenia gravis exacerbation - per discussion with RN retaining C02 per VBG with plan to place BiPAP. Given respiratory status/ BiPAP needs, not appropriate for swallow evaluation at this time. Will continue to follow.

## 2021-03-06 NOTE — ANESTHESIA CARE TRANSFER NOTE
Patient: Thanh Goodrich    * No procedures listed *    Diagnosis: * No pre-op diagnosis entered *  Diagnosis Additional Information: No value filed.    Anesthesia Type:   General     Note:    Oropharynx: endotracheal tube in place and ventilatory support  Level of Consciousness: iatrogenic sedation      Independent Airway: airway patency not satisfactory and stable  Dentition: dentition unchanged  Vital Signs Stable: post-procedure vital signs reviewed and stable  Report to RN Given: handoff report given  Patient transferred to: ICU  Comments: Diagnosis: Respiratory Insufficiency, Myasthenia Gravis  Procedure: Intubation  Ordering Physician: Miles  Location: [place] [address]        Vitals: (Last set prior to Anesthesia Care Transfer)    Electronically Signed By: ANIYA Dias CRNA  March 6, 2021  5:25 PM

## 2021-03-06 NOTE — ANESTHESIA PROCEDURE NOTES
Airway   Date/Time: 3/6/2021 5:05 PM   Patient location during procedure: ICU  Staff -   Performed By: CRNA    Consent for Airway   Urgency: emergentConsent: No emergent situation. Verbal consent obtained.  Consent given by: patient  Risks and benefits: risks, benefits and alternatives were discussed  Patient identity confirmed: verbally with patient and arm band      Report Obtained from Primary Care Team  History regarding most recent potassium obtained: Yes  History regarding presence/absence of renal failure obtained:Yes  History regarding stroke/CVA obtained:Yes  History regarding presence/absence of NM disorder:Yes    Indications and Patient Condition  Indications for airway management: respiratory insufficiency  Mallampati: Not AssessedInduction type:intravenousMask difficulty assessment: 0 - not attempted    Final Airway Details  Final airway type: endotracheal airway  Successful airway:ETT - single  Endotracheal Airway Details   ETT size (mm): 8.0  Cuffed: yes  Successful intubation technique: video laryngoscopy  Grade View of Cords: 1  Measured from: lips  Secured at (cm): 24  Secured with: commercial tube isaac    Post intubation assessment   Vent settings by primary/ICU team, Primary/ICU team to review CXR, Sedation to be ordered by primary/ICU team, No apparent complications and ETT secured  Placement verified by: capnometry, equal breath sounds and chest rise   Number of attempts at approach: 1  Secured with:commercial tube isaac  Ease of procedure: easy  Dentition: Intact and Unchanged

## 2021-03-06 NOTE — PLAN OF CARE
OT:Orders received and chart reviewed. Per ICU  rounds/nursing, pt. not appropriate for therapy evaluations this date. Will reschedule to 3/7.

## 2021-03-06 NOTE — PROGRESS NOTES
Patient achieved a NIF of -20 with poor pt effort and a FVC of 1000 ml with fair pt effort.  3/6/2021  Keila Maciel, RT

## 2021-03-06 NOTE — PLAN OF CARE
Remains intubated and sedated. Follows commands when sedation is titrated down. Failed pressure support trial this am. Levo infusing at 0.03. Bradycardic overnight. No fevers. Beckett patent with adequate output. Wound cares done per orders. TF increased to 30ml.

## 2021-03-06 NOTE — PROGRESS NOTES
Extubation Note    Successful completion of SBT (Yes or No):Yes. PS 5/5 for about 30 minutes  Extubation time:1038    Patient assessment:  Lung sounds:Clear and diminished  Stridor Present (Yes or No):No  Patient tolerance:Good    Oxygen device:  4L Oxymask  SpO2:97%    Plan:Will cont to monitor.  3/6/2021  Keila Maciel, RT

## 2021-03-06 NOTE — PROGRESS NOTES
Patient was placed on BiPAP 14/7 30% per MD order due to VBG results.  Venous Blood Gas  Recent Labs   Lab 03/06/21  1200 03/04/21  2124   PHV 7.31* 7.08*   PCO2V 66* 127*   PO2V 58* 60*   HCO3V 34* 38*   STARR 5.3 3.3   O2PER  --  Stefano     Gel pad and mepilex in place. Skin intact. Alarm volume set at 10.  Will cont to monitor.  3/6/2021  Keila Maciel, RT

## 2021-03-07 NOTE — PLAN OF CARE
Patient extubated to oxymask at 1038, placed on Bipap due to Co2 retaining at 1400 and electively reintubated by anesthesia at 1705, as per recommendation of neuro critical care and ICU MD. Throughout the day patient's vitals WDL. While extubated, alert and oriented x 4, slightly anxious but pleasant and cooperative. Now sedated on propofol. Put out approximately 800 mL of clear urine after Lasix administration at noon. Bowel sounds active, passing flatus, no BM at this time. New OG tube placed, verified by ICU MD and approved for use post advancement of 8 cm (now at 55 cm at lip). IVIG given, no reaction noted. Cousin Gaby updated over the phone.

## 2021-03-07 NOTE — PLAN OF CARE
SLP: chart reviewed this AM and pt re-intubated/sedated at this time. Will complete orders, please re-consult once extubated and appropriate for swallow evaluation.

## 2021-03-07 NOTE — PLAN OF CARE
Remains intubated and sedated on propofol. Moves all extremities purposefully at times. Opens eyes to stimulation. VSS. TF inceased to 45ml/hr. Wound cares done. Potassium replaced, repeat does and check later this am.

## 2021-03-07 NOTE — PROGRESS NOTES
[facility] ICU RESPIRATORY NOTE           Date of Admission: 3/4/2021     Date of Intubation (most recent): 3/6/2021     Reason for Mechanical Ventilation: Airway Protection     Number of Days on Mechanical Ventilation: 2     Met Criteria for Spontaneous Breathing Trial: Yes - Attempted PS 5/5, pt apnea, failed weaning trial. Switched back to CMV        Significant Events Today: None overnight     ABG Results:   Recent Labs   Lab 03/05/21  0230 03/04/21 2124   PH 7.54*  --    PCO2 36  --    PO2 168*  --    HCO3 31*  --    O2PER  --  Stefano     Ventilation Mode: CMV/AC  (Continuous Mandatory Ventilation/ Assist Control)  FiO2 (%): 40 %  Rate Set (breaths/minute): 16 breaths/min  Tidal Volume Set (mL): 540 mL  PEEP (cm H2O): 5 cmH2O  Pressure Support (cm H2O): 5 cmH2O  Oxygen Concentration (%): 40 %  Resp: 15    MATEO Hartley, RRT

## 2021-03-07 NOTE — PROGRESS NOTES
Patient achieved a NIF of -20 and a VC of 750 ml through the vent. Done with fair pt effort.  3/7/2021  Keila Maciel, RT

## 2021-03-07 NOTE — PROGRESS NOTES
[facility] ICU RESPIRATORY NOTE        Date of Admission: 3/4/2021    Date of Intubation (most recent): 3/6/2021    Reason for Mechanical Ventilation: Airway Protection    Number of Days on Mechanical Ventilation: 1    Met Criteria for Spontaneous Breathing Trial: Yes -       Significant Events Today: Intubated    ABG Results:   Recent Labs   Lab 03/05/21  0230 03/04/21 2124   PH 7.54*  --    PCO2 36  --    PO2 168*  --    HCO3 31*  --    O2PER  --  Stefano       Current Vent Settings: Ventilation Mode: CMV/AC  (Continuous Mandatory Ventilation/ Assist Control)  FiO2 (%): 30 %  Rate Set (breaths/minute): 16 breaths/min  Tidal Volume Set (mL): 540 mL  PEEP (cm H2O): 5 cmH2O  Pressure Support (cm H2O): 5 cmH2O  Oxygen Concentration (%): 50 %  Resp: 15          Plan: continue full vent support    Marsha [place], RT

## 2021-03-07 NOTE — PROGRESS NOTES
"  [facility]    Neuro Critical Care Progress Note    Interval Events  Patient electively reintubated yesterday   NIF of -21 and a VC of 975 ml this AM   Comfortable in bed today     Impression  #Myasthenia Gravis Exacerbation          Recommendations  Continue prednisolone 60 mg x7 days (start date 3/5/21) and wean by 10 mg every week.    This order has been placed for you, he will need to follow up with his neuromuscular specialist after he is discharge from the hospital   Continue mycophenolate 1000 mg AM/500 mg PM   Continue IVIG 400 mg/kg daily, day 3 of 5  NIF/VC BID to assess response     We will follow his NIF/VC to assess when he is safe to extubate, if he is treading in the right direction over the next several days base on his NIF/VC, he may be safe to extubate.     Preferably if his NIF/VC is treading closer to -30 and 2L, as of now he is still at the cutoff for elective intubation at approximately -20/1L.        The Stroke Staff is Dr. Harvey, Isak Francis MD.    Glenn Sargent MD  Vascular Neurology Fellow  To page me or covering stroke neurology team member, click here: AMCOM   Choose \"On Call\" tab at top, then search dropdown box for \"Neurology Adult\", select location, press Enter, then look for stroke/neuro ICU/telestroke.    ______________________________________________________    Medications   Home Meds  Prior to Admission medications    Medication Sig Start Date End Date Taking? Authorizing Provider   acetaminophen (TYLENOL) 500 MG tablet Take 500-1,000 mg by mouth every 6 hours as needed for mild pain or pain   Yes Unknown, Entered By History   calcium carbonate 600 mg-vitamin D 400 units (CALTRATE) 600-400 MG-UNIT per tablet Take 1 tablet by mouth daily   Yes Unknown, Entered By History   Cholecalciferol 100 MCG (4000 UT) CAPS Take 4,000 Units by mouth daily   Yes Unknown, Entered By History   CHROMIUM PICOLINATE Take 1 tablet by mouth daily.     Yes Unknown, Entered " By History   losartan (COZAAR) 50 MG tablet Take 1 tablet (50 mg) by mouth daily 10/8/20  Yes Martinez Toledo MD   mycophenolate (GENERIC EQUIVALENT) 500 MG tablet Take 1,000 mg by mouth every morning   Yes Unknown, Entered By History   mycophenolate (GENERIC EQUIVALENT) 500 MG tablet Take 500 mg by mouth every evening   Yes Unknown, Entered By History   Multiple Vitamins-Minerals (MULTIVITAMIN ADULTS 50+) TABS Take 1 tablet by mouth daily     Reported, Patient       Scheduled Meds    chlorhexidine  15 mL Mouth/Throat Q12H     enoxaparin ANTICOAGULANT  40 mg Subcutaneous Q24H     famotidine  20 mg Intravenous Q12H     immune globulin - sucrose free  30 g Intravenous Once     [START ON 3/8/2021] immune globulin - sucrose free  30 g Intravenous Once     [START ON 3/9/2021] immune globulin - sucrose free  30 g Intravenous Once     [Held by provider] losartan  50 mg Oral Daily     mycophenolate  1,000 mg Oral or Feeding Tube QAM     mycophenolate  500 mg Oral or Feeding Tube QPM     predniSONE  60 mg Oral Daily     sodium chloride 0.9 %  100 mL Intravenous Once       Infusion Meds    Continuing ACE inhibitor/ARB/ARNI from home medication list OR ACE inhibitor/ARB order already placed during this visit       fentaNYL Stopped (03/06/21 1000)     - MEDICATION INSTRUCTIONS -       norepinephrine Stopped (03/06/21 0757)     propofol (DIPRIVAN) infusion Stopped (03/07/21 0930)     BETA BLOCKER NOT PRESCRIBED       sodium chloride 10 mL/hr at 03/06/21 0800       PRN Meds  acetaminophen, acetaminophen, artificial tears ophthalmic solution, Continuing ACE inhibitor/ARB/ARNI from home medication list OR ACE inhibitor/ARB order already placed during this visit, glucose **OR** dextrose **OR** glucagon, diphenhydrAMINE **OR** diphenhydrAMINE, diphenhydrAMINE, EPINEPHrine, famotidine, fentaNYL, HOLD MEDICATION, lidocaine 4%, lidocaine (buffered or not buffered), melatonin, methylPREDNISolone, miconazole, naloxone **OR** naloxone  **OR** naloxone **OR** naloxone, - MEDICATION INSTRUCTIONS -, ondansetron **OR** ondansetron, polyethylene glycol, prochlorperazine **OR** prochlorperazine **OR** prochlorperazine, propofol (DIPRIVAN) infusion **AND** propofol **AND** CK total **AND** Triglycerides, BETA BLOCKER NOT PRESCRIBED, sodium chloride (PF), sodium chloride (PF)       PHYSICAL EXAMINATION  Temp:  [97.7  F (36.5  C)-99  F (37.2  C)] 98.2  F (36.8  C)  Pulse:  [] 64  Resp:  [9-27] 16  BP: ()/(56-75) 112/56  FiO2 (%):  [30 %-50 %] 30 %  SpO2:  [93 %-100 %] 96 %     Neurologic  Mental Status:  alert, oriented x 3, follows commands.   Cranial Nerves:  visual fields intact, PERRL, EOMI with normal smooth pursuit, facial sensation intact and symmetric, facial movements symmetric, hearing not formally tested but intact to conversation, palate elevation symmetric and uvula midline, shoulder shrug strong bilaterally, tongue protrusion midline, weak tongue thrust and cough.   Neck: -4/5 strength  Motor:  normal muscle tone and bulk, no abnormal movements, able to move all limbs spontaneously, no pronator drift, positive arm pumping test bilaterally   Reflexes:  toes down-going  Sensory:  light touch sensation intact and symmetric throughout upper and lower extremities, no extinction on double simultaneous stimulation   Coordination:  normal finger-to-nose and heel-to-shin bilaterally without dysmetria  Station/Gait:  deferred       Imaging  I personally reviewed all imaging; relevant findings per HPI.     Lab Results Data   CBC  Recent Labs   Lab 03/07/21  0500 03/06/21  0455 03/05/21  0450   WBC 5.5 7.5 9.9   RBC 4.32* 4.38* 4.42   HGB 11.2* 11.7* 11.6*   HCT 36.0* 37.5* 39.2*    218 233     Basic Metabolic Panel    Recent Labs   Lab 03/07/21  0500 03/06/21  0455 03/05/21  0450    134 134   POTASSIUM 2.8* 3.8 3.6   CHLORIDE 97 98 96   CO2 34* 27 29   BUN 22 21 31*   CR 0.71 0.72 1.14   * 120* 88   NOAH 8.5 8.6 8.5      Liver Panel  Recent Labs   Lab 03/04/21  0320   PROTTOTAL 6.8   ALBUMIN 3.6   BILITOTAL 0.7   ALKPHOS 69   AST 44   ALT 35     INR  No lab results found.   Lipid Profile    Recent Labs   Lab Test 12/16/20  1455 07/05/19  1558 08/08/18  1245 11/22/13  1523 11/22/13  1523   CHOL 141 174 175   < > 196   HDL 70 57 53   < > 61   LDL 60 105* 104*   < > 117   TRIG 55 59 90   < > 88   CHOLHDLRATIO  --   --   --   --  3.2    < > = values in this interval not displayed.     A1C  No lab results found.  Troponin I    Recent Labs   Lab 03/04/21  1629 03/04/21  1235 03/04/21  0320   TROPI <0.015 <0.015 <0.015

## 2021-03-07 NOTE — PROGRESS NOTES
RT was called to bedside by the Neuro team for NIF and VC. Pt achieved a NIF of -21 and a VC of 975 ml through the vent. Both done with fair pt effort.  Will cont to monitor.  3/7/2021  Keila Maciel RT

## 2021-03-07 NOTE — PROGRESS NOTES
[facility] ICU RESPIRATORY NOTE        Date of Admission: 3/4/2021    Date of Intubation (most recent):3/6/2021    Reason for Mechanical Ventilation:Airway protection    Number of Days on Mechanical Ventilation:2    Met Criteria for Spontaneous Breathing Trial:No    Reason for No Spontaneous Breathing Trial:Per MD     Significant Events Today:NIF/VC done through the vent twice throughout the day. See previous notes for details    ABG Results:   Recent Labs   Lab 03/05/21  0230 03/04/21 2124   PH 7.54*  --    PCO2 36  --    PO2 168*  --    HCO3 31*  --    O2PER  --  Stefano       Current Vent Settings: Ventilation Mode: CMV/AC  (Continuous Mandatory Ventilation/ Assist Control)  FiO2 (%): 30 %  Rate Set (breaths/minute): 14 breaths/min  Tidal Volume Set (mL): 540 mL  PEEP (cm H2O): 5 cmH2O  Pressure Support (cm H2O): 5 cmH2O  Oxygen Concentration (%): 30 %  Resp: 16      Plan:Will cont full vent support for now and will assess for weaning readiness daily.    Keila Maciel, RT

## 2021-03-07 NOTE — PROGRESS NOTES
"SPIRITUAL HEALTH SERVICES Progress Note  [facility] ICU    The \"yes\" column was marked for pt and after reading chart note I placed a call to pt's nurse to find out if pt was available for visit from  and they reported that as pt is intubated, a visit today is not appropriate.     I am noting this in the chart and updating the request for triage tomorrow by unit chaplain Sinclair.      Lorie Carolina  Chaplain Resident    "

## 2021-03-07 NOTE — PROGRESS NOTES
"ICU Progress Note    Subjective  Electively intubated yesterday afternoon. Remains stable overnight. Sedated on propofol this AM.     Objective  /56   Pulse 64   Temp 98.2  F (36.8  C)   Resp 16   Ht 1.778 m (5' 10\")   Wt 144 kg (317 lb 7.4 oz)   SpO2 97%   BMI 45.55 kg/m    FiO2 40%  General: Laying in bed, comfortable  HEENT: No scleral icterus, pupils 4mm and reactive  Heart: Normal rate, regular rhythm  Lungs: Clear bilaterally  Neuro: Sedated, not following commands    Labs reviewed    Cr 0.71  WBC 5.5 Hgb 11.2 Plt 175    Assessment and Plan  Thanh Goodrich is a 80 year old male with history of myasthenia gravis admitted on 3/4/2021 for generalized weakness and found to be in myasthenia gravis exacerbation.      1.Neurology/Pulmonary  Myasthenia Gravis Exacerbation  --Extubated 3/7 but with NIF of -20 and FVC of 1000ml afterwards. Discussed with neurology who anticipates it may be several days prior to resolution of exacerbation. Discussed with patient the option of elective intubation vs continuous BiPAP and patient prefers to be reintubated.   --Reintubated electively 3/7 afternoon.  --Plan to wean propofol today with goal of having patient awake on ventilator if he tolerates.   --Appreciate neurology recs: Continue IVIG, MMF, steroids.   --Decrease RR today for respiratory alkalosis.     2. Cardiovascular: Hypotensive on admission, but now resolved.   - Holding home ARB     3. GI and Nutrition :   Continue tube feeds.     4. Renal/Fluids/Electrolytes:   RAQUEL on admission, now improved. Hold on further lasix.     5. Endocrine: No acute issues     6. Hematology/Oncology: No acute issues.     IV/Access: right IJ  Prophylaxis: pepcid and enoxaparin    FULL CODE  Cousin Gaby updated by phone.    Gaby Aquino MD  Critical Care Staff    Critical Care Time: 35 minutes  "

## 2021-03-08 NOTE — PROGRESS NOTES
"SPIRITUAL HEALTH SERVICES Progress Note  [facility] ICU    Referral Source: Hospital  Request    PT (\"Rigoberto\") awake and alert and able to communicate by writing. Rigoberto indicated that he is a \"very strong Quaker\" and has recently attended [employer] services. Rigoberto stated that he finds readings from scripture to be helpful and takes a particular interest in the Gospel of [employer].    Provided emotional and spiritual support through prayer and active listening to PT's values and concerns. Per request, provided spiritual materials for PT to read and reflect on.    PT aware of the availability of  services by request. PT requests that he be followed by [facility] for the remainder of his stay in hospital.    Follow-up with PT in 1-2 days.      Isai Linares  Chaplain Resident   "

## 2021-03-08 NOTE — PROGRESS NOTES
[facility] ICU RESPIRATORY NOTE           Date of Admission: 3/4/2021     Date of Intubation (most recent): 3/6/21     Reason for Mechanical Ventilation: Airway protection     Number of Days on Mechanical Ventilation: 3     Met Criteria for Spontaneous Breathing Trial: Yes     Reason for No Spontaneous Breathing Trial:      Significant Events Today: none overnight     ABG Results:   Recent Labs   Lab 03/05/21  0230 03/04/21  2124   PH 7.54*  --    PCO2 36  --    PO2 168*  --    HCO3 31*  --    O2PER  --  Stefano     Ventilation Mode: CMV/AC  (Continuous Mandatory Ventilation/ Assist Control)  FiO2 (%): 30 %  Rate Set (breaths/minute): 14 breaths/min  Tidal Volume Set (mL): 540 mL  PEEP (cm H2O): 5 cmH2O  Oxygen Concentration (%): 30 %  Resp: 14    MATEO Hartley, RRT

## 2021-03-08 NOTE — PLAN OF CARE
Patient remains intubated, however off sedation and without restraints. Alert and appears oriented (communicates by writing). Anxious and unable to relax / rest; Frequently expresses concern. Vital signs WDL. Moderate thick ETT secretions noted. Urine output adequate; Bowel sounds hypoactive, no BM. Roby Griffin at bedside this afternoon, updated; Addressed the concerns patient had from outside of the hospital.

## 2021-03-08 NOTE — PROGRESS NOTES
[facility] ICU RESPIRATORY NOTE        Date of Admission: 3/4/2021    Date of Intubation (most recent): 3/6/21    Reason for Mechanical Ventilation: airway protection    Number of Days on Mechanical Ventilation: 3    Met Criteria for Spontaneous Breathing Trial: YES    Reason for No Spontaneous Breathing Trial:  Nif and VC not adequate per Neuro MD     Significant Events Today: NIF -27 VC 1000ml    ABG Results:   Recent Labs   Lab 03/05/21  0230 03/04/21  2124   PH 7.54*  --    PCO2 36  --    PO2 168*  --    HCO3 31*  --    O2PER  --  Stefano       Current Vent Settings: Ventilation Mode: CMV/AC  (Continuous Mandatory Ventilation/ Assist Control)  FiO2 (%): 30 %  Rate Set (breaths/minute): 14 breaths/min  Tidal Volume Set (mL): 540 mL  PEEP (cm H2O): 5 cmH2O  Pressure Support (cm H2O): 5 cmH2O  Oxygen Concentration (%): 30 %  Resp: 16      Skin Assessment: no breakdown, skin intact     Plan: NIF and VC BID wean tomorrow and plan to liberate.    PAUL MILLER, RT

## 2021-03-08 NOTE — PLAN OF CARE
PT: Orders received and chart reviewed. Pt has just finished with OT. Per discussion with RN, appropriate to hold therapy today as out of bed mobility is not tolerated at this time. Will reschedule therapy evaluation.

## 2021-03-08 NOTE — PROGRESS NOTES
03/08/21 0957   Quick Adds   Type of Visit Initial Occupational Therapy Evaluation   Living Environment   People in home alone   Current Living Arrangements apartment  (4th floor apt, pt has elevator)   Home Accessibility no concerns   Transportation Anticipated car, drives self   Living Environment Comments laundry in unit per pt   Self-Care   Usual Activity Tolerance moderate   Current Activity Tolerance fair   Regular Exercise No   Equipment Currently Used at Home walker, rolling   Activity/Exercise/Self-Care Comment pt intubated, off restraints.  Interactiing through writing responses   Disability/Function   Hearing Difficulty or Deaf no   Wear Glasses or Blind yes   Vision Management glasses   Concentrating, Remembering or Making Decisions Difficulty no   Difficulty Communicating no   Difficulty Eating/Swallowing yes   Eating/Swallowing eating   Walking or Climbing Stairs Difficulty yes   Walking or Climbing Stairs ambulation difficulty, requires equipment   Mobility Management walker   Dressing/Bathing Difficulty no   Toileting issues no   Doing Errands Independently Difficulty (such as shopping) yes   Fall history within last six months yes   Number of times patient has fallen within last six months 1   General Information   Onset of Illness/Injury or Date of Surgery 03/04/21   Referring Physician Belia Menjivar, CNP   Patient/Family Therapy Goal Statement (OT) return home, get stronger   Additional Occupational Profile Info/Pertinent History of Current Problem Pt is a 80 year old male admitted [facility] onsent of gradual generalized weakness, hypercapnic respirtory failure.  Intubated with restraints now alert and off restraintss.  PMH includes myasthenia gravis, HTN, HL   Performance Patterns (Routines, Roles, Habits) Pt gets microwavable meals, states he otherwise does his basic ADLs at baseline   Existing Precautions/Restrictions fall;other (see comments)  (intubated)   General Observations and Info pt in  bed, alert and responding.  Has activity orders but nursing asked to go easy with his LEs as they were painful   Cognitive Status Examination   Orientation Status person  (partial place and time)   Affect/Mental Status (Cognitive) WFL   Follows Commands follows one-step commands;over 90% accuracy   Pain Assessment   Patient Currently in Pain Yes, see Vital Sign flowsheet   Range of Motion Comprehensive   General Range of Motion no range of motion deficits identified   Comment, General Range of Motion limitations on left side secondary to vent   Strength Comprehensive (MMT)   General Manual Muscle Testing (MMT) Assessment no strength deficits identified   Comment, General Manual Muscle Testing (MMT) Assessment pt appears to have fair to good UE strength, unable to do a full MMT test with him   Coordination   Upper Extremity Coordination No deficits were identified   Clinical Impression   Criteria for Skilled Therapeutic Interventions Met (OT) yes   OT Diagnosis decreased independence and endurance in ADLS   OT Problem List-Impairments impacting ADL problems related to;activity tolerance impaired;strength;post-surgical precautions   Assessment of Occupational Performance 5 or more Performance Deficits   Identified Performance Deficits decreased endruance for dressing, bathing, functional mobility, cooking, household tasks   Planned Therapy Interventions (OT) ADL retraining;ROM;strengthening;home program guidelines;progressive activity/exercise   Clinical Decision Making Complexity (OT) moderate complexity   Therapy Frequency (OT) Daily   Predicted Duration of Therapy 7 days   Risk & Benefits of therapy have been explained evaluation/treatment results reviewed;care plan/treatment goals reviewed;patient   OT Discharge Planning    OT Discharge Recommendation (DC Rec) Transitional Care Facility   OT Rationale for DC Rec Pt currently has limited mobility secondary to his currentl situation and level of care needs in ICU.   General weakness and debility.  If pt can get off vent and be more active he may progress to be able to return home with home therapy.  Pt appears to be getting meals but otherwise not much other, may need to look at additional services if he becomes able to go home.   OT Brief overview of current status  Pt has good ROM of UEs and fair to good strength.  May be able to move to chair once nursing gives the okay   Total Evaluation Time (Minutes)   Total Evaluation Time (Minutes) 15

## 2021-03-08 NOTE — PROGRESS NOTES
Spontaneous Breathing Trial    Criteria met for SBT (Y/N): Y    Settings:  5/5     Measured Parameters:    RR:10  Tidal volume:450  RSBI: 22          Duration of SBT: 30min

## 2021-03-08 NOTE — PROGRESS NOTES
CLINICAL NUTRITION SERVICES - REASSESSMENT NOTE      Malnutrition: (3/5)  % Weight Loss:  None noted  % Intake:  </= 50% for >/= 5 days (severe malnutrition) - On 3/6  Subcutaneous Fat Loss:  None observed  Muscle Loss:  Temporal region mild depletion and Clavicle bone region mild depletion  Fluid Retention:  Mild      Malnutrition Diagnosis: Non-Severe malnutrition  In Context of:  Acute illness or injury  Chronic illness or disease       EVALUATION OF PROGRESS TOWARD GOALS   Diet:  NPO on vent     Nutrition Support:  Patient started on TF 3/5, advanced to goal on 3/7, and continues as follows ~    Nutrition Support Enteral:  Type of Feeding Tube: OG  Enteral Frequency:  Continuous  Enteral Regimen: Peptamen Intense VHP at 60 mL/hr  Total Enteral Provisions: 1440 kcal (11 kcal/kg), 132 g protein (1.8 g/kg), 1210 mL H2O, 6 g fiber, 109 g CHO  Free Water Flush: 60 mL every 4 hours       Intake/Tolerance:    K and Mg normal  Phos 1.5 (L) - Pharm mentioned to MD during rounds   BGM < 150   I/O 2311/880, wt 139.7 kg (up from dosing weight)  No stool since admit       ASSESSED NUTRITION NEEDS:  Dosing Weight:  130.2 kg (UBW for energy), 75.5 kg (IBW for pro)   Estimated Energy Needs:  1499-0347 kcals (11-14 Kcal/Kg)  Justification: obese and vented  Estimated Protein Needs:  113-151 grams protein (1.5-2 g pro/Kg)  Justification: repletion, hypercatabolism with critical illness and obesity guidelines       NEW FINDINGS:   Propofol off 3/7    3/5:  AXR = Orogastric tube tip extends to the region of the gastric body (OG)   3/6:  Extubated -->Bipap --> Re-intubated   3/6:  OG tube placed     Previous Goals (3/5):   TF + Propofol will meet % estimated needs  Evaluation: Met    Previous Nutrition Diagnosis (3/5):   Inadequate protein-energy intake related to intubation as evidenced by limited nutritional intake over past 4 days, Propofol meeting 32% energy and 0% protein needs, and TF planned  Evaluation: Resolved        CURRENT NUTRITION DIAGNOSIS  No nutrition diagnosis identified at this time     INTERVENTIONS  Recommendations / Nutrition Prescription  Continue Peptamen Intense VHP at 60 mL/hr as above     Implementation  Collaboration and Referral of Nutrition care:  Patient discussed today during interdisciplinary bedside rounds     Goals  Peptamen Intense VHP at 60 mL/hr will continue to meet % needs     MONITORING AND EVALUATION:  Progress towards goals will be monitored and evaluated per protocol and Practice Guidelines    Zarina Rivera RD, LD, CNSC   Clinical Dietitian - [facility]

## 2021-03-08 NOTE — PROGRESS NOTES
[facility] ICU RESPIRATORY NOTE        Date of Admission: 3/4/2021    Date of Intubation (most recent): 3/6/21    Reason for Mechanical Ventilation: Airway protection    Number of Days on Mechanical Ventilation: 2    Met Criteria for Spontaneous Breathing Trial: No    Reason for No Spontaneous Breathing Trial: per MD    Significant Events Today: none    ABG Results:   Recent Labs   Lab 03/05/21  0230 03/04/21  2124   PH 7.54*  --    PCO2 36  --    PO2 168*  --    HCO3 31*  --    O2PER  --  Stefano       Current Vent Settings: Ventilation Mode: CMV/AC  (Continuous Mandatory Ventilation/ Assist Control)  FiO2 (%): 30 %  Rate Set (breaths/minute): 14 breaths/min  Tidal Volume Set (mL): 540 mL  PEEP (cm H2O): 5 cmH2O  Pressure Support (cm H2O): 5 cmH2O  Oxygen Concentration (%): 40 %  Resp: 15          Plan: continue full vent support    Marsha Joseph, RT

## 2021-03-08 NOTE — PROGRESS NOTES
"  [facility]    Neuro Critical Care Progress Note    Interval Events  No acute overnight events   NIF -27, VC 1L this AM   Patient appears comfortable.     Impression  #Myasthenia Gravis Exacerbation          Recommendations  Continue prednisolone 60 mg x7 days (start date 3/5/21) and wean by 10 mg every week.    This order has been placed for you, he will need to follow up with his primary neurologist after he is discharge from the hospital    Will reach out to his primary neurology to see if he would like to make any changes to his MG regimen.   Continue mycophenolate 1000 mg AM/500 mg PM   Continue IVIG 400 mg/kg daily, day 4 of 5  Daily NIF/VC     If tomorrow the patient NIF/VC continues to improve, we will consider extubation tomorrow.       The Stroke Staff is STAR Ely MD  Vascular Neurology Fellow  To page me or covering stroke neurology team member, click here: AMCOM   Choose \"On Call\" tab at top, then search dropdown box for \"Neurology Adult\", select location, press Enter, then look for stroke/neuro ICU/telestroke.    ______________________________________________________    Medications   Home Meds  Prior to Admission medications    Medication Sig Start Date End Date Taking? Authorizing Provider   acetaminophen (TYLENOL) 500 MG tablet Take 500-1,000 mg by mouth every 6 hours as needed for mild pain or pain   Yes Unknown, Entered By History   calcium carbonate 600 mg-vitamin D 400 units (CALTRATE) 600-400 MG-UNIT per tablet Take 1 tablet by mouth daily   Yes Unknown, Entered By History   Cholecalciferol 100 MCG (4000 UT) CAPS Take 4,000 Units by mouth daily   Yes Unknown, Entered By History   CHROMIUM PICOLINATE Take 1 tablet by mouth daily.     Yes Unknown, Entered By History   losartan (COZAAR) 50 MG tablet Take 1 tablet (50 mg) by mouth daily 10/8/20  Yes Martinez Toledo MD   mycophenolate (GENERIC EQUIVALENT) 500 MG tablet Take 1,000 mg by " mouth every morning   Yes Unknown, Entered By History   mycophenolate (GENERIC EQUIVALENT) 500 MG tablet Take 500 mg by mouth every evening   Yes Unknown, Entered By History   Multiple Vitamins-Minerals (MULTIVITAMIN ADULTS 50+) TABS Take 1 tablet by mouth daily     Reported, Patient       Scheduled Meds    chlorhexidine  15 mL Mouth/Throat Q12H     enoxaparin ANTICOAGULANT  40 mg Subcutaneous Q24H     famotidine  20 mg Intravenous Q12H     immune globulin - sucrose free  30 g Intravenous Once     [START ON 3/9/2021] immune globulin - sucrose free  30 g Intravenous Once     [Held by provider] losartan  50 mg Oral Daily     mycophenolate  1,000 mg Oral or Feeding Tube QAM     mycophenolate  500 mg Oral or Feeding Tube QPM     [START ON 3/26/2021] predniSONE  10 mg Oral or Feeding Tube Daily     [START ON 3/19/2021] predniSONE  20 mg Oral or Feeding Tube Daily     [START ON 3/12/2021] predniSONE  40 mg Oral or Feeding Tube Daily     [START ON 3/9/2021] predniSONE  60 mg Oral or Feeding Tube Daily     sodium chloride 0.9 %  100 mL Intravenous Once       Infusion Meds    Continuing ACE inhibitor/ARB/ARNI from home medication list OR ACE inhibitor/ARB order already placed during this visit       fentaNYL Stopped (03/06/21 1000)     - MEDICATION INSTRUCTIONS -       norepinephrine Stopped (03/06/21 0757)     propofol (DIPRIVAN) infusion Stopped (03/07/21 0930)     BETA BLOCKER NOT PRESCRIBED       sodium chloride 10 mL/hr at 03/07/21 1400       PRN Meds  acetaminophen, acetaminophen, artificial tears ophthalmic solution, Continuing ACE inhibitor/ARB/ARNI from home medication list OR ACE inhibitor/ARB order already placed during this visit, glucose **OR** dextrose **OR** glucagon, diphenhydrAMINE **OR** diphenhydrAMINE, diphenhydrAMINE, EPINEPHrine, famotidine, fentaNYL, HOLD MEDICATION, lidocaine 4%, lidocaine (buffered or not buffered), melatonin, methylPREDNISolone, miconazole, naloxone **OR** naloxone **OR** naloxone  **OR** naloxone, - MEDICATION INSTRUCTIONS -, ondansetron **OR** ondansetron, phenol, polyethylene glycol, prochlorperazine **OR** prochlorperazine **OR** prochlorperazine, propofol (DIPRIVAN) infusion **AND** propofol **AND** CK total **AND** Triglycerides, BETA BLOCKER NOT PRESCRIBED, sodium chloride, sodium chloride (PF), sodium chloride (PF)       PHYSICAL EXAMINATION  Temp:  [98.4  F (36.9  C)-99.9  F (37.7  C)] 98.8  F (37.1  C)  Pulse:  [62-96] 76  Resp:  [10-48] 16  BP: (106-151)/() 130/78  FiO2 (%):  [30 %] 30 %  SpO2:  [94 %-99 %] 98 %     Neurologic  Mental Status:  alert, oriented x 3, follows commands. Comfortable  Cranial Nerves:  visual fields intact, PERRL, EOMI with normal smooth pursuit, facial sensation intact and symmetric, facial movements symmetric, hearing not formally tested but intact to conversation, palate elevation symmetric and uvula midline, shoulder shrug strong bilaterally, tongue protrusion midline, weak tongue thrust and cough.   Neck: -4/5 strength  Motor:  normal muscle tone and bulk, no abnormal movements, able to move all limbs spontaneously, no pronator drift, positive arm pumping test bilaterally   Reflexes:  toes down-going  Sensory:  light touch sensation intact and symmetric throughout upper and lower extremities, no extinction on double simultaneous stimulation   Coordination:  normal finger-to-nose and heel-to-shin bilaterally without dysmetria  Station/Gait:  deferred       Imaging  I personally reviewed all imaging; relevant findings per HPI.     Lab Results Data   CBC  Recent Labs   Lab 03/08/21  0429 03/07/21  0500 03/06/21  0455   WBC 6.8 5.5 7.5   RBC 4.41 4.32* 4.38*   HGB 11.3* 11.2* 11.7*   HCT 37.2* 36.0* 37.5*    175 218     Basic Metabolic Panel    Recent Labs   Lab 03/08/21  0429 03/07/21  2210 03/07/21  1225 03/07/21  0500 03/06/21  0455     --   --  138 134   POTASSIUM 3.8 3.7 3.8 2.8* 3.8   CHLORIDE 99  --   --  97 98   CO2 35*  --   --   34* 27   BUN 29  --   --  22 21   CR 0.65*  --   --  0.71 0.72   *  --   --  102* 120*   NOAH 8.7  --   --  8.5 8.6     Liver Panel  Recent Labs   Lab 03/04/21  0320   PROTTOTAL 6.8   ALBUMIN 3.6   BILITOTAL 0.7   ALKPHOS 69   AST 44   ALT 35     INR  No lab results found.   Lipid Profile    Recent Labs   Lab Test 12/16/20  1455 07/05/19  1558 08/08/18  1245 11/22/13  1523 11/22/13  1523   CHOL 141 174 175   < > 196   HDL 70 57 53   < > 61   LDL 60 105* 104*   < > 117   TRIG 55 59 90   < > 88   CHOLHDLRATIO  --   --   --   --  3.2    < > = values in this interval not displayed.     A1C  No lab results found.  Troponin I    Recent Labs   Lab 03/04/21  1629 03/04/21  1235 03/04/21  0320   TROPI <0.015 <0.015 <0.015

## 2021-03-09 NOTE — PROGRESS NOTES
"ICU Progress Note    Subjective  Electively intubated for myasthenia exacerbation. Remained stable overnight with throat pain 2/2 ETT and frontal headache. Tx'ed with Tylenol and x1 Fentanyl dose. Did an hour on pressure support but was placed back on full breathing support 2/2 low volumes.    Objective  /74   Pulse 128   Temp 98.8  F (37.1  C) (Bladder)   Resp 27   Ht 1.778 m (5' 10\")   Wt 144.1 kg (317 lb 10.9 oz)   SpO2 97%   BMI 45.58 kg/m    FiO2 40%  General: Lying in bed, interactive  HEENT: No scleral icterus, EOMs grossly intact   Heart: RRR, normal S1 and S2, no mumurs appreciated  GI: obese  Lungs: clear to auscultation bilaterally  Neuro: Awake, alert, writes questions on notepad, responsive to concerns   Legs: +1 pitting edema below knee    Labs reviewed    Results for orders placed or performed during the hospital encounter of 03/04/21 (from the past 24 hour(s))   CBC with platelets   Result Value Ref Range    WBC 7.2 4.0 - 11.0 10e9/L    RBC Count 4.45 4.4 - 5.9 10e12/L    Hemoglobin 11.6 (L) 13.3 - 17.7 g/dL    Hematocrit 37.9 (L) 40.0 - 53.0 %    MCV 85 78 - 100 fl    MCH 26.1 (L) 26.5 - 33.0 pg    MCHC 30.6 (L) 31.5 - 36.5 g/dL    RDW 15.1 (H) 10.0 - 15.0 %    Platelet Count 172 150 - 450 10e9/L   Basic metabolic panel   Result Value Ref Range    Sodium 136 133 - 144 mmol/L    Potassium 3.9 3.4 - 5.3 mmol/L    Chloride 100 94 - 109 mmol/L    Carbon Dioxide 35 (H) 20 - 32 mmol/L    Anion Gap 1 (L) 3 - 14 mmol/L    Glucose 102 (H) 70 - 99 mg/dL    Urea Nitrogen 35 (H) 7 - 30 mg/dL    Creatinine 0.59 (L) 0.66 - 1.25 mg/dL    GFR Estimate >90 >60 mL/min/[1.73_m2]    GFR Estimate If Black >90 >60 mL/min/[1.73_m2]    Calcium 8.7 8.5 - 10.1 mg/dL   EKG 12-lead, tracing only   Result Value Ref Range    Interpretation ECG Click View Image link to view waveform and result      Assessment and Plan  Thanh Goodrich is a 80 year old male with history of myasthenia gravis admitted on 3/4/2021 for " generalized weakness and found to be in myasthenia gravis exacerbation. Due to new onset tachycardia and low tidal volume (827), will continue on ventilator 3/9, to re-evaluate for possible extubation tmrw 3/10.      1.Neurology/Pulmonary  Myasthenia Gravis Exacerbation  --Re-intubated electively 3/7. Today NIF -26, Vt 827. Neuro ok with waiting til 3/10 to consider extubation due to new-onset tachycardia and low tidal volumes.  -Continue IVIG (day 5/5), MMF, steroids dejuan neuro recs.   -Tylenol q6h, Fentanyl PRN for pain associated w/ ETT     2. Cardiovascular: Hypotensive on admission, but now resolved. Now tachycardic in the ~120s. No fever, CXR clearer than previously. Possibly intravascularly depleted.  - Restart home Losartan 50 mg every day  -Check Total Protein. Consider 1x Lasix if normal, albumin if low.      3. GI and Nutrition :   -Continue tube feeds  -Restart home vitamins     4. Renal/Fluids/Electrolytes:RAQUEL on admission, now improved. Now tachycardic in the ~120s. No fever, CXR clearer than previously. Possibly intravascularly depleted with third-spacing. Pt reports previously being on HCTZ 25 mg before he self-stopped taking it.   -Check total protein. Consider 1x Lasix if normal, albumin if low.     5. Endocrine: No acute issues     6. Hematology/Oncology: No acute issues.    IV/Access: right IJ  Prophylaxis: pepcid and enoxaparin    FULL CODE  Roby Griffin updated by phone.    Critical Care Time: 35 minutes    Kathy Morales, MS4    I acted as scribe for Dr. Fan Singh.    ATTENDING PHYSICIAN:    I reviewed the patient's history, obtained additional history, conducted an examination to confirm key findings and reviewed the studies and labs. Discussed the case with Kathy Morales and agree with the findings and plan as outlined in her note as written.   I personally spent 35 minutes of critical care time reviewing labs and imaging, examining the patient, and managing the ventilator. This does  not include time spent on procedures or teaching. Rigoberto is critically ill based on: respiratory failure due to myasthenia gravis exacerbation.      Fan Singh MD  Pager [phone]

## 2021-03-09 NOTE — PROGRESS NOTES
"ICU Progress Note    Subjective  Electively intubated for myasthenia exacerbation. Remains stable overnight. Sedated on propofol this AM, but awakened and cooperative on spontaneous breathing trial.     Objective  /70   Pulse 61   Temp 98.4  F (36.9  C)   Resp 11   Ht 1.778 m (5' 10\")   Wt 139.7 kg (308 lb)   SpO2 98%   BMI 44.19 kg/m    FiO2 40%  General: Laying in bed, comfortable  HEENT: No scleral icterus, pupils 4mm and reactive  Heart: Normal rate, regular rhythm  Lungs: Clear bilaterally  Neuro: Sedated, not following commands    Labs reviewed    Results for orders placed or performed during the hospital encounter of 03/04/21 (from the past 24 hour(s))   CBC with platelets   Result Value Ref Range    WBC 7.2 4.0 - 11.0 10e9/L    RBC Count 4.45 4.4 - 5.9 10e12/L    Hemoglobin 11.6 (L) 13.3 - 17.7 g/dL    Hematocrit 37.9 (L) 40.0 - 53.0 %    MCV 85 78 - 100 fl    MCH 26.1 (L) 26.5 - 33.0 pg    MCHC 30.6 (L) 31.5 - 36.5 g/dL    RDW 15.1 (H) 10.0 - 15.0 %    Platelet Count 172 150 - 450 10e9/L   Basic metabolic panel   Result Value Ref Range    Sodium 136 133 - 144 mmol/L    Potassium 3.9 3.4 - 5.3 mmol/L    Chloride 100 94 - 109 mmol/L    Carbon Dioxide 35 (H) 20 - 32 mmol/L    Anion Gap 1 (L) 3 - 14 mmol/L    Glucose 102 (H) 70 - 99 mg/dL    Urea Nitrogen 35 (H) 7 - 30 mg/dL    Creatinine 0.59 (L) 0.66 - 1.25 mg/dL    GFR Estimate >90 >60 mL/min/[1.73_m2]    GFR Estimate If Black >90 >60 mL/min/[1.73_m2]    Calcium 8.7 8.5 - 10.1 mg/dL         Assessment and Plan  Thanh Goodrich is a 80 year old male with history of myasthenia gravis admitted on 3/4/2021 for generalized weakness and found to be in myasthenia gravis exacerbation.      1.Neurology/Pulmonary  Myasthenia Gravis Exacerbation  --Extubated 3/7 but with NIF of -27 and FVC of 1000ml today. Discussed with neurology who anticipates it may be several days prior to resolution of exacerbation. Possible extubation tomorrow.    --Reintubated " electively 3/7 afternoon.  --Plan to wean propofol again today with goal of having patient awake on ventilator if he tolerates.   --Appreciate neurology recs: Continue IVIG, MMF, steroids.   --Decrease RR today for respiratory alkalosis.     2. Cardiovascular: Hypotensive on admission, but now resolved.   - Holding home ARB     3. GI and Nutrition :   Continue tube feeds.     4. Renal/Fluids/Electrolytes:   RAQUEL on admission, now improved. Hold on further lasix.     5. Endocrine: No acute issues     6. Hematology/Oncology: No acute issues.     IV/Access: right IJ  Prophylaxis: pepcid and enoxaparin    FULL CODE  Cousin Gaby updated by phone.      Critical Care Time: 35 minutes      Fan Singh MD  [facility] Intensivist Service

## 2021-03-09 NOTE — PLAN OF CARE
Pt alert and anxious. Writing clearly to communicate needs. Generalized pain and sore throat from ETT getting tylenol and x1 fentanyl. Thick creamy secretions via ETT and continuous clear oral secretions. Multiple skin concerns and encouraging repositioning. Plan to have last IVIG dose today.

## 2021-03-09 NOTE — PLAN OF CARE
PT-Received orders. Discussed with OT. OT saw patient earlier and patient performed UE and LE ex. Patient not receptive to any OOB mobility per OT. Also, HR up and trying not to tax patient today. Will cancel PT today and clarisse for tomorrow.

## 2021-03-09 NOTE — PLAN OF CARE
VSS. A&O x4. Forgetful at times. Continues on vent, tolerating well. Attempted pressure support this evening per the pt's request but VT was low so placed back on full support. Uses oral suction, calls appropriately when needs inline suction. Secretions thick, moderate amount.Turned/repo q2h. Interdry changed to pannus. C/o sore throat, used chloroseptic spray and tylenol with reported relief.

## 2021-03-09 NOTE — PROGRESS NOTES
[facility] ICU RESPIRATORY NOTE        Date of Admission: 3/4/2021    Date of Intubation (most recent): 3/6/21    Reason for Mechanical Ventilation: Airway Protection     Number of Days on Mechanical Ventilation: 4    Met Criteria for Spontaneous Breathing Trial: Yes     Reason for No Spontaneous Breathing Trial: pt was sedated per RN.    Significant Events Today: none     ABG Results:   Recent Labs   Lab 03/05/21  0230 03/04/21  2124   PH 7.54*  --    PCO2 36  --    PO2 168*  --    HCO3 31*  --    O2PER  --  Stefano       Current Vent Settings: Ventilation Mode: CMV/AC  (Continuous Mandatory Ventilation/ Assist Control)  FiO2 (%): 30 %  Rate Set (breaths/minute): 14 breaths/min  Tidal Volume Set (mL): 540 mL  PEEP (cm H2O): 5 cmH2O  Pressure Support (cm H2O): 8 cmH2O  Oxygen Concentration (%): 30 %  Resp: 11      Plan: Continue on the vent and wean as the pt tolerates     Mina Torres RT

## 2021-03-09 NOTE — PROGRESS NOTES
[facility] ICU RESPIRATORY NOTE        Date of Admission: 3/4/2021    Date of Intubation (most recent): 3/6/21    Reason for Mechanical Ventilation: MG AIRWAY PROTECTION    Number of Days on Mechanical Ventilation: 4    Met Criteria for Spontaneous Breathing Trial: Yes     Significant Events Today:  NIF-26 VT 894ml    ABG Results:   Recent Labs   Lab 03/05/21  0230 03/04/21  2124   PH 7.54*  --    PCO2 36  --    PO2 168*  --    HCO3 31*  --    O2PER  --  Stefano       Current Vent Settings: Ventilation Mode: CMV/AC  (Continuous Mandatory Ventilation/ Assist Control)  FiO2 (%): 30 %  Rate Set (breaths/minute): 14 breaths/min  Tidal Volume Set (mL): 540 mL  PEEP (cm H2O): 5 cmH2O  Pressure Support (cm H2O): 5 cmH2O  Oxygen Concentration (%): 30 %  Resp: 15      Skin Assessment: no breakdown noted    Plan: CONTINUE NIF AND VC BID, ASSESS FOR SBT AND LIBERATION.    PAUL MILLER, RT

## 2021-03-09 NOTE — PROGRESS NOTES
"[facility]    Neuro Critical Care Progress Note    Interval Events  Patient appears comfortable.   NIF -26 this AM.     Impression  #Myasthenia Gravis Exacerbation          Recommendations  Continue prednisolone 60 mg x7 days (start date 3/5/21) and wean by 10 mg every week.     Follow up with Dr. Renner in 4 weeks after he has been discharge. .   Continue mycophenolate 1000 mg AM/500 mg PM   Continue IVIG 400 mg/kg daily, day 5 of 5  Daily NIF/VC     If he is stable from a medical standpoint, may consider extubation trial since NIF are improving, will need to stay in ICU if extubated for close monitoring as patient at increase risk to be re intubated.     The Stroke Staff is STAR Ely MD  Vascular Neurology Fellow  To page me or covering stroke neurology team member, click here: AMCOM   Choose \"On Call\" tab at top, then search dropdown box for \"Neurology Adult\", select location, press Enter, then look for stroke/neuro ICU/telestroke.    ______________________________________________________    Medications   Home Meds  Prior to Admission medications    Medication Sig Start Date End Date Taking? Authorizing Provider   acetaminophen (TYLENOL) 500 MG tablet Take 500-1,000 mg by mouth every 6 hours as needed for mild pain or pain   Yes Unknown, Entered By History   calcium carbonate 600 mg-vitamin D 400 units (CALTRATE) 600-400 MG-UNIT per tablet Take 1 tablet by mouth daily   Yes Unknown, Entered By History   Cholecalciferol 100 MCG (4000 UT) CAPS Take 4,000 Units by mouth daily   Yes Unknown, Entered By History   CHROMIUM PICOLINATE Take 1 tablet by mouth daily.     Yes Unknown, Entered By History   losartan (COZAAR) 50 MG tablet Take 1 tablet (50 mg) by mouth daily 10/8/20  Yes Martinez Toledo MD   mycophenolate (GENERIC EQUIVALENT) 500 MG tablet Take 1,000 mg by mouth every morning   Yes Unknown, Entered By History   mycophenolate (GENERIC EQUIVALENT) " 500 MG tablet Take 500 mg by mouth every evening   Yes Unknown, Entered By History   Multiple Vitamins-Minerals (MULTIVITAMIN ADULTS 50+) TABS Take 1 tablet by mouth daily     Reported, Patient       Scheduled Meds    chlorhexidine  15 mL Mouth/Throat Q12H     enoxaparin ANTICOAGULANT  40 mg Subcutaneous Q24H     famotidine  20 mg Intravenous Q12H     immune globulin - sucrose free  30 g Intravenous Once     [Held by provider] losartan  50 mg Oral Daily     mycophenolate  1,000 mg Oral or Feeding Tube QAM     mycophenolate  500 mg Oral or Feeding Tube QPM     potassium & sodium phosphates  2 packet Oral or Feeding Tube TID     [START ON 3/26/2021] predniSONE  10 mg Oral or Feeding Tube Daily     [START ON 3/19/2021] predniSONE  20 mg Oral or Feeding Tube Daily     [START ON 3/12/2021] predniSONE  40 mg Oral or Feeding Tube Daily     predniSONE  60 mg Oral or Feeding Tube Daily     sodium chloride 0.9 %  100 mL Intravenous Once       Infusion Meds    Continuing ACE inhibitor/ARB/ARNI from home medication list OR ACE inhibitor/ARB order already placed during this visit       fentaNYL Stopped (03/06/21 1000)     - MEDICATION INSTRUCTIONS -       norepinephrine Stopped (03/06/21 0757)     propofol (DIPRIVAN) infusion Stopped (03/07/21 0930)     BETA BLOCKER NOT PRESCRIBED       sodium chloride Stopped (03/09/21 0400)       PRN Meds  acetaminophen, acetaminophen, artificial tears ophthalmic solution, Continuing ACE inhibitor/ARB/ARNI from home medication list OR ACE inhibitor/ARB order already placed during this visit, glucose **OR** dextrose **OR** glucagon, diphenhydrAMINE **OR** diphenhydrAMINE, diphenhydrAMINE, EPINEPHrine, famotidine, fentaNYL, fentaNYL, HOLD MEDICATION, lidocaine 4%, lidocaine (buffered or not buffered), melatonin, methylPREDNISolone, miconazole, naloxone **OR** naloxone **OR** naloxone **OR** naloxone, - MEDICATION INSTRUCTIONS -, ondansetron **OR** ondansetron, phenol, polyethylene glycol,  prochlorperazine **OR** prochlorperazine **OR** prochlorperazine, propofol (DIPRIVAN) infusion **AND** propofol **AND** CK total **AND** Triglycerides, BETA BLOCKER NOT PRESCRIBED, sodium chloride, sodium chloride (PF), sodium chloride (PF)       PHYSICAL EXAMINATION  Temp:  [98.1  F (36.7  C)-99  F (37.2  C)] 99  F (37.2  C)  Pulse:  [61-93] 74  Resp:  [10-30] 17  BP: (107-144)/(66-97) 124/75  FiO2 (%):  [30 %] 30 %  SpO2:  [90 %-100 %] 97 %     Neurologic  Mental Status:  alert, oriented x 3, follows commands. Comfortable  Cranial Nerves:  visual fields intact, PERRL, EOMI with normal smooth pursuit, facial sensation intact and symmetric, facial movements symmetric, hearing not formally tested but intact to conversation, palate elevation symmetric and uvula midline, shoulder shrug strong bilaterally, tongue protrusion midline, weak tongue thrust and cough.   Motor:  normal muscle tone and bulk, no abnormal movements, able to move all limbs spontaneously, no pronator drift, positive arm pumping test bilaterally   Reflexes:  toes down-going  Sensory:  light touch sensation intact and symmetric throughout upper and lower extremities, no extinction on double simultaneous stimulation   Coordination:  normal finger-to-nose and heel-to-shin bilaterally without dysmetria  Station/Gait:  deferred       Imaging  I personally reviewed all imaging; relevant findings per HPI.     Lab Results Data   CBC  Recent Labs   Lab 03/09/21  0354 03/08/21  0429 03/07/21  0500   WBC 7.2 6.8 5.5   RBC 4.45 4.41 4.32*   HGB 11.6* 11.3* 11.2*   HCT 37.9* 37.2* 36.0*    173 175     Basic Metabolic Panel    Recent Labs   Lab 03/09/21  0354 03/08/21  0429 03/07/21  2210 03/07/21  0500 03/07/21  0500    136  --   --  138   POTASSIUM 3.9 3.8 3.7   < > 2.8*   CHLORIDE 100 99  --   --  97   CO2 35* 35*  --   --  34*   BUN 35* 29  --   --  22   CR 0.59* 0.65*  --   --  0.71   * 110*  --   --  102*   NOAH 8.7 8.7  --   --  8.5    <  > = values in this interval not displayed.     Liver Panel  Recent Labs   Lab 03/04/21  0320   PROTTOTAL 6.8   ALBUMIN 3.6   BILITOTAL 0.7   ALKPHOS 69   AST 44   ALT 35     INR  No lab results found.   Lipid Profile    Recent Labs   Lab Test 12/16/20  1455 07/05/19  1558 08/08/18  1245 11/22/13  1523 11/22/13  1523   CHOL 141 174 175   < > 196   HDL 70 57 53   < > 61   LDL 60 105* 104*   < > 117   TRIG 55 59 90   < > 88   CHOLHDLRATIO  --   --   --   --  3.2    < > = values in this interval not displayed.     A1C  No lab results found.  Troponin I    Recent Labs   Lab 03/04/21  1629 03/04/21  1235 03/04/21  0320   TROPI <0.015 <0.015 <0.015

## 2021-03-10 NOTE — PROGRESS NOTES
"[facility]    Neuro Critical Care Progress Note    Interval Events  Patient appears comfortable.   NIF -35/TV 1051 this AM.     Impression  #Myasthenia Gravis Exacerbation       Recommendations  Continue prednisolone 60 mg x7 days (start date 3/5/21) and wean by 10 mg every week.     Follow up with Dr. Renner in 4 weeks after he has been discharge. .   Continue mycophenolate 1000 mg AM/500 mg PM   S/p IVIG 400 mg/kg daily, 5 days  Daily NIF/VC     The Stroke Staff is Dr. Adwoa Sargent MD  Vascular Neurology Fellow  To page me or covering stroke neurology team member, click here: AMCOM   Choose \"On Call\" tab at top, then search dropdown box for \"Neurology Adult\", select location, press Enter, then look for stroke/neuro ICU/telestroke.    ______________________________________________________    Medications   Home Meds  Prior to Admission medications    Medication Sig Start Date End Date Taking? Authorizing Provider   acetaminophen (TYLENOL) 500 MG tablet Take 500-1,000 mg by mouth every 6 hours as needed for mild pain or pain   Yes Unknown, Entered By History   calcium carbonate 600 mg-vitamin D 400 units (CALTRATE) 600-400 MG-UNIT per tablet Take 1 tablet by mouth daily   Yes Unknown, Entered By History   Cholecalciferol 100 MCG (4000 UT) CAPS Take 4,000 Units by mouth daily   Yes Unknown, Entered By History   CHROMIUM PICOLINATE Take 1 tablet by mouth daily.     Yes Unknown, Entered By History   losartan (COZAAR) 50 MG tablet Take 1 tablet (50 mg) by mouth daily 10/8/20  Yes Martinez Toledo MD   mycophenolate (GENERIC EQUIVALENT) 500 MG tablet Take 1,000 mg by mouth every morning   Yes Unknown, Entered By History   mycophenolate (GENERIC EQUIVALENT) 500 MG tablet Take 500 mg by mouth every evening   Yes Unknown, Entered By History   Multiple Vitamins-Minerals (MULTIVITAMIN ADULTS 50+) TABS Take 1 tablet by mouth daily     Reported, Patient       Scheduled Meds    " acetaminophen  650 mg Oral or Feeding Tube Q6H     calcium carbonate 600 mg-vitamin D 400 units  1 tablet Oral BID w/meals     chlorhexidine  15 mL Mouth/Throat Q12H     Chromium  200 mcg Oral Daily     docusate  50 mg Oral Once     enoxaparin ANTICOAGULANT  40 mg Subcutaneous Q24H     famotidine  20 mg Intravenous Q12H     losartan  50 mg Oral Daily     multivitamin, therapeutic  1 tablet Oral Daily     mycophenolate  1,000 mg Oral or Feeding Tube QAM     mycophenolate  500 mg Oral or Feeding Tube QPM     [START ON 3/26/2021] predniSONE  10 mg Oral or Feeding Tube Daily     [START ON 3/19/2021] predniSONE  20 mg Oral or Feeding Tube Daily     [START ON 3/12/2021] predniSONE  40 mg Oral or Feeding Tube Daily     predniSONE  60 mg Oral or Feeding Tube Daily     sodium chloride 0.9 %  100 mL Intravenous Once     sennosides  5 mL Oral Once       Infusion Meds    Continuing ACE inhibitor/ARB/ARNI from home medication list OR ACE inhibitor/ARB order already placed during this visit       fentaNYL Stopped (03/06/21 1000)     - MEDICATION INSTRUCTIONS -       norepinephrine Stopped (03/06/21 0757)     propofol (DIPRIVAN) infusion Stopped (03/07/21 0930)     BETA BLOCKER NOT PRESCRIBED       sodium chloride Stopped (03/09/21 0400)       PRN Meds  acetaminophen, artificial tears ophthalmic solution, Continuing ACE inhibitor/ARB/ARNI from home medication list OR ACE inhibitor/ARB order already placed during this visit, glucose **OR** dextrose **OR** glucagon, diphenhydrAMINE **OR** diphenhydrAMINE, diphenhydrAMINE, EPINEPHrine, famotidine, fentaNYL, fentaNYL, HOLD MEDICATION, lidocaine 4%, lidocaine (buffered or not buffered), melatonin, methylPREDNISolone, miconazole, naloxone **OR** naloxone **OR** naloxone **OR** naloxone, - MEDICATION INSTRUCTIONS -, ondansetron **OR** ondansetron, phenol, polyethylene glycol, prochlorperazine **OR** prochlorperazine **OR** prochlorperazine, propofol (DIPRIVAN) infusion **AND** propofol  **AND** CK total **AND** Triglycerides, BETA BLOCKER NOT PRESCRIBED, sodium chloride, sodium chloride (PF), sodium chloride (PF)       PHYSICAL EXAMINATION  Temp:  [94.8  F (34.9  C)-99.3  F (37.4  C)] 98.6  F (37  C)  Pulse:  [] 87  Resp:  [8-51] 35  BP: ()/(47-82) 133/70  FiO2 (%):  [30 %] 30 %  SpO2:  [93 %-100 %] 100 %     Neurologic  Mental Status:  alert, oriented x 3, follows commands. Comfortable  Cranial Nerves:  visual fields intact, PERRL, EOMI with normal smooth pursuit, facial sensation intact and symmetric, facial movements symmetric, hearing not formally tested but intact to conversation, palate elevation symmetric and uvula midline, shoulder shrug strong bilaterally, tongue protrusion midline, weak tongue thrust and cough.   Motor:  normal muscle tone and bulk, no abnormal movements, able to move all limbs spontaneously, no pronator drift  Reflexes:  toes down-going  Sensory:  light touch sensation intact and symmetric throughout upper and lower extremities, no extinction on double simultaneous stimulation   Coordination:  normal finger-to-nose and heel-to-shin bilaterally without dysmetria  Station/Gait:  deferred       Imaging  I personally reviewed all imaging; relevant findings per HPI.     Lab Results Data   CBC  Recent Labs   Lab 03/10/21  0605 03/09/21  0354 03/08/21  0429   WBC 6.9 7.2 6.8   RBC 4.16* 4.45 4.41   HGB 10.8* 11.6* 11.3*   HCT 35.6* 37.9* 37.2*    172 173     Basic Metabolic Panel    Recent Labs   Lab 03/10/21  0605 03/09/21  0354 03/08/21  0429    136 136   POTASSIUM 3.7 3.9 3.8   CHLORIDE 100 100 99   CO2 34* 35* 35*   BUN 35* 35* 29   CR 0.63* 0.59* 0.65*   GLC 85 102* 110*   NOAH 8.6 8.7 8.7     Liver Panel  Recent Labs   Lab 03/09/21 0354 03/04/21  0320   PROTTOTAL 6.9 6.8   ALBUMIN  --  3.6   BILITOTAL  --  0.7   ALKPHOS  --  69   AST  --  44   ALT  --  35     INR  No lab results found.   Lipid Profile    Recent Labs   Lab Test 12/16/20  6301  07/05/19  1558 08/08/18  1245 11/22/13  1523 11/22/13  1523   CHOL 141 174 175   < > 196   HDL 70 57 53   < > 61   LDL 60 105* 104*   < > 117   TRIG 55 59 90   < > 88   CHOLHDLRATIO  --   --   --   --  3.2    < > = values in this interval not displayed.     A1C  No lab results found.  Troponin I    Recent Labs   Lab 03/04/21  1629 03/04/21  1235 03/04/21  0320   TROPI <0.015 <0.015 <0.015

## 2021-03-10 NOTE — PLAN OF CARE
A/O x4, anxious at times, active listening utilized, reassurance provided. Extubated at 1125, on 3L of O2 NC, sats mid to upper 90s. AVSS ex HTN at times. 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 K+ 3.7, replaced, phos 2.4, replaced, rechecks in am. C/o sharp panus pain, fentanyl given x1. LS clear, good cough, moderate secretions after extubation. Lazaro, +2 edema BLE. Rash under L breast improving, cleaned per plan of care. Redness blanchable under panus/groin, cleaned per plan of care, miconazole applied. Assist x2-3, turn/repo q2h, declines at times - need encouragements, sat up at side of bed with PT, on bedpan this afternoon, no BM this shift, alberto patent, lasix given, good output. Passed RN bedside swallow eval, BG 91, 120, 118, ST consult tomorrow. R internal jugular WDL, SL. Cousin at bedside this afternoon, updated with plan of care. Neuro following. Discharge pending, will continue to monitor.

## 2021-03-10 NOTE — PROGRESS NOTES
[facility] ICU RESPIRATORY NOTE        Date of Admission: 3/4/2021    Date of Intubation (most recent): 3/6/2021    Reason for Mechanical Ventilation: AW protection     Number of Days on Mechanical Ventilation:5    Met Criteria for Spontaneous Breathing Trial: Yes    Significant Events Today: poor performance in NIF -12 and Vt 931    ABG Results:   Recent Labs   Lab 03/05/21  0230 03/04/21  2124   PH 7.54*  --    PCO2 36  --    PO2 168*  --    HCO3 31*  --    O2PER  --  Stefano       Current Vent Settings: Ventilation Mode: CMV/AC  (Continuous Mandatory Ventilation/ Assist Control)  FiO2 (%): 30 %  Rate Set (breaths/minute): 14 breaths/min  Tidal Volume Set (mL): 540 mL  PEEP (cm H2O): 5 cmH2O  Pressure Support (cm H2O): 5 cmH2O  Oxygen Concentration (%): 30 %  Resp: 18      Skin Assessment: intact    Plan: will continue vent support.    Mulugeta Tavera, RT

## 2021-03-10 NOTE — PROGRESS NOTES
"ICU Progress Note    Subjective  Electively intubated for myasthenia exacerbation. Remained stable overnight with throat pain 2/2 ETT. Tx'ed with Tylenol and x2 Fentanyl dose. Pt concerned about lower ab pain (sharp/stabbing, happens when he coughs/moves, occurred pre-hospital as well, he believes it has to do w/ excessive weight gain) but says it is manageable and is okay following up with it outpatient. He was successfully extubated today ~11:20 am, 2/2 NIF -35 and Vt 1051.     Post-extubation, doing well. [place] that he worked as a  for 40+ years at [employer]. Loves to read police/international intrigue novels.     Objective  /71   Pulse 91   Temp 98.2  F (36.8  C)   Resp 24   Ht 1.778 m (5' 10\")   Wt 145 kg (319 lb 10.7 oz)   SpO2 95%   BMI 45.87 kg/m    On 4L NC  General: Lying in bed, interactive, mildly frustrated  HEENT: No scleral icterus, EOMs grossly intact   Heart: RRR, normal S1 and S2, no mumurs appreciated  Lungs: clear to auscultation bilaterally, no crackles/rhonchi appreciated  GI: obese, non-tender to palpation, negative fluid wave  Neuro: Awake, alert, writes questions on notepad, responsive to concerns, whispers answers  Legs: +2 bilateral pitting edema below knee, mild bilateral erythema on calves    Labs reviewed    Results for orders placed or performed during the hospital encounter of 03/04/21 (from the past 24 hour(s))   Phosphorus   Result Value Ref Range    Phosphorus 2.4 (L) 2.5 - 4.5 mg/dL   CBC with platelets   Result Value Ref Range    WBC 6.9 4.0 - 11.0 10e9/L    RBC Count 4.16 (L) 4.4 - 5.9 10e12/L    Hemoglobin 10.8 (L) 13.3 - 17.7 g/dL    Hematocrit 35.6 (L) 40.0 - 53.0 %    MCV 86 78 - 100 fl    MCH 26.0 (L) 26.5 - 33.0 pg    MCHC 30.3 (L) 31.5 - 36.5 g/dL    RDW 15.2 (H) 10.0 - 15.0 %    Platelet Count 163 150 - 450 10e9/L   Basic metabolic panel   Result Value Ref Range    Sodium 136 133 - 144 mmol/L    Potassium 3.7 3.4 - 5.3 mmol/L    Chloride " 100 94 - 109 mmol/L    Carbon Dioxide 34 (H) 20 - 32 mmol/L    Anion Gap 2 (L) 3 - 14 mmol/L    Glucose 85 70 - 99 mg/dL    Urea Nitrogen 35 (H) 7 - 30 mg/dL    Creatinine 0.63 (L) 0.66 - 1.25 mg/dL    GFR Estimate >90 >60 mL/min/[1.73_m2]    GFR Estimate If Black >90 >60 mL/min/[1.73_m2]    Calcium 8.6 8.5 - 10.1 mg/dL   Phosphorus   Result Value Ref Range    Phosphorus 2.4 (L) 2.5 - 4.5 mg/dL   Glucose by meter   Result Value Ref Range    Glucose 91 70 - 99 mg/dL   Blood gas venous with oxyhemoglobin   Result Value Ref Range    Ph Venous 7.39 7.32 - 7.43 pH    PCO2 Venous 63 (H) 40 - 50 mm Hg    PO2 Venous 43 25 - 47 mm Hg    Bicarbonate Venous 38 (H) 21 - 28 mmol/L    FIO2 30%     Oxyhemoglobin Venous 74 %    Base Excess Venous 10.3 mmol/L   Glucose by meter   Result Value Ref Range    Glucose 120 (H) 70 - 99 mg/dL     Assessment and Plan  Thanh Goodrich is a 80 year old male with history of myasthenia gravis admitted on 3/4/2021 for generalized weakness and found to be in myasthenia gravis exacerbation. Improved tachycardia, NIF/Vt appropriate for extubation. Pt extubated 3/10, moved to [facility].      1.Neurology/Pulmonary  Myasthenia Gravis Exacerbation. Finished Dose 5 of IVIG 3/09/21.   --Re-intubated electively 3/7. Today NIF -31 Vt 1051. Extubation today 3/10.  -Continue MMF, steroids dejuan neuro recs.   -Tylenol q6h, Fentanyl PRN for pain associated w/ ETT     2. Cardiovascular: Hypotensive on admission, but now resolved. Tachycardia resolved.   - Continue home Losartan 50 mg every day     3. GI and Nutrition :   -Continue tube feeds. Speech consulted for swallow study.   -On home vitamins  -Pt reports having a BM on 3/6 but it was not charted. Has not had a BM since. Does not want to use the bed pan nor take Senna/Docusate. Will discuss bowel reg today again. Appreciate PT/OT help with movement.     4. Renal/Fluids/Electrolytes:RAQUEL on admission, improved. Resolved tachycardia. Concern for third-spacing. Pt  reports previously being on HCTZ 25 mg before he self-stopped taking it. Protein total 6.9. Alb 3.6  -One time 20 mg IV Lasix     5. Endocrine: No acute issues     6. Hematology/Oncology: No acute issues.    IV/Access: right IJ  Prophylaxis: pepcid and enoxaparin    FULL CODE    Critical Care Time: 40 minutes    Kathy Morales, MS4    I acted as scribe for Dr. Fan Singh.    ATTENDING PHYSICIAN:    I reviewed the patient's history, obtained additional history, conducted an examination to confirm key findings and reviewed the studies and labs. Discussed the case with Kathy Morales and agree with the findings and plan as outlined in her note as written.   I personally spent 40 minutes of critical care time reviewing labs and imaging, examining the patient, and managing the ventilator. This does not include time spent on procedures or teaching. Thanh is critically ill based on: hypercarbic respiratory failure due to myasthenia gravis exacerbation.      Fan Singh MD  Pager [phone]

## 2021-03-10 NOTE — PROGRESS NOTES
Stopped by to do a skin assessment and the patient requested we come back later as they were going to extubate him today. Will return to see him tomorrow.

## 2021-03-10 NOTE — PROGRESS NOTES
Extubation Note    Successful completion of SBT (Yes or No):Yes. PS 5/5 for 3 hrs and 20 min  Extubation time:1125    Patient assessment:  Lung sounds:Clear and diminished  Stridor Present (Yes or No):No  Patient tolerance:Good    Oxygen device:  4L NC   SpO2:95%    Plan:Will cont to follow.  3/10/2021  Keila Maciel, RT

## 2021-03-10 NOTE — PLAN OF CARE
Pt A&O, forgetful at times. BP stable. Tele SR/ST up to 130 bpm. Restarted PO losartan. C/o generalized pain, throat pain. Gave PRN tylenol and IV fent with reported decrease in pain. Declined bowel meds. States that he had last BM on Saturday evening. Pressure support trial this morning. Tolerated for 1.5 hours. Replacing phos. Plan for possible extubation tomorrow.

## 2021-03-10 NOTE — PROGRESS NOTES
Patient achieved a NIF of -35 and a Vt of 1051. Both done with good pt effort.  3/10/2021  Keila Maciel, RT

## 2021-03-11 NOTE — PROGRESS NOTES
Pt A&O. VSS on 5 LNC. -120s, metoprolol started. Tele questionable ST elevation-Provider aware, 12 lead done. Pt denies SOB/pain. Bowel regimens given, multiple BMs today. Advanced to regular diet per speech. Therapy done. Skin care done. internal jugular removed, PIV x2 placed. Pts belongings; ria, phone, glasses, clothing send with pt to 814.1 with the bariatric bed with pulsate mattress.

## 2021-03-11 NOTE — PLAN OF CARE
OT: Pt in the process of transferring from ICU to acute floor during scheduled therapy time. Will schedule again for next date.

## 2021-03-11 NOTE — PROGRESS NOTES
NIF results: -30 (good effort)  Vital Capacity results: 1000 mL (good effort)     Lillie Hollingsworth, RT

## 2021-03-11 NOTE — PLAN OF CARE
The patient is alert and oriented. He has some generalized weakness.   Vitals stable. 2L nasal cannula. Lung sounds clear.   Sinus rhythm/Sinus tachy with 1st degree AV-block and Bundle Branch Block.   Patient has had a series of small bowel movements tonight. He feels like there is more feces inside that he cannot get out. He has refused stool softener and the offer of a suppository to help this. He would like it to resolve from further attempting without medication. Used ceiling lift to get patient to a large bedside commode. It was not very comfortable for him and he was unable to void much. He would like to try again with the commode if a larger more comfortable one can be found.  Beckett in place. Cares done. Good output.   Golden Harrell RN 8:02 AM 03/11/21

## 2021-03-11 NOTE — PROGRESS NOTES
03/11/21 0929   General Information   Onset of Illness/Injury or Date of Surgery 03/04/21   Referring Physician Fan Singh MD   Patient/Family Therapy Goal Statement (SLP) wants to drink water   Pertinent History of Current Problem Thanh Goodrich is a 80 year old male with history of myasthenia gravis admitted on 3/4/2021 for generalized weakness and found to be in myasthenia gravis exacerbation. Improved tachycardia, NIF/Vt appropriate for extubation. Pt extubated 3/10, moved to [facility].    General Observations No dysphagia hx per patient or chart review.    Past History of Dysphagia None apparent    Type of Evaluation   Type of Evaluation Swallow Evaluation   Oral Motor   Oral Musculature generally intact   Structural Abnormalities none present   Mucosal Quality adequate   Dentition (Oral Motor)   Dentition (Oral Motor) natural dentition   Facial Symmetry (Oral Motor)   Facial Symmetry (Oral Motor) WNL   Lip Function (Oral Motor)   Lip Range of Motion (Oral Motor) WNL   Tongue Function (Oral Motor)   Tongue ROM (Oral Motor) WNL   Cough/Swallow/Gag Reflex (Oral Motor)   Soft Palate/Velum (Oral Motor) WNL   Vocal Quality/Secretion Management (Oral Motor)   Vocal Quality (Oral Motor) WNL;hoarse  (mildly hoarse)   Secretion Management (Oral Motor) WNL   General Swallowing Observations   Current Diet/Method of Nutritional Intake (General Swallowing Observations, NIS) NPO   Respiratory Support (General Swallowing Observations) nasal cannula   Swallowing Evaluation Clinical swallow evaluation   Clinical Swallow Evaluation   Feeding Assistance minimal assistance required   Clinical Swallow Evaluation Textures Trialed Thin Liquids;Puree Textures;Solid Foods   Clinical Swallow Eval: Thin Liquid Texture Trial   Mode of Presentation, Thin Liquids straw;self-fed   Volume of Liquid or Food Presented 8 oz    Oral Phase of Swallow WFL   Pharyngeal Phase of Swallow intact   Diagnostic Statement No overt s/sx of  aspiration, subtle throat clear x 1, no additional s/sx of aspiration, no changes in breath sounds or vocal quality    Clinical Swallow Evaluation: Puree Solid Texture Trial   Mode of Presentation, Puree spoon;fed by clinician   Volume of Puree Presented 4 oz    Oral Phase, Puree WFL   Pharyngeal Phase, Puree intact   Diagnostic Statement Adequate oral manipulation, no overt s/sx of aspiration    Clinical Swallow Evaluation: Solid Food Texture Trial   Mode of Presentation, Solid self-fed   Volume of Solid Food Presented 1 cracker    Oral Phase, Solid WFL   Pharyngeal Phase, Solid intact   Diagnostic Statement Adequate oral manipulation and clearance, no overt s/sx of aspiration. Pt denies any difficulty    Esophageal Phase of Swallow   Patient reports or presents with symptoms of esophageal dysphagia No   Swallowing Recommendations   Diet Consistency Recommendations regular diet;thin liquids   Supervision Level for Intake close supervision needed   Mode of Delivery Recommendations bolus size, small;slow rate of intake   Postural Recommendations   (Completely upright )   Swallowing Maneuver Recommendations alternate food and liquid intake;effortful (hard) swallow   Monitoring/Assistance Required (Eating/Swallowing) check mouth frequently for oral residue/pocketing;cue for finger/lingual sweep if oral pocketing present;stop eating activities when fatigue is present;monitor for cough or change in vocal quality with intake   Recommended Feeding/Eating Techniques (Swallow Eval) maintain upright sitting position for eating;maintain upright posture during/after eating for 30 minutes;minimize distractions during oral intake;provide assist with feeding   Medication Administration Recommendations, Swallowing (SLP) As per pt preference    General Therapy Interventions   Planned Therapy Interventions Dysphagia Treatment   SLP Therapy Assessment/Plan   Criteria for Skilled Therapeutic Interventions Met (SLP Eval) yes;treatment  indicated   SLP Diagnosis Minimal oropharyngeal dysphagia    Rehab Potential (SLP Eval) good, to achieve stated therapy goals   Therapy Frequency (SLP Eval) one time eval and treatment only   Comment, Therapy Assessment/Plan (SLP) Pt seen for clinical swallow evaluation. No dysphagia hx. Pt consumed thin liquids by straw sip without overt s/sx of aspiration, no changes in breath sounds or vocal quality. Subtle throat clear x 1 suspect not related to PO intake. Pt also tolerated 4 oz of puree and 1 cracker. Pt demonstrates adequate mastication and oral clearance,  no s/sx of aspiration and pt denies any difficulty. Pt's swallow appears timely w/ functional hyolarygeal excursion. Recommend regular solids and thin liquids with feeding assistance at this time. Pt MUST be seated fully upright for PO Intake, take small bites/sips, eat/drink at a slow rate, and alternate solids and liquids. Edcuation provided to pt at bedside, no further SLP services warranted.   Therapy Plan Review/Discharge Plan (SLP)   Therapy Plan Review (SLP) evaluation/treatment results reviewed;care plan/treatment goals reviewed;patient   SLP Discharge Planning    SLP Discharge Recommendation (DC Rec) Transitional Care Facility  (Defer to providers and OT/PT)   SLP Rationale for DC Rec No further SLP services warranted    SLP Brief overview of current status  Recommend regular solids and thin liquids with feeding assistance at this time. Pt MUST be seated fully upright for PO Intake, take small bites/sips, eat/drink at a slow rate, and alternate solids and liquids. Edcuation provided to pt at bedside, no further SLP services warranted.    Total Evaluation Time   Total Evaluation Time (Minutes) 10

## 2021-03-11 NOTE — PROGRESS NOTES
"ICU Progress Note    Subjective  Extubated 3/10 and satting well on 3L NC. Was not able to have a BM and requested \"manual disimpaction\" overnight. He did not sleep well because he was trying to have a bowel movement. Was amenable to taking a suppository this morning.     Ok to transfer to hospitalist service.    Objective  /75   Pulse 118   Temp 97.7  F (36.5  C)   Resp 23   Ht 1.778 m (5' 10\")   Wt 145 kg (319 lb 10.7 oz)   SpO2 99%   BMI 45.87 kg/m    On 4L NC  General: Lying in bed, interactive  HEENT: No scleral icterus, EOMs grossly intact   Heart: RRR, normal S1 and S2, no mumurs appreciated  Lungs: clear to auscultation bilaterally, no crackles/rhonchi appreciated  GI: obese, non-tender to palpation, negative fluid wave; erythema under pannus   Neuro: Awake, alert, clearer voice today vs yesterday; responsive to concerns  Legs: +1 bilateral pitting edema below knee, mild bilateral erythema on calves    Labs reviewed    Results for orders placed or performed during the hospital encounter of 03/04/21 (from the past 24 hour(s))   Blood gas venous with oxyhemoglobin   Result Value Ref Range    Ph Venous 7.39 7.32 - 7.43 pH    PCO2 Venous 63 (H) 40 - 50 mm Hg    PO2 Venous 43 25 - 47 mm Hg    Bicarbonate Venous 38 (H) 21 - 28 mmol/L    FIO2 30%     Oxyhemoglobin Venous 74 %    Base Excess Venous 10.3 mmol/L   Glucose by meter   Result Value Ref Range    Glucose 120 (H) 70 - 99 mg/dL   Glucose by meter   Result Value Ref Range    Glucose 118 (H) 70 - 99 mg/dL   CBC with platelets   Result Value Ref Range    WBC 9.1 4.0 - 11.0 10e9/L    RBC Count 4.77 4.4 - 5.9 10e12/L    Hemoglobin 12.3 (L) 13.3 - 17.7 g/dL    Hematocrit 42.0 40.0 - 53.0 %    MCV 88 78 - 100 fl    MCH 25.8 (L) 26.5 - 33.0 pg    MCHC 29.3 (L) 31.5 - 36.5 g/dL    RDW 15.3 (H) 10.0 - 15.0 %    Platelet Count 180 150 - 450 10e9/L   Basic metabolic panel   Result Value Ref Range    Sodium 136 133 - 144 mmol/L    Potassium 4.0 3.4 - 5.3 " mmol/L    Chloride 97 94 - 109 mmol/L    Carbon Dioxide 37 (H) 20 - 32 mmol/L    Anion Gap 2 (L) 3 - 14 mmol/L    Glucose 76 70 - 99 mg/dL    Urea Nitrogen 27 7 - 30 mg/dL    Creatinine 0.53 (L) 0.66 - 1.25 mg/dL    GFR Estimate >90 >60 mL/min/[1.73_m2]    GFR Estimate If Black >90 >60 mL/min/[1.73_m2]    Calcium 8.8 8.5 - 10.1 mg/dL   Phosphorus (AM Draw)   Result Value Ref Range    Phosphorus 3.5 2.5 - 4.5 mg/dL   Glucose by meter   Result Value Ref Range    Glucose 81 70 - 99 mg/dL     Assessment and Plan  Thanh Goodrich is a 80 year old male with history of myasthenia gravis admitted on 3/4/2021 for generalized weakness and found to be in myasthenia gravis exacerbation. Pt extubated 3/10, now on 1.5L NC. Doing well. Okay to transfer to hospitalist service today.      1.Neurology/Pulmonary  Myasthenia Gravis Exacerbation. Finished Dose 5 of IVIG 3/09/21. Extubated 3/10, doing well on 1.5L NC. [Not on home O2]  -Continue MMF, steroids dejuan neuro recs.   -Tylenol q6h, Fentanyl PRN for pain     2. Cardiovascular: Hypotensive on admission, but now resolved. Tachycardia resolved.   - Continue home Losartan 50 mg every day     3. GI and Nutrition :   -Passed swallow study, regular diet.  -On home vitamins  -Pt reports having a BM on 3/6 but it was not charted. Has not had a BM since but pt has refused/declined any bowel regimen. Bisocodyl suppository today. Senna/Docusate, Miralax also on board.     4. Renal/Fluids/Electrolytes:RAQUEL on admission, improved. Resolved tachycardia. Concern for third-spacing. Pt reports previously being on HCTZ 25 mg before he self-stopped taking it. Protein total 6.9. Alb 3.6  -One time 20 mg IV Lasix given 3/10.     5. Endocrine: No acute issues     6. Hematology/Oncology: No acute issues.    IV/Access: right IJ  Prophylaxis: pepcid and enoxaparin    FULL CODE    Critical Care Time: 40 minutes    Kathy Morales, MS4    I acted as scribe for Dr. Fan Singh.

## 2021-03-11 NOTE — PROGRESS NOTES
[facility]    Medicine Progress Note - Hospitalist Service       Date of Admission:  3/4/2021    Assessment & Plan     Thanh Goodrich is an 80 year old male with history of myasthenia gravis, hypertension, prostate cancer s/p resection, morbid obesity, and chronic hyponatremia who presented 3/4/2021 with generalized weakness and hypoxic respiratory failure. Admitted by the Hospitalist service. RRT called the evening of admission for somnolence and acute hypoxic and hypercapnic respiratory failure, ultimately transferred to ICU for treatment with BiPAP. Initially felt etiology of sx was HFpEF exacerbation in the context of noncompliance with diuretic regimen. Upon transfer to the ICU, Neurology was consulted to weigh in as sx seemed to develop within a month of Covid vaccine with concern for myasthenia gravis exacerbation. Pt was ultimately intubated 3/6/21 and treated with IVIG and steroids. Extubated 3/11/21. Diuresis initiated 3/10/21 with good urine output. Hospitalist service was contacted for transfer out of the ICU.     Acute hypoxic and hypercapnic respiratory failure requiring intubation (3/6/21-3/11/21): RRT called 3/4/21 evening for increased somnolence. See note by Noman Reyes, ION. VBP with pH 7.08, pCO2 127. Placed on BiPAP and transferred to ICU. Differential for respiratory failure multifactorial including HFpEF exacerbation in the setting of recent changes/noncompliance with diuretic regimen, obesity hypoventilation syndrome, ? Untreated sleep apnea, and concern for myasthenia gravis exacerbation.   - Saturating well on 1.5 LPM supplemental oxygen via NC, ween as tolerated  - RCAT consulted, encourage pulmonary toilet with IS and acapella   - Treat separate issues below   - Outpatient sleep study recommended at discharge    Myasthenia gravis exacerbation: Question Covid vaccine being potential trigger for exacerbation. Received Pfizer shot #1 on 2/4/21. No exacerbations in the  "past 10 years. Follows with Dr. Perrin of [facility]. Stable. Off steroids for 4 years.   - Neurocritical care followed patient while in the ICU, plan as below. Appreciate recommendations regarding if/when patient should get second Covid vaccine.   - S/p IVIG 400 mg/kg daily, 5 days  - Continue prednisolone 60 mg x7 days (start date 3/5/21) and wean by 10 mg every week. Follow up with Dr. Perrin ([facility]) in 4 weeks after he has been discharge.  - Continue mycophenolate 1000 mg AM/500 mg PM     ADDENDUM 1350: Discussed case with [facility]. Upon transfer out of the ICU they will not follow patient. If need for Neurology, General Neurology service should be contacted. At this time, will hold on consult as plan established as above. Also asked recommendation regarding patient getting his second covid vaccine. No contraindication from their standpoint; pt should first recover from acute hospitalization. Pt should confer with his outpatient Neurologist.     Diastolic CHF exacerbation: On presentation, reported worsening generalized weakness, MARIE, and increased abdominal girth. Had self-discontinued hydrochlorothiazide several weeks PTA due to \"side effects.\" Echocardiogram 3/4/21 with EF 65-70%, right ventricle mildly dilated with mild decrease in RVSF. Mild to moderate mitral annular calcification, mild-moderate MR, mild-moderate MS. BNP on admission 1639. Trop negative X3. Procal negative. CTPE 3/4 with enlargement of central pulmonary arteries seen in pulmonary arterial HTN, mild bibasilar opacities, generalized prominence of the pulmonary vasculature and some probable scattered interstitial edema. Severe coronary artery calcification noted. Stress echo in 2011 negative for inducible ischemia   - Reports base weight pre-pandemic ~290, but has not been watching his weight or salt intake over the past year. Had been on hydrochlorothiazide \"for years,\" but recently switched 12/2020 to torsemide per PCP due " "to acute on chronic hyponatremia. Reports \"GI upset or maybe I didn't give it enough time to work\" with subsequent discontinuation and switch back to hydrochlorothiazide for which he does not consistently take.   - Pt received IV Lasix 20 mg X1 on 3/10 with >2 L output. Hold on further Lasix today given noted softer BPs this AM, reeval in the AM.   - I&Os, daily weights, keep Beckett in for today   - CORE clinic consult   - Continue PTA Losartan 50 mg po every day  - 2 g sodium diet     Wt Readings from Last 10 Encounters:   03/11/21 143 kg (315 lb 4.1 oz)   12/16/20 136.1 kg (300 lb)   10/08/20 130.2 kg (287 lb)   09/09/19 127.1 kg (280 lb 3.2 oz)   09/01/19 127.5 kg (281 lb)   08/23/19 129.3 kg (285 lb 1.6 oz)   08/14/19 130.2 kg (287 lb 1.6 oz)   08/07/19 132.2 kg (291 lb 6.4 oz)   07/05/19 133.6 kg (294 lb 8 oz)   10/12/18 132.5 kg (292 lb 1.6 oz)     Vitals:    03/07/21 0600 03/08/21 0400 03/09/21 0500 03/10/21 0600   Weight: 144 kg (317 lb 7.4 oz) 139.7 kg (308 lb) 144.1 kg (317 lb 10.9 oz) 145 kg (319 lb 10.7 oz)    03/11/21 0600   Weight: 143 kg (315 lb 4.1 oz)     Intake/Output Summary (Last 24 hours) at 3/11/2021 0848  Last data filed at 3/11/2021 0800  Gross per 24 hour   Intake 420 ml   Output 2180 ml   Net -1760 ml     Sinus tachycardia: Tachycardic in the 110s today, sinus tachy on tele. Has been maintained on Lovenox during ICU stay. Asymptomatic. Denies palpitations, chest discomfort, worsened SOB. Trop negative X4 on admission. Echo as above. CT PE negative for PE on admission.   - EKG ordered   - Start Lopressor 12.5 mg BID, discontinue Losartan 50 mg po every day, can add back at a lower dose based on BP   - Telemetry     Generalized weakness  Physical deconditioning: At baseline, ambulates independently with walker.   - PT/OT   - SW for discharge planning, pt will likely need TCU at discharge     Constipation: Bowel regimen in place.     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     Normocytic anemia: Baseline Hgb 11-12.   - Monitor     Recent Labs   Lab 03/11/21  0505 03/10/21  0605 03/09/21  0354 03/08/21  0429 03/07/21  0500   HGB 12.3* 10.8* 11.6* 11.3* 11.2*     Prostate cancer s/p resection (2007): Stable     Hypertension: Initiate BB as above, hold Losartan 50 mg po every day.      Obesity: Diet and lifestyle modification recommended.      Diet: NPO for Medical/Clinical Reasons Except for: No Exceptions  Adult Formula Drip Feeding: Continuous Peptamen Intense VHP; Orogastric tube; Goal Rate: 60; mL/hr; Medication - Feeding Tube Flush Frequency: At least 15-30 mL water before and after medication administration and with tube clogging; Amount to Sen...    DVT Prophylaxis: Enoxaparin (Lovenox) SQ  Beckett Catheter: in place, indication: Strict 1-2 Hour I&O  Code Status: Full Code, at this time, pt does not have formal HCA elected. Will be working on formal paperwork upon recovery. If he were to be unable to make decisions for himself, he would like his cousin, Devorah, to be involved in decision making for him at this time.        Disposition Plan   Expected discharge: 2 - 3 days, recommended to transitional care unit once appropriately diuresed.  Entered: Mela Hernández PA-C 03/11/2021, 8:34 AM     The patient's care was discussed with the Attending Physician, Dr. Perez, Bedside Nurse and Patient.    Mela Hernández PA-C  Hospitalist Service  [facility]  Contact information available via [facility] Paging/Directory  ______________________________________________________________________    Interval History   Contacted by Intensivist for transfer out of the ICU. Reports constipation. Denies SOB, chest pain, dizziness, lightheadedness, palpitations. Globally weak.     Data reviewed today: I reviewed all medications, new labs and imaging results over the last 24 hours. I personally reviewed all labs and imaging to  "date.     Physical Exam   Vital Signs: Temp: 97.9  F (36.6  C) Temp src: Bladder BP: 110/78 Pulse: 114   Resp: 8 SpO2: 100 % O2 Device: Nasal cannula Oxygen Delivery: 2 LPM  Weight: 315 lbs 4.12 oz    CONSTITUTIONAL: Obese male laying in bed, dressed in hospital garb. Appears comfortable. Cooperative with interview. Accompanied by RN at bedside.   HEENT: Normocephalic, atraumatic.   CARDIOVASCULAR: RRR, no murmurs, rubs, or extra heart sounds appreciated. Pulses +2/4 and regular in upper and lower extremities, bilaterally.   RESPIRATORY: No increased work of breathing.  Supplemental oxygenation via NC at 1.5 LPM. Diminished lung sounds at bases.   GASTROINTESTINAL:  Abdomen soft, non-distended; reports abdomen feels \"full.\" BS auscultated in all four quadrants. Negative for tenderness to palpation.  No masses or organomegaly noted.  MUSCULOSKELETAL: Global extremity weakness. No gross deformities noted. Normal muscle tone.   HEMATOLOGIC/LYMPHATIC/IMMUNOLOGIC: 1-2+ pitting edema in lower extremities, bilat.   NEUROLOGIC: Alert and oriented to person, place, and time.  strength intact. No focal neuro deficits  SKIN: Warm, dry, intact. No jaundice noted. Negative for suspicious lesions, rashes, bruising, open sores or abrasions.     Data   Recent Labs   Lab 03/11/21  0505 03/10/21  0605 03/09/21  0354 03/05/21  0450 03/05/21  0450 03/04/21  1629 03/04/21  1629 03/04/21  1235   WBC 9.1 6.9 7.2   < > 9.9   < >  --   --    HGB 12.3* 10.8* 11.6*   < > 11.6*   < >  --   --    MCV 88 86 85   < > 89   < >  --   --     163 172   < > 233   < >  --   --     136 136   < > 134   < >  --   --    POTASSIUM 4.0 3.7 3.9   < > 3.6   < >  --  3.8   CHLORIDE 97 100 100   < > 96   < >  --   --    CO2 37* 34* 35*   < > 29   < >  --   --    BUN 27 35* 35*   < > 31*   < >  --   --    CR 0.53* 0.63* 0.59*   < > 1.14   < >  --  0.97   ANIONGAP 2* 2* 1*   < > 9   < >  --   --    NOAH 8.8 8.6 8.7   < > 8.5   < >  --   --    GLC " 76 85 102*   < > 88   < >  --   --    PROTTOTAL  --   --  6.9  --   --   --   --   --    LIPASE  --   --   --   --  85  --   --   --    TROPI  --   --   --   --   --   --  <0.015 <0.015    < > = values in this interval not displayed.     No results found for this or any previous visit (from the past 24 hour(s)).  Medications     Continuing ACE inhibitor/ARB/ARNI from home medication list OR ACE inhibitor/ARB order already placed during this visit       fentaNYL Stopped (03/06/21 1000)     - MEDICATION INSTRUCTIONS -       norepinephrine Stopped (03/06/21 0757)     propofol (DIPRIVAN) infusion Stopped (03/07/21 0930)     BETA BLOCKER NOT PRESCRIBED       sodium chloride Stopped (03/09/21 0400)       acetaminophen  650 mg Oral or Feeding Tube Q6H     calcium carbonate 600 mg-vitamin D 400 units  1 tablet Oral BID w/meals     chlorhexidine  15 mL Mouth/Throat Q12H     Chromium  200 mcg Oral Daily     docusate  50 mg Oral Once     enoxaparin ANTICOAGULANT  40 mg Subcutaneous Q24H     famotidine  20 mg Intravenous Q12H     furosemide  20 mg Intravenous Q12H     losartan  50 mg Oral Daily     multivitamin, therapeutic  1 tablet Oral Daily     mycophenolate  1,000 mg Oral or Feeding Tube QAM     mycophenolate  500 mg Oral or Feeding Tube QPM     polyethylene glycol  17 g Oral Daily     [START ON 3/26/2021] predniSONE  10 mg Oral or Feeding Tube Daily     [START ON 3/19/2021] predniSONE  20 mg Oral or Feeding Tube Daily     [START ON 3/12/2021] predniSONE  40 mg Oral or Feeding Tube Daily     sodium chloride 0.9 %  100 mL Intravenous Once     sennosides  5 mL Oral Once

## 2021-03-11 NOTE — PROGRESS NOTES
CLINICAL NUTRITION SERVICES - REASSESSMENT NOTE      Future/Additional Recommendations:   Will be available for oral liquid nutritional supplements prn.  Will be available for 2 gm Na diet education as appropriate.   Malnutrition:  (3/5)  % Weight Loss:  None noted  % Intake:  </= 50% for >/= 5 days (severe malnutrition) - On 3/6  Subcutaneous Fat Loss:  None observed  Muscle Loss:  Temporal region mild depletion and Clavicle bone region mild depletion  Fluid Retention:  Mild      Malnutrition Diagnosis: Non-Severe malnutrition  In Context of:  Acute illness or injury  Chronic illness or disease       EVALUATION OF PROGRESS TOWARD GOALS   Diet:  2 gm Na    Nutrition Support:  TF was running via OG at recommended goal Peptamen Intense VHP at 60 mL/hr up until yesterday at 11:30 am when OG was pulled with extubation.     Intake/Tolerance:    Pt had been tolerating the TF.  While on TF he suspected his headache might perhaps be due to insufficient calories from the TF.  Last recorded stool 3/6 (Mirilax given this am), but having BM now.  Labs noted and acceptable.    3/11:  SLP bedside swallow = Minimal oropharyngeal dysphagia --> Okay for regular consistencies solids and liquids.  Pt consumed 50% lunch today (breakfast sandwich, hash browns, blueberry muffin, orange juice.  Unable to speak with patient this pm as he was receiving nursing cares (curtain closed).    ASSESSED NUTRITION NEEDS:  Dosing Weight:  130.2 kg (UBW for energy), 75.5 kg (IBW for pro)   Estimated Energy Needs:  6628-5471 kcals (11-14 Kcal/Kg)  Justification: obese   Estimated Protein Needs:  113-135 grams protein (1.5-1.8 g pro/Kg)  Justification: repletion, hypercatabolism with acute illness and obesity guidelines     NEW FINDINGS:   3/10:  Extubated and OG out   WOCN unable to see yesterday but plans to return today for skin assessment follow up.  Per provider note, pt reported he has not been watching his salt intake over the past year.  Will  need TCU at time of discharge.  Calcium with Vit D, MVI-M - for supplementation    Previous Goals (3/8):   Peptamen Intense VHP at 60 mL/hr will continue to meet % needs   Evaluation: Not met    Previous Nutrition Diagnosis (3/8):   No nutrition diagnosis identified at this time   Evaluation: Declining      CURRENT NUTRITION DIAGNOSIS  Predicted suboptimal nutrient intake related to recent intubation x 1 week and early satiety with 50% meal consumed today.    INTERVENTIONS  Recommendations / Nutrition Prescription  Will be available for oral liquid nutritional supplements prn.  Will be available for 2 gm Na diet education as appropriate.    Implementation  Collaboration and Referral of Nutrition care - Pt was discussed during ICU interdisciplinary rounds this morning.    Goals  Pt will consume > 75% meals TID in 3-5 days.    MONITORING AND EVALUATION:  Progress towards goals will be monitored and evaluated per protocol and Practice Guidelines    Kalyani Eaton, RD, LD, CNSC

## 2021-03-11 NOTE — PROGRESS NOTES
"[facility]    Neuro Critical Care Progress Note    Interval Events  Patient appears comfortable.   NIF -35/TV 1051 this AM.     Impression  #Myasthenia Gravis Exacerbation       Recommendations  Continue prednisolone 60 mg x7 days (start date 3/5/21) and wean by 10 mg every week.     Follow up with Dr. Renner in 4 weeks after he has been discharge.  Continue mycophenolate 1000 mg AM/500 mg PM   S/p IVIG 400 mg/kg daily, 5 days  Daily NIF/VC       Patient is to be transferred out of the ICU.  No further stroke/neuro critical care work-up indicated at this point.  We will sign off.  Please call with further questions.    The Stroke Staff is Dr. Adwoa Kramer MD  Vascular Neurology Fellow  To page me or covering stroke neurology team member, click here: AMCOM   Choose \"On Call\" tab at top, then search dropdown box for \"Neurology Adult\", select location, press Enter, then look for stroke/neuro ICU/telestroke.    ______________________________________________________    Medications   Home Meds  Prior to Admission medications    Medication Sig Start Date End Date Taking? Authorizing Provider   acetaminophen (TYLENOL) 500 MG tablet Take 500-1,000 mg by mouth every 6 hours as needed for mild pain or pain   Yes Unknown, Entered By History   calcium carbonate 600 mg-vitamin D 400 units (CALTRATE) 600-400 MG-UNIT per tablet Take 1 tablet by mouth daily   Yes Unknown, Entered By History   Cholecalciferol 100 MCG (4000 UT) CAPS Take 4,000 Units by mouth daily   Yes Unknown, Entered By History   CHROMIUM PICOLINATE Take 1 tablet by mouth daily.     Yes Unknown, Entered By History   losartan (COZAAR) 50 MG tablet Take 1 tablet (50 mg) by mouth daily 10/8/20  Yes Martinez Toledo MD   mycophenolate (GENERIC EQUIVALENT) 500 MG tablet Take 1,000 mg by mouth every morning   Yes Unknown, Entered By History   mycophenolate (GENERIC EQUIVALENT) 500 MG tablet Take 500 mg by mouth every evening "   Yes Unknown, Entered By History   Multiple Vitamins-Minerals (MULTIVITAMIN ADULTS 50+) TABS Take 1 tablet by mouth daily     Reported, Patient       Scheduled Meds    acetaminophen  650 mg Oral or Feeding Tube Q6H     calcium carbonate 600 mg-vitamin D 400 units  1 tablet Oral BID w/meals     chlorhexidine  15 mL Mouth/Throat Q12H     docusate  50 mg Oral Once     enoxaparin ANTICOAGULANT  40 mg Subcutaneous Q24H     metoprolol tartrate  12.5 mg Oral BID     multivitamin, therapeutic  1 tablet Oral Daily     mycophenolate  1,000 mg Oral or Feeding Tube QAM     mycophenolate  500 mg Oral or Feeding Tube QPM     polyethylene glycol  17 g Oral Daily     [START ON 3/26/2021] predniSONE  10 mg Oral or Feeding Tube Daily     [START ON 3/19/2021] predniSONE  20 mg Oral or Feeding Tube Daily     [START ON 3/12/2021] predniSONE  40 mg Oral or Feeding Tube Daily     sodium chloride 0.9 %  100 mL Intravenous Once       Infusion Meds    Continuing ACE inhibitor/ARB/ARNI from home medication list OR ACE inhibitor/ARB order already placed during this visit       fentaNYL Stopped (03/06/21 1000)     - MEDICATION INSTRUCTIONS -       norepinephrine Stopped (03/06/21 0757)     propofol (DIPRIVAN) infusion Stopped (03/07/21 0930)     BETA BLOCKER NOT PRESCRIBED       sodium chloride Stopped (03/09/21 0400)       PRN Meds  acetaminophen, artificial tears ophthalmic solution, Continuing ACE inhibitor/ARB/ARNI from home medication list OR ACE inhibitor/ARB order already placed during this visit, glucose **OR** dextrose **OR** glucagon, diphenhydrAMINE **OR** diphenhydrAMINE, diphenhydrAMINE, EPINEPHrine, famotidine, fentaNYL, fentaNYL, HOLD MEDICATION, lidocaine 4%, lidocaine (buffered or not buffered), melatonin, methylPREDNISolone, miconazole, naloxone **OR** naloxone **OR** naloxone **OR** naloxone, - MEDICATION INSTRUCTIONS -, ondansetron **OR** ondansetron, phenol, polyethylene glycol, prochlorperazine **OR** prochlorperazine  **OR** prochlorperazine, propofol (DIPRIVAN) infusion **AND** propofol **AND** CK total **AND** Triglycerides, BETA BLOCKER NOT PRESCRIBED, sodium chloride, sodium chloride (PF), sodium chloride (PF)       PHYSICAL EXAMINATION  Temp:  [97.5  F (36.4  C)-98.8  F (37.1  C)] 97.9  F (36.6  C)  Pulse:  [] 108  Resp:  [8-35] 15  BP: ()/() 127/88  SpO2:  [93 %-100 %] 100 %     Neurologic  Mental Status:  alert, oriented x 3, follows commands. Comfortable  Cranial Nerves:  visual fields intact, PERRL, EOMI with normal smooth pursuit, facial sensation intact and symmetric, facial movements symmetric, hearing not formally tested but intact to conversation, palate elevation symmetric and uvula midline, shoulder shrug strong bilaterally, tongue protrusion midline, weak tongue thrust and cough.   Motor:  normal muscle tone and bulk, no abnormal movements, able to move all limbs spontaneously, no pronator drift  Reflexes:  toes down-going  Sensory:  light touch sensation intact and symmetric throughout upper and lower extremities, no extinction on double simultaneous stimulation   Coordination:  normal finger-to-nose and heel-to-shin bilaterally without dysmetria  Station/Gait:  deferred       Imaging  I personally reviewed all imaging; relevant findings per HPI.     Lab Results Data   CBC  Recent Labs   Lab 03/11/21  0505 03/10/21  0605 03/09/21  0354   WBC 9.1 6.9 7.2   RBC 4.77 4.16* 4.45   HGB 12.3* 10.8* 11.6*   HCT 42.0 35.6* 37.9*    163 172     Basic Metabolic Panel    Recent Labs   Lab 03/11/21  0505 03/10/21  0605 03/09/21  0354    136 136   POTASSIUM 4.0 3.7 3.9   CHLORIDE 97 100 100   CO2 37* 34* 35*   BUN 27 35* 35*   CR 0.53* 0.63* 0.59*   GLC 76 85 102*   NOAH 8.8 8.6 8.7     Liver Panel  Recent Labs   Lab 03/09/21  0354   PROTTOTAL 6.9     INR  No lab results found.   Lipid Profile    Recent Labs   Lab Test 12/16/20  1455 07/05/19  1558 08/08/18  1245 11/22/13  1523 11/22/13  1523    CHOL 141 174 175   < > 196   HDL 70 57 53   < > 61   LDL 60 105* 104*   < > 117   TRIG 55 59 90   < > 88   CHOLHDLRATIO  --   --   --   --  3.2    < > = values in this interval not displayed.     A1C  No lab results found.  Troponin I    Recent Labs   Lab 03/04/21  1629   TROPI <0.015

## 2021-03-12 NOTE — PROGRESS NOTES
[facility]  Hospitalist Progress Note  Admission Date:  3/4/2021       Assessment and Plan:   Thanh Goodrich is an 80 year old male with history of myasthenia gravis, hypertension, prostate cancer s/p resection, morbid obesity, and chronic hyponatremia who presented 3/4/2021 with generalized weakness and hypoxic respiratory failure. Admitted by the Hospitalist service. RRT called the evening of admission for somnolence and acute hypoxic and hypercapnic respiratory failure, ultimately transferred to ICU for treatment with BiPAP. Initially felt etiology of sx was HFpEF exacerbation in the context of noncompliance with diuretic regimen. Upon transfer to the ICU, Neurology was consulted to weigh in as sx seemed to develop within a month of Covid vaccine with concern for myasthenia gravis exacerbation. Pt was ultimately intubated 3/6/21 and treated with IVIG and steroids. Extubated 3/11/21. Diuresis initiated 3/10/21 with good urine output. Hospitalist service was contacted for transfer out of the ICU on 3/11/21.    3/6/2021 - intubated; treated with IVIG and steroids.  3/11/2021-extubated, transferred to floor, hospitalist consult   3/12/2021 -  maintained on supplemental O2 ~2-3L, BMP and CBC stable, blood pressure and HR improved today      #Acute hypoxic and hypercapnic respiratory failure requiring intubation (3/6/21-3/11/21): RRT called 3/4/21 evening for increased somnolence. See note by Noman Reyes, ION. VBP with pH 7.08, pCO2 127. Placed on BiPAP and transferred to ICU. Differential for respiratory failure multifactorial including HFpEF exacerbation in the setting of recent changes/noncompliance with diuretic regimen, obesity hypoventilation syndrome, ? Untreated sleep apnea, and concern for myasthenia gravis exacerbation.  PLAN:  -wean O2 as tolerated, currently sats stable ~ 2L at rest 3L with exertion, may need to discharge on O2  -check CXR today   -start oral Lasix 20 mg/d  -continue  "ambulation, pulmonary toilet,  IS and acapella   - Outpatient sleep study recommended at discharge when at baseline in one month or so      #Myasthenia gravis exacerbation: Question Covid vaccine being potential trigger for exacerbation. Received Pfizer shot #1 on 2/4/21. No exacerbations in the past 10 years. Follows with Dr. Perrin of [facility]. Stable. Off steroids for 4 years.   PLAN:  - Neurocritical care followed patient while in the ICU, plan as below. Appreciate recommendations regarding if/when patient should get second Covid vaccine.   - S/p IVIG 400 mg/kg daily, 5 days  - Continue prednisolone 60 mg x7 days (start date 3/5/21) and wean by 10 mg every week. Follow up with Dr. Perrin ([facility]) in 4 weeks after he has been discharge.  - Continue mycophenolate 1000 mg AM/500 mg PM        #Diastolic CHF exacerbation: On presentation, reported worsening generalized weakness, MARIE, and increased abdominal girth. Had self-discontinued hydrochlorothiazide several weeks PTA due to \"side effects.\" Echocardiogram 3/4/21 with EF 65-70%, right ventricle mildly dilated with mild decrease in RVSF. Mild to moderate mitral annular calcification, mild-moderate MR, mild-moderate MS. BNP on admission 1639. Trop negative X3. Procal negative. CTPE 3/4 with enlargement of central pulmonary arteries seen in pulmonary arterial HTN, mild bibasilar opacities, generalized prominence of the pulmonary vasculature and some probable scattered interstitial edema. Severe coronary artery calcification noted. Stress echo in 2011 negative for inducible ischemia   - Reports base weight pre-pandemic ~290, but has not been watching his weight or salt intake over the past year. Had been on hydrochlorothiazide \"for years,\" but recently switched 12/2020 to torsemide per PCP due to acute on chronic hyponatremia. Reports \"GI upset or maybe I didn't give it enough time to work\" with subsequent discontinuation and switch back to " hydrochlorothiazide for which he does not consistently take.   - Pt received IV Lasix 20 mg X1 on 3/10 with >2 L output.   -BP and BMP stable today 3/12/2021   PLAN:  -oral Lasix 20 mg/d (started 3/12)  -BMP in the am  - I&Os, daily weights; OK to pull Beckett   - CORE clinic consult and cardiology follow up at discharge   - Continue Lopresser   - 2 g sodium diet      Vitals:    03/08/21 0400 03/09/21 0500 03/10/21 0600 03/11/21 0600   Weight: 139.7 kg (308 lb) 144.1 kg (317 lb 10.9 oz) 145 kg (319 lb 10.7 oz) 143 kg (315 lb 4.1 oz)    03/12/21 0635   Weight: 142.7 kg (314 lb 9.6 oz)            Vitals:     03/07/21 0600 03/08/21 0400 03/09/21 0500 03/10/21 0600   Weight: 144 kg (317 lb 7.4 oz) 139.7 kg (308 lb) 144.1 kg (317 lb 10.9 oz) 145 kg (319 lb 10.7 oz)     03/11/21 0600   Weight: 143 kg (315 lb 4.1 oz)        Intake/Output Summary (Last 24 hours) at 3/12/2021 0810  Last data filed at 3/12/2021 0611  Gross per 24 hour   Intake 320 ml   Output 810 ml   Net -490 ml        #Sinus tachycardia; improved:   Has been maintained on Lovenox during ICU stay. Asymptomatic. Denies palpitations, chest discomfort, worsened SOB. Trop negative X4 on admission. Echo as above. CT PE negative for PE on admission.   PLAN:  -improved on low dose BB  -continue Telemetry      #Generalized weakness  #Physical deconditioning: At baseline, ambulates independently with walker.   PLAN:  - PT/OT   - SW for discharge planning, pt will need TCU       Chronic / Stable Issues:  Constipation: Bowel regimen in place.   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   Normocytic anemia: Baseline Hgb 11-12.Monitor   Prostate cancer s/p resection (2007): Stable   Hypertension: Initiate BB as above, hold PTA Losartan  Obesity: Diet and lifestyle modification recommended.   Diet: low salt, cardiac  DVT Prophylaxis: Enoxaparin (Lovenox) SQ  Beckett Catheter: in place, indication: Strict 1-2 Hour I&O  Code Status:  Full Code           Disposition Plan: to TCU this weekend with home oxygen; needs to improve mobility, O2 sats and monitor BMP / diuretics        Richelle Pedersen PA-C ([place])   Hospitalist  Pager: [phone]  8:37 AM  March 12, 2021                  Interval History:   Good appetite, eating breakfast.   endorses weakness, has not gotten out of bed much.  Uses a walker at home  No acute or new SOB  O2 dipping in the mid 80s while eating breakfast on 2L              Medications:       acetaminophen  650 mg Oral or Feeding Tube Q6H     calcium carbonate 600 mg-vitamin D 400 units  1 tablet Oral BID w/meals     chlorhexidine  15 mL Mouth/Throat Q12H     docusate  50 mg Oral Once     enoxaparin ANTICOAGULANT  40 mg Subcutaneous Q24H     metoprolol tartrate  12.5 mg Oral BID     multivitamin, therapeutic  1 tablet Oral Daily     mycophenolate  1,000 mg Oral or Feeding Tube QAM     mycophenolate  500 mg Oral or Feeding Tube QPM     polyethylene glycol  17 g Oral Daily     [START ON 3/26/2021] predniSONE  10 mg Oral or Feeding Tube Daily     [START ON 3/19/2021] predniSONE  20 mg Oral or Feeding Tube Daily     predniSONE  40 mg Oral or Feeding Tube Daily     sodium chloride 0.9 %  100 mL Intravenous Once     acetaminophen, artificial tears ophthalmic solution, Continuing ACE inhibitor/ARB/ARNI from home medication list OR ACE inhibitor/ARB order already placed during this visit, glucose **OR** dextrose **OR** glucagon, diphenhydrAMINE **OR** diphenhydrAMINE, diphenhydrAMINE, EPINEPHrine, famotidine, fentaNYL, HOLD MEDICATION, lidocaine 4%, lidocaine (buffered or not buffered), melatonin, methylPREDNISolone, miconazole, naloxone **OR** naloxone **OR** naloxone **OR** naloxone, - MEDICATION INSTRUCTIONS -, ondansetron **OR** ondansetron, phenol, polyethylene glycol, prochlorperazine **OR** prochlorperazine **OR** prochlorperazine, BETA BLOCKER NOT PRESCRIBED, sodium chloride, sodium chloride (PF), sodium chloride  (PF)               Physical Exam:     Patient Vitals for the past 24 hrs:   BP Temp Temp src Pulse Resp SpO2 Weight   21 0726 110/52 95.4  F (35.2  C) Oral 80 15 98 % --   21 0635 -- -- -- -- -- -- 142.7 kg (314 lb 9.6 oz)   21 0135 129/64 95.1  F (35.1  C) Axillary 72 15 98 % --   21 0002 -- -- -- -- -- 98 % --   21 2237 112/62 95.5  F (35.3  C) Oral 76 17 99 % --   21 127/60 98.4  F (36.9  C) Oral 87 16 98 % --   21 1642 112/64 97.9  F (36.6  C) Oral 75 16 98 % --   21 1500 119/70 96.3  F (35.7  C) Oral 119 16 97 % --   21 1400 109/62 -- -- 121 12 96 % --   21 1300 125/84 -- -- 112 21 98 % --   21 1200 127/88 -- -- 108 15 100 % --   21 1100 118/83 -- -- 116 24 96 % --   21 1000 (!) 140/92 -- -- 129 (!) 34 97 % --   21 0900 (!) 119/91 -- -- 114 20 99 % --     Wt Readings from Last 4 Encounters:   21 142.7 kg (314 lb 9.6 oz)   20 136.1 kg (300 lb)   10/08/20 130.2 kg (287 lb)   19 127.1 kg (280 lb 3.2 oz)         Vital Sign Ranges  Temperature Temp  Av.4  F (35.8  C)  Min: 95.1  F (35.1  C)  Max: 98.4  F (36.9  C)   Blood pressure Systolic (24hrs), Av , Min:109 , Max:140        Diastolic (24hrs), Av, Min:52, Max:92      Pulse Pulse  Av.8  Min: 72  Max: 129   Respirations Resp  Av.4  Min: 12  Max: 34   Pulse oximetry SpO2  Av.8 %  Min: 96 %  Max: 100 %         Intake/Output Summary (Last 24 hours) at 3/12/2021 0804  Last data filed at 3/12/2021 0611  Gross per 24 hour   Intake 320 ml   Output 810 ml   Net -490 ml       Constitutional:   awake, alert, cooperative, no apparent distress, and appears stated age  PALE     Neck:   Supple, symmetrical, trachea midline, no adenopathy, thyroid symmetric, not enlarged and no tenderness, skin normal     Lungs:   No increased work of breathing, FAIR air exchange, FAINT CRACKLES AT BASES     Cardiovascular:   NO TACHYCARDIA   regular rate and  rhythm, normal S1 and S2, no S3 or S4, and no murmur noted       Extremities  No edema, cyanosis or clubbing              Data:     Recent Labs   Lab Test 03/11/21  0505 03/10/21  0605 03/09/21  0354   WBC 9.1 6.9 7.2   HGB 12.3* 10.8* 11.6*   MCV 88 86 85    163 172      Recent Labs   Lab Test 03/11/21  0505 03/10/21  0605 03/09/21  0354    136 136   POTASSIUM 4.0 3.7 3.9   CHLORIDE 97 100 100   CO2 37* 34* 35*   BUN 27 35* 35*   CR 0.53* 0.63* 0.59*   ANIONGAP 2* 2* 1*   NOAH 8.8 8.6 8.7   GLC 76 85 102*     Recent Labs   Lab Test 03/11/21  0839 03/11/21  0505 03/10/21  1735 03/10/21  1226 03/10/21  0803 03/10/21  0605 03/09/21  0354 03/08/21  0429 03/07/21  1954 03/07/21  0500 03/07/21  0500   GLC  --  76  --   --   --  85 102* 110*  --   --  102*   BGM 81  --  118* 120* 91  --   --   --  142*   < >  --     < > = values in this interval not displayed.

## 2021-03-12 NOTE — PLAN OF CARE
Neuro:forgetful, Saxman  CV/Rhythm:WDL  Resp/02:2 L NC  GI/Diet:small incont BM  :alberto refused discontinued  Skin/Incisions/Sites:pannus, refused interdry, sacral mepilex replaced, intact skin, B groins pink, excoriated  Pulses/CMS:1+ BLE  Edema:BLE 1+  Activity/Falls Risk:fall risk, PT/OT, turned  Lines/Drains/IVs:PIV  Labs/BGM:mag rechecked start PO  Test/Procedures:none  VS/Pain:stable, denies pain  DC Plan:?  Other:therapies, WOC, Social work    Heart Failure Care Map  GOALS TO BE MET BEFORE DISCHARGE:    1. Decrease congestion and/or edema with diuretic therapy to achieve near optimal volume status.     Dyspnea improved: Yes, satisfactory for discharge.   Edema improved:         Net I/O and Weights since admission:   02/10 2300 - 03/12 2259  In: 23107.86 [P.O.:800; I.V.:2539.86]  Out: 17911 [Urine:78164]  Net: -1085.14     Vitals:    03/04/21 1202 03/05/21 0400 03/07/21 0600 03/08/21 0400   Weight: 142.2 kg (313 lb 6.4 oz) 143 kg (315 lb 4.1 oz) 144 kg (317 lb 7.4 oz) 139.7 kg (308 lb)    03/09/21 0500 03/10/21 0600 03/11/21 0600 03/12/21 0635   Weight: 144.1 kg (317 lb 10.9 oz) 145 kg (319 lb 10.7 oz) 143 kg (315 lb 4.1 oz) 142.7 kg (314 lb 9.6 oz)       2.  O2 sats > 90% on room air, or at prior home O2 therapy level.      Able to wean O2 this shift to keep sats above 90%?: No, further care required to meet this goal. Please explain 2-3L NC   Does patient use Home O2? No          Current oxygenation status:   SpO2: 98 %     O2 Device: Nasal cannula, Oxygen Delivery: 3 LPM    3.  Tolerates ambulation and mobility near baseline.     Ambulation: No, further care required to meet this goal. Please explain PT stood   Times patient ambulated with staff this shift: 1    Please review the Heart Failure Care Map for additional HF goal outcomes.    Beryl Aguirre, RN  3/12/2021

## 2021-03-12 NOTE — CONSULTS
[facility] evaluation referral received from ABDIFATAH Joel.  Patient is not currently established in the [facility]. Patient diagnosis: HFpEF. Patient not seen by Cardiology this admission or previously. Per hospitalist note today, plan for patient to establish care with Cardiology and [facility] clinic as outpatient. Will arrange for next available C[facility] MD visit. Per notes, tentative plan for discharge to TCU this weekend.       [facility] appointment made for:  Future Appointments   Date Time Provider Department Center   4/8/2021  3:00 PM ALANIZ LAB [facility]   4/9/2021  2:15 PM Neville Hernandez MD [facility]       We look forward to seeing Rigoberto in the clinic.   Please call with questions.     Kaur Brewer RN  [facility] RN Care Coordinator  [facility]  [phone]    [facility]: Cardiomyopathy, Optimization, Rehabilitation, Education   The [facility] is a heart failure specialty clinic within the [facility] where you will work with your cardiologist, nurse practitioners, physician assistants and registered nurses who specialize in heart failure care. They are dedicated to helping patients with heart failure to carefully adjust medications, receive education, and learn who and when to call if symptoms develop. They specialize in helping you better understand your condition, slow the progression of your disease, improve the length and quality of your life, help you detect future heart problems before they become life threatening, and avoid hospitalizations.

## 2021-03-12 NOTE — PROGRESS NOTES
A&O, forgetful. VSS, weaned to 2L O2. Tele NSR with BBB. Up with a lift. Turn and reposition. BLE +2 edema. Coccyx with scant bloody drainage and blanchable redness, barrier cream and mepliex applied. Right hip abrasion, mepliex changed. Redness to skin folds, miconazole applied. Incontinent of 2 watery stools. Beckett in place, good urine output. PIV SL. Slept between cares.

## 2021-03-12 NOTE — PROVIDER NOTIFICATION
"Text page to ABDIFATAH Peoples, \"Pt refusing alberto removal. Declining out of bed until PT at 1330, but desats with turn/repo in the bed FYI.\"  "

## 2021-03-12 NOTE — PLAN OF CARE
A/ox4, forgetful at times. VSS, on 2-3L O2, desats with activity. 2gm Na diet. Not out of bed yet today, pt refused, waiting to work with PT this afternoon. T/R q2h. Beckett in place, pt refusing removal. Tele SR with BBB. Denies pain. Lung sounds diminished, occasional productive cough. BLE 1+ edema. BLE 3/5 strength.

## 2021-03-12 NOTE — PROGRESS NOTES
Pt came up from ICU around 1500. A/O x4. Anxious at times. VSS, on 5L NC. Denies pain/SOB. Tolerating low sodium diet. Denies N/V. Beckett patent with adequate output. BS active, passing flatus, had 1 small soft BM this shift. Turn/repo Q2H. R. internal jugular site CDI. PIV SL. Plan to discharge pending clinical improvement.

## 2021-03-12 NOTE — PROGRESS NOTES
"[facility] Nurse Inpatient Wound Assessment   Reason for consultation: Evaluate and treat ble wounds    Assessment  BLE wounds due to lymphedema scars no open wounds   Status: stable  Concerns from nurses about his buttocks and pannus and folds   Treatment Plan  Left Breast Rash BID   Cleanse with wound cleanser  Apply antifungal powder and rub it in  Apply calvilon skin prep     Pannus - BID   Cleanse pannus with barrier wipes and dry  Apply Antifungal powder-refusing interdry    SACRUM/Gluteal cleft  1. Clean wound with saline or MicroKlenz Spray, pat dry  2. Wipe / \"clean\" the surrounding periwound tissue with several skin preps to help with dressing sticking  3. Press a Mepilex Sacral Dressing ([id number]) to the area, making sure to conform nicely to skin curvatures.   4. Time and date dressing change  -REPOSITION ALL PATIENTS SIDE TO SIDE ONLY WHEN IN BED, EVERY 2 HOURS,   -HEELS MUST BE KEPT ELEVATED AT ALL TIMES, USING AT LEAST 2 PILLOWS UNDER EACH CALF, ASSURING HEELS ARE FLOATING  -WHEN UP TO THE CHAIR PT NEEDS TO FULLY OFF LOAD EVERY 2 HOURS    Orders Updated  Recommended provider order: None, at this time  WO Nurse follow-up plan:weekly  Nursing to notify the Provider(s) and re-consult the WO Nurse if wound(s) deteriorates or new skin concern.    Patient History  According to provider note(s):  Thanh Goodrich is a 80 year old male who presented to [facility] ED on 3/2/2021 for evaluation and management of generalized weakness and increasing edema. Emergency Department evaluation is concerning for acute hypoxic respiration failure with CT PE study concerning for pulmonary hypertension and generalized prominence of pulmonary vasculature and probable scattered interstitial edema concerning for fluid overload. He received Lasix 40- mg IV x 1 in ED and is being admitted to inpatient for further evaluation and management.      Objective Data  Containment of urine/stool: Incontinence Protocol and Indwelling " catheter    Active Diet Order  Orders Placed This Encounter      2 Gram Sodium Diet      Output:   I/O last 3 completed shifts:  In: 320 [P.O.:320]  Out: 870 [Urine:870]    Risk Assessment:   Sensory Perception: 2-->very limited  Moisture: 3-->occasionally moist  Activity: 2-->chairfast  Mobility: 2-->very limited  Nutrition: 3-->adequate  Friction and Shear: 2-->potential problem  Marc Score: 14                          Labs:   Recent Labs   Lab 03/12/21  0722   HGB 12.2*   WBC 6.2       Physical Exam  Areas of skin assessed: focused folds and gluteal cleft        Gluteal cleft - intertrigo measuring 4x0.3cm, 100% agranular, no drainage.    Wound Location: Left breast fold    Date of last photo none taken today  Wound History: found on admission   Rash with satellite lesions         Measurements (length x width x depth, in cm) 3  x 5 cm        Wound Location:  Right side of pannus  Date of last photo none taken   Wound History: found on admission - intertrigo and from shear  Wound Base: 100 % dermis this area is comprised of 3 lines one on top of the other     Palpation of the wound bed: normal      Drainage: scant     Description of drainage: serosanguinous     Measurements (length x width x depth, in cm) 3 linear areas with 3.5 x 3.5 cm area      Periwound skin: pendulous pannus      Color: pale      Temperature: normal   Odor: none  Pain: mild    Interventions  Visual inspection and assessment completed on admission   Wound Care Rationale Provide protection  and Decrease bacterial load  Wound Care: completed by RN  Supplies: floor stock and discussed with RN  Current off-loading measures: Pillows under calves and Wedge positioning system  Current support surface: Standard  Low air loss mattress with pulsation   Education provided to: wound progress, Infection prevention , Moisture management and Off-loading pressure  Discussed plan of care with Nurse    Quique Reese RN CWOCN

## 2021-03-13 NOTE — PLAN OF CARE
Denies pain. Up with lift to chair. Tolerating PO intake. Productive cough, lungs dim. 2L oxygen, pt encouraged to wean but would like to keep on. Beckett for lasix.

## 2021-03-13 NOTE — PLAN OF CARE
Patient A&0 x4. Up with lift. Patient is in pulsate bed but he does not like this bed and wishes to change back into regular bed. PT also states he is able to  get up and ambulate from a regular  bed and is unable to from the pulsate bed. Bed will be changed out.  Patient is on 1ltr 02. Fine crackles noted in bases of his lungs. He has an infrequent productive cough. Trace edema noted in bilateral feet and legs. Denies pain.  Tele is 1st degree AV block and BBB.

## 2021-03-13 NOTE — PROGRESS NOTES
[facility]  Hospitalist Progress Note  Isai Laughlin MD  03/13/2021    Assessment & Plan   Thanh Goodrich is an 80 year old male with history of myasthenia gravis, hypertension, prostate cancer s/p resection, morbid obesity, and chronic hyponatremia who presented 3/4/2021 with generalized weakness and hypoxic respiratory failure. Admitted by the Hospitalist service. RRT called the evening of admission for somnolence and acute hypoxic and hypercapnic respiratory failure, ultimately transferred to ICU for treatment with BiPAP. Initially felt etiology of sx was HFpEF exacerbation in the context of noncompliance with diuretic regimen. Upon transfer to the ICU, Neurology was consulted to weigh in as sx seemed to develop within a month of Covid vaccine with concern for myasthenia gravis exacerbation. Pt was ultimately intubated 3/6/21 and treated with IVIG and steroids. Extubated 3/11/21. Diuresis initiated 3/10/21 with good urine output. Hospitalist service was contacted for transfer out of the ICU on 3/11/21.  Hosp Course  3/6/2021 - intubated; treated with IVIG and steroids.  3/11/2021-extubated, transferred to floor, hospitalist consult   3/12/2021 -  maintained on supplemental O2 ~2-3L, BMP and CBC stable, blood pressure and HR improved today      #Acute hypoxic and hypercapnic respiratory failure requiring intubation (3/6/21-3/11/21): RRT called 3/4/21 evening for increased somnolence. See note by Noman Reyes, ION. VBP with pH 7.08, pCO2 127. Placed on BiPAP and transferred to ICU. Differential for respiratory failure multifactorial including HFpEF exacerbation in the setting of recent changes/noncompliance with diuretic regimen, obesity hypoventilation syndrome, ? Untreated sleep apnea, and concern for myasthenia gravis exacerbation.  PLAN:  -wean O2 as tolerated, currently sats stable ~ 2L at rest 3L with exertion, may need to discharge on O2  - CXR 3/12 showed increased  "consolidation  - continue iv lasix 40 mg bid  -continue ambulation, pulmonary toilet,  IS and acapella   - Outpatient sleep study recommended at discharge when at baseline in one month or so      #Myasthenia gravis exacerbation: Question Covid vaccine being potential trigger for exacerbation. Received Pfizer shot #1 on 2/4/21. No exacerbations in the past 10 years. Follows with Dr. Perrin of [facility]. Stable. Off steroids for 4 years.   PLAN:  - Neurocritical care followed patient while in the ICU, plan as below. Appreciate recommendations regarding if/when patient should get second Covid vaccine.   - S/p IVIG 400 mg/kg daily, 5 days  - Continue prednisolone 60 mg x7 days (start date 3/5/21) and wean by 10 mg every week. Follow up with Dr. Perrin ([facility]) in 4 weeks after he has been discharge.  - Continue mycophenolate 1000 mg AM/500 mg PM      #Diastolic CHF exacerbation: On presentation, reported worsening generalized weakness, MARIE, and increased abdominal girth. Had self-discontinued hydrochlorothiazide several weeks PTA due to \"side effects.\" Echocardiogram 3/4/21 with EF 65-70%, right ventricle mildly dilated with mild decrease in RVSF. Mild to moderate mitral annular calcification, mild-moderate MR, mild-moderate MS. BNP on admission 1639. Trop negative X3. Procal negative. CTPE 3/4 with enlargement of central pulmonary arteries seen in pulmonary arterial HTN, mild bibasilar opacities, generalized prominence of the pulmonary vasculature and some probable scattered interstitial edema. Severe coronary artery calcification noted. Stress echo in 2011 negative for inducible ischemia   - Reports base weight pre-pandemic ~290, but has not been watching his weight or salt intake over the past year. Had been on hydrochlorothiazide \"for years,\" but recently switched 12/2020 to torsemide per PCP due to acute on chronic hyponatremia. Reports \"GI upset or maybe I didn't give it enough time to work\" " "with subsequent discontinuation and switch back to hydrochlorothiazide for which he does not consistently take.   - Pt received IV Lasix 20 mg X1 on 3/10 with >2 L output.   -BP and BMP stable today 3/12/2021   PLAN:  -continue IV lasix while inpatient  -follow bmp  - I&Os, daily weights; keep alberto in  - CORE clinic consult and cardiology follow up at discharge   - Continue Lopresser   - 2 g sodium diet and 1500 ml fluid restriction  - last accurate weight 314, bed changed and now 326 which is likely an error.       #Sinus tachycardia; improved:   Has been maintained on Lovenox during ICU stay. Asymptomatic. Denies palpitations, chest discomfort, worsened SOB. Trop negative X4 on admission. Echo as above. CT PE negative for PE on admission.   PLAN:  -improved on low dose BB  -continue Telemetry      #Generalized weakness  #Physical deconditioning: At baseline, ambulates independently with walker.   PLAN:  - PT/OT   - SW for discharge planning, pt will need TCU       Chronic / Stable Issues:  Constipation: Bowel regimen in place.   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   Normocytic anemia: Baseline Hgb 11-12.Monitor   Prostate cancer s/p resection (2007): Stable   Hypertension: Initiate BB as above, hold PTA Losartan  Obesity: Diet and lifestyle modification recommended.   Diet: low salt, cardiac  DVT Prophylaxis: Enoxaparin (Lovenox) SQ  Alberto Catheter: in place, indication: Strict 1-2 Hour I&O  Code Status: Full Code        Disposition Plan: to TCU early next week.         Interval History   -- chart reviewed  -- significant diuresis past 24 hrs  -- patient interviewed    -Data reviewed today: I reviewed all new labs and imaging over the last 24 hours. I personally reviewed no images or EKG's today.    Physical Exam    , Blood pressure 114/60, pulse 77, temperature 95.7  F (35.4  C), temperature source Oral, resp. rate 22, height 1.778 m (5' 10\"), weight 148 kg (326 lb " 4.5 oz), SpO2 98 %.  Vitals:    03/11/21 0600 03/12/21 0635 03/13/21 0400   Weight: 143 kg (315 lb 4.1 oz) 142.7 kg (314 lb 9.6 oz) 148 kg (326 lb 4.5 oz)     Vital Signs with Ranges  Temp:  [95.6  F (35.3  C)-96.3  F (35.7  C)] 95.7  F (35.4  C)  Pulse:  [72-92] 77  Resp:  [15-22] 22  BP: (114-133)/(56-74) 114/60  SpO2:  [86 %-99 %] 98 %  I/O's Last 24 hours  I/O last 3 completed shifts:  In: 480 [P.O.:480]  Out: 710 [Urine:710]    Constitutional: Awake, alert, cooperative, no apparent distress  Respiratory: Diminished at bases  Cardiovascular: Regular rate   GI: Normal bowel sounds, soft, morbidly obese with large pannus  Skin/Integumen: No rashes, no cyanosis, no edema  Other:      Medications   All medications were reviewed.    Continuing ACE inhibitor/ARB/ARNI from home medication list OR ACE inhibitor/ARB order already placed during this visit       BETA BLOCKER NOT PRESCRIBED       sodium chloride Stopped (03/09/21 0400)       acetaminophen  650 mg Oral or Feeding Tube Q6H     calcium carbonate 600 mg-vitamin D 400 units  1 tablet Oral BID w/meals     enoxaparin ANTICOAGULANT  40 mg Subcutaneous Q24H     furosemide  40 mg Intravenous BID     furosemide  20 mg Oral Daily     magnesium oxide  400 mg Oral BID     metoprolol tartrate  12.5 mg Oral BID     multivitamin, therapeutic  1 tablet Oral Daily     mycophenolate  1,000 mg Oral or Feeding Tube QAM     mycophenolate  500 mg Oral or Feeding Tube QPM     polyethylene glycol  17 g Oral Daily     [START ON 3/14/2021] predniSONE  50 mg Oral Daily    Followed by     [START ON 3/20/2021] predniSONE  40 mg Oral Daily    Followed by     [START ON 3/27/2021] predniSONE  30 mg Oral Daily    Followed by     [START ON 4/3/2021] predniSONE  20 mg Oral Daily    Followed by     [START ON 4/10/2021] predniSONE  10 mg Oral Daily        Data   Recent Labs   Lab 03/13/21  0826 03/12/21  0722 03/11/21  0505 03/10/21  0605 03/09/21  0354   WBC  --  6.2 9.1 6.9 7.2   HGB  --   12.2* 12.3* 10.8* 11.6*   MCV  --  93 88 86 85    203 180 163 172    136 136 136 136   POTASSIUM 3.8 4.7 4.0 3.7 3.9   CHLORIDE 91* 98 97 100 100   CO2 44* 39* 37* 34* 35*   BUN 20 25 27 35* 35*   CR 0.61* 0.65* 0.53* 0.63* 0.59*   ANIONGAP <1* <1* 2* 2* 1*   NOAH 9.0 9.1 8.8 8.6 8.7   GLC 90 82 76 85 102*   PROTTOTAL  --   --   --   --  6.9       No results found for this or any previous visit (from the past 24 hour(s)).    Isai Laughlin MD  Text Page  (7am to 6pm)

## 2021-03-13 NOTE — PLAN OF CARE
A/O x4, forgetful. VSS on 2L NC. Tele: 1st deg AV block w/BBB. Pain controlled with scheduled tylenol, denies N/V, 2gm Na diet. T/R q2h. Mepilex to coccyx. Miconazole to reddened groin. Beckett patent with good UOP. Incontinent of bowel, x2 soft small/smear BM this shift. PIV saline locked. Up Ax2, lift. Mag replacement in process. Plan to discharge to TCU vs Home with home care.

## 2021-03-13 NOTE — CONSULTS
Care Management Initial Consult    General Information  Assessment completed with: Patient, REJI Garcia       Primary Care Provider verified and updated as needed:     Readmission within the last 30 days:           Advance Care Planning: Advance Care Planning Reviewed: no concerns identified          Communication Assessment  Patient's communication style: spoken language (English or Bilingual)    Hearing Difficulty or Deaf: no   Wear Glasses or Blind: yes    Cognitive  Cognitive/Neuro/Behavioral: WDL  Level of Consciousness: alert  Arousal Level: opens eyes spontaneously  Orientation: oriented x 4  Mood/Behavior: calm, cooperative  Best Language: 0 - No aphasia  Speech: clear    Living Environment:   People in home: alone     Current living Arrangements: apartment      Able to return to prior arrangements:         Family/Social Support:  Care provided by: self  Provides care for: no one     Other (specify)(Extended family)          Description of Support System: Supportive, Involved         Current Resources:   Patient receiving home care services: No     Community Resources:    Equipment currently used at home: grab bar, tub/shower, shower chair, walker, rolling  Supplies currently used at home:      Employment/Financial:  Employment Status: retired        Financial Concerns:             Lifestyle & Psychosocial Needs:        Socioeconomic History     Marital status: Single     Spouse name: Not on file     Number of children: Not on file     Years of education: Not on file     Highest education level: Not on file   Occupational History     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 [place]     Employer: [address] [place] [address]     Tobacco Use     Smoking status: Never Smoker     Smokeless tobacco: Never Used   Substance and Sexual Activity     Alcohol use: Yes     Alcohol/week: 0.0 standard drinks     Comment: very rarely     Drug use: No     Sexual activity: Not Currently     Partners: Female       Functional Status:  Prior to admission  patient needed assistance:   Dependent ADLs:: Independent  Dependent IADLs:: Independent       Mental Health Status:          Chemical Dependency Status:                Values/Beliefs:  Spiritual, Cultural Beliefs, Evangelical Practices, Values that affect care: no               Additional Information:  Thanh Goodrich is a 80 year old male who presented to [facility] ED on 3/2/2021 for evaluation and management of generalized weakness and increasing edema.    Writer spoke with pt. Pt lives in an apartment. He stated he was independent with everything until right before he came to the hospital. He stated he has gained about 25 pounds in the last month. He stated he uses a walker and has grab bars and a shower chair.     Pt stated he would prefer to go home with homecare and would be willing to pay for an aide as well. Writer spoke about him being recommended to TCU. He stated that he would like to speak with his cousin before sending referrals.     Pt stated that the doctor told him he will be here until at least Monday because he still has some issues with his lungs.     VALERY Carney

## 2021-03-13 NOTE — PLAN OF CARE
AO. VSS on 3L NC. 2-3 bed mobility. 2 g Na diet: tolerating. Inc bowel. Beckett in place: voiding adequately. Redness in groin: WOC following. +1 edema BL LE. PT following. PIV: SL.

## 2021-03-14 NOTE — PLAN OF CARE
A/O x4. VSS on 1.5L nc. Tele SR w/ BBB. C/O pain with Bms, rectal area reddened, skin barrier applied. Under pannus cleaned and interdry in place. Mepilex to sacrum/coccyx. Denies N/V, 2g NA diet with 1500ml fluid restriction, poor appetite. Phos and K+ replaced, recheck in AM, one dose left of mag replacement. T/R q2h as allows, intermittently refuses, educated on the benefits and consequences. 1 loose/soft BM this shift, alberto with good UOP. Up with lift. PIV saline locked. Plan pending.

## 2021-03-14 NOTE — CONSULTS
UROLOGY CONSULTATION:    PATIENT: Thanh ZALDIVAR (1940)  AGE: 80 year old year old male    Referring Physician: Isai Wilson  Primary Physician: Martinez Toledo      ASSESSMENT:  80 y.o. male with h.o. of prostate CA (cryotherapy, low PSAs) with urinary retention secondary to obstruction from pannus covering penis.  Creatinine 0.71. WBC 5.5. Alberto patent with yellow UOP      PLAN:      - Continue alberto catheter with discharge to TCU or home  - Pt is overdue for outpatient follow up with Dr. Charles, last seen prior to the Covid- pandemic.   - Standing order for once monthly catheter changes at TCU or at [facility] nursing schedule (pt will need to call to schedule if unable to have this done at TCU).   - Will plan for outpatient management with Dr. Charles, recommend follow up in 1 -3 months  - Urology to sign off     Mitzy Ruiz PA-C  [facility]  Office: [phone]      CHIEF COMPLAINT:  Urination difficulties    HPI:   Rigoberto is a 80 y.o. male with h.o. prostate CA, treated with cyroablation with subsequent low PSAs who is referred to Urology for voiding difficulties. He is a patient of Dr. Charles, last seen prior to Covid- pandemic. States not being able to lift his pannus anymore to allow for adequate emptying. He is unable to ambulate and have chronic edema.  Prior to this admission, denies recent gross hematuria, voiding concerns.      Past Medical History:   Diagnosis Date     Atypical mycobacterial infection 2013     Cellulitis of left leg 2012     Depressive disorder 2020     Edema 2009     History of steroid therapy 2010     Hyperlipidemia LDL goal <130 10/31/2010     HYPERTENSION 2003     Incontinence of urine 10/25/2010     Leg ulcer (H) 2012     MALIGN NEOPL PROSTATE-3/07 2007    T1C, Shaina 8, initial PSA 39, s/p radiation seed implants       Myasthenia gravis (H)      OBESITY 2003     Osteoporosis 2009    Refused  bisphosphonates 2011      PVC's (premature ventricular contractions) 11/23/2011     RIGHT OCCIPITAL PAIN 7/8/2003     ROTATOR CUFF SYND NOS- RT 9/19/2005     Skin nodule 3/18/2013     ulcer left shin 10/8/2007     Uncomplicated asthma 1944?    childhood only     Past Surgical History:   Procedure Laterality Date     BIOPSY  2013?    dealt with     Prostate Cancer Cryotherapy  2007     TONSILLECTOMY  1945     PAST SOCIAL HISTORY  Social History     Social History Narrative    The patient has a 0 pk yr tobacco hx.  He has no active use.  Alcohol use is rare alcoholic drinks per week.  He denies use of recreational drugs.          He is retired. 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        The patient is single.  Has 0 children.        Hot Tub Exposure: NO    Recent Travel: NO     Hx of incarceration:  NO    Bird Exposure:   NO    Animal Exposure:  3 Cats    Inhalation Exposure:  + asbestos exposure in past             Social History     Tobacco Use     Smoking status: Never Smoker     Smokeless tobacco: Never Used   Substance Use Topics     Alcohol use: Yes     Alcohol/week: 0.0 standard drinks     Comment: very rarely       FAMILY HISTORY  family history includes C.A.D. in his maternal grandmother; Cancer in his father and paternal uncle; Cerebrovascular Disease in his maternal grandfather; Depression in his mother; Diabetes in his maternal grandmother; Hypertension in his father and mother; Obesity in his father; Prostate Cancer in his father and another family member; Substance Abuse in his father and mother.    REVIEW OF SYSTEMS:  A comprehensive review of systems was reviewed with the patient with all findings across 10 systems negative except as per dictated in HPI.    There are no exam notes on file for this visit.             No current outpatient medications on file.                Allergies   Allergen Reactions     Ciprofloxacin Other (See Comments)     Contraindicated for myasthenia gravis patients  "    Procainamide Other (See Comments)     Contraindicated for myasthenia gravis patients     Quinidine Other (See Comments)     Contraindicated for myasthenia gravis patients     Quinine Other (See Comments)     Contraindicated for myasthenia gravis patients       PHYSICAL EXAM:          BP (!) 142/78 (BP Location: Right arm)   Pulse 78   Temp 95.5  F (35.3  C) (Oral)   Resp 20   Ht 1.778 m (5' 10\")   Wt 132 kg (291 lb 0.1 oz)   SpO2 97%   BMI 41.76 kg/m           General appearance shows morbid obesity, unable to ambulate  EYES: no icterus  HEAD, EARS, NOSE, MOUTH, AND THROAT: atraumatic, normocephalic  NECK: symmetric  CHEST WALL: symmetric  CARDIAC: skin well perfused  RESPIRATORY: breathing unlabored  ABDOMEN: soft, nontender, non distended, no rebound, guarding or peritoneal signs. + Pannus  : Penis somewhat buried with circumcised with alberto in place draining dark yellow UOP normal meatus, testicle descended bilaterally, normal size without masses or tenderness  SKIN/HAIR/NAILS: no visible rashes  NEUROLOGIC: no focal deficits  EXTREMITY: LE edema with brawny coloration at ankles  PSYCHIATRIC: Alert and oriented x3. Speech: normal Mood: normal Affect: normal        "

## 2021-03-14 NOTE — PROGRESS NOTES
[facility]  Hospitalist Progress Note  Isai Laughlin MD  03/14/2021    Assessment & Plan   Thanh Goodrich is an 80 year old male with history of myasthenia gravis, hypertension, prostate cancer s/p resection, morbid obesity, and chronic hyponatremia who presented 3/4/2021 with generalized weakness and hypoxic respiratory failure. Admitted by the Hospitalist service. RRT called the evening of admission for somnolence and acute hypoxic and hypercapnic respiratory failure, ultimately transferred to ICU for treatment with BiPAP. Initially felt etiology of sx was HFpEF exacerbation in the context of noncompliance with diuretic regimen. Upon transfer to the ICU, Neurology was consulted to weigh in as sx seemed to develop within a month of Covid vaccine with concern for myasthenia gravis exacerbation. Pt was ultimately intubated 3/6/21 and treated with IVIG and steroids. Extubated 3/11/21. Diuresis initiated 3/10/21 with good urine output. Hospitalist service was contacted for transfer out of the ICU on 3/11/21.  Hosp Course  3/6/2021 - intubated; treated with IVIG and steroids.  3/11/2021-extubated, transferred to floor, hospitalist consult   3/12/2021 -  maintained on supplemental O2 ~2-3L, BMP and CBC stable, blood pressure and HR improved today      #Acute hypoxic and hypercapnic respiratory failure requiring intubation (3/6/21-3/11/21): RRT called 3/4/21 evening for increased somnolence. See note by Noman Reyes, ION. VBP with pH 7.08, pCO2 127. Placed on BiPAP and transferred to ICU. Differential for respiratory failure multifactorial including HFpEF exacerbation in the setting of recent changes/noncompliance with diuretic regimen, obesity hypoventilation syndrome, ? Untreated sleep apnea, and concern for myasthenia gravis exacerbation.  - wean O2 as tolerated, suspect  may need to discharge on O2  - CXR 3/12 showed increased consolidation  - CO2 > 45 and he is likely intravascularly  "dry  - weight - 10 Kg  - will give metolazone 500 mg iv x1 for metabolic alkalosis and hold further diuresis  - continue ambulation, pulmonary toilet,  IS and acapella   - Outpatient sleep study recommended at discharge when at baseline in one month or so      #Myasthenia gravis exacerbation: Question Covid vaccine being potential trigger for exacerbation. Received Pfizer shot #1 on 2/4/21. No exacerbations in the past 10 years. Follows with Dr. Perrin of [facility]. Stable. Off steroids for 4 years.   PLAN:  - Neurocritical care followed patient while in the ICU, plan as below. Appreciate recommendations regarding if/when patient should get second Covid vaccine.   - S/p IVIG 400 mg/kg daily, 5 days  - Continue prednisolone 60 mg x7 days (start date 3/5/21) and wean by 10 mg every week.  Schedule in orders.  Follow up with Dr. Perrin ([facility]) in 4 weeks after he has been discharge.  - Continue mycophenolate 1000 mg AM/500 mg PM      #Diastolic CHF exacerbation  #Metabolic alkalosis: On presentation, reported worsening generalized weakness, MARIE, and increased abdominal girth. Had self-discontinued hydrochlorothiazide several weeks PTA due to \"side effects.\" Echocardiogram 3/4/21 with EF 65-70%, right ventricle mildly dilated with mild decrease in RVSF. Mild to moderate mitral annular calcification, mild-moderate MR, mild-moderate MS. BNP on admission 1639. Trop negative X3. Procal negative. CTPE 3/4 with enlargement of central pulmonary arteries seen in pulmonary arterial HTN, mild bibasilar opacities, generalized prominence of the pulmonary vasculature and some probable scattered interstitial edema. Severe coronary artery calcification noted. Stress echo in 2011 negative for inducible ischemia   - Reports base weight pre-pandemic ~290, but has not been watching his weight or salt intake over the past year. Had been on hydrochlorothiazide \"for years,\" but recently switched 12/2020 to torsemide " "per PCP due to acute on chronic hyponatremia. Reports \"GI upset or maybe I didn't give it enough time to work\" with subsequent discontinuation and switch back to hydrochlorothiazide for which he does not consistently take.   - Pt received IV Lasix 20 mg X1 on 3/10 with >2 L output.   -follow bmp  - I&Os, daily weights; keep alberto in  - [facility] clinic consult and cardiology follow up at discharge   - Continue Lopresser   - 2 g sodium diet and 1500 ml fluid restriction  - discontinue diuresis due to CO2 > 45, and he is at his dry weight  290-291 lbs  - will give 500 mg acetazolamide for alkalosis    Morbid obesity  Difficulty voiding  -- patient has significant pannus, had difficulty voiding by self trying to move his pannus so he can void  -- in hospital, has a alberto cather  -- I believe patient will have difficulty without a catheter this may have to be chronic  -- will get urology consultation from [facility], he may need chronic alberto catheter       #Sinus tachycardia - resolved:     Has been maintained on Lovenox during ICU stay. Asymptomatic. Denies palpitations, chest discomfort, worsened SOB. Trop negative X4 on admission. Echo as above. CT PE negative for PE on admission.   -improved on low dose BB  -continue Telemetry      #Generalized weakness  #Physical deconditioning: At baseline, ambulates independently with walker.   - PT/OT recommending TCU  - SW for discharge planning, pt will need TCU    - I believe patient was barely \"making it\" on his own with medical compliance and voiding difficulties.   - anticipate out of pocket cost for home health assistance and significant home health assistance if he were to go back to prior living situation.     Chronic / Stable Issues:  Constipation: Bowel regimen in place.   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   Normocytic anemia: Baseline Hgb 11-12.Monitor   Prostate cancer s/p resection (2007): Stable   Hypertension: Initiate BB as " "above, hold PTA Losartan  Obesity: Diet and lifestyle modification recommended.   Diet: low salt, cardiac  DVT Prophylaxis: Enoxaparin (Lovenox) SQ  Beckett Catheter: in place, indication: Strict 1-2 Hour I&O  Code Status: Full Code        Disposition Plan:  -- to TCU 3/16  -- urology to see   -- correct alkalosis     Interval History   -- chart reviewed  -- significant diuresis past 24 hrs  -- patient interviewed    -Data reviewed today: I reviewed all new labs and imaging over the last 24 hours. I personally reviewed no images or EKG's today.    Physical Exam    , Blood pressure (!) 142/78, pulse 78, temperature 95.5  F (35.3  C), temperature source Oral, resp. rate 20, height 1.778 m (5' 10\"), weight 132 kg (291 lb 0.1 oz), SpO2 97 %.  Vitals:    03/13/21 0400 03/13/21 1243 03/14/21 0500   Weight: 148 kg (326 lb 4.5 oz) 131.5 kg (290 lb) 132 kg (291 lb 0.1 oz)     Vital Signs with Ranges  Temp:  [95.5  F (35.3  C)-98.1  F (36.7  C)] 95.5  F (35.3  C)  Pulse:  [77-97] 78  Resp:  [20-22] 20  BP: (114-142)/(60-78) 142/78  SpO2:  [94 %-98 %] 97 %  I/O's Last 24 hours  I/O last 3 completed shifts:  In: 420 [P.O.:420]  Out: 6425 [Urine:6425]    Constitutional: Awake, alert, cooperative, no apparent distress  Respiratory: Diminished at bases  Cardiovascular: Regular rate   GI: Normal bowel sounds, soft, morbidly obese with large pannus  Skin/Integumen: No rashes, no cyanosis, no edema  Other:      Medications   All medications were reviewed.    Continuing ACE inhibitor/ARB/ARNI from home medication list OR ACE inhibitor/ARB order already placed during this visit       BETA BLOCKER NOT PRESCRIBED       sodium chloride Stopped (03/09/21 0400)       acetaminophen  650 mg Oral or Feeding Tube Q6H     acetaZOLAMIDE  500 mg Intravenous Once     calcium carbonate 600 mg-vitamin D 400 units  1 tablet Oral BID w/meals     enoxaparin ANTICOAGULANT  40 mg Subcutaneous Q24H     magnesium oxide  400 mg Oral BID     metoprolol tartrate "  12.5 mg Oral BID     multivitamin, therapeutic  1 tablet Oral Daily     mycophenolate  1,000 mg Oral or Feeding Tube QAM     mycophenolate  500 mg Oral or Feeding Tube QPM     polyethylene glycol  17 g Oral Daily     predniSONE  50 mg Oral Daily    Followed by     [START ON 3/20/2021] predniSONE  40 mg Oral Daily    Followed by     [START ON 3/27/2021] predniSONE  30 mg Oral Daily    Followed by     [START ON 4/3/2021] predniSONE  20 mg Oral Daily    Followed by     [START ON 4/10/2021] predniSONE  10 mg Oral Daily        Data   Recent Labs   Lab 03/14/21  0749 03/13/21  0826 03/12/21  0722 03/11/21  0505 03/09/21  0354 03/09/21  0354   WBC 5.5  --  6.2 9.1   < > 7.2   HGB 12.6*  --  12.2* 12.3*   < > 11.6*   MCV 92  --  93 88   < > 85    218 203 180   < > 172    134 136 136   < > 136   POTASSIUM 4.0 3.8 4.7 4.0   < > 3.9   CHLORIDE 84* 91* 98 97   < > 100   CO2 >45* 44* 39* 37*   < > 35*   BUN 22 20 25 27   < > 35*   CR 0.71 0.61* 0.65* 0.53*   < > 0.59*   ANIONGAP Not Calculated <1* <1* 2*   < > 1*   NOAH 9.2 9.0 9.1 8.8   < > 8.7   GLC 85 90 82 76   < > 102*   PROTTOTAL  --   --   --   --   --  6.9    < > = values in this interval not displayed.       No results found for this or any previous visit (from the past 24 hour(s)).    Isai Laughlin MD  Text Page  (7am to 6pm)

## 2021-03-14 NOTE — PROGRESS NOTES
Care Management Follow Up    Length of Stay (days): 10    Expected Discharge Date: 03/14/21(TCU pending PT/OT with O2)     Concerns to be Addressed: discharge planning     Patient plan of care discussed at interdisciplinary rounds: Yes    Anticipated Discharge Disposition: Transitional Care, Skilled Nursing Facilty     Anticipated Discharge Services: None  Anticipated Discharge DME: None    Patient/family educated on Medicare website which has current facility and service quality ratings: no  Education Provided on the Discharge Plan:    Patient/Family in Agreement with the Plan: yes    Referrals Placed by CM/SW: Post Acute Facilities  Private pay costs discussed: Not applicable    Additional Information:  Writer spoke with pt. He [facility], and [facility]. Pt stated he is okay with an extra fee for a private room.     Pt still prefers to go home, but understands that may not be an option.     VALERY Carney

## 2021-03-14 NOTE — PLAN OF CARE
Denies pain. Up with lift. Tolerating PO intake. Beckett, urology consult for continuation at home. Tele SR BBB. +BM today. 2L oxygen for comfort.

## 2021-03-15 NOTE — PROGRESS NOTES
Care Management Discharge Note    Discharge Date: 03/14/21(TCU pending PT/OT with O2)       Discharge Disposition: [facility], Transitional Care, Skilled Nursing Facilty      Discharge Transportation: [facility] wheelchair with O2 at 1500    Private pay costs discussed: Private pay wheelchair cost reviewed with patient    PAS Confirmation Code:    Patient/family educated on Medicare website which has current facility and service quality ratings: no    Persons Notified of Discharge Plans: Patient and cousin, Gaby notified   Patient/Family in Agreement with the Plan: yes    Additional Information:  Patient has been accepted at [facility], [facility]. Patient agrees to this plan. Orders are completed and were faxed.        LONI Kaur

## 2021-03-15 NOTE — PLAN OF CARE
Physical Therapy Discharge Summary    Reason for therapy discharge:    Discharged to transitional care facility.    Progress towards therapy goal(s). See goals on Care Plan in [facility] electronic health record for goal details.  Goals partially met.  Barriers to achieving goals:   discharge from facility.    Therapy recommendation(s):    Continued therapy is recommended.  Rationale/Recommendations:  To further increase independence with mobility.

## 2021-03-15 NOTE — PLAN OF CARE
OT: attempted for OT treatment. Pt was sound asleep, several attempts to wake, opened eyes a little but drifted back to sleep, unable to participate in meaningful therapy. Notes indicate pt accepted and leaving for [facility] at 15:00.

## 2021-03-15 NOTE — DISCHARGE SUMMARY
Pt A/O, VSS on 2L NC. C/o pain to shoulder, managing w/ scheduled tylenol. A2 w/ lift. T/R Q2h as tolerated. 2grm Na diet w/ FR 1500ml. PIV removed. Patient discharged at 3:00 PM to [facility] TCU. Belongings returned to patient.  Discharge instructions and medications reviewed with patient.  Patient verbalized understanding and all questions were answered.  No prescriptions given to patient.  At time of discharge, patient condition was stable and left the unit in wheelchair escorted by  [employer].

## 2021-03-15 NOTE — DISCHARGE SUMMARY
[facility]  Hospitalist Discharge Summary      Date of Admission:  3/4/2021  Date of Discharge:  3/15/2021  Discharging Provider: David Mcclain,       Discharge Diagnoses   Acute hypoxic and hypercapnic respiratory failure requiring intubation (3/6/21-3/11/21) 2/2 Myasthenia gravis exacerbation  Diastolic CHF exacerbation  Metabolic alkalosis, suspect contraction   Morbid obesity with difficulty voiding  Physical deconditioning  Ascending aorta dilatation  Normocytic anemia  Prostate cancer s/p resection (2007)  Hypertension      Follow-ups Needed After Discharge   Follow-up Appointments     Follow Up and recommended labs and tests      Follow up with group home physician.  The following labs/tests are   recommended: BMP in 4-5 days.  Follow up with PCP 1-2 weeks after TCU discharge  Follow up with neurology and urology as planned         Follow-up and recommended labs and tests       Please call to schedule a follow up with Dr. Charles. You may also call   for catheter changes every 30 days.     [facility]  [address] [place] [address]. [id number]  You may call [phone] with any questions or concerns.   Central Appointment #: [phone]           Unresulted Labs Ordered in the Past 30 Days of this Admission     No orders found from 2/2/2021 to 3/5/2021.          Discharge Disposition   Discharged to short-term care facility  Condition at discharge: Stable    Hospital Course   Thanh Goodrich is an 80 year old male with history of myasthenia gravis, hypertension, prostate cancer s/p resection, morbid obesity, and chronic hyponatremia who presented 3/4/2021 with generalized weakness and hypoxic respiratory failure. Admitted by the Hospitalist service. RRT called the evening of admission for somnolence and acute hypoxic and hypercapnic respiratory failure, ultimately transferred to ICU for treatment with BiPAP. Initially felt etiology of sx  was HFpEF exacerbation in the context of noncompliance with diuretic regimen. Upon transfer to the ICU, Neurology was consulted to weigh in as sx seemed to develop within a month of Covid vaccine with concern for myasthenia gravis exacerbation. Pt was ultimately intubated 3/6/21 and treated with IVIG and steroids. Extubated 3/11/21. Diuresis initiated 3/10/21 with good urine output. Hospitalist service was contacted for transfer out of the ICU on 3/11/21.  Hosp Course  3/6/2021 - intubated; treated with IVIG and steroids.  3/11/2021-extubated, transferred to floor, hospitalist consult   3/12/2021 -  maintained on supplemental O2 ~2-3L, BMP and CBC stable, blood pressure and HR improved today      #Acute hypoxic and hypercapnic respiratory failure requiring intubation (3/6/21-3/11/21): RRT called 3/4/21 evening for increased somnolence. See note by Noman Reyes, ION. VBP with pH 7.08, pCO2 127. Placed on BiPAP and transferred to ICU. Differential for respiratory failure multifactorial including HFpEF exacerbation in the setting of recent changes/noncompliance with diuretic regimen, obesity hypoventilation syndrome, ? Untreated sleep apnea, and concern for myasthenia gravis exacerbation.  - wean O2 as tolerated, suspect  may need to discharge on O2  - CXR 3/12 showed increased consolidation  - CO2 > 45 and he is likely intravascularly dry  - weight - 10 Kg  - will give metolazone 500 mg iv x1 for metabolic alkalosis and hold further diuresis  - continue ambulation, pulmonary toilet,  IS and acapella   - Outpatient sleep study recommended at discharge when at baseline in one month or so      #Myasthenia gravis exacerbation: Question Covid vaccine being potential trigger for exacerbation. Received Pfizer shot #1 on 2/4/21. No exacerbations in the past 10 years. Follows with Dr. Perrin of [facility]. Stable. Off steroids for 4 years.   PLAN:  - Neurocritical care followed patient while in the ICU, plan as below.  "Appreciate recommendations regarding if/when patient should get second Covid vaccine.   - S/p IVIG 400 mg/kg daily, 5 days  - Continue prednisolone 60 mg x7 days (start date 3/5/21) and wean by 10 mg every week.  Schedule in orders.  Follow up with Dr. Perrin ([facility]) in 4 weeks after he has been discharge.  - Continue mycophenolate 1000 mg AM/500 mg PM      #Diastolic CHF exacerbation  #Metabolic alkalosis: On presentation, reported worsening generalized weakness, MARIE, and increased abdominal girth. Had self-discontinued hydrochlorothiazide several weeks PTA due to \"side effects.\" Echocardiogram 3/4/21 with EF 65-70%, right ventricle mildly dilated with mild decrease in RVSF. Mild to moderate mitral annular calcification, mild-moderate MR, mild-moderate MS. BNP on admission 1639. Trop negative X3. Procal negative. CTPE 3/4 with enlargement of central pulmonary arteries seen in pulmonary arterial HTN, mild bibasilar opacities, generalized prominence of the pulmonary vasculature and some probable scattered interstitial edema. Severe coronary artery calcification noted. Stress echo in 2011 negative for inducible ischemia   - Reports base weight pre-pandemic ~290, but has not been watching his weight or salt intake over the past year. Had been on hydrochlorothiazide \"for years,\" but recently switched 12/2020 to torsemide per PCP due to acute on chronic hyponatremia. Reports \"GI upset or maybe I didn't give it enough time to work\" with subsequent discontinuation and switch back to hydrochlorothiazide for which he does not consistently take.   - Pt received IV Lasix 20 mg X1 on 3/10 with >2 L output.   -follow bmp  - I&Os, daily weights; keep alberto in  - [facility] consult and cardiology follow up at discharge   - Continue Lopresser   - 2 g sodium diet and 1500 ml fluid restriction  - discontinue diuresis due to CO2 > 45, and he is at his dry weight  290-291 lbs  - will give 500 mg acetazolamide for " "alkalosis     Morbid obesity  Difficulty voiding  -- patient has significant pannus, had difficulty voiding by self trying to move his pannus so he can void  -- in hospital, has a alberto cather  -- I believe patient will have difficulty without a catheter this may have to be chronic  -- will get urology consultation from [facility], he may need chronic alberto catheter        #Sinus tachycardia - resolved:     Has been maintained on Lovenox during ICU stay. Asymptomatic. Denies palpitations, chest discomfort, worsened SOB. Trop negative X4 on admission. Echo as above. CT PE negative for PE on admission.   -improved on low dose BB  -continue Telemetry      #Generalized weakness  #Physical deconditioning: At baseline, ambulates independently with walker.   - PT/OT recommending TCU  - SW for discharge planning, pt will need TCU    - I believe patient was barely \"making it\" on his own with medical compliance and voiding difficulties.   - anticipate out of pocket cost for home health assistance and significant home health assistance if he were to go back to prior living situation.     Chronic / Stable Issues:  Constipation: Bowel regimen in place.   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   Normocytic anemia: Baseline Hgb 11-12.Monitor   Prostate cancer s/p resection (2007): Stable   Hypertension: Initiate BB as above, hold PTA Losartan  Obesity: Diet and lifestyle modification recommended.     Consultations This Hospital Stay   CARE MANAGEMENT / SOCIAL WORK IP CONSULT  PHYSICAL THERAPY ADULT IP CONSULT  OCCUPATIONAL THERAPY ADULT IP CONSULT  [facility] EVALUATION IP CONSULT  OCCUPATIONAL THERAPY ADULT IP CONSULT  NUTRITION SERVICES ADULT IP CONSULT  CARE MANAGEMENT / SOCIAL WORK IP CONSULT  INTENSIVIST IP CONSULT  WOUND OSTOMY CONTINENCE NURSE  IP CONSULT  NEUROLOGY IP CONSULT  NUTRITION SERVICES ADULT IP CONSULT  PHARMACY IP CONSULT  SWALLOW EVAL SPEECH PATH AT BEDSIDE IP " CONSULT  SWALLOW EVAL SPEECH PATH AT BEDSIDE IP CONSULT  SWALLOW EVAL SPEECH PATH AT BEDSIDE IP CONSULT  CARE MANAGEMENT / SOCIAL WORK IP CONSULT  UROLOGY IP CONSULT  PHYSICAL THERAPY ADULT IP CONSULT  OCCUPATIONAL THERAPY ADULT IP CONSULT    Code Status   Full Code    Time Spent on this Encounter   I, David Mcclain DO, personally saw the patient today and spent greater than 30 minutes discharging this patient.       David Mcclain DO  [place] [facility] [address] [facility]  [address] [address]  Phone: [phone]  ______________________________________________________________________    Physical Exam   Vital Signs: Temp: 96  F (35.6  C) Temp src: Axillary BP: (!) 140/68 Pulse: 72   Resp: 16 SpO2: 98 % O2 Device: Nasal cannula Oxygen Delivery: 2 LPM  Weight: 300 lbs 7.79 oz  General Appearance: Resting comfortably in bed. NAD   Respiratory: Lungs were clear.  No respiratory distress  Cardiovascular: RRR.  No obvious murmurs but heart sounds distant  GI: Soft.  Non-tender  Skin: No obvious rashes or cyanosis  Other: Morbidly obese.  Alert         Primary Care Physician   Martinez Toledo    Discharge Orders      Basic metabolic panel     N terminal pro BNP outpatient     Hemoglobin    Last Lab Result: Hemoglobin (g/dL)       Date                     Value                 03/12/2021               12.2 (L)         ----------     TSH with free T4 reflex    Last Lab Result: TSH (mU/L)       Date                     Value                 04/18/2016               1.42             ----------     Follow-Up with [facility]      Follow-up and recommended labs and tests     Please call to schedule a follow up with Dr. Charles. You may also call for catheter changes every 30 days.     [facility]  [address] [place]. [address]  You may call [phone] with any questions or concerns.   Central Appointment #: [phone]     General info  for SNF    Length of Stay Estimate: Short Term Care: Estimated # of Days <30  Condition at Discharge: Stable  Level of care:skilled   Rehabilitation Potential: Fair  Admission H&P remains valid and up-to-date: Yes  Recent Chemotherapy: N/A  Use Nursing Home Standing Orders: Yes     Mantoux instructions    Give two-step Mantoux (PPD) Per Facility Policy Yes     Reason for your hospital stay    Myasthenia gravis     Follow Up and recommended labs and tests    Follow up with prison physician.  The following labs/tests are recommended: BMP in 4-5 days.  Follow up with PCP 1-2 weeks after TCU discharge  Follow up with neurology and urology as planned     Activity - Up ad shruthi     Beckett catheter    To straight gravity drainage. Change catheter every 2 weeks and PRN for leaking or decreased uring output with signs of bladder distention. DO NOT change catheter without a specific MD order IF diagnosis of benign prostatic hypertrophy (BPH), neurogenic bladder, or other urological conditions     Physical Therapy Adult Consult    Evaluate and treat as clinically indicated.    Reason:  Deconditioning from MG     Occupational Therapy Adult Consult    Evaluate and treat as clinically indicated.    Reason:  Deconditioning form MG     Advance Diet as Tolerated    Follow this diet upon discharge: Orders Placed This Encounter      Fluid restriction 1500 ML FLUID      2 Gram Sodium Diet       Significant Results and Procedures   Most Recent 3 CBC's:  Recent Labs   Lab Test 03/15/21  0651 03/14/21  0749 03/13/21  0826 03/12/21  0722   WBC 5.6 5.5  --  6.2   HGB 13.4 12.6*  --  12.2*   MCV 95 92  --  93    241 218 203     Most Recent 3 BMP's:  Recent Labs   Lab Test 03/15/21  0651 03/14/21  0749 03/13/21  0826    133 134   POTASSIUM 3.8 4.0 3.8   CHLORIDE 92* 84* 91*   CO2 >45* >45* 44*   BUN 21 22 20   CR 0.60* 0.71 0.61*   ANIONGAP Not Calculated Not Calculated <1*   NOAH 9.3 9.2 9.0   GLC 87 85 90     Most Recent 2  LFT's:  Recent Labs   Lab Test 03/04/21  0320 12/16/20  1455   AST 44 42   ALT 35 37   ALKPHOS 69 74   BILITOTAL 0.7 0.7   ,   Results for orders placed or performed during the hospital encounter of 03/04/21   XR Chest 2 Views    Narrative    EXAM: CHEST 2 VIEWS  LOCATION: [facility]  DATE/TIME: 3/4/2021 4:10 AM    INDICATION: Weakness.    COMPARISON: None.    FINDINGS: A few band-like opacities are present in bilateral lung bases. Blunting of the right costophrenic angle again noted and most likely related to scarring. Possible cardiomegaly. Atherosclerotic calcification in the thoracic aorta. Prominence of   the central pulmonary vasculature. Moderate elevation of the left hemidiaphragm.      Impression    IMPRESSION:  1. A few band-like opacities in bilateral lung bases are nonspecific and could represent atelectasis, scarring or pneumonia.  2. No other findings suspicious for acute cardiopulmonary disease.    CT Chest Pulmonary Embolism w Contrast    Narrative    EXAM: CT CHEST PULMONARY EMBOLISM W CONTRAST  LOCATION: [facility]  DATE/TIME: 3/4/2021 5:48 AM    INDICATION: Hypoxia, weakness.    COMPARISON: None.    TECHNIQUE: CT chest pulmonary angiogram during arterial phase injection of IV contrast. Multiplanar reformats and MIP reconstructions were performed. Dose reduction techniques were used.     CONTRAST: 83 mL Isovue-[id number].    FINDINGS:  ANGIOGRAM CHEST: Negative for pulmonary embolism. Mild enlargement of the central pulmonary arteries can be seen with pulmonary arterial hypertension. Thoracic aorta is negative for dissection. No CT evidence of right heart strain.    LUNGS AND PLEURA: Bibasilar opacities favored to be due to atelectasis versus less likely infiltrate. Clinical correlation. Prominence of the pulmonary vasculature. There are also some subtle scattered ground-glass opacities probably due to some   interstitial edema. Clinical correlation for any signs of CHF or  fluid overload. No pleural effusions.    MEDIASTINUM/AXILLAE: No adenopathy. Heart size is within normal limits. No pericardial effusion. 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 Descending thoracic aorta normal in caliber. Esophagus is grossly   negative.    CORONARY ARTERY CALCIFICATION: Severe.    UPPER ABDOMEN: Normal.    MUSCULOSKELETAL: Mild degenerative changes in the spine.      Impression    IMPRESSION:  1.  Negative for pulmonary embolism.    2.  Enlargement of the central pulmonary arteries can be seen with pulmonary arterial hypertension.    3.  Mild bibasilar opacities most likely due to atelectasis versus less likely infiltrate. Clinical correlation.    4.  There is some generalized prominence of the pulmonary vasculature and some probable scattered interstitial edema. Findings could be seen with CHF or fluid overload. Clinical correlation.    5.  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    6.  Severe coronary artery calcification.     XR Chest Port 1 View    Narrative    EXAM: XR CHEST PORT 1 VW  LOCATION: [facility]  DATE/TIME: 3/5/2021 12:56 AM    INDICATION: Intubation and central line placement.    COMPARISON: 3/4/2021.    FINDINGS: ET tube approximately 4 cm above the gabino. Right IJ central venous catheter tip projects over the distal SVC. There is no pneumothorax. The heart is at the upper limits of normal in size. Probable bilateral pleural effusions and bibasilar   infiltrates.      Impression    IMPRESSION: ET tube 4 cm above the gabino.   XR Abdomen Port 1 View    Narrative    Exam: XR ABDOMEN PORT 1 VW, 3/5/2021 10:35 AM    Indication: OG placement    Comparison: None      Impression    Impression:   Orogastric tube tip extends to the region of the gastric body. Bowel  gas pattern is nonobstructed.    JOVAN PAUL MD   XR Chest Port 1 View    Narrative    CHEST PORTABLE  ONE VIEW 3/6/2021 6:09 PM    HISTORY: Evaluate ETT placement.    COMPARISON: Chest x-rays, most recently 3/5/2021. CT dated 3/4/2021.    FINDINGS: EKG wires overlie the chest. Endotracheal tube tip projects  3.6 cm above the gabino. Right IJ central venous catheter tip projects  over the mid SVC. An enteric tube courses below the diaphragm with the  tip projecting over the stomach. The side-port overlies the  gastroesophageal junction.    The cardiomediastinal silhouette and pulmonary vasculature are within  normal limits. Aortic calcification. Low lung volumes. Bibasilar hazy  opacities, which obscure the costophrenic angles. No pneumothorax.      Impression    IMPRESSION:    1.  Endotracheal tube tip projects 3.6 cm above the gabino.    2.  The side port of the gastric tube overlies the gastroesophageal  junction. Consider advancing.    3.  Hazy bibasilar opacities. In correlation with comparison CT these  likely represent atelectasis in the setting of low lung volumes.  Effusions are difficult to exclude.    JOSEFINA KNIGHT MD   XR Abdomen Port 1 View    Narrative    ABDOMEN PORTABLE ONE VIEW  3/6/2021 6:15 PM    HISTORY: OG tube placement.    COMPARISON: Abdominal x-ray dated 3/5/2021.      Impression    IMPRESSION:     1.Gastric tube courses below the diaphragm with the tip overlying the  stomach. However, the side-port overlies the gastroesophageal  junction. Consider advancing.    2.Prominent air-filled loops of small bowel overlie the central  abdomen. A solitary loop is distended, measuring 4.2 cm in diameter.  However, there are air-filled loops of nondistended colon. This could  represent early adynamic ileus or partial small bowel obstruction,  although it may also be normal. Evaluation for free air is limited by  supine technique and collimation of the diaphragm off image.    JOSEFINA KNIGHT MD   XR Chest Port 1 View    Narrative    XR CHEST PORT 1 VW  3/9/2021 9:55 AM       INDICATION: New dyspnea and  tachycardia  COMPARISON: 3/6/2021       Impression    IMPRESSION: Tubes and lines are unchanged with endotracheal tube above  the gabino. Persistent bibasilar atelectasis, similar to previous.  Small pleural effusions difficult to exclude. No pneumothorax. No  pulmonary edema.    HARESH FRIED MD   XR Chest Port 1 View    Narrative    XR CHEST PORT 1 VW 3/12/2021 10:10 AM    HISTORY: persistent hypoxia    COMPARISON: 3/9/2021      Impression    IMPRESSION: Extubation, removal of the right IJ central venous  catheter and enteric tube. Elevated right hemidiaphragm. Small left  pleural effusion and bibasilar atelectasis/consolidation have  increased. No pneumothorax. Normal heart size.    DANA ONEAL MD   Echocardiogram Complete    Narrative    [id number]  [id number]  [id number]  [id number]^JOSE^ASAD^RONIT           [facility]  Echocardiography Laboratory  [address] [place], [place] [address]        Name: DAVIDSON GONZALEZ  MRN: [id number]  : 1940  Study Date: 2021 09:14 AM  Age: 80 yrs  Gender: Male  Patient Location: [facility]  Reason For Study: Heart Failure  Ordering Physician: ASAD GARCIA  Performed By: Ashlyn Barragan     BSA: 2.5 m2  Height: 70 in  Weight: 313 lb  HR: 93  BP: 133/62 mmHg  _____________________________________________________________________________  __        Procedure  Complete Portable Echo Adult. Optison (NDC #2133-6526) given intravenously.  _____________________________________________________________________________  __        Interpretation Summary     The visual ejection fraction is estimated at 65-70%. No regional wall motion  abnormalities noted.  The right ventricle is mild to moderately dilated. Mildly decreased right  ventricular systolic function  There is mild to moderate mitral annular calcification. There is mild, may be  mild-moderate mitral regurgitation. The mean mitral valve gradient is 6 mmHg.  The peak mitral valve gradient is 20 mmHg. There  is mild to moderate mitral  stenosis.  The ascending aorta is Mildly dilated at 4.2 cms.  Unable to assess mean RA pressure.  There is no pericardial effusion.  _____________________________________________________________________________  __        Left Ventricle  The left ventricle is normal in size. There is normal left ventricular wall  thickness. Hyperdynamic left ventricular function. The visual ejection  fraction is estimated at 65-70%. Diastolic Doppler findings (E/E' ratio and/or  other parameters) suggest left ventricular filling pressures are  indeterminate. Diastolic function not assessed due to significant mitral  annular calcification. No regional wall motion abnormalities noted.     Right Ventricle  The right ventricle is mild to moderately dilated. Mildly decreased right  ventricular systolic function.     Atria  Normal left atrial size. The right atrium is borderline dilated.     Mitral Valve  There is mild to moderate mitral annular calcification. There is mild (1+)  mitral regurgitation. Evaluation of regurgitation is inadequate. The mean  mitral valve gradient is 5.9 mmHg. The peak mitral valve gradient is 19.5  mmHg. There is mild to moderate mitral stenosis.        Tricuspid Valve  There is mild (1+) tricuspid regurgitation. The right ventricular systolic  pressure is approximated at 24.6 mmHg plus the right atrial pressure.     Aortic Valve  The aortic valve is trileaflet with aortic valve sclerosis. There is trace  aortic regurgitation. No hemodynamically significant valvular aortic stenosis.     Pulmonic Valve  The pulmonic valve is not well visualized.     Vessels  The aortic root is normal size. The ascending aorta is Mildly dilated. Unable  to assess mean RA pressure due to technically difficult study.     Pericardium  There is no pericardial effusion.     _____________________________________________________________________________  __  MMode/2D Measurements & Calculations  IVSd: 1.1  cm  LVIDd: 4.5 cm  LVIDs: 3.0 cm  LVPWd: 0.99 cm  FS: 33.5 %     LV mass(C)d: 164.5 grams  LV mass(C)dI: 65.2 grams/m2  Ao root diam: 3.3 cm  LA dimension: 3.3 cm  asc Aorta Diam: 4.2 cm  LA/Ao: 1.00  LA Volume (BP): 60.4 ml  LA Volume Index (BP): 24.0 ml/m2  RWT: 0.44        Doppler Measurements & Calculations  MV E max luz: 176.0 cm/sec     MV max P.5 mmHg  MV mean P.9 mmHg  MV V2 VTI: 46.2 cm  MV P1/2t max luz: 223.8 cm/sec  MV P1/2t: 68.2 msec  MVA(P1/2t): 3.2 cm2  MV dec slope: 961.0 cm/sec2  MV dec time: 0.20 sec  PA acc time: 0.01 sec  TR max luz: 248.2 cm/sec  TR max P.6 mmHg  Lateral E/e': 10.4           _____________________________________________________________________________  __           Report approved by: Jason Bennett 2021 11:49 AM            Discharge Medications   Current Discharge Medication List      START taking these medications    Details   metoprolol tartrate (LOPRESSOR) 25 MG tablet Take 0.5 tablets (12.5 mg) by mouth 2 times daily  Qty:      Associated Diagnoses: Essential hypertension, benign      multivitamin, therapeutic (THERA-VIT) TABS tablet Take 1 tablet by mouth daily  Qty:      Associated Diagnoses: Myasthenia gravis without exacerbation (H)      predniSONE (DELTASONE) 50 MG tablet Take 1 tablet (50 mg) by mouth daily Take 50 mg until 3/19.  Then decrease by 10 mg every 7 days starting on 3/20  Qty:      Associated Diagnoses: Myasthenia gravis without exacerbation (H)         CONTINUE these medications which have NOT CHANGED    Details   acetaminophen (TYLENOL) 500 MG tablet Take 500-1,000 mg by mouth every 6 hours as needed for mild pain or pain      calcium carbonate 600 mg-vitamin D 400 units (CALTRATE) 600-400 MG-UNIT per tablet Take 1 tablet by mouth daily      Cholecalciferol 100 MCG (4000 UT) CAPS Take 4,000 Units by mouth daily      CHROMIUM PICOLINATE Take 1 tablet by mouth daily.        !! mycophenolate (GENERIC EQUIVALENT) 500 MG tablet Take  1,000 mg by mouth every morning      !! mycophenolate (GENERIC EQUIVALENT) 500 MG tablet Take 500 mg by mouth every evening      Multiple Vitamins-Minerals (MULTIVITAMIN ADULTS 50+) TABS Take 1 tablet by mouth daily        !! - Potential duplicate medications found. Please discuss with provider.      STOP taking these medications       losartan (COZAAR) 50 MG tablet Comments:   Reason for Stopping:             Allergies   Allergies   Allergen Reactions     Ciprofloxacin Other (See Comments)     Contraindicated for myasthenia gravis patients     Procainamide Other (See Comments)     Contraindicated for myasthenia gravis patients     Quinidine Other (See Comments)     Contraindicated for myasthenia gravis patients     Quinine Other (See Comments)     Contraindicated for myasthenia gravis patients

## 2021-03-15 NOTE — PLAN OF CARE
Occupational Therapy Discharge Summary    Reason for therapy discharge:    Discharged to transitional care facility.    Progress towards therapy goal(s). See goals on Care Plan in [facility] electronic health record for goal details.  Goals not met.  Barriers to achieving goals:   limited tolerance for therapy.    Therapy recommendation(s):    Continued therapy is recommended.  Rationale/Recommendations:  pt is below baseline, would benefit from continued therapy prior to return home. .

## 2021-03-15 NOTE — PROGRESS NOTES
Care Management Follow Up    Length of Stay (days): 11    Expected Discharge Date: 03/15/21     Concerns to be Addressed: discharge planning     Patient plan of care discussed at interdisciplinary rounds: Yes    Anticipated Discharge Disposition: Transitional Care, Skilled Nursing Facilty     Anticipated Discharge Services: None  Anticipated Discharge DME: None    Patient/family educated on Medicare website which has current facility and service quality ratings: no  Education Provided on the Discharge Plan:    Patient/Family in Agreement with the Plan: yes    Referrals Placed by CM/SW: Post Acute Facilities  Private pay costs discussed: Not applicable    Additional Information:  PAS-RR    D: Per DHS regulation, SW completed and submitted PAS-RR to [place] [employer] Direct Connect via the Senior LinkAge Line.  PAS-RR confirmation # is : [id number]    P: Further questions may be directed to Senior LinkAge Line at #[phone], option #4 for PAS-RR staff.        VALERY Brown

## 2021-03-15 NOTE — PLAN OF CARE
Physical Therapy Discharge Summary    Reason for therapy discharge:    Discharged to transitional care facility.    Progress towards therapy goal(s). See goals on Care Plan in [facility] electronic health record for goal details.  Goals partially met.  Barriers to achieving goals:   discharge from facility.    Therapy recommendation(s):    Continued therapy is recommended.  Rationale/Recommendations:  Pt will benefit from continued skilled rehab services to progess independence and safety with functional mobility..

## 2021-03-15 NOTE — PLAN OF CARE
A/O x4. VSS on 2L nc, TANNER. Tele: SR w/BBB. Denies pain and N/V, 2G Na diet, 1500ml fluid restriction diet, poor appetite. Beckett patent with good UOP. Incontinent of stool at times, x1 small overnight. Miconazole powder to reddened groin. T/R q2h, as allows, refuses at times, educated. Up with lift. PIV saline locked. Planning TCU at discharge.

## 2021-03-16 NOTE — PROGRESS NOTES
[facility]  PRIMARY CARE PROVIDER AND CLINIC:  Martinez Toledo MD, [address] / [place] [address]  Chief Complaint   Patient presents with     Hospital F/U     [place] Medical Record Number:  [id number]  Place of Service where encounter took place:  [facility] (U) [25]    Thanh Goodrich  is a 80 year old  (1940), admitted to the above facility from  [facility]. Hospital stay 3/4/21 through 3/15/21..  Admitted to this facility for  rehab, medical management and nursing care.    HPI:    HPI information obtained from: facility chart records, facility staff, patient report and [facility] chart review.   Brief Summary of Hospital Course: PMH of myasthenia gravis, hypertension, prostate cancer s/p resection, morbid obesity, and chronic hyponatremia who presented 3/4/2021 with generalized weakness and hypoxic respiratory failure  Acute hypoxic and hypercapnic respiratory failure: ICU--> intubation--> initially thought to be HFpEF exacerbation but patient is one month out from receiving Covid vaccine, concern for myasthenia gravis exacerbation, IVIG and steroids administered, patient extubated 3/11/21. Also OP sleep study recommended  myasthenia gravis: question covid vaccine, received Pfizer shot #1 on 2/4/21, patient has had no exacerbations in past 10 years, follows with Dr. Perrin of [facility], will continue on prednisone taper, f/u in 4 weeks.   Diastolic CHF exacerbation: IV lasix X 1 discontinued due to CO2 > 45 alkalosis, 2g Na diet and fluid restriction  Urinary retention: patient has significant pannus, alberto catheter placed, f/u with urology  Sinus Tachycardia: CT PE negative, improved on low dose BB  Weakness: question if patient will be able to continue living in prior living situation, DC to TCU,       Updates on Status Since Skilled nursing Admission: On exam today patient laying in bed, he has O2 on at 2 liters/Nc,  "does not use O2 at home, states prior to hospitalization and prior to getting Pfizer covid vaccine he was able to care for himself and his affairs, he has declined functionally since covid vaccine. Patient denies pain or discomfort on exam, denies SOB, cough, congestion, states he has had to \"clear throat\" often, states he has not had a good BM for a few days, he eats prunes at home to stay regular.     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 He also has no past medical history of Arthritis, Cerebral infarction (H), Congestive heart failure (H), COPD (chronic obstructive pulmonary disease) (H), Diabetes (H), History of blood transfusion, or Thyroid disease.  PAST SURGICAL HISTORY:   has a past surgical history that includes Prostate Cancer Cryotherapy (2007); Tonsillectomy (1945); and biopsy (2013?).  FAMILY HISTORY: family history includes C.A.D. in his maternal grandmother; Cancer in his father and paternal uncle; Cerebrovascular Disease in his maternal grandfather; Depression in his mother; Diabetes in his maternal grandmother; Hypertension in his father and mother; Obesity in his father; Prostate Cancer in his father and " another family member; Substance Abuse in his father and mother.  SOCIAL HISTORY:   reports that he has never smoked. He has never used smokeless tobacco. He reports current alcohol use. He reports that he does not use drugs.    Post Discharge Medication Reconciliation Status: discharge medications reconciled, continue medications without change    Current Outpatient Medications   Medication Sig Dispense Refill     acetaminophen (TYLENOL) 500 MG tablet Take 500-1,000 mg by mouth every 6 hours as needed for mild pain or pain       calcium carbonate 600 mg-vitamin D 400 units (CALTRATE) 600-400 MG-UNIT per tablet Take 1 tablet by mouth daily       Cholecalciferol 100 MCG (4000 UT) CAPS Take 4,000 Units by mouth daily       metoprolol tartrate (LOPRESSOR) 25 MG tablet Take 0.5 tablets (12.5 mg) by mouth 2 times daily       Multiple Vitamins-Minerals (MULTIVITAMIN ADULTS 50+) TABS Take 1 tablet by mouth daily        mycophenolate (GENERIC EQUIVALENT) 500 MG tablet Take 1,000 mg by mouth every morning       mycophenolate (GENERIC EQUIVALENT) 500 MG tablet Take 500 mg by mouth every evening       predniSONE (DELTASONE) 50 MG tablet Take 1 tablet (50 mg) by mouth daily Take 50 mg until 3/19.  Then decrease by 10 mg every 7 days starting on 3/20       CHROMIUM PICOLINATE Take 1 tablet by mouth daily.         multivitamin, therapeutic (THERA-VIT) TABS tablet Take 1 tablet by mouth daily (Patient not taking: Reported on 3/16/2021)           ROS:  10 point ROS of systems including Constitutional, Eyes, Respiratory, Cardiovascular, Gastroenterology, Genitourinary, Integumentary, Musculoskeletal, Psychiatric were all negative except for pertinent positives noted in my HPI.    Vitals:  /68   Pulse 72   Temp 97.9  F (36.6  C)   Resp 17   SpO2 94%   Exam:  GENERAL APPEARANCE:  Alert, in no distress, morbidly obese  ENT:  Mouth and posterior oropharynx normal, moist mucous membranes, [place]  EYES:  EOM, conjunctivae, lids,  pupils and irises normal, PERRL  RESP:  respiratory effort and palpation of chest normal, lungs clear to auscultation , no respiratory distress, diminished breath sounds bases bilaterally  CV:  Palpation and auscultation of heart done , regular rate and rhythm, no murmur, rub, or gallop, peripheral edema trace+ in LE bilaterally  ABDOMEN:  normal bowel sounds, soft, nontender, no hepatosplenomegaly or other masses  M/S:   patient resting in bed, able to move all 4 extremities, weak  SKIN:  Inspection of skin and subcutaneous tissue baseline  NEURO:   speech wnl  PSYCH:  affect and mood normal    Lab/Diagnostic data:    Most Recent 3 CBC's:  Recent Labs   Lab Test 03/15/21  0651 03/14/21  0749 03/13/21  0826 03/12/21  0722   WBC 5.6 5.5  --  6.2   HGB 13.4 12.6*  --  12.2*   MCV 95 92  --  93    241 218 203     Most Recent 3 BMP's:  Recent Labs   Lab Test 03/15/21  0651 03/14/21  0749 03/13/21  0826    133 134   POTASSIUM 3.8 4.0 3.8   CHLORIDE 92* 84* 91*   CO2 >45* >45* 44*   BUN 21 22 20   CR 0.60* 0.71 0.61*   ANIONGAP Not Calculated Not Calculated <1*   NOAH 9.3 9.2 9.0   GLC 87 85 90       ASSESSMENT/PLAN:  Myasthenia gravis with exacerbation (H)  Physical deconditioning  Acute/ongoing: PT and OT, prednisone taper by 10mg weekly, currently on prednisone 50mg QD, mycophenolate 500mg BID   F/u with Dr. Perrin in 4 weeks    Heart failure with preserved ejection fraction, NYHA class I (H)  Benign essential hypertension  Sinus tachycardia  Acute/ongoing: referral to [facility] clinic  Daily weights, vitals daily and prn, BMP follow, metoprolol 12.5mg BID, no further diuresis  Wean off o2, keep Sao2 > 90%    Urinary retention  Panniculitis  Acute/ongoing: continue alberto catheter, f/u with urology        Orders written by provider at facility  BMP and CBC On Thursday  Wean O2 to keep SaO2 > 90%  Schedule appt with urology for f/u on alberto catheter and urinary retention      Total time spent with patient visit  at the skilled nursing facility was 35 min including patient visit and review of past records. Greater than 50% of total time spent with counseling and coordinating care due to Discussed patient prior function and home situation, he was living at home alone able to care for himself and his affairs, discussed therapy here in TCU, will work on weaning off O2, getting stronger and continue to monitor medically, will f/u with urology and Dr. Perrin, continue slow prednisone taper    Electronically signed by:  Tonya Lynn Haase, APRN CNP

## 2021-03-16 NOTE — LETTER
3/16/2021        RE: Thanh Goodrich  [address]  [facility] [id number]        [facility]  PRIMARY CARE PROVIDER AND CLINIC:  Martinez Toledo MD, [address] / [place] [address]  Chief Complaint   Patient presents with     Hospital F/U     [facility] Medical Record Number:  [id number]  Place of Service where encounter took place:  [facility] (U) [25]    Thanh Goodrich  is a 80 year old  (1940), admitted to the above facility from  [facility]. Hospital stay 3/4/21 through 3/15/21..  Admitted to this facility for  rehab, medical management and nursing care.    HPI:    HPI information obtained from: facility chart records, facility staff, patient report and [facility] chart review.   Brief Summary of Hospital Course: PMH of myasthenia gravis, hypertension, prostate cancer s/p resection, morbid obesity, and chronic hyponatremia who presented 3/4/2021 with generalized weakness and hypoxic respiratory failure  Acute hypoxic and hypercapnic respiratory failure: ICU--> intubation--> initially thought to be HFpEF exacerbation but patient is one month out from receiving Covid vaccine, concern for myasthenia gravis exacerbation, IVIG and steroids administered, patient extubated 3/11/21. Also OP sleep study recommended  myasthenia gravis: question covid vaccine, received Pfizer shot #1 on 2/4/21, patient has had no exacerbations in past 10 years, follows with Dr. Perrin of [facility], will continue on prednisone taper, f/u in 4 weeks.   Diastolic CHF exacerbation: IV lasix X 1 discontinued due to CO2 > 45 alkalosis, 2g Na diet and fluid restriction  Urinary retention: patient has significant pannus, alberto catheter placed, f/u with urology  Sinus Tachycardia: CT PE negative, improved on low dose BB  Weakness: question if patient will be able to continue living in prior living situation, DC to U, social  "services      Updates on Status Since Skilled nursing Admission: On exam today patient laying in bed, he has O2 on at 2 liters/Nc, does not use O2 at home, states prior to hospitalization and prior to getting Pfizer covid vaccine he was able to care for himself and his affairs, he has declined functionally since covid vaccine. Patient denies pain or discomfort on exam, denies SOB, cough, congestion, states he has had to \"clear throat\" often, states he has not had a good BM for a few days, he eats prunes at home to stay regular.     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 He also has no past medical history of Arthritis, Cerebral infarction (H), Congestive heart failure (H), COPD (chronic obstructive pulmonary disease) (H), Diabetes (H), History of blood transfusion, or Thyroid disease.  PAST SURGICAL HISTORY:   has a past surgical history that includes Prostate Cancer Cryotherapy (2007); Tonsillectomy (1945); and biopsy (2013?).  FAMILY HISTORY: family history includes C.A.D. in his maternal grandmother; Cancer in his father and paternal uncle; Cerebrovascular Disease in his maternal grandfather; Depression in his mother; " Diabetes in his maternal grandmother; Hypertension in his father and mother; Obesity in his father; Prostate Cancer in his father and another family member; Substance Abuse in his father and mother.  SOCIAL HISTORY:   reports that he has never smoked. He has never used smokeless tobacco. He reports current alcohol use. He reports that he does not use drugs.    Post Discharge Medication Reconciliation Status: discharge medications reconciled, continue medications without change    Current Outpatient Medications   Medication Sig Dispense Refill     acetaminophen (TYLENOL) 500 MG tablet Take 500-1,000 mg by mouth every 6 hours as needed for mild pain or pain       calcium carbonate 600 mg-vitamin D 400 units (CALTRATE) 600-400 MG-UNIT per tablet Take 1 tablet by mouth daily       Cholecalciferol 100 MCG (4000 UT) CAPS Take 4,000 Units by mouth daily       metoprolol tartrate (LOPRESSOR) 25 MG tablet Take 0.5 tablets (12.5 mg) by mouth 2 times daily       Multiple Vitamins-Minerals (MULTIVITAMIN ADULTS 50+) TABS Take 1 tablet by mouth daily        mycophenolate (GENERIC EQUIVALENT) 500 MG tablet Take 1,000 mg by mouth every morning       mycophenolate (GENERIC EQUIVALENT) 500 MG tablet Take 500 mg by mouth every evening       predniSONE (DELTASONE) 50 MG tablet Take 1 tablet (50 mg) by mouth daily Take 50 mg until 3/19.  Then decrease by 10 mg every 7 days starting on 3/20       CHROMIUM PICOLINATE Take 1 tablet by mouth daily.         multivitamin, therapeutic (THERA-VIT) TABS tablet Take 1 tablet by mouth daily (Patient not taking: Reported on 3/16/2021)           ROS:  10 point ROS of systems including Constitutional, Eyes, Respiratory, Cardiovascular, Gastroenterology, Genitourinary, Integumentary, Musculoskeletal, Psychiatric were all negative except for pertinent positives noted in my HPI.    Vitals:  /68   Pulse 72   Temp 97.9  F (36.6  C)   Resp 17   SpO2 94%   Exam:  GENERAL APPEARANCE:  Alert, in no  distress, morbidly obese  ENT:  Mouth and posterior oropharynx normal, moist mucous membranes, [place]  EYES:  EOM, conjunctivae, lids, pupils and irises normal, PERRL  RESP:  respiratory effort and palpation of chest normal, lungs clear to auscultation , no respiratory distress, diminished breath sounds bases bilaterally  CV:  Palpation and auscultation of heart done , regular rate and rhythm, no murmur, rub, or gallop, peripheral edema trace+ in LE bilaterally  ABDOMEN:  normal bowel sounds, soft, nontender, no hepatosplenomegaly or other masses  M/S:   patient resting in bed, able to move all 4 extremities, weak  SKIN:  Inspection of skin and subcutaneous tissue baseline  NEURO:   speech wnl  PSYCH:  affect and mood normal    Lab/Diagnostic data:    Most Recent 3 CBC's:  Recent Labs   Lab Test 03/15/21  0651 03/14/21  0749 03/13/21  0826 03/12/21  0722   WBC 5.6 5.5  --  6.2   HGB 13.4 12.6*  --  12.2*   MCV 95 92  --  93    241 218 203     Most Recent 3 BMP's:  Recent Labs   Lab Test 03/15/21  0651 03/14/21  0749 03/13/21  0826    133 134   POTASSIUM 3.8 4.0 3.8   CHLORIDE 92* 84* 91*   CO2 >45* >45* 44*   BUN 21 22 20   CR 0.60* 0.71 0.61*   ANIONGAP Not Calculated Not Calculated <1*   NOAH 9.3 9.2 9.0   GLC 87 85 90       ASSESSMENT/PLAN:  Myasthenia gravis with exacerbation (H)  Physical deconditioning  Acute/ongoing: PT and OT, prednisone taper by 10mg weekly, currently on prednisone 50mg QD, mycophenolate 500mg BID   F/u with Dr. Perrin in 4 weeks    Heart failure with preserved ejection fraction, NYHA class I (H)  Benign essential hypertension  Sinus tachycardia  Acute/ongoing: referral to [facility] clinic  Daily weights, vitals daily and prn, BMP follow, metoprolol 12.5mg BID, no further diuresis  Wean off o2, keep Sao2 > 90%    Urinary retention  Panniculitis  Acute/ongoing: continue alberto catheter, f/u with urology        Orders written by provider at facility  BMP and CBC On Thursday  Wean O2 to  keep SaO2 > 90%  Schedule appt with urology for f/u on alberto catheter and urinary retention      Total time spent with patient visit at the [facility] nursing facility was 35 min including patient visit and review of past records. Greater than 50% of total time spent with counseling and coordinating care due to Discussed patient prior function and home situation, he was living at home alone able to care for himself and his affairs, discussed therapy here in TCU, will work on weaning off O2, getting stronger and continue to monitor medically, will f/u with urology and Dr. Perrin, continue slow prednisone taper    Electronically signed by:  Tonya Lynn Haase, APRN CNP                         Sincerely,        Tonya Lynn Haase, APRN CNP

## 2021-03-19 NOTE — PROGRESS NOTES
[facility]  [place] Medical Record Number:  [id number]  Place of Service where encounter took place:  [facility] ([facility]) [25]  Chief Complaint   Patient presents with     Nursing Home Acute       HPI:    Thanh Goodrich  is a 80 year old (1940), who is being seen today for an episodic care visit.  HPI information obtained from: facility chart records, facility staff, patient report and [facility] chart review. Today's concern is:  Myasthenia gravis: patient continues with weakness, resting in bed at time of exam, in therapy patient using an EZ stand for transfers, c/o fatigue  CHF/HTN: current weight 282.8lbs, /72, 122/74, 108/66 with HR 70-80 patient c/o increased SOB this AM, continues on O2 at 2 liters/NC, SAO2 92%, no c/o cough, congestion  Sinus Tachycardia: HR 70 range, patient denies CP, palpitations  Urinary retention: continues with alberto catheter in place with dark yellow returns  Past Medical and Surgical History reviewed in Epic today.    MEDICATIONS:    Current Outpatient Medications   Medication Sig Dispense Refill     acetaminophen (TYLENOL) 500 MG tablet Take 500-1,000 mg by mouth every 6 hours as needed for mild pain or pain       calcium carbonate 600 mg-vitamin D 400 units (CALTRATE) 600-400 MG-UNIT per tablet Take 1 tablet by mouth daily       Cholecalciferol 100 MCG (4000 UT) CAPS Take 4,000 Units by mouth daily       metoprolol tartrate (LOPRESSOR) 25 MG tablet Take 0.5 tablets (12.5 mg) by mouth 2 times daily       Multiple Vitamins-Minerals (MULTIVITAMIN ADULTS 50+) TABS Take 1 tablet by mouth daily        mycophenolate (GENERIC EQUIVALENT) 500 MG tablet Take 1,000 mg by mouth every morning       mycophenolate (GENERIC EQUIVALENT) 500 MG tablet Take 500 mg by mouth every evening       predniSONE (DELTASONE) 50 MG tablet Take 1 tablet (50 mg) by mouth daily Take 50 mg until 3/19.  Then decrease by 10 mg every 7 days starting on 3/20           REVIEW OF  "SYSTEMS:  10 point ROS of systems including Constitutional, Eyes, Respiratory, Cardiovascular, Gastroenterology, Genitourinary, Integumentary, Musculoskeletal, Psychiatric were all negative except for pertinent positives noted in my HPI.    Objective:  /74   Pulse 76   Temp 98.2  F (36.8  C)   Resp 18   Ht 1.778 m (5' 10\")   Wt 128.3 kg (282 lb 12.8 oz)   SpO2 95%   BMI 40.58 kg/m    Exam:  GENERAL APPEARANCE:  Alert, in moderate distress due to SOB  ENT:  Mouth and posterior oropharynx normal, moist mucous membranes, normal hearing acuity  EYES:  EOM, conjunctivae, lids, pupils and irises normal, PERRL  RESP:  respiratory effort and palpation of chest normal, diminished breath sounds bases bilaterally, no adventitious sounds appreciated  CV:  Palpation and auscultation of heart done , regular rate and rhythm, no murmur, rub, or gallop, no edema  ABDOMEN:  abdomen obese  M/S:   patient resting in bed, able to move all 4 extremities  SKIN:  Inspection of skin and subcutaneous tissue baseline  NEURO:   speech WNL    Labs:     Most Recent 3 CBC's:  Recent Labs   Lab Test 03/18/21 03/15/21  0651 03/14/21  0749   WBC 6.1 5.6 5.5   HGB 12.6* 13.4 12.6*   MCV 97.9 95 92    215 241     Most Recent 3 BMP's:  Recent Labs   Lab Test 03/18/21 03/15/21  0651 03/14/21  0749 03/13/21  0826    137 133 134   POTASSIUM 4.2 3.8 4.0 3.8   CHLORIDE 90* 92* 84* 91*   CO2 >45* >45* >45* 44*   BUN 22 21 22 20   CR 0.53* 0.60* 0.71 0.61*   ANIONGAP  --  Not Calculated Not Calculated <1*   NOAH 9.0 9.3 9.2 9.0   GLC 72 87 85 90       ASSESSMENT/PLAN:  Myasthenia gravis with exacerbation (H)  Physical deconditioning  Acute/ongoing: PT and OT, prednisone taper by 10mg weekly, currently on prednisone 50mg QD, mycophenolate 500mg BID   F/u with Dr. Perrin in 4 weeks     Heart failure with preserved ejection fraction, NYHA class I (H)  Benign essential hypertension  Sinus tachycardia  Acute/ongoing: referral to [facility] " clinic  Daily weights, vitals daily and prn, metoprolol 12.5mg BID  Give lasix 20mg IM now and then start Lasix 20mg BID and KCL 20meq BID  BMP on Monday  Wean off o2, keep Sao2 > 90%     Urinary retention  Panniculitis  Acute/ongoing: continue alberto catheter, f/u with urology     Orders written by provider at facility  Lasix 20mg IM now  Lasix 20mg BID start today  KCL 20meq BID start today  BMP on monday    Total time spent with patient visit at the [facility] was 35 min including patient visit and review of past records. Greater than 50% of total time spent with counseling and coordinating care due to discussed patient SOB and restarting lasix, will monitor closely and check labs on monday, discussed other medications and therapy, patient continues to have fatigue and very little stamina compounded with increased sOB. .  Electronically signed by:  Tonya Lynn Haase, APRN CNP

## 2021-03-20 PROBLEM — G93.41 METABOLIC ENCEPHALOPATHY: Status: ACTIVE | Noted: 2021-01-01

## 2021-03-20 PROBLEM — J96.22 ACUTE ON CHRONIC RESPIRATORY FAILURE WITH HYPERCAPNIA (H): Status: ACTIVE | Noted: 2021-01-01

## 2021-03-20 PROBLEM — R82.90 ABNORMAL URINALYSIS: Status: ACTIVE | Noted: 2021-01-01

## 2021-03-20 NOTE — PROGRESS NOTES
Brief Gen Neuro note:    Chart reviewed, patient not examined.    Patient in ICU and intubated. Appropriate for stroke/ICU neuro team.  I put that consult and gen neuro will not plan to see today.  --Gen neuro will sign off.  Please call if any questions or concerns.    -Tamara Patricia MD  [facility]  Office Phone [phone]

## 2021-03-20 NOTE — PROGRESS NOTES
Critical Care Progress Note      03/20/2021    Name: Thanh Goodrich MRN#: [id number]   Age: 80 year old YOB: 1940     [facility]    [facility] DAY #             Problem List:   Active Problems:    Metabolic encephalopathy    Abnormal urinalysis    Acute on chronic respiratory failure with hypercapnia (H)           Summary/Hospital Course:   Thanh Goodrich is a 80 year old male admitted on 3/19/2021 with significant history for myasthenia gravis, HFpEF, COPD, HTN, MDD, HAZEL, prostate cancer who presents from TCU for acute hypercapnic respiratory failure. He was recently admitted and discharged (3/4-3/15) for acute hypoxic and hypercapnic respiratory failure requiring intubation (3/6/21-3/11/21) 2/2 Myasthenia gravis exacerbation, diastolic CHF exacerbation with diuresis,  Metabolic alkalosis. He received IVIG and steroid treatment and was followed by NCC. He was discharged on supplemental O2 at 2-3 LPM.  He arrived to ED somnolent. ABG demonstrated PCO2>100 and he was immediately intubated.  He was given rocpehin, zosyn and vancomycin in ED in addition to 125mg solumedrol. CXR was not significant for focal infection or mucous plugging. ETT was in good position.   NIF check was -14 and VC was 650 ml.        Assessment and plan :     Thanh Goodrich is a 80 year old male admitted on 3/19/2021 for acute hypercapnic respiratory failure due to myasthenia gravis exacerbation requiring intubation.  I have personally reviewed the daily labs, imaging studies, cultures and discussed the case with referring physician and consulting physicians.     My assessment and plan by system for this patient is as follows:    Neurology/Psychiatry:   1. Myasthenia Gravis exacerbation/myasthenic crisis  2. Sedation for vent synchrony.    Plan  - Neurology/NCC on board, had received IVIG during recent admission. May require plasma exchange this time.  - Continue steroids - Prednisone 60 mg daily for now with  taper.      Cardiovascular:   1.Acute on chronic diastolic CHF exacerbation  2. Volume overload  3. Mild troponemia    Plan   Continue aggressive diuresis with lasix. Will give diamox too due to contraction alkalosis    Pulmonary/Ventilator Management:   1. Acute hypercapnic respiratory failure presumed 2/2 MG exacerbation. Possible obesity hypoventilation syndrome is a major contributor.  2. Obesity hypoventilation syndrome  3. Pulmonary edema and volume overload    Plan  - Bedside NIF is - 14 and  VC is 650 ml in keeping with MG exacerbation. NCC on board, my start plasma exchange. Continue steroids for now.  - Protective tidal volume ventilation.  - Monitor VBG closely  - He will require a bi-level PAP device on discharge such as BIPAP or trilogy device for hypercapnic respiratory failure as he still stands a high chance for readmission without the device. I will talk to the  on Monday for possible procurement.  - Continue diuresis with lasix, add Diamox for contraction alkalosis. Monitor PH with VBG.      GI and Nutrition :   1. No issues      Plan  - Nutrition consult for tube feed management  - PPI for prophylaxis  - Continue bowel prep    Renal/Fluids/Electrolytes:   1. Metabolic alkalosis likely due to contraction  2. Volume overload and pulmonary edema    Plan  - Hold lasix for now and give Diamox 500 mg x 3 doses, monitor VBG closely.  - monitor function and electrolytes as needed with replacement per ICU protocols.  - generally avoid nephrotoxic agents such as NSAID, IV contrast unless specifically required  - adjust medications as needed for renal clearance  - follow I/O's as appropriate.    Infectious Disease:   1. ? UTI    Plan  - Continue zosyn, discontinue vancomycin and rocephin  - Follow up on cultures    Endocrine:   1. Stress and steroid induced hyperglycemia    Plan  - ICU insulin protocol, goal sugar <180      ICU Prophylaxis:   1. DVT: Hep Subq/mechanical  2. VAP: HOB 30 degrees,  chlorhexidine rinse  3. Stress Ulcer: PPI  4. Restraints: Nonviolent soft two point restraints required and necessary for patient safety and continued cares and good effect as patient continues to pull at necessary lines, tubes despite education and distraction. Will readdress daily.   5. IV Access - central access required and necessary for continued patient cares  6. Feeding - NPO  7. Family updated        Key goals for next 24 hours:   1. Continue to monitor NIF and VC  2. Wean off ventilator as tolerated               Interim History:              Key Medications:       chlorhexidine  15 mL Mouth/Throat Q12H     furosemide  20 mg Oral or NG Tube Daily     heparin ANTICOAGULANT  5,000 Units Subcutaneous Q8H     metoprolol tartrate  25 mg Oral or NG Tube BID     mycophenolate  1,000 mg Oral or NG Tube QAM     mycophenolate  500 mg Oral or NG Tube QPM     [START ON 3/21/2021] predniSONE  60 mg Oral Daily       propofol (DIPRIVAN) infusion Stopped (03/20/21 1130)     propofol (DIPRIVAN) infusion Stopped (03/20/21 1130)     sodium chloride 10 mL/hr at 03/20/21 0656               Physical Examination:   Temp:  [96.4  F (35.8  C)-97.9  F (36.6  C)] 97.9  F (36.6  C)  Pulse:  [47-75] 63  Resp:  [8-24] 13  BP: ()/(50-96) 120/64  FiO2 (%):  [30 %-100 %] 30 %  SpO2:  [95 %-100 %] 99 %    Intake/Output Summary (Last 24 hours) at 3/20/2021 1434  Last data filed at 3/20/2021 1200  Gross per 24 hour   Intake 252.68 ml   Output 900 ml   Net -647.32 ml     Wt Readings from Last 4 Encounters:   03/20/21 127.7 kg (281 lb 8.4 oz)   03/17/21 128.3 kg (282 lb 12.8 oz)   03/15/21 136.3 kg (300 lb 7.8 oz)   12/16/20 136.1 kg (300 lb)     BP - Mean:  [] 92  Ventilation Mode: CMV/AC  (Continuous Mandatory Ventilation/ Assist Control)  FiO2 (%): 30 %  Rate Set (breaths/minute): 14 breaths/min  Tidal Volume Set (mL): 500 mL  PEEP (cm H2O): 5 cmH2O  Oxygen Concentration (%): 30 %  Resp: 13    Recent Labs   Lab 03/20/21  0635  03/20/21  0007 03/19/21  2248   PH 7.60* 7.60*  --    PCO2 51* 54*  --    PO2 76* 190*  --    HCO3 50* 53*  --    O2PER  --  70% 2 Lt Nasal Cannula       GEN: no acute distress   HEENT: head ncat, sclera anicteric, OP patent, trachea midline   PULM: unlabored synchronous with vent, clear anteriorly    CV/COR: RRR S1S2 no gallop,  No rub, no murmur  ABD: soft nontender, hypoactive bowel sounds, no mass  EXT:  Edema   warm  NEURO: grossly intact  SKIN: no obvious rash  LINES: clean, dry intact         Data:   All data and imaging reviewed     ROUTINE ICU LABS (Last four results)  CMP  Recent Labs   Lab 03/20/21  0602 03/19/21  2217 03/18/21 03/15/21  0651 03/14/21  0749    142 144 137 133   POTASSIUM 4.1 4.4 4.2 3.8 4.0   CHLORIDE 97 95 90* 92* 84*   CO2 45* >45* >45* >45* >45*   ANIONGAP <1* Not Calculated  --  Not Calculated Not Calculated   * 118* 72 87 85   BUN 22 22 22 21 22   CR 0.57* 0.65* 0.53* 0.60* 0.71   GFRESTIMATED >90 >90 >60 >90 90   GFRESTBLACK >90 >90  --  >90 >90   NOAH 8.8 9.0 9.0 9.3 9.2   MAG  --   --   --  2.4*  --    PHOS  --   --   --  3.2  --    PROTTOTAL 5.8* 6.7*  --   --   --    ALBUMIN 2.4* 2.8*  --   --   --    BILITOTAL 0.7 0.4  --   --   --    ALKPHOS 48 59  --   --   --    AST 21 18  --   --   --    ALT 27 33  --   --   --      CBC  Recent Labs   Lab 03/20/21  0602 03/19/21  2217 03/18/21 03/15/21  0651   WBC 7.9 5.6 6.1 5.6   RBC 4.76 5.01 4.73 5.10   HGB 12.5* 13.1* 12.6* 13.4   HCT 45.7 48.7 46.3 48.3   MCV 96 97 97.9 95   MCH 26.3* 26.1* 26.6* 26.3*   MCHC 27.4* 26.9* 27.2* 27.7*   RDW 15.2* 15.8* 15.6* 15.1*    216 220 215     INRNo lab results found in last 7 days.  Arterial Blood Gas  Recent Labs   Lab 03/20/21  0628 03/20/21  0007 03/19/21  2248   PH 7.60* 7.60*  --    PCO2 51* 54*  --    PO2 76* 190*  --    HCO3 50* 53*  --    O2PER  --  70% 2 Lt Nasal Cannula       All cultures:  Recent Labs   Lab 03/18/21 2217   CULT PENDING     Recent Results (from the  past 24 hour(s))   XR Chest Port 1 View    Narrative    EXAM: XR CHEST PORT 1 VW  LOCATION: [facility]  DATE/TIME: 3/19/2021 11:22 PM    INDICATION: Weakness  COMPARISON: 03/12/2021      Impression    IMPRESSION: Endotracheal tube 3.5 cm above gabino. Enteric tube tip in stomach. Sidehole in distal esophagus. Elevated right hemidiaphragm. Bibasilar atelectasis or infiltrate and small effusions. Atherosclerotic aorta.   US Lower Extremity Arterial Duplex Left    Narrative    PRELIMINARY REPORT - Final read in the a.m.    EXAM: US LOWER EXTREMITY ARTERIAL DUPLEX LEFT  LOCATION: [facility]  DATE/TIME: 3/19/2021 11:27 PM    INDICATION: Cold limb.    COMPARISON: None.    TECHNIQUE: Duplex utilizing 2D gray-scale imaging, Doppler interrogation with color-flow and spectral waveform analysis.    FINDINGS: Blood flow is visualized throughout the major arteries of the left lower extremity. Unremarkable Doppler waveform evaluation of the major arteries of the left lower extremity. Evaluation of the peroneal artery is limited due to atherosclerotic   calcification.    LEFT LOWER EXTREMITY ARTERIAL ASSESSMENT:  Common femoral artery: 96 cm/s  Profunda femoris artery: 69 cm/s  SFA (proximal): 91 cm/s  SFA (mid): 65 cm/s  SFA (distal): 52 cm/s  Popliteal artery: 45 cm/s  Posterior tibial artery: 39 cm/s      Impression    IMPRESSION: No convincing evidence of significant stenosis or occlusion of the major arteries of the left lower extremity.    Preliminary Interpretation Dictated By: Leodan Fernandez MD  Date: 3/20/2021      Agree with preliminary report. No significant arterial insufficiency.         Billing: This patient is critically ill: Yes. Total critical care time today 50 min.    Milla Granados MD  Pulmonary, Critical Care and Sleep Medicine  [facility]  Pager: [phone]

## 2021-03-20 NOTE — ED TRIAGE NOTES
EMS arrival:    Resides at [facility] for COPD exacerbations.  Today since 3-4 pm today, mentation getting worse.   Weakness, whispering.   Gave an extra dose of Lasix at 12pm.  Has urine alberto catheter.  Cloudy yellow.   1900 urine output since.  Pale, talking like he is really weak.   VSS.  /55.  HR 72.  98% on 2 Lt.  Chronically on 2 Lt at all times.     Blood glucose 111.    Breathing is shallow.  Respiratory rate WNL.

## 2021-03-20 NOTE — ED PROVIDER NOTES
Emergency Department Attending Supervision Note  3/19/2021  11:06 PM      I evaluated this patient in conjunction with ABDIFATAH Arora.      Briefly, the patient presented with increasing somnolence and weakness.  He has a history of COPD and myasthenia gravis and was admitted 3/4 - 3/15/21 for respiratory failure requiring intubation.  Staff at his rehab facility noticed he was more sleepy today and since arrival here he has become quite somnolent.    On my exam, patient is somnolent and not immediately arousable.  CTAB.    Results:  Chest X-ray, Portable (1 view):  Endotracheal tube 3.5 cm above gabino. Enteric tube tip in stomach. Sidehole in distal esophagus. Elevated right hemidiaphragm. Bibasilar atelectasis or infiltrate and small effusions. Atherosclerotic aorta.  Report per radiology.      Lower Extremity Arterial Duplex Ultrasound, left:  No convincing evidence of significant stenosis or occlusion of the major arteries of the left lower extremity.  Report per radiology.       CBC: WBC 5.6, HGB 13.1 (L),   CMP: CO2 45 (HH), Glucose 118 (H), Creatinine 0.65 (L), Albumin 2.8 (L), Total Protein 6.7 (L), otherwise WNL   Lactic Acid: 0.7  Blood gas, venous: pH 7.27 (L), pCO2 128 (HH), pO2 36, Bicarb 59 (HH), FiO2 2L NC, Oxyhemoglobin 63, Base Excess 25  Blood gas, arterial: pH 7.6 (H), pCO2 54 (H), pO2 190 (H), Bicarb 53 (HH), FiO2 70, Oxyhemoglobin 99, Base Excess 27.2   Troponin I: 0.060 (H)   Pro-BNP: 3996 (H)    ABO/Rh Type and Screen: A positive, antibody screen negative     COVID19 Virus PCR, nasopharyngeal: negative     UA: Cloudy orange urine, Blood large, Protein 30, Nitrite positive, Leukocyte Esterase large, WBC/HPF >182 (H), RBC/HPF >182 (H), Bacteria many, Squamous Epithelial Cells/HPF 3 (H), Mucous present, Calcium Oxalate few, otherwise WNL   Urine Culture: pending     [facility]    -Intubation    Date/Time: 3/19/2021 11:28 PM  Performed by: Stephanie San  MD Miriam  Authorized by: Stephanie San MD       PRE-PROCEDURE DETAILS     Patient status:  Altered mental status    Pretreatment meds: etomidate.    Paralytics:  Succinylcholine      PROCEDURE DETAILS     Preoxygenation:  Bag valve mask    CPR in progress: no      Intubation method:  Oral    Oral intubation technique:  Video-assisted    Tube size (mm):  7.5    Tube type:  Cuffed    Number of attempts:  1    Tube visualized through cords: yes      PLACEMENT ASSESSMENT     ETT to teeth:  22 cm    Tube secured with:  ETT isaac    Breath sounds:  Equal    Placement verification: chest rise, CXR verification, equal breath sounds and ETCO2 detector      CXR findings:  ETT in proper place  PROCEDURE   : Adverse Events: Patient had brief desaturation to the 80s after tube placement.    Interventions:  (2316) Etomidate, 30 mg, IV injection   (2317) Succinylcholine, 150 mg, IV injection   (2324) Propofol, 30 mg/hr, IV infusion   (2334) Propofol increased to 50 mcg/kg/hr  (2338) Propofol increased to 65 mcg/kg/min  (2353) Propofol decreased to 60 mcg/kg/min  (2358) Fentanyl, 100 mcg, IV injection  (0010) Propofol decreased to 50 mcg/kg/min  (0021) Propofol decreased to 45 mcg/kg/min  (0036) Ceftriaxone, 2 g, IV infusion   (0051) Solu-Medrol, 125 mg, IV injection      ED course:  (2309) The patient was transferred to stabilization room 1  (2318) I intubated the patient as per the above ED note.  (2332) The patient had a portable chest x-ray done in the emergency department.   (2335) The patient had a portable left leg arterial ultrasound done in the emergency department.   (0038) I discussed the patient with Dr. El Zavala of the hospitalist service, who will admit the patient to an ICU bed for further monitoring, evaluation, and treatment.   (0105) The patient was transferred to the ICU.    Medical Decision Making:  I became involved when the patient's pCO2 returned elevated at 128.  I went to assess him and he was  somnolent and not arousable.  Intubation is indicated.  While in the [facility] room prior to intubation, he had opened his eyes and was briefly nodding and shaking his head to questions and he consented this way to proceed with intubation.  After placing the tube and confirming with color change and breath sounds, his sats dropped to 80% before coming back up.  Repeat ABG after he had been on the vent for some time showed improvement of his pCO2 but overcorrection of his pH as well as an elevated bicarb.  Based on this, RT was instructed to decrease his respiratory rate.  He had an abnormal urinalysis taken off of his catheter and broad spectrum antibiotics were given.  These were broadened further after talking with the intensivist.  Troponin and BNP were noted to both be elevated however EKG showed no signs of ischemia and chest x-ray showed florid pulmonary edema.  He was given IV steroids for his COPD exacerbation.  Propofol had to be weaned and Fentanyl given instead due to softer blood pressures once on sedation.    Critical Care time was 60 minutes for this patient excluding procedures.     Diagnosis    ICD-10-CM    1. Acute on chronic respiratory failure with hypercapnia (H)  J96.22 Urine Culture     Blood gas arterial and oxyhgb     Blood gas arterial and oxyhgb     Glucose by meter     Glucose by meter   2. Metabolic encephalopathy  G93.41    3. Abnormal urinalysis  R82.90              I, Kalyani Choudhary, am serving as a scribe on 3/19/2021 at 11:06 PM to personally document services performed by Stephanie San MD based on my observations and the provider's statements to me.        Stephanie San MD  03/20/21 0614

## 2021-03-20 NOTE — PHARMACY-ADMISSION MEDICATION HISTORY
Pharmacy Medication History  Admission medication history interview status for the 3/19/2021  admission is complete. See EPIC admission navigator for prior to admission medications     Location of Interview: Outside patient room but on unit  Medication history sources: MAR ([facility])    Significant changes made to the medication list:  -Received furosemide 20mg IM 3/19/21 at 1330  -Furosemide 20mg BID and KCl 20 mEq BID new orders 3/19/21 - he received 1 dose each 3/19.    In the past week, patient estimated taking medication this percent of the time: greater than 90%    Additional medication history information:   -Prednisone taper: completed 50mg daily 3/19/21, plan 40mg daily 3/20-3/26, 30mg daily 3/27-4/2 & continue decrease by 10mg every 7 days.  -MAR listed mycophenolate mofetil - did not indicate brand vs generic.    Medication reconciliation completed by provider prior to medication history? Yes    Time spent in this activity: 20 min    Prior to Admission medications    Medication Sig Last Dose Taking? Auth Provider   acetaminophen (TYLENOL) 325 MG tablet Take 650 mg by mouth every 4 hours as needed for mild pain 3/19/2021 at 0125 Yes Unknown, Entered By History   calcium carbonate 600 mg-vitamin D 400 units (CALTRATE) 600-400 MG-UNIT per tablet Take 1 tablet by mouth daily 3/19/2021 at am Yes Unknown, Entered By History   Cholecalciferol 100 MCG (4000 UT) CAPS Take 4,000 Units by mouth daily 3/19/2021 at am Yes Unknown, Entered By History   furosemide (LASIX) 20 MG tablet Take 1 tablet (20 mg) by mouth 2 times daily 3/19/2021 at 1200 Yes Haase, Tonya Lynn, APRN CNP   metoprolol tartrate (LOPRESSOR) 25 MG tablet Take 0.5 tablets (12.5 mg) by mouth 2 times daily 3/19/2021 at am Yes David Mcclain, DO   Multiple Vitamins-Minerals (MULTIVITAMIN ADULTS 50+) TABS Take 1 tablet by mouth daily  3/19/2021 at am Yes Reported, Patient   mycophenolate (GENERIC EQUIVALENT) 500 MG tablet Take 1,000  mg by mouth every morning 3/19/2021 at am Yes Unknown, Entered By History   mycophenolate (GENERIC EQUIVALENT) 500 MG tablet Take 500 mg by mouth At Bedtime  3/18/2021 at hs Yes Unknown, Entered By History   potassium chloride ER (KLOR-CON M) 10 MEQ CR tablet Take 2 tablets (20 mEq) by mouth 2 times daily 3/19/2021 at pm Yes Haase, Tonya Lynn, APRN CNP   predniSONE (DELTASONE) 50 MG tablet Take 1 tablet (50 mg) by mouth daily Take 50 mg until 3/19.  Then decrease by 10 mg every 7 days starting on 3/20 3/19/2021 at am-50mg Yes David Mcclain, DO       The information provided in this note is only as accurate as the sources available at the time of update(s)

## 2021-03-20 NOTE — ED PROVIDER NOTES
History   Chief Complaint:  Generalized Weakness       The history is provided by the EMS personnel.      Thanh Goodrich is a 80 year old male with history of asthma, hypertension, and prostate cancer who presents from [facility] via EMS for generalized weakness. Staff state that his mentation is getting worse and his voice sounds more weak than normal. The patient was given an extra dose of Lasix around noon. Further, the patient is always on 2L of oxygen for COPD, chf. Denies any chest pain, back pain, abdominal pain, headache, vision loss, vomiting, or increasing sob.  He specifically notes that he can feel his left leg and denies numbness or pain in this leg.    Review of Systems   HENT: Positive for voice change.    Cardiovascular: Negative for chest pain.   Gastrointestinal: Negative for abdominal pain.   Musculoskeletal: Negative for arthralgias, back pain, myalgias and neck pain.   Neurological: Positive for weakness.   All other systems reviewed and are negative.    Allergies:  Ciprofloxacin  Procainamide  Quinidine  Quinine    Medications:  Lasix  Lopressor  Deltasone  Mycophenolate    Past Medical History:    Atypical mycobacterial infection  Cellulitis of left leg  Depression  Edema  History of steroid therapy  Hyperlipidemia  Hypertension  Leg ulcer  Malignant neoplasm of prostate  Myasthenia gravis  Osteoporosis  PVCs  Rotator cuff syndrome  Asthma  Acute pulmonary edema  Dilated aortic root   Panniculitis    Past Surgical History:    Biopsy  Prostate cancer cryotherapy  Tonsillectomy    Family History:    Mother: Hypertension, depression, substance abuse  Father: Hypertension, prostate cancer, substance abuse    Social History:  Patient presents to the ED via EMS.  Lives at [facility]    Physical Exam     Patient Vitals for the past 24 hrs:   BP Temp Temp src Pulse Resp SpO2   03/20/21 0050 95/65 96.8  F (36  C) -- 57 13 99 %   03/20/21 0031 93/62 97.2  F (36.2  C) -- 60 24 100 %   03/20/21  0023 (!) 86/59 97.2  F (36.2  C) -- 61 -- --   03/20/21 0020 (!) 84/61 97.2  F (36.2  C) -- 62 23 100 %   03/20/21 0003 (!) 86/57 97.2  F (36.2  C) -- 67 16 100 %   03/19/21 2350 97/59 97  F (36.1  C) -- 70 8 100 %   03/19/21 2340 105/76 97  F (36.1  C) -- 64 13 99 %   03/19/21 2335 116/58 96.8  F (36  C) -- 64 15 98 %   03/19/21 2331 (!) 142/96 96.8  F (36  C) Bladder 69 16 98 %   03/19/21 2300 -- -- -- 61 21 --   03/19/21 2245 -- -- -- 67 22 --   03/19/21 2230 -- -- -- 75 18 97 %   03/19/21 2215 -- -- -- 71 14 98 %   03/19/21 2200 -- -- -- 68 24 97 %   03/19/21 2157 137/75 97.2  F (36.2  C) Axillary 69 20 95 %   03/19/21 2155 -- -- -- 75 17 96 %   03/19/21 2154 -- -- -- 73 18 --       Physical Exam  General: Mildly somnolent appearing but easily arousable.  Chronically ill-appearing.  Pale.  Head:  Scalp is NC/AT  Eyes:  Conjunctiva normal, PERRL  ENT:  The external nose and ears are normal.   Neck:  Normal range of motion without rigidity.  CV:  Regular rate and rhythm    No pathologic murmur, rubs, or gallops.  Resp:  Breath sounds are decreased bilaterally partially secondary to obesity.  No wheezing, crackles, rhonchi heard.    Non-labored, no retractions or accessory muscle use  Abdomen: Abdomen is soft, no distension, no tenderness, no masses. No CVA tenderness.  :  Beckett catheter in place with dark yellow urine in bag.  MS:  Bilateral 1+ symmetric lower extremity edema. Compartments soft.  Normal ROM in all joints without effusions.    No midline cervical, thoracic, or lumbar tenderness  Skin:  Asymmetrically decreased temperature in left lower extremity as compared to right.  Doppler pulses present bilaterally at DP and PT.    Neuro: Alert and oriented x3.  5/5 strength BL in UE and LE, normal sensation to touch. Specifically normal strength and sensation in toes, ankle, foot of RLE.  Cranial nerves 2-12 intact.  Psych: Awake.  Somnolent appearing.    Emergency Department Course   ECG  ECG taken at 2244,  ECG read at 2247 by Dr. San  Normal sinus rhythm. Right bundle branch block. Abnormal ECG.   No significant changes as compared to prior, dated 3/11/21.  Rate 68 bpm. DE interval 172 ms. QRS duration 146 ms. QT/QTc 418/444 ms. P-R-T axes 60 -2 -7.     Imaging:  US Lower Extremity Arterial Duplex Left:  No convincing evidence of significant stenosis or occlusion of the major arteries of the left lower extremity. As per radiology.     Laboratory:   CBC: WBC: 5.6, HGB: 13.1 (L), PLT: 216    CMP: Glucose 118 (H), Creatinine: 0.65 (L), Carbon Dioxide: >45 (H), Albumin: 2.8 (L), Protein Total: 6.7 (L), o/w WNL     2300 VBG and oxyhgb: pH 7.27 (L) / PCO2 128 (H) / PO2 36 / Bicarb 59 (H) / FlO2 2 LT / Oxyhemoglobin 63 / Base excess 25.0    0030 VBG and oxyhgb: pH 7.60 (H) / PCO2 54 (H) / PO2 190 / Bicarb 53 (H) / FlO2 70 % / Oxyhemoglobin 99 / Base excess 27.2    Troponin (Collected 2217): 0.060 (H)    Lactic acid (Resulted 2300): 0.7    BNP: 3,996 (H)    ABO/Rh type and screen: A, RH: Pos, Antibody: Negative    UA: Blood: Large (A), Protein Albumin: 30 (A), Nitrite: Positive, Leukocyte Esterase: Large (A), WBC: >182 (H), RBC: >182 (H), Bacteria: Many (A), Squamous Epithelial: 3 (H), Mucous: Present, Calcium Oxalate: Few (A), o/w Negative    Asymptomatic COVID-19 PCR: Negative    Emergency Department Course:    Reviewed:  I reviewed nursing notes, vitals, past medical history and care everywhere    Assessments:  2216 I obtained history and examined the patient as noted above.     2305 I rechecked the patient.  He appears more somnolent.    2311 Moved to Stab room. Dr. San to intubate.  Dr. San assumed primary care for the patient.  Please see attending supervision note    Interventions:  0019 Rocephin 2 g IV    Disposition:  The patient was admitted to the hospital, under the care of the hospitalist team, to the ICU    Impression & Plan     Covid-19  Thanh Goodrich was evaluated during a global COVID-19 pandemic,  which necessitated consideration that the patient might be at risk for infection with the SARS-CoV-2 virus that causes COVID-19.   Applicable protocols for evaluation were followed during the patient's care.   COVID-19 was considered as part of the patient's evaluation. The plan for testing is:  a test was obtained during this visit.    Medical Decision Makin-year-old male presents with generalized weakness and increased fatigue from nursing facility.  Broad differential considered.  Initial work-up returned most notable for marked acute on chronic hypercapnia.  At this time I immediately called for RT, noted him to be increasingly somnelent, and spoke with Dr. San.  Dr. San evaluated the patient and took him directly to stabilization room for intubation as per review of his records he is full code as recently as  of this year.  BNP is mildly elevated from baseline, though no significant pulmonary edema or evidence of CHF on chest x-ray.  O2 requirements at baseline and actually satting at 97% and question whether home oxygen therapy may be playing a role in suppressing his respiratory drive and leading to hypercapnia.  Troponin slightly elevated, though denying any chest pain on my additional evaluation and EKG without acute ischemic changes.  At this time will defer heparinization given lower suspicion for ACS and can likely be trended.  No tachycardia or hypoxia and low suspicion for pulmonary embolism at this time.  Urinalysis is grossly abnormal though difficult to interpret in the setting of chronic indwelling Beckett, afebrile with normal white blood cell count and lactic acid but given critically ill patient will cover empirically with antibiotics pending culture results.  Past review of cultures have been all negative and no history of resistant organisms.    Somewhat incidentally the patient's left lower extremity was noted to be asymmetrically cold as compared to his right lower extremity.   I was able to locate pulses with Doppler and formal ultrasound shows no evidence of occlusion.  He does have normal strength and sensation in the limb and no evidence of acute arterial insufficiency or compartment syndrome.    Please see attending supervision note for details of hospitalist discussion and critical care management following intubation.    Diagnosis:    ICD-10-CM    1. Acute on chronic respiratory failure with hypercapnia (H)  J96.22 Blood gas arterial and oxyhgb     Blood gas arterial and oxyhgb   2. Metabolic encephalopathy  G93.41    3. Abnormal urinalysis  R82.90      Scribe Disclosure:  I, Jon Dumont, am serving as a scribe at 9:56 PM on 3/19/2021 to document services personally performed by Julio Gupta PA-C based on my observations and the provider's statements to me.            Julio Gupta PA-C  03/20/21 0158

## 2021-03-20 NOTE — ED NOTES
DATE:  3/20/2021   TIME OF RECEIPT FROM LAB:  00:29  LAB TEST:  HC03  LAB VALUE:  53  RESULTS GIVEN WITH READ-BACK TO (PROVIDER):  Dr. San  TIME LAB VALUE REPORTED TO PROVIDER:   00:29

## 2021-03-20 NOTE — CONSULTS
[facility]   CRITICAL CARE ADMISSION/CONSULTATION    Thanh Goodrich MRN: [id number]  1940  Date of Admission:3/19/2021          HPI   Thanh Goodrich IS a 80 year old male admitted on 3/19/2021 with significant history for myasthenia gravis, HFpEF, COPD, HTN, MDD, HAZEL, prostate cancer who presents from TCU for acute respiratory failure.   - Arrived to ED somnolent. ABG demonstrated PCO2>100 and he was immediately intubated.   - He was given rocpehin, zosyn and vancomycin in ED in addition to 125mg solumedrol.   - CXR was not significant for focal infection or mucous plugging. ETT was in good position.   - He is now transferred to ICU for management.     Relevant Issues  1. COPD/asthma. On prn albuterol per med chart. No previous PFT  2. Myasthenia Gravis. Currently on mycophenolate and prednisone. Previous episodes of respiratory failure have been presumed 2/2 MG exacerbations.   3. HFpEF. Stable. EF 65% on 3/2021  4. Prostate cancer. No recurrence.            Past Medical History:      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     Cellulitis of left leg 9/23/2012     Depressive disorder 2020     Edema 7/7/2009     History of steroid therapy 2/22/2010     Hyperlipidemia LDL goal <130 10/31/2010     HYPERTENSION 7/8/2003     Incontinence of urine 10/25/2010     Leg ulcer (H) 9/20/2012     MALIGN NEOPL PROSTATE-3/07 4/2/2007    T1C, Shaina 8, initial PSA 39, s/p radiation seed implants 2007      Myasthenia gravis (H)      OBESITY 7/8/2003     Osteoporosis 8/18/2009    Refused bisphosphonates 2011      PVC's (premature ventricular contractions) 11/23/2011     RIGHT OCCIPITAL PAIN 7/8/2003     ROTATOR CUFF SYND NOS- RT 9/19/2005     Skin nodule 3/18/2013     ulcer left shin 10/8/2007     Uncomplicated asthma 1944?    childhood only             Past Surgical History:      Past Surgical History:   Procedure Laterality Date     BIOPSY  2013?    dealt with      Prostate Cancer Cryotherapy  2007     TONSILLECTOMY  1945            Social History:     Social History     Tobacco Use     Smoking status: Never Smoker     Smokeless tobacco: Never Used   Substance Use Topics     Alcohol use: Yes     Alcohol/week: 0.0 standard drinks     Comment: very rarely            Family History:     Family History   Problem Relation Age of Onset     Hypertension Mother      Depression Mother      Substance Abuse Mother         alcohol     Hypertension Father      Cancer Father         Prostate     Prostate Cancer Father      Substance Abuse Father         alcohol     Obesity Father      Diabetes Maternal Grandmother      C.A.D. Maternal Grandmother      Cerebrovascular Disease Maternal Grandfather      Cancer Paternal Uncle         Prostate     Prostate Cancer Other              Allergies:   Please see allergy list which was reviewed this admission.         Medications:       chlorhexidine  15 mL Mouth/Throat Q12H     furosemide  20 mg Oral Daily     heparin ANTICOAGULANT  5,000 Units Subcutaneous Q8H     methylPREDNISolone (solu-MEDROL) infusion ADULT  60 mg/hr Intravenous Q8H     metoprolol tartrate  25 mg Oral BID     mycophenolate  1,000 mg Oral QAM     mycophenolate  500 mg Oral QPM     piperacillin-tazobactam  3.375 g Intravenous Once     vancomycin (VANCOCIN) IV  2,500 mg Intravenous Once            Review of Systems:   Unable to assess         Physical Examination:   Temp:  [96.8  F (36  C)-97.2  F (36.2  C)] 96.8  F (36  C)  Pulse:  [57-75] 57  Resp:  [8-24] 13  BP: ()/(57-96) 95/65  FiO2 (%):  [45 %-100 %] 45 %  SpO2:  [95 %-100 %] 99 %  No intake or output data in the 24 hours ending 03/20/21 0136  Wt Readings from Last 4 Encounters:   03/17/21 128.3 kg (282 lb 12.8 oz)   03/15/21 136.3 kg (300 lb 7.8 oz)   12/16/20 136.1 kg (300 lb)   10/08/20 130.2 kg (287 lb)     BP - Mean:  [] 77  FiO2 (%): 45 %  Resp: 13    Recent Labs   Lab 03/20/21  0007 03/19/21  2248   PH 7.60*   --    PCO2 54*  --    PO2 190*  --    HCO3 53*  --    O2PER 70% 2 Lt Nasal Cannula       GEN: no acute distress   HEENT: head ncat, sclera anicteric, OP patent, trachea midline   PULM: unlabored synchronous with vent, clear anteriorly    CV/COR: RRR S1S2 no gallop,  No rub, no murmur  ABD: soft nontender, hypoactive bowel sounds, no mass  EXT:  Edema   warm  NEURO: grossly intact  SKIN: no obvious rash      Assessment and plan :     Neurology/Psychiatry/Pain/Sedation:   1. Sedation for vent synchrony. Cont propofol and prn fentanyl for RASS -2 to -3. Possible SAT in am  2. Myasthenia gravis. Unclear if this resp event is 2/2 exacerbation or not. Will cont mycophenolate and steroids. Consult neurology.     Cardiovascular/Hemodynamics:   1. Acute hypercapnic respiratory failure presumed 2/2 AECOPD vs MG exacerbation. Easy to ventilate/oxygenate. Main reason for intubation was mental status in addition to acutely elevated PCO2. He is being treated with broad spectrum abx and steroids. Cultures are pending. Will add prn bronchodilators since no active bronchospasm is noted. SBT in am. No acute kidney or GI issues noted.   2. HFpEF. Cont lasix and metoprolol     Endocrine/Hematology/Oncology:   1. ICU glucose protocol    Disposition/Code Status/Other  1. Respiratory failure, critically ill   2. Code: Full     ICU Prophylaxis:   1. DVT: Hep Subq/mechanical  2. VAP: HOB 30 degrees, chlorhexidine rinse  3. Stress Ulcer: PPI  4. Restraints: Nonviolent soft two point restraints required and necessary for patient safety and continued cares and good effect as patient continues to pull at necessary lines, tubes despite education and distraction. Will readdress daily.   5. IV Access - central access required and necessary for continued patient cares  6. Feeding - NPO    I have personally reviewed the daily labs, imaging studies, cultures and discussed the case with referring physician and consulting physicians.     This patient  is critically ill and I have provided 30 minutes of critical care time (excluding procedures) on March 20, 2021.     El Zavala MD   of Medicine  Interventional Pulmonary  Department of Pulmonary, Allergy, Critical Care and Sleep Medicine   [facility]  Pager: [phone]   Office: [phone]  Email: [email or web address]    ROUTINE ICU LABS (Last four results)  CMP  Recent Labs   Lab 03/19/21  2217 03/18/21 03/15/21  0651 03/14/21  0749 03/13/21  0826    144 137 133 134   POTASSIUM 4.4 4.2 3.8 4.0 3.8   CHLORIDE 95 90* 92* 84* 91*   CO2 >45* >45* >45* >45* 44*   ANIONGAP Not Calculated  --  Not Calculated Not Calculated <1*   * 72 87 85 90   BUN 22 22 21 22 20   CR 0.65* 0.53* 0.60* 0.71 0.61*   GFRESTIMATED >90 >60 >90 90 >90   GFRESTBLACK >90  --  >90 >90 >90   NOAH 9.0 9.0 9.3 9.2 9.0   MAG  --   --  2.4*  --   --    PHOS  --   --  3.2  --  2.4*   PROTTOTAL 6.7*  --   --   --   --    ALBUMIN 2.8*  --   --   --   --    BILITOTAL 0.4  --   --   --   --    ALKPHOS 59  --   --   --   --    AST 18  --   --   --   --    ALT 33  --   --   --   --      CBC  Recent Labs   Lab 03/19/21  2217 03/18/21 03/15/21  0651 03/14/21  0749   WBC 5.6 6.1 5.6 5.5   RBC 5.01 4.73 5.10 4.86   HGB 13.1* 12.6* 13.4 12.6*   HCT 48.7 46.3 48.3 44.9   MCV 97 97.9 95 92   MCH 26.1* 26.6* 26.3* 25.9*   MCHC 26.9* 27.2* 27.7* 28.1*   RDW 15.8* 15.6* 15.1* 14.9    220 215 241     INRNo lab results found in last 7 days.  Arterial Blood Gas  Recent Labs   Lab 03/20/21  0007 03/19/21  2248   PH 7.60*  --    PCO2 54*  --    PO2 190*  --    HCO3 53*  --    O2PER 70% 2 Lt Nasal Cannula       All cultures:  No results for input(s): CULT in the last 168 hours.  Recent Results (from the past 24 hour(s))   XR Chest Port 1 View    Narrative    EXAM: XR CHEST PORT 1 VW  LOCATION: [facility]  DATE/TIME: 3/19/2021 11:22 PM    INDICATION: Weakness  COMPARISON: 03/12/2021       Impression    IMPRESSION: Endotracheal tube 3.5 cm above gabino. Enteric tube tip in stomach. Sidehole in distal esophagus. Elevated right hemidiaphragm. Bibasilar atelectasis or infiltrate and small effusions. Atherosclerotic aorta.   US Lower Extremity Arterial Duplex Left    Narrative    PRELIMINARY REPORT - Final read in the a.m.    EXAM: US LOWER EXTREMITY ARTERIAL DUPLEX LEFT  LOCATION: [facility]  DATE/TIME: 3/19/2021 11:27 PM    INDICATION: Cold limb.    COMPARISON: None.    TECHNIQUE: Duplex utilizing 2D gray-scale imaging, Doppler interrogation with color-flow and spectral waveform analysis.    FINDINGS: Blood flow is visualized throughout the major arteries of the left lower extremity. Unremarkable Doppler waveform evaluation of the major arteries of the left lower extremity. Evaluation of the peroneal artery is limited due to atherosclerotic   calcification.    LEFT LOWER EXTREMITY ARTERIAL ASSESSMENT:  Common femoral artery: 96 cm/s  Profunda femoris artery: 69 cm/s  SFA (proximal): 91 cm/s  SFA (mid): 65 cm/s  SFA (distal): 52 cm/s  Popliteal artery: 45 cm/s  Posterior tibial artery: 39 cm/s      Impression    IMPRESSION: No convincing evidence of significant stenosis or occlusion of the major arteries of the left lower extremity.    Preliminary Interpretation Dictated By: Leodan Fernandez MD  Date: 3/20/2021                Lines, Drains, Airway     .  Peripheral IV 03/19/21 Right Upper forearm (Active)   Site Assessment [facility] 03/19/21 2216   Line Status Saline locked 03/19/21 2216   Dressing Intervention New dressing  03/19/21 2216   Phlebitis Scale 0-->no symptoms 03/19/21 2216   Number of days: 1       Peripheral IV 03/19/21 Left Upper arm (Active)   Site Assessment [facility] 03/19/21 2334   Number of days: 1       ETT Cuffed 7.5 mm (Active)   Number of days: 1       NG/OG/NJ Tube Nasogastric 16 fr Right nostril (Active)   Site Description [facility] 03/19/21 2329   Number of days: 1       Urethral  Catheter Temperature probe 16 fr (Active)   Number of days:

## 2021-03-20 NOTE — PROGRESS NOTES
Pt intubated at 2330 3/19, co2 greater than 120. Secured at 22 at teeth placed on transport vent cmv 24, 500, 5, 100%. Post intubation abg drawn, settings adjusted to cmv 14, 500, 5, 30%. Transported up to icu and put on avea vent. Rt will continue to follow.

## 2021-03-20 NOTE — PLAN OF CARE
Shift: [id number]    Pt arrived to the unit from the ED.       Neuro:Pupils are equal and reactive; pt moves his feet spontaneously, withdraws in upper ext intermittently; does not follow commands or track    CV: Heart rhythm is sinus brent; BP's adequate; afebrile     Resp: Lungs are diminished, vent settings 30%, PEEP of 5     GI: pt had small incontinent BM on arrival from ED    : Beckett in place, adequate output     Skin: intact, bruising on arms; dry/flakey skin on shins; coccyx intact

## 2021-03-20 NOTE — CONSULTS
"[facility]    Stroke Consult Note    Reason for Consult:  Myasthenia gravis      Chief Complaint: Generalized Weakness       HPI  Thanh Goodrich is a 80 year old male with MG who presented with another exacerbation, presenting as hypercapnia respiratory failure. He was recently discharge for similar presentation and received 5 days of IVIG at that time which improve his symptoms. He was discharge on steroids 60 mg daily and was tapering it by 10 mg every week when he started complaining of increase fatigue/softer voice, and increase somnolence and found to have Co2>100 on arrival. He was intubated for airway protection.    Per review of other provider concerning for COPD, however, patient does not have COPD. Co2 retention likely from his myasthenia gravis.     Impression  Myasthenia Gravis Exacerbation     Recommendations  NIF/VC BID   Will start IVIG x 3 days   May extend to 5 days base on his response   Messaged patient's primary neurologist, patient may benefit from weekly/biweekly IVIG to prevent relapse.   Increase oral steroids to 60 mg daily   Continue home cellcept 1000 AM and 500 PM    We will follow up with his daily NIF and VC, do not extubate the patient even if he is doing well on PS.     We would like his NIF closer to -30 and VC >1 L before attempting any extubation.     Patient Follow-up    - final recommendation pending work-up    Thank you for this consult. We will continue to follow.     The Stroke Staff is Dr. Harvey, Isak Francis MD.    Glenn Sargent MD  Vascular Neurology Fellow  To page me or covering stroke neurology team member, click here: AMCOM   Choose \"On Call\" tab at top, then search dropdown box for \"Neurology Adult\", select location, press Enter, then look for stroke/neuro ICU/telestroke.    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 "     Cellulitis of left leg 9/23/2012     Depressive disorder 2020     Edema 7/7/2009     History of steroid therapy 2/22/2010     Hyperlipidemia LDL goal <130 10/31/2010     HYPERTENSION 7/8/2003     Incontinence of urine 10/25/2010     Leg ulcer (H) 9/20/2012     MALIGN NEOPL PROSTATE-3/07 4/2/2007    T1C, [place] 8, initial PSA 39, s/p radiation seed implants 2007      Myasthenia gravis (H)      OBESITY 7/8/2003     Osteoporosis 8/18/2009    Refused bisphosphonates 2011      PVC's (premature ventricular contractions) 11/23/2011     RIGHT OCCIPITAL PAIN 7/8/2003     ROTATOR CUFF SYND NOS- RT 9/19/2005     Skin nodule 3/18/2013     ulcer left shin 10/8/2007     Uncomplicated asthma 1944?    childhood only     Past Surgical History   Past Surgical History:   Procedure Laterality Date     BIOPSY  2013?    dealt with     Prostate Cancer Cryotherapy  2007     TONSILLECTOMY  1945     Medications   Home Meds  Prior to Admission medications    Medication Sig Start Date End Date Taking? Authorizing Provider   acetaminophen (TYLENOL) 500 MG tablet Take 500-1,000 mg by mouth every 6 hours as needed for mild pain or pain    Unknown, Entered By History   calcium carbonate 600 mg-vitamin D 400 units (CALTRATE) 600-400 MG-UNIT per tablet Take 1 tablet by mouth daily    Unknown, Entered By History   Cholecalciferol 100 MCG (4000 UT) CAPS Take 4,000 Units by mouth daily    Unknown, Entered By History   furosemide (LASIX) 20 MG tablet Take 1 tablet (20 mg) by mouth 2 times daily 3/19/21   Haase, Tonya Lynn, APRN CNP   metoprolol tartrate (LOPRESSOR) 25 MG tablet Take 0.5 tablets (12.5 mg) by mouth 2 times daily 3/15/21   David Mcclain, DO   Multiple Vitamins-Minerals (MULTIVITAMIN ADULTS 50+) TABS Take 1 tablet by mouth daily     Reported, Patient   mycophenolate (GENERIC EQUIVALENT) 500 MG tablet Take 1,000 mg by mouth every morning    Unknown, Entered By History   mycophenolate (GENERIC EQUIVALENT) 500 MG tablet Take 500  mg by mouth every evening    Unknown, Entered By History   potassium chloride ER (KLOR-CON M) 10 MEQ CR tablet Take 2 tablets (20 mEq) by mouth 2 times daily 3/19/21   Haase, Tonya Lynn, APRN CNP   predniSONE (DELTASONE) 50 MG tablet Take 1 tablet (50 mg) by mouth daily Take 50 mg until 3/19.  Then decrease by 10 mg every 7 days starting on 3/20 3/16/21   David Mcclain, DO       Scheduled Meds    chlorhexidine  15 mL Mouth/Throat Q12H     furosemide  20 mg Oral or NG Tube Daily     heparin ANTICOAGULANT  5,000 Units Subcutaneous Q8H     methylPREDNISolone  62.5 mg Intravenous Q8H     metoprolol tartrate  25 mg Oral or NG Tube BID     mycophenolate  1,000 mg Oral or NG Tube QAM     mycophenolate  500 mg Oral or NG Tube QPM       Infusion Meds    propofol (DIPRIVAN) infusion 40 mcg/kg/min (03/20/21 0852)     propofol (DIPRIVAN) infusion 40 mcg/kg/min (03/20/21 1019)     sodium chloride 10 mL/hr at 03/20/21 0656       PRN Meds  glucose **OR** dextrose **OR** glucagon, fentaNYL, naloxone **OR** naloxone **OR** naloxone **OR** naloxone, ondansetron **OR** ondansetron, prochlorperazine **OR** prochlorperazine **OR** prochlorperazine, propofol (DIPRIVAN) infusion **AND** propofol **AND** CK total **AND** Triglycerides, senna-docusate **OR** senna-docusate    Allergies   Allergies   Allergen Reactions     Ciprofloxacin Other (See Comments)     Contraindicated for myasthenia gravis patients     Procainamide Other (See Comments)     Contraindicated for myasthenia gravis patients     Quinidine Other (See Comments)     Contraindicated for myasthenia gravis patients     Quinine Other (See Comments)     Contraindicated for myasthenia gravis patients     Family History   Family History   Problem Relation Age of Onset     Hypertension Mother      Depression Mother      Substance Abuse Mother         alcohol     Hypertension Father      Cancer Father         Prostate     Prostate Cancer Father      Substance Abuse Father          alcohol     Obesity Father      Diabetes Maternal Grandmother      C.A.D. Maternal Grandmother      Cerebrovascular Disease Maternal Grandfather      Cancer Paternal Uncle         Prostate     Prostate Cancer Other      Social History   Social History     Tobacco Use     Smoking status: Never Smoker     Smokeless tobacco: Never Used   Substance Use Topics     Alcohol use: Yes     Alcohol/week: 0.0 standard drinks     Comment: very rarely     Drug use: No       Review of Systems   Review of systems not obtained due to patient factors - intubation       PHYSICAL EXAMINATION   Temp:  [96.4  F (35.8  C)-97.5  F (36.4  C)] 97.5  F (36.4  C)  Pulse:  [47-75] 54  Resp:  [8-24] 13  BP: ()/(50-96) 106/57  FiO2 (%):  [30 %-100 %] 30 %  SpO2:  [95 %-100 %] 98 %    Neurologic  Mental Status:  follows commands, intubated  Cranial Nerves:  visual fields intact, PERRL, EOMI with normal smooth pursuit, facial sensation intact and symmetric, facial movements symmetric, hearing not formally tested but intact to conversation  Motor:  normal muscle tone and bulk, no abnormal movements, able to move all limbs spontaneously, weak neck flexor  Reflexes:  toes down-going  Sensory:  light touch sensation intact and symmetric throughout upper and lower extremities, no extinction on double simultaneous stimulation   Coordination:  no resting tremor  Station/Gait:  deferred    Dysphagia Screen  Per Nursing        Imaging  I personally reviewed all imaging; relevant findings per HPI.    Labs Data   CBC  Recent Labs   Lab 03/20/21  0602 03/19/21 2217 03/18/21   WBC 7.9 5.6 6.1   RBC 4.76 5.01 4.73   HGB 12.5* 13.1* 12.6*   HCT 45.7 48.7 46.3    216 220     Basic Metabolic Panel   Recent Labs   Lab 03/20/21  0602 03/19/21 2217 03/18/21    142 144   POTASSIUM 4.1 4.4 4.2   CHLORIDE 97 95 90*   CO2 45* >45* >45*   BUN 22 22 22   CR 0.57* 0.65* 0.53*   * 118* 72   NOAH 8.8 9.0 9.0     Liver Panel  Recent Labs   Lab  03/20/21  0602 03/19/21  2217   PROTTOTAL 5.8* 6.7*   ALBUMIN 2.4* 2.8*   BILITOTAL 0.7 0.4   ALKPHOS 48 59   AST 21 18   ALT 27 33     INR  No lab results found.   Lipid Profile    Recent Labs   Lab Test 12/16/20  1455 07/05/19  1558 08/08/18  1245 11/22/13  1523 11/22/13  1523   CHOL 141 174 175   < > 196   HDL 70 57 53   < > 61   LDL 60 105* 104*   < > 117   TRIG 55 59 90   < > 88   CHOLHDLRATIO  --   --   --   --  3.2    < > = values in this interval not displayed.     A1C  No lab results found.  Troponin I    Recent Labs   Lab 03/19/21 2217   TROPI 0.060*

## 2021-03-20 NOTE — ED NOTES
Bed: ED01  Expected date: 3/19/21  Expected time: 9:45 PM  Means of arrival: Ambulance  Comments:  Josué [address] 80M weak   Katelyn

## 2021-03-20 NOTE — PROGRESS NOTES
[facility] ICU RESPIRATORY NOTE        Date of Admission: 3/19/2021    Date of Intubation (most recent):  3/19/21    Reason for Mechanical Ventilation: Resp failure    Number of Days on Mechanical Ventilation: 2    Met Criteria for Spontaneous Breathing Trial: No    Reason for No Spontaneous Breathing Trial:  Per MD    Significant Events Today:  NIF -16 FVC 650ml    ABG Results:   Recent Labs   Lab 03/20/21  0628 03/20/21  0007 03/19/21  2248   PH 7.60* 7.60*  --    PCO2 51* 54*  --    PO2 76* 190*  --    HCO3 50* 53*  --    O2PER  --  70% 2 Lt Nasal Cannula       Current Vent Settings: Ventilation Mode: CMV/AC  (Continuous Mandatory Ventilation/ Assist Control)  FiO2 (%): 30 %  Rate Set (breaths/minute): 14 breaths/min  Tidal Volume Set (mL): 500 mL  PEEP (cm H2O): 5 cmH2O  Oxygen Concentration (%): 30 %  Resp: 18    Plan:  Pt to remain on full vent support overnight    Cristiano Moran, RT

## 2021-03-21 NOTE — PROGRESS NOTES
[facility] ICU RESPIRATORY NOTE           Date of Admission: 3/19/2021     Date of Intubation (most recent):  3/19/21     Reason for Mechanical Ventilation: Resp failure     Number of Days on Mechanical Ventilation: 3     Met Criteria for Spontaneous Breathing Trial: No     Reason for No Spontaneous Breathing Trial:  Per MD     Significant Events Today:  NIF -16 FVC 650ml     ABG Results:   Recent Labs   Lab 03/20/21  2250 03/20/21  0628 03/20/21  0007 03/19/21  2248   PH 7.59* 7.60* 7.60*  --    PCO2 44 51* 54*  --    PO2 87 76* 190*  --    HCO3 42* 50* 53*  --    O2PER 30%  --  70% 2 Lt Nasal Cannula     Ventilation Mode: CMV/AC  (Continuous Mandatory Ventilation/ Assist Control)  FiO2 (%): 30 %  Rate Set (breaths/minute): 14 breaths/min  Tidal Volume Set (mL): 500 mL  PEEP (cm H2O): 5 cmH2O  Oxygen Concentration (%): 30 %  Resp: 17    MATEO Hartley, RRT

## 2021-03-21 NOTE — PROGRESS NOTES
"  [facility]    Neuro Critical Care Progress Note    Interval Events  NIF -28, VC 1.3 L today   Appears comfortable, using paper and pen to communicate.     Impression  Myasthenia Gravis Exacerbation     Plan  Continue IVIG, day 2/3 today   We will assess if we need to extend it to 5 days if he does not clinically improve.   Continue prednisone 60 mg, will not taper, he will continue this dose until he see his primary neurologist in the outpatient setting.   Continue NIF/VC daily   Continue home cellcept 1000 AM and 500 PM    If patient continue to improve and his numbers look better, will consider extubating.     The Stroke Staff is Dr. Harvey, Isak Francis MD.    Glenn Sargent MD  Vascular Neurology Fellow  To page me or covering stroke neurology team member, click here: AMCOM   Choose \"On Call\" tab at top, then search dropdown box for \"Neurology Adult\", select location, press Enter, then look for stroke/neuro ICU/telestroke.    ______________________________________________________    Medications   Home Meds  Prior to Admission medications    Medication Sig Start Date End Date Taking? Authorizing Provider   acetaminophen (TYLENOL) 325 MG tablet Take 650 mg by mouth every 4 hours as needed for mild pain   Yes Unknown, Entered By History   calcium carbonate 600 mg-vitamin D 400 units (CALTRATE) 600-400 MG-UNIT per tablet Take 1 tablet by mouth daily   Yes Unknown, Entered By History   Cholecalciferol 100 MCG (4000 UT) CAPS Take 4,000 Units by mouth daily   Yes Unknown, Entered By History   furosemide (LASIX) 20 MG tablet Take 1 tablet (20 mg) by mouth 2 times daily 3/19/21  Yes Haase, Tonya Lynn, APRN CNP   metoprolol tartrate (LOPRESSOR) 25 MG tablet Take 0.5 tablets (12.5 mg) by mouth 2 times daily 3/15/21  Yes David Mcclain, DO   Multiple Vitamins-Minerals (MULTIVITAMIN ADULTS 50+) TABS Take 1 tablet by mouth daily    Yes Reported, Patient   mycophenolate (GENERIC " EQUIVALENT) 500 MG tablet Take 1,000 mg by mouth every morning   Yes Unknown, Entered By History   mycophenolate (GENERIC EQUIVALENT) 500 MG tablet Take 500 mg by mouth At Bedtime    Yes Unknown, Entered By History   potassium chloride ER (KLOR-CON M) 10 MEQ CR tablet Take 2 tablets (20 mEq) by mouth 2 times daily 3/19/21  Yes Haase, Tonya Lynn, APRN CNP   predniSONE (DELTASONE) 50 MG tablet Take 1 tablet (50 mg) by mouth daily Take 50 mg until 3/19.  Then decrease by 10 mg every 7 days starting on 3/20 3/16/21  Yes David Mcclain, DO       Scheduled Meds    acetaminophen  650 mg Oral Q24H     chlorhexidine  15 mL Mouth/Throat Q12H     diphenhydrAMINE  50 mg Oral Q24H    Or     diphenhydrAMINE  50 mg Intravenous Q24H     heparin ANTICOAGULANT  5,000 Units Subcutaneous Q8H     immune globulin - sucrose free  30 g Intravenous Q24H     metoprolol tartrate  25 mg Oral or NG Tube BID     mycophenolate  1,000 mg Oral or NG Tube Daily     mycophenolate  500 mg Oral or NG Tube At Bedtime     pantoprazole (PROTONIX) IV  40 mg Intravenous Daily     piperacillin-tazobactam  3.375 g Intravenous Q6H     potassium & sodium phosphates  2 packet Oral or Feeding Tube TID     potassium chloride  40 mEq Oral or Feeding Tube Once     [START ON 3/22/2021] predniSONE  60 mg Oral or Feeding Tube Daily       Infusion Meds    dextrose       propofol (DIPRIVAN) infusion Stopped (03/21/21 0602)     propofol (DIPRIVAN) infusion Stopped (03/20/21 1130)     sodium chloride Stopped (03/21/21 0000)       PRN Meds  acetaminophen, dextrose, glucose **OR** dextrose **OR** glucagon, diphenhydrAMINE **OR** diphenhydrAMINE, diphenhydrAMINE, EPINEPHrine, famotidine, fentaNYL, methylPREDNISolone, naloxone **OR** naloxone **OR** naloxone **OR** naloxone, ondansetron **OR** ondansetron, prochlorperazine **OR** prochlorperazine **OR** prochlorperazine, propofol (DIPRIVAN) infusion **AND** propofol **AND** CK total **AND** Triglycerides,  senna-docusate **OR** senna-docusate       PHYSICAL EXAMINATION  Temp:  [98.1  F (36.7  C)-99.9  F (37.7  C)] 99  F (37.2  C)  Pulse:  [50-82] 59  Resp:  [12-37] 21  BP: ()/() 121/67  FiO2 (%):  [30 %] 30 %  SpO2:  [95 %-100 %] 95 %     Neurologic  Mental Status:  follows commands, intubated  Cranial Nerves:  visual fields intact, PERRL, EOMI with normal smooth pursuit, facial sensation intact and symmetric, facial movements symmetric, hearing not formally tested but intact to conversation  Motor:  normal muscle tone and bulk, no abnormal movements, able to move all limbs spontaneously, weak neck flexor, 5/5 upper extremity   Reflexes:  toes down-going  Sensory:  light touch sensation intact and symmetric throughout upper and lower extremities, no extinction on double simultaneous stimulation   Coordination:  no resting tremor  Station/Gait:  deferred        Imaging  I personally reviewed all imaging; relevant findings per HPI.     Lab Results Data   CBC  Recent Labs   Lab 03/21/21 0451 03/20/21  0602 03/19/21 2217   WBC 6.0 7.9 5.6   RBC 4.26* 4.76 5.01   HGB 11.3* 12.5* 13.1*   HCT 37.8* 45.7 48.7    157 216     Basic Metabolic Panel    Recent Labs   Lab 03/21/21  0858 03/21/21  0451 03/20/21  0602 03/19/21 2217   NA  --  138 142 142   POTASSIUM 3.2* 2.6* 4.1 4.4   CHLORIDE  --  99 97 95   CO2  --  35* 45* >45*   BUN  --  25 22 22   CR  --  0.72 0.57* 0.65*   GLC  --  93 111* 118*   NOAH  --  8.2* 8.8 9.0     Liver Panel  Recent Labs   Lab 03/21/21  0451 03/20/21  0602 03/19/21 2217   PROTTOTAL 6.0* 5.8* 6.7*   ALBUMIN 2.2* 2.4* 2.8*   BILITOTAL 1.0 0.7 0.4   ALKPHOS 45 48 59   AST 17 21 18   ALT 23 27 33     INR  No lab results found.   Lipid Profile    Recent Labs   Lab Test 12/16/20  1455 07/05/19  1558 08/08/18  1245 11/22/13  1523 11/22/13  1523   CHOL 141 174 175   < > 196   HDL 70 57 53   < > 61   LDL 60 105* 104*   < > 117   TRIG 55 59 90   < > 88   CHOLHDLRATIO  --   --   --   --   3.2    < > = values in this interval not displayed.     A1C  No lab results found.  Troponin I    Recent Labs   Lab 03/19/21  2217   TROPI 0.060*

## 2021-03-21 NOTE — PLAN OF CARE
BP stable. Tele SR. Neuro intact, forgetful at times. Communicating well through writing. Tolerating vent. Skin intact. Encourage offloading with pillows, used rotation via mattress at times to assist with pressure relief. Denies pain. TF started today @ 15cc/hr. Due to be increased @ 0330 on 3/22. Cousin Gaby updated and was at bedside today. She brought pt's ria and phone. Plan for IVIG to continue. Neuro crit to communicate with pt's primary neuro provider for probable OP IVIG infusions for the future. Neuro planning to extubate tomorrow.

## 2021-03-21 NOTE — PROVIDER NOTIFICATION
Dr. Zavala notified of critical K 2.6. Standard replacement protocol ordered as well as to give 40 mEq PT. K and Phos added to AM labs for frequent PVC's on telemetry.

## 2021-03-21 NOTE — PLAN OF CARE
BP soft at times, Tele SR/SB. Sedation off this am, pt tolerating well. Started IVIG after pre medications. Ki bed in room. Frequently needing oral suction. Able to let us know. Sputum sample sent to lab. Cont to monitor

## 2021-03-21 NOTE — PROGRESS NOTES
Critical Care Progress Note      03/21/2021    Name: Thanh Goodrich MRN#: [id number]   Age: 80 year old YOB: 1940     Hsptl Day# 2  ICU DAY #    MV DAY #             Problem List:   Active Problems:    Metabolic encephalopathy    Abnormal urinalysis    Acute on chronic respiratory failure with hypercapnia (H)    Myasthenia crises/MG exacerbation         Summary/Hospital Course:   Thanh Goodrich is a 80 year old male admitted on 3/19/2021 with significant history for myasthenia gravis, HFpEF, COPD, HTN, MDD, HAZEL, prostate cancer who presents from TCU for acute hypercapnic respiratory failure. He was recently admitted and discharged (3/4-3/15) for acute hypoxic and hypercapnic respiratory failure requiring intubation (3/6/21-3/11/21) 2/2 Myasthenia gravis exacerbation, diastolic CHF exacerbation with diuresis,  Metabolic alkalosis. He received IVIG and steroid treatment and was followed by NCC. He was discharged on supplemental O2 at 2-3 LPM.  He arrived to ED somnolent. ABG demonstrated PCO2>100 and he was immediately intubated.  He was given rocpehin, zosyn and vancomycin in ED in addition to 125mg solumedrol. CXR was not significant for focal infection or mucous plugging. ETT was in good position.   NIF check was -14 and VC was 650 ml on admission and has improved -28 and 1300 ml today.        Assessment and plan :     Thanh Goodrich is a 80 year old male admitted on 3/19/2021 for acute hypercapnic respiratory failure due to myasthenia gravis exacerbation requiring intubation.  I have personally reviewed the daily labs, imaging studies, cultures and discussed the case with referring physician and consulting physicians.     My assessment and plan by system for this patient is as follows:    Neurology/Psychiatry:   1. Myasthenia Gravis exacerbation/myasthenic crisis  2. Sedation for vent synchrony.    Plan  - Neurology/NCC on board, had received IVIG during recent admission. Restarted another course  of IVIG yesterday.  - Continue steroids - Prednisone 60 mg daily for now with taper.  - Doesn't require sedation and is very comfortable.       Cardiovascular:   1.Acute on chronic diastolic CHF exacerbation  2. Volume overload  3. Mild troponemia    Plan   - Continue gentle diuresis with lasix - IV lasix 20 mg daily   - Trend troponin levels, no signs of ischemia on EKG  - Strict 1/0s    Pulmonary/Ventilator Management:   1. Acute hypercapnic respiratory failure presumed 2/2 MG exacerbation. Possible obesity hypoventilation syndrome is a major contributor.  2. Obesity hypoventilation syndrome  3. Pulmonary edema and volume overload    Plan  - Bedside NIF is - 14 and  VC is 650 ml on admission in keeping with MG exacerbation improved today to -28 and 1300 ml. NCC on board, started IVIG yesterday. Continue steroids for now.  - Protective tidal volume ventilation.  - Monitor VBG closely  - He may require a bi-level PAP device on discharge such as BIPAP or trilogy device for hypercapnic respiratory failure due to concern for obesity hypoventilation syndrome as a contributing factor as he still stands a high chance for readmission without the device. I will talk to the  on Monday for possible procurement.  - Continue diuresis with lasix.      GI and Nutrition :   1. No issues      Plan  - Nutrition consult for tube feed management  - PPI for prophylaxis  - Continue bowel prep    Renal/Fluids/Electrolytes:   1. Metabolic alkalosis likely due to contraction  2. Volume overload and pulmonary edema    Plan  - Some improvement in alkalosis after Diamox doses, bicarb is down to 35. Will continue to monitor VBG.  - Restart gentle diuresis with IV lasix 20 mg daily.  - monitor function and electrolytes as needed with replacement per ICU protocols.  - generally avoid nephrotoxic agents such as NSAID, IV contrast unless specifically required  - adjust medications as needed for renal clearance  - follow I/O's as  appropriate.    Infectious Disease:   1. UTI - due to E coli    Plan  - Continue zosyn  - Follow up on sensitivities.    Endocrine:   1. Stress and steroid induced hyperglycemia    Plan  - ICU insulin protocol, goal sugar <180      ICU Prophylaxis:   1. DVT: Hep Subq/mechanical  2. VAP: HOB 30 degrees, chlorhexidine rinse  3. Stress Ulcer: PPI  4. Restraints: Nonviolent soft two point restraints required and necessary for patient safety and continued cares and good effect as patient continues to pull at necessary lines, tubes despite education and distraction. Will readdress daily.   5. IV Access - central access required and necessary for continued patient cares  6. Feeding - NPO  7. Family updated, spoke with his cousin- Gaby        [place] goals for next 24 hours:   1. Continue to monitor NIF and VC  2. Wean off ventilator as tolerated               Interim History:   Remained calm on the ventilator with no major issues. Propofol was increased due to slight coughing and dyssynchrony with the vent         Key Medications:       acetaminophen  650 mg Oral Q24H     acetaZOLAMIDE  500 mg Oral TID     chlorhexidine  15 mL Mouth/Throat Q12H     diphenhydrAMINE  50 mg Oral Q24H    Or     diphenhydrAMINE  50 mg Intravenous Q24H     heparin ANTICOAGULANT  5,000 Units Subcutaneous Q8H     immune globulin - sucrose free  30 g Intravenous Q24H     metoprolol tartrate  25 mg Oral or NG Tube BID     mycophenolate  1,000 mg Oral or NG Tube Daily     mycophenolate  500 mg Oral or NG Tube At Bedtime     pantoprazole (PROTONIX) IV  40 mg Intravenous Daily     piperacillin-tazobactam  3.375 g Intravenous Q6H     potassium & sodium phosphates  2 packet Oral or Feeding Tube TID     predniSONE  60 mg Oral Daily       propofol (DIPRIVAN) infusion Stopped (03/21/21 0602)     propofol (DIPRIVAN) infusion Stopped (03/20/21 1130)     sodium chloride Stopped (03/21/21 0000)               Physical Examination:   Temp:  [97.9  F (36.6   C)-99.9  F (37.7  C)] 98.8  F (37.1  C)  Pulse:  [50-82] 56  Resp:  [12-37] 14  BP: ()/() 111/57  FiO2 (%):  [30 %] 30 %  SpO2:  [96 %-100 %] 97 %    Intake/Output Summary (Last 24 hours) at 3/20/2021 1434  Last data filed at 3/20/2021 1200  Gross per 24 hour   Intake 252.68 ml   Output 900 ml   Net -647.32 ml     Wt Readings from Last 4 Encounters:   03/21/21 125.2 kg (276 lb)   03/17/21 128.3 kg (282 lb 12.8 oz)   03/15/21 136.3 kg (300 lb 7.8 oz)   12/16/20 136.1 kg (300 lb)     BP - Mean:  [] 77  Ventilation Mode: CMV/AC  (Continuous Mandatory Ventilation/ Assist Control)  FiO2 (%): 30 %  Rate Set (breaths/minute): 14 breaths/min  Tidal Volume Set (mL): 500 mL  PEEP (cm H2O): 5 cmH2O  Oxygen Concentration (%): 30 %  Resp: 14    Recent Labs   Lab 03/20/21  2250 03/20/21  0628 03/20/21  0007 03/19/21  2248   PH 7.59* 7.60* 7.60*  --    PCO2 44 51* 54*  --    PO2 87 76* 190*  --    HCO3 42* 50* 53*  --    O2PER 30%  --  70% 2 Lt Nasal Cannula       GEN: no acute distress   HEENT: head ncat, sclera anicteric, OP patent, trachea midline   PULM: unlabored synchronous with vent, clear anteriorly    CV/COR: RRR S1S2 no gallop,  No rub, no murmur  ABD: soft nontender, hypoactive bowel sounds, no mass  EXT:  Edema   warm  NEURO: grossly intact  SKIN: no obvious rash  LINES: clean, dry intact         Data:   All data and imaging reviewed     ROUTINE ICU LABS (Last four results)  CMP  Recent Labs   Lab 03/21/21  0451 03/20/21  0602 03/19/21  2217 03/18/21 03/15/21  0651    142 142 144 137   POTASSIUM 2.6* 4.1 4.4 4.2 3.8   CHLORIDE 99 97 95 90* 92*   CO2 35* 45* >45* >45* >45*   ANIONGAP 4 <1* Not Calculated  --  Not Calculated   GLC 93 111* 118* 72 87   BUN 25 22 22 22 21   CR 0.72 0.57* 0.65* 0.53* 0.60*   GFRESTIMATED 89 >90 >90 >60 >90   GFRESTBLACK >90 >90 >90  --  >90   NOAH 8.2* 8.8 9.0 9.0 9.3   MAG 1.8  --   --   --  2.4*   PHOS 1.4*  --   --   --  3.2   PROTTOTAL 6.0* 5.8* 6.7*  --   --     ALBUMIN 2.2* 2.4* 2.8*  --   --    BILITOTAL 1.0 0.7 0.4  --   --    ALKPHOS 45 48 59  --   --    AST 17 21 18  --   --    ALT 23 27 33  --   --      CBC  Recent Labs   Lab 03/21/21  0451 03/20/21  0602 03/19/21  2217 03/18/21   WBC 6.0 7.9 5.6 6.1   RBC 4.26* 4.76 5.01 4.73   HGB 11.3* 12.5* 13.1* 12.6*   HCT 37.8* 45.7 48.7 46.3   MCV 89 96 97 97.9   MCH 26.5 26.3* 26.1* 26.6*   MCHC 29.9* 27.4* 26.9* 27.2*   RDW 17.1* 15.2* 15.8* 15.6*    157 216 220     INRNo lab results found in last 7 days.  Arterial Blood Gas  Recent Labs   Lab 03/20/21  2250 03/20/21  0628 03/20/21  0007 03/19/21  2248   PH 7.59* 7.60* 7.60*  --    PCO2 44 51* 54*  --    PO2 87 76* 190*  --    HCO3 42* 50* 53*  --    O2PER 30%  --  70% 2 Lt Nasal Cannula       All cultures:  Recent Labs   Lab 03/20/21  1640 03/18/21 2217   CULT PENDING >100,000 colonies/mL  Escherichia coli  Susceptibility testing in progress  *     No results found for this or any previous visit (from the past 24 hour(s)).      Billing: This patient is critically ill: Yes. Total critical care time today 50 min.    Milla Granados MD  Pulmonary, Critical Care and Sleep Medicine  [facility]  Pager: [phone]

## 2021-03-21 NOTE — PLAN OF CARE
Propofol restarted for a few hours due to restlessness and frequent coughing on vent. Stopped for hypotension. Pt anxious but cooperative. Restraints are not needed. Difficult to assess orientation, but pt appears mildly confused. Able to follow commands and nod or shake his head in response to questions. Able to write to make his needs known.

## 2021-03-21 NOTE — PROGRESS NOTES
RT Note 1000    NIF -28  FVC 1300ml  Pt had good effort.  Will conitinue to follow  Cristiano Moran, RT  3/21/2021

## 2021-03-22 NOTE — PLAN OF CARE
Neuro: Pt A&Ox4, writes correct answers in response to questions. Cooperative. Denies pain. Pt appears to have slept much better last night than previous night.  CV: SB-SR, metoprolol held last night for HR 50's. HR 40's at times when pt fast asleep.  Pulm: lungs clear, SBT 30 minutes this AM, tolerated well  GI/: good UOP, tolerating tube feeds  Skin: blanchable redness to coccyx, frequent repositioning

## 2021-03-22 NOTE — PROGRESS NOTES
[facility] ICU RESPIRATORY NOTE           Date of Admission: 3/19/2021     Date of Intubation (most recent):  3/19/21     Reason for Mechanical Ventilation: Resp failure     Number of Days on Mechanical Ventilation: 4     Met Criteria for Spontaneous Breathing Trial: Yes see below     Reason for No Spontaneous Breathing Trial:  Per MD     Significant Events Today:  NIF -26 VC 1200     ABG Results:   Recent Labs   Lab 03/20/21  2250 03/20/21  0628 03/20/21  0007 03/19/21  2248   PH 7.59* 7.60* 7.60*  --    PCO2 44 51* 54*  --    PO2 87 76* 190*  --    HCO3 42* 50* 53*  --    O2PER 30%  --  70% 2 Lt Nasal Cannula     Ventilation Mode: CPAP/PS  (Continuous positive airway pressure with Pressure Support)  FiO2 (%): 30 %  Rate Set (breaths/minute): 14 breaths/min  Tidal Volume Set (mL): 500 mL  PEEP (cm H2O): 5 cmH2O  Pressure Support (cm H2O): 5 cmH2O  Oxygen Concentration (%): 30 %  Resp: 14    Spontaneous Breathing Trial    Criteria met for SBT (Y/N): Y    Settings:    PS: 5  PEEP: 5  FiO2: 30    Measured Parameters:    RR: 17  Tidal volume: 360  RSBI: 43    Patient tolerance:  good      Duration of SBT: 30 mins    MATEO Hartley, RRT

## 2021-03-22 NOTE — PROGRESS NOTES
"  [facility]    Neuro Critical Care Progress Note    Interval Events  NIF -26 & -25, VC 1.3 and 1.5L today   Appears comfortable   Will extubate.     Impression  Myasthenia Gravis Exacerbation     Plan  Continue IVIG, day 3/3 today   We will assess if we need to extend it to 5 days if he does not clinically improve.   Continue prednisone 60 mg, he will continue this dose until he see his primary neurologist in the outpatient setting.   Continue NIF/VC daily   Continue home cellcept 1000 AM and 500 PM  ABG 24 hours x 2 post extubation to make sure patient does not continue to retain CO2.    If he is slowly retaining CO2, he may be benefit from CPAP at nighttime to prevent relapse of hypercapnia respiratory failure.     The Neuro Critical Staff is EMIGDIO Tuttle MD  Vascular Neurology Fellow  To page me or covering stroke neurology team member, click here: AMCOM   Choose \"On Call\" tab at top, then search dropdown box for \"Neurology Adult\", select location, press Enter, then look for stroke/neuro ICU/telestroke.    ______________________________________________________    Medications   Home Meds  Prior to Admission medications    Medication Sig Start Date End Date Taking? Authorizing Provider   acetaminophen (TYLENOL) 325 MG tablet Take 650 mg by mouth every 4 hours as needed for mild pain   Yes Unknown, Entered By History   calcium carbonate 600 mg-vitamin D 400 units (CALTRATE) 600-400 MG-UNIT per tablet Take 1 tablet by mouth daily   Yes Unknown, Entered By History   Cholecalciferol 100 MCG (4000 UT) CAPS Take 4,000 Units by mouth daily   Yes Unknown, Entered By History   furosemide (LASIX) 20 MG tablet Take 1 tablet (20 mg) by mouth 2 times daily 3/19/21  Yes Haase, Tonya Lynn, APRN CNP   metoprolol tartrate (LOPRESSOR) 25 MG tablet Take 0.5 tablets (12.5 mg) by mouth 2 times daily 3/15/21  Yes David Mcclain, DO   Multiple Vitamins-Minerals (MULTIVITAMIN ADULTS " 50+) TABS Take 1 tablet by mouth daily    Yes Reported, Patient   mycophenolate (GENERIC EQUIVALENT) 500 MG tablet Take 1,000 mg by mouth every morning   Yes Unknown, Entered By History   mycophenolate (GENERIC EQUIVALENT) 500 MG tablet Take 500 mg by mouth At Bedtime    Yes Unknown, Entered By History   potassium chloride ER (KLOR-CON M) 10 MEQ CR tablet Take 2 tablets (20 mEq) by mouth 2 times daily 3/19/21  Yes Haase, Tonya Lynn, APRN CNP   predniSONE (DELTASONE) 50 MG tablet Take 1 tablet (50 mg) by mouth daily Take 50 mg until 3/19.  Then decrease by 10 mg every 7 days starting on 3/20 3/16/21  Yes David Mcclain,        Scheduled Meds    acetaminophen  650 mg Oral Q24H     cefTRIAXone  1 g Intravenous Q24H     chlorhexidine  15 mL Mouth/Throat Q12H     diphenhydrAMINE  50 mg Oral Q24H    Or     diphenhydrAMINE  50 mg Intravenous Q24H     heparin ANTICOAGULANT  5,000 Units Subcutaneous Q8H     immune globulin - sucrose free  30 g Intravenous Q24H     metoprolol tartrate  12.5 mg Oral or NG Tube BID     mycophenolate  1,000 mg Oral or NG Tube Daily     mycophenolate  500 mg Oral or NG Tube At Bedtime     pantoprazole (PROTONIX) IV  40 mg Intravenous Daily     predniSONE  60 mg Oral or Feeding Tube Daily       Infusion Meds    dextrose       propofol (DIPRIVAN) infusion Stopped (03/21/21 0602)     propofol (DIPRIVAN) infusion Stopped (03/20/21 1130)     sodium chloride 10 mL/hr at 03/22/21 0700       PRN Meds  acetaminophen, dextrose, glucose **OR** dextrose **OR** glucagon, diphenhydrAMINE **OR** diphenhydrAMINE, diphenhydrAMINE, EPINEPHrine, famotidine, fentaNYL, methylPREDNISolone, naloxone **OR** naloxone **OR** naloxone **OR** naloxone, ondansetron **OR** ondansetron, prochlorperazine **OR** prochlorperazine **OR** prochlorperazine, propofol (DIPRIVAN) infusion **AND** propofol **AND** CK total **AND** Triglycerides, senna-docusate **OR** senna-docusate       PHYSICAL EXAMINATION  Temp:  [97.3  F  (36.3  C)-99.1  F (37.3  C)] 97.3  F (36.3  C)  Pulse:  [48-71] 67  Resp:  [13-17] 14  BP: (103-144)/(50-88) 124/65  FiO2 (%):  [30 %] 30 %  SpO2:  [94 %-100 %] 97 %     Neurologic  Mental Status:  follows commands, intubated  Cranial Nerves:  visual fields intact, PERRL, EOMI with normal smooth pursuit, facial sensation intact and symmetric, facial movements symmetric, hearing not formally tested but intact to conversation  Motor:  normal muscle tone and bulk, no abnormal movements, able to move all limbs spontaneously, weak neck flexor, 5/5 upper extremity   Reflexes:  toes down-going  Sensory:  light touch sensation intact and symmetric throughout upper and lower extremities, no extinction on double simultaneous stimulation   Coordination:  no resting tremor  Station/Gait:  deferred        Imaging  I personally reviewed all imaging; relevant findings per HPI.     Lab Results Data   CBC  Recent Labs   Lab 03/22/21 0436 03/21/21 0451 03/20/21  0602   WBC 4.9 6.0 7.9   RBC 4.25* 4.26* 4.76   HGB 11.0* 11.3* 12.5*   HCT 37.0* 37.8* 45.7   * 158 157     Basic Metabolic Panel    Recent Labs   Lab 03/22/21 0436 03/21/21 2057 03/21/21  0858 03/21/21 0451 03/20/21  0602     --   --  138 142   POTASSIUM 2.9* 3.3* 3.2* 2.6* 4.1   CHLORIDE 104  --   --  99 97   CO2 29  --   --  35* 45*   BUN 27  --   --  25 22   CR 0.68  --   --  0.72 0.57*   GLC 81  --   --  93 111*   NOAH 7.8*  --   --  8.2* 8.8     Liver Panel  Recent Labs   Lab 03/22/21 0436 03/21/21 0451 03/20/21  0602   PROTTOTAL 6.1* 6.0* 5.8*   ALBUMIN 2.0* 2.2* 2.4*   BILITOTAL 0.7 1.0 0.7   ALKPHOS 42 45 48   AST 15 17 21   ALT 22 23 27     INR  No lab results found.   Lipid Profile    Recent Labs   Lab Test 12/16/20  1455 07/05/19  1558 08/08/18  1245 11/22/13  1523 11/22/13  1523   CHOL 141 174 175   < > 196   HDL 70 57 53   < > 61   LDL 60 105* 104*   < > 117   TRIG 55 59 90   < > 88   CHOLHDLRATIO  --   --   --   --  3.2    < > = values in  this interval not displayed.     A1C  No lab results found.  Troponin I    Recent Labs   Lab 03/19/21  2217   TROPI 0.060*

## 2021-03-22 NOTE — PROGRESS NOTES
Critical Care Progress Note      03/22/2021    Name: Thanh Goodrich MRN#: [id number]   Age: 80 year old YOB: 1940     [facility] Day# 2                   Problem List:   Active Problems:    Metabolic encephalopathy    Abnormal urinalysis    Acute on chronic respiratory failure with hypercapnia (H)    Myasthenia crises/MG exacerbation         Summary/Hospital Course:   Thanh Goodrich is a 80 year old male admitted on 3/19/2021 with significant history for myasthenia gravis, HFpEF, COPD, HTN, MDD, HAZEL, prostate cancer who presents from TCU for acute hypercapnic respiratory failure. He was recently admitted and discharged (3/4-3/15) for acute hypoxic and hypercapnic respiratory failure requiring intubation (3/6/21-3/11/21) 2/2 Myasthenia gravis exacerbation, diastolic CHF exacerbation with diuresis.  Metabolic alkalosis. He received IVIG and steroid treatment and was followed by NCC. He was discharged on supplemental O2 at 2-3 LPM.  He arrived to ED somnolent. ABG demonstrated PCO2>100 and he was immediately intubated.  He was given rocpehin, zosyn and vancomycin in ED in addition to 125mg solumedrol. CXR was not significant for focal infection or mucous plugging. ETT was in good position.   NIF check was -14 and VC was 650 ml on admission and has improved -25 and 1500 ml today.       Assessment and plan :     Thanh Goodrich is a 80 year old male admitted on 3/19/2021 for acute hypercapnic respiratory failure due to myasthenia gravis exacerbation requiring intubation.  I have personally reviewed the daily labs, imaging studies, cultures and discussed the case with referring physician and consulting physicians.   My assessment and plan by system for this patient is as follows:    Neurology/Psychiatry:   1. Myasthenia Gravis exacerbation/myasthenic crisis  2. Sedation for vent synchrony.    Plan  - Neurology/NCC on board, had received IVIG during recent admission. Restarted another course of IVIG this  admission  - Continue steroids - Prednisone 60 mg daily for now, to continue until outpt neuro follow-up.  - Doesn't require sedation and is very comfortable.     Cardiovascular:   1.Acute on chronic diastolic CHF exacerbation--intermittent diuresis  2. Mild troponemia--suspect demand    Pulmonary/Ventilator Management:   1. Acute hypercapnic respiratory failure presumed 2/2 MG exacerbation. Possible obesity hypoventilation syndrome as a contributor.  Overall improved NIF and VC today--plan to trial extubation.  2. Obesity hypoventilation syndrome  3. Pulmonary edema and volume overload:  Continue intermittent diuresis as above.    GI and Nutrition :   1. Non-Severe malnutrition In Context of:  Acute illness or injury Chronic illness or disease: tube feeding. Appreciate RD input.  2. protonix for pud prophy.    Renal/Fluids/Electrolytes:   1. Metabolic alkalosis likely due to contraction vs reversal of chronic resp acidosis.  Bicarb down to 29 today.  Overall seems to be resolving.  Spot check as needed.    Infectious Disease:   1. UTI - due to E coli, pan sensitive.  Narrow to ctx today.    Endocrine:   1. Stress and steroid induced hyperglycemia:  Overall controlled to 80-90s.  Insulin as needed.    Plan  - ICU insulin protocol, goal sugar <180      ICU Prophylaxis:   1. DVT: Hep Subq/mechanical  2. VAP: HOB 30 degrees, chlorhexidine rinse  3. Stress Ulcer: PPI  4. Restraints: Nonviolent soft two point restraints required and necessary for patient safety and continued cares and good effect as patient continues to pull at necessary lines, tubes despite education and distraction. Will readdress daily.   5. IV Access - central access required and necessary for continued patient cares  6. Feeding - tf  7. Family updated, spoke with his cousin- Gaby          Interim History:   No events overnight.  Able to make needs known via writing.  Denies shortness of breath, chest pain, dizzyness and lightheadedness.          Key Medications:       acetaminophen  650 mg Oral Q24H     chlorhexidine  15 mL Mouth/Throat Q12H     diphenhydrAMINE  50 mg Oral Q24H    Or     diphenhydrAMINE  50 mg Intravenous Q24H     heparin ANTICOAGULANT  5,000 Units Subcutaneous Q8H     immune globulin - sucrose free  30 g Intravenous Q24H     metoprolol tartrate  25 mg Oral or NG Tube BID     mycophenolate  1,000 mg Oral or NG Tube Daily     mycophenolate  500 mg Oral or NG Tube At Bedtime     pantoprazole (PROTONIX) IV  40 mg Intravenous Daily     piperacillin-tazobactam  3.375 g Intravenous Q6H     predniSONE  60 mg Oral or Feeding Tube Daily       dextrose       propofol (DIPRIVAN) infusion Stopped (03/21/21 0602)     propofol (DIPRIVAN) infusion Stopped (03/20/21 1130)     sodium chloride 10 mL/hr at 03/22/21 0700               Physical Examination:   Temp:  [97.9  F (36.6  C)-99.1  F (37.3  C)] 98.1  F (36.7  C)  Pulse:  [48-71] 65  Resp:  [13-21] 14  BP: (103-144)/(50-88) 103/50  FiO2 (%):  [30 %] 30 %  SpO2:  [94 %-100 %] 97 %      Intake/Output Summary (Last 24 hours) at 3/22/2021 1117  Last data filed at 3/22/2021 0800  Gross per 24 hour   Intake 1253 ml   Output 1815 ml   Net -562 ml         Wt Readings from Last 4 Encounters:   03/22/21 128.5 kg (283 lb 4.7 oz)   03/17/21 128.3 kg (282 lb 12.8 oz)   03/15/21 136.3 kg (300 lb 7.8 oz)   12/16/20 136.1 kg (300 lb)     BP - Mean:  [] 68  Ventilation Mode: CMV/AC  (Continuous Mandatory Ventilation/ Assist Control)  FiO2 (%): 30 %  Rate Set (breaths/minute): 14 breaths/min  Tidal Volume Set (mL): 500 mL  PEEP (cm H2O): 5 cmH2O  Pressure Support (cm H2O): 5 cmH2O  Oxygen Concentration (%): 30 %  Resp: 14    Recent Labs   Lab 03/20/21  2250 03/20/21  0628 03/20/21  0007 03/19/21  2248   PH 7.59* 7.60* 7.60*  --    PCO2 44 51* 54*  --    PO2 87 76* 190*  --    HCO3 42* 50* 53*  --    O2PER 30%  --  70% 2 Lt Nasal Cannula       GEN: no acute distress   HEENT: head ncat, sclera anicteric, OP patent,  trachea midline   NECK:  supple  PULM: unlabored synchronous with vent, clear anteriorly    CV/COR: RRR S1S2 no gallop,  No rub, no murmur  ABD: soft nontender, hypoactive bowel sounds, no mass  EXT: minimal Edema;   Warm x4  NEURO: awake, alert, interactive, answers questions by writing; improving strength to neck muscles and extremities x4.  SKIN: no obvious rash  LINES: clean, dry intact         Data:   All data and imaging reviewed     ROUTINE ICU LABS (Last four results)  CMP  Recent Labs   Lab 03/22/21 0436 03/21/21 2057 03/21/21  0858 03/21/21 0451 03/20/21  0602 03/19/21 2217     --   --  138 142 142   POTASSIUM 2.9* 3.3* 3.2* 2.6* 4.1 4.4   CHLORIDE 104  --   --  99 97 95   CO2 29  --   --  35* 45* >45*   ANIONGAP 5  --   --  4 <1* Not Calculated   GLC 81  --   --  93 111* 118*   BUN 27  --   --  25 22 22   CR 0.68  --   --  0.72 0.57* 0.65*   GFRESTIMATED >90  --   --  89 >90 >90   GFRESTBLACK >90  --   --  >90 >90 >90   NOAH 7.8*  --   --  8.2* 8.8 9.0   MAG 1.7  --   --  1.8  --   --    PHOS 3.0  --   --  1.4*  --   --    PROTTOTAL 6.1*  --   --  6.0* 5.8* 6.7*   ALBUMIN 2.0*  --   --  2.2* 2.4* 2.8*   BILITOTAL 0.7  --   --  1.0 0.7 0.4   ALKPHOS 42  --   --  45 48 59   AST 15  --   --  17 21 18   ALT 22  --   --  23 27 33     CBC  Recent Labs   Lab 03/22/21 0436 03/21/21 0451 03/20/21  0602 03/19/21 2217   WBC 4.9 6.0 7.9 5.6   RBC 4.25* 4.26* 4.76 5.01   HGB 11.0* 11.3* 12.5* 13.1*   HCT 37.0* 37.8* 45.7 48.7   MCV 87 89 96 97   MCH 25.9* 26.5 26.3* 26.1*   MCHC 29.7* 29.9* 27.4* 26.9*   RDW 17.1* 17.1* 15.2* 15.8*   * 158 157 216     INRNo lab results found in last 7 days.  Arterial Blood Gas  Recent Labs   Lab 03/20/21  2250 03/20/21  0628 03/20/21  0007 03/19/21  2248   PH 7.59* 7.60* 7.60*  --    PCO2 44 51* 54*  --    PO2 87 76* 190*  --    HCO3 42* 50* 53*  --    O2PER 30%  --  70% 2 Lt Nasal Cannula       All cultures:  Recent Labs   Lab 03/20/21  1640 03/18/21 2217   CULT  Culture in progress >100,000 colonies/mL  Escherichia coli  *     No results found for this or any previous visit (from the past 24 hour(s)).    Labs (personally reviewed):  See a/p.    D/w Dr. Sargent of neurology    Billing: This patient is critically ill: Yes. Total critical care time today 35 min.

## 2021-03-22 NOTE — PLAN OF CARE
Patient extubated to nasal cannula 3L/min at 1155, after approval by intensive care MD and neurocrit care. Alert and oriented, cooperative; somewhat anxious and preoccupied with things outside of the hospital. General weakness / lethargy. Hoarse voice after extubation; throat pain treated with Chlorasept spray. Updated about goals of care / plans and procedures, reports understanding. Cousin Gaby updated over the phone. Lung sounds clear and diminished, coughs spontaneously, thick clear secretions noted. BP and heart rate WDL. Acive bowel sounds, a couple of small BM, blood glucose WDL. Diet advanced to clear liquid, tolerates well, advancement recommended. Urine output pasquale, clear, adequate. Denies pain. Skin intact, bruised, slight blanchable coccyx redness, interdry used under pannus. Potassium replaced, within range at recheck. IVIG administered / titrated per protocol; no reaction noted.

## 2021-03-23 NOTE — CONSULTS
"Consult Date:  03/23/2021      HOSPITALIST CONSULTATION      REQUESTING PROVIDER:  Tito Villegas MD      REASON FOR CONSULTATION:  Assume primary management of patient on transfer out of intensive care.      HISTORY OF PRESENT ILLNESS:  Mr. Goodrich is a 80-year-old gentleman with a past medical history of HFpEF, hypertension, hyperlipidemia without medications, prostate cancer, obesity, major depressive disorder, atypical mycobacterial infection and myasthenia gravis, who was recently admitted from 03/04-03/15/2021 for acute hypoxic and hypercarbic respiratory failure requiring intubation secondary to myasthenic crisis, acute on chronic decompensated HFpEF.  He was discharged to [facility] in [place] post-hospitalization with intention to rehab and then return home.  The patient reports having gained approximately 20 pounds in 1 month following his initial COVID vaccine with significant accumulation of \"water in his gut.\"  He also began a downward taper of his steroids while at the TCU following his recent hospitalization.        The patient re-presented to [facility] ED on 03/19/2021 with acute hypercarbic respiratory failure requiring immediate intubation in the Emergency Department.  He was given high-dose steroids, broad-spectrum antimicrobials for possible COPD exacerbation.  It was subsequently determined that myasthenic crisis was likely contributing to his acute hypoxic respiratory failure.  He required mechanical ventilatory support from 03/20 until 03/22/2021.  He received a 3-day course of IVIG as well as steroids, 60 mg daily prednisone in addition to his PTA CellCept b.i.d.  He was continued on diuretics for acute on chronic decompensated HFpEF, both with furosemide and acetazolamide.  Course also included elevated troponin without any ischemic changes on EKG.  Over the course of his IVIG treatment, his pulmonary mechanics improved such that he was able to be extubated.  Hospitalist Service was " "contacted on 03/23/2021 to assume primary management of patient on transfer out of ICU.  Additionally, during his ICU stay, he had a lower extremity Doppler of the left lower extremity that was negative for DVT and grew Ecoli from his urine culture and MSSA from sputum culture, maintained on atbx since admit.    Since extubation, the patient reports his breathing is \"pretty good,\" he denies any shortness of breath. He reports that following the 20-pound weight gain in a month's time, he was placed on a fluid and sodium restricted diet at the TCU.  He denied any cough or shortness of breath.  He denies dysuria or incontinence, hematuria.  On review of recent discharge summary, it seems that he was having difficulty urinating because of his large pannus.  A catheter was placed at that time and has remained in place since that hospitalization.  It was not present prior to his 03/04/2021 hospitalization.  He endorses having loose stools.  Denies any choking with food intake.  He had been ambulatory prior to hospitalization on 03/04/2021, but since discharging has not yet regain his ability to ambulate.      PAST MEDICAL HISTORY:    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     Cellulitis of left leg 9/23/2012     Depressive disorder 2020     Edema 7/7/2009     History of steroid therapy 2/22/2010     Hyperlipidemia LDL goal <130 10/31/2010     HYPERTENSION 7/8/2003     Incontinence of urine 10/25/2010     Leg ulcer (H) 9/20/2012     MALIGN NEOPL PROSTATE-3/07 4/2/2007    T1C, Bob White 8, initial PSA 39, s/p radiation seed implants 2007      Myasthenia gravis (H)      OBESITY 7/8/2003     Osteoporosis 8/18/2009    Refused bisphosphonates 2011      PVC's (premature ventricular contractions) 11/23/2011     RIGHT OCCIPITAL PAIN 7/8/2003     ROTATOR CUFF SYND NOS- RT 9/19/2005     Skin nodule 3/18/2013     ulcer left shin 10/8/2007     Uncomplicated asthma 1944?    childhood only "        PAST SURGICAL HISTORY:    Past Surgical History:   Procedure Laterality Date     BIOPSY  2013?    dealt with     Prostate Cancer Cryotherapy  2007     TONSILLECTOMY  1945        CURRENT MEDICATIONS:    Current Facility-Administered Medications   Medication     acetaminophen (TYLENOL) solution 650 mg     ampicillin-sulbactam (UNASYN) 3 g vial to attach to  mL bag     dextrose 10% infusion     glucose gel 15-30 g    Or     dextrose 50 % injection 25-50 mL    Or     glucagon injection 1 mg     EPINEPHrine (ADRENALIN) kit 0.3 mg     heparin ANTICOAGULANT injection 5,000 Units     metoprolol tartrate (LOPRESSOR) half-tab 12.5 mg     mycophenolate (GENERIC EQUIVALENT) suspension 1,000 mg     mycophenolate (GENERIC EQUIVALENT) suspension 500 mg     naloxone (NARCAN) injection 0.2 mg    Or     naloxone (NARCAN) injection 0.4 mg    Or     naloxone (NARCAN) injection 0.2 mg    Or     naloxone (NARCAN) injection 0.4 mg     ondansetron (ZOFRAN-ODT) ODT tab 4 mg    Or     ondansetron (ZOFRAN) injection 4 mg     [START ON 3/24/2021] pantoprazole (PROTONIX) EC tablet 40 mg     phenol (CHLORASEPTIC) 1.4 % spray 1 mL     predniSONE (DELTASONE) tablet 60 mg     prochlorperazine (COMPAZINE) injection 5 mg    Or     prochlorperazine (COMPAZINE) tablet 5 mg    Or     prochlorperazine (COMPAZINE) suppository 12.5 mg     senna-docusate (SENOKOT-S/PERICOLACE) 8.6-50 MG per tablet 1 tablet    Or     senna-docusate (SENOKOT-S/PERICOLACE) 8.6-50 MG per tablet 2 tablet     [START ON 3/24/2021] Vitamin D3 (CHOLECALCIFEROL) tablet 100 mcg        ALLERGIES:       Allergies   Allergen Reactions     Ciprofloxacin Other (See Comments)     Contraindicated for myasthenia gravis patients     Procainamide Other (See Comments)     Contraindicated for myasthenia gravis patients     Quinidine Other (See Comments)     Contraindicated for myasthenia gravis patients     Quinine Other (See Comments)     Contraindicated for myasthenia gravis  patients         SOCIAL HISTORY:  The patient had been living independently in a local apartment.  He appoints his cousin, Gaby Martinez, as his point of contact.  He is a lifelong nonsmoker, denies alcohol, drug use, is a retired .  He had been in [facility]'s TCU for rehabilitation post-recent hospitalization.      FAMILY MEDICAL HISTORY:  Significant for hypertension:      REVIEW OF SYSTEMS:    10 point ROS neg other than the pertinent positives and negatives noted above in the HPI.    PHYSICAL EXAMINATION:   GENERAL:  eldelry man lying in bed w/o acute distress   VITAL SIGNS:  Heart rate 75, SpO2 93% on 2 liters nasal cannula, /89, .   Neuro: +follows commands wiggle toes and show 2 fingers bilat, face symmetric, tongue midline, speech fluent, 5/5 strength x 4 extremities.   Eyes pupils equal round 3mm briskly reactive bilat, sclera nonicteric, noninjected, conjunctiva pink,  Head, ENT & mouth: NC/AT,  mouth moist oral mucosa  Neck: supple  CV S1S2, no murmurs  resp: CTAB upper lobes  gi:normoactive bowel sounds, soft, nontender, nondisteded, obese  Ext: +2 bilat LE edema  Skin: no rashes on exposed skin, chronic venous stasis changes to biltat LE   Musculoskeletal no bony joint deformities     ASSESSMENT AND PLAN:  Mr. Goodrich is a 80-year-old gentleman with PMH of HFpEF, myasthenia gravis, hypertension, hyperlipidemia, atypical mycoplasma infection, prostate cancer, who was admitted for the second time in 4 weeks with recurrent acute hypercarbic respiratory failure necessitating Intensive Care Unit stay for management of his myasthenia crisis requiring IVIG.  He has since been extubated, now is ready to transfer to the Hospitalist Service and general medical [place].      PROBLEM LIST AND PLAN:     Myasthenic crisis, resulting in CNS failure/acute hypoxic respiratory failure with likely etiologies being a combination of recent downward taper of his steroids,  CAUTI and A/CDHFpEF.  He is status post 3 doses of IVIG.  He has also been maintained on his PTA prednisone and CellCept dosing.   -- Appreciate Neuro Critical Care recommendations not to down titrate his steroids until his outpatient Neurology followup.  There is a question of possibly needing outpatient IVIG to stave off further crises.   -- Continue PTA CellCept 1 gram at 10 a.m. and 500 mg every 5 p.m.   -- Continue PT and OT.   -- Consult General Neuro to continue monitoring his hopeful improvement on the general [place].   --continue PTA prednisone    Resolving acute hypercarbic respiratory failure secondary to CNS failure from myasthenia crisis.   Likely chronic resp acidosis by serial ABGs  MSSA pneumonia by sputum culture from 03/20/2021.  It was thought the patient has COPD but he denies this and is a lifelong nonsmoker. procal was negative but CXR abnormal.    -- Pending followup chest x-ray--> improved but still w/ poor inspiration  --will consult pulmonology re: assist w/ obtaining nocturnal NIPPV  --IS 10x q1h while awake  --can also consider diagnostic lung US to eval presence/absence of pleural effusions 2/2 HF that may benefit from possible thoracentesis if metabolic parameters prohibit more aggressive diuresis  -- Continue b.i.d. pulmonary mechanics with NIF and VC.   --Unasyn is day #1, total antibiotic #4 as previously has been on ceftriaxone, piperacillin, tazobactam and received 1 dose of vancomycin. Can likely stop antibiotics on 03/27.     Possibly acute on chronic decompensated HFpEF with recent echocardiogram from 03/04/2021 showing LVEF of 65-70%, no regional wall motion abnormalities, mildly decreased RV systolic function, moderately dilated mild to moderate mitral regurgitation, mild to moderate mitral stenosis.  The patient reports recent A 20-pound weight gain in the last 1 month following COVID vaccine.   Hypertension.   Hyperlipidemia without medications.   -- Follow daily weights.   --  "Strict I's and O's.   -- Noting that patient received IV Lasix on the a.m. of 03/23/2021, can likely transition to PTA dosing 20 mg b.i.d., Lasix on 03/24/2021.  He has had metabolic alkalosis that was treated with at least 1 dose of Diamox and so we will hold off on the b.i.d. po dosing for now.   -- Continue PTA metoprolol 12.5 mg b.i.d.   -- Change diet to a 2-gram sodium.   --repeat TTE pending at neuro's request in the event that A/DHF is contributing to his recurrent MG crises    Escherichia coli urinary tract infection/ CAUTI as UA was abnormal at admit and \"symptom\" was likely MG crisis.  Catheter was placed during 3/4-3/15 hospitalization because of difficulty voiding given his large pannus size.  Although ideally I would like to discontinue this anatomy, as per my discussion with RN staff, is such that an external condom catheter be unlikely to stay on.   -- While we continue to manage the patient's likely decompensated HFpEF, we will continue urinary catheter with goal of removing ASAP/ prior to hospital dismissal    Mild metabolic alkalosis, likely secondary to a combination of diuresis and probable chronic hypercarbic respiratory failure.   -- 5 p.m. BMP and mag are pending.     Thrombocytopenia, trending down.   -- Recheck CBC in the morning, as the patient is on heparin.     Pressure ulcer,  I did not visualize this.   -- WOCN consult is pending.   -- Continue specialty bed.         Prophylaxis.   -- Subcutaneous heparin.   -- Continue PPI while patient is on steroids.      CODE STATUS:  Reviewed with the patient, he desires full resuscitative measures.        Total time is 40 mintues of which 15 minutes was face-to-face time, remainder spent in counseling and coordination of care, reviewing chart     The patient will be independently evaluated by Dr. True Sewell MD.         BETH BUTTS MD       As dictated by ADENIKE HE, ANIYA, CNP            D: 03/23/2021   T: 03/23/2021   MT: PK      Name:  "    DAVIDSON GONZALEZ   MRN:      [id number]        Account:       [id number]   :      1940           Consult Date:  2021      Document: [id number]       cc: Tito Villegas MD

## 2021-03-23 NOTE — PROGRESS NOTES
CM    I: REJI received update from RN CC that patient arrived from [facility] TCU. SW spoke w/[facility] admissions who confirms pt has a bed hold in place.     P: Will continue to follow.    VALERY Hawthorne   [facility]  [phone]

## 2021-03-23 NOTE — PROGRESS NOTES
"[facility]    Neuro Critical Care Progress Note    Interval Events  Extubated yesterday doing well.   Up in bed eating breakfast.     Impression  #Hx of Myasthenia Gravis   #?CHF  Completed 3 days of IVIG 3/22/21  NIF -30, VC ~900 ml       Patient's baseline weight is 286 from 10/2020 and it was was 326 during the last admission and 280 this admission. Question whether he has underlying CHF making him having increase work of breathing with superimpose myasthenia gravis. His BNP is ~4000 during this admission, up from ~1600 in the last admission. Concern that if his CHF is the underlying problem triggering his MG and giving him more IVIG may be exacerbating the problem.     Patient may benefit from more aggressive dieretic regimen if fluid retention is the underlying cause of his relapse.     Plan  Continue prednisone 60 mg, he will continue this dose until he see his primary neurologist in the outpatient setting.   Okay to transfer out of the ICU, General Neurology to follow on floor.   We will order BNP and ABG at noon. Concern that may actually be volume up and retaining Co2  Continue home cellcept 1000 AM and 500 PM      The Neuro Critical Staff is EMIGDIO Tutlte MD  Vascular Neurology Fellow  To page me or covering stroke neurology team member, click here: AMCOM   Choose \"On Call\" tab at top, then search dropdown box for \"Neurology Adult\", select location, press Enter, then look for stroke/neuro ICU/telestroke.    ______________________________________________________    Medications   Home Meds  Prior to Admission medications    Medication Sig Start Date End Date Taking? Authorizing Provider   acetaminophen (TYLENOL) 325 MG tablet Take 650 mg by mouth every 4 hours as needed for mild pain   Yes Unknown, Entered By History   calcium carbonate 600 mg-vitamin D 400 units (CALTRATE) 600-400 MG-UNIT per tablet Take 1 tablet by mouth daily   Yes Unknown, Entered By " History   Cholecalciferol 100 MCG (4000 UT) CAPS Take 4,000 Units by mouth daily   Yes Unknown, Entered By History   furosemide (LASIX) 20 MG tablet Take 1 tablet (20 mg) by mouth 2 times daily 3/19/21  Yes Haase, Tonya Lynn, APRN CNP   metoprolol tartrate (LOPRESSOR) 25 MG tablet Take 0.5 tablets (12.5 mg) by mouth 2 times daily 3/15/21  Yes David Mcclain, DO   Multiple Vitamins-Minerals (MULTIVITAMIN ADULTS 50+) TABS Take 1 tablet by mouth daily    Yes Reported, Patient   mycophenolate (GENERIC EQUIVALENT) 500 MG tablet Take 1,000 mg by mouth every morning   Yes Unknown, Entered By History   mycophenolate (GENERIC EQUIVALENT) 500 MG tablet Take 500 mg by mouth At Bedtime    Yes Unknown, Entered By History   potassium chloride ER (KLOR-CON M) 10 MEQ CR tablet Take 2 tablets (20 mEq) by mouth 2 times daily 3/19/21  Yes Haase, Tonya Lynn, APRN CNP   predniSONE (DELTASONE) 50 MG tablet Take 1 tablet (50 mg) by mouth daily Take 50 mg until 3/19.  Then decrease by 10 mg every 7 days starting on 3/20 3/16/21  Yes David Mcclain, DO       Scheduled Meds    cefTRIAXone  1 g Intravenous Q24H     heparin ANTICOAGULANT  5,000 Units Subcutaneous Q8H     metoprolol tartrate  12.5 mg Oral or NG Tube BID     mycophenolate  1,000 mg Oral or NG Tube Daily     mycophenolate  500 mg Oral or NG Tube At Bedtime     pantoprazole (PROTONIX) IV  40 mg Intravenous Daily     predniSONE  60 mg Oral or Feeding Tube Daily       Infusion Meds    dextrose       sodium chloride 10 mL/hr at 03/22/21 0800       PRN Meds  acetaminophen, dextrose, glucose **OR** dextrose **OR** glucagon, diphenhydrAMINE **OR** diphenhydrAMINE, diphenhydrAMINE, EPINEPHrine, famotidine, methylPREDNISolone, naloxone **OR** naloxone **OR** naloxone **OR** naloxone, ondansetron **OR** ondansetron, phenol, prochlorperazine **OR** prochlorperazine **OR** prochlorperazine, senna-docusate **OR** senna-docusate       PHYSICAL EXAMINATION  Temp:  [96.6  F  (35.9  C)-98.4  F (36.9  C)] 98.1  F (36.7  C)  Pulse:  [56-88] 71  Resp:  [14-30] 20  BP: (110-144)/(54-77) 120/72  SpO2:  [94 %-100 %] 97 %     Neurologic  Mental Status:  awake, alert   Cranial Nerves:  visual fields intact, PERRL, EOMI with normal smooth pursuit, facial sensation intact and symmetric, facial movements symmetric, hearing not formally tested but intact to conversation, hypophonic    Motor:  normal muscle tone and bulk, no abnormal movements, able to move all limbs spontaneously, neck flexor 4/5, 5/5 upper extremity   Reflexes:  toes down-going  Sensory:  light touch sensation intact and symmetric throughout upper and lower extremities, no extinction on double simultaneous stimulation   Coordination:  no resting tremor  Station/Gait:  deferred        Imaging  I personally reviewed all imaging; relevant findings per HPI.     Lab Results Data   CBC  Recent Labs   Lab 03/23/21 0419 03/22/21 0436 03/21/21 0451   WBC 3.8* 4.9 6.0   RBC 4.19* 4.25* 4.26*   HGB 11.3* 11.0* 11.3*   HCT 37.9* 37.0* 37.8*   * 133* 158     Basic Metabolic Panel    Recent Labs   Lab 03/23/21 0419 03/22/21  1216 03/22/21 0436 03/21/21  0451 03/21/21  0451     --  138  --  138   POTASSIUM 3.7 4.1 2.9*   < > 2.6*   CHLORIDE 106  --  104  --  99   CO2 34*  --  29  --  35*   BUN 24  --  27  --  25   CR 0.62*  --  0.68  --  0.72   GLC 71  --  81  --  93   NOAH 8.1*  --  7.8*  --  8.2*    < > = values in this interval not displayed.     Liver Panel  Recent Labs   Lab 03/23/21 0419 03/22/21 0436 03/21/21  0451   PROTTOTAL 6.7* 6.1* 6.0*   ALBUMIN 2.1* 2.0* 2.2*   BILITOTAL 0.4 0.7 1.0   ALKPHOS 42 42 45   AST 22 15 17   ALT 30 22 23     INR  No lab results found.   Lipid Profile    Recent Labs   Lab Test 12/16/20  1455 07/05/19  1558 08/08/18  1245 11/22/13  1523 11/22/13  1523   CHOL 141 174 175   < > 196   HDL 70 57 53   < > 61   LDL 60 105* 104*   < > 117   TRIG 55 59 90   < > 88   CHOLHDLRATIO  --   --   --   --   3.2    < > = values in this interval not displayed.     A1C  No lab results found.  Troponin I    Recent Labs   Lab 03/19/21  2217   TROPI 0.060*

## 2021-03-23 NOTE — PLAN OF CARE
CNS: A&O, on IVIG day 3/3    CVS: SR, BP stable    Resp: 2-3 L NC overnight    GI: Tolerated clear liquid diet overnight    : Beckett in place, adequate output    Skin: Blanchable redness on coccyx, mepilex in place    Access: PIV L         Plan: Neuro crit to evaluate extension of IVIG, potential transfer to the floor.

## 2021-03-23 NOTE — PROGRESS NOTES
Critical Care Progress Note      03/23/2021    Name: Thanh Goodrich MRN#: [id number]   Age: 80 year old YOB: 1940                    Problem List:   Active Problems:    Metabolic encephalopathy    Abnormal urinalysis    Acute on chronic respiratory failure with hypercapnia (H)    Myasthenia crises/MG exacerbation         Summary/Hospital Course:   Thanh Goodrich is a 80 year old male admitted on 3/19/2021 with significant history for myasthenia gravis, HFpEF, COPD, HTN, MDD, HAZEL, prostate cancer who presents from TCU for acute hypercapnic respiratory failure. He was recently admitted and discharged (3/4-3/15) for acute hypoxic and hypercapnic respiratory failure requiring intubation (3/6/21-3/11/21) 2/2 Myasthenia gravis exacerbation, diastolic CHF exacerbation with diuresis.  Metabolic alkalosis. He received IVIG and steroid treatment and was followed by NCC. He was discharged on supplemental O2 at 2-3 LPM.  He arrived to ED somnolent. ABG demonstrated PCO2>100 and he was immediately intubated.  He was given rocpehin, zosyn and vancomycin in ED in addition to 125mg solumedrol. CXR was not significant for focal infection or mucous plugging. ETT was in good position.   NIF check was -14 and VC was 650 ml on admission and has improved to -30 and 900 ml today.       Assessment and plan :     Thanh Goodrich is a 80 year old male admitted on 3/19/2021 for acute hypercapnic respiratory failure due to myasthenia gravis exacerbation requiring intubation.  I have personally reviewed the daily labs, imaging studies, cultures and discussed the case with referring physician and consulting physicians.   My assessment and plan by system for this patient is as follows:    Neurology/Psychiatry:   1. Myasthenia Gravis exacerbation/myasthenic crisis  Plan  - Neurology/NCC on board, had received IVIG during recent admission. Restarted another course of IVIG this admission  - Continue steroids - Prednisone 60 mg daily  for now, to continue until outpt neuro follow-up.    Cardiovascular:   1.Acute on chronic diastolic CHF exacerbation--intermittent diuresis--lasix 20 mg iv ordered for today  2. Mild troponemia--suspect demand    Pulmonary/Ventilator Management:   1. Hypoxemia, acute: requiring 2-3 L nc. Found to have MSSA on sputum culture.  Possible obesity hypoventilation syndrome as a contributor as well.    2. Obesity hypoventilation syndrome  3. Pulmonary edema and volume overload:  Continue intermittent diuresis as above.  4.  Resp acidosis:  Mild.  7.30/65 on abg; breathing non-labored; pt alert/mentation clear.  May need intermittent bipap for support given OHVS noted above and myasthenia.    GI and Nutrition:   1. Non-Severe malnutrition In Context of:  Acute illness or injury Chronic illness or disease: tube feeding. Appreciate RD input.  2. protonix for pud prophy.    Renal/Fluids/Electrolytes:   1. Creat ok 0.62.    Infectious Disease:   1. UTI - due to E coli, pan sensitive.  Change to unasyn today (see below).  2.  MSSA pna:  On sputum cx from 3/20.  Broadened slightly to unasyn to better cover this.    Endocrine:   1. Stress and steroid induced hyperglycemia:  Overall controlled to 80-90s.  Insulin as needed.    ICU Prophylaxis:   1. DVT: Hep Subq/mechanical  2. VAP: HOB 30 degrees, chlorhexidine rinse  3. Stress Ulcer: PPI  4. Restraints: Nonviolent soft two point restraints required and necessary for patient safety and continued cares and good effect as patient continues to pull at necessary lines, tubes despite education and distraction. Will readdress daily.   5. IV Access - central access required and necessary for continued patient cares  6. Feeding - clears--advancing to reg diet  7. Updated pt himself at bedside          Interim History:   No events overnight.   Denies dyspnea, new weakness, dizzyness, lightheadedness, and pain this morning.  Eager to continue his recovery and get back to [facility].         Key  Medications:       cefTRIAXone  1 g Intravenous Q24H     heparin ANTICOAGULANT  5,000 Units Subcutaneous Q8H     metoprolol tartrate  12.5 mg Oral or NG Tube BID     mycophenolate  1,000 mg Oral or NG Tube Daily     mycophenolate  500 mg Oral or NG Tube At Bedtime     pantoprazole (PROTONIX) IV  40 mg Intravenous Daily     predniSONE  60 mg Oral or Feeding Tube Daily       dextrose       sodium chloride 10 mL/hr at 03/22/21 0800               Physical Examination:   Temp:  [96.6  F (35.9  C)-98.4  F (36.9  C)] 98.1  F (36.7  C)  Pulse:  [56-88] 71  Resp:  [14-30] 20  BP: (110-144)/(54-77) 120/72  SpO2:  [94 %-100 %] 97 %        Intake/Output Summary (Last 24 hours) at 3/23/2021 1139  Last data filed at 3/23/2021 1047  Gross per 24 hour   Intake 1790 ml   Output 1335 ml   Net 455 ml         Wt Readings from Last 4 Encounters:   03/23/21 127.4 kg (280 lb 13.9 oz)   03/17/21 128.3 kg (282 lb 12.8 oz)   03/15/21 136.3 kg (300 lb 7.8 oz)   12/16/20 136.1 kg (300 lb)     BP - Mean:  [] 80  Ventilation Mode: CPAP/PS  (Continuous positive airway pressure with Pressure Support)  FiO2 (%): 30 %  Rate Set (breaths/minute): 14 breaths/min  Tidal Volume Set (mL): 500 mL  PEEP (cm H2O): (S) 5 cmH2O  Pressure Support (cm H2O): 5 cmH2O  Oxygen Concentration (%): 30 %  Resp: 20    Recent Labs   Lab 03/23/21  0635 03/22/21  1127 03/20/21  2250 03/20/21  0628 03/20/21  0007   PH 7.30* 7.37 7.59* 7.60* 7.60*   PCO2 65* 52* 44 51* 54*   PO2 140* 95 87 76* 190*   HCO3 32* 30* 42* 50* 53*   O2PER 2L 30 30%  --  70%       GEN: no acute distress   HEENT: head ncat, sclera anicteric, OP patent, trachea midline   NECK:  supple  PULM: unlabored, clear anteriorly    CV/COR: RRR S1S2 no gallop,  No rub, no murmur  ABD: soft nontender, hypoactive bowel sounds, no mass  EXT: minimal Edema;   Warm x4  NEURO: awake, alert, interactive, rapid fluent speech; improving strength to neck muscles and extremities x4.  SKIN: no obvious rash  LINES:  clean, dry intact         Data:   All data and imaging reviewed     ROUTINE ICU LABS (Last four results)  CMP  Recent Labs   Lab 03/23/21 0419 03/22/21  1216 03/22/21  0436 03/21/21 2057 03/21/21 0451 03/21/21 0451 03/20/21  0602     --  138  --   --  138 142   POTASSIUM 3.7 4.1 2.9* 3.3*   < > 2.6* 4.1   CHLORIDE 106  --  104  --   --  99 97   CO2 34*  --  29  --   --  35* 45*   ANIONGAP <1*  --  5  --   --  4 <1*   GLC 71  --  81  --   --  93 111*   BUN 24  --  27  --   --  25 22   CR 0.62*  --  0.68  --   --  0.72 0.57*   GFRESTIMATED >90  --  >90  --   --  89 >90   GFRESTBLACK >90  --  >90  --   --  >90 >90   NOAH 8.1*  --  7.8*  --   --  8.2* 8.8   MAG  --   --  1.7  --   --  1.8  --    PHOS 3.1  --  3.0  --   --  1.4*  --    PROTTOTAL 6.7*  --  6.1*  --   --  6.0* 5.8*   ALBUMIN 2.1*  --  2.0*  --   --  2.2* 2.4*   BILITOTAL 0.4  --  0.7  --   --  1.0 0.7   ALKPHOS 42  --  42  --   --  45 48   AST 22  --  15  --   --  17 21   ALT 30  --  22  --   --  23 27    < > = values in this interval not displayed.     CBC  Recent Labs   Lab 03/23/21  0419 03/22/21  0436 03/21/21  0451 03/20/21  0602   WBC 3.8* 4.9 6.0 7.9   RBC 4.19* 4.25* 4.26* 4.76   HGB 11.3* 11.0* 11.3* 12.5*   HCT 37.9* 37.0* 37.8* 45.7   MCV 91 87 89 96   MCH 27.0 25.9* 26.5 26.3*   MCHC 29.8* 29.7* 29.9* 27.4*   RDW 17.0* 17.1* 17.1* 15.2*   * 133* 158 157     INRNo lab results found in last 7 days.  Arterial Blood Gas  Recent Labs   Lab 03/23/21  0635 03/22/21  1127 03/20/21  2250 03/20/21  0628 03/20/21  0007   PH 7.30* 7.37 7.59* 7.60* 7.60*   PCO2 65* 52* 44 51* 54*   PO2 140* 95 87 76* 190*   HCO3 32* 30* 42* 50* 53*   O2PER 2L 30 30%  --  70%       All cultures:  Recent Labs   Lab 03/20/21  1640 03/18/21  2217   CULT Moderate growth  Staphylococcus aureus  * >100,000 colonies/mL  Escherichia coli  *     No results found for this or any previous visit (from the past 24 hour(s)).    Labs (personally reviewed):  See a/p.    D/w  on-call hospitalist

## 2021-03-23 NOTE — PLAN OF CARE
Neuros unchanged. Pulm hygiene encouraged. Turning and repositioning encouraged. WOC consulted d/t nonblanchable redness. SR bp within goals. Pp+ skin warm pale. Multiple looser stools. Pt refuses bedpan use and remains incontinent then calls for changing. Education provided with turning and continence concerns. Beckett remains good diuresis of 1600 in hours of getting 20mg of lasix. Awaiting room for pt on neuro unit, will monitor and report as needed. All care explained throughout shift period. Laptop and clothes brought by cousin earlier in the afternoon.

## 2021-03-23 NOTE — CONSULTS
"WO Nurse Inpatient Wound Assessment   Reason for consultation: Evaluate and treat right posterior thigh wound.    Assessment  Right posterior thich wounds due to unknown etiology  Status: initial assessment  Nurse concerned that their may have been something in the bed. . It looks to me like there was a crease in the bed linens and it trapped his skin. He if full of creases all over his body.   Treatment Plan  Right posterior thigh nonblanchable area wounds: Daily    Prevent pressure to site  Offload site    Right posterior thigh   Okay to cover with a foam dressing  Change every three days    Orders Written  Recommended provider order: None, at this time  WO Nurse follow-up plan:weekly  Nursing to notify the Provider(s) and re-consult the WO Nurse if wound(s) deteriorates or new skin concern.    Patient History  According to provider note(s): HISTORY OF PRESENT ILLNESS:  Mr. Goodrich is a 80-year-old gentleman with a past medical history of HFpEF, hypertension, hyperlipidemia without medications, prostate cancer, obesity, major depressive disorder, atypical mycobacterial infection and myasthenia gravis, who was recently admitted from 03/04-03/15/2021 for acute hypoxic and hypercarbic respiratory failure requiring intubation secondary to myasthenia gravis exacerbation, acute on chronic decompensated HFpEF.  He was discharged to [facility] in [place] post-hospitalization with intention to rehab and then return home.  The patient reports having gained approximately 20 pounds in 1 month following his initial COVID vaccine with significant accumulation of \"water in his gut.\"  He also began a downward taper of his steroids while at the TCU following his recent hospitalization.        The patient re-presented to [facility] ED on 03/19/2021 with acute hypercarbic respiratory failure requiring immediate intubation in the Emergency Department.  He was given high-dose steroids, broad-spectrum antimicrobials for " possible COPD exacerbation.  It was subsequently determined that myasthenic crisis was likely contributing to his acute hypoxic respiratory failure.  He required mechanical ventilatory support from 03/20 until 03/22/2021.  He received a 5-day course of IVIG as well as steroids, 60 mg daily in addition to his PTA CellCept b.i.d.  He was continued on diuretics for acute on chronic decompensated HFpEF, both his furosemide and acetazolamide.  Course also included elevated troponin without any ischemic changes on EKG.  Over the course of his IVIG treatment, his pulmonary mechanics improved such that he was able to be extubated.  Hospitalist Service was contacted on 03/23/2021 to assume primary management of patient on transfer out of ICU.  Additionally, during his ICU stay, he had a lower extremity Doppler of the left lower extremity that was negative for DVT.     Objective Data  Containment of urine/stool: Incontinence Protocol    Active Diet Order  Orders Placed This Encounter      Advance Diet as Tolerated: Regular Diet Adult; 2 gm NA Diet      Output:   I/O last 3 completed shifts:  In: 1960 [P.O.:600; I.V.:730; NG/GT:240]  Out: 1285 [Urine:1285]    Risk Assessment:   Sensory Perception: 3-->slightly limited  Moisture: 3-->occasionally moist  Activity: 1-->bedfast  Mobility: 2-->very limited  Nutrition: 3-->adequate  Friction and Shear: 1-->problem  Marc Score: 13                          Labs:   Recent Labs   Lab 03/23/21  0419   ALBUMIN 2.1*   HGB 11.3*   WBC 3.8*       Physical Exam  Areas of skin assessed: focused right posterior thigh and sacrum          Wound Location:  See above   Date of last photo today   Wound History: found today by RN     Intact maroon nonblanchable skin linear 1.6x 0.2cm x 0.0cm     Interventions  Visual inspection and assessment completed today  Wound Care Rationale Provide protection   Wound Care: done per plan of care  Supplies: not necessary and discussed with RN  Current  off-loading measures: Wedge positioning system  Current support surface: Standard  Isolette mattress  Education provided to: importance of repositioning, plan of care and Off-loading pressure  Discussed plan of care with RN     Che Baldwin RN BS CWON

## 2021-03-24 PROBLEM — E66.01 MORBID OBESITY (H): Status: ACTIVE | Noted: 2018-08-24

## 2021-03-24 PROBLEM — J81.0 ACUTE PULMONARY EDEMA (H): Status: RESOLVED | Noted: 2021-01-01 | Resolved: 2021-01-01

## 2021-03-24 PROBLEM — R06.02 SHORTNESS OF BREATH: Status: RESOLVED | Noted: 2021-01-01 | Resolved: 2021-01-01

## 2021-03-24 NOTE — PROGRESS NOTES
03/24/21 1128   Signing Clinician's Name / Credentials   Signing clinician's name / credentials Carolyn Klein OTR/L   Quick Adds   Rehab Discipline OT   Therapeutic Activity   Minutes of Treatment 25 minutes   Symptoms Noted During/After Treatment Fatigue   Treatment Detail OT: Pt supine in bed upon arrival and agrees to OT. Increased time needed for movement d/t morbid obesity. Supine to sit with max A x1 with HOB elevated. Sits EOB for prolonged period of time with SBA and holding bedrails for support. Pt occasionally states he feels unsteady d/t decreased postural support/strength. Sit to supine with max A x2 for repositioning. All needs met at end of session.    ADL Training   Minutes of Treatment 18   Symptoms Noted During/After Treatment none   Treatment Instruction in compensatory methods;Tasks graded to facilitate independence   Treatment Detail OT: Pt supine in bed and reports he had a BM. Requies max A to roll R/L and total assist to clean buttocks. Discussed bedside commode and its purpose. Pt reports he is open to using that. Discussed with care team to order bairatic commode. Follow up with nursing as needed. When sitting EOB, pt brushes teeth with set-up assist.    Patient Response Able to follow techniques as instructed   OT Discharge Planning    OT Discharge Recommendation (DC Rec) Transitional Care Facility   OT Rationale for DC Rec Recommend pt return to skill skilled OT in TCU setting to assist pt. in achieving maximal safety/independence w/ I/ADLs, as pt is well below baseline.    Additional Documentation   OT Plan OT Plan:EOB ADLs or BUE exercises--has theraband/HEP in room; progress transfers as able (BSC)

## 2021-03-24 NOTE — PLAN OF CARE
CNS: A&O x 4, neuro intact, Denies pain. Pt appears to have slept most of the night.  CVS: SR with LBBB, VSS,  BP stable  Resp: LS diminish, on 3 L O 2 via  NC overnight, good sat's  GI/: diet advanced to regular,  Good oral intake overnight. Beckett in place, adequate output, Incontinent in bowels , have BM x 1.  Skin: Blanchable redness on coccyx, mepilex in place, skin to BLE red, dry nonblanchable,  Access: PIV L

## 2021-03-24 NOTE — PROGRESS NOTES
03/24/21 1128   Quick Adds   Type of Visit Initial Occupational Therapy Evaluation   Living Environment   People in home alone   Current Living Arrangements apartment   Home Accessibility no concerns   Transportation Anticipated car, drives self   Living Environment Comments laundry in unit per pt   Self-Care   Usual Activity Tolerance moderate   Current Activity Tolerance poor   Regular Exercise No   Equipment Currently Used at Home grab bar, tub/shower;shower chair;walker, rolling   Activity/Exercise/Self-Care Comment Pt with recent hospitalization and has not been back to baseline since. D/c to TCU and per pt report he was standing but not walking at TCU. Prior to previous hospialization, pt reports he was indep with self-cares and ate microwaveable meals   Disability/Function   Hearing Difficulty or Deaf no   Wear Glasses or Blind yes   Vision Management glasses   Concentrating, Remembering or Making Decisions Difficulty no   Difficulty Communicating no   Walking or Climbing Stairs Difficulty yes   Walking or Climbing Stairs transferring difficulty, requires equipment;transferring difficulty, assistance 1 person   Dressing/Bathing Difficulty yes   Dressing/Bathing bathing difficulty, assistance 1 person;dressing difficulty, assistance 1 person   Toileting issues yes   Toileting Assistance toileting difficulty, dependent   Fall history within last six months yes   Number of times patient has fallen within last six months 1   General Information   Onset of Illness/Injury or Date of Surgery 03/19/21   Referring Physician Augusta Schwab, ANIYA CNP   Patient/Family Therapy Goal Statement (OT) return home, get stronger   Additional Occupational Profile Info/Pertinent History of Current Problem Mr. Goodrich is a 80-year-old gentleman with a past medical history of HFpEF, hypertension, hyperlipidemia without medications, prostate cancer, obesity, major depressive disorder, atypical mycobacterial infection and myasthenia  gravis, who was recently admitted from 03/04-03/15/2021 for acute hypoxic and hypercarbic respiratory failure requiring intubation secondary to myasthenic crisis, acute on chronic decompensated HFpEF.  He was discharged to [facility] in [place]. post-hospitalization with intention to rehab and then return home.  The patient re-presented to [facility] ED on 03/19/2021 with acute hypercarbic respiratory failure requiring immediate intubation in the Emergency Department. Extubated 03/22/2021.   Existing Precautions/Restrictions fall   Cognitive Status Examination   Orientation Status orientation to person, place and time   Visual Perception   Visual Impairment/Limitations WNL   Sensory   Sensory Quick Adds No deficits were identified   Integumentary/Edema   Integumentary/Edema other (describe)   Integumentary/Edema Comments Difficult to tell with morbid obesity   Range of Motion Comprehensive   General Range of Motion no range of motion deficits identified   Strength Comprehensive (MMT)   General Manual Muscle Testing (MMT) Assessment upper extremity strength deficits identified   Comment, General Manual Muscle Testing (MMT) Assessment Generalized weakness likely d/t prolonged hospital stay   Coordination   Upper Extremity Coordination No deficits were identified   Bed Mobility   Bed Mobility scooting/bridging;supine-sit;sit-supine;rolling left;rolling right   Rolling Left Foreman (Bed Mobility) maximum assist (25% patient effort)   Rolling Right Foreman (Bed Mobility) maximum assist (25% patient effort)   Scooting/Bridging Foreman (Bed Mobility) dependent (less than 25% patient effort);2 person assist   Supine-Sit Foreman (Bed Mobility) maximum assist (25% patient effort)   Sit-Supine Foreman (Bed Mobility) maximum assist (25% patient effort);2 person assist   Assistive Device (Bed Mobility) draw sheet   Activities of Daily Living   BADL Assessment/Intervention grooming;lower body  dressing   Lower Body Dressing Assessment/Training   [place] Level (Lower Body Dressing) dependent (less than 25% patient effort)   Grooming Assessment/Training   [place] Level (Grooming) supervision;set up   Clinical Impression   Criteria for Skilled Therapeutic Interventions Met (OT) yes;meets criteria;skilled treatment is necessary   OT Diagnosis decreased indep with self-cares and funcitonal mobility   OT Problem List-Impairments impacting ADL problems related to;activity tolerance impaired;strength;balance   Assessment of Occupational Performance 3-5 Performance Deficits   Identified Performance Deficits decreased endruance for dressing, bathing, functional mobility, cooking, household tasks   Planned Therapy Interventions (OT) ADL retraining;ROM;strengthening;home program guidelines;progressive activity/exercise   Clinical Decision Making Complexity (OT) moderate complexity   Therapy Frequency (OT) Daily   Predicted Duration of Therapy 7   Anticipated Equipment Needs Upon Discharge (OT) bariatric equipment   Risk & Benefits of therapy have been explained evaluation/treatment results reviewed;care plan/treatment goals reviewed;patient   OT Discharge Planning    OT Discharge Recommendation (DC Rec) Transitional Care Facility   OT Rationale for DC Rec Recommend pt return to skill skilled OT in TCU setting to assist pt. in achieving maximal safety/independence w/ I/ADLs, as pt is well below baseline.    Total Evaluation Time (Minutes)   Total Evaluation Time (Minutes) 10

## 2021-03-24 NOTE — PROGRESS NOTES
[facility]    Hospitalist Progress Note    Assessment & Plan   80-year-old male with HFpEF, hypertension, hyperlipidemia without medications, prostate cancer, obesity, major depressive disorder, atypical mycobacterial infection and myasthenia gravis, who was recently admitted from 03/04-03/15/2021 for acute hypoxic and hypercarbic respiratory failure requiring intubation secondary to myasthenic crisis, acute on chronic decompensated HFpEF.  He was discharged to [facility] in [place] post-hospitalization with intention to rehab and then return home -- who is admitted 3/19/21 after gaining 20 pounds in 1 month following his initial COVID vaccine, admitted to ICU and intubated, now extubated and breathing better:     Impression:   Principal Problem:     Myasthenic crisis    -- Redose IVIG and follow course   -- may need outpatient maintenance IVIG in the short run to avoid relapses   -- Prednisone 60mg       Acute on chronic respiratory failure with hypercapnia -- extubated 3/24/21       UTI -- E.Coli, pan-sensitive   -- Currently on Unasyn, anticipate PO Amoxicillin tomorrow        Thrombocytopenia    Active Problems:    Benign essential hypertension      Morbid obesity -- BMI 40.6      Metabolic encephalopathy      Plan:  Transfer to neurology floor.     DVT Prophylaxis: Heparin SQ  Code Status: Full Code    Disposition: Expected discharge in ?3 days     Dionicio Blunt MD  Pager [phone]  Cell Phone [phone]  Text Page (7am to 6pm)    Interval History   On 2 LPM, tired.     Physical Exam   Temp: 98.3  F (36.8  C) Temp src: Oral BP: 135/79 Pulse: 62   Resp: 16 SpO2: 99 % O2 Device: Nasal cannula Oxygen Delivery: 2 LPM  Vitals:    03/22/21 0400 03/23/21 0200 03/24/21 0600   Weight: 128.5 kg (283 lb 4.7 oz) 127.4 kg (280 lb 13.9 oz) 128.3 kg (282 lb 13.6 oz)     Vital Signs with Ranges  Temp:  [97.6  F (36.4  C)-98.3  F (36.8  C)] 98.3  F (36.8  C)  Pulse:  []  62  Resp:  [13-39] 16  BP: (112-154)/(55-79) 135/79  SpO2:  [92 %-100 %] 99 %  I/O last 3 completed shifts:  In: 605 [P.O.:605]  Out: 2950 [Urine:2950]    # Pain Assessment:  Current Pain Score 3/24/2021   Patient currently in pain? no   Pain score (0-10) -   Pain location -   Pain descriptors -   CPOT pain score -   Thanh cronin pain level was assessed and he currently denies pain.        Constitutional: Awake, alert, cooperative, no apparent distress  Respiratory: Clear to auscultation bilaterally, no crackles or wheezing  Cardiovascular: Regular rate and rhythm, normal S1 and S2, and no murmur noted  GI: Normal bowel sounds, soft, non-distended, non-tender  Extrem: No calf tenderness, no ankle edema  Neuro: Ox3, no focal motor or sensory deficits    Medications     dextrose         ampicillin-sulbactam (UNASYN) IV  3 g Intravenous Q6H     heparin ANTICOAGULANT  5,000 Units Subcutaneous Q8H     metoprolol tartrate  12.5 mg Oral or NG Tube BID     mycophenolate  1,000 mg Oral or NG Tube Daily     mycophenolate  500 mg Oral or NG Tube At Bedtime     pantoprazole  40 mg Oral QAM AC     potassium & sodium phosphates  1 packet Oral TID     predniSONE  60 mg Oral or Feeding Tube Daily     cholecalciferol  100 mcg Oral Daily       Data   Recent Labs   Lab 03/24/21  0456 03/23/21  1719 03/23/21  0419 03/22/21  0436 03/22/21  0436 03/19/21  2217 03/19/21  2217   WBC 3.7*  --  3.8*  --  4.9   < > 5.6   HGB 12.0*  --  11.3*  --  11.0*   < > 13.1*   MCV 93  --  91  --  87   < > 97   *  --  125*  --  133*   < > 216    139 139  --  138   < > 142   POTASSIUM 3.8 3.9 3.7   < > 2.9*   < > 4.4   CHLORIDE 106 105 106  --  104   < > 95   CO2 41* 32 34*  --  29   < > >45*   BUN 19 22 24  --  27   < > 22   CR 0.61* 0.59* 0.62*  --  0.68   < > 0.65*   ANIONGAP <1* 2* <1*  --  5   < > Not Calculated   NOAH 8.0* 8.4* 8.1*  --  7.8*   < > 9.0   GLC 69* 231* 71  --  81   < > 118*   ALBUMIN  --   --  2.1*  --  2.0*   < > 2.8*    PROTTOTAL  --   --  6.7*  --  6.1*   < > 6.7*   BILITOTAL  --   --  0.4  --  0.7   < > 0.4   ALKPHOS  --   --  42  --  42   < > 59   ALT  --   --  30  --  22   < > 33   AST  --   --  22  --  15   < > 18   TROPI  --   --   --   --   --   --  0.060*    < > = values in this interval not displayed.       Imaging:   No results found for this or any previous visit (from the past 24 hour(s)).

## 2021-03-24 NOTE — PROGRESS NOTES
Patient is A/O x 4, vss, a-febrile, denies pain, up with ceiling lift, cms and neuros to baseline, tele SR with BBB, TANNER, on 2 L NC 98-99%, tolerating regular 2 gm diet, incontinent of bowel, patient has been having loose stools per ICU nurse, transfer from ICU this afternoon, blanchable redness on coccyx, mepilex in place, continue to monitor.

## 2021-03-24 NOTE — PROGRESS NOTES
All care explained to pt as well as progress in plan of care leading to transfer out of ICU. Pt was transferred with all belongings including not limited to: tablet, computer and cord, chargers briefcase and clothing by flying squad RNs. Report was called and VSS prior to transfer. BS did normalize prior to transfer and apple juice, orange juice, and breakfast was ate prior to transfer. Therapies and RT will revisit pt per there teams as pt is transferring and was eating breakfast when visited.

## 2021-03-25 NOTE — PROGRESS NOTES
Pt here on 3/21 with ARF (FICO2 > 100). A&OX4 now with minor forgetfulness. Neuros intact except some weakness on extremities (4/5 BUE & 3/5 BLE). VSS. Not on tele. On a regular diet with thin liquids. Takes pills whole with any thin liquids. Up with A-2. Denies pain. Pt scoring green on the Aggression Stop Light Tool. Plan is discharge to TCU in the next few days. Back to [facility]?

## 2021-03-25 NOTE — PROVIDER NOTIFICATION
MD Notification    Notified Person: MD    Notified Person Name: True CASTREJON NP    Notification Date/Time: 3/24/2021 8:36 PM     Notification Interaction: paged    Purpose of Notification: patient with known infection on unasyn sepsis BPA has fired for T97.7 RR16 HR91 /69. do we need to obtain stat LA?     Orders Received:    Comments: obtain stat LA as ordered

## 2021-03-25 NOTE — PROGRESS NOTES
[facility]    Hospitalist Progress Note    Assessment & Plan   80-year-old male with HFpEF, hypertension, prostate CA, obesity, major depressive disorder, atypical mycobacterial infection and myasthenia gravis since 2005 -- hospitalized 03/04-03/15/2021 for myasthenic crisis, discharged to [facility], re-admitted 3/19/21 and admitted to ICU and intubated, now extubated and breathing better:     Impression:   Principal Problem:     Myasthenic crisis    -- Redose IVIG and follow course   -- may need outpatient maintenance IVIG in the short run to avoid relapses   -- Prednisone 60 mg and wean per neurology (never below 10)   -- Cellcept bid        Acute on chronic respiratory failure with hypercapnia -- extubated 3/24/21       UTI -- E.Coli, pan-sensitive   -- Currently on Unasyn, anticipate PO Amoxicillin tomorrow        Thrombocytopenia    Active Problems:    Benign essential hypertension      Morbid obesity -- BMI 40.6      Metabolic encephalopathy -- cleared       Plan:  As above    DVT Prophylaxis: Heparin SQ  Code Status: Full Code    Disposition: Expected discharge back to TCU in ?2 days    Dionicio Blunt MD  Pager [phone]  Cell Phone [phone]  Text Page (7am to 6pm)    Interval History   On 2 LPM, tired, but overall feels better. He has had MG since 2005 and wants to discuss treatment plan with neurology.     Physical Exam   Temp: 97.9  F (36.6  C) Temp src: Oral BP: 121/69 Pulse: 76   Resp: 18 SpO2: 97 % O2 Device: Nasal cannula Oxygen Delivery: 2 LPM  Vitals:    03/22/21 0400 03/23/21 0200 03/24/21 0600   Weight: 128.5 kg (283 lb 4.7 oz) 127.4 kg (280 lb 13.9 oz) 128.3 kg (282 lb 13.6 oz)     Vital Signs with Ranges  Temp:  [97.5  F (36.4  C)-98.6  F (37  C)] 97.9  F (36.6  C)  Pulse:  [62-94] 76  Resp:  [16-18] 18  BP: (118-135)/(63-79) 121/69  SpO2:  [95 %-99 %] 97 %  I/O last 3 completed shifts:  In: 600 [P.O.:600]  Out: 725 [Urine:725]    # Pain  Assessment:  Current Pain Score 3/25/2021   Patient currently in pain? denies   Pain score (0-10) -   Pain location -   Pain descriptors -   CPOT pain score -   Thanh cronin pain level was assessed and he currently denies pain.        Constitutional: Awake, alert, cooperative, no apparent distress  Respiratory: Clear to auscultation bilaterally, no crackles or wheezing  Cardiovascular: Regular rate and rhythm, normal S1 and S2, and no murmur noted  GI: Normal bowel sounds, soft, non-distended, non-tender, obese  Extrem: No calf tenderness, no ankle edema  Neuro: Ox3, no focal motor or sensory deficits    Medications     dextrose         ampicillin-sulbactam (UNASYN) IV  3 g Intravenous Q6H     furosemide  20 mg Oral BID     heparin ANTICOAGULANT  5,000 Units Subcutaneous Q8H     metoprolol tartrate  12.5 mg Oral BID     mycophenolate  1,000 mg Oral or NG Tube Daily     mycophenolate  500 mg Oral or NG Tube At Bedtime     pantoprazole  40 mg Oral QAM AC     predniSONE  60 mg Oral or Feeding Tube Daily     cholecalciferol  100 mcg Oral Daily       Data   Recent Labs   Lab 03/25/21  0725 03/24/21  0456 03/23/21  1719 03/23/21  0419 03/22/21  0436 03/22/21  0436 03/19/21  2217 03/19/21 2217   WBC 4.0 3.7*  --  3.8*  --  4.9   < > 5.6   HGB 11.6* 12.0*  --  11.3*  --  11.0*   < > 13.1*   MCV 94 93  --  91  --  87   < > 97   * 126*  --  125*  --  133*   < > 216    142 139 139  --  138   < > 142   POTASSIUM 3.8 3.8 3.9 3.7   < > 2.9*   < > 4.4   CHLORIDE 104 106 105 106  --  104   < > 95   CO2 40* 41* 32 34*  --  29   < > >45*   BUN 18 19 22 24  --  27   < > 22   CR 0.54* 0.61* 0.59* 0.62*  --  0.68   < > 0.65*   ANIONGAP <1* <1* 2* <1*  --  5   < > Not Calculated   NOAH 8.3* 8.0* 8.4* 8.1*  --  7.8*   < > 9.0   GLC 75 69* 231* 71  --  81   < > 118*   ALBUMIN  --   --   --  2.1*  --  2.0*   < > 2.8*   PROTTOTAL  --   --   --  6.7*  --  6.1*   < > 6.7*   BILITOTAL  --   --   --  0.4  --  0.7   < > 0.4   ALKPHOS   --   --   --  42  --  42   < > 59   ALT  --   --   --  30  --  22   < > 33   AST  --   --   --  22  --  15   < > 18   TROPI  --   --   --   --   --   --   --  0.060*    < > = values in this interval not displayed.       Imaging:   No results found for this or any previous visit (from the past 24 hour(s)).

## 2021-03-25 NOTE — PROGRESS NOTES
"CLINICAL NUTRITION SERVICES - REASSESSMENT NOTE      Malnutrition (3/21):   % Weight Loss:  Weight loss does not meet criteria for malnutrition (fluids)  % Intake:  <75% for > 7 days (non-severe malnutrition)  Subcutaneous Fat Loss:  Orbital region mild depletion  Muscle Loss:  Temporal region mild-moderate depletion and Clavicle bone region mild depletion  Fluid Retention:  None noted     Malnutrition Diagnosis: Non-Severe malnutrition  In Context of:  Acute illness or injury  Chronic illness or disease       EVALUATION OF PROGRESS TOWARD GOALS   Diet:  2 gm Na, thin liquids  Pt says he doesn't always have time to order meals, \"they keep me pretty busy\". Per flow sheets, eating 25%.  He also c/o loose stools. Apparently he hadn't had a BM for a few days so he was given a suppository or stool softener.    Nutrition Support:  TF was d/c'd 3/22 as NG pulled with extubation      NEW FINDINGS:   Vitals:    03/20/21 0215 03/21/21 0350 03/22/21 0400 03/23/21 0200   Weight: 127.7 kg (281 lb 8.4 oz) 125.2 kg (276 lb) 128.5 kg (283 lb 4.7 oz) 127.4 kg (280 lb 13.9 oz)    03/24/21 0600   Weight: 128.3 kg (282 lb 13.6 oz)         Previous Goals:   Peptamen Intense VHP at 60 mL/hr will meet % needs while NPO   Evaluation: No longer applicable      Previous Nutrition Diagnosis:   Inadequate protein-energy intake related to NPO on vent with plans to start TF today as evidenced by meeting 0% needs   Evaluation: No change, upddted below      CURRENT NUTRITION DIAGNOSIS  Inadequate protein-energy intake related to decreased oral intake as evidenced by no ordering 3 meals/day and intake of 50%    INTERVENTIONS  Recommendations / Nutrition Prescription  Continue current diet    Implementation  Composition of Meals and Snacks - suggested he order more than one meal at a time    Goals  Pt will consume at least 75% of meals      MONITORING AND EVALUATION:  Progress towards goals will be monitored and evaluated per protocol and " Practice Guidelines

## 2021-03-25 NOTE — PLAN OF CARE
Pt here with Myasthenic Crisis. Alert, oriented x4. Neuros: decreased strength, BLE's scoring a 3/5, BUE's a 4/5. VSS on 2L via NC. Lung sounds diminished. Regular diet with 2g Na+, thin liquids. Takes pills whole. Up with A2 usin the lift, T/R Q2H but intermittently refuses  repositioninig. Skin with blamchable redness to coccyx, mepilex in place and L. Thigh with redness. Denies pain.Beckett in place with adequate output, no BM overnight. Pt scoring green on the Aggression Stop Light Tool. Discharge plan pending.

## 2021-03-25 NOTE — PROGRESS NOTES
Patient here with Respiratory Failure with hypercapnia.. Patient is A/O x 4.VSS on 2 L O2.TELE: SR with BBB. Denies pain, Up with lift. Neuros: BUE 4/5, BLE 3/5.Tolerating regular diet, incontinent of bowel, had loose MBx1.Beckett patent.Blanchable redness on coccyx, mepilex in place, redness on L inner thigh.

## 2021-03-25 NOTE — PROGRESS NOTES
"SPIRITUAL HEALTH SERVICES  SPIRITUAL ASSESSMENT Progress Note  [facility]     REFERRAL SOURCE: Initial Visit Given Lengthy Hospital Stay    I shared a lengthy, reflective visit with patient Rigoberto today, integrating elements of illness and personal narratives.    -Rigoberto spent time reflecting at length on his illness the last 15 years and the more recent progressions and health issues he has experienced. He shares it's been hard to not be able to move and his goals are to be strong enough to return home.    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 He shared stories and reflected on how meaningful this work was for him. He shares he retired about 15 years ago when his illness made it difficult to work full time.     -Rigoberto shares he has many close relationships in his life, including his cousin (designated visitor) who supports him a lot, his nephew \"who is like a son\" and his three children \"who have always been like my grandchildren.\"    -Rigoberto shares he finds great meaning in his S[employer] yovanny and reflected on this over the last few years. He names the challenges of covid and his health issues in connecting with a Church more, which he shares desire to do.    I spent time offering emotional and spiritual support through reflective listening that affirmed emotions, experiences and meaning. Rigoberto asked for some reflection materials, which I will bring for him.     PLAN: Rigoberto welcomes shs visits during his hospital stay, will continue to follow for support.     Dottie Samuels  Associate    Phone: [phone]  Pager: [phone]    "

## 2021-03-25 NOTE — PROGRESS NOTES
"   03/25/21 0900   Quick Adds   Type of Visit Initial PT Evaluation   Living Environment   People in home alone   Current Living Arrangements apartment   Home Accessibility no concerns   Transportation Anticipated car, drives self   Living Environment Comments Patient has been in TCU since his last admission from 3/4-3/15. At TCU he was standing with \"a machine\" but not taking any steps.    Self-Care   Current Activity Tolerance poor   Regular Exercise No   Equipment Currently Used at Home grab bar, tub/shower;shower chair;walker, rolling   Activity/Exercise/Self-Care Comment Activity tolerance has been very limited since first admission in early March. Prior to that was moderate.    Disability/Function   Hearing Difficulty or Deaf no   Wear Glasses or Blind yes   Vision Management Glasses   Concentrating, Remembering or Making Decisions Difficulty no   Difficulty Communicating no   Difficulty Eating/Swallowing yes   Eating/Swallowing eating   Walking or Climbing Stairs Difficulty yes   Walking or Climbing Stairs ambulation difficulty, requires equipment   Mobility Management 4ww   Dressing/Bathing Difficulty yes   Dressing/Bathing bathing difficulty, assistance 1 person;dressing difficulty, assistance 1 person   Toileting issues yes   Toileting Assistance toileting difficulty, dependent   Doing Errands Independently Difficulty (such as shopping) yes   Fall history within last six months yes   Number of times patient has fallen within last six months 1   General Information   Onset of Illness/Injury or Date of Surgery 03/19/21   Referring Physician Dionicio David   Patient/Family Therapy Goals Statement (PT) To return to TCU after treatment for Myasthenia Gravis   Pertinent History of Current Problem (include personal factors and/or comorbidities that impact the POC) 80-year-old male with HFpEF, hypertension, hyperlipidemia without medications, prostate cancer, obesity, major depressive disorder, atypical " mycobacterial infection and myasthenia gravis, who was recently admitted from 03/04-03/15/2021 for acute hypoxic and hypercarbic respiratory failure requiring intubation secondary to myasthenic crisis, acute on chronic decompensated HFpEF.  He was discharged to [facility] in [place] post-hospitalization with intention to rehab and then return home -- who is admitted 3/19/21 after gaining 20 pounds in 1 month following his initial COVID vaccine, admitted to ICU and intubated, now extubated and breathing better:    Existing Precautions/Restrictions   (On oxygen via nasal cannula)   General Observations Patient very fearful of mobility. Motivated but needs encouragement to try new ways to get out of bed.    Cognition   Orientation Status (Cognition) oriented x 4   Affect/Mental Status (Cognition) WFL   Pain Assessment   Patient Currently in Pain No   Posture    Posture Forward head position;Protracted shoulders   Posture Comments Slumped position in sitting.    Range of Motion (ROM)   ROM Comment Bilatersl ankle range limited but functional.    Strength Comprehensive (MMT)   Comment, General Manual Muscle Testing (MMT) Assessment Bilateral UE's 4/5 strength as testing in supine. Bilateral DF/PF 4/5 witbnin available range, bilateral quads 3/5 in supine but functionally much weaker, hip abd/add at least 2/5 as tested in supine. Can perform heel slides with assist needed especially on the left.    Bed Mobility   Bed Mobility rolling left;rolling right;scooting/bridging;supine-sit;sit-supine   Rolling Left [place] (Bed Mobility) maximum assist (25% patient effort);1 person to manage equipment   Rolling Right [place] (Bed Mobility) maximum assist (25% patient effort);1 person to manage equipment   Scooting/Bridging [place] (Bed Mobility) other (see comments)  (Dependent to scoot up in bed with bed in Trendelenberg. )   Supine-Sit [place] (Bed Mobility) maximum assist (25% patient effort);2  person assist   Sit-Supine [place] (Bed Mobility) maximum assist (25% patient effort);2 person assist   Bed Mobility Limitations decreased ability to use legs for bridging/pushing;impaired ability to control trunk for mobility   Impairments Contributing to Impaired Bed Mobility decreased strength   Assistive Device (Bed Mobility) bed rails;draw sheet   Transfers   Transfer Safety Comments Unable.    Balance   Balance Comments Can maintain static sitting balancre on edge of bed with supervision. Able to reach outside of midline without LOB.    Clinical Impression   Criteria for Skilled Therapeutic Intervention yes, treatment indicated   PT Diagnosis (PT) Impaired strength UE's and LE's.    Influenced by the following impairments Fear of falling, decreased LE strength   Functional limitations due to impairments Needs assist for bed mobility, transfers and unable to amb.    Clinical Presentation Stable/Uncomplicated   Clinical Presentation Rationale > 3 factirs gregory garibay   Clinical Decision Making (Complexity) moderate complexity   Therapy Frequency (PT) Daily   Predicted Duration of Therapy Intervention (days/wks) 5 days   Planned Therapy Interventions (PT) bed mobility training;gait training;patient/family education;strengthening;transfer training   Anticipated Equipment Needs at Discharge (PT) bariatric equipment   Risk & Benefits of therapy have been explained evaluation/treatment results reviewed;care plan/treatment goals reviewed;risks/benefits reviewed;current/potential barriers reviewed;participants voiced agreement with care plan;participants included;patient   Clinical Impression Comments Anxious about transferring but motivated.    PT Discharge Planning    PT Discharge Recommendation (DC Rec) Transitional Care Facility   PT Rationale for DC Rec Patient is well below baseline with functional independence and will need continued PT to progress his strength and independence prior to discharging to  home.    PT Brief overview of current status  Assist of 2 for bed mobiltiy, Patient too fearful to get to full standing with Marleni Steady. Will need to use portable lift.    Total Evaluation Time   Total Evaluation Time (Minutes) 14

## 2021-03-25 NOTE — CONSULTS
Pulmonary Medicine Consultation        Date of Admission: 3/19/2021  Primary Attending:  El Zavala MD  Consulting Physician: Alexx Campos MD      History:    Thanh is a 80-year-old male patient with multiple medical problems including myasthenia gravis, heart failure with preserved ejection fraction, hypertension, hyperlipidemia, history of prostate cancer, depression, obesity, history of atypical mycobacterial infection admitted earlier in the month from March 4-15 for acute hypoxic and hypercapnic respiratory failure requiring intubation secondary to myasthenia crisis and decompensation of his heart failure.  Readmitted March 19 with similar complaints. He arrived to ED somnolent. ABG demonstrated PCO2>100 and he was immediately intubated.  He was given rocpehin, zosyn and vancomycin in ED in addition to 125mg solumedrol.  Also has been noting increased fluid retention.Transferred out of the ICU March 23. NIF check was -14 and VC was 650 ml on admission and had improved -28 and 1300   At the moment denies any new complaints.  Feels close to his Most recent baseline from his last discharge.Denies any choking with food intake.  He had been ambulatory prior to hospitalization on 03/04/2021, but since discharging has not yet regain his ability to ambulate.       Review of system:   ROS is negative except for items mentioned above and in HPI.           Prior medical history:  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     Cellulitis of left leg 9/23/2012     Depressive disorder 2020     Edema 7/7/2009     History of steroid therapy 2/22/2010     Hyperlipidemia LDL goal <130 10/31/2010     HYPERTENSION 7/8/2003     Incontinence of urine 10/25/2010     Leg ulcer (H) 9/20/2012     MALIGN NEOPL PROSTATE-3/07 4/2/2007    T1C, [place] 8, initial PSA 39, s/p radiation seed implants 2007      Myasthenia gravis (H)      OBESITY 7/8/2003     Osteoporosis 8/18/2009     Refused bisphosphonates 2011      PVC's (premature ventricular contractions) 11/23/2011     RIGHT OCCIPITAL PAIN 7/8/2003     ROTATOR CUFF SYND NOS- RT 9/19/2005     Skin nodule 3/18/2013     ulcer left shin 10/8/2007     Uncomplicated asthma 1944?    childhood only       Past Surgical History:   Procedure Laterality Date     BIOPSY  2013?    dealt with     Prostate Cancer Cryotherapy  2007     TONSILLECTOMY  1945       Patient Active Problem List   Diagnosis     Myasthenic crisis (H)     MALIGN NEOPL PROSTATE-3/07     Osteoporosis     Hyperlipidemia LDL goal <130     PVC's (premature ventricular contractions)     Atypical mycobacterial infection     Pain of left scapula     Dilated aortic root (H)     History of mycobacterial infection     Benign essential hypertension     Morbid obesity -- BMI 40.6     Metabolic encephalopathy     Abnormal urinalysis     Acute on chronic respiratory failure with hypercapnia (H)       Social History     Social History     Socioeconomic History     Marital status: Single     Spouse name: Not on file     Number of children: Not on file     Years of education: Not on file     Highest education level: Not on file   Occupational History     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 [employer]     Employer: [address] [place] [address]   Social Needs     Financial resource strain: Not on file     Food insecurity     Worry: Not on file     Inability: Not on file     Transportation needs     Medical: Not on file     Non-medical: Not on file   Tobacco Use     Smoking status: Never Smoker     Smokeless tobacco: Never Used   Substance and Sexual Activity     Alcohol use: Yes     Alcohol/week: 0.0 standard drinks     Comment: very rarely     Drug use: No     Sexual activity: Not Currently     Partners: Female   Lifestyle     Physical activity     Days per week: Not on file     Minutes per session: Not on file     Stress: Not on file   Relationships     Social connections     Talks on phone: Not on file     Gets together: Not  on file     Attends Latter-day service: Not on file     Active member of club or organization: Not on file     Attends meetings of clubs or organizations: Not on file     Relationship status: Not on file     Intimate partner violence     Fear of current or ex partner: Not on file     Emotionally abused: Not on file     Physically abused: Not on file     Forced sexual activity: Not on file   Other Topics Concern     Parent/sibling w/ CABG, MI or angioplasty before 65F 55M? No   Social History Narrative    The patient has a 0 pk yr tobacco hx.  He has no active use.  Alcohol use is rare alcoholic drinks per week.  He denies use of recreational drugs.          He is retired. 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        The patient is single.  Has 0 children.        Hot Tub Exposure: NO    Recent Travel: NO     Hx of incarceration:  NO    Bird Exposure:   NO    Animal Exposure:  3 Cats    Inhalation Exposure:  + asbestos exposure in past                 Family History  Family History   Problem Relation Age of Onset     Hypertension Mother      Depression Mother      Substance Abuse Mother         alcohol     Hypertension Father      Cancer Father         Prostate     Prostate Cancer Father      Substance Abuse Father         alcohol     Obesity Father      Diabetes Maternal Grandmother      C.A.D. Maternal Grandmother      Cerebrovascular Disease Maternal Grandfather      Cancer Paternal Uncle         Prostate     Prostate Cancer Other            Medications  No current outpatient medications on file.     Current Facility-Administered Medications Ordered in Epic   Medication Dose Route Frequency Last Rate Last Admin     acetaminophen (TYLENOL) solution 650 mg  650 mg Oral Q24H PRN   650 mg at 03/22/21 0806     ampicillin-sulbactam (UNASYN) 3 g vial to attach to  mL bag  3 g Intravenous Q6H   3 g at 03/25/21 0639     dextrose 10% infusion   Intravenous Continuous PRN         glucose gel 15-30 g  15-30 g Oral  Q15 Min PRN        Or     dextrose 50 % injection 25-50 mL  25-50 mL Intravenous Q15 Min PRN        Or     glucagon injection 1 mg  1 mg Subcutaneous Q15 Min PRN         EPINEPHrine (ADRENALIN) kit 0.3 mg  0.3 mg Intramuscular Q3 Min PRN         furosemide (LASIX) tablet 20 mg  20 mg Oral BID   20 mg at 03/25/21 0808     heparin ANTICOAGULANT injection 5,000 Units  5,000 Units Subcutaneous Q8H   5,000 Units at 03/25/21 0808     metoprolol tartrate (LOPRESSOR) half-tab 12.5 mg  12.5 mg Oral BID   12.5 mg at 03/25/21 0807     mycophenolate (GENERIC EQUIVALENT) suspension 1,000 mg  1,000 mg Oral or NG Tube Daily   1,000 mg at 03/25/21 0814     mycophenolate (GENERIC EQUIVALENT) suspension 500 mg  500 mg Oral or NG Tube At Bedtime   500 mg at 03/24/21 2121     naloxone (NARCAN) injection 0.2 mg  0.2 mg Intravenous Q2 Min PRN        Or     naloxone (NARCAN) injection 0.4 mg  0.4 mg Intravenous Q2 Min PRN        Or     naloxone (NARCAN) injection 0.2 mg  0.2 mg Intramuscular Q2 Min PRN        Or     naloxone (NARCAN) injection 0.4 mg  0.4 mg Intramuscular Q2 Min PRN         ondansetron (ZOFRAN-ODT) ODT tab 4 mg  4 mg Oral Q6H PRN        Or     ondansetron (ZOFRAN) injection 4 mg  4 mg Intravenous Q6H PRN         pantoprazole (PROTONIX) EC tablet 40 mg  40 mg Oral QAM AC   40 mg at 03/25/21 0639     phenol (CHLORASEPTIC) 1.4 % spray 1 mL  1 spray Mouth/Throat Q1H PRN   1 mL at 03/22/21 1739     predniSONE (DELTASONE) tablet 60 mg  60 mg Oral or Feeding Tube Daily   60 mg at 03/25/21 0808     prochlorperazine (COMPAZINE) injection 5 mg  5 mg Intravenous Q6H PRN        Or     prochlorperazine (COMPAZINE) tablet 5 mg  5 mg Oral Q6H PRN        Or     prochlorperazine (COMPAZINE) suppository 12.5 mg  12.5 mg Rectal Q12H PRN         senna-docusate (SENOKOT-S/PERICOLACE) 8.6-50 MG per tablet 1 tablet  1 tablet Oral or NG Tube BID PRN        Or     senna-docusate (SENOKOT-S/PERICOLACE) 8.6-50 MG per tablet 2 tablet  2 tablet Oral  or NG Tube BID PRN         Vitamin D3 (CHOLECALCIFEROL) tablet 100 mcg  100 mcg Oral Daily   100 mcg at 21 0807     No current Epic-ordered outpatient medications on file.       Allergies   Allergen Reactions     Ciprofloxacin Other (See Comments)     Contraindicated for myasthenia gravis patients     Procainamide Other (See Comments)     Contraindicated for myasthenia gravis patients     Quinidine Other (See Comments)     Contraindicated for myasthenia gravis patients     Quinine Other (See Comments)     Contraindicated for myasthenia gravis patients               Physical Examination:   Vitals:    21 2124 21 0005 21 0445 21 0751   BP: 133/69 123/73 124/63 119/69   BP Location:  Left arm Left arm Left arm   Pulse: 94 94 75 77   Resp: 17 16 16 16   Temp: 98.5  F (36.9  C) 97.8  F (36.6  C) 97.5  F (36.4  C) 97.5  F (36.4  C)   TempSrc: Oral Oral Oral Oral   SpO2: 96% 96% 95% 97%   Weight:         Body mass index is 40.58 kg/m .  Temp (24hrs), Av.9  F (36.6  C), Min:97.5  F (36.4  C), Max:98.6  F (37  C)        Constitutional:  Appears comfortable.  HENT:  mucous membranes moist.  Eyes: PERRLA, no icterus, no pallor.   Neck: No lymphadenopathy or thyromegaly, trachea midline, no carotid bruits.  [id number], no murmurs  Respiratory: Mild decreased air entry, vesicular breath sounds bilaterally  Gastrointestinal:normoactive bowel sounds, soft, nontender, nondisteded, obese  Ext: +2 bilat LE edema  Skin: no rashes on exposed skin, chronic venous stasis changes to biltat LE   Musculoskeletal no bony joint deformities      CMP  Recent Labs   Lab 21  0725 21  0456 21  1719 21  0419 21  0436 21  0436 21  0451 21  0451 21  0602    142 139 139  --  138  --  138 142   POTASSIUM 3.8 3.8 3.9 3.7   < > 2.9*   < > 2.6* 4.1   CHLORIDE 104 106 105 106  --  104  --  99 97   CO2 40* 41* 32 34*  --  29  --  35* 45*   ANIONGAP <1* <1*  2* <1*  --  5  --  4 <1*   GLC 75 69* 231* 71  --  81  --  93 111*   BUN 18 19 22 24  --  27  --  25 22   CR 0.54* 0.61* 0.59* 0.62*  --  0.68  --  0.72 0.57*   GFRESTIMATED >90 >90 >90 >90  --  >90  --  89 >90   GFRESTBLACK >90 >90 >90 >90  --  >90  --  >90 >90   NOAH 8.3* 8.0* 8.4* 8.1*  --  7.8*  --  8.2* 8.8   MAG  --   --  1.8  --   --  1.7  --  1.8  --    PHOS  --  2.3*  --  3.1  --  3.0  --  1.4*  --    PROTTOTAL  --   --   --  6.7*  --  6.1*  --  6.0* 5.8*   ALBUMIN  --   --   --  2.1*  --  2.0*  --  2.2* 2.4*   BILITOTAL  --   --   --  0.4  --  0.7  --  1.0 0.7   ALKPHOS  --   --   --  42  --  42  --  45 48   AST  --   --   --  22  --  15  --  17 21   ALT  --   --   --  30  --  22  --  23 27    < > = values in this interval not displayed.     CBC  Recent Labs   Lab 03/25/21  0725 03/24/21  0456 03/23/21  0419 03/22/21  0436   WBC 4.0 3.7* 3.8* 4.9   RBC 4.37* 4.52 4.19* 4.25*   HGB 11.6* 12.0* 11.3* 11.0*   HCT 40.9 41.8 37.9* 37.0*   MCV 94 93 91 87   MCH 26.5 26.5 27.0 25.9*   MCHC 28.4* 28.7* 29.8* 29.7*   RDW 16.8* 16.8* 17.0* 17.1*   * 126* 125* 133*     INRNo lab results found in last 7 days.  Arterial Blood Gas  Recent Labs   Lab 03/23/21  1341 03/23/21  0635 03/22/21  1127 03/20/21  2250   PH 7.34* 7.30* 7.37 7.59*   PCO2 63* 65* 52* 44   PO2 112* 140* 95 87   HCO3 34* 32* 30* 42*   O2PER 2l 2L 30 30%     Recent Labs   Lab 03/20/21  1640 03/18/21  2217   CULT Moderate growth  Staphylococcus aureus  * >100,000 colonies/mL  Escherichia coli  *       Diagnostic Studies:  Chest Radiology:     CHEST ONE VIEW  3/23/2021 1:19 PM      HISTORY: Hypoxemia.     COMPARISON: March 19, 2021                                                                      IMPRESSION: Mild bibasilar atelectasis and/or infiltrate. Question  elevated right hemidiaphragm. Low lung volumes bilaterally.           Results for PINEDA GONZALEZ (MRN [id number]) as of 3/25/2021 08:59   Ref. Range 3/23/2021 13:41   pH Arterial  Latest Ref Range: 7.35 - 7.45 pH 7.34 (L)   pCO2 Arterial Latest Ref Range: 35 - 45 mm Hg 63 (H)   PO2 Arterial Latest Ref Range: 80 - 105 mm Hg 112 (H)   Bicarbonate Arterial Latest Ref Range: 21 - 28 mmol/L 34 (H)   Base Excess Art Latest Units: mmol/L 6.3   FIO2 Unknown 2l   Oxyhemoglobin Arterial Latest Ref Range: 92 - 100 % 97       Assessment:   80-year-old male patient with multiple medical problems including myasthenia gravis, heart failure with preserved ejection fraction, hypertension, hyperlipidemia, history of prostate cancer, depression, obesity, history of atypical mycobacterial infection admitted earlier in the month from March 4-15 for acute hypoxic and hypercapnic respiratory failure requiring intubation secondary to myasthenia crisis and decompensation of his heart failure.  Readmitted March 19 with similar complaints. He arrived to ED somnolent. ABG demonstrated PCO2>100 and he was immediately intubated  In the setting of back to back hypercapnic respiratory failure it is clear that the patient needs to have ventilatory support especially during sleep in order to prevent future exacerbations that would put him in the hospital. Chest radiograph shows significant atelectasis which goes with neuromuscular affectation of respiratory muscles.  The fact that the NIF somewhat normalized does not mean that this will not happen again.      Pulmonary Diagnoses: Hypercapnia R06.89, Resp fail acute J96.00, Resp fail chronic J96.10 and SOB R06.02      Recommendations      I will start the patient on AVAPS with a target tidal volume of 450 mL and a backup rate 12 breaths/min.  In the setting of neuromuscular disorder and now are repeated episode of hypercapnic respiratory failure the patient definitely needs support.  This needs to be used with every sleep opportunity  I spoke to the care coordinator and order to work on getting the patient a trilogy device which she will need to take to his TCU  The patient  should not have been sent out of the ICU without NIPPV  If this is not done the likelihood of recurrent respiratory failures with hospital admissions is high.  Optimization of management of his myasthenia gravis per neurology CellCept 1 gram at 10 a.m. and 500 mg every 5 p.m.   Continue b.i.d. pulmonary mechanics with NIF and VC.   Encourage incentive spirometer at least Every 2 hours while awake  Please call if question           Alexx Coronel M.D.  Pulmonary, Critical Care and Sleep Medicine  [facility]   Pager: [phone]  Office:[phone]

## 2021-03-25 NOTE — PROGRESS NOTES
Care Management Follow Up    Per Pulmonary patient will need to be set up with Trilogy.  Contacted [facility].  They indicated that if patient is discharging to TCU, the TCUs that allow Trilogy usually have contracts with providers for this equipment and staff to manage.  Therefore they would not set him up with equipment for home at this time unless the facility he is going to allows and/or requires him to bring his own equipment.  [facility] will need to be recontacted if this is the case and they will help to provide the equipment.    Addendum:  SW will check with [facility] where patient has bed hold to see if Trilogy can be used at their facility.    Length of Stay (days): 5    Expected Discharge Date: 03/27/21(TCU)     Concerns to be Addressed: Trilogy    Patient plan of care discussed at interdisciplinary rounds: Yes    Anticipated Discharge Disposition:  TCU     Anticipated Discharge Services:    Anticipated Discharge DME:      Patient/family educated on Medicare website which has current facility and service quality ratings:  n/a  Education Provided on the Discharge Plan:    Patient/Family in Agreement with the Plan:      Referrals Placed by CM/SW:    Private pay costs discussed: Not applicable    Yennifer Degroot RN, BSN, PHN  Inpatient Care Coordination  [facility]  Phone: [phone]

## 2021-03-26 NOTE — PLAN OF CARE
Pt here with MG exacerbation. A&O x4. Neuros include generalized weakness BUE 4/5 and BLE 3/5. VSS. Tele SR with BBB. Regular 2 gram sodium diet, thin liquids. Takes pills whole. Up with A2 + lift. Denies pain. Pt scoring green on the Aggression Stop Light Tool. Discharge plan pending medical readiness.

## 2021-03-26 NOTE — PROGRESS NOTES
[facility]    Hospitalist Progress Note    Assessment & Plan   80-year-old male with HFpEF, hypertension, prostate CA, obesity, major depressive disorder, atypical mycobacterial infection and myasthenia gravis since 2005 -- hospitalized 03/04-03/15/2021 for myasthenic crisis, discharged to [facility], re-admitted 3/19/21 and admitted to ICU and intubated, now extubated and breathing better:     Impression:   Principal Problem:     Myasthenic crisis    -- Redose IVIG and follow course   -- may need outpatient maintenance IVIG in the short run to avoid relapses   -- Prednisone 60 mg and wean per neurology (never below 10)   -- Cellcept bid (1000 mg in AM, 500 mg PM)       Acute on chronic respiratory failure with hypercapnia -- extubated 3/24/21   -- per pulmonary, he needs ventilation assistance whenever sleeping (at night or naps during the day, otherwise his CO2 will increase, he becomes lethargic, then develops resp arrest and needs intubation   -- for now,  AVAPS with a target tidal volume of 450 mL and a backup rate 12 breaths/min, and will have Trilogy machine with these same settings when he goes back to TCU -- it is for Neuromuscular disorder and does not need a sleep study.        UTI -- E.Coli, pan-sensitive   --  on Unasyn, switch to Amox 500 mg tid for 3 more days         Thrombocytopenia        Hypophosphatemia      Hypokalemia   -- replaced and checking in AM     Active Problems:    Benign essential hypertension      Morbid obesity -- BMI 40.6      Metabolic encephalopathy -- cleared       Plan:  As above    DVT Prophylaxis: Heparin SQ  Code Status: Full Code    Disposition: Expected discharge back to TCU when Neurology comfortable with his strength, and will need Trilogy at TCU on discharge.     Dionicio Blunt MD  Pager [phone]  Cell Phone [phone]  Text Page (7am to 6pm)    Interval History   On 2 LPM, tired, but overall feels better. He has had MG  since 2005 and wants to discuss treatment plan with neurology.     Physical Exam   Temp: 97.3  F (36.3  C) Temp src: Oral BP: 115/65 Pulse: 82   Resp: 18 SpO2: 96 % O2 Device: Nasal cannula Oxygen Delivery: 2 LPM  Vitals:    03/22/21 0400 03/23/21 0200 03/24/21 0600   Weight: 128.5 kg (283 lb 4.7 oz) 127.4 kg (280 lb 13.9 oz) 128.3 kg (282 lb 13.6 oz)     Vital Signs with Ranges  Temp:  [97.3  F (36.3  C)-98.8  F (37.1  C)] 97.3  F (36.3  C)  Pulse:  [] 82  Resp:  [17-18] 18  BP: (115-137)/(60-80) 115/65  SpO2:  [94 %-98 %] 96 %  I/O last 3 completed shifts:  In: 840 [P.O.:840]  Out: 1075 [Urine:1075]    # Pain Assessment:  Current Pain Score 3/26/2021   Patient currently in pain? denies   Pain score (0-10) -   Pain location -   Pain descriptors -   CPOT pain score -   Thanh cronin pain level was assessed and he currently denies pain.        Constitutional: Awake, alert, cooperative, no apparent distress  Respiratory: Clear to auscultation bilaterally, no crackles or wheezing  Cardiovascular: Regular rate and rhythm, normal S1 and S2, and no murmur noted  GI: Normal bowel sounds, soft, non-distended, non-tender, obese  Extrem: No calf tenderness, no ankle edema  Neuro: Ox3, no focal motor or sensory deficits    Medications     dextrose       - MEDICATION INSTRUCTIONS -       - MEDICATION INSTRUCTIONS -         amoxicillin  500 mg Oral Q8H ALEXUS     furosemide  20 mg Oral BID     heparin ANTICOAGULANT  5,000 Units Subcutaneous Q8H     metoprolol tartrate  12.5 mg Oral BID     mycophenolate  1,000 mg Oral or NG Tube Daily     mycophenolate  500 mg Oral or NG Tube At Bedtime     pantoprazole  40 mg Oral QAM AC     potassium & sodium phosphates  1 packet Oral BID     potassium chloride  40 mEq Oral Once     predniSONE  60 mg Oral or Feeding Tube Daily     sodium phosphate  15 mmol Intravenous Once     cholecalciferol  100 mcg Oral Daily       Data   Recent Labs   Lab 03/26/21  0841 03/25/21  0726 03/24/21  6084  03/23/21  1719 03/23/21 0419 03/22/21 0436 03/22/21 0436 03/19/21 2217 03/19/21 2217   WBC 4.1 4.0 3.7*  --  3.8*  --  4.9   < > 5.6   HGB 12.3* 11.6* 12.0*  --  11.3*  --  11.0*   < > 13.1*   MCV 95 94 93  --  91  --  87   < > 97   * 122* 126*  --  125*  --  133*   < > 216   NA  --  142 142 139 139  --  138   < > 142   POTASSIUM 3.4 3.8 3.8 3.9 3.7   < > 2.9*   < > 4.4   CHLORIDE  --  104 106 105 106  --  104   < > 95   CO2  --  40* 41* 32 34*  --  29   < > >45*   BUN  --  18 19 22 24  --  27   < > 22   CR  --  0.54* 0.61* 0.59* 0.62*  --  0.68   < > 0.65*   ANIONGAP  --  <1* <1* 2* <1*  --  5   < > Not Calculated   NOAH  --  8.3* 8.0* 8.4* 8.1*  --  7.8*   < > 9.0   GLC  --  75 69* 231* 71  --  81   < > 118*   ALBUMIN  --   --   --   --  2.1*  --  2.0*   < > 2.8*   PROTTOTAL  --   --   --   --  6.7*  --  6.1*   < > 6.7*   BILITOTAL  --   --   --   --  0.4  --  0.7   < > 0.4   ALKPHOS  --   --   --   --  42  --  42   < > 59   ALT  --   --   --   --  30  --  22   < > 33   AST  --   --   --   --  22  --  15   < > 18   TROPI  --   --   --   --   --   --   --   --  0.060*    < > = values in this interval not displayed.       Imaging:   No results found for this or any previous visit (from the past 24 hour(s)).

## 2021-03-26 NOTE — PROGRESS NOTES
SPIRITUAL HEALTH SERVICES  SPIRITUAL ASSESSMENT Progress Note  [facility]     REFERRAL SOURCE: Follow Up    I shared a brief follow up with Rigoberto today to deliver some materials for him to read while in the hospital, per his request.     Rigoberto shares he is about to eat lunch but named today has been a hard day as he learned he cannot return to the previous TCU he was at due to a new treatment he needs. He shared some stress around th future and where he will be going. He names he will likely be here a few more days and looks forward to reading through these materials. I offered reflective listening that validated emotions, experiences and meaning.     PLAN: [facility] will continue to remain available.     Dottie Samuels  Associate    Phone: [phone]  Pager: [phone]

## 2021-03-26 NOTE — PROGRESS NOTES
"  Pulmonary Medicine Progress Note        Date of Admission: 3/19/2021  Primary Attending:  El Zavala MD  Consulting Physician: Alexx Campos MD      History:      SUBJECTIVE: No new events overnight,  Afebrile. No worsening respiratory complaints at this time.      OBJECTIVE:    Vital signs:  Temp: 98.8  F (37.1  C) Temp src: Oral BP: 122/65 Pulse: 74   Resp: 18 SpO2: 98 % O2 Device: Nasal cannula Oxygen Delivery: 2 LPM   Weight: 128.3 kg (282 lb 13.6 oz)  Estimated body mass index is 40.58 kg/m  as calculated from the following:    Height as of 3/17/21: 1.778 m (5' 10\").    Weight as of this encounter: 128.3 kg (282 lb 13.6 oz).       Body mass index is 40.58 kg/m .  Temp (24hrs), Av.1  F (36.7  C), Min:97.7  F (36.5  C), Max:98.8  F (37.1  C)        Neck: No lymphadenopathy or thyromegaly, trachea midline, no carotid bruits.  [id number], no murmurs  Respiratory: Mild decreased air entry, vesicular breath sounds bilaterally  Gastrointestinal:normoactive bowel sounds, soft, nontender, nondisteded, obese  Ext: +2 bilat LE edema        Medications  Current Facility-Administered Medications Ordered in Epic   Medication Dose Route Frequency Last Rate Last Admin     acetaminophen (TYLENOL) solution 650 mg  650 mg Oral Q24H PRN   650 mg at 21 0806     acetaminophen (TYLENOL) tablet 650 mg  650 mg Oral Q4H PRN   650 mg at 21 0620     amoxicillin (AMOXIL) capsule 500 mg  500 mg Oral Q8H ALEXUS   500 mg at 21 0621     carboxymethylcellulose PF (REFRESH PLUS) 0.5 % ophthalmic solution 1 drop  1 drop Both Eyes Q1H PRN         dextrose 10% infusion   Intravenous Continuous PRN         glucose gel 15-30 g  15-30 g Oral Q15 Min PRN        Or     dextrose 50 % injection 25-50 mL  25-50 mL Intravenous Q15 Min PRN        Or     glucagon injection 1 mg  1 mg Subcutaneous Q15 Min PRN         EPINEPHrine (ADRENALIN) kit 0.3 mg  0.3 mg Intramuscular Q3 Min PRN         furosemide (LASIX) tablet 20 " mg  20 mg Oral BID   20 mg at 03/25/21 2042     heparin ANTICOAGULANT injection 5,000 Units  5,000 Units Subcutaneous Q8H   5,000 Units at 03/26/21 0248     metoprolol tartrate (LOPRESSOR) half-tab 12.5 mg  12.5 mg Oral BID   12.5 mg at 03/25/21 2041     mycophenolate (GENERIC EQUIVALENT) suspension 1,000 mg  1,000 mg Oral or NG Tube Daily   1,000 mg at 03/25/21 0814     mycophenolate (GENERIC EQUIVALENT) suspension 500 mg  500 mg Oral or NG Tube At Bedtime   500 mg at 03/25/21 2041     naloxone (NARCAN) injection 0.2 mg  0.2 mg Intravenous Q2 Min PRN        Or     naloxone (NARCAN) injection 0.4 mg  0.4 mg Intravenous Q2 Min PRN        Or     naloxone (NARCAN) injection 0.2 mg  0.2 mg Intramuscular Q2 Min PRN        Or     naloxone (NARCAN) injection 0.4 mg  0.4 mg Intramuscular Q2 Min PRN         No lozenges or gum should be given while patient on BIPAP/AVAPS/AVAPS AE   Does not apply Continuous PRN         ondansetron (ZOFRAN-ODT) ODT tab 4 mg  4 mg Oral Q6H PRN        Or     ondansetron (ZOFRAN) injection 4 mg  4 mg Intravenous Q6H PRN         pantoprazole (PROTONIX) EC tablet 40 mg  40 mg Oral QAM AC   40 mg at 03/26/21 0621     Patient may continue current oral medications   Does not apply Continuous PRN         phenol (CHLORASEPTIC) 1.4 % spray 1 mL  1 spray Mouth/Throat Q1H PRN   1 mL at 03/22/21 1739     predniSONE (DELTASONE) tablet 60 mg  60 mg Oral or Feeding Tube Daily   60 mg at 03/25/21 0808     prochlorperazine (COMPAZINE) injection 5 mg  5 mg Intravenous Q6H PRN        Or     prochlorperazine (COMPAZINE) tablet 5 mg  5 mg Oral Q6H PRN        Or     prochlorperazine (COMPAZINE) suppository 12.5 mg  12.5 mg Rectal Q12H PRN         senna-docusate (SENOKOT-S/PERICOLACE) 8.6-50 MG per tablet 1 tablet  1 tablet Oral or NG Tube BID PRN        Or     senna-docusate (SENOKOT-S/PERICOLACE) 8.6-50 MG per tablet 2 tablet  2 tablet Oral or NG Tube BID PRN         Vitamin D3 (CHOLECALCIFEROL) tablet 100 mcg  100  mcg Oral Daily   100 mcg at 03/25/21 0807     No current [facility]-ordered outpatient medications on file.           CMP  Recent Labs   Lab 03/25/21 0725 03/24/21 0456 03/23/21  1719 03/23/21  0419 03/22/21  0436 03/22/21  0436 03/21/21  0451 03/21/21  0451 03/20/21  0602    142 139 139  --  138  --  138 142   POTASSIUM 3.8 3.8 3.9 3.7   < > 2.9*   < > 2.6* 4.1   CHLORIDE 104 106 105 106  --  104  --  99 97   CO2 40* 41* 32 34*  --  29  --  35* 45*   ANIONGAP <1* <1* 2* <1*  --  5  --  4 <1*   GLC 75 69* 231* 71  --  81  --  93 111*   BUN 18 19 22 24  --  27  --  25 22   CR 0.54* 0.61* 0.59* 0.62*  --  0.68  --  0.72 0.57*   GFRESTIMATED >90 >90 >90 >90  --  >90  --  89 >90   GFRESTBLACK >90 >90 >90 >90  --  >90  --  >90 >90   NOAH 8.3* 8.0* 8.4* 8.1*  --  7.8*  --  8.2* 8.8   MAG  --   --  1.8  --   --  1.7  --  1.8  --    PHOS  --  2.3*  --  3.1  --  3.0  --  1.4*  --    PROTTOTAL  --   --   --  6.7*  --  6.1*  --  6.0* 5.8*   ALBUMIN  --   --   --  2.1*  --  2.0*  --  2.2* 2.4*   BILITOTAL  --   --   --  0.4  --  0.7  --  1.0 0.7   ALKPHOS  --   --   --  42  --  42  --  45 48   AST  --   --   --  22  --  15  --  17 21   ALT  --   --   --  30  --  22  --  23 27    < > = values in this interval not displayed.     CBC  Recent Labs   Lab 03/25/21 0725 03/24/21  0456 03/23/21  0419 03/22/21  0436   WBC 4.0 3.7* 3.8* 4.9   RBC 4.37* 4.52 4.19* 4.25*   HGB 11.6* 12.0* 11.3* 11.0*   HCT 40.9 41.8 37.9* 37.0*   MCV 94 93 91 87   MCH 26.5 26.5 27.0 25.9*   MCHC 28.4* 28.7* 29.8* 29.7*   RDW 16.8* 16.8* 17.0* 17.1*   * 126* 125* 133*     INRNo lab results found in last 7 days.  Arterial Blood Gas  Recent Labs   Lab 03/23/21  1341 03/23/21  0635 03/22/21  1127 03/20/21  2250   PH 7.34* 7.30* 7.37 7.59*   PCO2 63* 65* 52* 44   PO2 112* 140* 95 87   HCO3 34* 32* 30* 42*   O2PER 2l 2L 30 30%     Recent Labs   Lab 03/20/21  1640   CULT Moderate growth  Staphylococcus aureus  *             Diagnostic Studies:  Chest  Radiology: *CHEST ONE VIEW  3/23/2021 1:19 PM      HISTORY: Hypoxemia.     COMPARISON: March 19, 2021                                                                      IMPRESSION: Mild bibasilar atelectasis and/or infiltrate. Question  elevated right hemidiaphragm. Low lung volumes bilaterally.             Results for PINEDA GONZALEZ (MRN [id number]) as of 3/25/2021 08:59    Ref. Range 3/23/2021 13:41   pH Arterial Latest Ref Range: 7.35 - 7.45 pH 7.34 (L)   pCO2 Arterial Latest Ref Range: 35 - 45 mm Hg 63 (H)   PO2 Arterial Latest Ref Range: 80 - 105 mm Hg 112 (H)   Bicarbonate Arterial Latest Ref Range: 21 - 28 mmol/L 34 (H)   Base Excess Art Latest Units: mmol/L 6.3   FIO2 Unknown 2l   Oxyhemoglobin Arterial Latest Ref Range: 92 - 100 % 97         Assessment:   80-year-old male patient with multiple medical problems including myasthenia gravis, heart failure with preserved ejection fraction, hypertension, hyperlipidemia, history of prostate cancer, depression, obesity, history of atypical mycobacterial infection admitted earlier in the month from March 4-15 for acute hypoxic and hypercapnic respiratory failure requiring intubation secondary to myasthenia crisis and decompensation of his heart failure.  Readmitted March 19 with similar complaints. He arrived to ED somnolent. ABG demonstrated PCO2>100 and he was immediately intubated  In the setting of back to back hypercapnic respiratory failure it is clear that the patient needs to have ventilatory support especially during sleep in order to prevent future exacerbations that would put him in the hospital. Chest radiograph shows significant atelectasis which goes with neuromuscular affectation of respiratory muscles.  The fact that the NIF somewhat normalized does not mean that this will not happen again.        Pulmonary Diagnoses: Hypercapnia R06.89, Resp fail acute J96.00, Resp fail chronic J96.10 and SOB R06.02        Recommendations       I will start  the patient on AVAPS with a target tidal volume of 450 mL and a backup rate 12 breaths/min.  In the setting of neuromuscular disorder and now are repeated episode of hypercapnic respiratory failure the patient definitely needs support.  This needs to be used with every sleep opportunity  I spoke to the care coordinator and order to work on getting the patient a trilogy device which she will need to take to his TCU  The patient should not have been sent out of the ICU without NIPPV  If this is not done the likelihood of recurrent respiratory failures with hospital admissions is high.  Optimization of management of his myasthenia gravis per neurology CellCept 1 gram at 10 a.m. and 500 mg every 5 p.m.   Continue b.i.d. pulmonary mechanics with NIF and VC.   Encourage incentive spirometer at least Every 2 hours while awake  Please call if question              Alexx Coronel M.D.  Pulmonary, Critical Care and Sleep Medicine  [facility]   Pager: [phone]  Office:[phone]

## 2021-03-26 NOTE — PROGRESS NOTES
Care Management Follow Up    Length of Stay (days): 6    Expected Discharge Date: 03/28/21(TCU)     Concerns to be Addressed:       Patient plan of care discussed at interdisciplinary rounds: Yes    Anticipated Discharge Disposition:  TCU     Anticipated Discharge Services:  Inpatient rehab. Pt has a bed hold at [facility].  Pt is recommended by pulmonary to have a Trilogy.  SW contacted [facility] and spoke with Wilda regarding Trilogy and [facility] does NOT accept pt's on Trilogy.  Will see if pt wants to move forward with Trilogy and an alternative TCU or return to [facility] without Trilogy.  Will update [facility] asap of decision.    Patient/family educated on Medicare website which has current facility and service quality ratings:  no  Education Provided on the Discharge Plan:  yes  Patient/Family in Agreement with the Plan: TBD      Referrals Placed by CM/REJI:  Will send additional referrals as needed.  Private pay costs discussed: Not applicable    Additional Information:    VALERY Quiñonez  [facility]  [phone]

## 2021-03-26 NOTE — PROVIDER NOTIFICATION
Paged MD: Will you please re-consult general neuro to see patient at patient's request? Neuro not responding. Thank you

## 2021-03-26 NOTE — PROGRESS NOTES
Patient here with Respiratory Failure with hypercapnia. Patient is A/O x 4.VSS on 2 L O2.TELE: SR with BBB. Denies pain, Up with lift. Neuros: BUE 4/5, BLE 3/5.Tolerating regular diet, incontinent of bowel, had loose MBx1.Beckett patent.Blanchable redness on coccyx, mepilex in place, redness on L inner thigh.

## 2021-03-27 NOTE — PROGRESS NOTES
[facility]    Internal Medicine Hospitalist Progress Note  03/27/2021  I evaluated patient on the above date.    Mushtaq Gonzalez Jr., MD  [phone] (p)  Text Page        Assessment & Plan New actions/orders today (03/27/2021) are underlined.    Mr. Goodrich is a 80-year-old gentleman with PMH including morbid obesity; myasthenia gravis with recent hospitalization for myasthenia crisis with acute respiratory failure (3/4-3/15); HTN; CHF; h/o prostate cancer and h/o HAZEL; who presented from TCU 3/19/2021 with weakness and found to be in acute hypercarbic respiratory failure. Intubated in the ED and admitted to the ICU.    In the ICU, was treated for recurrent myasthenia crisis including with IVIG. Also treated for CHF/pulmonary edema, MSSA pneumonia and E. Coli UTI. Improved and was extubated 3/22/2021. Transferred to IM Hospitalist service 3/23/2021.         Acute hypercarbic respiratory failure, suspect multifactorial including myasthenia gravis flare, MSSA pneumonia and acute diastolic CHF exacerbation.  - Management of separate issues as below.    Recurrent myasthenic crisis, resulting in CNS failure with acute hypoxic respiratory failure, suspect multifactorial including recent taper of steroids and UTI.  * H/o myasthenia gravis, diagnosed in 2005. Had been stable on mycophenolate and had not needed steroids for about 10 years until recent hospitalization 3/2021. Recent hospitalization (3/4-3/15) with myasthenia gravis flare with associated acuter respiratory failure (intubated 3/6-3/11). Treated with IVIG and steroids. Discharged to TCU. Steroids were slowly being tapered after discharge.  * Presented from TCU 3/19 with weakness and found to be in acute hypercarbic respiratory failure. Intubated in ED and admitted to ICU. Was also found with evidence of UTI and pneumonia which were treated (see below). Neurology consulted. Treated with IVIG 3/20-3/22. Predisone increased. Improved and able  to be extubated 3/22. Started on pyridostigmine 3/26. Pulmonology consulted 3/25 to assist with obtaining nocturnal NIPPV.  - Continue mycophenolate 1000 mg qam and 500 mg at bedtime; prednisone 60 mg daily; and pyridostigmine 60 mg q8h; per Neurology rec's.  - Considering additional doses of IVIG per Neurology who will d/w primary Neurologist.  - Continue BID pulmonary mechanics with NIF and VC.   - Continue IS q2h while awake.  - Continue AVAPS with every sleep opportunity (target tidal volume of 450 mL and a backup rate 12 breaths/min); plan discharge with this.  - Continue O2 while awake, wean as able.  - Continue PT and OT.   - There is a question of possibly needing outpatient IVIG to stave off further crises.   - Appreciate consultant help.    MSSA pneumonia, suspect HCAP.  Recent hospitalization as above. Initial presentation from TCU 3/19 as above. On initial evaluation 3/19, was afebrile and hemodynamically stable; WBC and lactate normal, VBG showed pCO2 128. CXR 3/19 showed bibasilar atelectasis or infiltrate with small pleural effusions. Initially started on broad antibiotics including Zosyn on admit. Sputum 3/20 grew MSSA. Antibiotics narrowed to Unasyn 3/25  - Continue Unasyn (started 3/25); plan stop after 3/29.    Possible acute on chronic decompensated diastolic CHF exacerbation, question related to recent high dose steroids.  Hypertension (benign essential).  [PTA: furosemide 20 mg BID; metoprolol 12.5 mg BID.]  * Echocardiogram 3/4/2021 showed LVEF of 65-70%, no regional wall motion abnormalities; mildly decreased RV systolic function; mild to moderate mitral regurgitation, mild to moderate mitral stenosis. The patient reported a recent 20-pound weight gain in the last 1 month PTA following COVID vaccine. Treated with high dose steroids for myasthenia gravis as above.  * Initial presentation 3/19 as above. NTP-BNP 3996 3/19. Was given IV furosemide this hospitalization. He had metabolic alkalosis  that was treated with acetazolamide, stopped 3/21. Repeat echo 3/23 showed LVEF 55-60%, grade 1 diastolic dysfunction, no regional wall motion abnormalities; RV OK; trace MR, mild MS.  - Continue furosemide 20 mg BID; metoprolol 12.5 mg BID.  - Continue 2 gm sodium diet.  - Monitor i/o's, daily wts.    Complicated catheter associated E. Coli UTI - treated.  Indwelling Beckett due to difficulty voiding due to large pannus (placed previous hospitalization).  * Beckett placed during previous hospitalization because of difficulty voiding or monitor I/O's given his large pannus size.   * UA 3/19 grossly abnormal. Previously obtained UC from 3/18 showed E. Coli. Was treated with antibiotics including ceftriaxone and other antibiotics as above. UTI felt to be adequately treated.  - Although ideally I would like to discontinue Beckett, per discussion with RN staff, an external condom catheter would be unlikely to stay on.  - As long as strict I/O's needed, continue catheter with goal of removing ASAP or prior to discharge.     Mild metabolic alkalosis, likely secondary to a combination of diuresis and probable chronic hypercarbic respiratory failure.   Diuresed as above for CHF. Received acetazolamide as above.  Recent Labs   Lab 03/25/21  0725 03/24/21  0456 03/23/21  1719 03/23/21  0419 03/22/21  0436 03/21/21  0451   CO2 40* 41* 32 34* 29 35*   - Monitor.     Thrombocytopenia, unclear etiology, possibly medication effect.  Recent Labs   Lab 03/26/21  0841 03/25/21  0725 03/24/21  0456 03/23/21  0419 03/22/21  0436 03/21/21  0451   * 122* 126* 125* 133* 158   - Monitor CBC.  - Consider platelet transfusion if needs a procedure and less than 50,000; or if less than 10,000.    Anemia, suspect from multiple phlebotomies and also chronic component.  Hgb 13.1 on admit 3/19. No overt clinical signs of major bleeding.   Recent Labs   Lab 03/26/21  0841 03/25/21  0725 03/24/21  0456 03/23/21  0419 03/22/21  0436 03/21/21  0451    HGB 12.3* 11.6* 12.0* 11.3* 11.0* 11.3*   - Monitor CBC.  - Consider prbc transfusion if hgb </= 7.0 or if significant bleeding with hemodynamic instability or if symptomatic.    Pressure ulcer, right posterior thigh.  WOC RN consulted.  - Continue local wound cares.    Hypophosphatemia, hypokalemia.  Recent Labs   Lab 03/26/21  0841 03/25/21  0725 03/24/21  0456 03/23/21  1719 03/23/21  0419 03/22/21  1216 03/22/21  0436 03/21/21  0451 03/21/21  0451   POTASSIUM 3.4 3.8 3.8 3.9 3.7 4.1 2.9*   < > 2.6*   MAG  --   --   --  1.8  --   --  1.7  --  1.8   PHOS 1.8*  --  2.3*  --  3.1  --  3.0  --  1.4*    < > = values in this interval not displayed.   - Continue PRN replacement.    Trop elevation, suspect demand ischemia (type 2 NSTEMI).  Trop 3/19 of 0.060. No chest pain on admit. Echo 3/23 as above.  - Monitor clinically.    Question of COPD.  Question of COPD noted; but pt a lifelong non-smoker. On no inhalers PTA.  - Monitor clinically.    Morbid obesity.  Body mass index is 40.58 kg/m .  - Needs to pursue aggressive dietary and lifestyle modifications.    Metabolic encephalopathy, resolved.  Noted with metabolic encephalopathy this hospitalization that cleared with above treatment.  - Monitor clinically.    COVID-19 testing.  COVID-19 PCR Results    COVID-19 PCR Results 3/4/21 3/11/21 3/19/21 3/19/21      2030 [id number]   COVID-19 Virus by PCR (External Result)   Not Detected    SARS-CoV-2 Virus Specimen Source Nasopharyngeal Nasal  Nasopharyngeal   SARS-CoV-2 PCR Result NEGATIVE NEGATIVE  NEGATIVE      Comments are available for some flowsheets but are not being displayed.         COVID-19 Antibody Results, Testing for Immunity    COVID-19 Antibody Results, Testing for Immunity   No data to display.             Diet: Advance Diet as Tolerated: Regular Diet Adult; 2 gm NA Diet    Prophylaxis: PCD's, ambulation. Heparin SQ.  Beckett Catheter: in place, indication: Retention  Code Status: Full Code    Disposition Plan    Expected discharge: 2 - 3 days, recommended to transitional care unit once pending above.  Entered: Mushtaq Gonzalez MD 03/27/2021, 7:19 AM       I spent approximately 50 minutes bedside and on the inpatient unit today managing the care of patient. Over 50% of my time on the unit was spent in extensive chart review, counseling the patient/family and/or coordinating care regarding services listed in this note.    Interval History   No chest pain.  Feels weak now.  Did not tolerate AVAPS overnight and placed on NC.    -Data reviewed today: I reviewed all new labs and imaging over the last 24 hours. I personally reviewed no images or EKG's today.    Physical Exam    , Blood pressure 124/62, pulse 64, temperature 97.5  F (36.4  C), temperature source Temporal, resp. rate 18, weight 128.3 kg (282 lb 13.6 oz), SpO2 98 %.  Vitals:    03/22/21 0400 03/23/21 0200 03/24/21 0600   Weight: 128.5 kg (283 lb 4.7 oz) 127.4 kg (280 lb 13.9 oz) 128.3 kg (282 lb 13.6 oz)     Vital Signs with Ranges  Temp:  [97.3  F (36.3  C)-98.8  F (37.1  C)] 97.5  F (36.4  C)  Pulse:  [64-91] 64  Resp:  [16-18] 18  BP: (115-130)/(62-74) 124/62  SpO2:  [94 %-98 %] 98 %  Patient Vitals for the past 24 hrs:   BP Temp Temp src Pulse Resp SpO2   03/27/21 0330 124/62 97.5  F (36.4  C) Temporal 64 18 98 %   03/26/21 2315 130/70 97.5  F (36.4  C) Oral 69 16 94 %   03/26/21 2007 129/74 -- Oral 91 18 96 %   03/26/21 2001 122/63 97.8  F (36.6  C) Oral 82 16 95 %   03/26/21 1638 129/70 97.3  F (36.3  C) Oral 77 18 95 %   03/26/21 1132 115/65 97.3  F (36.3  C) Oral 82 18 96 %   03/26/21 0726 122/65 98.8  F (37.1  C) Oral 74 18 98 %     I/O's Last 24 hours  I/O last 3 completed shifts:  In: 650 [P.O.:650]  Out: 1650 [Urine:1650]    Constitutional: Fatigued, slightly labored breathing.  Respiratory: Diminished in bases. No crackles or wheezes.  Cardiovascular: RRR, no m/r/g.  GI: Soft, nt, nd, +BS.  Skin/Integumen: No significant bilateral lower extremity  pitting edema.  Other:        Data   Recent Labs   Lab 03/26/21  0841 03/25/21  0725 03/24/21  0456 03/23/21  1719 03/23/21 0419 03/22/21 0436 03/22/21 0436   WBC 4.1 4.0 3.7*  --  3.8*  --  4.9   HGB 12.3* 11.6* 12.0*  --  11.3*  --  11.0*   MCV 95 94 93  --  91  --  87   * 122* 126*  --  125*  --  133*   NA  --  142 142 139 139  --  138   POTASSIUM 3.4 3.8 3.8 3.9 3.7   < > 2.9*   CHLORIDE  --  104 106 105 106  --  104   CO2  --  40* 41* 32 34*  --  29   BUN  --  18 19 22 24  --  27   CR  --  0.54* 0.61* 0.59* 0.62*  --  0.68   ANIONGAP  --  <1* <1* 2* <1*  --  5   NOAH  --  8.3* 8.0* 8.4* 8.1*  --  7.8*   GLC  --  75 69* 231* 71  --  81   ALBUMIN  --   --   --   --  2.1*  --  2.0*   PROTTOTAL  --   --   --   --  6.7*  --  6.1*   BILITOTAL  --   --   --   --  0.4  --  0.7   ALKPHOS  --   --   --   --  42  --  42   ALT  --   --   --   --  30  --  22   AST  --   --   --   --  22  --  15    < > = values in this interval not displayed.     Recent Labs   Lab Test 03/25/21  0725 03/24/21  0833 03/24/21 0456 03/23/21  1719 03/23/21  0749 03/23/21 0419 03/22/21  1216 03/22/21  0828 03/22/21 0436 03/21/21  0021 03/21/21  0021   GLC 75  --  69* 231*  --  71  --   --  81   < >  --    BGM  --  74  --   --  74  --  107* 86  --   --  115*    < > = values in this interval not displayed.     No results for input(s): CRP, DD, LDH, FIBR, LESLY, IL6B in the last 168 hours.      No results found for this or any previous visit (from the past 24 hour(s)).    Medications   All medications were reviewed.    dextrose       - MEDICATION INSTRUCTIONS -       - MEDICATION INSTRUCTIONS -         amoxicillin  500 mg Oral Q8H ALEXUS     furosemide  20 mg Oral BID     heparin ANTICOAGULANT  5,000 Units Subcutaneous Q8H     metoprolol tartrate  12.5 mg Oral BID     mycophenolate  1,000 mg Oral or NG Tube Daily     mycophenolate  500 mg Oral or NG Tube At Bedtime     pantoprazole  40 mg Oral QAM AC     potassium & sodium phosphates  1  packet Oral BID     predniSONE  60 mg Oral or Feeding Tube Daily     pyridostigmine  60 mg Oral Q8H [facility]     cholecalciferol  100 mcg Oral Daily     acetaminophen, acetaminophen, artificial tears ophthalmic solution, dextrose, glucose **OR** dextrose **OR** glucagon, EPINEPHrine, naloxone **OR** naloxone **OR** naloxone **OR** naloxone, - MEDICATION INSTRUCTIONS -, ondansetron **OR** ondansetron, - MEDICATION INSTRUCTIONS -, phenol, prochlorperazine **OR** prochlorperazine **OR** prochlorperazine, senna-docusate **OR** senna-docusate

## 2021-03-27 NOTE — PLAN OF CARE
Pt here with acute respiratory failure. A&Ox4. Neuros intact ex BLE's  3/5. VSS on 1.5L via NC - AVAPS overnight. Lung sounds diminished. Regular diet with 2g Na+, thin liquids. Takes pills whole. Up with A2with lift, T/R Q2H - refuses repositioninig. Skin with blamchable redness to coccyx, left thigh with blanchable redness. Beckett in place for retention. Pt scoring green on the Aggression Stop Light Tool. Discharge plan pending, possibly back to [facility].

## 2021-03-27 NOTE — PROGRESS NOTES
"  Pulmonary Medicine Progress Note        Date of Admission: 3/19/2021  Primary Attending:  El Zavala MD  Consulting Physician: Alexx Campos MD      History:      SUBJECTIVE: Had difficulty tolerating AVAPS therapy yesterday, Had to take off his mask several times last night ,no new events overnight,  Afebrile. No worsening respiratory complaints at this time.      OBJECTIVE:    Vital signs:  Temp: 97.4  F (36.3  C) Temp src: Oral BP: 115/65 Pulse: 65   Resp: 18 SpO2: 100 % O2 Device: Nasal cannula Oxygen Delivery: 2 LPM   Weight: 128.3 kg (282 lb 13.6 oz)  Estimated body mass index is 40.58 kg/m  as calculated from the following:    Height as of 3/17/21: 1.778 m (5' 10\").    Weight as of this encounter: 128.3 kg (282 lb 13.6 oz).       Body mass index is 40.58 kg/m .  Temp (24hrs), Av.1  F (36.7  C), Min:97.7  F (36.5  C), Max:98.8  F (37.1  C)        Neck: No lymphadenopathy or thyromegaly, trachea midline, no carotid bruits.  [id number], no murmurs  Respiratory: Mild decreased air entry, vesicular breath sounds bilaterally  Gastrointestinal:normoactive bowel sounds, soft, nontender, nondisteded, obese  Ext: +2 bilat LE edema        Medications  Current Facility-Administered Medications Ordered in Epic   Medication Dose Route Frequency Last Rate Last Admin     acetaminophen (TYLENOL) solution 650 mg  650 mg Oral Q24H PRN   650 mg at 21 0806     acetaminophen (TYLENOL) tablet 650 mg  650 mg Oral Q4H PRN   650 mg at 21 0620     amoxicillin (AMOXIL) capsule 500 mg  500 mg Oral Q8H ALEXUS   500 mg at 21 0635     carboxymethylcellulose PF (REFRESH PLUS) 0.5 % ophthalmic solution 1 drop  1 drop Both Eyes Q1H PRN         dextrose 10% infusion   Intravenous Continuous PRN         glucose gel 15-30 g  15-30 g Oral Q15 Min PRN        Or     dextrose 50 % injection 25-50 mL  25-50 mL Intravenous Q15 Min PRN        Or     glucagon injection 1 mg  1 mg Subcutaneous Q15 Min PRN         " EPINEPHrine (ADRENALIN) kit 0.3 mg  0.3 mg Intramuscular Q3 Min PRN         furosemide (LASIX) tablet 20 mg  20 mg Oral BID   20 mg at 03/26/21 2010     heparin ANTICOAGULANT injection 5,000 Units  5,000 Units Subcutaneous Q8H   5,000 Units at 03/27/21 0301     metoprolol tartrate (LOPRESSOR) half-tab 12.5 mg  12.5 mg Oral BID   12.5 mg at 03/26/21 2010     mycophenolate (GENERIC EQUIVALENT) suspension 1,000 mg  1,000 mg Oral or NG Tube Daily   1,000 mg at 03/26/21 1002     mycophenolate (GENERIC EQUIVALENT) suspension 500 mg  500 mg Oral or NG Tube At Bedtime   500 mg at 03/26/21 2009     naloxone (NARCAN) injection 0.2 mg  0.2 mg Intravenous Q2 Min PRN        Or     naloxone (NARCAN) injection 0.4 mg  0.4 mg Intravenous Q2 Min PRN        Or     naloxone (NARCAN) injection 0.2 mg  0.2 mg Intramuscular Q2 Min PRN        Or     naloxone (NARCAN) injection 0.4 mg  0.4 mg Intramuscular Q2 Min PRN         No lozenges or gum should be given while patient on BIPAP/AVAPS/AVAPS AE   Does not apply Continuous PRN         ondansetron (ZOFRAN-ODT) ODT tab 4 mg  4 mg Oral Q6H PRN        Or     ondansetron (ZOFRAN) injection 4 mg  4 mg Intravenous Q6H PRN         pantoprazole (PROTONIX) EC tablet 40 mg  40 mg Oral QAM AC   40 mg at 03/27/21 0635     Patient may continue current oral medications   Does not apply Continuous PRN         phenol (CHLORASEPTIC) 1.4 % spray 1 mL  1 spray Mouth/Throat Q1H PRN   1 mL at 03/22/21 1739     potassium & sodium phosphates (NEUTRA-PHOS) Packet 1 packet  1 packet Oral BID         predniSONE (DELTASONE) tablet 60 mg  60 mg Oral or Feeding Tube Daily   60 mg at 03/26/21 1002     prochlorperazine (COMPAZINE) injection 5 mg  5 mg Intravenous Q6H PRN        Or     prochlorperazine (COMPAZINE) tablet 5 mg  5 mg Oral Q6H PRN        Or     prochlorperazine (COMPAZINE) suppository 12.5 mg  12.5 mg Rectal Q12H PRN         pyridostigmine (MESTINON) syrup 60 mg  60 mg Oral Q8H ALEXUS   60 mg at 03/27/21 0635      senna-docusate (SENOKOT-S/PERICOLACE) 8.6-50 MG per tablet 1 tablet  1 tablet Oral or NG Tube BID PRN        Or     senna-docusate (SENOKOT-S/PERICOLACE) 8.6-50 MG per tablet 2 tablet  2 tablet Oral or NG Tube BID PRN         Vitamin D3 (CHOLECALCIFEROL) tablet 100 mcg  100 mcg Oral Daily   100 mcg at 03/26/21 1002     No current [facility]-ordered outpatient medications on file.           CMP  Recent Labs   Lab 03/27/21  0856 03/26/21  0841 03/25/21  0725 03/24/21  0456 03/23/21  1719 03/23/21  0419 03/22/21  0436 03/22/21  0436 03/21/21  0451 03/21/21  0451     --  142 142 139 139  --  138  --  138   POTASSIUM 4.7 3.4 3.8 3.8 3.9 3.7   < > 2.9*   < > 2.6*   CHLORIDE 96  --  104 106 105 106  --  104  --  99   CO2 44*  --  40* 41* 32 34*  --  29  --  35*   ANIONGAP <1*  --  <1* <1* 2* <1*  --  5  --  4   GLC 75  --  75 69* 231* 71  --  81  --  93   BUN 13  --  18 19 22 24  --  27  --  25   CR 0.48*  --  0.54* 0.61* 0.59* 0.62*  --  0.68  --  0.72   GFRESTIMATED >90  --  >90 >90 >90 >90  --  >90  --  89   GFRESTBLACK >90  --  >90 >90 >90 >90  --  >90  --  >90   NOAH 8.4*  --  8.3* 8.0* 8.4* 8.1*  --  7.8*  --  8.2*   MAG  --   --   --   --  1.8  --   --  1.7  --  1.8   PHOS 2.2* 1.8*  --  2.3*  --  3.1  --  3.0  --  1.4*   PROTTOTAL  --   --   --   --   --  6.7*  --  6.1*  --  6.0*   ALBUMIN  --   --   --   --   --  2.1*  --  2.0*  --  2.2*   BILITOTAL  --   --   --   --   --  0.4  --  0.7  --  1.0   ALKPHOS  --   --   --   --   --  42  --  42  --  45   AST  --   --   --   --   --  22  --  15  --  17   ALT  --   --   --   --   --  30  --  22  --  23    < > = values in this interval not displayed.     CBC  Recent Labs   Lab 03/27/21  0856 03/26/21  0841 03/25/21  0725 03/24/21  0456   WBC 3.8* 4.1 4.0 3.7*   RBC 4.86 4.55 4.37* 4.52   HGB 12.8* 12.3* 11.6* 12.0*   HCT 45.0 43.2 40.9 41.8   MCV 93 95 94 93   MCH 26.3* 27.0 26.5 26.5   MCHC 28.4* 28.5* 28.4* 28.7*   RDW 17.1* 17.1* 16.8* 16.8*   * 118* 122*  126*     INRNo lab results found in last 7 days.  Arterial Blood Gas  Recent Labs   Lab 03/23/21  1341 03/23/21  0635 03/22/21  1127 03/20/21  2250   PH 7.34* 7.30* 7.37 7.59*   PCO2 63* 65* 52* 44   PO2 112* 140* 95 87   HCO3 34* 32* 30* 42*   O2PER 2l 2L 30 30%     Recent Labs   Lab 03/20/21  1640   CULT Moderate growth  Staphylococcus aureus  *             Diagnostic Studies:  Chest Radiology: *CHEST ONE VIEW  3/23/2021 1:19 PM      HISTORY: Hypoxemia.     COMPARISON: March 19, 2021                                                                      IMPRESSION: Mild bibasilar atelectasis and/or infiltrate. Question  elevated right hemidiaphragm. Low lung volumes bilaterally.             Results for PINEDA GONZALEZ (MRN [id number]) as of 3/25/2021 08:59    Ref. Range 3/23/2021 13:41   pH Arterial Latest Ref Range: 7.35 - 7.45 pH 7.34 (L)   pCO2 Arterial Latest Ref Range: 35 - 45 mm Hg 63 (H)   PO2 Arterial Latest Ref Range: 80 - 105 mm Hg 112 (H)   Bicarbonate Arterial Latest Ref Range: 21 - 28 mmol/L 34 (H)   Base Excess Art Latest Units: mmol/L 6.3   FIO2 Unknown 2l   Oxyhemoglobin Arterial Latest Ref Range: 92 - 100 % 97         Assessment:   80-year-old male patient with multiple medical problems including myasthenia gravis, heart failure with preserved ejection fraction, hypertension, hyperlipidemia, history of prostate cancer, depression, obesity, history of atypical mycobacterial infection admitted earlier in the month from March 4-15 for acute hypoxic and hypercapnic respiratory failure requiring intubation secondary to myasthenia crisis and decompensation of his heart failure.  Readmitted March 19 with similar complaints. He arrived to ED somnolent. ABG demonstrated PCO2>100 and he was immediately intubated  In the setting of back to back hypercapnic respiratory failure it is clear that the patient needs to have ventilatory support especially during sleep in order to prevent future exacerbations that  would put him in the hospital. Chest radiograph shows significant atelectasis which goes with neuromuscular affectation of respiratory muscles.  The fact that the NIF somewhat normalized does not mean that this will not happen again.        Pulmonary Diagnoses: Hypercapnia R06.89, Resp fail acute J96.00, Resp fail chronic J96.10 and SOB R06.02        Recommendations       I will start the patient on AVAPS with a target tidal volume of 450 mL and a backup rate 12 breaths/min.  In the setting of neuromuscular disorder and now are repeated episode of hypercapnic respiratory failure the patient definitely needs support.  Had difficulty tolerating AVAPS therapy yesterday, Had to take off his mask several times last night.  We will try some desensitization strategies today.  I spoke to nursing to have the BiPAP on the patient 2-3 times throughout the day while awake so he can work on getting used to the mask.  This needs to be used with every sleep opportunity  I spoke to the care coordinator and order to work on getting the patient a trilogy device which she will need to take to his TCU  The patient should not have been sent out of the ICU without NIPPV  If this is not done the likelihood of recurrent respiratory failures with hospital admissions is high.  Optimization of management of his myasthenia gravis per neurology CellCept 1 gram at 10 a.m. and 500 mg every 5 p.m.   Continue b.i.d. pulmonary mechanics with NIF and VC.   Encourage incentive spirometer at least Every 2 hours while awake  Please call if question  I will be available on my pager Over the weekend.            Alexx Coronel M.D.  Pulmonary, Critical Care and Sleep Medicine  [facility]   Pager: [phone]  Office:[phone]

## 2021-03-27 NOTE — PLAN OF CARE
Pt here with MG exacerbation and resp. failure. A&O x 4. Neuros intact with weakness to the legs. VSS on 2 liters via NC. Pulmonology wants patient to use the AVAPS machine, but patient is resistant. Tele showing SR w/ BBB. 2 gm sodium restricted diet, thin liquids. Takes pills whole. Up with 2 x assist and lift. Denies pain. Pt scoring green on the Aggression Stop Light Tool. Plan is to keep on current drug regiment. Will consider PLEX if no improvement in a week. Patient has been having some loose brown, chunky stools. He is on oral antibiotics and a new MG drug. Discharge recommended to TCU when cleared; 2-3 days.

## 2021-03-27 NOTE — PROVIDER NOTIFICATION
Hospitalist service cross cover:    Paged by respiratory therapy regarding need for ABG post AVAPS initiation, patient has MG and was recently extubated in the ICU and transferred to the floor. Patient has worsening NIFs (now in the 15 range).    Interventions/plan:  --  Looks like pulmonology is following along and this is an order they placed. I think it would be reasonable to obtain the ABG to ensure there isn't worsening of respiratory status.   -- call an RRT if increased work of breathing or O2 demand. Next NIF in the AM.    Please page with additional concerns,     ANIYA Barr, CNP  Hospitalist - [facility]  Text Page  [phone])

## 2021-03-27 NOTE — PLAN OF CARE
Pt here with MG exacerbation and acute respiratory failure. A&Ox4. Neuros with generalized weakness, bilateral lower extremity 3-4/5.  VSS on AVAPS machine over night, otherwise on 2L O2. Did not tolerate AVAPS machine well. Tele SR with BBB. 2gNa regular diet with thin  liquids. Takes pills whole with water. Up with A2 lift. Beckett patent, incontinent x1 loose BM. Redness to groin, powder applied. Denies pain. Q2 turn/repo, refusing most times. Pt scoring green on the Aggression Stop Light Tool. Plan for TCU once neuro signs off.

## 2021-03-28 NOTE — PLAN OF CARE
Pt here with MG exacerbation. A&O x4. Neuros include generalized weakness and BLE 3/5 strength. VSS. Regular diet, thin liquids. Takes pills whole. Up with A2 + lift. Denies pain. Pt scoring green on the Aggression Stop Light Tool. Discharge pending medical readiness.

## 2021-03-28 NOTE — PLAN OF CARE
Pt here with MG exacerbation and acute respiratory failure. A&Ox4. Neuros with generalized weakness, bilateral lower extremity 3/5, uppers 5/5. VSS on 2L O2. Refused AVAPS machine overnight. Tele SR with BBB. 2gNa regular diet with thin liquids. Takes pills whole with water. Up with A2 lift. Beckett patent, incontinent x2 loose BM. Redness to groin, and folds, interedry and powder applied, mepi to coccyx. Denies pain. Q2 repo, refused repo x1. Pt scoring green on the Aggression Stop Light Tool. Plan for TCU once neuro signs off.

## 2021-03-28 NOTE — PROGRESS NOTES
[facility]    Internal Medicine Hospitalist Progress Note  03/28/2021  I evaluated patient on the above date.    Mushtaq Gonzalez Jr., MD  [phone] (p)  Text Page        Assessment & Plan New actions/orders today (03/28/2021) are underlined.    Mr. oGodrich is a 80-year-old gentleman with PMH including morbid obesity; myasthenia gravis with recent hospitalization for myasthenia crisis with acute respiratory failure (3/4-3/15); HTN; CHF; h/o prostate cancer and h/o HAZEL; who presented from TCU 3/19/2021 with weakness and found to be in acute hypercarbic respiratory failure. Intubated in the ED and admitted to the ICU.    In the ICU, was treated for recurrent myasthenia crisis including with IVIG. Also treated for CHF/pulmonary edema, MSSA pneumonia and E. Coli UTI. Improved and was extubated 3/22/2021. Transferred to  Hospitalist service 3/23/2021.         Acute hypercarbic respiratory failure, suspect multifactorial including myasthenia gravis flare, MSSA pneumonia and acute diastolic CHF exacerbation.  - Management of separate issues as below.    Recurrent myasthenic crisis, resulting in CNS failure with acute hypoxic respiratory failure - suspect multifactorial etiology including recent taper of steroids and UTI.  * H/o myasthenia gravis, diagnosed in 2005. Had been stable on mycophenolate and had not needed steroids for about 10 years until recent hospitalization 3/2021. Recent hospitalization (3/4-3/15) with myasthenia gravis flare with associated acute respiratory failure (intubated 3/6-3/11). Treated with IVIG and steroids. Discharged to TCU. Steroids were slowly being tapered after discharge.  * Presented from TCU 3/19 with weakness and found to be in acute hypercarbic respiratory failure. Intubated in ED and admitted to ICU. Was also found with evidence of UTI and pneumonia which were treated (see below). Neurology consulted. Treated with IVIG 3/20-3/22. Predisone increased. Improved  and able to be extubated 3/22. Started on pyridostigmine 3/26. Pulmonology consulted 3/25 to assist with obtaining nocturnal NIPPV. On 3/27, still weak overall and not at baseline.   - Continue mycophenolate 1000 mg qam and 500 mg at bedtime; prednisone 60 mg daily (no taper until outpatient follow-up); and pyridostigmine 60 mg q8h; per Neurology rec's.  - Considering plasma exchange if no improvement with above measures, per Neuro.  - Question of possibly needing outpatient IVIG to stave off further crises.   - Continue BID pulmonary mechanics with NIF and VC.   - Continue IS q2h while awake.  - Continue AVAPS with every sleep opportunity (target tidal volume of 450 mL and a backup rate 12 breaths/min); plan discharge with this.  - Continue O2 while awake, wean as able.  - Plan to start Bactrim DS 3x/week for PCP prophylaxis while on high dose steroids.  - Continue PT and OT.   - Appreciate consultant help.    MSSA pneumonia, suspect HCAP.  Recent hospitalization as above. Initial presentation from TCU 3/19 as above. On initial evaluation 3/19, was afebrile and hemodynamically stable; WBC and lactate normal, VBG showed pCO2 128. CXR 3/19 showed bibasilar atelectasis or infiltrate with small pleural effusions. Initially started on broad antibiotics including Zosyn on admit. Sputum 3/20 grew MSSA. Antibiotics narrowed to Unasyn 3/25  - Continue Unasyn (started 3/25); plan stop after 3/29.    Possible acute on chronic decompensated diastolic CHF exacerbation, question related to recent high dose steroids.  Hypertension (benign essential).  [PTA: furosemide 20 mg BID; metoprolol 12.5 mg BID.]  * Echocardiogram 3/4/2021 showed LVEF of 65-70%, no regional wall motion abnormalities; mildly decreased RV systolic function; mild to moderate mitral regurgitation, mild to moderate mitral stenosis. The patient reported a recent 20-pound weight gain in the last 1 month PTA following COVID vaccine. Treated with high dose steroids  for myasthenia gravis as above.  * Initial presentation 3/19 as above. NTP-BNP 3996 3/19. Was given IV furosemide this hospitalization. He had metabolic alkalosis that was treated with acetazolamide, stopped 3/21. Repeat echo 3/23 showed LVEF 55-60%, grade 1 diastolic dysfunction, no regional wall motion abnormalities; RV OK; trace MR, mild MS.  - Try holding furosemide to reduce risk of contraction alkalosis.  - Continue metoprolol 12.5 mg BID.  - Continue 2 gm sodium diet.  - Monitor i/o's, daily wts.    Complicated catheter associated E. Coli UTI, treated.  Indwelling Beckett due to difficulty voiding due to large pannus (placed previous hospitalization).  * Beckett placed during previous hospitalization because of difficulty voiding or monitor I/O's given his large pannus size.   * UA 3/19 grossly abnormal. Previously obtained UC from 3/18 showed E. Coli. Was treated with antibiotics including ceftriaxone and other antibiotics as above. UTI felt to be adequately treated.  - Although ideally I would like to discontinue Beckett, per discussion with RN staff, an external condom catheter would be unlikely to stay on.  - Continue Beckett for now; plan to remove once neuro status improved and more ambulatory.     Mild metabolic alkalosis, likely secondary to a combination of diuresis and probable chronic hypercarbic respiratory failure.   Diuresed as above for CHF. Received acetazolamide as above.  Recent Labs   Lab 03/27/21  0856 03/25/21  0725 03/24/21  0456 03/23/21  1719 03/23/21  0419 03/22/21  0436   CO2 44* 40* 41* 32 34* 29   - Try holding furosemide to reduce risk of contraction alkalosis.     Thrombocytopenia, unclear etiology, possibly medication effect.  Recent Labs   Lab 03/27/21  0856 03/26/21  0841 03/25/21  0725 03/24/21  0456 03/23/21  0419 03/22/21  0436   * 118* 122* 126* 125* 133*   - Monitor CBC.  - Consider platelet transfusion if needs a procedure and less than 50,000; or if less than  10,000.    Anemia, suspect from multiple phlebotomies and also chronic component.  Hgb 13.1 on admit 3/19. No overt clinical signs of major bleeding.   Recent Labs   Lab 03/27/21  0856 03/26/21  0841 03/25/21  0725 03/24/21  0456 03/23/21  0419 03/22/21  0436   HGB 12.8* 12.3* 11.6* 12.0* 11.3* 11.0*   - Monitor CBC.  - Consider prbc transfusion if hgb </= 7.0 or if significant bleeding with hemodynamic instability or if symptomatic.    Leukopenia, possibly medication effect.  WBC intermittently low this hospitalization.  Recent Labs   Lab 03/27/21  0856 03/26/21  0841 03/25/21  0725 03/24/21  0456 03/23/21  0419 03/22/21  0436   WBC 3.8* 4.1 4.0 3.7* 3.8* 4.9   - Monitor CBC.    Pressure ulcer, right posterior thigh.  C RN consulted.  - Continue local wound cares.    Hypophosphatemia, hypokalemia.  Recent Labs   Lab 03/27/21  1429 03/27/21  0856 03/26/21  0841 03/25/21  0725 03/24/21  0456 03/23/21  1719 03/23/21  0419 03/22/21  0436 03/22/21  0436   POTASSIUM  --  4.7 3.4 3.8 3.8 3.9 3.7   < > 2.9*   MAG  --   --   --   --   --  1.8  --   --  1.7   PHOS 2.0* 2.2* 1.8*  --  2.3*  --  3.1  --  3.0    < > = values in this interval not displayed.   - Continue PRN electrolyte replacement.    Trop elevation, suspect demand ischemia (type 2 NSTEMI).  Trop 3/19 of 0.060. No chest pain on admit. Echo 3/23 as above.  - Monitor clinically.    Question of COPD.  Question of COPD noted; but pt a lifelong non-smoker. On no inhalers PTA.  - Monitor clinically.    Morbid obesity.  Body mass index is 40.58 kg/m .  - Needs to pursue aggressive dietary and lifestyle modifications.    Metabolic encephalopathy, resolved.  Noted with metabolic encephalopathy this hospitalization that cleared with above treatment.  - Monitor clinically.    COVID-19 testing.  COVID-19 PCR Results    COVID-19 PCR Results 3/4/21 3/11/21 3/19/21 3/19/21      0217 [id number]   COVID-19 Virus by PCR (External Result)   Not Detected    SARS-CoV-2 Virus Specimen  Source Nasopharyngeal Nasal  Nasopharyngeal   SARS-CoV-2 PCR Result NEGATIVE NEGATIVE  NEGATIVE      Comments are available for some flowsheets but are not being displayed.         COVID-19 Antibody Results, Testing for Immunity    COVID-19 Antibody Results, Testing for Immunity   No data to display.             Diet: Advance Diet as Tolerated: Regular Diet Adult; 2 gm NA Diet    Prophylaxis: PCD's, ambulation. Heparin SQ.  Beckett Catheter: in place, indication: Retention  Code Status: Full Code    Disposition Plan   Expected discharge: 3-5d, recommended to transitional care unit once pending above.  Entered: Mushtaq Gonzalez MD 03/28/2021, 7:48 AM         Interval History   Doing about the same.  Still not using AVAPS at night given discomfort. Pt was encouraged to try tonight.  Tolerating diet.  C/o loose stools/diarrhea since starting pyridostigmine.    -Data reviewed today: I reviewed all new labs and imaging over the last 24 hours. I personally reviewed no images or EKG's today.    Physical Exam    , Blood pressure 116/57, pulse 72, temperature 97.9  F (36.6  C), temperature source Axillary, resp. rate 18, weight 128.3 kg (282 lb 13.6 oz), SpO2 98 %.  Vitals:    03/22/21 0400 03/23/21 0200 03/24/21 0600   Weight: 128.5 kg (283 lb 4.7 oz) 127.4 kg (280 lb 13.9 oz) 128.3 kg (282 lb 13.6 oz)     Vital Signs with Ranges  Temp:  [97.4  F (36.3  C)-97.9  F (36.6  C)] 97.9  F (36.6  C)  Pulse:  [64-82] 72  Resp:  [18-20] 18  BP: (109-124)/(57-65) 116/57  SpO2:  [94 %-100 %] 98 %  Patient Vitals for the past 24 hrs:   BP Temp Temp src Pulse Resp SpO2   03/28/21 0459 116/57 97.9  F (36.6  C) Axillary 72 18 98 %   03/28/21 0118 116/62 97.5  F (36.4  C) Oral 72 20 94 %   03/27/21 1919 124/57 97.7  F (36.5  C) Oral 82 18 94 %   03/27/21 1532 115/63 97.5  F (36.4  C) Oral 72 18 97 %   03/27/21 1230 109/60 97.5  F (36.4  C) Oral 64 18 97 %   03/27/21 0800 115/65 97.4  F (36.3  C) Oral 65 18 100 %     I/O's Last 24  hours  I/O last 3 completed shifts:  In: -   Out: 1375 [Urine:1375]    Constitutional: Awake, alert, pleasant, more comfortable.  Respiratory: Diminished in bases. No crackles or wheezes.  Cardiovascular: RRR, m/r/g.  GI: Soft, nt, nd, +BS.  Skin/Integumen:   Other:        Data   Recent Labs   Lab 03/27/21  0856 03/26/21  0841 03/25/21  0725 03/24/21  0456 03/23/21  0419 03/23/21  0419 03/22/21  0436 03/22/21 0436   WBC 3.8* 4.1 4.0 3.7*  --  3.8*  --  4.9   HGB 12.8* 12.3* 11.6* 12.0*  --  11.3*  --  11.0*   MCV 93 95 94 93  --  91  --  87   * 118* 122* 126*  --  125*  --  133*     --  142 142   < > 139  --  138   POTASSIUM 4.7 3.4 3.8 3.8   < > 3.7   < > 2.9*   CHLORIDE 96  --  104 106   < > 106  --  104   CO2 44*  --  40* 41*   < > 34*  --  29   BUN 13  --  18 19   < > 24  --  27   CR 0.48*  --  0.54* 0.61*   < > 0.62*  --  0.68   ANIONGAP <1*  --  <1* <1*   < > <1*  --  5   NOAH 8.4*  --  8.3* 8.0*   < > 8.1*  --  7.8*   GLC 75  --  75 69*   < > 71  --  81   ALBUMIN  --   --   --   --   --  2.1*  --  2.0*   PROTTOTAL  --   --   --   --   --  6.7*  --  6.1*   BILITOTAL  --   --   --   --   --  0.4  --  0.7   ALKPHOS  --   --   --   --   --  42  --  42   ALT  --   --   --   --   --  30  --  22   AST  --   --   --   --   --  22  --  15    < > = values in this interval not displayed.     Recent Labs   Lab Test 03/27/21  0856 03/25/21  0725 03/24/21  0833 03/24/21  0456 03/23/21  1719 03/23/21  0749 03/23/21  0419 03/22/21  1216 03/22/21  0828 03/21/21  0021 03/21/21  0021   GLC 75 75  --  69* 231*  --  71  --   --    < >  --    BGM  --   --  74  --   --  74  --  107* 86  --  115*    < > = values in this interval not displayed.     No results for input(s): CRP, DD, LDH, FIBR, LESLY, IL6B in the last 168 hours.      No results found for this or any previous visit (from the past 24 hour(s)).    Medications   All medications were reviewed.    dextrose       - MEDICATION INSTRUCTIONS -       - MEDICATION  INSTRUCTIONS -         amoxicillin  500 mg Oral Q8H ALEXUS     furosemide  20 mg Oral BID     heparin ANTICOAGULANT  5,000 Units Subcutaneous Q8H     metoprolol tartrate  12.5 mg Oral BID     mycophenolate  1,000 mg Oral or NG Tube Daily     mycophenolate  500 mg Oral or NG Tube At Bedtime     pantoprazole  40 mg Oral QAM AC     potassium & sodium phosphates  1 packet Oral TID     predniSONE  60 mg Oral or Feeding Tube Daily     pyridostigmine  60 mg Oral Q8H ALEXUS     cholecalciferol  100 mcg Oral Daily     acetaminophen, acetaminophen, artificial tears ophthalmic solution, dextrose, glucose **OR** dextrose **OR** glucagon, EPINEPHrine, naloxone **OR** naloxone **OR** naloxone **OR** naloxone, - MEDICATION INSTRUCTIONS -, ondansetron **OR** ondansetron, - MEDICATION INSTRUCTIONS -, phenol, prochlorperazine **OR** prochlorperazine **OR** prochlorperazine, senna-docusate **OR** senna-docusate

## 2021-03-28 NOTE — PROGRESS NOTES
Lab called critical value: total CO2 greater than 45.  Talked to MD who is holding lasix to improve lab. Monitor patient's respiratory status and potential edema. Notify MD of any changes.

## 2021-03-28 NOTE — PROGRESS NOTES
Pt has Bipap with AVAP setting at bedtime. Pt declined to use and stated that he wants doctor to explain more about why he is using it? I tried to explain the reason why but he seemed he wants to hear from a doctor who ordered. He is currently on regular oxygen. RN aware and will continue to monitor.

## 2021-03-29 NOTE — PROGRESS NOTES
Pt changed settings to ST 22/10 with no improvement, he was having return volumes 100s. Placed back on AVAP settings and increased back up rate to 20. Pt minute ventilation improved from 5.5 to 8.0.

## 2021-03-29 NOTE — PROVIDER NOTIFICATION
"Paged hospitalist \"pt short of breath, increased confusion, different from his baseline. VSS on 2L. Please advise. Thanks!\"    Call back received, see hospitalist note.  "

## 2021-03-29 NOTE — PROGRESS NOTES
Intubation Note    Date of intubation: 3/29/2021    Time of intubation: 04:12 am    Location of intubation:ICU    Intubated by:Colleen Gonzales APRN CRNA    Size of ETT: 8.0    Location (cm) at lip: 23 cm    ETT placement confirmed by: color change, Breath sounds and X-ray.    Mulugeta Tavera, RT

## 2021-03-29 NOTE — PROGRESS NOTES
SPIRITUAL HEALTH SERVICES  SPIRITUAL ASSESSMENT Progress Note  [facility] ICU     REFERRAL SOURCE: Pt Request    I shared a visit with patient Rigoberto (known to me from previous visits while on the floor) and his cousin, Gaby today as there was a request for a spiritual health visit before Rigoberto was intubated this morning.    Rigoberto is alert and intubated though expresses fatigue and is closing his eyes during the visit. I shared a reflective conversation with his cousin, Gaby at bedside as she reflected on Rigoberto's health journey, with a particularly challenging last few weeks as this is Rigoberto's third ICU stay. She shared about Rigoberto's family and has brought in some pictures to share with him. He shares his godson who he is very close to would like to see him and we discussed the option of using the ipad to do so, which they would like to do.     I offered emotional support through reflective listening, validation of emotions, and words of comfort and support.     PLAN: [facility] will continue to follow.     Dottie Samuels  Associate    Phone: [phone]  Pager: [phone]

## 2021-03-29 NOTE — PROGRESS NOTES
Critical Care Progress Note      03/29/2021    Name: Thanh Goodrich MRN#: [id number]   Age: 80 year old YOB: 1940                    Problem List:   Principal Problem:    Acute on chronic respiratory failure with hypercapnia (H)  Active Problems:    Myasthenic crisis (H)    Metabolic alkalosis superimposed on acute respiratory acidosis    Benign essential hypertension    Morbid obesity -- BMI 40.6    Metabolic encephalopathy    Abnormal urinalysis    Immunosuppressed for MG         Summary/Hospital Course:   Thanh Goodrich is a 80 year old male admitted on 3/19/2021 with significant history for myasthenia gravis, HFpEF, COPD, HTN, MDD, HAZEL, prostate cancer who presents from TCU for acute hypercapnic respiratory failure. He was recently admitted and discharged (3/4-3/15) for acute hypoxic and hypercapnic respiratory failure requiring intubation (3/6/21-3/11/21) 2/2 Myasthenia gravis exacerbation, diastolic CHF exacerbation with diuresis.   Metabolic alkalosis. He received IVIG and steroid treatment and was followed by [facility]. He was extubated on 3/22 and transferred to hospitalist service 3/23. Subsequently experienced worsening encephalopathy and was found to have worsening respiratory acidosis despite BiPAP treatment and was transferred back to ICU on 3/29. Intubated due to persistent hypercarbia.      Assessment and plan :     Thanh Goodrich is a 80 year old male admitted on 3/19/2021 for acute hypercapnic respiratory failure due to myasthenia gravis exacerbation requiring intubation, and re-intubated 3/29 due to repeat of hypercarbic respiratory failure. Unclear if this represents worsening of his myasthenia gravis vs other cause for hypoventilation vs persistent PNA. Neurology would recommend plasmapheresis (depending on goals of care) in the setting of lack of improvement.  I have personally reviewed the daily labs, imaging studies, cultures and discussed the case with referring physician and  consulting physicians.   My assessment and plan by system for this patient is as follows:    CNS:   Myasthenia gravis -recurrent exacerbations. Unclear goals of care based on overnight intensivist's conversation with patient while unable to immediately contact family (see her H/P). Discussed with neurology 3/29, if pt/loved ones decide on aggressive treatment, plan would be plasmapheresis and would need catheter for this. Pt family planning on visiting 3/29, would like to engage in discussion re: goals with them.  - Neurology following, appreciate assistance/recommendations  - will check NIF once off sedation. If sedation needed, would lean towards Precedex (neurology rec) over propofol for lighter sedation. Would need to monitor hemodynamics with this.  - Continue mycophenalate 1000 mg Q Am and 500 mg at bedtime  - Continue daily prednisone 60 mg daily  -start Bactrim for PCP prophylaxis while on steroids  - Continue pyridostigmine 60 mg every 8 hours  -- Palliative care consult ordered 3/29 and pending     Acute pain and sedation:  - Fentanyl drip and propofol  -RASS goal 0 to -1    Pulm:   Acute hypercarbic respiratory failure  - Patient currently intubated, will likely need to extubate to BiPAP  - Follow-up post intubation ABG>> alkalotic pH of 7.7>> RR reduced, recheck after 15 minutes>> pH 7.62>> diamox>> tidal volume adjusted, recheck at 0800 >> pH improved to 7.54, pCO2 increased to 60  - Will continue current vent settings of RR 12, , PEEP 8, FiO2 50% for now  - Recheck ABG today and q6 hr, would increase RR as pH trends toward normal  - Reduce sedation to check NIF  CV:   History of CHF  -Volume status appears down overall with diuresis/BMs over weekend, and given degree of alkalosis, this may be related to diuresis vs worsening respiratory status or a combination of the two.   -- gentle hydration with 100 ml/hr of LR for total 1L (ends @ 1700).  --May need slow rate of fluids overnight to hydrate  while avoiding overload  Hypotension without evidence of shock  - titrate peripheral norepi to maintain MAP >65. Monitor closely  FEN/GI:   Nutrition consult for tube feeds through OG.   --Start feeds today, re-evaluate after pt/family input re: goals  Renal / lytes / acid-base  Low UOP after diuresis this weekend. Normal creatinine (baseline ~0.6)  Respiratory acidosis with likely metabolic alkalosis from ?diuresis  - may have exacerbated the patient's mental status / respiratory drive  - gentle IV fluid hydration as above  - will give additional evening dose of Diamox tonight  Phos and Mg replacement protocols  Heme:   Mild anemia (stable), likely related to phlebotomy, chronic disease  Msk:   No acute issues  Endo:   At risk for stress and steroid related hyperglycemia. Continue Q4H BG, thus far controlled in setting of no nutrition  ID:   Start Bactrim for PPx of PJP given steroid use  Patient had MSSA PNA diagnosed 3/21 - procal 3/29 WNL, oxygenating well. Mild R lung patchy infiltrate on CXR 3/29  Received a few days of unasyn and was then switched to amox x4 days for E coli UTI, now complete     General cares:  DVT Prophylaxis: Heparin SQ  GI Prophylaxis: H2 blocker  Restraints: Restraints for medical healing needed: YES  Family update by me today: Cousin Gaby updated by RN, will attempt to chat with her when she visits  Current lines are required for patient management  Access: right radial arterial line. PIV x2  Feeding: RD consult, tube feeds 3/29          Interim History:   Transferred back to ICU overnight due to worsening respiratory acidosis / encephalopathy despite BiPAP use. A-line placed this AM. Intubated due to persistent hypercarbia. Given 1x dose of Diamox. Unclear goals of care based on patients comments prior to intubation and could not immediately reach family.           Key Medications:       acetaZOLAMIDE  500 mg Intravenous Once     chlorhexidine  15 mL Mouth/Throat Q12H     heparin  ANTICOAGULANT  5,000 Units Subcutaneous Q8H     [Held by provider] metoprolol tartrate  12.5 mg Oral BID     mycophenolate  1,000 mg Oral or NG Tube Daily     mycophenolate  500 mg Oral or NG Tube At Bedtime     pantoprazole  40 mg Oral QAM AC     potassium & sodium phosphates  2 packet Oral or Feeding Tube TID     predniSONE  60 mg Oral or Feeding Tube Daily     pyridostigmine  60 mg Oral Q8H ALEXUS     cholecalciferol  100 mcg Oral Daily       dextrose       fentaNYL 50 mcg/hr (03/29/21 0834)     lactated ringers 100 mL/hr at 03/29/21 0647     - MEDICATION INSTRUCTIONS -       norepinephrine 0.03 mcg/kg/min (03/29/21 0915)     - MEDICATION INSTRUCTIONS -       propofol (DIPRIVAN) infusion 20 mcg/kg/min (03/29/21 1104)               Physical Examination:   Temp:  [97.5  F (36.4  C)-99  F (37.2  C)] (P) 99  F (37.2  C)  Pulse:  [58-85] 67  Resp:  [11-28] 12  BP: ()/(39-83) 88/54  MAP:  [57 mmHg-92 mmHg] 67 mmHg  Arterial Line BP: ()/(40-70) 108/46  FiO2 (%):  [40 %-60 %] 40 %  SpO2:  [92 %-100 %] 98 %        Intake/Output Summary (Last 24 hours) at 3/29/2021 1115  Last data filed at 3/29/2021 1000  Gross per 24 hour   Intake 621.6 ml   Output 885 ml   Net -263.4 ml       Wt Readings from Last 4 Encounters:   03/29/21 131.1 kg (289 lb)   03/17/21 128.3 kg (282 lb 12.8 oz)   03/15/21 136.3 kg (300 lb 7.8 oz)   12/16/20 136.1 kg (300 lb)     Arterial Line BP: ()/(40-70) 108/46  MAP:  [57 mmHg-92 mmHg] 67 mmHg  BP - Mean:  [62-94] 73  Ventilation Mode: CMV/AC  (Continuous Mandatory Ventilation/ Assist Control)  FiO2 (%): 40 %  Rate Set (breaths/minute): 12 breaths/min  Tidal Volume Set (mL): 500 mL  PEEP (cm H2O): 8 cmH2O  Oxygen Concentration (%): 40 %  Resp: 12    Recent Labs   Lab 03/29/21  0830 03/29/21  0640 03/29/21  0550 03/29/21  0320   PH 7.54* 7.62* 7.70* 7.28*   PCO2 60* 51* 41 116*   PO2 159* 144* 129* 113*   HCO3 52* 52* 50* 54*   O2PER HIDE 60% 60% 40%       Gen: Sedated and intubated,  comfortable appearing. Does open eyes to persistent requests.  HEENT: Normocephalic atraumatic. Dry mucous membranes.  Pulm: Lungs CTA bilaterally anteriorly.  Cor: Regular rate and rhythm no extra heart sounds  Abdomen/GI: Abdomen is soft nontender and nondistended  : Catheter is in place with tea colored urine  Extremities: No lower extremity edema  Skin: Extremities warm, no mottling of skin  Neuro: Lightly sedated. Opens eyes to requests. Unable to give thumbs up bilaterally but withdraws arms from pain in hands. Able to squeeze hands bilaterally and wiggle toes.  Psych: No acute issues         Data:     Recent Results (from the past 24 hour(s))   XR Chest Port 1 View    Narrative    EXAM: XR CHEST PORT 1 VW  LOCATION: [facility]  DATE/TIME: 3/29/2021 4:20 AM    INDICATION: Intubation.  COMPARISON: 3/23/2021.      Impression    IMPRESSION: Endotracheal tube 3 cm above the gabino. Enteric tube with tip in the fundus of the stomach. Heart size and pulmonary vascularity within normal limits. Mild patchy right mid and lower lung infiltrate. Slight linear scarring or atelectasis   left lung base. Aortic calcification. No significant bony abnormalities.                  ROUTINE ICU LABS (Last four results)  CMP  Recent Labs   Lab 03/29/21  0550 03/28/21  0739 03/27/21  1429 03/27/21  0856 03/26/21  0841 03/25/21  0725 03/23/21  1719 03/23/21  1719 03/23/21  0419    139  --  136  --  142   < > 139 139   POTASSIUM 4.0 3.7  --  4.7 3.4 3.8   < > 3.9 3.7   CHLORIDE 96 95  --  96  --  104   < > 105 106   CO2 >45* >45*  --  44*  --  40*   < > 32 34*   ANIONGAP Not Calculated Not Calculated  --  <1*  --  <1*   < > 2* <1*   GLC 98 79  --  75  --  75   < > 231* 71   BUN 17 15  --  13  --  18   < > 22 24   CR 0.57* 0.57*  --  0.48*  --  0.54*   < > 0.59* 0.62*   GFRESTIMATED >90 >90  --  >90  --  >90   < > >90 >90   GFRESTBLACK >90 >90  --  >90  --  >90   < > >90 >90   NOAH 8.8 8.5  --  8.4*  --  8.3*   < >  8.4* 8.1*   MAG 1.7  --   --   --   --   --   --  1.8  --    PHOS 1.9*  --  2.0* 2.2* 1.8*  --    < >  --  3.1   PROTTOTAL  --   --   --   --   --   --   --   --  6.7*   ALBUMIN  --   --   --   --   --   --   --   --  2.1*   BILITOTAL  --   --   --   --   --   --   --   --  0.4   ALKPHOS  --   --   --   --   --   --   --   --  42   AST  --   --   --   --   --   --   --   --  22   ALT  --   --   --   --   --   --   --   --  30    < > = values in this interval not displayed.     CBC  Recent Labs   Lab 03/29/21  0550 03/28/21  0739 03/27/21  0856 03/26/21  0841   WBC 4.5 4.5 3.8* 4.1   RBC 4.36* 4.55 4.86 4.55   HGB 11.8* 11.9* 12.8* 12.3*   HCT 39.8* 42.7 45.0 43.2   MCV 91 94 93 95   MCH 27.1 26.2* 26.3* 27.0   MCHC 29.6* 27.9* 28.4* 28.5*   RDW 17.0* 17.2* 17.1* 17.1*   * 125* 111* 118*     INRNo lab results found in last 7 days.  Arterial Blood Gas  Recent Labs   Lab 03/29/21  0830 03/29/21  0640 03/29/21  0550 03/29/21  0320   PH 7.54* 7.62* 7.70* 7.28*   PCO2 60* 51* 41 116*   PO2 159* 144* 129* 113*   HCO3 52* 52* 50* 54*   O2PER HIDE 60% 60% 40%       All cultures:  No results for input(s): CULT in the last 168 hours.  Recent Results (from the past 24 hour(s))   XR Chest Port 1 View    Narrative    EXAM: XR CHEST PORT 1 VW  LOCATION: [facility]  DATE/TIME: 3/29/2021 4:20 AM    INDICATION: Intubation.  COMPARISON: 3/23/2021.      Impression    IMPRESSION: Endotracheal tube 3 cm above the gabino. Enteric tube with tip in the fundus of the stomach. Heart size and pulmonary vascularity within normal limits. Mild patchy right mid and lower lung infiltrate. Slight linear scarring or atelectasis   left lung base. Aortic calcification. No significant bony abnormalities.                 Linden Tierney, MS4    Patient seen and discussed with Dr. Haley who will attest independently.    Physician Attestation   I, Fan Haley, was present with the medical/BRIDGET student who participated in the  service and in the documentation of the note.  I have verified the history and personally performed the physical exam and medical decision making.  I agree with the assessment and plan of care as documented in the note.      I personally reviewed vital signs, medications, labs and imaging.    Significant for: carbon dioxide >45. Cr 0.57. Phos 1.9. K 4.0. Most recent ABG 7.43/72/158/47 (improved). WBC 4.5 Hgb 11.8 (stable), plts 123.     3/29 CXR: Endotracheal tube 3 cm above the gabino. Enteric tube with tip in the fundus of the stomach. Heart size and pulmonary vascularity within normal limits. Mild patchy right mid and lower lung infiltrate. Slight linear scarring or atelectasis   left lung base. Aortic calcification. No significant bony abnormalities.      Assessment and plan:  79 yo M with a history of myasthenia gravis, HFpEF, COPD, HTN, MDD, HAZEL, and prostate cancer who presented from TCU for acute hypoxic respiratory failure (after recent admission from 3/4 to 3/15 for acute hypoxic and hypercapnic respiratory failure requiring intubation due to myasthenia gravis exacerbation and CHF exacerbation). He was found to have profound hypercapnia and metabolic alkalosis. Here, he initially was intubated from 3/19 to 3/22, and received treatment with IVIG and steroids. Unfortunately, he had worsening encephalopathy and worsening respiratory acidosis requiring transfer back to the ICU and intubation on 3/29.     Myasthenia gravis: likely having persistent weakness from this causing his recurrent acute hypercarbic respiratory failure. It is likely we will need to intensify therapy to plasmapheresis, but will continue goals of care discussions with family, as the patient apparently was contemplating a change in code status prior to intubation (but was ultimately okay with re-intubation).  Neuro critical care is again consulted, appreciate assistance. Goal to minimize sedation and hopefully resolve acute hypercapnia so  patient may be able to participate in GOC discussions. If plasmapheresis is needed, will need HD line placement. Palliative also consulted, will see patient after he is more alert. Follow NIF when sedation is minimized. Continue mycophenolate and prednisone. Start bactrim for PJP ppx. Pyridostigmine 60 mg q8h. Currently on fentanyl and propofol for sedation, minimize as able, may change to Precedex.     Acute hypercarbic respiratory failure: worsened requiring re-intubation on 3/29, now improved. Continue vent settings. Does have some chronic hypercapnia as well. Follow VBG q12h aiming for normal pH.     Diastolic CHF: volume status currently dry. Will given gentle hydration with LR today, also receiving diamox. On low dose pressor currently, likely sedation related. Wean as able.     Metabolic alkalosis: diamox x2 doses. Follow VBG.     I personally spent 45 minutes of critical care time reviewing labs and imaging, examining the patient, managing the ventilator. This does not include time spent on procedures or teaching. He is critically ill due to above.     Fan Haley MD    Department of Medicine, Division of Pulmonary, Allergy, Critical Care, and Sleep Medicine      Fan Haley MD  Date of Service (when I saw the patient): 03/29/21

## 2021-03-29 NOTE — PROGRESS NOTES
Palliative consult received for goals of care. Reviewed case with Dr. Haley. Pt is now intubated. Per Dr. Haley, hoping to correct gases today and ideally be able to wake pt up in the coming days. Seems very reasonable to have a GOC conversation, but will try to address this more mid-week. Thus palliative consult on hold for today. Thanks.    ANIYA Montalvo [facility]  Contact information available via [facility] Paging/Directory

## 2021-03-29 NOTE — PROGRESS NOTES
[facility] ICU RESPIRATORY NOTE        Date of Admission: 3/19/2021    Date of Intubation (most recent): 3/29/2021    Reason for Mechanical Ventilation:Resp failure with hypercapnia    Number of Days on Mechanical Ventilation: 1    Met Criteria for Spontaneous Breathing Trial:No      Reason for No Spontaneous Breathing Trial:Per MD    Significant Events Today: Patient was intubated at 04:20 am this morning after failing on AVAPS mode.        ABG Results:   Recent Labs   Lab 03/29/21  0320 03/29/21  0055 03/28/21  2330 03/23/21  1341 03/23/21  0635   PH 7.28* 7.26* 7.29* 7.34* 7.30*   PCO2 116* 122* 107* 63* 65*   PO2 113* 103 84 112* 140*   HCO3 54* 55* 52* 34* 32*   O2PER 40% 50%  --  2l 2L       Current Vent Settings: Ventilation Mode: CMV/AC  (Continuous Mandatory Ventilation/ Assist Control)  FiO2 (%): 60 %  Rate Set (breaths/minute): 25 breaths/min  Tidal Volume Set (mL): 500 mL  PEEP (cm H2O): 8 cmH2O  Oxygen Concentration (%): 60 %  Resp: 25      Skin Assessment:Intact    Plan: Continue full vent support.    Mulugeta Tavera, RT

## 2021-03-29 NOTE — CONSULTS
ICU H&P  DOS 2021      Patient name: Thanh Goodrich  MRN: [id number]  : 1940    Thanh Goodrich is a 80 year old male admitted initially for a myasthenia gravis exacerbation and hypoxic and hypercarbic respiratory failure.  He was initially admitted to the ICU on 3/19/2021.  He had been hospitalized until 3/15/2021 with a similar exacerbation.  On admission here he received 60 mg of prednisone daily which has been continued on and received IVIG.    Today he presents to the ICU from a rapid response that was called for worsening arterial blood gases rising carbon dioxide and worsening respiratory acidosis despite ongoing BiPAP use.     Today he says he is feeling okay, he denies shortness of breath no abdominal pain no nausea or vomiting.      Past Medical History:   Diagnosis Date     Atypical mycobacterial infection 2013     Cellulitis of left leg 2012     Depressive disorder 2020     Edema 2009     History of steroid therapy 2010     Hyperlipidemia LDL goal <130 10/31/2010     HYPERTENSION 2003     Incontinence of urine 10/25/2010     Leg ulcer (H) 2012     MALIGN NEOPL PROSTATE-3/07 2007    T1C, Shaina 8, initial PSA 39, s/p radiation seed implants       Myasthenia gravis (H)      OBESITY 2003     Osteoporosis 2009    Refused bisphosphonates       PVC's (premature ventricular contractions) 2011     RIGHT OCCIPITAL PAIN 2003     ROTATOR CUFF SYND NOS- RT 2005     Skin nodule 3/18/2013     ulcer left shin 10/8/2007     Uncomplicated asthma 1944?    childhood only     Past Surgical History:   Procedure Laterality Date     BIOPSY  ?    dealt with     Prostate Cancer Cryotherapy  2007     TONSILLECTOMY          Allergies   Allergen Reactions     Ciprofloxacin Other (See Comments)     Contraindicated for myasthenia gravis patients     Procainamide Other (See Comments)     Contraindicated for myasthenia gravis patients     Quinidine  Other (See Comments)     Contraindicated for myasthenia gravis patients     Quinine Other (See Comments)     Contraindicated for myasthenia gravis patients     His emergency contact is his friend Pranay George.  He does want us to connect with his cousin Gaby.    Vitals:   Vitals:    03/29/21 0310 03/29/21 0315 03/29/21 0320 03/29/21 0330   BP:  104/49  111/49   Pulse: 69 65 63 62   Resp: 22 22 22 22   Temp:       TempSrc:       SpO2: 99% 99% 98% 100%   Weight:           I/O: I/O last 3 completed shifts:  In: 150 [P.O.:150]  Out: 750 [Urine:750]    Exam:  Gen: Comfortable appearing laying in bed BiPAP in place  HEENT: Normocephalic atraumatic, no lid lag  Pulm: Lungs very diminished but overall clear to auscultation bilaterally.  More diminished on the left than the right anterior lung field  Cor: Regular rate and rhythm no extra heart sounds  Abdomen/GI: Abdomen is soft nontender and nondistended  : Catheter is in place with tea colored urine  Extremities: No lower extremity edema  Skin: Scattered bruising on bilateral arms  Neuro: Patient appears understanding of current place but at times will remove the BiPAP and ask for other people.  It is unclear if he understands to the full extent his current situation.  Strong in all extremities  Psych: No acute issues    Data: reviewed    Assessment/plan:  Thanh Goodrich is a 80 year old male admitted initially for management of a myasthenia gravis crisis, he currently presents again for hypercarbic respiratory failure.  It is not clear to me if this is worsening of his myasthenia gravis, given the preservation of his strength.  Neurology had considered plasmapheresis in the setting of lack of improvement.    CNS:   Myasthenia gravis -recurrent exacerbations., possible plasmaphoresis if this is determined to be continued MG crisis  - will check NIF once off sedation  - Continue mycophenalate 1000 mg Q Am and 500 mg at bedtime  - Continue daily prednisone 60 mg daily   "-start Bactrim for PCP prophylaxis while on steroids  - Continue pyridostigmine 60 mg every 8 hours  --Of note this is the patient's third intubation in the last month, just before intubation Thanh stated \"maybe it is time to let go\".  I told him that we had a couple of choices and could make more adjustments on the BiPAP and recheck a ABG with a goal of avoiding intubation, or intubate immediately, we discussed that given his current elevation of CO2 it is not clear if he truly understands his situation.  After this discussion we agreed to proceed with intubation if necessary and plan to minimize sedation with the goal of having a conversation with his friend Pranay and cousin Gaby, and ideally himself in regards to his overall plan of care.    Acute pain and sedation:  - Fentanyl and propofol  -RASS goal 0 to -1    Pulm:   Acute hypercarbic respiratory failure  - Patient currently intubated, will likely need to extubate to BiPAP  - Follow-up post intubation ABG>> alkalotic pH of 7.7>> RR adjusted, recheck after 15 minutes>> pH 7.62>> diamox>> tidal volume adjusted, recheck at 0800, signed out to oncoming team.  CV:   History of CHF  -Volume status overall appears down overall, and given degree of alkalosis, this may be related to diuresis vs worsening respiratory status or a combination of the two.   -- gentle hydration with 100 ml/hr of LR for total 1L.  -- Q6H ABG  Hypotension without evidence of shock  - continue close monitoring, peripheral levophed available as needed  FEN/GI:   Nutrition consult  For tube feeds through OG.  :   Low UOP. Normal creatinine,   Respiratory acidosis with metabolic compensation.   ? Mixed metabolic alkalosis from diuresis  - gentle IV fluid hydration. This may have exacerbated the patient's respiratory acidosis and blunted his respiratory drive  Heme:   Mild anemia, likely related to phlebotomy, chronic disease  Msk:   No acute issues  Endo:   At risk for stress and steroid " related hyperglycemia. Continue Q4H BG  ID:   Bactrim for PPx of PJP given steroid use  Patient had MSSA PNA diagnosed 3/21 - check procalcitonin now and trend oxygenation. Patient received a few days of unasyn and was then switched to amox.    General cares:  DVT Prophylaxis: Heparin SQ  GI Prophylaxis: H2 Blocker  Restraints: Restraints for medical healing needed: YES  Family update by me today: No, House staff attempted to call Pranay overnight. will attempt again this am.  Current lines are required for patient management  Access: right radial arterial line. PIV x2    Jalyn Case MD 03/29/2021   3:51 AM   Surgical Critical Care  [phone]    60 min cct apart from procedures

## 2021-03-29 NOTE — PROVIDER NOTIFICATION
"Paged hospitalist \"FYI critical lab: co2 122 Bicarb 55\"    Call back received, hospitalist spoke to RT    Paged \"RT changed setting, doesn't think it will help. Please call back.\"    Callback received, RRT called, pt transferred to ICU for closer monitoring    "

## 2021-03-29 NOTE — PLAN OF CARE
"Shift Summary: Pt transferred to ICU from RRT on floor at 0230. Upon arrival, pt Bipap dependent. MD had discussion with patient re: intubation/code status. See intensivist note. Pt saying \"I'm done, I want to get out of this place.\" Pt not answering orientation questions. D/t pts high C02 despite continued AVAPS BIPAP, decision made to intubate and plan for a goals of care conversation when CO2 more appropriate.     Neuro: At RASS-1 on propofol and fentanyl gtts. PERRL.  CV: SB/SR. On/off low dose levophed to keep MAP>65.  Resp: CMV 50%/400/12/+8. See previous note re: ABGs and vent changes. Small amount of ETT secretions  GI: OG placed 55@lip, clamped. Small BM.  : alberto with dim tea colored UOP, MD updated.   Inf/Endo: afebrile, phos replacement ordered  Lines/Gtts: PIVx2, R Rad Rosamaria. Propofol, fentanyl, LR, and levophed infusing  Family: MD updated pts friend, Pranay,    Jalyn Méndez, RN on 3/29/2021 at 7:58 AM            "

## 2021-03-29 NOTE — ANESTHESIA PROCEDURE NOTES
Airway       Patient location during procedure: ICU ([facility] - Operating Room or Procedural Area)       Procedure Start/Stop Times: 3/29/2021 4:12 AM  Staff -        CRNA: Colleen Gonzales APRN CRNA       Performed By: CRNA  Consent for Airway        Urgency: emergent       Consent: No emergent situation. Verbal consent obtained. Written consent not obtained.       Consent given by: patient       Risks and benefits: risks, benefits and alternatives were discussed       Patient identity confirmed: verbally with patient, arm band and hospital-assigned identification number    Report Obtained from Primary Care Team       History regarding most recent potassium obtained: Yes       History regarding presence/absence of renal failure obtained:Yes       History regarding stroke/CVA obtained:Yes       History regarding presence/absence of NM disorder: YesIndications and Patient Condition       Indications for airway management: respiratory insufficiency       Induction type:intravenous       Mask difficulty assessment: 0 - not attempted    Final Airway Details       Final airway type: endotracheal airway       Successful airway: ETT - single  Endotracheal Airway Details        ETT size (mm): 8.0       Cuffed: yes       Cuff volume (mL): 7       Successful intubation technique: video laryngoscopy       VL Blade Size: Glidescope 4       Grade View of Cords: 1       Adjucts: stylet       Position: Right       Measured from: lips       Secured at (cm): 23       Bite block used: None    Post intubation assessment        ETT secured, Vent settings by primary/ICU team, Primary/ICU team to review CXR, Sedation to be ordered by primary/ICU team and No apparent complications       Placement verified by: capnometry, equal breath sounds and chest rise        Number of attempts at approach: 1       Number of other approaches attempted: 0       Secured with: commercial tube isaac       Ease of procedure:  easy       Dentition: Intact and Unchanged    Medication(s) Administered   Medication Administration Time: 3/29/2021 4:12 AM

## 2021-03-29 NOTE — ANESTHESIA CARE TRANSFER NOTE
Patient: Thanh Goodrich    * No procedures listed *    Diagnosis: * No pre-op diagnosis entered *  Diagnosis Additional Information: No value filed.    Anesthesia Type:   General     Note:    Oropharynx: ventilatory support and endotracheal tube in place  Level of Consciousness: iatrogenic sedation    Level of Supplemental Oxygen (L/min / FiO2): per ICU MD/RT  Independent Airway: airway patency not satisfactory and stable  Dentition: dentition unchanged  Vital Signs Stable: post-procedure vital signs reviewed and stable  Report to RN Given: handoff report given  Patient transferred to: ICU (did not transfer pt.)    ICU Handoff: Call for PAUSE to initiate/utilize ICU HANDOFF, Identified Patient, Identified Responsible Provider, Reviewed the Pertinent Medical History, Discussed Surgical Course, Reviewed Intra-OP Anesthesia Management and Issues during Anesthesia, Set Expectations for Post Procedure Period and Allowed Opportunity for Questions and Acknowledgement of Understanding      Vitals: (Last set prior to Anesthesia Care Transfer)    Electronically Signed By: ANIYA Flower CRNA  March 29, 2021  4:36 AM

## 2021-03-29 NOTE — PROVIDER NOTIFICATION
MD Notification    Notified Person: MD    Notified Person Name: Dr. Haley    Notification Date/Time: 3/29/21    Notification Interaction: Verbal    Purpose of Notification: Critical lab-bicarb 52     Orders Received: Pending    Comments:

## 2021-03-29 NOTE — PROVIDER NOTIFICATION
0550: MD notified of slight ST elevation noted in lead II on bedside monitor. Troponin and EKG ordered    0610: MD notified of critical arterial pH, 7.70. MD to bedside and RR decreased to 12 from 20. Plan to recheck at 0630.    0650:MD notified of critical arterial pH 7.62 and bicarb 52. TV decreased to 400 from 500. Also received order for diamox and to recheck ABG in 1 hr.

## 2021-03-29 NOTE — PROVIDER NOTIFICATION
Brief update:    Paged regarding increased confusion and question of shortness of breath    Here with myasthenia gravis.  Note that on initial presentation he required intubation, transferred out of ICU, had acute worsening which was attributed to lack of AVAPS.    2 nights ago, was taking his AVAPS on and off, last night declined AVAPS.  Note worsening bicarb on a.m. labs.    Suspect hypercarbia as primary  of encephalopathy.  Discussed with nursing staff.    ABG now, initiate AVAPS.    Addendum: Patient has notable hypercarbic acidosis.  Continue AVAPS, recheck ABG in 1 hour    Moreno Porras MD  11:45 PM    Repeat ABG w/ worsening hypercarbia. Discussed w/ RT. Low tidal volumes when sleeping despite support; likely resulting in deadspace ventilation. Switching to bilevel support, repeat ABG in 1 hr again.     Moreno Porras MD  1:28 AM

## 2021-03-29 NOTE — CODE/RAPID RESPONSE
[facility]    [facility] RRT Note  3/29/2021   Time Called: 0149    RRT called for: Worsening ABG    Assessment & Plan     Acute on chronic hypercapnic, hypoxic respiratory failure multifactorial 2/2 myasthenia gravis, MSSA PNA, HFpEF exacerbation, obesity hypoventilation syndrome, prolonged illness requiring 2 recent intubations.  - Upon arrival, pt lying in bed, eyes drowsy, in no overt distress on Bipap; however, minimal chest rise noted.  Nursing notes pt was placed on Bipap therapy at 2300; however, despite AVAPS with , RR 12 and then recently increased to 20, FiO2 50%, pt's ABG continues to worsen.  Pt's lung sounds with minimal air movement noted throughout; no obvious crackles or wheezes noted.  Pt lethargic, but attempts to respond to questions and is able to follow commands.  Pt's HR 70s, SBP 110s, afebrile.       INTERVENTIONS:  - Will transfer pt to ICU for likely intubation  - Will consult intensivist; greatly appreciate further recommendations and management of pt  - At this time, the hospitalist service will sign off; please contact hospitalist service when transfer of care deemed appropriate  - Per pt's request, attempted to update pt's friend, Pranay, regarding above; however, no answer, left voicemail to call ICU    At the end of the RRT pt transferred to ICU for likely intubation    Discussed with and defer further cares to nursing and Dr. Case, intensivist    Interval History     Thanh Goodrich is a 80 year old male who was admitted on 3/19/2021 for weakness.    Medical history significant for: morbid obesity; myasthenia gravis with recent hospitalization for myasthenia crisis with acute respiratory failure (3/4-3/15); HTN; HFpEF; h/o prostate cancer    Code Status: Full Code    Allergies   Allergies   Allergen Reactions     Ciprofloxacin Other (See Comments)     Contraindicated for myasthenia gravis patients     Procainamide Other (See Comments)      Contraindicated for myasthenia gravis patients     Quinidine Other (See Comments)     Contraindicated for myasthenia gravis patients     Quinine Other (See Comments)     Contraindicated for myasthenia gravis patients       Physical Exam   Vital Signs with Ranges:  Temp:  [97.5  F (36.4  C)-98.2  F (36.8  C)] 97.9  F (36.6  C)  Pulse:  [59-85] 62  Resp:  [18-24] 22  BP: ()/(42-79) 111/49  FiO2 (%):  [40 %-50 %] 40 %  SpO2:  [92 %-100 %] 100 %  I/O last 3 completed shifts:  In: 150 [P.O.:150]  Out: 750 [Urine:750]    Constitutional: Pt lying in bed, eyes half open, on Bipap, in no overt distress  Neck: No upper airway wheezes or stridor noted  Pulmonary: In no apparent distress; however, minimal chest rise noted with minimal air movement noted throughout, no crackles or wheezes noted  Cardiovascular: Regular rate and rhythm, normal S1S2, no murmur, rub or gallop noted  GI: Round, soft  Skin/Integumen: Warm, dry, pale  Neuro: PERRL, able to follow commands sluggishly and attempts to respond to questions; however, appears to have weak voice, no obvious focal neuro deficits noted  Psych:  Calm  Extremities: Trace BLE edema noted    Data       Recent Labs   Lab 03/29/21  0320 03/29/21  0055 03/28/21  2330   PH 7.28* 7.26* 7.29*   PO2 113* 103 84   PCO2 116* 122* 107*   HCO3 54* 55* 52*   TAMAR 22.0 22.1 19.8       Time Spent on this Encounter   I spent 30 minutes of critical care time on the unit/floor managing the care of Thanh Goodrich. Upon evaluation, this patient had a high probability of imminent or life-threatening deterioration due to respiratory failure, which required my direct attention, intervention, and personal management. 100% of my time was spent at the bedside counseling the patient and/or coordinating care regarding services listed in this note.    ANIYA Buckley [facility]

## 2021-03-29 NOTE — PROGRESS NOTES
Neuroscience Intensive Care Consult    HPI:   Mr Goodrich is a 79yo gentleman with known history of myasthenia gravis, heart failure, hypertension, hyperlipidemia, UTI, and depressed mood who was transferred to the ICU for management of hypercarbic respiratory failure overnight. He was intubated and is now sedated with propofol. He has been hospitalized almost continuously over the past month for ongoing management of respiratory failure secondary to suspected myasthenia gravis crisis. He has failed multiple trials of extubation. He has received IVIG on at least two occasions (3/5-9 and 3/20-22) with modest benefit. He is currently on prednisone 60mg/day, pyridostigmine, and mycophenolate. Prior to intubation last night he indicated a potential desire to de-escalate care, although given his hypercarbic state at the time, his mental status was impaired. The palliative care team has been consulted to help determine how best to respect his wishes for optimized care.    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     Cellulitis of left leg 9/23/2012     Depressive disorder 2020     Edema 7/7/2009     History of steroid therapy 2/22/2010     Hyperlipidemia LDL goal <130 10/31/2010     HYPERTENSION 7/8/2003     Incontinence of urine 10/25/2010     Leg ulcer (H) 9/20/2012     MALIGN NEOPL PROSTATE-3/07 4/2/2007    T1C, Shaina 8, initial PSA 39, s/p radiation seed implants 2007      Myasthenia gravis (H)      OBESITY 7/8/2003     Osteoporosis 8/18/2009    Refused bisphosphonates 2011      PVC's (premature ventricular contractions) 11/23/2011     RIGHT OCCIPITAL PAIN 7/8/2003     ROTATOR CUFF SYND NOS- RT 9/19/2005     Skin nodule 3/18/2013     ulcer left shin 10/8/2007     Uncomplicated asthma 1944?    childhood only       Family History:  Family History   Problem Relation Age of Onset      Hypertension Mother      Depression Mother      Substance Abuse Mother         alcohol     Hypertension Father      Cancer Father         Prostate     Prostate Cancer Father      Substance Abuse Father         alcohol     Obesity Father      Diabetes Maternal Grandmother      C.A.D. 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 [employer]     Employer: [address] [place] [address]   Social Needs     Financial resource strain: Not on file     Food insecurity     Worry: Not on file     Inability: Not on file     Transportation needs     Medical: Not on file     Non-medical: Not on file   Tobacco Use     Smoking status: Never Smoker     Smokeless tobacco: Never Used   Substance and Sexual Activity     Alcohol use: Yes     Alcohol/week: 0.0 standard drinks     Comment: very rarely     Drug use: No     Sexual activity: Not Currently     Partners: Female   Lifestyle     Physical activity     Days per week: Not on file     Minutes per session: Not on file     Stress: Not on file   Relationships     Social connections     Talks on phone: Not on file     Gets together: Not on file     Attends L[employer]-day service: Not on file     Active member of club or organization: Not on file     Attends meetings of clubs or organizations: Not on file     Relationship status: Not on file     Intimate partner violence     Fear of current or ex partner: Not on file     Emotionally abused: Not on file     Physically abused: Not on file     Forced sexual activity: Not on file   Other Topics Concern     Parent/sibling w/ CABG, MI or angioplasty before 65F 55M? No   Social History  Narrative    The patient has a 0 pk yr tobacco hx.  He has no active use.  Alcohol use is rare alcoholic drinks per week.  He denies use of recreational drugs.          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        The patient is single.  Has 0 children.        Hot Tub Exposure: NO    Recent Travel: NO     Hx of incarceration:  NO    Bird Exposure:   NO    Animal Exposure:  3 Cats    Inhalation Exposure:  + asbestos exposure in past               Objective Data:    24 Hour Vital Signs Summary:  Temperatures:  Current - Temp: 97.9  F (36.6  C); Max - Temp  Av.7  F (36.5  C)  Min: 97.5  F (36.4  C)  Max: 97.9  F (36.6  C)  Respiration range: Resp  Av.8  Min: 11  Max: 28  Pulse range: Pulse  Av.8  Min: 58  Max: 85  Blood pressure range: Systolic (24hrs), Av , Min:84 , Max:148   ; Diastolic (24hrs), Av, Min:39, Max:83    Pulse oximetry range: SpO2  Av.5 %  Min: 92 %  Max: 100 %    Ventilator Settings  Ventilation Mode: CMV/AC  (Continuous Mandatory Ventilation/ Assist Control)  FiO2 (%): 40 %  Rate Set (breaths/minute): 12 breaths/min  Tidal Volume Set (mL): 500 mL  PEEP (cm H2O): 8 cmH2O  Oxygen Concentration (%): 40 %  Resp: 12        Intake/Output Summary (Last 24 hours) at 3/29/2021 0919  Last data filed at 3/29/2021 0800  Gross per 24 hour   Intake 278.42 ml   Output 635 ml   Net -356.58 ml       Arterial Line BP: ()/(40-70) 113/46  MAP:  [57 mmHg-92 mmHg] 71 mmHg  BP - Mean:  [62-94] 73    Current Medications:    chlorhexidine  15 mL Mouth/Throat Q12H     famotidine  20 mg Intravenous Q12H     heparin ANTICOAGULANT  5,000 Units Subcutaneous Q8H     [Held by provider] metoprolol tartrate  12.5 mg Oral BID     mycophenolate  1,000 mg Oral or NG Tube Daily     mycophenolate  500 mg Oral or NG Tube At Bedtime     pantoprazole  40 mg Oral QAM AC     potassium & sodium phosphates  2 packet Oral or Feeding Tube TID     predniSONE  60 mg Oral or Feeding Tube Daily      pyridostigmine  60 mg Oral Q8H ALEXUS     cholecalciferol  100 mcg Oral Daily       PRN Medications:  acetaminophen, acetaminophen, albuterol, artificial tears ophthalmic solution, dextrose, glucose **OR** dextrose **OR** glucagon, EPINEPHrine, fentaNYL, naloxone **OR** naloxone **OR** naloxone **OR** naloxone, - MEDICATION INSTRUCTIONS -, ondansetron **OR** ondansetron, - MEDICATION INSTRUCTIONS -, phenol, prochlorperazine **OR** prochlorperazine **OR** prochlorperazine, senna-docusate **OR** senna-docusate    Infusions:    dextrose       fentaNYL 50 mcg/hr (03/29/21 0834)     lactated ringers 100 mL/hr at 03/29/21 0647     - MEDICATION INSTRUCTIONS -       norepinephrine 0.03 mcg/kg/min (03/29/21 0915)     - MEDICATION INSTRUCTIONS -       propofol (DIPRIVAN) infusion 30 mcg/kg/min (03/29/21 0833)       Allergies   Allergen Reactions     Ciprofloxacin Other (See Comments)     Contraindicated for myasthenia gravis patients     Procainamide Other (See Comments)     Contraindicated for myasthenia gravis patients     Quinidine Other (See Comments)     Contraindicated for myasthenia gravis patients     Quinine Other (See Comments)     Contraindicated for myasthenia gravis patients       Physical Examination:  BP (!) 88/54   Pulse 72   Temp 97.9  F (36.6  C) (Axillary)   Resp 12   Wt 131.1 kg (289 lb)   SpO2 98%   BMI 41.47 kg/m      EXAM:  Examined on 30mcg/kg/min propofol and 50mcg/hr fentanyl. Mr Goodrich is examined in the supine position.  He is intubated and sedated.  He opens his eyes in response to noxious stimulus and looks in the direction of examiners.  He follows commands by squeezing his hands and wiggling his toes.  His pupils are symmetric and react to light.  There is no clear facial asymmetry he withdraws all limbs appropriately in response to noxious stimuli.  His limb movements are brisk and appear to be minimally weak.    Labs/Studies:  Recent Labs   Lab Test 03/29/21  0550 03/28/21  0032  03/27/21  0856    139 136   POTASSIUM 4.0 3.7 4.7   CHLORIDE 96 95 96   CO2 >45* >45* 44*   ANIONGAP Not Calculated Not Calculated <1*   GLC 98 79 75   BUN 17 15 13   CR 0.57* 0.57* 0.48*   NOAH 8.8 8.5 8.4*   WBC 4.5 4.5 3.8*   RBC 4.36* 4.55 4.86   HGB 11.8* 11.9* 12.8*   * 125* 111*       No results for input(s): INR, PTT in the last 08594 hours.    Recent Labs   Lab 03/29/21  0830 03/29/21  0640 03/29/21  0550 03/29/21  0320   PH 7.54* 7.62* 7.70* 7.28*   PCO2 60* 51* 41 116*   PO2 159* 144* 129* 113*   HCO3 52* 52* 50* 54*   O2PER HIDE 60% 60% 40%     Imaging and labs reviewed personally in EMR    Assessment:  Mr Goodrich is a 72-year-old gentleman currently admitted for ongoing management of respiratory failure thought secondary to myasthenia gravis crisis.  He is now intubated for the third time due to hypercarbic respiratory failure.  With regard to myasthenia gravis, he has been treated with IVIG twice during this string of hospitalizations, once in early March for a full dose and a partial dose given 1 week ago.  He had modest improvement after IVIG, but not to the point of full recovery.  He is also on prednisone 60 mg daily, pyridostigmine 60 mg 3 times a day, and mycophenolate.  At this point, it seems that he has profound bulbar and diaphragmatic weakness from myasthenia gravis.  He will need an escalation of care of some sort.  If he chooses to pursue aggressive measures, we would recommend plasmapheresis.  In order to have plasmapheresis, he will need to have a dialysis line placed.  This will require additional goals of care conversations.  We will continue to follow until goals of care are established, however at this point we believe plasmapheresis is the next best escalation of care.    Recommendations:  -Continue mycophenolate  -Continue pyridostigmine  -Continue prednisone, he will need PJP prophylaxis at this dose  -Continue to check NIF and FVC at least daily  -Please limit sedation  to the least necessary to permit adequate neurologic assessment but also to reduce the risk of further deconditioning  -There are multiple, medications that can worsen myasthenia gravis, please reference this resource [email or web address]  -If goals of care discussed and he would like to pursue aggressive measures, we will start plasmapheresis with plan for 5 total treatments over 10 days.  He will need a dialysis catheter in order to do plasmapheresis.    This patient was seen and discussed with attending, Dr Rodney Hernandez, DO  Neurocritical Care Fellow

## 2021-03-29 NOTE — PLAN OF CARE
PT/OT: New therapy orders were entered today; however, the pt is not appropriate for therapies this day. Transferred to ICU and intubated overnight. Note palliative care consult also added.     Of note yesterday the pt required ModA for bed mobility (sup>sit), SBA with sitting balance and use of the Marleni Steady with Min A for sit<> stand. Limited by fatigue and weakness with participation.

## 2021-03-29 NOTE — PROCEDURES
[facility]    Arterial line placement    Date/Time: 3/29/2021 5:04 AM  Performed by: Jalyn Case MD  Authorized by: Jalyn Case MD     UNIVERSAL PROTOCOL   Site Marked: Yes  Prior Images Obtained and Reviewed:  Yes  Required items: Required blood products, implants, devices and special equipment available    Patient identity confirmed:  Verbally with patient  Patient was reevaluated immediately before administering moderate or deep sedation or anesthesia  Confirmation Checklist:  Patient's identity using two indicators  Time out: Immediately prior to the procedure a time out was called    Universal Protocol: the Joint Commission Universal Protocol was followed    Preparation: Patient was prepped and draped in usual sterile fashion    ESBL (mL):  3      Indication: multiple ABGs  Location: left radial      SEDATION    Patient Sedated: Yes    Sedation Type:  Deep  Sedation:  Fentanyl  Vital signs: Vital signs monitored during sedation      PROCEDURE DETAILS  Tucker's Test Normal?: Tucker's test not abnormal  Needle Gauge:  20  Seldinger technique: Seldinger technique used    Number of Attempts:  1  Post-procedure:  Line sutured and dressing applied  CMS: unchanged  PROCEDURE   Patient Tolerance:  Patient tolerated the procedure well with no immediate complications    Length of time physician/provider present for 1:1 monitoring during sedation: 5      Jalyn Case MD 3/29/2021 5:06 AM

## 2021-03-29 NOTE — CONSULTS
CLINICAL NUTRITION SERVICES - REASSESSMENT NOTE      Recommendations Ordered by Registered Dietitian (RD):   - Begin TF Vital High Protein (Peptamen Intense VHP equivalent) at 15 mL/hr via OGT and hold at this rate x24 hrs  - Daily electrolyte checks     - Pending pressor reliance and Propofol, monitor for advancement to preliminary Vital High Protein @ goal rate of 45ml/hr (1080ml/day) to provide 1080 kcals, 94 g PRO, 902 ml free H20, 119 g CHO, and 0 g fiber daily  - Add Prosource 1 pkt BID = 22 gm PRO and 80 kcal   - TOTAL (TF + Prosource + Propofol) = 1560 kcal (12 kcal/kg) and 116 gm PRO (1.5 gm/kg)     Future Recommendations:  - When pressor and Propofol weaned, future TF goal = Vital High Protein 60ml/hr  (1440ml/day) will provide 1440 kcals (12 kcal/kg), 125 g PRO (1.6 gm/kg), 1203 ml free H20, 159 g CHO, and 0 g fiber daily     Malnutrition: (3/21)  % Weight Loss:  Weight loss does not meet criteria for malnutrition (fluids)  % Intake:  <75% for > 7 days (non-severe malnutrition)  Subcutaneous Fat Loss:  Orbital region mild depletion  Muscle Loss:  Temporal region mild-moderate depletion and Clavicle bone region mild depletion  Fluid Retention:  None noted     Malnutrition Diagnosis: Non-Severe malnutrition  In Context of:  Acute illness or injury  Chronic illness or disease     3/29: Received Provider Order: Registered Dietitian to order TF per Medical Nutrition Therapy Guidelines    EVALUATION OF PROGRESS TOWARD GOALS   Diet:  NPO, vented     Nutrition Support:  Previous NGT was discontinued on 3/22 with extubation    Intake/Tolerance:   Patient was eating % meals recorded 2-3x per day between 3/25-3/28   Pt is now day 1 NPO after reintubation and plan to feed via OGT today   Labs reviewed: Na 139, K 4, Mg 1.7, Phos 1.9 (L)   Recent Labs   Lab 03/29/21  0828 03/29/21  0550 03/29/21  0202 03/28/21  1212 03/28/21  0821 03/28/21  0739 03/27/21  0856 03/25/21  0725 03/24/21  0833 03/24/21  0456  03/23/21  1719 03/23/21  0749   GLC  --  98  --   --   --  79 75 75  --  69* 231*  --    BGM 90  --  137* 137* 74  --   --   --  74  --   --  74     Medications reviewed: NeutraPhos 2 pkts TID, Vitamin D3 100 mcg, Levophed at 14.4 mL/hr, Propofol at 15.4 mL/hr (406 kcal/day)   Stooling: BM x5 on 3/27, BM x2 on 3/28, BM x1 on 3/29   Wt: 131.1 kg  Vitals:    03/21/21 0350 03/22/21 0400 03/23/21 0200 03/24/21 0600   Weight: 125.2 kg (276 lb) 128.5 kg (283 lb 4.7 oz) 127.4 kg (280 lb 13.9 oz) 128.3 kg (282 lb 13.6 oz)    03/29/21 0600   Weight: 131.1 kg (289 lb)         ASSESSED NUTRITION NEEDS:  Dosing Weight 125.2 kg (energy) and 75.5 kg (protein)  Estimated Energy Needs: 3673-4574 kcals (11-14 Kcal/Kg)  Justification: obese and vented  Estimated Protein Needs: 115-150 grams protein (1.5-2 g pro/Kg)  Justification: hypercatabolism with critical illness and obesity guidelines   Estimated Fluid Needs: 4599-1816 mL (1 mL/Kcal)  Justification: maintenance      NEW FINDINGS:   3/28: RRT called --> txr to ICU and re-intubated     Previous Goals:   Pt will consume at least 75% of meals  Evaluation: Not met consistently     Previous Nutrition Diagnosis:   Inadequate protein-energy intake related to decreased oral intake as evidenced by no ordering 3 meals/day and intake of 50%  Evaluation: No change      CURRENT NUTRITION DIAGNOSIS  Inadequate protein-energy intake related to re-intubated and TF planned as evidenced by receiving 0% estimated protein and 29% estimated energy needs from Propofol alone     INTERVENTIONS  Recommendations / Nutrition Prescription  - Begin TF Vital High Protein (Peptamen Intense VHP equivalent) at 15 mL/hr via OGT and hold at this rate x24 hrs  - Daily electrolyte checks      Implementation  EN Composition, EN Schedule, Feeding Tube Flush: as above   Collaboration and Referral of Nutrition care: patient was discussed during ICU rounds     Goals  EN to meet % estimated needs within 48 hrs      MONITORING AND EVALUATION:  Progress towards goals will be monitored and evaluated per protocol and Practice Guidelines    Gauri Monae RD, LD  Clinical Dietitian

## 2021-03-30 NOTE — PROGRESS NOTES
Neurocritical Care Note:  S: Sitting up and reading a book in bed. Wide awake. Denies pain. Answers questions appropriately and indicates willingness to undergo treatment with plasmapheresis.  O:  BP (!) 88/54   Pulse 68   Temp 97.8  F (36.6  C) (Axillary)   Resp 13   Wt 132 kg (291 lb 0.1 oz)   SpO2 94%   BMI 41.76 kg/m      Examined off sedation. On exam, Mr Goodrich is wide awake and fully interactive. He answers questions appopriately by nodding and writes out his questions with proper spelling, penmanship, and grammar. He follows commands reliably. Pupils symmetric and react to light. No facial asymmetry. Tongue protrusion midline. Limb strength full and symmetric. Neck extension 5/5, flexion 4/5. Sensation grossly intact to light touch.    Imaging and labs personally reviewed in EMR.    A/P:   #myasthenia gravis crisis  #hypercarbic respiratory failure sp intubation x3    Mr Goodrich is a 79yo gentleman currently admitted for management of myasthenia gravis crisis with persistent hypoventilation and hypercarbic respiratory failure. He is currently intubated and doing well. He has received multiple doses of IVIG over the past month with only modest improvement. He met with palliative care today and expressed desire for restorative care. Our plan is to proceed with plasmapheresis as soon as a dialysis catheter can be placed. The ICU team is planning for extubation in the coming days.    Recommendations  - please arrange for placement of dialysis catheter for plasmapheresis  - consult nephrology for plasmapheresis  -Continue mycophenolate  -Continue pyridostigmine  -Continue prednisone, he will need PJP prophylaxis at this dose  -Continue to check NIF and FVC at least daily  -Please limit sedation to the least necessary to permit adequate neurologic assessment but also to reduce the risk of further deconditioning  -There are multiple, medications that can worsen myasthenia gravis, please reference this resource  [email or web address]    This patient was seen and discussed with attending, Dr Rodney Hernandez,   Neurocritical Care Fellow  03/30/2021

## 2021-03-30 NOTE — PROGRESS NOTES
[facility] ICU RESPIRATORY NOTE        Date of Admission: 3/19/2021    Date of Intubation (most recent): 3/29/2021    Reason for Mechanical Ventilation: MG airway protection    Number of Days on Mechanical Ventilation: 2    Met Criteria for Spontaneous Breathing Trial: No    Reason for No Spontaneous Breathing Trial: MG    Significant Events Today: none    ABG Results:   Recent Labs   Lab 03/30/21  0400 03/29/21  1700 03/29/21  1247 03/29/21  0830 03/29/21  0640   PH 7.41  --  7.43 7.54* 7.62*   PCO2 64*  --  72* 60* 51*   PO2 88  --  158* 159* 144*   HCO3 41*  --  47* 52* 52*   O2PER 30% ART LINE HIDE HIDE 60%       Current Vent Settings: Ventilation Mode: CMV/AC  (Continuous Mandatory Ventilation/ Assist Control)  FiO2 (%): 30 %  Rate Set (breaths/minute): 12 breaths/min  Tidal Volume Set (mL): 400 mL  PEEP (cm H2O): 8 cmH2O  Oxygen Concentration (%): 30 %  Resp: 16        Plan: continue to monitor V/Q, SBT, and Liberation    Shelby Rinaldi, RT

## 2021-03-30 NOTE — PLAN OF CARE
[facility] Intensive Care Unit   Nursing Note                                                       Neuro:  PERRL.  Moves all extremities.  Follows commands. Cardiovascular:  RRR.  Hemodynamically stable.  Pulmonary:  Tolerating ventilator on fentanyl.  Oxygen saturation > 92  GI/:  Adequate UOP.  Endocrine: Glucose well controlled.  Skin:   Protective mepilex to sacrum.  Restraints:  Not in use or necessary.  AM labs noted.  Continue to monitor closely.    Recent Labs   Lab 03/30/21  0400 03/29/21  1700 03/29/21  1247 03/29/21  0830 03/29/21  0640   PH 7.41  --  7.43 7.54* 7.62*   PCO2 64*  --  72* 60* 51*   PO2 88  --  158* 159* 144*   HCO3 41*  --  47* 52* 52*   O2PER 30% ART LINE HIDE HIDE 60%       Lab Results   Component Value Date    TROPI 0.022 03/29/2021    TROPI 0.023 03/29/2021    TROPI 0.028 03/29/2021    TROPI 0.060 (H) 03/19/2021    TROPI <0.015 03/04/2021    TROPONIN <0.04 02/04/2007       ROUTINE IP LABS (Last four results)  BMP  Recent Labs   Lab 03/30/21  0400 03/29/21  0550 03/28/21  0739 03/27/21  0856    139 139 136   POTASSIUM 3.6 4.0 3.7 4.7   CHLORIDE 98 96 95 96   NOAH 8.5 8.8 8.5 8.4*   CO2 41* >45* >45* 44*   BUN 17 17 15 13   CR 0.58* 0.57* 0.57* 0.48*   GLC 80 98 79 75     CBC  Recent Labs   Lab 03/30/21  0400 03/29/21  0550 03/28/21  0739 03/27/21  0856   WBC 4.2 4.5 4.5 3.8*   RBC 3.98* 4.36* 4.55 4.86   HGB 10.5* 11.8* 11.9* 12.8*   HCT 36.1* 39.8* 42.7 45.0   MCV 91 91 94 93   MCH 26.4* 27.1 26.2* 26.3*   MCHC 29.1* 29.6* 27.9* 28.4*   RDW 18.3* 17.0* 17.2* 17.1*   * 123* 125* 111*     INRNo lab results found in last 7 days.  PTTNo lab results found in last 7 days.  LACTIC ACID  Lactic Acid (mmol/L)   Date Value   03/19/2021 0.7   03/04/2021 0.8   03/04/2021 1.4     Blood Glucose  Glucose (mg/dL)   Date Value   03/29/2021 129 (H)   03/29/2021 112 (H)   03/29/2021 90   03/29/2021 137 (H)   03/28/2021 137 (H)   03/28/2021 74       Intake/Output Summary (Last 24  hours) at 3/30/2021 0646  Last data filed at 3/30/2021 0600  Gross per 24 hour   Intake 2699 ml   Output 1900 ml   Net 799 ml       Augusta Bhatti BSN RN   [facility]  Intensive Care Unit

## 2021-03-30 NOTE — PROGRESS NOTES
[facility] ICU RESPIRATORY NOTE        Date of Admission: 3/19/2021    Date of Intubation (most recent): 3/29/21    Reason for Mechanical Ventilation: MG airway protection    Number of Days on Mechanical Ventilation: 2    Met Criteria for Spontaneous Breathing Trial: No    Reason for No Spontaneous Breathing Trial: MG    Significant Events Today: none    ABG Results:   Recent Labs   Lab 03/30/21  0400 03/29/21  1700 03/29/21  1247 03/29/21  0830 03/29/21  0640   PH 7.41  --  7.43 7.54* 7.62*   PCO2 64*  --  72* 60* 51*   PO2 88  --  158* 159* 144*   HCO3 41*  --  47* 52* 52*   O2PER 30% ART LINE HIDE HIDE 60%       Current Vent Settings: Ventilation Mode: CMV/AC  (Continuous Mandatory Ventilation/ Assist Control)  FiO2 (%): 30 %  Rate Set (breaths/minute): 12 breaths/min  Tidal Volume Set (mL): 400 mL  PEEP (cm H2O): 8 cmH2O  Oxygen Concentration (%): 30 %  Resp: 12      Plan: Continue to monitor V/Q, SBT and Liberation.    PAUL MILLER, RT

## 2021-03-30 NOTE — CONSULTS
[facility] WOC Nurse Inpatient Wound Assessment     R) post leg intact nonblanchable erythema POA    Assessment  R) post leg intact nonblanchable erythema unknown etiology POA  Status: initial assessment    Treatment Plan:   R) post leg intact nonblanchable erythema  Protective foam dressings PRN,Change at least q 4 days, peel back, peek and replace for daily skin inspection.    Pressure Injury prevention (please order supplies if not in room)  1. Turn every 2 hours, side to side avoid supine on bariatric low air loss mattress   2.   Float heels off bed with use of pillows under legs, if ineffective, order offloading boots: Heel Lift boots (Prevalon #[id number])  3.   Incontinent cleanser (Alethea spray # [id number]) followed by skin barrier protectant (Critic Aid paste #60521)  BID and after each incontinent episode  4.   Prevent sliding by limiting HOB to 30 degrees or less unless contraindicated, use knee gatch first if not contraindicated  5.   If in chair use Chair cushion pressure redistribution (#037055) ; if unable to shift weight each hour, please limit sitting to an hour at a time  6.   Optimize nutrition   Orders Written  Recommended provider order: None, at this time  WOC Nurse follow-up plan while in the hospital:signing off  Nursing to notify the Provider(s) and re-consult the WOC Nurse if wound(s) deteriorates or new skin concern.    Patient History  According to provider note(s):  81yo gentleman currently admitted for management of myasthenia gravis crisis with persistent hypoventilation and hypercarbic respiratory failure. He is currently intubated and doing well. He has received multiple doses of IVIG over the past month with only modest improvement. He met with palliative care today and expressed desire for restorative care. Our plan is to proceed with plasmapheresis as soon as a dialysis catheter can be placed. The ICU team is planning for extubation in the coming  days.    Data  Documented Allergies: Ciprofloxacin, Procainamide, Quinidine, and Quinine     Recent Labs   Lab Test 21  0400 21  0419 21  0419 03/10/15  1416 03/10/15  1416   ALBUMIN  --   --  2.1*   < >  --    HGB 10.5*   < > 11.3*   < > 13.2*   WBC 4.2   < > 3.8*   < > 7.1   CRP  --   --   --   --  43.0*    < > = values in this interval not displayed.     Recent Labs   Lab 21  1144 21  0910 21  0400 21  1657 21  1126 21  0828 21  0550 21  0202 21  0739 21  0739 21  0856 21  0725 21  0456 21  0456   GLC  --   --  80  --   --   --  98  --   --  79 75 75  --  69*   * 92  --  129* 112* 90  --  137*   < >  --   --   --    < >  --     < > = values in this interval not displayed.       Temperature: Temp (24hrs), Av.1  F (36.7  C), Min:97.8  F (36.6  C), Max:98.4  F (36.9  C)    Intake/Output Summary (Last 24 hours) at 3/30/2021 1604  Last data filed at 3/30/2021 1533  Gross per 24 hour   Intake 1987.83 ml   Output 1135 ml   Net 852.83 ml       Orders Placed This Encounter      NPO for Medical/Clinical Reasons Except for: Meds      Containment of urine/stool: Incontinence Protocol   Medical Devices:Beckett Catheter: in place, indication: Retention    Catheter securement Yes    Pressure Injury Risk Assessment (Marc Scale):  Sensory Perception: 3-->slightly limited    Moisture: 3-->occasionally moist   Activity: 1-->bedfast     Mobility: 2-->very limited   Nutrition: 3-->adequate   Friction and Shear: 2-->potential problem  Marc Score: 14    Current support surface: Bariatric Low air loss mattress  Current off-loading measures in bed: reposition side to side with use of Pillows  Current off-loading heels: Pillows under calves  Current off-loading measures chair: Chair cushion ordered if needed    Focused WOC Nurse Skin/Wound Exam:   Subjective/Objective: Saw patient with RN. Patient alert and  interactive    Date of last photo 3/30/2021  R) post leg intact nonblanchable erythema POA  Measurements (length x width x depth, in cm) 2 x 0.5  x  0 cm   Palpation of the wound bed: normal   Periwound skin: intact  Periwound Temperature: normal   Drainage:, none  Description of drainage: none  Odor: none  Pain: absent,   Local signs of infection: none    Interventions  Visual inspection and assessment completed   Wound care completed  Orders with supply numbers placed in orders for nursing staff  Education provided: plan of care  Discussed plan of care with Patient and RN    oZe Savage MS RN CWOCN

## 2021-03-30 NOTE — CONSULTS
[facility]  Palliative Care Consultation Note    Patient: Thanh Goodrich  Date of Admission:  3/19/2021    Requesting Clinician / Team: ICU  Reason for consult: Goals of care  Decisional support    Recommendations:    Patient's goals remain restorative at this time. He wants to try everything to see if his breathing improves enough to be able to get off the ventilator and off bipap and be able to leave the hospital. He is hoping that plasmapheresis will work to improve his ability to breath on his own and ultimately allow him to be able to go home. He understands that for now, once the ventilator is removed, he will have to wear a bipap machine all of the time.     He also understands that aggressive treatment with plasmapheresis, ventilator and bipap might not work and if he had a full course of plasmapheresis without improvement in his respiratory status and he were to be dependent upon the ventilator to breath, he would not want to be kept alive at that point and instead would want to be allowed to die.      Continue all current cares    Full code    These recommendations have been discussed with ICU team.      Thank you for the opportunity to participate in the care of this patient and family. Our team: will continue to follow.     During regular M-F work hours -- if you are not sure who specifically to contact -- please contact us by sending a text page to our team consult pager at [phone].    After regular work hours and on weekends/holidays, you can call our answering service at [phone]. Also, who's on call for us is available in [facility] [employer].       Assessments:  Thanh Goodrich is a 80 year old male with a past medical history significant for myasthenia gravis, HFpEF, COPD, HTN, MDD, HAZEL, prostate cancer who presents on 3/19 from TCU for acute hypercapnic respiratory failure requiring intubation. He received IVIG and steroid treatment and was  extubated on 3/22 and transferred to hospitalist service 3/23. Subsequently experienced worsening encephalopathy and was found to have worsening respiratory acidosis despite BiPAP treatment and was transferred back to ICU on 3/29 and intubated due to persistent hypercarbia. Patient was recently hospitalized (3/4-3/15) with generalized weakness and hypoxic respiratory failure, intubated and transferred to ICU, treated with IVIG and steroids. Extubated 3/11/21 and discharged on 3/15. Per neuro critial care, he has received IVIG x 2 with only modest benefit and he has profound bulbar and diaphragmatic weakness from myasthenia gravis.  Palliative care was consulted for goals of care discussions as pt medically ready for extubation again.     Today, the patient was seen for:  Myasthenia gravis with profound bulbar and diaphragmatic weakness  Respiratory failure due to above  Goals of care      Prognosis, Goals, & Planning:      Functional Status just prior to hospitalization: 2 (Ambulatory and capable of all selfcare but unable to carry out any work activities; may need help with IADLs up and about > 50% of waking hours)    3 (Capable of only limited self-care; needs help with ADLs; in bed/chair >50% of waking hours) significant worsening of his functional status over past month       Prognosis, Goals, and/or Advance Care Planning were addressed today: Yes   Discussion with patient, his cousin, Gaby, in the room and his best friend and primary health care agent, Tito Adame, by phone. We discussed that this past month has been very tough for Rigoberto and that he is much weaker now than he was a month ago. We reviewed his hospitalization at the beginning of March as well as his current hospitalization and I discussed that despite receiving steroids and IVIG x 2, there was no significant improvement in his ability to breath on his own. I also discussed the concern that because of progression of his myasthenia gravis that  was not responsive to treatment, we were concerned that he might not ever improve. We talked about how there was the possibility to try plasmapheresis to improve his breathing muscles and that he would likely be able to come off the ventilator soon but would require a Bipap machine to breath and we were very worried that despite all of this aggressive care, he would still not be able to breath on his own. We discussed two paths: one path would be to try plasmapheresis, get off the ventilator when ready, use Bipap but if he was not able to breath off the ventilator, transition to comfort care and die peacefully. The other option would be to try plasmapheresis, get off the ventilator when ready, use Bipap and if he needed re-intubation. Rigoberto decided that he wanted to go with the second option and to try a full course of plasmapheresis but if he was going to be dependent upon the ventilator for life, he would not want that and would want to be allowed to die rather than remain on a ventilator for the rest of his life.       Patient's decision making preferences: shared with support from loved ones          Patient has decision-making capacity today for complex decisions: Yes            I have concerns about the patient/family's health literacy today: No           Patient has a completed Health Care Directive: No.       Code status: Full Code    Coping, Meaning, & Spirituality:   Mood, coping, and/or meaning in the context of serious illness were addressed today: No      Social:     Living situation: Patient has been living alone, never . This month he was discharged to rehab    Key family / caregivers: Friends, Tito, Moreno and cousin, Gaby    Occupational history: -    History of Present Illness:  History gathered today from: family/loved ones, medical chart, medical team members    Thanh Goodrich is a 80 year old male with a past medical history significant for myasthenia gravis, HFpEF, COPD, HTN, MDD,  HAZEL, prostate cancer who presents on 3/19 from TCU for acute hypercapnic respiratory failure requiring intubation. He received IVIG and steroid treatment and was extubated on 3/22 and transferred to hospitalist service 3/23. Subsequently experienced worsening encephalopathy and was found to have worsening respiratory acidosis despite BiPAP treatment and was transferred back to ICU on 3/29 and intubated due to persistent hypercarbia. Patient was recently hospitalized (3/4-3/15) with generalized weakness and hypoxic respiratory failure, intubated and transferred to ICU, treated with IVIG and steroids. Extubated 3/11/21 and discharged on 3/15. Per neuro critial care, he has received IVIG x 2 (3/5-9 and 3/20-22) with only modest benefit. He is currently on prednisone 60mg/day, pyridostigmine, and mycophenolate and has profound bulbar and diaphragmatic weakness from myasthenia gravis. 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     Cellulitis of left leg 9/23/2012     Depressive disorder 2020     Edema 7/7/2009     History of steroid therapy 2/22/2010     Hyperlipidemia LDL goal <130 10/31/2010     HYPERTENSION 7/8/2003     Incontinence of urine 10/25/2010     Leg ulcer (H) 9/20/2012     MALIGN NEOPL PROSTATE-3/07 4/2/2007    T1C, Shaina 8, initial PSA 39, s/p radiation seed implants 2007      Myasthenia gravis (H)      OBESITY 7/8/2003     Osteoporosis 8/18/2009    Refused bisphosphonates 2011      PVC's (premature ventricular contractions) 11/23/2011     RIGHT OCCIPITAL PAIN 7/8/2003     ROTATOR CUFF SYND NOS- RT 9/19/2005     Skin nodule 3/18/2013     ulcer left shin 10/8/2007     Uncomplicated asthma 1944?    childhood only        Past  Surgical History:  Past Surgical History:   Procedure Laterality Date     BIOPSY  2013?    dealt with     Prostate Cancer Cryotherapy  2007     TONSILLECTOMY  1945         Family History:  Family History   Problem Relation Age of Onset     Hypertension Mother      Depression Mother      Substance Abuse Mother         alcohol     Hypertension Father      Cancer Father         Prostate     Prostate Cancer Father      Substance Abuse Father         alcohol     Obesity Father      Diabetes Maternal Grandmother      C.A.D. Maternal Grandmother      Cerebrovascular Disease Maternal Grandfather      Cancer Paternal Uncle         Prostate     Prostate Cancer Other          Allergies:  Allergies   Allergen Reactions     Ciprofloxacin Other (See Comments)     Contraindicated for myasthenia gravis patients     Procainamide Other (See Comments)     Contraindicated for myasthenia gravis patients     Quinidine Other (See Comments)     Contraindicated for myasthenia gravis patients     Quinine Other (See Comments)     Contraindicated for myasthenia gravis patients        Medications:  I have reviewed this patient's medication profile and medications from this hospitalization.       Physical Exam:  Vital Signs: Temp: 97.8  F (36.6  C) Temp src: Axillary  Pulse: 62   Resp: 17 SpO2: 95 % O2 Device: Mechanical Ventilator    Weight: 291 lbs .12 oz  Gen: alert, intubated, appears stated age, in NAD  Eyes: Conjunctiva clear. Sclera anicteric   HENT: NC/AT, ventilator in place  Resp: no increased work of breathing  Abd: morbidly obese  Skin:  no jaundice  Neuro: A&O x 3; CN II-XII grossly intact;   Mental status/Psych: alert, engaged, appropriate, pleasant; sensorium intact    Data reviewed:  Reviewed recent labs and pertinent imaging      Thank you for the opportunity to continue to participate in the care of this patient and family.  Please feel free to contact on-call palliative provider with any emergent needs.  We can be reached via  team pager [phone] (answered 8-4:30 Monday-Friday); after-hours answering service ([phone]);     Rosa M Gabriel MD / Palliative Medicine / Pager [phone] / [facility] Inpatient Team Consult Pager [phone] (answered 8am-430pm M-F) - ok to text page via [employer] / After-Hours Answering Service [phone] / Palliative Clinic in the [facility] at the [facility] [place] - [phone] (scheduling); [phone] (triage).  Total time spent was 85 minutes,  >50% of time was spent counseling and/or coordination of care regarding plan of care, goals of care.

## 2021-03-30 NOTE — PROGRESS NOTES
Critical Care Progress Note      03/30/2021    Name: Thanh Goodrich MRN#: [id number]   Age: 80 year old YOB: 1940                    Problem List:   Principal Problem:    Acute on chronic respiratory failure with hypercapnia (H)  Active Problems:    Myasthenic crisis (H)    Metabolic alkalosis superimposed on acute respiratory acidosis    Benign essential hypertension    Morbid obesity -- BMI 40.6    Metabolic encephalopathy    Abnormal urinalysis    Immunosuppressed for MG         Summary/Hospital Course:   Thanh Goodrich is a 80 year old male admitted on 3/19/2021 with significant history for myasthenia gravis, HFpEF, COPD, HTN, MDD, HAZEL, prostate cancer who presents from TCU for acute hypercapnic respiratory failure. He was recently admitted and discharged (3/4-3/15) for acute hypoxic and hypercapnic respiratory failure requiring intubation (3/6/21-3/11/21) 2/2 Myasthenia gravis exacerbation, diastolic CHF exacerbation with diuresis.   Metabolic alkalosis. He received IVIG and steroid treatment and was followed by [facility]. He was extubated on 3/22 and transferred to hospitalist service 3/23. Subsequently experienced worsening encephalopathy and was found to have worsening respiratory acidosis despite BiPAP treatment and was transferred back to ICU on 3/29. Intubated due to persistent hypercarbia.      Assessment and plan :     Thanh Goodrich is a 80 year old male admitted on 3/19/2021 for acute hypercapnic respiratory failure due to myasthenia gravis exacerbation requiring intubation, and re-intubated 3/29 due to repeat of hypercarbic respiratory failure. Unclear if this represents worsening of his myasthenia gravis vs other cause for hypoventilation vs persistent PNA. Neurology would recommend plasmapheresis (depending on goals of care) in the setting of lack of improvement.  I have personally reviewed the daily labs, imaging studies, cultures and discussed the case with referring physician and  consulting physicians.   My assessment and plan by system for this patient is as follows:    CNS:   Myasthenia gravis  Recurrent exacerbations. Need goals of care discussion with pt and loved-ones, have been waiting for pt to be able to participate and now sedation weaned off and likely can extubate soon. Would prefer to determine goals of care prior to extubation to know whether he would like to be re-intubated or not (if cannot tolerate BiPAP) given the increased need for this recently and high risk of re-intubation. Neurocrit would recommend dialysis catheter + 10 days plasmapheresis if pt/family decides to pursue aggressive treatment for MG.  Plan:  - Neurocrit following, appreciate assistance/recommendations  - Continue mycophenalate 1000 mg Q Am and 500 mg at bedtime  - Continue daily prednisone 60 mg daily  - Continue pyridostigmine 60 mg every 8 hours  -- Palliative care consult ordered 3/29 and pending, they will see today   Acute pain and sedation:  - Fentanyl drip and Tylenol PRN. No sedation currently.  - RASS goal 0 to -1    Pulm:   Acute hypercarbic respiratory failure  Likely secondary to MG. Improving - pH on ABG WNL this AM, pCO2 now trending down. RT reports that his NIF this morning was 35, which is reassuring that he may be able to develop enough inspiratory force to breathe without ventilator. Likely is ready for extubation pending coordination of HD catheter and plasmapharesis treatment.  Plan:  - Determine goals of care before extubation (I.e. desire to wear BiPAP and to be re-intubated if needed)  - Patient currently intubated, will likely need to extubate to BiPAP/AVAPS when NIF/FVC adequate  - Will continue current vent settings of RR 12, , PEEP 8, FiO2 30% (decreased from 50%) for now  - Recheck ABG if worsens, adjust ventilator as needed    CV:   History of CHF  Appeared to be volume down on 3/29 related to diuresis/BMs over weekend so gave gentle hydration. Appears to have  responded well to this with good UO 3/29 and improvement of bicarbonate (concern for contraction alkalosis). Would like to improve metabolic alkalosis with hydration while also avoiding fluid overload due to CHF.  -- Continue gentle hydration with LR, noting tube feeds started yesterday.  -- Holding metoprolol with hypotension  Hypotension without evidence of shock  Seems most likely related to propofol, was on norepi to maintain MAPs. Propofol and Norepi now weaned off with MAP >65 off propofol.  --Continue to monitor. May be able to remove A-line if BP does ok today.    FEN/GI:   Nutrition consult for tube feeds through OG, started 3/30.   --Continue tube feeds for now    Renal / lytes / acid-base  Respiratory acidosis with likely metabolic alkalosis from ?diuresis  Respiratory acidosis improving on vent, metabolic alkalosis improving with fluids. ABG pH now normal 3/30. Normal creatinine (baseline ~0.6) though UO decreased on 3/30. Note that magnesium administration can worsen MG if given IV per Neurocritical care's list of meds that can worsen MG  - may have exacerbated the patient's mental status / respiratory drive  - gentle IV fluid hydration as above, may need to increase with low UO  - Phos replacement protocols  - discontinue Mg replacement protocol, would prefer oral instead    Heme:   Mild anemia (stable), likely related to phlebotomy and chronic disease    Msk:   No acute issues    Endo:   At risk for stress and steroid related hyperglycemia, though glucose has been controlled with tube feeds started 3/29.   Plan:  -Monitoring per RD protocol.    ID:   Patient had MSSA PNA diagnosed 3/21 - procal 3/29 WNL, oxygenating well. Mild R lung patchy infiltrate on CXR 3/29. Received a few days of unasyn and was then switched to amox x4 days for E coli UTI, now complete.  Plan:  --Continue Bactrim for PPx of PJP given steroid use (started 3/29)    General cares:  DVT Prophylaxis: Heparin SQ  GI Prophylaxis: H2  blocker  Restraints: Restraints for medical healing needed: NO  Family update by me today: Will update cousin Gaby  Current lines are required for patient management  Access: right radial arterial line. PIV x2  Feeding: RD consult, tube feeds 3/29          Interim History:   Propofol weaned yesterday successfully, pt awake with good mentation and was able to begin writing thoughts about his living will. Norepi drip able to be turned off. Feeling ok this morning, having some throat discomfort from vent but does not want to go up on Fentanyl due to risk of cloudy mentation. Eager to get off of ventilator and feels he could tolerate BiPAP. Other ROS negative.           Key Medications:       chlorhexidine  15 mL Mouth/Throat Q12H     famotidine  20 mg Intravenous Q12H     heparin ANTICOAGULANT  5,000 Units Subcutaneous Q8H     [Held by provider] metoprolol tartrate  12.5 mg Oral BID     multivitamins w/minerals  15 mL Per Feeding Tube Daily     mycophenolate  1,000 mg Oral or NG Tube Daily     mycophenolate  500 mg Oral or NG Tube At Bedtime     predniSONE  60 mg Oral or Feeding Tube Daily     pyridostigmine  60 mg Oral or Feeding Tube Q8H ALEXUS     sulfamethoxazole-trimethoprim  1 tablet Oral or Feeding Tube Daily     cholecalciferol  100 mcg Oral or Feeding Tube Daily       dextrose       fentaNYL 50 mcg/hr (03/29/21 2000)     lactated ringers 50 mL/hr at 03/29/21 1630     - MEDICATION INSTRUCTIONS -       norepinephrine Stopped (03/29/21 1507)     - MEDICATION INSTRUCTIONS -       propofol (DIPRIVAN) infusion Stopped (03/29/21 1436)               Physical Examination:   Temp:  [97.8  F (36.6  C)-99  F (37.2  C)] 97.8  F (36.6  C)  Pulse:  [47-75] 50  Resp:  [10-26] 12  MAP:  [58 mmHg-82 mmHg] 75 mmHg  Arterial Line BP: ()/(27-53) 136/53  FiO2 (%):  [30 %-50 %] 30 %  SpO2:  [90 %-100 %] 99 %        Intake/Output Summary (Last 24 hours) at 3/30/2021 1386  Last data filed at 3/30/2021 0600  Gross per 24 hour  "  Intake 2699 ml   Output 1900 ml   Net 799 ml       Wt Readings from Last 4 Encounters:   03/30/21 132 kg (291 lb 0.1 oz)   03/17/21 128.3 kg (282 lb 12.8 oz)   03/15/21 136.3 kg (300 lb 7.8 oz)   12/16/20 136.1 kg (300 lb)     Arterial Line BP: ()/(27-53) 136/53  MAP:  [58 mmHg-82 mmHg] 75 mmHg  Ventilation Mode: CMV/AC  (Continuous Mandatory Ventilation/ Assist Control)  FiO2 (%): 30 %  Rate Set (breaths/minute): 12 breaths/min  Tidal Volume Set (mL): 400 mL  PEEP (cm H2O): 8 cmH2O  Oxygen Concentration (%): 30 %  Resp: 12    Recent Labs   Lab 03/30/21  0400 03/29/21  1700 03/29/21  1247 03/29/21  0830 03/29/21  0640   PH 7.41  --  7.43 7.54* 7.62*   PCO2 64*  --  72* 60* 51*   PO2 88  --  158* 159* 144*   HCO3 41*  --  47* 52* 52*   O2PER 30% ART LINE HIDE HIDE 60%       Gen: Awake and alert during discussion but closes eyes restfully. Appears comfortable while intubated.  HEENT: Normocephalic atraumatic. Dry mucous membranes.  Pulm: Lungs CTA bilaterally anteriorly.   CV: Regular rate and rhythm no extra heart sounds.  Abdomen/GI: Abdomen is soft nontender and nondistended  : Catheter is in place  Extremities: No lower extremity edema  Skin: Extremities warm, no mottling of skin  Neuro: Alert and seems to be oriented though unable to speak verbally - able to use thumbs to describe mentation and pain is \"so-so.\" Able to communicate in writing on paper with R hand. Able to consider and communicate about preference of not getting more Fentanyl for pain if it will cloud his thinking. Good strength bilaterally in UEs and LEs..          Data:     No results found for this or any previous visit (from the past 24 hour(s)).     ROUTINE ICU LABS (Last four results)  CMP  Recent Labs   Lab 03/30/21  0400 03/29/21  0550 03/28/21  0739 03/27/21  1429 03/27/21  0856 03/23/21  1719 03/23/21  1719    139 139  --  136   < > 139   POTASSIUM 3.6 4.0 3.7  --  4.7   < > 3.9   CHLORIDE 98 96 95  --  96   < > 105   CO2 " 41* >45* >45*  --  44*   < > 32   ANIONGAP <1* Not Calculated Not Calculated  --  <1*   < > 2*   GLC 80 98 79  --  75   < > 231*   BUN 17 17 15  --  13   < > 22   CR 0.58* 0.57* 0.57*  --  0.48*   < > 0.59*   GFRESTIMATED >90 >90 >90  --  >90   < > >90   GFRESTBLACK >90 >90 >90  --  >90   < > >90   NOAH 8.5 8.8 8.5  --  8.4*   < > 8.4*   MAG 1.8 1.7  --   --   --   --  1.8   PHOS 3.1 1.9*  --  2.0* 2.2*   < >  --     < > = values in this interval not displayed.     CBC  Recent Labs   Lab 03/30/21  0400 03/29/21  0550 03/28/21  0739 03/27/21  0856   WBC 4.2 4.5 4.5 3.8*   RBC 3.98* 4.36* 4.55 4.86   HGB 10.5* 11.8* 11.9* 12.8*   HCT 36.1* 39.8* 42.7 45.0   MCV 91 91 94 93   MCH 26.4* 27.1 26.2* 26.3*   MCHC 29.1* 29.6* 27.9* 28.4*   RDW 18.3* 17.0* 17.2* 17.1*   * 123* 125* 111*     INRNo lab results found in last 7 days.  Arterial Blood Gas  Recent Labs   Lab 03/30/21  0400 03/29/21  1700 03/29/21  1247 03/29/21  0830 03/29/21  0640   PH 7.41  --  7.43 7.54* 7.62*   PCO2 64*  --  72* 60* 51*   PO2 88  --  158* 159* 144*   HCO3 41*  --  47* 52* 52*   O2PER 30% ART LINE HIDE HIDE 60%       All cultures:  No results for input(s): CULT in the last 168 hours.  No results found for this or any previous visit (from the past 24 hour(s)).    Linden Tierney, MS4    Patient seen and discussed with Dr. Haley who will attest independently.    Physician Attestation   I, Fan Haley, was present with the medical/BRIDGET student who participated in the service and in the documentation of the note.  I have verified the history and personally performed the physical exam and medical decision making.  I agree with the assessment and plan of care as documented in the note.      I personally reviewed vital signs, medications, labs and imaging.    Significant for:   K 3.6 Cr 0.58.   ABG 7.41/64/88/41.   WBC 4.2 Hgb 10.5 Plts 112.     Assessment and plan:  81 yo M with a history of myasthenia gravis, HFpEF, COPD, HTN, MDD, HAZEL,  and prostate cancer who presented from TCU for acute hypoxic respiratory failure (after recent admission from 3/4 to 3/15 for acute hypoxic and hypercapnic respiratory failure requiring intubation due to myasthenia gravis exacerbation and CHF exacerbation). He was found to have profound hypercapnia and metabolic alkalosis. Here, he initially was intubated from 3/19 to 3/22, and received treatment with IVIG and steroids. Unfortunately, he had worsening encephalopathy and worsening respiratory acidosis requiring transfer back to the ICU and intubation on 3/29. His mental status is now improved.      Myasthenia gravis: likely having persistent weakness from this causing his recurrent acute hypercarbic respiratory failure. After discussions with neuro and palliative care today, we will move forward with plasmapheresis, hopefully starting tomorrow. Consult IR for tunneled HD cath for pheresis. Consult nephrology to run plasmapheresis (discussed with nephology attending). Goals of care remain restorative at this point, at least until after plasmapheresis treatment, including okay with re-intubation (but would not want to have a permanent vent). Continue to follow NIF and FVC. Continue mycophenolate and prednisone. Start bactrim for PJP ppx. Pyridostigmine 60 mg q8h.      Acute hypercarbic respiratory failure: worsened requiring re-intubation on 3/29, now improved. Continue vent settings. Does have some chronic hypercapnia as well. Follow VBG PRN. Ideally would start plasmapheresis prior to extubation, but hopeful that we can extubate on 3/31 if passes PST.      Diastolic CHF: Continue gentle hydration with LR today. Off pressors. TF running as well.       I personally spent 40  minutes of critical care time reviewing labs and imaging, examining the patient, managing the ventilator. This does not include time spent on procedures or teaching. He is critically ill due to above.     Fan Hlaey MD  Date of Service  (when I saw the patient): 03/30/21

## 2021-03-31 NOTE — PROCEDURES
[facility]    Procedure: Right IJ tunneled catheter.     Date/Time: 3/31/2021 4:21 PM  Performed by: Gaby Spring DO  Authorized by: Gaby Spring DO     UNIVERSAL PROTOCOL   Site Marked: Yes  Prior Images Obtained and Reviewed:  Yes  Required items: Required blood products, implants, devices and special equipment available    Patient identity confirmed:  Verbally with patient, arm band, provided demographic data and hospital-assigned identification number  Patient was reevaluated immediately before administering moderate or deep sedation or anesthesia  Confirmation Checklist:  Patient's identity using two indicators, relevant allergies, procedure was appropriate and matched the consent or emergent situation and correct equipment/implants were available  Time out: Immediately prior to the procedure a time out was called    Universal Protocol: the Joint Commission Universal Protocol was followed    Preparation: Patient was prepped and draped in usual sterile fashion           ANESTHESIA    Anesthesia: Local infiltration  Local Anesthetic:  Lidocaine 1% without epinephrine      SEDATION    Patient Sedated: No    See dictated procedure note for full details.  Findings: Right internal jugular tunneled catheter placement    Specimens: none    Complications: None    Condition: Stable    PROCEDURE   Patient Tolerance:  Patient tolerated the procedure well with no immediate complications    Length of time physician/provider present for 1:1 monitoring during sedation: 0

## 2021-03-31 NOTE — PLAN OF CARE
OT/PT: Per discussion with RN, pt on hold as pt remains intubated. Will initiate when appropriate.

## 2021-03-31 NOTE — PROGRESS NOTES
[facility] ICU RESPIRATORY NOTE        Date of Admission: 3/19/2021    Date of Intubation (most recent): 3/29/21    Reason for Mechanical Ventilation:  Airway protection    Number of Days on Mechanical Ventilation: 2    Met Criteria for Spontaneous Breathing Trial: No    Reason for No Spontaneous Breathing Trial: Per MD    Significant Events Today: None today    ABG Results:   Recent Labs   Lab 03/30/21  1650 03/30/21  0400 03/29/21  1700 03/29/21  1247 03/29/21  0830   PH 7.46* 7.41  --  7.43 7.54*   PCO2 54* 64*  --  72* 60*   PO2 145* 88  --  158* 159*   HCO3 38* 41*  --  47* 52*   O2PER  --  30% ART LINE HIDE HIDE       Current Vent Settings: Ventilation Mode: CMV/AC  (Continuous Mandatory Ventilation/ Assist Control)  FiO2 (%): 30 %  Rate Set (breaths/minute): 12 breaths/min  Tidal Volume Set (mL): 400 mL  PEEP (cm H2O): 8 cmH2O  Pressure Support (cm H2O): 5 cmH2O  Oxygen Concentration (%): 30 %  Resp: 15    Plan: continue to monitor the patient and wean as tolerated   Bertha Ayon, RT

## 2021-03-31 NOTE — CONSULTS
Consult Date:  03/31/2021      IDENTIFICATION:  A 80-year-old male admitted to this facility dated 03/19/2021 in the setting of generalized weakness.  Prolonged Hospital course requiring ongoing management for myasthenia gravis crisis.      REASON FOR CONSULTATION:  Provision of plasma exchange in the setting of persistent hypoventilation and hypercarbic respiratory failure related to MG.      IMPRESSION:   1.  Apparent baseline normal renal function by data review.   2.  Absence of significant electrolyte abnormalities.   3.  Acid based compensated respiratory acidosis.   4.  Myasthenia gravis with ongoing respiratory issues and requirement for mechanical ventilation.   5.  Absence of recent use of Ace inhibitor, which can lead to plasma exchange associated hypotension.      RECOMMENDATIONS:   1.  Discussed with Interventional Radiology placement of a tunneled dialysis catheter.   2.  Initial plasma exchange today.  A 1.5 volume exchange with 5% albumin replacement.  He will receive protocol calcium replacement as well.   3.  Duration and course of plasma exchange to be determined by Neurology Service.   4.  We will follow with you.      HISTORY OF PRESENT ILLNESS:  A 80-year-old male with apparent history of myasthenia gravis.  He has been hospitalized for approximately 10 days with hypercapnic respiratory failure, which has required intubation on 3 separate occasions.  He is currently awake, alert, appropriate on the ventilator.  He has a compensated respiratory acidosis.  He has apparently previously received IVIG as well.  Neurology is requesting provision of plasma exchange.      PAST MEDICAL HISTORY:  Depression, hyperlipidemia, hypertension, prostate cancer, myasthenia gravis, osteoporosis, asthma, history of pulmonary edema, dilated aortic root.      ADMISSION MEDICATIONS:  Lasix, Lopressor, prednisone and mycophenolate.      ALLERGIES:  CIPRO, PROCAINAMIDE, QUINIDINE AND QUININE.      SOCIAL AND FAMILY  HISTORY:  Reviewed in the medical record.  Given the patient's current clinical state, they were not obtained directly from him.      REVIEW OF SYSTEMS:  The patient is awake and alert.  He understands questions, follows commands and is able to write to ask questions.      PHYSICAL EXAMINATION:   VITAL SIGNS:  Afebrile.  Rhythm sinus.  Heart rate 70 range.  Vented at 30% FiO2, PEEP of 8.   GENERAL:  Awake, alert, appropriate elderly male.   HEENT:  Normocephalic, atraumatic.  Oral ET tube.   CHEST:  Symmetric and clear.   CARDIOVASCULAR:  Regular.   ABDOMEN:  Soft.   EXTREMITIES:  Without significant edema.   GENITOURINARY:  Beckett catheter in place.      LABORATORY DATA:  Current and historic reviewed in detail.  Hemoglobin today 11, hematocrit 36.7.         G ANGELA SUMMERS MD             D: 2021   T: 2021   MT: JASON      Name:     DAVIDSON GONZALEZ   MRN:      [id number]        Account:       [id number]   :      1940           Consult Date:  2021      Document: [id number]

## 2021-03-31 NOTE — PROGRESS NOTES
[facility] ICU RESPIRATORY NOTE        Date of Admission: 3/19/2021    Date of Intubation (most recent): 3/29/21    Reason for Mechanical Ventilation: airway protection    Number of Days on Mechanical Ventilation: 3    Met Criteria for Spontaneous Breathing Trial: Yes     Significant Events Today: Patient had low tidal volumes, low rate and low minute ventilation after 15 minutes on SBT.     ABG Results:   Recent Labs   Lab 03/30/21  1650 03/30/21  0400 03/29/21  1700 03/29/21  1247 03/29/21  0830   PH 7.46* 7.41  --  7.43 7.54*   PCO2 54* 64*  --  72* 60*   PO2 145* 88  --  158* 159*   HCO3 38* 41*  --  47* 52*   O2PER  --  30% ART LINE HIDE HIDE       Current Vent Settings: Ventilation Mode: CMV/AC  (Continuous Mandatory Ventilation/ Assist Control)  FiO2 (%): 30 %  Rate Set (breaths/minute): 12 breaths/min  Tidal Volume Set (mL): 400 mL  PEEP (cm H2O): 8 cmH2O  Pressure Support (cm H2O): 5 cmH2O  Oxygen Concentration (%): 30 %  Resp: 13      Skin Assessment: No skin issues    Plan: Continue vent support and daily wean assessments     Jossie Jaramillo, RT

## 2021-03-31 NOTE — PRE-PROCEDURE
GENERAL PRE-PROCEDURE:   Procedure:  Large bore tunneled central line with possible intravenous moderate sedation   Date/Time:  3/31/2021 9:26 AM    Written consent obtained?: Yes    Risks and benefits: Risks, benefits and alternatives were discussed    Consent given by:  Patient  Patient states understanding of procedure being performed: Yes    Patient's understanding of procedure matches consent: Yes    Procedure consent matches procedure scheduled: Yes    Expected level of sedation:  Moderate  Appropriately NPO:  Yes  ASA Class:  Class 3- Severe systemic disease, definite functional limitations  Mallampati  :  N/A- Alternate secured airway  Lungs:  Other (comment)  Lung exam comment:  Suctioning as needed, patient intubated  History & Physical reviewed:  History and physical reviewed and no updates needed  Statement of review:  I have reviewed the lab findings, diagnostic data, medications, and the plan for sedation    Patient is on IR schedule today Wednesday 3/31/21 for the above procedure.     -Labs WNL for procedure.    -Orders for NPO and antibiotics have been entered.   -Consent was obtained from patient after explaining the procedure, risks and benefits and consent is in IR.     Please contact the IR department at [phone] for procedural related questions.     Thanks Millie Leitschuh CNP Interventional Radiology ([phone])

## 2021-03-31 NOTE — PLAN OF CARE
Alert and oriented X 4. Fully vented, stable on 30% O2. SB-SR + BBB on tele. VS otherwise stable. Communicating via writing. Palliative consult today, see note. POC discussed between pt, francesco Griffin palliative, MD, and contact Tito via phone. Pt to further discuss with contact Moreno along with francesco Griffin and MD at 1230 tomorrow. Tentative plan to extubate tomorrow if agreed upon, dialysis catheter placement, and plasmapheresis; see neurocrit note. Fentanyl gtt + IVF's running. LR increased for low urine output early afternoon. Wound care consulted. Continue to monitor.

## 2021-03-31 NOTE — PROGRESS NOTES
Renal Medicine       PLEX Note:    Indication:  MG    #1 of 5 runs      1.5 volume exchange  6300 ml 5% albumin    6 grams calcium    Stable initial portion of run      Next 04/02/21  1 volume        Recent Labs   Lab 03/31/21  0421      POTASSIUM 3.7   CHLORIDE 98   CO2 38*   ANIONGAP <1*   GLC 76   BUN 19   CR 0.64*   GFRESTIMATED >90   GFRESTBLACK >90   NOAH 8.6     Recent Labs   Lab 03/31/21  0421 03/30/21  0400 03/29/21  0550   WBC 4.9 4.2 4.5   HGB 11.0* 10.5* 11.8*   HCT 36.7* 36.1* 39.8*   MCV 89 91 91   * 112* 123*           RANJIT Hazel    [facility]  [phone]

## 2021-03-31 NOTE — PROGRESS NOTES
Critical Care Progress Note      03/31/2021    Name: Thanh Goodrich MRN#: [id number]   Age: 80 year old YOB: 1940                    Problem List:   Principal Problem:    Acute on chronic respiratory failure with hypercapnia (H)  Active Problems:    Myasthenic crisis (H)    Metabolic alkalosis superimposed on acute respiratory acidosis    Benign essential hypertension    Morbid obesity -- BMI 40.6    Metabolic encephalopathy    Abnormal urinalysis    Immunosuppressed for MG         Summary/Hospital Course:   Thanh Goodrich is a 80 year old male admitted on 3/19/2021 with significant history for myasthenia gravis, HFpEF, COPD, HTN, MDD, HAZEL, prostate cancer who presents from TCU for acute hypercapnic respiratory failure. He was recently admitted and discharged (3/4-3/15) for acute hypoxic and hypercapnic respiratory failure requiring intubation (3/6/21-3/11/21) 2/2 Myasthenia gravis exacerbation, diastolic CHF exacerbation with diuresis.   Metabolic alkalosis. He received IVIG and steroid treatment and was followed by [facility]. He was extubated on 3/22 and transferred to hospitalist service 3/23. Subsequently experienced worsening encephalopathy and was found to have worsening respiratory acidosis despite BiPAP treatment and was transferred back to ICU on 3/29. Intubated due to persistent hypercarbia. Palliative care saw the patient on 3/30 and it was decided to pursue HD catheter (IR), begin plasmapheresis (nephrology), use BiPAP after extubation, and re-intubate if needed.      Assessment and plan :     Thanh Goodrich is a 80 year old male admitted on 3/19/2021 for acute hypercapnic respiratory failure due to myasthenia gravis exacerbation requiring intubation, and re-intubated 3/29 due to repeat of hypercarbic respiratory failure. Unclear if this represents worsening of his myasthenia gravis vs other cause for hypoventilation vs persistent PNA. Neurology would recommend plasmapheresis (depending on  goals of care) in the setting of lack of improvement.  I have personally reviewed the daily labs, imaging studies, cultures and discussed the case with referring physician and consulting physicians.   My assessment and plan by system for this patient is as follows:    CNS:   Myasthenia gravis  Recurrent exacerbations. Neurocritical care is following and recommending plasmapheresis. Had goals of care discussion on 3/30 with assistance of palliative care, and plan is to undergo HD line placement and plasmapheresis. He understands that there is no guarantee that this will improve the underlying disease, that he may need to wear BiPAP indefinitely after extubation, and that he may need to be re-intubated if he were unable to tolerate BiPAP as in the past. He and loved-ones understand and agree to pursue these cares at this time.  Plan:  - Neurocrit following, appreciate assistance/recommendations  - Appreciate palliative care assistance  - IR to place HD catheter today (hold late AM heparin dose)  - Neurology consulted for assistance with plasmapheresis, appreciate their help  - Continue mycophenalate 1000 mg Q Am and 500 mg at bedtime. Will discuss with neurocritical care re: increasing dose to 1000 mg BID  - Continue daily prednisone 60 mg daily  - Continue pyridostigmine 60 mg every 8 hours   Acute pain and sedation:  - Fentanyl drip and Tylenol PRN. No sedation currently.  - RASS goal 0 to -1    Pulm:   Acute hypercarbic respiratory failure  Likely secondary to MG. Improving - still intubated/ventilated though maintaining acid/base status and mentation appears near baseline.  NIF was 35 on 3/30 suggestive of adequate inspiratory force, though unsuccessful SBT (15 mins) on 3/31. Unclear how well he would do off the ventilator at this time and appears he may still require BiPAP (AVAPS) support. Hope that his ability to breathe spontaneously would improve if underlying MG improves over days/weeks with plasmapheresis,  but this is not certain. He has agreed to wear his BiPAP indefinitely after extubation though is noted to be unable to tolerate it in the past (warranting intubation). Pt would also like to communicate with friend Moreno prior to extubation.  Plan:  - Would lean towards remaining intubated until after HD line placement and begins plasmapheresis to avoid mathieu-procedural difficulties with BiPAP >>> need for re-intubation  - RT please check NIF BID  - Will likely need to extubate to BiPAP/AVAPS when NIF/FVC adequate  - Will continue current vent settings of RR 12, , PEEP 8, FiO2 30%  - Recheck ABG q12 hr    CV:   History of CHF  Appeared to be volume down on 3/29 related to diuresis/BMs over weekend so gave gentle hydration. Appears to have responded well to this with good UO 3/29 and improvement of bicarbonate (concern for contraction alkalosis). Would like to improve metabolic alkalosis with hydration while also avoiding fluid overload due to CHF.  -- Continue gentle hydration with LR, noting tube feeds started 3/29.  -- Holding metoprolol with hypotension  Hypotension without evidence of shock  Resolved. Seemed most likely related to propofol, was on norepi to maintain MAPs. Propofol and Norepi now weaned off with MAP >65 off propofol and pressor.  --Continue to monitor.    FEN/GI:   Nutrition consult for tube feeds through OG, started 3/29.   --Continue tube feeds for now    Renal / lytes / acid-base  Respiratory acidosis with likely metabolic alkalosis from ?diuresis  Respiratory acidosis improving on vent, metabolic alkalosis improving with fluids. ABG stabilizing with pH WNL, pCO2/bicarb elevated but perhaps component of chronic hypercapnia. Mental status good. Normal creatinine (baseline ~0.6) though UO decreased on 3/30. Hydrating gently. Note that magnesium administration can worsen MG if given IV per Neurocritical care's list of meds that can worsen MG  - follow BMPs and I/Os  - gentle IV fluid  hydration as above, may need to increase with low UO  - Phos replacement protocols  - discontinued Mg replacement protocol, would prefer oral instead    Heme:   Mild anemia (stable), likely related to phlebotomy and chronic disease    Msk:   No acute issues    Endo:   At risk for stress and steroid related hyperglycemia, though glucose has been controlled with tube feeds started 3/29.   Plan:  -Monitoring per RD protocol.    ID:   Patient had MSSA PNA diagnosed 3/21 - procal 3/29 WNL, oxygenating well. Mild R lung patchy infiltrate on CXR 3/29. Received a few days of unasyn and was then switched to amox x4 days for E coli UTI, now complete.  Plan:  --Continue Bactrim for PPx of PJP given steroid use (started 3/29)    General cares:  DVT Prophylaxis: Heparin subcutaneous  GI Prophylaxis: H2 blocker  Restraints: Restraints for medical healing needed: NO  Family update by me today: Yes, cousin Gaby  Current lines are required for patient management  Access: right radial arterial line. PIV x2  Feeding: RD consult, tube feeds 3/29          Interim History:   Palliative saw pt, discussed options with pt and fam, elected for plasmapharesis and re-intubation if needed. IR consulted for HD line placement, nephrology consulted for plasmapharesis. Unsuccessful SBT 3/31 AM after 15 mins (low tidal volumes, low rate and low minute ventilation) per RT. ROS negative except for some ET tube discomfort.           Key Medications:       ceFAZolin  3 g Intravenous Pre-Op/Pre-procedure x 1 dose     chlorhexidine  15 mL Mouth/Throat Q12H     famotidine  20 mg Intravenous Q12H     [Held by provider] heparin ANTICOAGULANT  5,000 Units Subcutaneous Q8H     [Held by provider] metoprolol tartrate  12.5 mg Oral BID     multivitamins w/minerals  15 mL Per Feeding Tube Daily     mycophenolate  1,000 mg Oral or NG Tube Daily     mycophenolate  500 mg Oral or NG Tube At Bedtime     predniSONE  60 mg Oral or Feeding Tube Daily      pyridostigmine  60 mg Oral or Feeding Tube Q8H [facility]     sulfamethoxazole-trimethoprim  1 tablet Oral or Feeding Tube Daily     cholecalciferol  100 mcg Oral or Feeding Tube Daily       dextrose       fentaNYL 50 mcg/hr (03/31/21 0309)     lactated ringers 100 mL/hr at 03/30/21 2058     - MEDICATION INSTRUCTIONS -       - MEDICATION INSTRUCTIONS -       propofol (DIPRIVAN) infusion Stopped (03/29/21 1436)     sodium chloride 0.9%                 Physical Examination:   Temp:  [97.2  F (36.2  C)-97.7  F (36.5  C)] 97.7  F (36.5  C)  Pulse:  [41-73] 71  Resp:  [11-38] 38  MAP:  [50 mmHg-105 mmHg] 82 mmHg  Arterial Line BP: ()/(29-70) 133/56  FiO2 (%):  [30 %] 30 %  SpO2:  [92 %-100 %] 99 %        Intake/Output Summary (Last 24 hours) at 3/30/2021 0756  Last data filed at 3/30/2021 0600  Gross per 24 hour   Intake 2699 ml   Output 1900 ml   Net 799 ml       Wt Readings from Last 4 Encounters:   03/30/21 132 kg (291 lb 0.1 oz)   03/17/21 128.3 kg (282 lb 12.8 oz)   03/15/21 136.3 kg (300 lb 7.8 oz)   12/16/20 136.1 kg (300 lb)     Arterial Line BP: ()/(29-70) 125/52  MAP:  [50 mmHg-105 mmHg] 75 mmHg  Ventilation Mode: CMV/AC  (Continuous Mandatory Ventilation/ Assist Control)  FiO2 (%): 30 %  Rate Set (breaths/minute): 12 breaths/min  Tidal Volume Set (mL): 400 mL  PEEP (cm H2O): 8 cmH2O  Pressure Support (cm H2O): 5 cmH2O  Oxygen Concentration (%): 30 %  Resp: (!) 38    Recent Labs   Lab 03/31/21  0421 03/30/21  1650 03/30/21  0400 03/29/21  1700 03/29/21  1247   PH 7.42 7.46* 7.41  --  7.43   PCO2 60* 54* 64*  --  72*   PO2 92 145* 88  --  158*   HCO3 39* 38* 41*  --  47*   O2PER 30%  --  30% ART LINE HIDE       Gen: Awake and alert. Appears comfortable while intubated.  HEENT: Normocephalic atraumatic. Dry mucous membranes.  Pulm: Lungs CTA bilaterally anteriorly.   CV: Regular rate and rhythm no extra heart sounds.  Abdomen/GI: Abdomen is soft nontender and nondistended  : Catheter is in  place  Extremities: No lower extremity edema  Skin: Extremities warm, no mottling of skin  Neuro: Alert and seems to be oriented though unable to speak verbally - able to use thumbs to describe mentation and pain. Able to communicate clearly in writing on paper with R hand. Good strength bilaterally in UEs and LEs.         Data:     No results found for this or any previous visit (from the past 24 hour(s)).     ROUTINE ICU LABS (Last four results)  CMP  Recent Labs   Lab 03/31/21 0421 03/30/21  0400 03/29/21  0550 03/28/21  0739 03/27/21  1429    138 139 139  --    POTASSIUM 3.7 3.6 4.0 3.7  --    CHLORIDE 98 98 96 95  --    CO2 38* 41* >45* >45*  --    ANIONGAP <1* <1* Not Calculated Not Calculated  --    GLC 76 80 98 79  --    BUN 19 17 17 15  --    CR 0.64* 0.58* 0.57* 0.57*  --    GFRESTIMATED >90 >90 >90 >90  --    GFRESTBLACK >90 >90 >90 >90  --    NOAH 8.6 8.5 8.8 8.5  --    MAG 1.8 1.8 1.7  --   --    PHOS 2.5 3.1 1.9*  --  2.0*     CBC  Recent Labs   Lab 03/31/21 0421 03/30/21 0400 03/29/21  0550 03/28/21  0739   WBC 4.9 4.2 4.5 4.5   RBC 4.11* 3.98* 4.36* 4.55   HGB 11.0* 10.5* 11.8* 11.9*   HCT 36.7* 36.1* 39.8* 42.7   MCV 89 91 91 94   MCH 26.8 26.4* 27.1 26.2*   MCHC 30.0* 29.1* 29.6* 27.9*   RDW 18.5* 18.3* 17.0* 17.2*   * 112* 123* 125*     INRNo lab results found in last 7 days.  Arterial Blood Gas  Recent Labs   Lab 03/31/21 0421 03/30/21  1650 03/30/21  0400 03/29/21  1700 03/29/21  1247   PH 7.42 7.46* 7.41  --  7.43   PCO2 60* 54* 64*  --  72*   PO2 92 145* 88  --  158*   HCO3 39* 38* 41*  --  47*   O2PER 30%  --  30% ART LINE HIDE       All cultures:  No results for input(s): CULT in the last 168 hours.  No results found for this or any previous visit (from the past 24 hour(s)).    Linden Tierney, MS4    Patient seen and discussed with Dr. Haley who will attest independently.    Physician Attestation   I, Fan Haley, was present with the medical/BRIDGET student who  participated in the service and in the documentation of the note.  I have verified the history and personally performed the physical exam and medical decision making.  I agree with the assessment and plan of care as documented in the note.      I personally reviewed vital signs, medications, labs and imaging.    Significant for:   K 3.7, Cr 0.64. Phos 2.5 Mag 1.8.   ABG 7.42/60/92/39.   WBC 4.9 Hgb 11, plts 102.      Assessment and plan:  81 yo M with a history of myasthenia gravis, HFpEF, COPD, HTN, MDD, HAZEL, and prostate cancer who presented from TCU for acute hypoxic respiratory failure (after recent admission from 3/4 to 3/15 for acute hypoxic and hypercapnic respiratory failure requiring intubation due to myasthenia gravis exacerbation and CHF exacerbation). He was found to have profound hypercapnia and metabolic alkalosis. Here, he initially was intubated from 3/19 to 3/22, and received treatment with IVIG and steroids. Unfortunately, he had worsening encephalopathy and worsening respiratory acidosis requiring transfer back to the ICU and intubation on 3/29. His mental status is now improved. Dialysis catheter placed on 3/31 for initiation of plasmapheresis.      Myasthenia gravis: likely having persistent weakness from this causing his recurrent acute hypercarbic respiratory failure. Had HD line placed by IR today for plasmapheresis, which will start today. Nephrology consulted to run plasmapheresis, appreciate assistance. Goals of care remain restorative at this point, at least until after plasmapheresis treatment, including okay with re-intubation (but would not want to have a permanent vent). Continue mycophenolate and prednisone. On bactrim for PJP ppx. Pyridostigmine 60 mg q8h. Check NIF after plasmapheresis for baseline.      Acute hypercarbic respiratory failure: worsened requiring re-intubation on 3/29, now improved. Continue vent settings. After first run of plasmapheresis today, we will attempt PST  and hopefully extubate, with plan to initially extubate to bipap (with AVAPS settings). Check VBG PRN. Serum bicarb continue to improve, some chronic hypercapnia with PvCO2 of 60 with normal pH.      Diastolic CHF, hypertension: Will add back labetalol PRN, was on metoprolol at home. Will stop MIVF today, anticipating extubation, so he is a bit on the dry side.       I personally spent 35  minutes of critical care time reviewing labs and imaging, examining the patient, managing the ventilator. This does not include time spent on procedures or teaching. He is critically ill due to above.       Fan Haley MD  Date of Service (when I saw the patient): 03/31/21

## 2021-03-31 NOTE — PROGRESS NOTES
Treatment in room CS-OF using Optia apheresis machine. Consent verified.     Height: 5 ft 10in  Weight: 132kg  HCT: 36%  Inlet speed: 75-95  ACDA ratio: 10:1  Fluid balance: 100  Access: right tunneled CVC. Biopatch intact. Dressing change due 4/7/2021     Replacement product: 6300ml 5% Albumin for 1.5 volume exchange  Electrolyte replacement: Ca Gluconate 6.5 grams in NS - total 150 mls, piggybacked into return lines, infused over time of treatment.    Premedications: none     Treatment Notes: tolerated treatment well. No complaints     Treatment completed as ordered, VSS, see EPIC flowsheet for run data.     Next treatment: Friday 4/2/2021 @ 1200    Jalyn Watson RN

## 2021-03-31 NOTE — IR NOTE
Interventional Radiology Intra-procedural Nursing Note    Patient Name: Thanh Goodrich  Medical Record Number: [id number]  Today's Date: March 31, 2021    Start Time: 1109  End of procedure time: 1118  Procedure: Tunneled large bore central line placement with possible intravenous moderate sedation  Report given to: AMINAH Crenshaw  Time pt departs:  1128    Other Notes: Pt into IR suite 1 via cart IVF infusing. Pt awake and alert. To table in supine position prepped and drapped with 2%. VSS. Tele SB. Dr. Spring in room. Time out and procedure started. Pt tolerated procedure well. Debrief with Dr. Spring Dressing CDI. No complications. Pt transferred back to ICU. Report given over the phone    Medications:    Lidocaine 1% 8ml  Heparin 8000units -lock CVC    Adeline Bingham RN

## 2021-03-31 NOTE — PROCEDURES
[facility]    Procedure: Tunneled catheter placement.     Date/Time: 3/31/2021 11:24 AM  Performed by: Gaby Spring DO  Authorized by: Gaby Spring DO     UNIVERSAL PROTOCOL   Site Marked: Yes  Prior Images Obtained and Reviewed:  Yes  Required items: Required blood products, implants, devices and special equipment available    Patient identity confirmed:  Verbally with patient, arm band, provided demographic data and hospital-assigned identification number  Patient was reevaluated immediately before administering moderate or deep sedation or anesthesia  Confirmation Checklist:  Patient's identity using two indicators, relevant allergies, procedure was appropriate and matched the consent or emergent situation and correct equipment/implants were available  Time out: Immediately prior to the procedure a time out was called    Universal Protocol: the Joint Commission Universal Protocol was followed    Preparation: Patient was prepped and draped in usual sterile fashion           ANESTHESIA    Anesthesia: Local infiltration  Local Anesthetic:  Lidocaine 1% without epinephrine      SEDATION    Patient Sedated: No    See dictated procedure note for full details.  Findings: Right internal jugular tunneled catheter placement.     Specimens: none    Complications: None    Condition: Stable    Plan: Ok to use.     PROCEDURE   Patient Tolerance:  Patient tolerated the procedure well with no immediate complications    Length of time physician/provider present for 1:1 monitoring during sedation: 0

## 2021-03-31 NOTE — PLAN OF CARE
Alert and oriented X 4. Communicates via writing. ETT, fully vent-supported. PS X 30 minutes this afternoon, unable to tolerate longer. Dialysis catheter placed this AM, inferior R clavicle. First round plasmapheresis completed. TF increased to 30cc/hr. IVF's to TKO. Urine output adequate. Cousin Devorah present, supportive. Updated on POC. Continue to monitor.

## 2021-03-31 NOTE — PROGRESS NOTES
Neurocritical Care Note:  S: Sitting up and reading a book in bed. Wide awake. Denies pain.     O:  BP (!) 88/54   Pulse 61   Temp 97.7  F (36.5  C) (Axillary)   Resp 19   Wt 132 kg (291 lb 0.1 oz)   SpO2 100%   BMI 41.76 kg/m      Examined off sedation. On exam, Mr Goodrich is wide awake and fully interactive. He answers questions appopriately by nodding and writes out his questions with proper spelling, penmanship, and grammar. He follows commands reliably. Pupils symmetric and react to light. No facial asymmetry. Tongue protrusion midline. Limb strength full and symmetric. Neck extension 5/5, flexion 4/5. Sensation grossly intact to light touch.    Imaging and labs personally reviewed in EMR.    A/P:   #myasthenia gravis crisis  #hypercarbic respiratory failure sp intubation x3    Mr Goodrich is a 81yo gentleman currently admitted for management of myasthenia gravis crisis with persistent hypoventilation and hypercarbic respiratory failure. He is currently intubated and doing well. He has received multiple doses of IVIG over the past month with only modest improvement. He met with palliative care today and expressed desire for restorative care. Our plan is to proceed with plasmapheresis as soon as a dialysis catheter can be placed. He will receive PLEX every other day for 5 round of treatment.     Recommendations  - After catheter place, will need plasmapheresis every other day for a total of 5 treatment,   - Continue mycophenolate  - Continue pyridostigmine  - Continue prednisone, he will need PJP prophylaxis at this dose  - Continue to check NIF and FVC at least daily  -Please limit sedation to the least necessary to permit adequate neurologic assessment but also to reduce the risk of further deconditioning  -There are multiple, medications that can worsen myasthenia gravis, please reference this resource [email or web address]    This patient was seen and discussed with  "attending, Dr Rodney Sargent MD  Vascular Neurology Fellow  To page me or covering stroke neurology team member, click here: [facility]COM   Choose \"On Call\" tab at top, then search dropdown box for \"Neurology Adult\", select location, press Enter, then look for stroke/neuro ICU/telestroke.    "

## 2021-03-31 NOTE — PROGRESS NOTES
BRIEF NUTRITION NOTE:    Monitoring TF adequacy.  A full Nutrition Reassessment was completed 3/29.  See note for details.    NEW FINDINGS:  3/29:  Chest x-ray = Enteric tube with tip in the fundus of the stomach (OG)   3/30:  WOCN = post leg intact nonblanchable erythema unknown etiology POA   Status: initial assessment  3/31:  IR Procedure = Tunneled catheter placement for plasmapheresis     TF has been running at 15 mL/hr over the past 3 days.  Received approval in rounds to increase TF towards goal rate.    INTERVENTIONS:  Enteral Nutrition - Modify rate --> Increase TF Vital High Protein now to 30 mL/hr;  Increase by 15 mL every 12 hrs to goal 60 mL/hr = 1440 kcals (12 kcal/kg), 125 g PRO (1.6 gm/kg), 1203 ml free H20, 159 g CHO, and 0 g fiber daily   Will continue Certavite for now, but consider stopping it when good tolerance to goal TF has been established.    Kalyani Eaton, RD, LD, CNSC

## 2021-03-31 NOTE — PLAN OF CARE
[facility] Intensive Care Unit   Nursing Note                                                     Neuro:  PERRL.  Moves all extremities.  Follows commands. Cardiovascular:  RRR.  Hemodynamically stable.  Pulmonary:  Tolerating ventilator on fentanyl.  Oxygen saturation > 92, FiO2 30%. PS trial unsuccessful after 15 min.   GI/:  Adequate UOP.  Endocrine: Glucose well controlled.  Skin:   Protective mepilex to sacrum.  Restraints:  Not in use or necessary.  AM labs noted.  Continue to monitor closely.      Recent Labs   Lab 03/31/21  0421 03/30/21  1650 03/30/21  0400 03/29/21  1700 03/29/21  1247   PH 7.42 7.46* 7.41  --  7.43   PCO2 60* 54* 64*  --  72*   PO2 92 145* 88  --  158*   HCO3 39* 38* 41*  --  47*   O2PER 30%  --  30% ART LINE HIDE       Lab Results   Component Value Date    TROPI 0.022 03/29/2021    TROPI 0.023 03/29/2021    TROPI 0.028 03/29/2021    TROPI 0.060 (H) 03/19/2021    TROPI <0.015 03/04/2021    TROPONIN <0.04 02/04/2007       ROUTINE IP LABS (Last four results)  BMP  Recent Labs   Lab 03/31/21 0421 03/30/21  0400 03/29/21  0550 03/28/21  0739    138 139 139   POTASSIUM 3.7 3.6 4.0 3.7   CHLORIDE 98 98 96 95   NOAH 8.6 8.5 8.8 8.5   CO2 38* 41* >45* >45*   BUN 19 17 17 15   CR 0.64* 0.58* 0.57* 0.57*   GLC 76 80 98 79     CBC  Recent Labs   Lab 03/31/21 0421 03/30/21  0400 03/29/21  0550 03/28/21  0739   WBC 4.9 4.2 4.5 4.5   RBC 4.11* 3.98* 4.36* 4.55   HGB 11.0* 10.5* 11.8* 11.9*   HCT 36.7* 36.1* 39.8* 42.7   MCV 89 91 91 94   MCH 26.8 26.4* 27.1 26.2*   MCHC 30.0* 29.1* 29.6* 27.9*   RDW 18.5* 18.3* 17.0* 17.2*   * 112* 123* 125*     INRNo lab results found in last 7 days.  PTTNo lab results found in last 7 days.  LACTIC ACID  Lactic Acid (mmol/L)   Date Value   03/19/2021 0.7   03/04/2021 0.8   03/04/2021 1.4     Blood Glucose  Glucose (mg/dL)   Date Value   03/30/2021 119 (H)   03/30/2021 100 (H)   03/30/2021 92   03/29/2021 129 (H)   03/29/2021 112 (H)    03/29/2021 90       Intake/Output Summary (Last 24 hours) at 3/31/2021 0541  Last data filed at 3/31/2021 0400  Gross per 24 hour   Intake 2839.83 ml   Output 1010 ml   Net 1829.83 ml       Augusta Bhatti BSN RN   [facility]  Intensive Care Unit

## 2021-04-01 NOTE — PROGRESS NOTES
Replaced by [facility] ICU RESPIRATORY NOTE        Date of Admission: 3/19/2021    Date of Intubation (most recent): 3/29/21    Reason for Mechanical Ventilation: Airway protection    Number of Days on Mechanical Ventilation: 4    Met Criteria for Spontaneous Breathing Trial: yes        Significant Events Today: PS trial done today.     ABG Results:   Recent Labs   Lab 04/01/21  0435 03/31/21  0421 03/30/21  1650 03/30/21  0400 03/29/21  1700   PH 7.41 7.42 7.46* 7.41  --    PCO2 58* 60* 54* 64*  --    PO2 83 92 145* 88  --    HCO3 37* 39* 38* 41*  --    O2PER 30% 30%  --  30% ART LINE       Current Vent Settings: Ventilation Mode: CMV/AC  (Continuous Mandatory Ventilation/ Assist Control)  FiO2 (%): 30 %  Rate Set (breaths/minute): 12 breaths/min  Tidal Volume Set (mL): 400 mL  PEEP (cm H2O): 5 cmH2O  Pressure Support (cm H2O): 5 cmH2O  Oxygen Concentration (%): 30 %  Resp: 18      Plan: continue full vent support.     Marsha Joseph, RT

## 2021-04-01 NOTE — PLAN OF CARE
Neuro:  PERRL.  Moves all extremities.  Follows commands.   Cardiovascular:  RRR.  Hemodynamically stable.  Pulmonary:  Tolerating ventilator on fentanyl.  Oxygen saturation > 92, FiO2 30%. PS trial immediately unsuccessful.   GI/:  Adequate UOP.  Endocrine: Glucose well controlled.  Skin:   Protective mepilex to sacrum.  Restraints:  Not in use or necessary.  AM labs noted.  Continue to monitor closely.

## 2021-04-01 NOTE — PROGRESS NOTES
"SPIRITUAL HEALTH SERVICES  SPIRITUAL ASSESSMENT Progress Note  [facility] ICU     REFERRAL SOURCE: Follow Up    I shared a brief visit with Rigoberto today, known to me from previous visits. Rigoberto is in the ICU and intubated, though he is able to write and is alert. He was tearful today during our visit. He shares he is struggling with the \"bad news\" he has received about his health and shares he doesn't have the energy for a visit. He asked that I stop by tomorrow. I shared words of comfort and affirmation of his emotions and experience.     PLAN: I will plan to follow up and check in with Rigoberto tomorrow.     Dottie Samuels  Associate    Phone: [phone]  Pager: [phone]    "

## 2021-04-01 NOTE — PROGRESS NOTES
"Neurocritical Care Note:  S: Sitting up and reading a book in bed. Wide awake. Receive 1/5 PLEX yesterday without complication    O:  /55   Pulse 64   Temp 97.5  F (36.4  C) (Axillary)   Resp 11   Ht 1.778 m (5' 10\")   Wt 132 kg (291 lb 0.1 oz)   SpO2 91%   BMI 41.76 kg/m      Examined off sedation. On exam, Mr Goodrich is wide awake and fully interactive. He answers questions appopriately by nodding and writes out his questions with proper spelling, penmanship, and grammar. He follows commands reliably. Pupils symmetric and react to light. No facial asymmetry. Tongue protrusion midline. Limb strength full and symmetric. Neck extension 5/5, flexion 4/5. Sensation grossly intact to light touch.    Imaging and labs personally reviewed in EMR.    A/P:   #myasthenia gravis crisis  #hypercarbic respiratory failure sp intubation x3    Mr Goodrich is a 79yo gentleman currently admitted for management of myasthenia gravis crisis with persistent hypoventilation and hypercarbic respiratory failure. He is currently intubated and doing well. He has received multiple doses of IVIG over the past month with only modest improvement. He is currently receiving PLEX. Yesterday was session 1/5. Next session is tomorrow.     Recommendations  - Plex 2/5 tomorrow   - Continue mycophenolate  - Continue pyridostigmine  - Continue prednisone, he will need PJP prophylaxis at this dose  - Continue to check NIF and FVC at least daily  - Please limit sedation to the least necessary to permit adequate neurologic assessment but also to reduce the risk of further deconditioning  -There are multiple, medications that can worsen myasthenia gravis, please reference this resource [email or web address]    This patient was seen and discussed with attending, Dr Rodney Sargent MD  Vascular Neurology Fellow  To page me or covering stroke neurology team member, click here: AMCOM   Choose \"On Call\" tab at top, then " "search dropdown box for \"Neurology Adult\", select location, press Enter, then look for stroke/neuro ICU/telestroke.    "

## 2021-04-01 NOTE — PROGRESS NOTES
Spontaneous Breathing Trial    Criteria met for SBT (Y/N):Yes    Settings:    PS: 5  PEEP:5  FiO2:30%    Measured Parameters:    RR:22  Tidal volume:457mls  RSBI: 53    Patient tolerance: First trial was 15 mins long, resulting in periods of apnea. Second trial was more successful, lasting 60 mins      Duration of SBT: 15 mins and 60 mins  Plan: Continue PST as tolerated

## 2021-04-01 NOTE — PROGRESS NOTES
[facility] ICU RESPIRATORY NOTE        Date of Admission: 3/19/2021    Date of Intubation (most recent): 3/29/21    Reason for Mechanical Ventilation: Airway protection    Number of Days on Mechanical Ventilation: 4    Met Criteria for Spontaneous Breathing Trial: Yes    Reason for No Spontaneous Breathing Trial: N/A    Significant Events Today: First pressure support trial was PS 5/5 for 15 minutes. Second trial was PS 5/5 for 60 minutes.    ABG Results:   Recent Labs   Lab 04/01/21  0435 03/31/21  0421 03/30/21  1650 03/30/21  0400 03/29/21  1700   PH 7.41 7.42 7.46* 7.41  --    PCO2 58* 60* 54* 64*  --    PO2 83 92 145* 88  --    HCO3 37* 39* 38* 41*  --    O2PER 30% 30%  --  30% ART LINE       Current Vent Settings: Ventilation Mode: (S) CMV/AC  (Continuous Mandatory Ventilation/ Assist Control)  FiO2 (%): 30 %  Rate Set (breaths/minute): 12 breaths/min  Tidal Volume Set (mL): 400 mL  PEEP (cm H2O): 5 cmH2O  Pressure Support (cm H2O): 5 cmH2O  Oxygen Concentration (%): 30 %  Resp: 11      Skin Assessment: Skin intact    Plan: RT to continue to follow and monitor.     Ashwini Julien

## 2021-04-01 NOTE — PLAN OF CARE
With patients first PS trial this AM, went apenic when dozing off. Fentanyl gtt stopped and patient did better with second PS trial. Plan for apheresis tomorrow with more attempts with PS trial. Tylenol given for discomfort.    N: Intact  CV: SB/SR. Normotensive  LS: Tolerating vent. Numbing spray and tylenol given for ETT discomfort  GI/:TF advanced to goal this PM. Adequate urine output via alberto  IV: LR through PIV. Arterial line remains in place

## 2021-04-01 NOTE — PLAN OF CARE
OT: attempted OT, pt reports he is trying to sleep but it's too noisy and declined therapy at this time. Pt educated in leaving a theraband in his room for next session. Pt writing to communicate.

## 2021-04-01 NOTE — PROGRESS NOTES
[facility] ICU RESPIRATORY NOTE        Date of Admission: 3/19/2021    Date of Intubation (most recent): 3/29/2021    Reason for Mechanical Ventilation: Airway protection     Number of Days on Mechanical Ventilation: 4    Met Criteria for Spontaneous Breathing Trial: Yes    Significant Events Today:  PS 5/5 was performed and pt failed within 1 minute     ABG Results:   Recent Labs   Lab 04/01/21  0435 03/31/21  0421 03/30/21  1650 03/30/21  0400 03/29/21  1700   PH 7.41 7.42 7.46* 7.41  --    PCO2 58* 60* 54* 64*  --    PO2 83 92 145* 88  --    HCO3 37* 39* 38* 41*  --    O2PER 30% 30%  --  30% ART LINE       Current Vent Settings: Ventilation Mode: CMV/AC  (Continuous Mandatory Ventilation/ Assist Control)  FiO2 (%): 30 %  Rate Set (breaths/minute): 12 breaths/min  Tidal Volume Set (mL): 400 mL  PEEP (cm H2O): 5 cmH2O  Pressure Support (cm H2O): 5 cmH2O  Oxygen Concentration (%): 30 %  Resp: 17    Spontaneous Breathing Trial    Criteria met for SBT (Y/N): Yes     Settings:    PS:5  PEEP:5  FiO2:30%    Measured Parameters:    RR:12  Tidal volume:396  RSBI: 30    Patient tolerance: No   Minute ventilation was very low.     Duration of SBT: 1 minute   Plan: Will continue to re assess the pt.         Mina Torres, RT

## 2021-04-01 NOTE — PROGRESS NOTES
Renal Medicine       Remains intubated  Tolerated initial PLEX    No access issues    Plan PLEX 2 of 5   04/02/21        Recent Labs   Lab 04/01/21  0432      POTASSIUM 3.5   CHLORIDE 100   CO2 37*   ANIONGAP <1*   GLC 97   BUN 17   CR 0.58*   GFRESTIMATED >90   GFRESTBLACK >90   NOAH 8.3*           RANJIT Hazel    [facility]  [phone]

## 2021-04-01 NOTE — PROGRESS NOTES
Critical Care Progress Note      04/01/2021    Name: Thanh Goodrich MRN#: [id number]   Age: 80 year old YOB: 1940                    Problem List:   Principal Problem:    Acute on chronic respiratory failure with hypercapnia (H)  Active Problems:    Myasthenic crisis (H)    Metabolic alkalosis superimposed on acute respiratory acidosis    Benign essential hypertension    Morbid obesity -- BMI 40.6    Metabolic encephalopathy    Abnormal urinalysis    Immunosuppressed for MG         Summary/Hospital Course:   Thanh Goodrich is a 80 year old male admitted on 3/19/2021 with significant history for myasthenia gravis, HFpEF, COPD, HTN, MDD, HAZEL, prostate cancer who presents from TCU for acute hypercapnic respiratory failure. He was recently admitted and discharged (3/4-3/15) for acute hypoxic and hypercapnic respiratory failure requiring intubation (3/6/21-3/11/21) 2/2 Myasthenia gravis exacerbation, diastolic CHF exacerbation with diuresis.   Metabolic alkalosis. He received IVIG and steroid treatment and was followed by NCC. He was extubated on 3/22 and transferred to hospitalist service 3/23. Subsequently experienced worsening encephalopathy and was found to have worsening respiratory acidosis despite BiPAP treatment and was transferred back to ICU on 3/29. Intubated due to persistent hypercarbia. Palliative care saw the patient on 3/30 and it was decided to pursue HD catheter (IR), begin plasmapheresis (nephrology), use BiPAP after extubation, and re-intubate if needed. HD catheter placed 3/31 and tolerated first plasmapheresis.      Assessment and plan :     Thanh Goodrich is a 80 year old male admitted on 3/19/2021 for acute hypercapnic respiratory failure due to myasthenia gravis exacerbation requiring intubation, and re-intubated 3/29 due to repeat of hypercarbic respiratory failure. Most likely represents worsening of his myasthenia gravis. Neurology recommending plasmapheresis in the setting  of lack of improvement.  I have personally reviewed the daily labs, imaging studies, cultures and discussed the case with referring physician and consulting physicians.   My assessment and plan by system for this patient is as follows:    CNS:   Myasthenia gravis  Recurrent exacerbations. Neurocritical care is following and recommending plasmapheresis. Had goals of care discussion on 3/30 with assistance of palliative care, and plan is to undergo HD line placement and plasmapheresis which he began on 3/31. He understands that there is no guarantee that this will improve the underlying disease, that he may need to wear BiPAP indefinitely after extubation, and that he may need to be re-intubated if he were unable to tolerate BiPAP as in the past. He and loved-ones understand and agree to pursue these cares at this time.  Plan:  - Neurocrit following, appreciate assistance/recommendations  - Appreciate palliative care assistance - may benefit from more discussion around possibility of ET tube not being able to be removed  - Nephrology following for plasmapheresis, current plan is MWF x 5 treatments starting 3/31  - Continue mycophenalate 1000 mg Q Am and 500 mg at bedtime  - Continue daily prednisone 60 mg daily  - Continue pyridostigmine 60 mg every 8 hours   Acute pain and sedation:  - Stop fentanyl drip (restart if needed). Prefer Tylenol PRN pain to avoid sedation    Pulm:   Acute hypercarbic respiratory failure  Likely secondary to MG. Improving - still intubated/ventilated though maintaining acid/base status and mentation appears near baseline.  NIF was 35 on 3/30 suggestive of adequate inspiratory force, though unsuccessful PST 3/31, 4/1. High threshold for extubation and would want him to pass PST prior to extubation. Unclear how well he would do off the ventilator at this time and appears he may still require BiPAP (AVAPS) support. Hope that his ability to breathe spontaneously would improve if underlying MG  improves over days/weeks with plasmapheresis, but this is not certain. He has agreed to wear his BiPAP indefinitely after extubation though is noted to be unable to tolerate it in the past (warranting intubation).  Plan:  - At least daily PST to evaluate readiness for extubation  - RT please check NIF BID  - Will likely need to extubate to BiPAP/AVAPS when NIF/FVC adequate and passes PST  - Will continue current vent settings of RR 12, , PEEP 8, FiO2 30%  - Stop scheduled ABG checks. PRN VBG available  - will hold on extubation today, despite passing second PST, to get one more Plex treatment tomorrow.     CV:   History of CHF  Appeared to be volume down on 3/29 related to diuresis/BMs over weekend so gave gentle hydration. Appears to have responded well to this with good UO 3/29 and improvement of bicarbonate (concern for contraction alkalosis). Would like to avoid fluid overload due to CHF.  -- Continue gentle hydration with LR, increase to 75 mL/hr for the next day or so. Tube feeds still up-titrating to goal.   -- Holding metoprolol with hypotension  Hypotension without evidence of shock  Resolved. Seemed most likely related to propofol, was on norepi to maintain MAPs. Propofol and Norepi now weaned off with MAP >65 off propofol and pressor.  --Continue to monitor.    FEN/GI:   Nutrition consult for tube feeds through OG, started 3/29.   --Continue tube feeds for now, advance as tolerated  --PRN bowel regimen    Renal / lytes / acid-base  Respiratory acidosis with likely metabolic alkalosis from ?diuresis  Respiratory acidosis improving on vent, metabolic alkalosis improved with fluids. ABG stabilizing with pH WNL, pCO2/bicarb elevated but perhaps component of chronic hypercapnia. Mental status good. Normal creatinine (baseline ~0.6) though low UO with careful hydration. Note that magnesium administration can worsen MG if given IV per Neurocritical care's list of meds that can worsen MG.  - follow BMPs and  "I/Os  - gentle IV fluid hydration as above, increase with low Urine output today.   - Advancing tube feeds and getting flushes.  - Phos replacement protocols  - discontinued Mg replacement protocol, would prefer oral instead if needed    Heme:  Thrombocytopenia: Plt down to 85 on 4/1. No petechiae or signs of bleeding. Unclear cause. Seems most likely dilution/phlebotomy (plasmapheresis started 3/31). On mycophenolate but this is at PTA dose. Doubt DELFIN though is on ppx with heparin.  Normocytic anemia with hgb down to 9.8 on 4/1 (baseline 11-12). Likely related to phlebotomy and chronic disease.   --Monitor CBC daily  --Consider reducing blood draws as able    Msk:   No acute issues    Endo:   At risk for stress and steroid related hyperglycemia, though glucose has been controlled with tube feeds started 3/29.   Plan:  -Monitoring per RD protocol.    ID:   Patient had MSSA PNA diagnosed 3/21 - procal 3/29 WNL, oxygenating well. Mild R lung patchy infiltrate on CXR 3/29. Received a few days of unasyn and was then switched to amox x4 days for E coli UTI, now complete.  Plan:  --Continue Bactrim for PPx of PJP given steroid use (started 3/29)    General cares:  DVT Prophylaxis: Heparin subcutaneous (on hold due to thrombocytopenia). Mechanical   GI Prophylaxis: H2 blocker  Restraints: Restraints for medical healing needed: NO  Family update by me today: Yes, cousin Gaby  Current lines are required for patient management  Access: right radial arterial line. PIV x2  Feeding: RD consult, tube feeds 3/29          Interim History:   HD line placed by IR and tolerated first plasmapheresis treatment. Unsuccessful SBT x2 on 3/31 AM and 4/1 so extubation was deferred. Wondering about \"what if\" he never becomes strong enough to breathe without ET tube.         Key Medications:       chlorhexidine  15 mL Mouth/Throat Q12H     famotidine  20 mg Oral or Feeding Tube BID     [Held by provider] heparin ANTICOAGULANT  5,000 Units " Subcutaneous Q8H     [Held by provider] metoprolol tartrate  12.5 mg Oral BID     multivitamins w/minerals  15 mL Per Feeding Tube Daily     mycophenolate  1,000 mg Oral or NG Tube Daily     mycophenolate  500 mg Oral or NG Tube At Bedtime     predniSONE  60 mg Oral or Feeding Tube Daily     pyridostigmine  60 mg Oral or Feeding Tube Q8H [facility]     sulfamethoxazole-trimethoprim  1 tablet Oral or Feeding Tube Daily     cholecalciferol  100 mcg Oral or Feeding Tube Daily       dextrose       fentaNYL 50 mcg/hr (03/31/21 2000)     lactated ringers 10 mL/hr at 03/31/21 2000     - MEDICATION INSTRUCTIONS -       - MEDICATION INSTRUCTIONS -                 Physical Examination:   Temp:  [97  F (36.1  C)-97.7  F (36.5  C)] 97  F (36.1  C)  Pulse:  [43-92] 46  Resp:  [10-38] 14  BP: (101-172)/(51-86) 124/55  MAP:  [55 mmHg-113 mmHg] 63 mmHg  Arterial Line BP: ()/(35-78) 114/43  FiO2 (%):  [30 %-35 %] 30 %  SpO2:  [86 %-100 %] 100 %        Intake/Output Summary (Last 24 hours) at 3/30/2021 0756  Last data filed at 3/30/2021 0600  Gross per 24 hour   Intake 2699 ml   Output 1900 ml   Net 799 ml       Wt Readings from Last 4 Encounters:   03/31/21 132 kg (291 lb 0.1 oz)   03/17/21 128.3 kg (282 lb 12.8 oz)   03/15/21 136.3 kg (300 lb 7.8 oz)   12/16/20 136.1 kg (300 lb)     Arterial Line BP: ()/(35-78) 114/43  MAP:  [55 mmHg-113 mmHg] 63 mmHg  Ventilation Mode: CMV/AC  (Continuous Mandatory Ventilation/ Assist Control)  FiO2 (%): 30 %  Rate Set (breaths/minute): 12 breaths/min  Tidal Volume Set (mL): 400 mL  PEEP (cm H2O): 5 cmH2O  Pressure Support (cm H2O): 5 cmH2O  Oxygen Concentration (%): 30 %  Peak Inspiratory Pressure (cm H2O) (Drager Brit): 10  Resp: 11    Recent Labs   Lab 04/01/21  0435 03/31/21  0421 03/30/21  1650 03/30/21  0400 03/29/21  1700   PH 7.41 7.42 7.46* 7.41  --    PCO2 58* 60* 54* 64*  --    PO2 83 92 145* 88  --    HCO3 37* 39* 38* 41*  --    O2PER 30% 30%  --  30% ART LINE       Gen: Awake  and alert. Appears comfortable while intubated.  HEENT: Normocephalic atraumatic. Dry mucous membranes.  Pulm: Lungs CTA bilaterally anteriorly.   CV: Regular rate and rhythm no extra heart sounds.  Abdomen/GI: Abdomen is soft nontender and nondistended  : Catheter is in place with small amount dark yellow urine  Extremities: No lower extremity edema  Skin: Extremities warm, no mottling of skin, no rashes on exposed skin  Neuro: Alert and seems to be oriented though unable to speak verbally - able to use thumbs to describe mentation and pain. Able to communicate clearly in writing on paper with R hand. Good strength bilaterally in UEs and LEs.         Data:     No results found for this or any previous visit (from the past 24 hour(s)).     ROUTINE ICU LABS (Last four results)  CMP  Recent Labs   Lab 04/01/21 0432 03/31/21 0421 03/30/21 0400 03/29/21  0550 03/27/21  1429 03/27/21  1429    135 138 139   < >  --    POTASSIUM 3.5 3.7 3.6 4.0   < >  --    CHLORIDE 100 98 98 96   < >  --    CO2 37* 38* 41* >45*   < >  --    ANIONGAP <1* <1* <1* Not Calculated   < >  --    GLC 97 76 80 98   < >  --    BUN 17 19 17 17   < >  --    CR 0.58* 0.64* 0.58* 0.57*   < >  --    GFRESTIMATED >90 >90 >90 >90   < >  --    GFRESTBLACK >90 >90 >90 >90   < >  --    NOAH 8.3* 8.6 8.5 8.8   < >  --    MAG  --  1.8 1.8 1.7  --   --    PHOS  --  2.5 3.1 1.9*  --  2.0*    < > = values in this interval not displayed.     CBC  Recent Labs   Lab 04/01/21 0432 03/31/21 0421 03/30/21  0400 03/29/21  0550   WBC 5.0 4.9 4.2 4.5   RBC 3.60* 4.11* 3.98* 4.36*   HGB 9.8* 11.0* 10.5* 11.8*   HCT 31.8* 36.7* 36.1* 39.8*   MCV 88 89 91 91   MCH 27.2 26.8 26.4* 27.1   MCHC 30.8* 30.0* 29.1* 29.6*   RDW 18.5* 18.5* 18.3* 17.0*   PLT 85* 102* 112* 123*     INRNo lab results found in last 7 days.  Arterial Blood Gas  Recent Labs   Lab 04/01/21  0435 03/31/21  0421 03/30/21  1650 03/30/21  0400 03/29/21  1700   PH 7.41 7.42 7.46* 7.41  --    PCO2  58* 60* 54* 64*  --    PO2 83 92 145* 88  --    HCO3 37* 39* 38* 41*  --    O2PER 30% 30%  --  30% ART LINE       All cultures:  No results for input(s): CULT in the last 168 hours.  No results found for this or any previous visit (from the past 24 hour(s)).    Linden Kelsy, MS4    Patient seen and discussed with Dr. Haley who will attest independently.    Physician Attestation   I, Fan Haley, was present with the medical/BRIDGET student who participated in the service and in the documentation of the note.  I have verified the history and personally performed the physical exam and medical decision making.  I agree with the assessment and plan of care as documented in the note.      I personally reviewed vital signs, medications, labs and imaging.    Significant for:  K 3.5 Cr 0.58 BUN 17. Bicarb 37   ABG 7.41/58/83/37.   WBC 5, Hgb 9.8. Plts 85 (down from 102, from 112, from 123).     Assessment and plan:  81 yo M with a history of myasthenia gravis, HFpEF, COPD, HTN, MDD, HAZEL, and prostate cancer who presented from TCU for acute hypoxic respiratory failure (after recent admission from 3/4 to 3/15 for acute hypoxic and hypercapnic respiratory failure requiring intubation due to myasthenia gravis exacerbation and CHF exacerbation). He was found to have profound hypercapnia and metabolic alkalosis. Here, he initially was intubated from 3/19 to 3/22, and received treatment with IVIG and steroids. Unfortunately, he had worsening encephalopathy and worsening respiratory acidosis requiring transfer back to the ICU and intubation on 3/29. His mental status is now improved. Dialysis catheter placed on 3/31 for initiation of plasmapheresis, now improving slowly.      Myasthenia gravis: Causing some persistent weakness and his recurrent acute hypercarbic respiratory failure. Had HD line placed by IR 3/31, with initiation of plasmapheresis, next tomorrow (treatment 2), planning of 5 treatments in 10 days. Nephrology  consulted to run plasmapheresis, appreciate assistance. Goals of care remain restorative at this point, at least until after plasmapheresis treatment, including okay with re-intubation (but would not want to have a permanent vent). Continue mycophenolate and prednisone. On bactrim for PJP ppx. Pyridostigmine 60 mg q8h. Check NIF after plasmapheresis.Appreciate neuroCC assistance.      Acute hypercarbic respiratory failure: worsened requiring re-intubation on 3/29, now improved. On minimal vent settings. Did not do well with PST this AM, but subsequently passed today. However, will wait to extubate until tomorrow when he will have another plasmapheresis, in order to minimize chance of needing re-intubation. Will plan to pressure support after plasmapheresis tomorrow, and if passes, extubate, with plant to initially extubate to bipap (with AVAPS settings). Check VBG PRN. Serum bicarb continue to improve, some chronic hypercapnia with PvCO2 of 58 with normal pH.      Diastolic CHF, hypertension, low UOP: will increase hydration today with LR to 75 mL/hr givne low UOP, despite stable creatinine. Tube feeds are advancing toward goal, but not yet there. BP higher now, may need to re-introduce anti-hypertensive meds, if remains elevated. Has PRN labetalol.     Thrombocytopenia: worsening slowly. Monitor for now, hold heparin for DVT ppx given plts <100.     Normocytic anemia: worsening, Hgb 9.8 today, may be related to procedural blood loss and phlebotomy. Monitor, transfuse for Hgb <7.       I personally 40 min of critical care time reviewing labs and imaging, examining the patient, managing the ventilator. This does not include time spent on procedures or teaching. He is critically ill due to above.     Fan Haley MD  Date of Service (when I saw the patient): 04/01/21

## 2021-04-02 NOTE — CONSULTS
"[facility]  Urology Consult Note  Name: Thanh Goodrich    MRN: [id number]  YOB: 1940    Age: 80 year old  Date of admission: 3/19/2021  Primary care provider: Martinez Toledo     Requesting Physician:  Dr. Villegas  Reason for consult:  Urethral bleeding after Alberto removal             History of Present Illness:   Thanh Goodrich is a 80 year old male, seen at the request of Dr. Villegas, who is admitted from TCU with hypercapnic respiratory failure requiring intubation.  Has history of prostate cancer 15+ years ago s/p cryoablation with subsequent low to undetectable PSAs per his report.  He was seen by our service on 3/14/21 during last hospital admission for voiding difficulties; per patient at that time, was having more difficulty lifting his pannus to adequately void so Alberto was placed. He was to be discharged with a Alberto with standing order for monthly catheter exchanges at his TCU or our clinic.  It was also recommended to follow-up with his primary urologist, Dr. Charles, as he had not been seen since prior to the start of Covid-19 pandemic.  His follow-up is currently scheduled for this Monday 4/5 and will need to be delayed due to current hospitalization.      His catheter became non-functional this afternoon, and Rigoberto reported to his RN that \"something did not feel right.\" Repositioning and flushing were both attempted without success, so catheter was removed.  Patient began to have bleeding per urethra after alberto removal. Has been having good UOP (525/1450cc) prior to this. Per discussion with Dr. Villegas (intensivist) and AMINAH Pineda, bleeding has slowed.  Patient is intubated, but alert and oriented and able to provider his own history via writing.  Has not had any recent trouble with hematuria.  Alberto has not been changed since prior admission earlier in March, and there were calcifications on tip of catheter that was removed today.  AMINAH Pineda also reports Alberto seemed " to be pulled somewhat tight by stat-lock and was then removed from stat-lock because of this.  Patient denies any abdominal/suprapubic pain, penile or scrotal pain.  He is afebrile and vitally stable aside from HTN.      From intensivist hospital course summary: Thanh Goodrich is a 80 year old male admitted on 3/19/2021 with significant history for myasthenia gravis, HFpEF, COPD, HTN, MDD, HAZEL, prostate cancer who presents from TCU for acute hypercapnic respiratory failure. He was recently admitted and discharged (3/4-3/15) for acute hypoxic and hypercapnic respiratory failure requiring intubation (3/6/21-3/11/21) 2/2 Myasthenia gravis exacerbation, diastolic CHF exacerbation with diuresis.   Metabolic alkalosis. He received IVIG and steroid treatment and was followed by [facility]. He was extubated on 3/22 and transferred to hospitalist service 3/23. Subsequently experienced worsening encephalopathy and was found to have worsening respiratory acidosis despite BiPAP treatment and was transferred back to ICU on 3/29. Intubated due to persistent hypercarbia. Palliative care saw the patient on 3/30 and it was decided to pursue HD catheter (IR), begin plasmapheresis (nephrology), use BiPAP after extubation, and re-intubate if needed. HD catheter placed 3/31 and tolerated first plasmapheresis. Successful 2 hr PST on 4/2.            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     Cellulitis of left leg 9/23/2012     Depressive disorder 2020     Edema 7/7/2009     History of steroid therapy 2/22/2010     Hyperlipidemia LDL goal <130 10/31/2010     HYPERTENSION 7/8/2003     Incontinence of urine 10/25/2010     Leg ulcer (H) 9/20/2012     MALIGN NEOPL PROSTATE-3/07 4/2/2007    T1C, [place] 8, initial PSA 39, s/p radiation seed implants 2007      Myasthenia gravis (H)      OBESITY 7/8/2003     Osteoporosis 8/18/2009    Refused bisphosphonates 2011      PVC's  (premature ventricular contractions) 11/23/2011     RIGHT OCCIPITAL PAIN 7/8/2003     ROTATOR CUFF SYND NOS- RT 9/19/2005     Skin nodule 3/18/2013     ulcer left shin 10/8/2007     Uncomplicated asthma 1944?    childhood only             Past Surgical History:     Past Surgical History:   Procedure Laterality Date     BIOPSY  2013?    dealt with     IR CVC TUNNEL PLACEMENT > 5 YRS OF AGE  3/31/2021     Prostate Cancer Cryotherapy  2007     TONSILLECTOMY  1945               Social History:     Social History     Tobacco Use     Smoking status: Never Smoker     Smokeless tobacco: Never Used   Substance Use Topics     Alcohol use: Yes     Alcohol/week: 0.0 standard drinks     Comment: very rarely             Family History:     Family History   Problem Relation Age of Onset     Hypertension Mother      Depression Mother      Substance Abuse Mother         alcohol     Hypertension Father      Cancer Father         Prostate     Prostate Cancer Father      Substance Abuse Father         alcohol     Obesity Father      Diabetes Maternal Grandmother      C.A.D. Maternal Grandmother      Cerebrovascular Disease Maternal Grandfather      Cancer Paternal Uncle         Prostate     Prostate Cancer Other              Allergies:     Allergies   Allergen Reactions     Ciprofloxacin Other (See Comments)     Contraindicated for myasthenia gravis patients     Procainamide Other (See Comments)     Contraindicated for myasthenia gravis patients     Quinidine Other (See Comments)     Contraindicated for myasthenia gravis patients     Quinine Other (See Comments)     Contraindicated for myasthenia gravis patients             Medications:       acetaminophen  1,000 mg Oral or Feeding Tube TID     chlorhexidine  15 mL Mouth/Throat Q12H     famotidine  20 mg Oral or Feeding Tube BID     [Held by provider] heparin ANTICOAGULANT  5,000 Units Subcutaneous Q8H     lidocaine         [Held by provider] metoprolol tartrate  12.5 mg Oral BID      "multivitamins w/minerals  15 mL Per Feeding Tube Daily     mycophenolate  1,000 mg Oral or NG Tube BID     predniSONE  60 mg Oral or Feeding Tube Daily     pyridostigmine  60 mg Oral or Feeding Tube Q8H ALEXUS     sulfamethoxazole-trimethoprim  1 tablet Oral or Feeding Tube Daily     cholecalciferol  100 mcg Oral or Feeding Tube Daily             Review of Systems:   A comprehensive greater than 10 system review of systems was carried out.  Pertinent positives and negatives are noted above.  Otherwise negative for contributory info.            Physical Exam:     Blood pressure (!) 178/90, pulse 86, temperature 98  F (36.7  C), temperature source Axillary, resp. rate 16, height 1.778 m (5' 10\"), weight 116.1 kg (256 lb), SpO2 95 %.    Intake/Output Summary (Last 24 hours) at 4/2/2021 1454  Last data filed at 4/2/2021 1345  Gross per 24 hour   Intake 2982 ml   Output 1300 ml   Net 1682 ml     Exam:  General - Intubated, but awake, alert, oriented and able to provide history via written communication  Pulm - intubated  Abd - Soft, non-tender, non-distended.  No suprapubic tenderness.  No guarding, rigidity or peritoneal signs.    - buried penis, testicles descended bilaterally and non-tender, no masses or edema, dark blood around meatus and on towel  Neuro - CN II-XII grossly intact  Musculoskeletal - extremities with no clubbing, cyanosis  Psych - responsive, alert, cooperative; oriented x3; appropriate mood and affect  External/skin - inspection reveals no rashes, lesions or ulcers, normal coloring    Procedure:  Pts urethra/penis was prepped with betadine solution. Lidocaine lubrication was inserted into the urethra and allowed to take effect. A 22Fr 3-way Beckett was lubricated then inserted and advanced toward the bladder.  Resistance was encountered, so removed 22-Fr 3-way catheter.  Placement was then attempted with a smaller coude tip catheter. A 16 [place] coude catheter  was copiously lubricated then inserted and " advanced without difficulty into the bladder. Dark cherry colored urine returned.  The catheter balloon was then inflated with 10cc of sterile saline and pulled back where it seated well against the bladder wall.     The catheter was then hand irrigated with a total of ~350-400cc of sterile water in 60-120cc aliquots until the returns were clear with a light pink tinge.  No clots were noted during irrigation procedure.  At end of procedure, the catheter was connected to gravity drainage with continued drainage of clear light pink tinged output.  The catheter was then secured to patient's thigh with stat-lock.  Patient appeared to tolerate procedure well.           Data Reviewed:   No results found for this or any previous visit (from the past 24 hour(s)).    Recent Labs   Lab 04/02/21 0550 04/01/21 0432 03/31/21 0421   WBC 5.3 5.0 4.9   HGB 10.5* 9.8* 11.0*   HCT 34.2* 31.8* 36.7*   MCV 88 88 89   PLT 89* 85* 102*     Recent Labs   Lab 04/02/21 0550 04/01/21 0432 03/31/21  0421    137 135   POTASSIUM 3.7 3.5 3.7   CHLORIDE 100 100 98   CO2 35* 37* 38*   ANIONGAP 2* <1* <1*   * 97 76   BUN 17 17 19   CR 0.50* 0.58* 0.64*   GFRESTIMATED >90 >90 >90   GFRESTBLACK >90 >90 >90   NOAH 8.4* 8.3* 8.6     No results for input(s): COLOR, APPEARANCE, URINEGLC, URINEBILI, URINEKETONE, SG, UBLD, URINEPH, PROTEIN, UROBILINOGEN, NITRITE, LEUKEST, RBCU, WBCU in the last 168 hours.      Assessment and Plan:   Thanh Goodrich is a 80 year old male who presented with acute respiratory failure requiring intubation.  Had Alberto placed after being seen by our service on 3/14 during recent hospital admission with plans for routine catheter changes monthly.  Alberto non-functional this afternoon despite repositioning and irrigation by nursing.  Alberto removed with subsequent bleeding per urethra.    Plan:  1. Bleeding per urethra  a. Likely secondary to alberto trauma in setting of h/o prostate cancer and cryo, old Alberto also  noted to have calcifications on the tip of catheter  b. 16Fr coude Alberto placed at bedside and hand irrigated until clear with light pink tinge.  No clots noted throughout irrigation.  See procedure note above for details.  c. Monitor hematuria, Hgb  d. Can hand irrigate PRN for clots, obstruction.  If having to hand irrigate frequently, can consider 3-way Alberto and CBI.  e. Usually recommend alberto changes q4 weeks, but given calcifications, patient may require more frequent changes (every 2 or 3 weeks)  2. H/o prostate CA  a. S/p cyrotherapy  b. Due for annual follow up with Dr. Charles-- will reschedule his 4/5 appt    Dr. Brown is on call for our group shoshana and Dr Lopez tomorrow.    Maria Del Carmen Lee PA-C  [facility]  Phone: [phone]

## 2021-04-02 NOTE — PLAN OF CARE
No changes overnight. VSS on ventilator. A&Ox4. Able to makes needs known. Art line began bleeding, removed per patient request with MD approval. Tylenol and fentanyl for pain. Beckett patent with adequate output. Tolerating TF at goal. Plan for apheresis today.

## 2021-04-02 NOTE — PLAN OF CARE
PT: pt is getting medical treatment at this time and inappropriate for exercise. Will try back later.    Attempted session this afternoon, pt unavailable x2 due to nursing care and then needing urology consult due to bleeding from urethra, will reschedule PT.

## 2021-04-02 NOTE — PROGRESS NOTES
SHIFT SUMMARY   Patient AOx4, writes. Remains vented. Plan was to extubate but due to alberto difficulty late in shift (see previous notes) will reassess tomorrow morning due to history of problematic extubation. Alberto inserted by urology, now following. UOP initially bloody but now clearing up. Orders for irrigation placed. TF remains at goal. No complaints of pain.       Pt and family updated on plan. Pt agreeable with extubation to bipap tomorrow instead.

## 2021-04-02 NOTE — PROGRESS NOTES
[facility]  Palliative Care Daily Progress Note       Recommendations & Counseling       Patient has restorative goals, overall is amenable to Bipap if needed and ultimately wants to have time at home and be able to communicate with family    Continue all cares    Full Code, would accept repeat intubation if needed, and would make decision about trach in the future    We will continue to follow peripherally, please notify us if significant changes or closer follow-up needed.     Nilam Chakraborty MD  Palliative Medicine  Pager [phone]   Team consult pager - [phone]  On call/weekend:  [phone]          Assessments          Thanh Goodrich is a 80 year old male with a past medical history significant for myasthenia gravis, HFpEF, COPD, HTN, MDD, HAZEL, prostate cancer who was admitted 3/19 from TCU for acute hypercapnic respiratory failure requiring intubation. He received IVIG and steroid treatment for myasthenic crisis and was extubated on 3/22, but subsequently experienced worsening encephalopathy and was found to have worsening respiratory acidosis despite BiPAP treatment and was transferred back to ICU on 3/29 and intubated due to persistent hypercarbia. Also recent hospitalization 3/4-3/15 with myasthenia flare. Per neuro critial care, he has received IVIG x 2 with only modest benefit and he has profound bulbar and diaphragmatic weakness from myasthenia gravis.  Palliative care was consulted for goals of care discussions, seen today at ICU request to discuss QOL and existential questions prior to repeat extubation.      Discussed case with Neuro ICU fellow - told me it is possible he could recover, but may take 3-4 months for full recovery.  He was previously in remission for 10 years.  In the meantime, it is likely he will need Bipap, at least at night for likely the next several weeks to months to help clear CO2, possibly during the day as well in the short-term.  If he were  "to need to be intubated again, he may need a tracheostomy, but the expectation would be that this is temporary, and that with the plasmapheresis + adjustments to immunosuppressants that would take weeks to a few months to have full effect, it would be expected he could have the tracheostomy tube removed.    Met at bedside with Thanh and his cousin Gaby with ICU  and RN.  Described above information, and my expectation that in addition to above, he would need likely weeks of rehab for deconditioning from recent hospitalizations as his myasthenia improves.  It is expected he could recover with time.  It is important to him that he could eventually get home, and that he's able to communicate with his family.  He would accept reintubation if necessary.  He said \"Let's do it\" to proceed with extubation.   asked if he had worries he is wondering about, to which he replied \"many\" but didn't want to discuss further.        Today, the patient was seen for:  Myasthenia gravis with profound bulbar and diaphragmatic weakness  Respiratory failure due to above  Goals of care    Prognosis, Goals, or Advance Care Planning was addressed today with: Yes.  Mood, coping, and/or meaning in the context of serious illness were addressed today: Yes.  Summary/Comments:            Interval History:     Chart review/discussion with unit or clinical team members:   Improved respiratory function, ICU team thinks he can be extubated today.     Per patient or family/caregivers today:  Patient awake and writing letter to the bank when I came in, breathing with ventilator.  Calm.  Able to participate in above conversation with gestures and writing responses.     Key Palliative Symptoms:  We are not helping to manage these symptoms currently in this patient.             Review of Systems:     Besides above, an additional 0 system ROS was reviewed and is unremarkable          Medications:     I have reviewed this patient's " medication profile and medications during this hospitalization.    Noted meds:    Tylenol TID  Fentanyl IVP PRN  Pyridostigmine, mycophenolate  Senna-docusate  Prednisone 60 mg daily           Physical Exam:   Vitals were reviewed  Temp: 97.5  F (36.4  C) Temp src: Axillary BP: 102/88 Pulse: 72   Resp: 8 SpO2: 95 % O2 Device: Mechanical Ventilator      Gen: alert, intubated, appears stated age, in NAD  Eyes: Conjunctiva clear. Sclera anicteric   HENT: NC/AT, ventilator in place  Resp: no increased work of breathing  Neuro: A&O x 3; CN II-XII grossly intact;   Mental status/Psych: alert, engaged, appropriate, pleasant; sensorium intact           Data Reviewed:     Reviewed recent pertinent imaging, comments:   No new imaging    Reviewed recent labs, comments:   Cre 0.50  Hb 10.5      55 minutes unit time spent in reviewing record, patient examination and counseling, discussion with ICU and neurology team and documentation.  >50% spent in counseling re: disease understanding and goals of care,  and coordinating care with team.     Nilam Chakraborty MD  Palliative Medicine  Pager [phone]

## 2021-04-02 NOTE — PROGRESS NOTES
[facility] ICU RESPIRATORY NOTE    Date of Admission: 3/19/2021    Date of Intubation (most recent):  3/29/21    Reason for Mechanical Ventilation: Airway protection    Number of Days on Mechanical Ventilation: 5    Met Criteria for Pressure Support Trial: Yes     Length of Pressure Support Trial: weaned 15 minutes during days      Significant Events Today: none      ABG Results: Results for PINEDA GONZALEZ (MRN [id number]) as of 4/2/2021 04:27   Ref. Range 4/1/2021 04:35   pH Arterial Latest Ref Range: 7.35 - 7.45 pH 7.41   pCO2 Arterial Latest Ref Range: 35 - 45 mm Hg 58 (H)   PO2 Arterial Latest Ref Range: 80 - 105 mm Hg 83   Bicarbonate Arterial Latest Ref Range: 21 - 28 mmol/L 37 (H)   Base Excess Art Latest Units: mmol/L 10.1   FIO2 Unknown 30%   Oxyhemoglobin Arterial Latest Ref Range: 92 - 100 % 96     Current Vent Settings: Ventilation Mode: CMV/AC  (Continuous Mandatory Ventilation/ Assist Control)  FiO2 (%): 30 %  Rate Set (breaths/minute): 12 breaths/min  Tidal Volume Set (mL): 400 mL  PEEP (cm H2O): 5 cmH2O  Pressure Support (cm H2O): 5 cmH2O  Oxygen Concentration (%): 30 %    Plan:  Continue on full support, wean as tolerated.

## 2021-04-02 NOTE — PROGRESS NOTES
"SPIRITUAL HEALTH SERVICES  SPIRITUAL ASSESSMENT Progress Note  [facility] ICU     REFERRAL SOURCE: Follow Up/Care Conference    I attended a care conference for patient Rigoberto today as he is known to me from previous visits and to be available for emotional and spiritual support. In attendance was Dr. Palmer Franklin with Palliative Care, patient's RN Miriam, EZE, [place] and patient's cousin, Gaby.    Dr. Chakraborty provided a thorough overview and synthesis of patient's condition and the possibilities for next steps. She explored with Rigoberto what options would be available to him post extubation, including anticipating recovery with rehab and support and also considering the larger context of what interventions Rigoberto would desire or not if his condition changes. Rigoberto shared clearly that being home and being able to communicate in some way with his loved ones are important priorities for his care and well being. Rigoberto expressed fatigue during the visit and his readiness to \"get the tube out\" so we didn't do further processing about some existential wonderings he has expressed around his care and quality of life. Rigoberto and his cousin expressed feeling good about this conversation and the clarity it offered around what is next.     PLAN: I am out this next week but I will ask another  to follow up with Rigoberto. [facility] continues to remain available.     Dottie Samuels  Associate    Phone: [phone]  Pager: [phone]    "

## 2021-04-02 NOTE — PROGRESS NOTES
"Pt complaining of alberto feeling \"clogged.\" RN unable to flush. Per MD okay to exchange since alberto is likely from previous admission. Removed without difficulty although started to bleed upon removal. MD called to bedside & urology called. Urology to bedside to place new alberto. Bright red blood from new alberto, urology aware. Will continue to monitor urine output and for continues bleeding.  "

## 2021-04-02 NOTE — PLAN OF CARE
OT: attempted to see pt x2 this date. On initial attempt, RN at bedside repositioning catheter. On second attempt, RN requests reschedule d/t awaiting urology consult for bleeding from urethra.

## 2021-04-02 NOTE — PROGRESS NOTES
"Apheresis Treatment -      Treatment in room 358 Consent verified.  Treatment number: 2/5    Height: 5\"10  Weight: 256 lbs  HCT: 34%  Inlet speed: 85  ACDA ratio: 10:1  Fluid balance: 100%  Access: RIJ  Post treatment line dwell: Heparin 1,000units/ml, red port 1600 units (1.6mls), blue port 1600 units (1.6mls).  Replacement product: Albumin 5% for 1 volume exchange  Electrolyte replacement: Calcium Gluconate 4.5 grams in NS - total 85 mls, piggybacked into return lines, infused over time of treatment.  Premedications: none    Treatment Notes:  Tolerated treatment well with no complications.     Treatment completed as ordered, VSS, see EPIC flowsheet for run data.    Next treatment: Sunday 4/4/2021  "

## 2021-04-02 NOTE — PROGRESS NOTES
[facility] ICU RESPIRATORY NOTE        Date of Admission: 3/19/2021    Date of Intubation (most recent): 3/29/2021    Reason for Mechanical Ventilation: Airway Protection    Number of Days on Mechanical Ventilation: 5    Met Criteria for Spontaneous Breathing Trial:Yes    Reason for No Spontaneous Breathing Trial: Went 2 hours on CPAP/Ps 5/5 35%    Significant Events Today: None    ABG Results:   Recent Labs   Lab 04/01/21  0435 03/31/21  0421 03/30/21  1650 03/30/21  0400 03/29/21  1700   PH 7.41 7.42 7.46* 7.41  --    PCO2 58* 60* 54* 64*  --    PO2 83 92 145* 88  --    HCO3 37* 39* 38* 41*  --    O2PER 30% 30%  --  30% ART LINE       Current Vent Settings: Ventilation Mode: (S) CPAP/PS  (Continuous positive airway pressure with Pressure Support)  FiO2 (%): 35 %  Rate Set (breaths/minute): 12 breaths/min  Tidal Volume Set (mL): 400 mL  PEEP (cm H2O): 5 cmH2O  Pressure Support (cm H2O): 5 cmH2O  Oxygen Concentration (%): 35 %  Resp: 16      Skin Assessment: Good    Plan: Extubate this afternoon    Daniel Ivy, RT

## 2021-04-02 NOTE — PROGRESS NOTES
Intensivist:  Called to bedside for bleeding from urethra.  Apparently patient's alberto had become non-functional which did not respond to repositioning or flushing.  Nurses removed it to replace it but then patient began having copious bleeding.  Pt is existing patient of Dr. Charles with [facility].  --continuing to hold pressure as well as able for urological bleeding  --will consult [facility] for assessment and possible cystoscopy if needed.

## 2021-04-02 NOTE — PROGRESS NOTES
CLINICAL NUTRITION SERVICES - REASSESSMENT NOTE      RECOMMENDATIONS FOR MD/PROVIDER TO ORDER:   - Consider placing nasoenteric FT p/t extubation if prolonged BIPAP requirements suspected    Recommendations Ordered by Registered Dietitian (RD):   - Stop Certavite as meeting >100% needs with TF at goal    Malnutrition: (3/21)  % Weight Loss:  Weight loss does not meet criteria for malnutrition (fluids)  % Intake:  <75% for > 7 days (non-severe malnutrition)  Subcutaneous Fat Loss:  Orbital region mild depletion  Muscle Loss:  Temporal region mild-moderate depletion and Clavicle bone region mild depletion  Fluid Retention:  None noted     Malnutrition Diagnosis: Non-Severe malnutrition  In Context of:  Acute illness or injury  Chronic illness or disease       EVALUATION OF PROGRESS TOWARD GOALS   Diet:  NPO, vented     Nutrition Support:  TF began advancing toward goal on 3/31. Goal rate achieved on 4/1 at 1800 as outlined below    Nutrition Support Enteral:  Type of Feeding Tube: OGT   Enteral Frequency:  Continuous  Enteral Regimen: Vital High Protein at 60 mL/hr   Total Enteral Provisions: 1440 kcals (12 kcal/kg), 125 g PRO (1.6 gm/kg), 1203 ml free H20, 159 g CHO, and 0 g fiber daily   Free Water Flush: 60 mL q 4 hrs   Certavite daily     Intake/Tolerance:   Labs reviewed: Na 137, K 3.7, Mg 1.7, Phos 1.8 (L)   Recent Labs   Lab 04/02/21  0550 04/01/21  0432 04/01/21  0431 03/31/21  1608 03/31/21  0756 03/31/21  0421 03/30/21  1645 03/30/21  1144 03/30/21  0910 03/30/21  0400 03/29/21  0550 03/29/21  0550 03/28/21  0739 03/28/21  0739   * 97  --   --   --  76  --   --   --  80  --  98  --  79   BGM  --   --  91 128* 82  --  119* 100* 92  --    < >  --    < >  --     < > = values in this interval not displayed.     Medications reviewed: LR IVF discontinued today, Propofol discontinued 3/30   Stooling: Last BM x1 on 3/29   Wt: 116.1 kg - down 11.6 kg from admit -- ?accuracy as this drop happened within the  last 1 day   Vitals:    03/24/21 0600 03/29/21 0600 03/30/21 0400 03/31/21 1150   Weight: 128.3 kg (282 lb 13.6 oz) 131.1 kg (289 lb) 132 kg (291 lb 0.1 oz) 132 kg (291 lb 0.1 oz)    04/02/21 0600   Weight: 116.1 kg (256 lb)       ASSESSED NUTRITION NEEDS: (updated)  Dosing Weight 125.2 kg (energy) and 75.5 kg (protein)  Estimated Energy Needs: 3472-8240 kcals (11-14 Kcal/Kg)  Justification: obese and vented  Estimated Protein Needs: 115-150 grams protein (1.5-2 g pro/Kg)  Justification: hypercatabolism with critical illness and obesity guidelines   Estimated Fluid Needs: 7839-7325 mL (1 mL/Kcal)  Justification: maintenance      NEW FINDINGS:   Plan extubation to BIPAP today when family arrives -- OGT will be pulled and tube feedings will stop   Plasmapheresis today      Previous Goals:   EN to meet % estimated needs within 48 hrs   Evaluation: Met    Previous Nutrition Diagnosis:   Inadequate protein-energy intake related to re-intubated and TF planned as evidenced by receiving 0% estimated protein and 29% estimated energy needs from Propofol alone   Evaluation: Completed      CURRENT NUTRITION DIAGNOSIS  No nutrition diagnosis identified at this time as meeting 100% estimated needs from TF     INTERVENTIONS  Recommendations / Nutrition Prescription  Continue TF as ordered while intubated     Implementation  Collaboration and Referral of Nutrition care: patient discussed during ICU rounds     Goals  EN to meet % estimated needs while intubated       MONITORING AND EVALUATION:  Progress towards goals will be monitored and evaluated per protocol and Practice Guidelines      Gauri Monae RD, LD  Clinical Dietitian

## 2021-04-02 NOTE — PROGRESS NOTES
"Neurocritical Care Note:  S: Sitting up and reading a book in bed. Wide awake. Session 2 of 5 PLEX today.    O:  BP (!) 161/101   Pulse 92   Temp 97.5  F (36.4  C) (Axillary)   Resp 28   Ht 1.778 m (5' 10\")   Wt 116.1 kg (256 lb)   SpO2 95%   BMI 36.73 kg/m      Examined off sedation. On exam, Mr Goodrich is wide awake and fully interactive. He answers questions appopriately by nodding and writes out his questions with proper spelling, penmanship, and grammar. He follows commands reliably. Pupils symmetric and react to light. No facial asymmetry. Tongue protrusion midline. Limb strength full and symmetric. Neck extension 5/5, flexion 4/5. Sensation grossly intact to light touch.    Imaging and labs personally reviewed in EMR.    A/P:   #myasthenia gravis crisis  #hypercarbic respiratory failure sp intubation x3    Mr Goodrich is a 81yo gentleman currently admitted for management of myasthenia gravis crisis with persistent hypoventilation and hypercarbic respiratory failure. He is currently intubated and doing well. He has received multiple doses of IVIG over the past month with only modest improvement. He is currently receiving PLEX. Today is session 2 of 5. He will be extubated today.     Recommendations  - Plex 2 of 5 today   - Continue mycophenolate  - Continue pyridostigmine  - Continue prednisone, he will need PJP prophylaxis at this dose  - After patient is extubated, he will need to be on BiPAP 4on/4off temporarily and transition to BiPAP nightly to prevent his CO2 rentention from reaching critical level requiring intubation again. The duration for which he will need to be on BiPAP nightly is uncertain at this time, but most likely he will need it for weeks up to  Month while we are treating his MS until he goes into remission. Most importantly, he will need to follow up with his primary neurologist to titrate his medication to assist in achieving remission.        This patient was seen and discussed with " "attending, Dr Rodney Sargent MD  Vascular Neurology Fellow  To page me or covering stroke neurology team member, click here: [facility]   Choose \"On Call\" tab at top, then search dropdown box for \"Neurology Adult\", select location, press Enter, then look for stroke/neuro ICU/telestroke.    "

## 2021-04-02 NOTE — PROGRESS NOTES
Critical Care Progress Note      04/02/2021    Name: Thanh Goodrich MRN#: [id number]   Age: 80 year old YOB: 1940                    Problem List:   Principal Problem:    Acute on chronic respiratory failure with hypercapnia (H)  Active Problems:    Myasthenic crisis (H)    Metabolic alkalosis superimposed on acute respiratory acidosis    Benign essential hypertension    Morbid obesity -- BMI 40.6    Metabolic encephalopathy    Abnormal urinalysis    Immunosuppressed for MG         Summary/Hospital Course:   Thanh Goodrich is a 80 year old male admitted on 3/19/2021 with significant history for myasthenia gravis, HFpEF, COPD, HTN, MDD, HAZEL, prostate cancer who presents from TCU for acute hypercapnic respiratory failure. He was recently admitted and discharged (3/4-3/15) for acute hypoxic and hypercapnic respiratory failure requiring intubation (3/6/21-3/11/21) 2/2 Myasthenia gravis exacerbation, diastolic CHF exacerbation with diuresis.   Metabolic alkalosis. He received IVIG and steroid treatment and was followed by [facility]. He was extubated on 3/22 and transferred to hospitalist service 3/23. Subsequently experienced worsening encephalopathy and was found to have worsening respiratory acidosis despite BiPAP treatment and was transferred back to ICU on 3/29. Intubated due to persistent hypercarbia. Palliative care saw the patient on 3/30 and it was decided to pursue HD catheter (IR), begin plasmapheresis (nephrology), use BiPAP after extubation, and re-intubate if needed. HD catheter placed 3/31 and tolerated first plasmapheresis. Successful 2 hr PST on 4/2.      Assessment and plan :     Thanh Goodrich is a 80 year old male admitted on 3/19/2021 for acute hypercapnic respiratory failure due to myasthenia gravis exacerbation requiring intubation, and re-intubated 3/29 due to repeat of hypercarbic respiratory failure. Most likely represents worsening of his myasthenia gravis. Neurology recommending  plasmapheresis in the setting of lack of improvement.  I have personally reviewed the daily labs, imaging studies, cultures and discussed the case with referring physician and consulting physicians.   My assessment and plan by system for this patient is as follows:    CNS:   Myasthenia gravis  Recurrent exacerbations. Neurocritical care is following and recommending plasmapheresis. Had goals of care discussion on 3/30 with assistance of palliative care, and plan is to undergo HD line placement and plasmapheresis which he began on 3/31. He understands that there is no guarantee that this will improve the underlying disease, that he may need to wear BiPAP indefinitely after extubation, and that he may need to be re-intubated if he were unable to tolerate BiPAP as in the past. Discussed with palliative care again 4/2 in setting of pt having more existential questions.  Plan:  - Neurocrit following, appreciate assistance/recommendations  - Appreciate palliative care assistance with goals of care planning today  - Nephrology following for plasmapheresis, current plan is MWF x 5 treatments starting 3/31  - Continue mycophenalate 1000 mg Q Am and 500 mg at bedtime  - Continue daily prednisone 60 mg daily  - Continue pyridostigmine 60 mg every 8 hours   Acute pain and sedation:  - Scheduled Tylenol and PRN Fentanyl (pt wants to avoid mental clouding)    Pulm:   Acute hypercarbic respiratory failure  Likely secondary to MG. Improving - still intubated/ventilated though maintaining acid/base status and mentation appears near baseline.  NIF was 35 on 3/30 suggestive of adequate inspiratory force. Unsuccessful PST 3/31, 4/1 followed by successful 2 hr PST on 4/2. Unclear if he will still require BiPAP (AVAPS) support off vent. Hope that his ability to breathe spontaneously would improve if underlying MG improves over days/weeks with plasmapheresis, but this is not certain. Had previously agreed to wear his BiPAP indefinitely  after extubation though is noted to be unable to tolerate it in the past (warranting intubation) and is contemplative of what his life would look like. He would like to talk with cousin and friends prior to extubation.  Plan:  - Plan extubation this afternoon following 2nd PLEX and discussion with palliative, cousin, and friends  - Will expect to extubate to BiPAP/AVAPS  - Will continue current vent settings of RR 12, , PEEP 8, FiO2 30% in interim  - No need for ABG checks at this time. PRN VBG available    CV:   History of CHF  Appeared to be volume down on 3/29 related to diuresis/BMs over weekend so gave gentle hydration. Appears to have responded well to this with good UO 3/29 and improvement of bicarbonate (concern for contraction alkalosis). Would like to avoid fluid overload due to CHF. Tube feeds at goal and getting flushes.  -- discontinue LR today  -- Holding metoprolol with hypotension  Hypotension without evidence of shock  Resolved. Seemed most likely related to propofol, was on norepi to maintain MAPs. Propofol and Norepi now weaned off with MAP >65 off propofol and pressor.  --Continue to monitor.    FEN/GI:   Nutrition consult for tube feeds through OG, started 3/29. At goal as of 4/2  --Continue tube feeds for now  --PRN bowel regimen    Renal / lytes / acid-base  Respiratory acidosis with likely metabolic alkalosis from ?diuresis  Respiratory acidosis improving on vent, metabolic alkalosis improved with fluids. ABG stabilizing with pH WNL, pCO2/bicarb elevated but perhaps component of chronic hypercapnia. Mental status good. Normal creatinine (baseline ~0.6) though low UO with careful hydration. Note that magnesium administration can worsen MG if given IV per Neurocritical care's list of meds that can worsen MG.  - follow BMPs and I/Os  - fluids as above in CV (tube feeds at goal + flushes)  - Phos replacement protocols  - discontinued Mg replacement protocol, would prefer oral instead if  "needed    Heme:  Thrombocytopenia: Plt down to 85 on 4/1, stable. No petechiae or signs of bleeding. Unclear cause. Seems most likely dilution/phlebotomy (plasmapheresis started 3/31). On mycophenolate but this is at PTA dose. Doubt DELFIN though is on ppx with heparin (holding with plt < 100).  Normocytic anemia with hgb down to 9.8 on 4/1, stable (baseline 11-12). Likely related to phlebotomy and chronic disease.   --Monitor CBC daily  --Consider reducing blood draws as able    Msk:   No acute issues    Endo:   At risk for stress and steroid related hyperglycemia, though glucose has been controlled with tube feeds started 3/29.   Plan:  -Monitoring per RD protocol.    ID:   Patient had MSSA PNA diagnosed 3/21 - procal 3/29 WNL, oxygenating well. Mild R lung patchy infiltrate on CXR 3/29. Received a few days of unasyn and was then switched to amox x4 days for E coli UTI, now complete.  Plan:  --Continue Bactrim for PPx of PJP given steroid use (started 3/29)    General cares:  DVT Prophylaxis: Heparin subcutaneous (on hold due to thrombocytopenia). Mechanical   GI Prophylaxis: H2 blocker  Restraints: Restraints for medical healing needed: NO  Family update by me today: Yes, cousin Gaby  Current lines are required for patient management  Access: right radial arterial line. PIV x2  Feeding: RD consult, tube feeds 3/29          Interim History:     Successful 60 min PST yesterday. Arterial line removed overnight due to bleeding/discomfort. Pain has been ok with PRN fentanyl and Tylenol. Has some \"hard to describe\" throat/neck pain perhaps related to HD catheter. Having questions about life on ventilator vs BiPAP, and wants to talk to cousin and friends prior to extubation. No other new issues with otherwise negative ROS.         Key Medications:       chlorhexidine  15 mL Mouth/Throat Q12H     famotidine  20 mg Oral or Feeding Tube BID     [Held by provider] heparin ANTICOAGULANT  5,000 Units Subcutaneous Q8H     " [Held by provider] metoprolol tartrate  12.5 mg Oral BID     multivitamins w/minerals  15 mL Per Feeding Tube Daily     mycophenolate  1,000 mg Oral or NG Tube BID     predniSONE  60 mg Oral or Feeding Tube Daily     pyridostigmine  60 mg Oral or Feeding Tube Q8H [facility]     sulfamethoxazole-trimethoprim  1 tablet Oral or Feeding Tube Daily     cholecalciferol  100 mcg Oral or Feeding Tube Daily       dextrose       lactated ringers 75 mL/hr at 04/02/21 0342     - MEDICATION INSTRUCTIONS -       - MEDICATION INSTRUCTIONS -                 Physical Examination:   Temp:  [97.5  F (36.4  C)-98  F (36.7  C)] 98  F (36.7  C)  Pulse:  [50-79] 79  Resp:  [8-28] 22  BP: (109-157)/(56-81) 136/81  MAP:  [2 mmHg-107 mmHg] 2 mmHg  Arterial Line BP: (2-159)/(2-72) 2/2  FiO2 (%):  [30 %-35 %] 35 %  SpO2:  [87 %-100 %] 91 %        Intake/Output Summary (Last 24 hours) at 3/30/2021 0756  Last data filed at 3/30/2021 0600  Gross per 24 hour   Intake 2699 ml   Output 1900 ml   Net 799 ml       Wt Readings from Last 4 Encounters:   04/02/21 116.1 kg (256 lb)   03/17/21 128.3 kg (282 lb 12.8 oz)   03/15/21 136.3 kg (300 lb 7.8 oz)   12/16/20 136.1 kg (300 lb)     Arterial Line BP: (2-159)/(2-72) 2/2  MAP:  [2 mmHg-107 mmHg] 2 mmHg  BP - Mean:  [] 90  Ventilation Mode: CPAP/PS  (Continuous positive airway pressure with Pressure Support)  FiO2 (%): 35 %  Rate Set (breaths/minute): 12 breaths/min  Tidal Volume Set (mL): 400 mL  PEEP (cm H2O): 5 cmH2O  Pressure Support (cm H2O): 5 cmH2O  Oxygen Concentration (%): 35 %  Resp: 22    Recent Labs   Lab 04/01/21  0435 03/31/21  0421 03/30/21  1650 03/30/21  0400 03/29/21  1700   PH 7.41 7.42 7.46* 7.41  --    PCO2 58* 60* 54* 64*  --    PO2 83 92 145* 88  --    HCO3 37* 39* 38* 41*  --    O2PER 30% 30%  --  30% ART LINE       Gen: Awake and alert. Appears comfortable while intubated.  HEENT: Normocephalic atraumatic. Dry mucous membranes.  Pulm: Lungs CTA bilaterally anteriorly.   CV: Regular  rate and rhythm no extra heart sounds.  Abdomen/GI: Abdomen is soft nontender and nondistended  : Catheter is in place with small amount dark yellow urine  Extremities: No lower extremity edema  Skin: Extremities warm, no mottling of skin. Some subQ redness around R clavicle area superficial to HD catheter. Scattered bruising on arms.  Neuro: Alert and seems to be oriented though unable to speak verbally - able to use thumbs to describe mentation and pain. Able to communicate clearly in writing on paper with R hand. Good strength bilaterally in UEs and LEs.         Data:     No results found for this or any previous visit (from the past 24 hour(s)).     ROUTINE ICU LABS (Last four results)  CMP  Recent Labs   Lab 04/02/21  0550 04/01/21  0432 03/31/21  0421 03/30/21  0400 03/29/21  0550    137 135 138 139   POTASSIUM 3.7 3.5 3.7 3.6 4.0   CHLORIDE 100 100 98 98 96   CO2 35* 37* 38* 41* >45*   ANIONGAP 2* <1* <1* <1* Not Calculated   * 97 76 80 98   BUN 17 17 19 17 17   CR 0.50* 0.58* 0.64* 0.58* 0.57*   GFRESTIMATED >90 >90 >90 >90 >90   GFRESTBLACK >90 >90 >90 >90 >90   NOAH 8.4* 8.3* 8.6 8.5 8.8   MAG 1.7  --  1.8 1.8 1.7   PHOS 1.8*  --  2.5 3.1 1.9*     CBC  Recent Labs   Lab 04/02/21  0550 04/01/21  0432 03/31/21  0421 03/30/21  0400   WBC 5.3 5.0 4.9 4.2   RBC 3.90* 3.60* 4.11* 3.98*   HGB 10.5* 9.8* 11.0* 10.5*   HCT 34.2* 31.8* 36.7* 36.1*   MCV 88 88 89 91   MCH 26.9 27.2 26.8 26.4*   MCHC 30.7* 30.8* 30.0* 29.1*   RDW 19.0* 18.5* 18.5* 18.3*   PLT 89* 85* 102* 112*     INRNo lab results found in last 7 days.  Arterial Blood Gas  Recent Labs   Lab 04/01/21  0435 03/31/21  0421 03/30/21  1650 03/30/21  0400 03/29/21  1700   PH 7.41 7.42 7.46* 7.41  --    PCO2 58* 60* 54* 64*  --    PO2 83 92 145* 88  --    HCO3 37* 39* 38* 41*  --    O2PER 30% 30%  --  30% ART LINE       All cultures:  No results for input(s): CULT in the last 168 hours.  No results found for this or any previous visit (from  the past 24 hour(s)).    Linden Magañacyfish, MS4    Patient seen and discussed with Dr. Haley who will attest independently.    Physician Attestation   I, Fan Haley, was present with the medical/BRIDGET student who participated in the service and in the documentation of the note.  I have verified the history and personally performed the physical exam and medical decision making.  I agree with the assessment and plan of care as documented in the note.      I personally reviewed vital signs, medications, labs and imaging.    Significant for:  K 3.7 Cr 0.5.   Phos 1.8. Mag 1.7    WBC 5.3, Hgb 10.5, plts 89 (from 85)     Assessment and plan:  79 yo M with a history of myasthenia gravis, HFpEF, COPD, HTN, MDD, HAZEL, and prostate cancer who presented from TCU for acute hypoxic respiratory failure (after recent admission from 3/4 to 3/15 for acute hypoxic and hypercapnic respiratory failure requiring intubation due to myasthenia gravis exacerbation and CHF exacerbation). He was found to have profound hypercapnia and metabolic alkalosis. Here, he initially was intubated from 3/19 to 3/22, and received treatment with IVIG and steroids. Unfortunately, he had worsening encephalopathy and worsening respiratory acidosis requiring transfer back to the ICU and intubation on 3/29. His mental status is now improved. Dialysis catheter placed on 3/31 for initiation of plasmapheresis, now improving slowly.      Myasthenia gravis: with recurrent acute hypercarbic respiratory failure. Had HD line placed by IR 3/31, with initiation of plasmapheresis. Getting treatment 2 today, of 5 planned treatments over 10 days. Nephrology consulted to run plasmapheresis, neuro CC also following. Appreciate assistance. Continue mycophenolate and prednisone. On bactrim for PJP ppx. Pyridostigmine 60 mg q8h. VC over 1 L this AM, check NIF today. Palliative care also following.       Acute on chronic hypercarbic respiratory failure: Improving. Passed  PST today, with VC of >1 L. Likely will be able to extubate, patient wanted to wait until after plasmapheresis today, and when family present after about noon. If still looks good on repeat PST at that time, will proceed with extubation. Plan for Bipap/AVAPS prn and with sleep. Would continue bipap nightly for the foreseeable future.      History of HTN, diastolic CHF: holding metoprolol today, may need to resume if BP elevates, currently acceptable. Monitor fluid status carefully.      Thrombocytopenia: stable today. Monitor for now, hold heparin for DVT ppx given plts <100.      Normocytic anemia: improved today. Monitor.       I personally 30 min of critical care time reviewing labs and imaging, examining the patient, managing the ventilator. This does not include time spent on procedures or teaching. He is critically ill due to above.      Fan Haley MD  Date of Service (when I saw the patient): 04/02/21

## 2021-04-02 NOTE — PROGRESS NOTES
Renal Medicine       PLEX Note:    Indication:  MG    #2 of 5 runs      1 volume exchange  3600 ml 5% albumin    4 grams calcium    Stable run      Next 04/04/21 or 04/05   1 volume        Recent Labs   Lab 04/02/21  0550      POTASSIUM 3.7   CHLORIDE 100   CO2 35*   ANIONGAP 2*   *   BUN 17   CR 0.50*   GFRESTIMATED >90   GFRESTBLACK >90   NOAH 8.4*     Recent Labs   Lab 04/02/21  0550 04/01/21  0432 03/31/21  0421   WBC 5.3 5.0 4.9   HGB 10.5* 9.8* 11.0*   HCT 34.2* 31.8* 36.7*   MCV 88 88 89   PLT 89* 85* 102*           RANJIT Hazel    [facility]  [phone]

## 2021-04-03 NOTE — PROGRESS NOTES
Spontaneous Breathing Trial    Criteria met for SBT (Y/N): Yes    Settings:    PS:5  PEEP:5  FiO2:35    Measured Parameters:    RR:15  Tidal volume:384  RSBI: 40    Patient tolerance: Good      Duration of SBT: 37 minuets.  Plan:  To wean and extubate in AM.

## 2021-04-03 NOTE — PLAN OF CARE
PT: Pt with weaning trials today, participated in supine exercises with OT and was given HEP, declining OOB/seated at EOB activities this date.

## 2021-04-03 NOTE — PROGRESS NOTES
Critical Care Progress Note  04/03/2021    Name: Thanh Goodrich MRN#: [id number]   Age: 80 year old YOB: 1940     [facility] Day# 14           Problem List:   Principal Problem:    Acute on chronic respiratory failure with hypercapnia (H)  Active Problems:    Myasthenic crisis (H)    Benign essential hypertension    Morbid obesity -- BMI 40.6    Metabolic encephalopathy    Abnormal urinalysis           Summary/Hospital Course:   Mr. Goodrich is a 80 year old male admitted on 3/19/2021 with significant history for myasthenia gravis, HFpEF, COPD, HTN, MDD, HAZEL, prostate cancer who presents from TCU for acute hypercapnic respiratory failure. He was recently admitted and discharged (3/4-3/15) for acute hypoxic and hypercapnic respiratory failure requiring intubation (3/6/21-3/11/21) 2/2 Myasthenia gravis exacerbation, diastolic CHF exacerbation with diuresis. Previously, he received IVIG and steroid treatment and was followed by [facility]. He was extubated on 3/22 and transferred to hospitalist service 3/23. Subsequently experienced worsening encephalopathy and was found to have worsening respiratory acidosis despite BiPAP treatment and was transferred back to ICU on 3/29. Intubated due to persistent hypercarbia. Palliative care saw the patient on 3/30 and it was decided to pursue HD catheter (IR), begin plasmapheresis (nephrology), use BiPAP after extubation, and re-intubate if needed. HD catheter placed 3/31 and plasmapheresis started. Course complicated by hematuria due to alberto trauma.     Assessment and plan :     Thanh Goodrich is a 80 year old male admitted on 3/19/2021 for acute hypercapnic respiratory failure due to myasthenia gravis exacerbation requiring intubation, and re-intubated 3/29 due to repeat of hypercarbic respiratory failure, likely due to ongoing myasthenia gravis, now improving with plasmapheresis.   I have personally reviewed the daily labs, imaging studies, cultures and discussed the case  with referring physician and consulting physicians.   My assessment and plan by system for this patient is as follows:    Neurology/Psychiatry:   1. Myasthenia gravis: has had recurrent exacerbations. He has previously received IVIG and steroid treatment, and has been followed by [facility]. He has required intubation for respiratory failure related to this multiple times, most recently 3/29 to currently. Now undergoing plasmapheresis   2. ICU sedation, analgesia   Plan  -appreciate neuro critical care assistance  -appreciate palliative care assistance with GO  -Continue plasmapheresis, has received 2/5 planned treatments, next Monday.   -nephrology is consulted for plasmapheresis   -continue daily prednisone 60 mg  -continue mycophenolate 1000 mg qAM and 500 mg qPM.   -continue pyridostigmine 60 mg q8h  -scheduled tylenol 1 g TID, PRN fentanyl for pain     Cardiovascular:   1. History of CHF:   2. Hypotension, resolved.    Plan  -TF with [facility] for hydration  -monitor volume status  -Holding metoprolol with his hypotension, resume when stable.   -Off of pressors     Pulmonary/Ventilator Management:   1. Recurrent acute hypoxic and acute on chronic hypercapnic respiratory failure: thought to be secondary to MG, improving. We have been following NIF and FVC daily.   Plan  - PST today  - if passes, would proceed with extubation, with plan to use Bipap (AVAPS settings) nightly and PRN during the day  -Most recent need for intubation was due to hypercapnia and decreased mental status.   -PRN VBG    GI/Nutrition :   1. Non severe malnutrition:   2. GI stress ulcer ppx  Plan  -Tube feeds, appreciate dietitian assistance  -Bowel regimen  -famotidine BID    Renal/Fluids/Electrolytes:   1. Chronic respiratory acidosis, initial metabolic alkalosis (resolved)   2. Lv for plasmapheresis  3. Hematuria due to alberto trauma, resolved  4 hx of prostate cancer s/p cryotherapy   Plan  - Nephrology consulted for plasmapheresis, appreciate  assistance  - monitor and support ventilation as above   - monitor function and electrolytes as needed with replacement per ICU protocols.  - generally avoid nephrotoxic agents such as NSAID, IV contrast unless specifically required  - adjust medications as needed for renal clearance  - follow I/O's as appropriate.  -chronic alberto (16 F Coude), replace every 2-3 weeks, leave in at discharge.   -outpatient urology follow up needed with Dr. Gardner in 2-4 weeks from 4/3 with PSA    Infectious Disease:   1. Need for prophylactic antibiotics given immunosuppression  2. Resolved MSSA pneumonia: s/p Unasyn and amoxicillin course  3. Resolved E coli UTI: s/p treatment   Plan  - continue bactrim single strength 1 tab daily    Endocrine:   1. Stress induced hyperglycemia  Plan  - ICU insulin protocol, goal sugar <180    Hematology/Oncology:   1. Thrombocytopenia:   2. normocytic anemia, hematuria on 4/2 due to alberto trauma (resolved)  Plan  - monitor CBC  - hand alberto irrigation of hematuria recurs    MSK/Rheum:  1. Deconditioning   Plan  - PT/OT    IV/Access:   1. Venous access - tunneled right internal jugular pheresis catheter, PIV  2. Arterial access - none  Plan  - central access required and necessary    ICU Prophylaxis:   1. DVT: Hep subcutaneous (on hold with low plts), mechanical  2. VAP: HOB 30 degrees, chlorhexidine rinse  3. Stress Ulcer: H2 blocker  4. Restraints: Nonviolent soft two point restraints required and necessary for patient safety and continued cares and good effect as patient continues to pull at necessary lines, tubes despite education and distraction. Will readdress daily.   5. Wound care - per unit routine   6. Feeding - Tube feeds  7. Family Update: not yet completed.   8. Disposition - remain in ICU    Key goals for next 24 hours:   1. PST, hopefully extubate.        Interim History/Overnight Events:   Doing well. Had hematuria yesterday after attempt to change alberto requiring urology consult to  replace. Hematuria now resolved. Today, no alberto pain, abdominal pain, fevers, chills, chest pain, shortness of breath, headaches, or lightheadedness. No nausea. Feels well.          Key Medications:       acetaminophen  1,000 mg Oral or Feeding Tube TID     chlorhexidine  15 mL Mouth/Throat Q12H     famotidine  20 mg Oral or Feeding Tube BID     [Held by provider] heparin ANTICOAGULANT  5,000 Units Subcutaneous Q8H     [Held by provider] metoprolol tartrate  12.5 mg Oral BID     mycophenolate  1,000 mg Oral or NG Tube BID     predniSONE  60 mg Oral or Feeding Tube Daily     pyridostigmine  60 mg Oral or Feeding Tube Q8H ALEXUS     sulfamethoxazole-trimethoprim  1 tablet Oral or Feeding Tube Daily     cholecalciferol  100 mcg Oral or Feeding Tube Daily       dextrose       - MEDICATION INSTRUCTIONS -       - MEDICATION INSTRUCTIONS -                 Physical Examination:   Temp:  [97.5  F (36.4  C)-98.2  F (36.8  C)] 97.8  F (36.6  C)  Pulse:  [] 81  Resp:  [8-30] 20  BP: ()/() 135/119  FiO2 (%):  [35 %-40 %] 40 %  SpO2:  [92 %-98 %] 93 %    Intake/Output Summary (Last 24 hours) at 4/3/2021 1015  Last data filed at 4/3/2021 0900  Gross per 24 hour   Intake 2022 ml   Output 1130 ml   Net 892 ml     Wt Readings from Last 4 Encounters:   04/02/21 116.1 kg (256 lb)   03/17/21 128.3 kg (282 lb 12.8 oz)   03/15/21 136.3 kg (300 lb 7.8 oz)   12/16/20 136.1 kg (300 lb)     BP - Mean:  [] 126  Ventilation Mode: CMV/AC  (Continuous Mandatory Ventilation/ Assist Control)  FiO2 (%): 40 %  Rate Set (breaths/minute): 12 breaths/min  Tidal Volume Set (mL): 400 mL  PEEP (cm H2O): 5 cmH2O  Pressure Support (cm H2O): 5 cmH2O  Oxygen Concentration (%): 40 %  Resp: 20    Recent Labs   Lab 04/01/21  0435 03/31/21  0421 03/30/21  1650 03/30/21  0400 03/29/21  1700   PH 7.41 7.42 7.46* 7.41  --    PCO2 58* 60* 54* 64*  --    PO2 83 92 145* 88  --    HCO3 37* 39* 38* 41*  --    O2PER 30% 30%  --  30% ART LINE        GEN: no acute distress, lying in bed  HEENT: head ncat, sclera anicteric, trachea midline   PULM: unlabored synchronous with vent, clear anteriorly    CV/COR: RRR, normal S1 and S2. No gallop, rub, nor murmur  ABD: soft, non-tender, non-distended. Hypoactive bowel sounds, no mass  MSK/EXT:  No LE edema. WWP.  NEURO: grossly intact. Writing out answers clearly. Alert and oriented.   SKIN: no obvious rash  LINES: clean, dry intact         Data:   All data and imaging reviewed.     ROUTINE ICU LABS (Last four results)  CMP  Recent Labs   Lab 04/02/21  0550 04/01/21  0432 03/31/21  0421 03/30/21  0400 03/29/21  0550    137 135 138 139   POTASSIUM 3.7 3.5 3.7 3.6 4.0   CHLORIDE 100 100 98 98 96   CO2 35* 37* 38* 41* >45*   ANIONGAP 2* <1* <1* <1* Not Calculated   * 97 76 80 98   BUN 17 17 19 17 17   CR 0.50* 0.58* 0.64* 0.58* 0.57*   GFRESTIMATED >90 >90 >90 >90 >90   GFRESTBLACK >90 >90 >90 >90 >90   NOAH 8.4* 8.3* 8.6 8.5 8.8   MAG 1.7  --  1.8 1.8 1.7   PHOS 1.8*  --  2.5 3.1 1.9*     CBC  Recent Labs   Lab 04/03/21  0509 04/02/21  0550 04/01/21  0432 03/31/21  0421   WBC 7.9 5.3 5.0 4.9   RBC 4.10* 3.90* 3.60* 4.11*   HGB 11.2* 10.5* 9.8* 11.0*   HCT 36.5* 34.2* 31.8* 36.7*   MCV 89 88 88 89   MCH 27.3 26.9 27.2 26.8   MCHC 30.7* 30.7* 30.8* 30.0*   RDW 19.9* 19.0* 18.5* 18.5*   PLT 76* 89* 85* 102*     INRNo lab results found in last 7 days.  Arterial Blood Gas  Recent Labs   Lab 04/01/21  0435 03/31/21  0421 03/30/21  1650 03/30/21  0400 03/29/21  1700   PH 7.41 7.42 7.46* 7.41  --    PCO2 58* 60* 54* 64*  --    PO2 83 92 145* 88  --    HCO3 37* 39* 38* 41*  --    O2PER 30% 30%  --  30% ART LINE       All cultures:  No results for input(s): CULT in the last 168 hours.  No results found for this or any previous visit (from the past 24 hour(s)).              Billing: This patient is critically ill: Yes. Total critical care time today 40 min, excluding procedures.     Fan Halye,  MD    Department of Medicine, Division of Pulmonary, Allergy, Critical Care, and Sleep Medicine

## 2021-04-03 NOTE — PROGRESS NOTES
"Neurocritical Care Note:  S: Sitting up and reading a book in bed. Wide awake. Session 3 of 5 PLEX tomorrow. Possible extubation to BiPAP today    O:  /58   Pulse 77   Temp 98.3  F (36.8  C) (Oral)   Resp 11   Ht 1.778 m (5' 10\")   Wt 116.1 kg (256 lb)   SpO2 95%   BMI 36.73 kg/m      Examined off sedation. On exam, Mr Goodrich is wide awake and fully interactive. He answers questions appopriately by nodding and writes out his questions with proper spelling, penmanship, and grammar. He follows commands reliably. Pupils symmetric and react to light. No facial asymmetry. Tongue protrusion midline. Limb strength full and symmetric. Neck extension 5/5, flexion 4/5. Sensation grossly intact to light touch.    Imaging and labs personally reviewed in EMR.    A/P:   #myasthenia gravis crisis  #hypercarbic respiratory failure sp intubation x3    Mr Goodrich is a 81yo gentleman currently admitted for management of myasthenia gravis crisis with persistent hypoventilation and hypercarbic respiratory failure. He is currently intubated and doing well. He has received multiple doses of IVIG over the past month with only modest improvement. He is currently receiving PLEX. Today is session 2 of 5. Possible  extubated tomorrow    Recommendations  - Plex 3 of 5 tomorrow  - Continue mycophenolate  - Continue pyridostigmine  - Continue prednisone, he will need PJP prophylaxis at this dose  - After patient is extubated, he will need to be on BiPAP 4on/4off temporarily and transition to BiPAP nightly to prevent his CO2 rentention from reaching critical level requiring intubation again. The duration for which he will need to be on BiPAP nightly is uncertain at this time, but most likely he will need it for weeks up to  Month while we are treating his MS until he goes into remission. Most importantly, he will need to follow up with his primary neurologist to titrate his medication to assist in achieving remission.        This patient " "was seen and discussed with attending, Dr Rodney Sargent MD  Vascular Neurology Fellow  To page me or covering stroke neurology team member, click here: AMCOM   Choose \"On Call\" tab at top, then search dropdown box for \"Neurology Adult\", select location, press Enter, then look for stroke/neuro ICU/telestroke.    "

## 2021-04-03 NOTE — PROGRESS NOTES
"Urology Note    Subjective:   Intubated - awake and alert. He denies pain with alberto catheter. Catheter has been draining well - minimal hematuria.    Objective:    BP (!) 135/119   Pulse 81   Temp 97.8  F (36.6  C) (Axillary)   Resp 20   Ht 1.778 m (5' 10\")   Wt 116.1 kg (256 lb)   SpO2 93%   BMI 36.73 kg/m        Intake/Output Summary (Last 24 hours) at 4/3/2021 1044  Last data filed at 4/3/2021 0900  Gross per 24 hour   Intake 2022 ml   Output 1130 ml   Net 892 ml       Labs:    ROUTINE IP LABS (Last four results)  BMP  Recent Labs   Lab 04/02/21  0550 04/01/21  0432 03/31/21  0421 03/30/21  0400    137 135 138   POTASSIUM 3.7 3.5 3.7 3.6   CHLORIDE 100 100 98 98   NOAH 8.4* 8.3* 8.6 8.5   CO2 35* 37* 38* 41*   BUN 17 17 19 17   CR 0.50* 0.58* 0.64* 0.58*   * 97 76 80     CBC  Recent Labs   Lab 04/03/21  0509 04/02/21  0550 04/01/21  0432 03/31/21  0421   WBC 7.9 5.3 5.0 4.9   RBC 4.10* 3.90* 3.60* 4.11*   HGB 11.2* 10.5* 9.8* 11.0*   HCT 36.5* 34.2* 31.8* 36.7*   MCV 89 88 88 89   MCH 27.3 26.9 27.2 26.8   MCHC 30.7* 30.7* 30.8* 30.0*   RDW 19.9* 19.0* 18.5* 18.5*   PLT 76* 89* 85* 102*     INRNo lab results found in last 7 days.      Exam:    Gen: Awake and alert  Ab: soft, obese, NT, ND  : buried penis -  Alberto catheter - minimal old bloody drain at tip of penis  Ext: no calve tenderness  Urine - yellow/light pink      Assessment/Plan:     1.  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  H/O prostate cancer    - s/p Cryotherapy    - was scheduled to see Dr. Charles on 4/5/21    - follow-up with Dr. Charles in 2-4 weeks with PSA   3.  Hematuria      - due to catheter trauma - resolved    - I will sign-off. Please call if there are any further concerns.    Isai Lopez MD    [facility]  Office " - [phone]

## 2021-04-03 NOTE — PROGRESS NOTES
Neuro: Alert and oriented, no sedation, able to write complete sentences to communicate. Gets frustrated easily. Patient became aggravated overnight when RN told patient that he couldn't have a cup to spit into due to the breathing tube being in his mouth. Patient became angry and threw clip board. Patient ended up being able to use suction on his own.   CV: BP WNL, SR, mild edema, pulses palpable.   Resp: Patient on full vent support overnight, weaning now in the morning, tolerating. Minimal settings, no desaturations.   GI/Nutrition: Tolerating TF  : Beckett in place, no further bleeding, no clots in urine, adequate output. Pink tinged.   Skin: Redness and bruising diffuse. Patient refuses bath this morning and turning overnight.    Critical Labs: Labs WNL  ID: NA  Social/Psych: No contact with family overnight  Therapy: NA  Plan: Wean this morning, plan to extubate to BiPap today.     Continuing to monitor.    Elli Andres RN

## 2021-04-03 NOTE — PROGRESS NOTES
[facility] ICU RESPIRATORY NOTE        Date of Admission: 3/19/2021    Date of Intubation (most recent): 3/29/21    Reason for Mechanical Ventilation: Airway protection    Number of Days on Mechanical Ventilation: 5    Met Criteria for Spontaneous Breathing Trial: yes Pt did PS of 5/5 and 35%      Significant Events Today: none    ABG Results:   Recent Labs   Lab 04/01/21  0435 03/31/21  0421 03/30/21  1650 03/30/21  0400 03/29/21  1700   PH 7.41 7.42 7.46* 7.41  --    PCO2 58* 60* 54* 64*  --    PO2 83 92 145* 88  --    HCO3 37* 39* 38* 41*  --    O2PER 30% 30%  --  30% ART LINE       Current Vent Settings: Ventilation Mode: CMV/AC  (Continuous Mandatory Ventilation/ Assist Control)  FiO2 (%): 35 %  Rate Set (breaths/minute): 12 breaths/min  Tidal Volume Set (mL): 400 mL  PEEP (cm H2O): 5 cmH2O  Pressure Support (cm H2O): 5 cmH2O  Oxygen Concentration (%): 35 %  Resp: 18        Plan: keep the pt on a vent for tonight and possible extubate tomorrow to Avaps.    Bertha Ayon, RT

## 2021-04-03 NOTE — PROGRESS NOTES
Extubation Note    Successful completion of SBT (Yes or No):Yes. PS 5/5 1 hr and 45 minutes  Extubation time:1555    Patient assessment:  Lung sounds:Diminished  Stridor Present (Yes or No):No  Patient tolerance:Good    Oxygen device:  Patient was extubated to BiPAP with AVAPS settings per MD order.    Target Tidal volume (6-8 mL/Kg of body weight) 580    AVAPS Min Pressure 10    Max Pressure 35    AVAPS EPAP 5    Backup Respiratory Rate 12    Keep SaO2 above 89%        SpO2:97%  Gel pad and mepilex in place. Skin intact. Alarm volume set at 10.  Plan:Will cont to monitor.  4/3/2021  Keila Maciel, RT

## 2021-04-03 NOTE — PROGRESS NOTES
CLINICAL NUTRITION SERVICES BRIEF NOTE  Refer to RD note dated 4/2 for full reassessment.     -Low inventory of Vital High Protein requires switch to Peptamen Intense VHP once Vital is no longer available NOTE: These formulas are equivalent and used for the same purpose.   -Once stock of Vital High Protein is available will transition back.     - Noted plan to extubate to BIPAP    Intervention  - If extubation fails -->   - When Vital High Protein runs out transition to Peptamen Intense VHP at the same rate.   - Peptamen Intense VHP @ 60 mL/hr provides 1440 mL, 1440 kcal (12 kcal per kg), 134 g protein (1.8 g per kg), 1210 ml free H2O, 112 g CHO and 5 g Fiber daily.    Ayaka Gamble RD, LD  Weekend Pager: [phone]

## 2021-04-03 NOTE — PROGRESS NOTES
Spontaneous Breathing Trial    Criteria met for SBT (Y/N):Yes    Settings:    PS:5   PEEP:5  FiO2:40%    Measured Parameters:    RR:20  Tidal volume:360 ml   RSBI:55    Patient tolerance:Vt's decreased into the low 200 ml's.    Duration of SBT:105 minutes  Plan:Will assess for another PS this afternoon.  4/3/2021  Keila Maciel, RT

## 2021-04-03 NOTE — PROGRESS NOTES
Patient achieved a NIF of -37 and a VC of 870 ml. Both done on PS 5/5 with fair pt effort.  4/3/2021  Keila Maciel, RT

## 2021-04-04 NOTE — PLAN OF CARE
Place on 4L NC this morning, tolerating well. No SOB or feelings of tiredness. Plan for Bipap tonight. Diet ordered per SLP. Good appetite. BM x2, fecal incontinence. Patient instructed to call out when needing to have BM. Education given on skin care and importance of turning to prevent pressure ulcers although still refusing turns throughout day.     Possibly transfer from ICU tomorrow.

## 2021-04-04 NOTE — PROGRESS NOTES
Neuro: Alert and oriented, intact. Able to write complete sentences to communicate. Patient very frustrated and unhappy with care plans for his care. Stated that he was 'duped into this program' and that he is 'aware of when he is being worked'. Patient educated many times on what and why nursing and Respiratory is doing what they are doing.  CV: BP WNL, SR, mild edema, pulses palpable.   Resp: BiPap on all night overnight, Switched masks multiple times. Unhappy with how masks feel and very concerned about pneumonia. Mouth carfes done and patient self suctioned mouth multiple times overnight with assistance donning and doffing mask. No desaturations. No VBG or ABG per MD.   GI/Nutrition: NPO, swallow study in the morning, MD ok with no nutrition overnight,   : Beckett in place, no further bleeding, no clots in urine, adequate output.   Skin: Redness and bruising diffuse. Patient refuses bath this morning and turning overnight.    Critical Labs: Labs WNL  ID: NA  Social/Psych: No contact with family overnight  Therapy: NA  Plan: Continue to monitor      Patient continues to refuse to be turned. Patient calls when he would like repositioning, but refused turns q 2. Documented to Flowsheets.      Elli Andres RN

## 2021-04-04 NOTE — PROGRESS NOTES
"Neuro Critical Care Interval Progress Note Update 04/04/21 4:58 PM     Patient extubated to intermittent BiPAP   Patient will likely to continue BiPAP especially at night time and maybe at times in the day time to avoid CO2 retention and re intubation.  Okay to transfer to floor.   General Neurology to follow   He will need close follow up with his neurologist in the OP setting.  Neuro Critical Care to sign off   Please page with any additional question.     Glenn Sargent MD  Vascular Neurology Fellow  To page me or covering stroke neurology team member, click here: AMCOM   Choose \"On Call\" tab at top, then search dropdown box for \"Neurology Adult\", select location, press Enter, then look for stroke/neuro ICU/telestroke.      "

## 2021-04-04 NOTE — PROGRESS NOTES
Renal Medicine         Extubated  Appropriate   Appears comfortable    No CP or SOB         PLEX 3 of 5:  04/05/21          Recent Labs   Lab 04/04/21  0701      POTASSIUM 4.0   CHLORIDE 101   CO2 37*   ANIONGAP 1*   GLC 77   BUN 20   CR 0.49*   GFRESTIMATED >90   GFRESTBLACK >90   NOAH 8.6           RANJIT Hazel    [facility]  [phone]

## 2021-04-04 NOTE — PROVIDER NOTIFICATION
"  [facility] ICU RESPIRATORY NOTE        Date of Admission: 3/19/2021    Date of Intubation (most recent): 3/29/21    Reason for Mechanical Ventilation: MG    /60   Pulse 55   Temp 97.5  F (36.4  C) (Oral)   Resp 12   Ht 1.778 m (5' 10\")   Wt 116.1 kg (256 lb)   SpO2 99%   BMI 36.73 kg/m      Continue to wean BiPAP settings. Wear NIV 4hrs on and 4hrs off with NIV at night. NIF BID.    ABG Results:   Recent Labs   Lab 04/01/21  0435 03/31/21  0421 03/30/21  1650 03/30/21  0400 03/29/21  1700   PH 7.41 7.42 7.46* 7.41  --    PCO2 58* 60* 54* 64*  --    PO2 83 92 145* 88  --    HCO3 37* 39* 38* 41*  --    O2PER 30% 30%  --  30% ART LINE       Current Vent Settings: Ventilation Mode: PS  (Pressure Support)  FiO2 (%): 40 %  Rate Set (breaths/minute): 12 breaths/min  Tidal Volume Set (mL): 400 mL  PEEP (cm H2O): 5 cmH2O  Pressure Support (cm H2O): 5 cmH2O  Oxygen Concentration (%): 40 %  Resp: 12         04/04/21 0015   Tech Time   $Tech Time (10 minute increments) 6   CPAP/BiPAP/Settings   $BIPAP/CPAP Therapy continuous   AVAPS Settings   Min P (cmH20) AVAPS 8   Max P (cmH20) AVAPS 35   EPAP cmH20 Bilevel (Res Med) 5   Target VT (mL) AVAPS 580   Oxygen (%) 40   Set Rate (breath/min) 12 breath/min   Timed Inspiration (Sec) 1   AVAPS Patient Parameters   Respiratory Rate Total (breaths/min) 16   Vte (mL) 519   Peak Inspiratory Pressure (cm H2O) 6.7   Minute Ventilation (L/min) 9   Total Leak 9   CPAP/BiPAP/AVAPS/AVAPS AE Alarms   High Pressure (cm H2O) 25 cmH2O   Low Pressure (cm H2O) 5   Low Pressure Delay (sec) 20 sec   Lo Min Vent 2   High Rate (breaths/min) 45 breaths/min   Low Rate (breaths/min) 5   Audible Alarm set at (Volume of Alarm) 10   CPAP/BiPAP/AVAPS/AVAPS AE Patient Assessment   Interface Face Mask - Large   Skin Integrity gel pad and mepilex in place   RT Device Skin Assessment   Oxygen Delivery Device CPAP/BiPAP Mask   Site Appearance neck circumference Clean and dry   Site Appearance tracheostomy " stoma and surrounding skin Clean and dry   Site Appearance lips Clean and dry   Site Appearance bridge of nose Clean and dry   Site Appearance occiput Clean and dry   Strap Tightness Finger Allowance between head and device strap   Skin Interventions Taken No adjustments needed   Respiratory WDL   Respiratory WDL X   Rhythm/Pattern, Respiratory pattern regular;depth regular       PAUL MILLER, RT

## 2021-04-04 NOTE — PROGRESS NOTES
Clinical Swallow Evaluation:    04/04/21 1042   General Information   Onset of Illness/Injury or Date of Surgery 03/19/21   Referring Physician Dr. Haley   Patient/Family Therapy Goal Statement (SLP) To eat/drink   Pertinent History of Current Problem Pt admitted from TCU with somnolence, weakness. Recurrent myaesthenia gravis exacerbations, neurology this admission starting PLEX tx. Intubated 3/19 - 3/22, re-intubated 3/29 - 4/3. Of note, recent similar admission with intubations 3/4 - 3/6 and 3/6 - 3/10. x4 intubated within the last month. Pt was seen during prior admission where regular/thin diet was recommended. Per TCU paperwork soft/bite sized solids, mildly thick liquids.    General Observations Pt alert, strong cough with self-management of secretions.   Type of Evaluation   Type of Evaluation Swallow Evaluation   Oral Motor   Oral Musculature generally intact   Structural Abnormalities none present   Mucosal Quality good   Dentition (Oral Motor)   Dentition (Oral Motor) natural dentition   Facial Symmetry (Oral Motor)   Facial Symmetry (Oral Motor) WNL   Lip Function (Oral Motor)   Lip Range of Motion (Oral Motor) WNL   Tongue Function (Oral Motor)   Tongue ROM (Oral Motor) WNL   Jaw Function (Oral Motor)   Jaw Function (Oral Motor) WNL   Cough/Swallow/Gag Reflex (Oral Motor)   Soft Palate/Velum (Oral Motor) WNL   Volitional Throat Clear/Cough (Oral Motor) WNL   Volitional Swallow (Oral Motor) weak   Vocal Quality/Secretion Management (Oral Motor)   Vocal Quality (Oral Motor) hypophonic  (but good/clear qualityu)   Secretion Management (Oral Motor)   (good strong coughs/self-management)   General Swallowing Observations   Current Diet/Method of Nutritional Intake (General Swallowing Observations, NIS) NPO   Respiratory Support (General Swallowing Observations) nasal cannula   Swallowing Evaluation Clinical swallow evaluation   Clinical Swallow Evaluation   Feeding Assistance no assistance needed    Clinical Swallow Evaluation Textures Trialed Thin Liquids;Puree Textures;Solid Foods;Nectar-Thick Liquids   Clinical Swallow Eval: Thin Liquid Texture Trial   Mode of Presentation, Thin Liquids spoon;cup;straw;self-fed   Volume of Liquid or Food Presented x5 ice chips, 4 oz via cup, 2 oz via straw   Oral Phase of Swallow WFL   Pharyngeal Phase of Swallow intact;throat clearing   Diagnostic Statement Good oral control, timely swallow, throat clear x1 across 6 oz   Clinical Swallow Evaluation: Nectar-Thick Liquid Texture Trial   Mode of Presentation, Nectar cup;straw;self-fed   Volume of Nectar Presented 3 oz   Oral Phase, [place] WFL   Pharyngeal Phase, Nectar intact;throat clearing   Diagnostic Statement No clinical difference between thin and nectar thick liquid consistencies - x1 throat clear also noted   Clinical Swallow Evaluation: Puree Solid Texture Trial   Mode of Presentation, Puree spoon;self-fed   Volume of Puree Presented 4 oz   Oral Phase, Puree WFL   Pharyngeal Phase, Puree intact   Diagnostic Statement Good oral manipulation, clearance, timely swallow, no overt signs/sx aspiration noted   Clinical Swallow Evaluation: Solid Food Texture Trial   Mode of Presentation, Solid self-fed   Volume of Solid Food Presented 3 crackers   Oral Phase, Solid WFL   Pharyngeal Phase, Solid coughing/choking   Diagnostic Statement Adequate mastication/oral clearance, slight increased WOB and x1 strong cough   Esophageal Phase of Swallow   Patient reports or presents with symptoms of esophageal dysphagia No   Swallowing Recommendations   Diet Consistency Recommendations dysphagia level 3 (advanced);thin liquids   Supervision Level for Intake close supervision needed   Mode of Delivery Recommendations bolus size, small;slow rate of intake   Swallowing Maneuver Recommendations   (periodic liquid wash every 2-3 bites)   Monitoring/Assistance Required (Eating/Swallowing) stop eating activities when fatigue is present;monitor  for cough or change in vocal quality with intake;optimize oral intake to minimize need for tube feeding   Recommended Feeding/Eating Techniques (Swallow Eval) maintain upright sitting position for eating;maintain upright posture during/after eating for 30 minutes   Medication Administration Recommendations, Swallowing (SLP) whole as tolerated   Instrumental Assessment Recommendations   (? monitor need for testing given multiple intubations)   General Therapy Interventions   Planned Therapy Interventions Dysphagia Treatment   SLP Therapy Assessment/Plan   Criteria for Skilled Therapeutic Interventions Met (SLP Eval) yes;treatment indicated   SLP Diagnosis minimal oropharyngeal dysphagia   Rehab Potential (SLP Eval) good, to achieve stated therapy goals   Therapy Frequency (SLP Eval) 2 times/wk   Predicted Duration of Therapy Intervention (SLP Eval) 1 week   Comment, Therapy Assessment/Plan (SLP) Pt presents with minimal oropharyngeal dysphagia on clinical swallow evaluation related to reduced endurance 2/2 recurrent myaesthenia gravis exacerbations. Pt reported secretion build up in throat but demonstrated strong cough and ability to clear independently. Good oral manipulation across textures observed, consistent/timely swallow; minimal weakness to palpation. x2 throat clears across liquids (both thin and nectar, no clinical difference in function) and x1 cough with dryer solids. Pt agreeable to dysphagia diet level 3 (softer, moister) with thin liquids. SLP to follow up x1-2 session for education, trial for upgrades and to monitor if VFSS or FEES may be indicated in setting of x4 intubated in 1 month, generalized weakness 2/2 myaesthenia gravis.    Therapy Plan Review/Discharge Plan (SLP)   Therapy Plan Review (SLP) evaluation/treatment results reviewed;care plan/treatment goals reviewed;risks/benefits reviewed;current/potential barriers reviewed;participants voiced agreement with care plan;participants  included;patient   SLP Discharge Planning    SLP Discharge Recommendation (DC Rec) Transitional Care Facility   SLP Rationale for DC Rec Minimal dysphagia, may benefit from short term follow up at TCU   SLP Brief overview of current status  Recommend dysphagia diet level 3 and thin liquids with close supervision. Pt MUST be seated fully upright for PO Intake, take small bites/sips, eat/drink at a slow rate, and alternate solids and liquids every 2-3 bites.     Total Evaluation Time   Total Evaluation Time (Minutes) 38

## 2021-04-04 NOTE — PROGRESS NOTES
Critical Care Progress Note  04/04/2021    Name: Thanh Goodrich MRN#: [id number]   Age: 80 year old YOB: 1940     [facility] Day# 15           Problem List:   Principal Problem:    Acute on chronic respiratory failure with hypercapnia (H)  Active Problems:    Myasthenic crisis (H)    Benign essential hypertension    Morbid obesity -- BMI 40.6    Metabolic encephalopathy    Abnormal urinalysis         Summary/Hospital Course:   Mr. Goodrich is a 80 year old male admitted on 3/19/2021 with significant history for myasthenia gravis, HFpEF, COPD, HTN, MDD, HAZEL, prostate cancer who presents from TCU for acute hypercapnic respiratory failure. He was recently admitted and discharged (3/4-3/15) for acute hypoxic and hypercapnic respiratory failure requiring intubation (3/6/21-3/11/21) 2/2 Myasthenia gravis exacerbation, diastolic CHF exacerbation with diuresis. Previously, he received IVIG and steroid treatment and was followed by [facility]. He was extubated on 3/22 and transferred to hospitalist service 3/23. Subsequently experienced worsening encephalopathy and was found to have worsening respiratory acidosis despite BiPAP treatment and was transferred back to ICU on 3/29. Intubated due to persistent hypercarbia. Palliative care saw the patient on 3/30 and it was decided to pursue HD catheter (IR), begin plasmapheresis (nephrology), use BiPAP after extubation, and re-intubate if needed. HD catheter placed 3/31 and plasmapheresis started. Course complicated by hematuria due to alberto trauma. He was able to be extubated to AVAPS BIPAP on 4/3.     Assessment and plan :     Thanh Goodrich is a 80 year old male admitted on 3/19/2021 for acute hypercapnic respiratory failure due to myasthenia gravis exacerbation requiring intubation, and re-intubated 3/29-4/3 due to repeat of hypercarbic respiratory failure, likely due to ongoing myasthenia gravis, now improving with plasmapheresis.   I have personally reviewed the daily  labs, imaging studies, cultures and discussed the case with referring physician and consulting physicians.   My assessment and plan by system for this patient is as follows:    Neurology/Psychiatry:   1. Myasthenia gravis: has had recurrent exacerbations. He has previously received IVIG and steroid treatment, and has been followed by [facility]. He has required intubation for respiratory failure related to this multiple times, most recently 3/29 to 4/3. Now undergoing plasmapheresis   2. Analgesia   Plan  -appreciate neuro critical care assistance  -appreciate palliative care assistance with GOC  -Continue plasmapheresis, has received 2/5 planned treatments, next Monday  -nephrology is consulted for plasmapheresis   -continue daily prednisone 60 mg  -continue mycophenolate 1000 mg qAM and 500 mg qPM.   -continue pyridostigmine 60 mg q8h  -scheduled tylenol 1 g TID    Cardiovascular:   1. History of CHF:   2. Hypotension, resolved.    Plan  -monitor volume status  -Holding metoprolol with his hypotension, resume when stable.     Pulmonary/Ventilator Management:   1. Recurrent acute hypoxic and acute on chronic hypercapnic respiratory failure: thought to be secondary to MG, improving. We have been following NIF and FVC daily. Extubated to bipap (AVAPS) on 4/3.   Plan  - Okay to trial off AVAPS/Bipap this morning.   - Would continue to use Bipap nightly (likely for weeks-months, if not longer), and with naps/on and off throughout the day today to support ventilation   - PRN VBG    GI/Nutrition :   1. Non severe malnutrition:   2. GI stress ulcer ppx  Plan  -Tube feeds on hold due to removal of OG with extubation  -will get swallow eval from SLP today, if fails, will need NG/NJ to restart tube feeds.   -Bowel regimen  -stop famotidine, no longer intubated     Renal/Fluids/Electrolytes:   1. Chronic respiratory acidosis, initial metabolic alkalosis (resolved)   2. Need for plasmapheresis  3. Hematuria due to alberto trauma,  resolved  4. History of prostate cancer s/p cryotherapy   Plan  - Nephrology consulted for plasmapheresis, appreciate assistance  - monitor and support ventilation as above   - monitor function and electrolytes as needed with replacement per ICU protocols.  - generally avoid nephrotoxic agents such as NSAID, IV contrast unless specifically required  - adjust medications as needed for renal clearance  - follow I/O's as appropriate.  -chronic alberto (16 F Coude), replace every 2-3 weeks, leave in at discharge.   -outpatient urology follow up needed with Dr. Gardner in 2-4 weeks from 4/3 with PSA    Infectious Disease:   1. Need for prophylactic antibiotics given immunosuppression  2. Resolved MSSA pneumonia: s/p Unasyn and amoxicillin course  3. Resolved E coli UTI: s/p treatment   Plan  - continue bactrim single strength 1 tab daily    Endocrine:   1. Stress induced hyperglycemia  Plan  - ICU insulin protocol, goal sugar <180    Hematology/Oncology:   1. Thrombocytopenia:   2. normocytic anemia, hematuria on 4/2 due to alberto trauma (resolved)  Plan  - monitor CBC  - hand alberto irrigation of hematuria recurs    MSK/Rheum:  1. Deconditioning   Plan  - PT/OT    IV/Access:   1. Venous access - tunneled right internal jugular pheresis catheter, PIV  2. Arterial access - none  Plan  - central access required and necessary    ICU Prophylaxis:   1. DVT: Hep subcutaneous (on hold with low plts), mechanical  2. VAP: not indicated   3. Stress Ulcer: not indicated   4. Restraints: not indicated    5. Wound care - per unit routine   6. Feeding - Tube feeds currently on hold, swallow eval today.   7. Family Update: not yet completed, patient speaking to family as well.   8. Disposition - remain in ICU today given risk of decompensation and multiple intubations this admission     Key goals for next 24 hours:   1. Plex tomorrow, treatment 3.   2. Swallow eval today, either start diet or place NG for tube feed restart         Interim  History/Overnight Events:   Extubated yesterday. Remained on AVAPS overnight. Continues to have a productive cough at times. Was irritated overnight with mask. This morning, feels better. Slept okay. No fever, chills, chest pain, shortness of breath, or nausea. Feeling okay. Good cough. No other complaints.          Key Medications:       acetaminophen  1,000 mg Oral or Feeding Tube TID     chlorhexidine  15 mL Mouth/Throat Q12H     famotidine  20 mg Oral or Feeding Tube BID     [Held by provider] heparin ANTICOAGULANT  5,000 Units Subcutaneous Q8H     [Held by provider] metoprolol tartrate  12.5 mg Oral BID     mycophenolate  1,000 mg Oral or NG Tube BID     predniSONE  60 mg Oral or Feeding Tube Daily     pyridostigmine  60 mg Oral or Feeding Tube Q8H ALEXUS     sulfamethoxazole-trimethoprim  1 tablet Oral or Feeding Tube Daily     cholecalciferol  100 mcg Oral or Feeding Tube Daily       dextrose       - MEDICATION INSTRUCTIONS -       - MEDICATION INSTRUCTIONS -                 Physical Examination:   Temp:  [97.5  F (36.4  C)-98.5  F (36.9  C)] 97.5  F (36.4  C)  Pulse:  [54-86] 68  Resp:  [8-30] 29  BP: (100-135)/() 100/60  FiO2 (%):  [40 %-50 %] 40 %  SpO2:  [79 %-100 %] 98 %    Intake/Output Summary (Last 24 hours) at 4/3/2021 1015  Last data filed at 4/3/2021 0900  Gross per 24 hour   Intake 2022 ml   Output 1130 ml   Net 892 ml     Wt Readings from Last 4 Encounters:   04/04/21 120.3 kg (265 lb 3.4 oz)   03/17/21 128.3 kg (282 lb 12.8 oz)   03/15/21 136.3 kg (300 lb 7.8 oz)   12/16/20 136.1 kg (300 lb)     BP - Mean:  [] 76  Ventilation Mode: PS  (Pressure Support)  FiO2 (%): 40 %  Rate Set (breaths/minute): 12 breaths/min  Tidal Volume Set (mL): 400 mL  PEEP (cm H2O): 5 cmH2O  Pressure Support (cm H2O): 5 cmH2O  Oxygen Concentration (%): 40 %  Resp: 29    Recent Labs   Lab 04/01/21  0435 03/31/21  0421 03/30/21  1650 03/30/21  0400 03/29/21  1700   PH 7.41 7.42 7.46* 7.41  --    PCO2 58* 60* 54*  64*  --    PO2 83 92 145* 88  --    HCO3 37* 39* 38* 41*  --    O2PER 30% 30%  --  30% ART LINE       GEN: no acute distress, lying in bed, talkative   HEENT: head ncat, sclera anicteric, trachea midline   PULM: unlabored on NC. CTAB. Occasional cough.   CV/COR: RRR, normal S1 and S2. No gallop, rub, nor murmur  ABD: soft, non-tender, non-distended. Normoactive bowel sounds, no mass  MSK/EXT:  No LE edema. WWP.  NEURO: grossly intact. Speech/language wnl. Louder voice today. Alert and oriented.   SKIN: no obvious rash  LINES: clean, dry intact         Data:   All data and imaging reviewed.     ROUTINE ICU LABS (Last four results)  CMP  Recent Labs   Lab 04/02/21  0550 04/01/21  0432 03/31/21  0421 03/30/21  0400 03/29/21  0550    137 135 138 139   POTASSIUM 3.7 3.5 3.7 3.6 4.0   CHLORIDE 100 100 98 98 96   CO2 35* 37* 38* 41* >45*   ANIONGAP 2* <1* <1* <1* Not Calculated   * 97 76 80 98   BUN 17 17 19 17 17   CR 0.50* 0.58* 0.64* 0.58* 0.57*   GFRESTIMATED >90 >90 >90 >90 >90   GFRESTBLACK >90 >90 >90 >90 >90   NOAH 8.4* 8.3* 8.6 8.5 8.8   MAG 1.7  --  1.8 1.8 1.7   PHOS 1.8*  --  2.5 3.1 1.9*     CBC  Recent Labs   Lab 04/04/21  0701 04/03/21  0509 04/02/21  0550 04/01/21  0432   WBC 5.8 7.9 5.3 5.0   RBC 4.02* 4.10* 3.90* 3.60*   HGB 10.9* 11.2* 10.5* 9.8*   HCT 35.9* 36.5* 34.2* 31.8*   MCV 89 89 88 88   MCH 27.1 27.3 26.9 27.2   MCHC 30.4* 30.7* 30.7* 30.8*   RDW 20.3* 19.9* 19.0* 18.5*   PLT 81* 76* 89* 85*     INRNo lab results found in last 7 days.  Arterial Blood Gas  Recent Labs   Lab 04/01/21  0435 03/31/21  0421 03/30/21  1650 03/30/21  0400 03/29/21  1700   PH 7.41 7.42 7.46* 7.41  --    PCO2 58* 60* 54* 64*  --    PO2 83 92 145* 88  --    HCO3 37* 39* 38* 41*  --    O2PER 30% 30%  --  30% ART LINE     All cultures:  No results for input(s): CULT in the last 168 hours.  No results found for this or any previous visit (from the past 24 hour(s)).              Billing: This patient is critically  ill: Yes, due to risk of decompensation from myesthesia gravis and hypercapnic respiratory failure with multiple re-intubations this admission. Total critical care time today 35 min, excluding procedures.     Fan Haley MD    Department of Medicine, Division of Pulmonary, Allergy, Critical Care, and Sleep Medicine

## 2021-04-05 NOTE — PROGRESS NOTES
[facility]    Apheresis Progress Note     Assessment & Plan     Indication:  MG     #3 of 5 runs        1 volume exchange  3600 ml 5% albumin     4 grams calcium     Stable run        Next apheresis 4/7/21     Tito Rosairo MD  [facility] - Nephrology  [phone]    Interval History     Generally feeling better.  A number of things have been treated so it is hard to determine if apheresis is specifically helping.      Physical Exam   Temp: 98  F (36.7  C) Temp src: Oral BP: 125/67 Pulse: 69   Resp: 18 SpO2: 97 % O2 Device: Nasal cannula Oxygen Delivery: 4 LPM  Vitals:    04/02/21 0600 04/04/21 0600 04/05/21 1300   Weight: 116.1 kg (256 lb) 120.3 kg (265 lb 3.4 oz) 120.3 kg (265 lb 3.4 oz)     Vital Signs with Ranges  Temp:  [97.6  F (36.4  C)-98.7  F (37.1  C)] 98  F (36.7  C)  Pulse:  [55-99] 69  Resp:  [8-31] 18  BP: (100-166)/() 125/67  SpO2:  [84 %-100 %] 97 %  I/O last 3 completed shifts:  In: 540 [P.O.:540]  Out: 965 [Urine:965]    GENERAL APPEARANCE: pleasant, NAD, a & o  HEENT:  Eyes/ears/nose/neck grossly normal  RESP: lungs cta b c good efforts, no crackles, rhonchi or wheezes  CV: RRR, nl S1/S2, no m/r/g   ABDOMEN: o/s/nt/nd, bs present  EXTREMITIES/SKIN: no rashes/lesions; 1+ BLE edema    Medications     dextrose       - MEDICATION INSTRUCTIONS -       - MEDICATION INSTRUCTIONS -       - MEDICATION INSTRUCTIONS -         acetaminophen  1,000 mg Oral or Feeding Tube TID     albumin human  4,250 mL Apheresis During apheresis procedure     anticoagulant citrate  500-2,000 mL Apheresis Once     calcium gluconate intermittent infusion with additives - doses under 5g  4.5 g Intravenous Once     [Held by provider] heparin ANTICOAGULANT  5,000 Units Subcutaneous Q8H     [Held by provider] metoprolol tartrate  12.5 mg Oral BID     mycophenolate  1,000 mg Oral BID     predniSONE  60 mg Oral or Feeding Tube Daily     pyridostigmine  60 mg Oral TID      sulfamethoxazole-trimethoprim  1 tablet Oral or Feeding Tube Daily     cholecalciferol  100 mcg Oral or Feeding Tube Daily       Data   BMP  Recent Labs   Lab 04/05/21  0634 04/04/21  0701 04/02/21  0550 04/01/21  0432    139 137 137   POTASSIUM 4.1 4.0 3.7 3.5   CHLORIDE 101 101 100 100   NOAH 8.3* 8.6 8.4* 8.3*   CO2 38* 37* 35* 37*   BUN 17 20 17 17   CR 0.50* 0.49* 0.50* 0.58*   GLC 70 77 100* 97     [email or web address](phos:4)  CBC)  Recent Labs   Lab 04/05/21  0634 04/04/21  0701 04/03/21  0509 04/02/21  0550   WBC 4.6 5.8 7.9 5.3   HGB 10.7* 10.9* 11.2* 10.5*   HCT 36.2* 35.9* 36.5* 34.2*   MCV 90 89 89 88   PLT 95* 81* 76* 89*       Attestation:   I have reviewed today's relevant vital signs, notes, medications, labs and imaging.

## 2021-04-05 NOTE — PROGRESS NOTES
"SPIRITUAL HEALTH SERVICES Progress Note  [facility]    Referral Source: Follow-up visit    PT (\"Rigoberto\") indicated that he believes he is making improvements but acknowledges that \"I have a long way to go.\" Rigoberto stated that he believes his recovery and his ability to resume normal life depends on his ability to strengthen his legs as this would allow him to perform daily living and other activities. Rigoberto stated that he is confident that he can accomplish this but added that medical staff is \"not quite as sure as I am\". PT states emphatically that he is determined to improve his condition by doing what is necessary.    PT indicated that he enjoys visits from  but asked that we resume the conversation at another time as he was tired from a long day of receiving treatment and other cares.    Provided emotional and spiritual support through affirmation and encouragement around goals of care, reflective conversation, and prayer.    Follow-up in 1-2 days per request by PT for another visit.      Isai Linares  Chaplain Resident   "

## 2021-04-05 NOTE — PROGRESS NOTES
Addendum           Neuro: Alert and oriented, intact. Patient educated many times on what and why nursing and Respiratory is doing what they are doing.  CV: BP WNL, SR, mild edema, pulses palpable.   Resp: 4 L NC until pt ready to sleep, mask on BiPap settings all night, patient states not sleeping well with it but tolerated well. No desaturations. No VBG or ABG per MD.   GI/Nutrition: D3 diet, swallows well, thin liquids, no cough  : [place] in place, no further bleeding, no clots in urine, adequate output.   Skin: Redness and bruising diffuse. Patient refuses bath this morning and turning overnight.    Critical Labs: Labs WNL  ID: NA  Social/Psych: No contact with family overnight  Therapy: NA  Plan: Continue to monitor      Patient continues to refuse to be turned. Patient calls when he would like repositioning, but refused turns q 2. Documented to Flowsheets.      Elli Andres RN

## 2021-04-05 NOTE — PROGRESS NOTES
"Apheresis Treatment -     Treatment in room 741 Consent verified.  Treatment 3 of 5    Height: 5'10\"  Weight: 120.3 kg  HCT: 36.2%  Inlet speed: 70-80  ACDA ratio: 10:1  Fluid balance: 100%  Access: Right tunneled catheter  Post treatment line dwell: Heparin 1,000units/ml, red port 1600 units (1.6mls), blue port 1600 units (1.6mls).  Replacement product: 5% Albumin  Electrolyte replacement: Calcium Gluconate 4.5grams in NS - total 95mls, piggybacked into return lines, infused over time of treatment.  Premedications: none    Treatment Notes: stable treatment without any complications    Treatment completed as ordered, VSS, see EPIC flowsheet for run data.    Next treatment: Wednesday, April 7  "

## 2021-04-05 NOTE — PROGRESS NOTES
[facility]    Medicine Progress Note - Hospitalist Service       Date of Admission:  3/19/2021  Assessment & Plan     Mr. Goodrich is a 80 year old male admitted on 3/19/2021 with significant history for myasthenia gravis, HFpEF, COPD, HTN, MDD, HAZEL, prostate cancer who presents from TCU for acute hypercapnic respiratory failure requiring emergent intubation on admission. He was recently admitted and discharged (3/4-3/15) for acute hypoxic and hypercapnic respiratory failure requiring intubation (3/6/21-3/11/21) 2/2 myasthenia gravis exacerbation, diastolic CHF exacerbation. Previously, pt received IVIG and steroid treatment and was followed by NCC. He was extubated on 3/22 and transferred to hospitalist service 3/23. Subsequently experienced worsening encephalopathy and was found to have worsening respiratory acidosis despite BiPAP treatment and was transferred back to ICU on 3/29. Intubated due to persistent hypercarbia. Palliative care saw the patient on 3/30 and it was decided to pursue HD catheter (IR), begin plasmapheresis (nephrology), use BiPAP after extubation, and re-intubate if needed. HD catheter placed 3/31 and plasmapheresis started. Course complicated by hematuria due to alberto trauma. He was able to be extubated to AVAPS BIPAP on 4/3.  Pt remains stable, transfer of care to hospitalist service 4/5/21 with plan to transfer to neuro floor.        Recurrent myasthenic crisis, resulting in CNS failure with acute hypoxic hypercapnic respiratory failure - suspect multifactorial etiology including recent taper of steroids and UTI, improving.  Metabolic encephalopathy, resolved.  * H/o myasthenia gravis, diagnosed in 2005. Had been stable on mycophenolate and had not needed steroids for about 10 years until recent hospitalization 3/2021. Recent hospitalization (3/4-3/15) with myasthenia gravis flare with associated acute respiratory failure (intubated 3/6-3/11). Treated with IVIG and  steroids. Discharged to TCU. Steroids were slowly being tapered after discharge.  * Presented from TCU 3/19 with weakness and found to be in acute hypercarbic respiratory failure. Intubated in ED and admitted to ICU. Was also found with evidence of UTI and pneumonia which were treated (see below). Neurology consulted. Treated with IVIG 3/20-3/22. Predisone increased. Improved and able to be extubated 3/22. Started on pyridostigmine 3/26. Pulmonology consulted 3/25 to assist with obtaining nocturnal NIPPV. On 3/27, still weak overall and not at baseline.  Subsequently experienced worsening encephalopathy and was found to have worsening respiratory acidosis despite BiPAP treatment and was transferred back to ICU on 3/29. Intubated due to persistent hypercarbia.  - Continue mycophenolate 1000 mg qam and 500 mg at bedtime; prednisone 60 mg daily (no taper until outpatient follow-up); and pyridostigmine 60 mg q8h; per Neurology rec's.  - Continue plasma exchange per nephrology/NCC, completing 3/5 planned treatments today.  Has tunneled R internal jugular pheresis catheter  - Continue Bactrim DS 3x/week for PCP prophylaxis while on high dose steroids, immunosuppression.   - NCC has signed off, will consult general neurology with transfer to floor.  Nephrology following.  Appreciate consultant assistance.  - Palliative care consulted, last saw pt on 4/2, following peripherally at this time.  Goals remain restorative at this time.  Appreciate consultant assistance.       Recurrent acute hypoxic hypercarbic respiratory failure, suspect multifactorial including myasthenia gravis flare, MSSA pneumonia and acute diastolic CHF exacerbation, improving.  - Continue BID pulmonary mechanics with NIF and VC.   - Continue IS q2h while awake.  - Would continue to use AVAPS Bipap nightly (likely for weeks-months, if not longer), and with naps/on and off throughout the day today to support ventilation   - Continue O2 while awake, wean as  able.     MSSA pneumonia, suspect HCAP, resolved.  Recent hospitalization as above. Initial presentation from TCU 3/19 as above. On initial evaluation 3/19, was afebrile and hemodynamically stable; WBC and lactate normal, VBG showed pCO2 128. CXR 3/19 showed bibasilar atelectasis or infiltrate with small pleural effusions. Initially started on broad antibiotics including Zosyn on admit. Sputum 3/20 grew MSSA. Antibiotics narrowed to Unasyn 3/25.  Completed Unasyn/amoxicillin therapy.     Possible acute on chronic decompensated diastolic CHF exacerbation, question related to recent high dose steroids, resolved exacerbation.  Hypertension (benign essential).  [PTA: furosemide 20 mg BID; metoprolol 12.5 mg BID.]  * Echocardiogram 3/4/2021 showed LVEF of 65-70%, no regional wall motion abnormalities; mildly decreased RV systolic function; mild to moderate mitral regurgitation, mild to moderate mitral stenosis. The patient reported a recent 20-pound weight gain in the last 1 month PTA following COVID vaccine. Treated with high dose steroids for myasthenia gravis as above.  * Initial presentation 3/19 as above. NTP-BNP 3996 3/19. Was given IV furosemide this hospitalization. He had metabolic alkalosis that was treated with acetazolamide, stopped 3/21. Repeat echo 3/23 showed LVEF 55-60%, grade 1 diastolic dysfunction, no regional wall motion abnormalities; RV OK; trace MR, mild MS.  - Holding PTA furosemide.  - Holding PTA metoprolol 12.5 mg BID in setting of recent hypotension.  - Monitor i/o's, daily wts.     Complicated catheter associated E. Coli UTI, treated.  Chronic indwelling alberto due to difficulty voiding, large pannus (placed previous hospitalization).  Recent hematuria 2/2 alberto trauma, resolved.   History of prostate cancer s/p cryotherapy.  * Alberto placed during previous hospitalization because of difficulty voiding  * UA 3/19 grossly abnormal. Previously obtained UC from 3/18 showed E. Coli. Was treated  with antibiotics including ceftriaxone and other antibiotics as above.  UTI felt to be adequately treated.  - Chronic alberto (16 F Coude), replace every 2-3 weeks, leave in at discharge.   - Outpatient urology follow up needed with Dr. Charles in 2-4 weeks from 4/3 with PSA  - Hand alberto irrigation of hematuria recurs (recently required CBI)     Mild metabolic alkalosis, likely secondary to a combination of diuresis and probable chronic hypercarbic respiratory failure, resolved.   Diuresed as above for CHF. Received acetazolamide as above.  - Holding furosemide.     Thrombocytopenia, unclear etiology, possibly medication effect.  Plt 216 on admit 3/19.  - Monitor CBC.  - Consider platelet transfusion if needs a procedure and less than 50,000; or if less than 10,000.     Anemia, suspect from multiple phlebotomies and also chronic component.  Hgb 13.1 on admit 3/19. No overt clinical signs of major bleeding.   - Monitor CBC.  - Consider prbc transfusion if hgb </= 7.0 or if significant bleeding with hemodynamic instability or if symptomatic.     Leukopenia, possibly medication effect.  WBC intermittently low this hospitalization.  - Monitor CBC.     Hypophosphatemia, hypokalemia.  - Continue PRN electrolyte replacement.     Trop elevation, suspect demand ischemia (type 2 NSTEMI).  Trop 3/19 of 0.060 > 0.022 3/29.  No chest pain on admit. Echo 3/23 as above.  - Monitor clinically.    Pressure ulcer, right posterior thigh.  WOC RN consulted.  - Continue local wound cares.    Malnutrition.    - Level of malnutrition: Non-Severe   - Based on: reduced intake, mild (or greater) subcutaneous fat loss, mild (or greater) muscle loss   - Speech therapy consulted after extubation, currently tolerating dysphagia diet; had received TF while intubated.     Morbid obesity.  Body mass index is 40.58 kg/m .  - Needs to pursue aggressive dietary and lifestyle modifications.    Physical deconditioning 2/2 critical illness.  - Will consult  PT/OT      COVID-19 testing 3/4, 3/11, 3/19 - negative.     Diet: Combination Diet Dysphagia Diet Level 3: Advanced; Thin Liquids (water, ice chips, juice, milk gelatin, ice cream, etc)    DVT Prophylaxis: Pneumatic Compression Devices, subcutaneous heparin on hold due to low plts  Beckett Catheter: in place, indication: Insertion Difficulty, Strict 1-2 Hour I&O  Code Status: Full Code           Disposition Plan   Expected discharge: 4 - 7 days, recommended to transitional care unit once safe disposition plan/ TCU bed available and respiratory status stable and completed plasmaphoresis.  Entered: ANIYA Buckley CNP 04/05/2021, 9:10 AM       The patient's care was discussed with the Attending Physician, Dr. Jeremy Alba, hospitalist, Bedside Nurse and Patient.    ANIYA Buckley CNP  Hospitalist Service  [facility]  Contact information available via [facility] Paging/Directory    ______________________________________________________________________    Interval History   Pt remains on 4L O2 via NC, reports no SOB, chest pain, N/V, fever/chills.  Pt reports loose stools and tolerating adequate PO intake without aspiration concerns.    Data reviewed today: I reviewed all medications, new labs and imaging results over the last 24 hours. I personally reviewed no images or EKG's today.    Physical Exam   Vital Signs: Temp: 97.6  F (36.4  C) Temp src: Oral BP: 127/65 Pulse: 74   Resp: 8 SpO2: 100 % O2 Device: Nasal cannula Oxygen Delivery: 4 LPM  Weight: 265 lbs 3.41 oz  Gen: Pt lying in bed, awake, alert, in no apparent distress, nontoxic, nondistressed appearing  Neuro: Pt A/Ox4, clear speech, CN II-XII intact, PERRL, moving all extremities, BLE weak appearing  Resp: In no apparent respiratory distress, clear to auscultation bilaterally, diminished in bases, no crackles or wheezes noted  CV: Regular rate and rhythm, normal S1S2, no murmur, rub or gallop noted  GI: Round, soft, nondistended,  nontender to palpation, no guarding or rebound tenderness noted  Skin: Warm, dry, no mottling noted  Ext: +1 BLE peripheral edema noted    Data   Recent Labs   Lab 04/05/21  0634 04/04/21  0701 04/03/21  0509 04/02/21  0550 03/29/21  1700 03/29/21  1700   WBC 4.6 5.8 7.9 5.3   < >  --    HGB 10.7* 10.9* 11.2* 10.5*   < >  --    MCV 90 89 89 88   < >  --    PLT 95* 81* 76* 89*   < >  --     139  --  137   < >  --    POTASSIUM 4.1 4.0  --  3.7   < >  --    CHLORIDE 101 101  --  100   < >  --    CO2 38* 37*  --  35*   < >  --    BUN 17 20  --  17   < >  --    CR 0.50* 0.49*  --  0.50*   < >  --    ANIONGAP <1* 1*  --  2*   < >  --    NOAH 8.3* 8.6  --  8.4*   < >  --    GLC 70 77  --  100*   < >  --    TROPI  --   --   --   --   --  0.022    < > = values in this interval not displayed.     No results found for this or any previous visit (from the past 24 hour(s)).

## 2021-04-05 NOTE — PLAN OF CARE
Patient here with myasthenia gravis exacerbation with respiratory distress. A/Ox4. Neuros intact, generally weak. Able to move all extremities equally. Dd3 diet, thin liquids. Up with A2/lift. Refuses repositioning, calls when he wants to be turned. VSS on 4L NC. Received 3/5 Apheresis treatment. Beckett in place. Incontinent of BM x 1 this shift. Discharge pending.    Patient Belongings: clothing, cell phone, electronics/laptop/ria, plastic bags, 2 pairs of glasses, book, brief case    No home medications

## 2021-04-05 NOTE — PLAN OF CARE
SLP - Swallow Tx attempted this am; however, pt declined po intake stating he was full from breakfast.  Pt reported no difficulty swallowing.  Will follow.

## 2021-04-05 NOTE — CONSULTS
[facility] [place]  [facility] [place]    Neurology Consultation    Thanh Goodrich MRN# [id number]   YOB: 1940 Age: 80 year old    Code Status:Full Code   Date of Admission: 3/19/2021  Date of Consult:04/05/2021                                                                                       Assessment and Plan:                                         #.  Myasthenia gravis    He has had long-term myasthenia gravis and had been in myasthenic crisis at this admission.  Currently he is 2012 having had doing well after having been extubated.  He is currently going through his third plasma exchange while his CellCept 1000 mg in the morning and 500 mg at night, prednisone 60 mg a day and pyridostigmine 60 mg 3 times a day have been continued as he responded well to them.    Will follow.     Vy Hernandez MD  Neurologist  [facility]  Office [phone]        ----------------------------------------------------------------------------------  ----------------------------------------------------------------------------------  Reason for consult: I was asked by Dr. Zavala to evaluate this patient for Myasthenia Gravis. So far he has been followed by Neurocritical team while he was intubate in the ICU.        Chief Complaint:   Exacerbation of Myasthenia Gravis  History is obtained from the patient / chart       History of Present Illness:   This patient is a 80 year old male who presents with exacerbation of his myasthenia gravis presenting with hypercapnia and respiratory failure.  He had been in the ICU intubated and today was transferred to the floor.    In the recent past he has had similar presentation and had received IVIG for 5 days and was discharged on 60 mg/day of steroids with the plan to taper off the milligrams a week.  While he was going to the taper he started having difficulty with breathing, feeling fatigued out having low-volume voice  and became more somnolent.  On arrival he was found to be hypercarbic and was intubated.  In the ICU he has had 2 plasma exchanges and today with 2 more planned on Wednesday and Friday.  Since he has had the 2 plasma exchanges he has done well and has improved.  He has been extubated and transferred to the floor.    His myasthenic symptoms have mostly been shortness of breath, double vision and low volume of his voice.  He has never developed dysphagia or ptosis.    Currently as an outpatient he has been followed by  and maintained on CellCept 1000 mg in the morning and 500 mg at night.         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     Cellulitis of left leg 9/23/2012     Depressive disorder 2020     Edema 7/7/2009     History of steroid therapy 2/22/2010     Hyperlipidemia LDL goal <130 10/31/2010     HYPERTENSION 7/8/2003     Incontinence of urine 10/25/2010     Leg ulcer (H) 9/20/2012     MALIGN NEOPL PROSTATE-3/07 4/2/2007    T1C, [facility] 8, initial PSA 39, s/p radiation seed implants 2007      Myasthenia gravis (H)      OBESITY 7/8/2003     Osteoporosis 8/18/2009    Refused bisphosphonates 2011      PVC's (premature ventricular contractions) 11/23/2011     RIGHT OCCIPITAL PAIN 7/8/2003     ROTATOR CUFF SYND NOS- RT 9/19/2005     Skin nodule 3/18/2013     ulcer left shin 10/8/2007     Uncomplicated asthma 1944?    childhood only         Past Surgical History:     Past Surgical History:   Procedure Laterality Date     BIOPSY  2013?    dealt with     IR CVC TUNNEL PLACEMENT > 5 YRS OF AGE  3/31/2021     Prostate Cancer Cryotherapy  2007     TONSILLECTOMY  1945          Social History:     Social History     Socioeconomic History     Marital status: Single     Spouse name: Not on file     Number of children: Not on file     Years of education: Not on file     Highest education level: Not on file   Occupational History     [place] [employer] [place]     Employer: [employer]   Social Needs     Financial resource strain: Not on file     Food insecurity     Worry: Not on file     Inability: Not on file     Transportation needs     Medical: Not on file     Non-medical: Not on file   Tobacco Use     Smoking status: Never Smoker     Smokeless tobacco: Never Used   Substance and Sexual Activity     Alcohol use: Yes     Alcohol/week: 0.0 standard drinks     Comment: very rarely     Drug use: No     Sexual activity: Not Currently     Partners: Female   Lifestyle     Physical activity     Days per week: Not on file     Minutes per session: Not on file     Stress: Not on file   Relationships     Social connections     Talks on phone: Not on file     Gets together: Not on file     Attends Yazidism service: Not on file     Active member of club or organization: Not on file     Attends meetings of clubs or organizations: Not on file     Relationship status: Not on file     Intimate partner violence     Fear of current or ex partner: Not on file     Emotionally abused: Not on file     Physically abused: Not on file     Forced sexual activity: Not on file   Other Topics Concern     Parent/sibling w/ CABG, MI or angioplasty before 65F 55M? No   Social History Narrative    The patient has a 0 pk yr tobacco hx.  He has no active use.  Alcohol use is rare alcoholic drinks per week.  He denies use of recreational drugs.          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        The patient is single.  Has 0 children.        Hot Tub Exposure: NO    Recent Travel: NO     Hx of incarceration:  NO    Bird Exposure:   NO    Animal Exposure:  3 Cats    Inhalation Exposure:  + asbestos exposure in past             Patient denies smoking, no significant alcohol intake, denies illicit drugs use       Family History:     Family History   Problem Relation Age of Onset     Hypertension Mother      Depression Mother      Substance Abuse Mother          alcohol     Hypertension Father      Cancer Father         Prostate     Prostate Cancer Father      Substance Abuse Father         alcohol     Obesity Father      Diabetes Maternal Grandmother      C.A.D. Maternal Grandmother      Cerebrovascular Disease Maternal Grandfather      Cancer Paternal Uncle         Prostate     Prostate Cancer Other      Reviewed and not felt to be contributory.        Home Medications:     Prior to Admission Medications   Prescriptions Last Dose Informant Patient Reported? Taking?   Cholecalciferol 100 MCG (4000 UT) CAPS 3/19/2021 at am  Yes Yes   Sig: Take 4,000 Units by mouth daily   Multiple Vitamins-Minerals (MULTIVITAMIN ADULTS 50+) TABS 3/19/2021 at am  Yes Yes   Sig: Take 1 tablet by mouth daily    acetaminophen (TYLENOL) 325 MG tablet 3/19/2021 at 0125  Yes Yes   Sig: Take 650 mg by mouth every 4 hours as needed for mild pain   calcium carbonate 600 mg-vitamin D 400 units (CALTRATE) 600-400 MG-UNIT per tablet 3/19/2021 at am  Yes Yes   Sig: Take 1 tablet by mouth daily   furosemide (LASIX) 20 MG tablet 3/19/2021 at 1200  No Yes   Sig: Take 1 tablet (20 mg) by mouth 2 times daily   metoprolol tartrate (LOPRESSOR) 25 MG tablet 3/19/2021 at am  No Yes   Sig: Take 0.5 tablets (12.5 mg) by mouth 2 times daily   mycophenolate (GENERIC EQUIVALENT) 500 MG tablet 3/19/2021 at am  Yes Yes   Sig: Take 1,000 mg by mouth every morning   mycophenolate (GENERIC EQUIVALENT) 500 MG tablet 3/18/2021 at hs  Yes Yes   Sig: Take 500 mg by mouth At Bedtime    potassium chloride ER (KLOR-CON M) 10 MEQ CR tablet 3/19/2021 at pm  No Yes   Sig: Take 2 tablets (20 mEq) by mouth 2 times daily   predniSONE (DELTASONE) 50 MG tablet 3/19/2021 at am-50mg  No Yes   Sig: Take 1 tablet (50 mg) by mouth daily Take 50 mg until 3/19.  Then decrease by 10 mg every 7 days starting on 3/20      Facility-Administered Medications: None          Allergy:     Allergies   Allergen Reactions     Ciprofloxacin Other (See  Comments)     Contraindicated for myasthenia gravis patients     Procainamide Other (See Comments)     Contraindicated for myasthenia gravis patients     Quinidine Other (See Comments)     Contraindicated for myasthenia gravis patients     Quinine Other (See Comments)     Contraindicated for myasthenia gravis patients          Inpatient Medications:   Scheduled Meds:    acetaminophen  1,000 mg Oral or Feeding Tube TID     albumin human  4,250 mL Apheresis During apheresis procedure     anticoagulant citrate  500-2,000 mL Apheresis Once     calcium gluconate intermittent infusion with additives - doses under 5g  4.5 g Intravenous Once     [Held by provider] heparin ANTICOAGULANT  5,000 Units Subcutaneous Q8H     [Held by provider] metoprolol tartrate  12.5 mg Oral BID     mycophenolate  1,000 mg Oral BID     predniSONE  60 mg Oral or Feeding Tube Daily     pyridostigmine  60 mg Oral TID     sulfamethoxazole-trimethoprim  1 tablet Oral or Feeding Tube Daily     cholecalciferol  100 mcg Oral or Feeding Tube Daily     PRN Meds: sodium chloride 0.9%, albuterol, bisacodyl, calcium gluconate, artificial tears ophthalmic solution, dextrose, glucose **OR** dextrose **OR** glucagon, diphenhydrAMINE, EPINEPHrine, heparin Lock (1000 units/mL High concentration), labetalol, miconazole, - MEDICATION INSTRUCTIONS -, - MEDICATION INSTRUCTIONS -, ondansetron **OR** ondansetron, - MEDICATION INSTRUCTIONS -, phenol, prochlorperazine **OR** prochlorperazine **OR** prochlorperazine, senna-docusate **OR** senna-docusate        Review of Systems    The Review of Systems is negative other than noted in the HPI  A comprehensive review of  10 systems was performed and found to be negative except as described in this note  CONSTITUTIONAL: negative for fever, chills, change in weight  INTEGUMENTARY/SKIN: no rash or obvious new lesions  ENT/MOUTH: no sore throat, new sinus pain or nasal drainage, no neck mass noted  RESP: No pleuretic pain, No  "cough, no hemoptysis, No SOB   CV: negative for chest pain, palpitations or peripheral edema  GI: no nausea, vomiting, change in stools  : no dysuria or hematuria  MUSCULOSKELETAL: no myalgias, arthralgias or join efffusion  ENDOCRINE: no history of polyuria, polydyspsia or symptoms of thyroid dysfunction  PSYCHIATRIC: no change in mood stable  LYMPHATIC: no new lymphadenopathy  HEME: no bleeding or easy bruisability  NEURO: see HPI       Physical Exam:   Physical Exam   Vitals:  Height:5' 10\"  Weight:265 lbs 3.41 oz   Temp: 98  F (36.7  C) Temp src: Oral BP: 124/62 Pulse: 85   Resp: 18 SpO2: 97 % O2 Device: Nasal cannula Oxygen Delivery: 4 LPM  General Appearance:  No acute distress  Neuro:       Mental Status Exam:    He is alert and oriented x3       Cranial Nerves:   His visual fields are normal bilaterally, pupils are equal round reactive to light, extraocular movements are full without any nystagmus and no double vision.  No sensory loss of the face no weakness of the jaw no facial asymmetry no palatal weakness tongue is midline there is no weakness of the neck flexors sternomastoid and shoulder shrug are normal bilaterally           Motor:   There is no weakness proximally in the upper extremities distally there is slight weakness of the .  There is proximal and distal mild to moderate weakness in her legs.  He is able to lift his left leg about 4 inches of the bed in the right leg about 2 inches.           Reflexes: Depressed all over       Sensory: No sensory deficits                   Coordination:   No incoordination of the upper extremities.  Lower extremity coordination could not be palpated because of the weakness       Gait: Currently bedbound as he is going through plasma exchange.         Data:   ROUTINE IP LABS   CBC RESULTS:     Recent Labs   Lab 04/05/21  0634 04/04/21  0701 04/03/21  0509   WBC 4.6 5.8 7.9   RBC 4.02* 4.02* 4.10*   HGB 10.7* 10.9* 11.2*   HCT 36.2* 35.9* 36.5*   PLT 95* " 81* 76*     Basic Metabolic Panel:   Recent Labs   Lab Test 04/05/21  0634 04/04/21  0701 04/02/21  0550    139 137   POTASSIUM 4.1 4.0 3.7   CHLORIDE 101 101 100   CO2 38* 37* 35*   BUN 17 20 17   CR 0.50* 0.49* 0.50*   GLC 70 77 100*   NOAH 8.3* 8.6 8.4*     Liver panel:  Recent Labs   Lab Test 03/23/21  0419 03/22/21  0436 03/21/21  0451 03/20/21  0602 03/19/21 2217   PROTTOTAL 6.7* 6.1* 6.0* 5.8* 6.7*   ALBUMIN 2.1* 2.0* 2.2* 2.4* 2.8*   BILITOTAL 0.4 0.7 1.0 0.7 0.4   ALKPHOS 42 42 45 48 59   AST 22 15 17 21 18   ALT 30 22 23 27 33     INR:No lab results found.   Lipid Profile:  Recent Labs   Lab Test 12/16/20  1455 07/05/19  1558 08/08/18  1245 06/14/17  1511 04/18/16  1518 11/22/13  1523   CHOL 141 174 175 184  --  196   HDL 70 57 53 63 57 61   LDL 60 105* 104* 106* 116* 117   TRIG 55 59 90 76  --  88   CHOLHDLRATIO  --   --   --   --   --  3.2     Thyroid Panel:  Recent Labs   Lab Test 04/18/16  1518   TSH 1.42      Vitamin B12: No lab results found.   Vitamin D level:   Recent Labs   Lab Test 08/08/18  1245 09/10/14  1545   VITDT 101* 88*     A1C: No lab results found.  Troponin I:   Recent Labs   Lab Test 03/29/21  1700 03/29/21  1247 03/29/21  0550 03/19/21  2217 03/04/21  1629 03/04/21  1235 03/04/21  0320   TROPI 0.022 0.023 0.028 0.060* <0.015 <0.015 <0.015     CRP inflammation:   Recent Labs   Lab Test 03/10/15  1416 01/05/15  1459 12/29/14  1255 09/10/14  1545 08/05/13  1410   CRP 43.0* 66.0* 13.0* 13.8* <5.0     ESR:   Recent Labs   Lab Test 03/10/15  1416 12/29/14  1255 09/10/14  1545   SED 20 15 13       ANIL:   Recent Labs   Lab Test 08/05/13  1416   MK <1.0 Interpretation:  Negative       ANCA:   Recent Labs   Lab Test 03/10/15  1416   ANCA <1:20  Reference range: <1:20  (Note)  The ANCA IFA is <1:20; therefore, no further testing will  be performed.  INTERPRETIVE INFORMATION: Anti-Neutrophil Cyto Ab, IgG  Neutrophil Cytoplasmic Antibodies (C-ANCA = granular  cytoplasmic staining, P-ANCA  = perinuclear staining) are  found in the serum of over 90 percent of patients with  certain necrotizing systemic vasculitides, and usually in  less than 5 percent of patients with collagen vascular  disease or arthritis.  Performed by [facility],  [address] [place] [id number] [phone]  [email or web address], Art Strickland MD, Lab. Director           IMAGING:   No Neuroimaging done.

## 2021-04-06 NOTE — PLAN OF CARE
SLP: Attempted to see Pt several times outside of a meal, and Pt declined snacks. Will reschedule during a meal time for 4/7.

## 2021-04-06 NOTE — CONSULTS
[facility]  [facility]    Neurology Consultation    Thanh Goodrich MRN# [id number]   YOB: 1940 Age: 80 year old    Code Status:Full Code   Date of Admission: 3/19/2021  Date of Consult:04/06/2021                                                                                       Assessment and Plan:                                         #.  Myasthenia gravis    He has had long-term myasthenia gravis and had been in myasthenic crisis at this admission.  Currently he doing much better than yesterday when he had his 3rd PE done.  He is currently going through his third plasma exchange while his CellCept has been increased to 1000 mg bid, prednisone 60 mg a day and pyridostigmine 60 mg 3 times a day have been continued as he responded well to them.    Will follow.     Vy Hernandez MD  Neurologist  [facility]  Office [phone]        ----------------------------------------------------------------------------------  ----------------------------------------------------------------------------------  Reason for consult: I was asked by Dr. Zavala to evaluate this patient for Myasthenia Gravis. So far he has been followed by Neurocritical team while he was intubate in the ICU.        Chief Complaint:   Exacerbation of Myasthenia Gravis  History is obtained from the patient / chart       History of Present Illness:   This patient is a 80 year old male who presents with exacerbation of his myasthenia gravis presenting with hypercapnia and respiratory failure.  He had been in the ICU intubated and today was transferred to the floor.    In the recent past he has had similar presentation and had received IVIG for 5 days and was discharged on 60 mg/day of steroids with the plan to taper off the milligrams a week.  While he was going to the taper he started having difficulty with breathing, feeling fatigued out having low-volume voice and  became more somnolent.  On arrival he was found to be hypercarbic and was intubated.  In the ICU he has had 2 plasma exchanges 3rd one done yesterday with 2 more planned on Wednesday and Friday.  Since he has had the 2 plasma exchanges he has done well and has improved.  He has been extubated and transferred to the floor.    His myasthenic symptoms have mostly been shortness of breath, double vision and low volume of his voice.  He has never developed dysphagia or ptosis. He says that his symptoms are much better today.     Currently as an outpatient he has been followed by  with [facility].         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     Cellulitis of left leg 9/23/2012     Depressive disorder 2020     Edema 7/7/2009     History of steroid therapy 2/22/2010     Hyperlipidemia LDL goal <130 10/31/2010     HYPERTENSION 7/8/2003     Incontinence of urine 10/25/2010     Leg ulcer (H) 9/20/2012     MALIGN NEOPL PROSTATE-3/07 4/2/2007    T1C, Patriot 8, initial PSA 39, s/p radiation seed implants 2007      Myasthenia gravis (H)      OBESITY 7/8/2003     Osteoporosis 8/18/2009    Refused bisphosphonates 2011      PVC's (premature ventricular contractions) 11/23/2011     RIGHT OCCIPITAL PAIN 7/8/2003     ROTATOR CUFF SYND NOS- RT 9/19/2005     Skin nodule 3/18/2013     ulcer left shin 10/8/2007     Uncomplicated asthma 1944?    childhood only         Past Surgical History:     Past Surgical History:   Procedure Laterality Date     BIOPSY  2013?    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 [employer]     Employer: [employer]   Social Needs     Financial resource strain: Not on file     Food insecurity     Worry: Not on file     Inability: Not on file     Transportation needs     Medical: Not on file     Non-medical: Not on file   Tobacco Use     Smoking status: Never Smoker     Smokeless tobacco: Never Used   Substance and Sexual Activity     Alcohol use: Yes     Alcohol/week: 0.0 standard drinks     Comment: very rarely     Drug use: No     Sexual activity: Not Currently     Partners: Female   Lifestyle     Physical activity     Days per week: Not on file     Minutes per session: Not on file     Stress: Not on file   Relationships     Social connections     Talks on phone: Not on file     Gets together: Not on file     Attends Rastafarian service: Not on file     Active member of club or organization: Not on file     Attends meetings of clubs or organizations: Not on file     Relationship status: Not on file     Intimate partner violence     Fear of current or ex partner: Not on file     Emotionally abused: Not on file     Physically abused: Not on file     Forced sexual activity: Not on file   Other Topics Concern     Parent/sibling w/ CABG, MI or angioplasty before 65F 55M? No   Social History Narrative    The patient has a 0 pk yr tobacco hx.  He has no active use.  Alcohol use is rare alcoholic drinks per week.  He denies use of recreational drugs.          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        The patient is single.  Has 0 children.        Hot Tub Exposure: NO    Recent Travel: NO     Hx of incarceration:  NO    Bird Exposure:   NO    Animal Exposure:  3 Cats    Inhalation Exposure:  + asbestos exposure in past             Patient denies smoking, no significant alcohol intake, denies illicit drugs use       Family History:     Family History   Problem Relation Age of Onset     Hypertension Mother      Depression  Mother      Substance Abuse Mother         alcohol     Hypertension Father      Cancer Father         Prostate     Prostate Cancer Father      Substance Abuse Father         alcohol     Obesity Father      Diabetes Maternal Grandmother      C.A.D. Maternal Grandmother      Cerebrovascular Disease Maternal Grandfather      Cancer Paternal Uncle         Prostate     Prostate Cancer Other      Reviewed and not felt to be contributory.        Home Medications:     Prior to Admission Medications   Prescriptions Last Dose Informant Patient Reported? Taking?   Cholecalciferol 100 MCG (4000 UT) CAPS 3/19/2021 at am  Yes Yes   Sig: Take 4,000 Units by mouth daily   Multiple Vitamins-Minerals (MULTIVITAMIN ADULTS 50+) TABS 3/19/2021 at am  Yes Yes   Sig: Take 1 tablet by mouth daily    acetaminophen (TYLENOL) 325 MG tablet 3/19/2021 at 0125  Yes Yes   Sig: Take 650 mg by mouth every 4 hours as needed for mild pain   calcium carbonate 600 mg-vitamin D 400 units (CALTRATE) 600-400 MG-UNIT per tablet 3/19/2021 at am  Yes Yes   Sig: Take 1 tablet by mouth daily   furosemide (LASIX) 20 MG tablet 3/19/2021 at 1200  No Yes   Sig: Take 1 tablet (20 mg) by mouth 2 times daily   metoprolol tartrate (LOPRESSOR) 25 MG tablet 3/19/2021 at am  No Yes   Sig: Take 0.5 tablets (12.5 mg) by mouth 2 times daily   mycophenolate (GENERIC EQUIVALENT) 500 MG tablet 3/19/2021 at am  Yes Yes   Sig: Take 1,000 mg by mouth every morning   mycophenolate (GENERIC EQUIVALENT) 500 MG tablet 3/18/2021 at hs  Yes Yes   Sig: Take 500 mg by mouth At Bedtime    potassium chloride ER (KLOR-CON M) 10 MEQ CR tablet 3/19/2021 at pm  No Yes   Sig: Take 2 tablets (20 mEq) by mouth 2 times daily   predniSONE (DELTASONE) 50 MG tablet 3/19/2021 at am-50mg  No Yes   Sig: Take 1 tablet (50 mg) by mouth daily Take 50 mg until 3/19.  Then decrease by 10 mg every 7 days starting on 3/20      Facility-Administered Medications: None          Allergy:     Allergies   Allergen  Reactions     Ciprofloxacin Other (See Comments)     Contraindicated for myasthenia gravis patients     Procainamide Other (See Comments)     Contraindicated for myasthenia gravis patients     Quinidine Other (See Comments)     Contraindicated for myasthenia gravis patients     Quinine Other (See Comments)     Contraindicated for myasthenia gravis patients          Inpatient Medications:   Scheduled Meds:    acetaminophen  1,000 mg Oral or Feeding Tube TID     [Held by provider] heparin ANTICOAGULANT  5,000 Units Subcutaneous Q8H     [Held by provider] metoprolol tartrate  12.5 mg Oral BID     mycophenolate  1,000 mg Oral BID     predniSONE  60 mg Oral or Feeding Tube Daily     pyridostigmine  60 mg Oral TID     sulfamethoxazole-trimethoprim  1 tablet Oral or Feeding Tube Daily     cholecalciferol  100 mcg Oral or Feeding Tube Daily     PRN Meds: albuterol, bisacodyl, artificial tears ophthalmic solution, dextrose, glucose **OR** dextrose **OR** glucagon, EPINEPHrine, labetalol, miconazole, - MEDICATION INSTRUCTIONS -, - MEDICATION INSTRUCTIONS -, ondansetron **OR** ondansetron, - MEDICATION INSTRUCTIONS -, phenol, prochlorperazine **OR** prochlorperazine **OR** prochlorperazine, senna-docusate **OR** senna-docusate        Review of Systems    The Review of Systems is negative other than noted in the HPI  A comprehensive review of  10 systems was performed and found to be negative except as described in this note  CONSTITUTIONAL: negative for fever, chills, change in weight  INTEGUMENTARY/SKIN: no rash or obvious new lesions  ENT/MOUTH: no sore throat, new sinus pain or nasal drainage, no neck mass noted  RESP: No pleuretic pain, No cough, no hemoptysis, No SOB   CV: negative for chest pain, palpitations or peripheral edema  GI: no nausea, vomiting, change in stools  : no dysuria or hematuria  MUSCULOSKELETAL: no myalgias, arthralgias or join efffusion  ENDOCRINE: no history of polyuria, polydyspsia or symptoms of  "thyroid dysfunction  PSYCHIATRIC: no change in mood stable  LYMPHATIC: no new lymphadenopathy  HEME: no bleeding or easy bruisability  NEURO: see HPI       Physical Exam:   Physical Exam   Vitals:  Height:5' 10\"  Weight:265 lbs 3.41 oz   Temp: 97.6  F (36.4  C) Temp src: Oral BP: 118/70 Pulse: 93   Resp: 16 SpO2: 96 % O2 Device: Nasal cannula Oxygen Delivery: 4 LPM  General Appearance:  No acute distress  Neuro:       Mental Status Exam:    He is alert and oriented x3       Cranial Nerves:   His visual fields are normal bilaterally, pupils are equal round reactive to light, extraocular movements are full without any nystagmus and no double vision.  No sensory loss of the face no weakness of the jaw no facial asymmetry no palatal weakness tongue is midline there is no weakness of the neck flexors sternomastoid and shoulder shrug are normal bilaterally           Motor:   There is no weakness proximally in the upper extremities distally there is slight weakness of the .  There is proximal and distal mild to moderate weakness in her legs.  He is able to lift both legs 6 inches off the bed.       Reflexes: Depressed all over       Sensory: No sensory deficits                   Coordination:   No incoordination of the upper extremities.  Lower extremity coordination could not be palpated because of the weakness       Gait: Currently bedbound          Data:   ROUTINE IP LABS   CBC RESULTS:     Recent Labs   Lab 04/06/21  0736 04/05/21  0634 04/04/21  0701   WBC 4.5 4.6 5.8   RBC 4.06* 4.02* 4.02*   HGB 10.9* 10.7* 10.9*   HCT 37.2* 36.2* 35.9*   * 95* 81*     Basic Metabolic Panel:   Recent Labs   Lab Test 04/06/21  0736 04/05/21  0634 04/04/21  0701    138 139   POTASSIUM 3.6 4.1 4.0   CHLORIDE 101 101 101   CO2 37* 38* 37*   BUN 11 17 20   CR 0.50* 0.50* 0.49*   GLC 71 70 77   NOAH 8.6 8.3* 8.6     Liver panel:  Recent Labs   Lab Test 03/23/21  0419 03/22/21  0436 03/21/21  0451 03/20/21  0602 " 03/19/21  2217   PROTTOTAL 6.7* 6.1* 6.0* 5.8* 6.7*   ALBUMIN 2.1* 2.0* 2.2* 2.4* 2.8*   BILITOTAL 0.4 0.7 1.0 0.7 0.4   ALKPHOS 42 42 45 48 59   AST 22 15 17 21 18   ALT 30 22 23 27 33     INR:No lab results found.   Lipid Profile:  Recent Labs   Lab Test 12/16/20  1455 07/05/19  1558 08/08/18  1245 06/14/17  1511 04/18/16  1518 11/22/13  1523   CHOL 141 174 175 184  --  196   HDL 70 57 53 63 57 61   LDL 60 105* 104* 106* 116* 117   TRIG 55 59 90 76  --  88   CHOLHDLRATIO  --   --   --   --   --  3.2     Thyroid Panel:  Recent Labs   Lab Test 04/18/16  1518   TSH 1.42      Vitamin B12: No lab results found.   Vitamin D level:   Recent Labs   Lab Test 08/08/18  1245 09/10/14  1545   VITDT 101* 88*     A1C: No lab results found.  Troponin I:   Recent Labs   Lab Test 03/29/21  1700 03/29/21  1247 03/29/21  0550 03/19/21  2217 03/04/21  1629 03/04/21  1235 03/04/21  0320   TROPI 0.022 0.023 0.028 0.060* <0.015 <0.015 <0.015     CRP inflammation:   Recent Labs   Lab Test 03/10/15  1416 01/05/15  1459 12/29/14  1255 09/10/14  1545 08/05/13  1410   CRP 43.0* 66.0* 13.0* 13.8* <5.0     ESR:   Recent Labs   Lab Test 03/10/15  1416 12/29/14  1255 09/10/14  1545   SED 20 15 13       ANIL:   Recent Labs   Lab Test 08/05/13  1416   MK <1.0 Interpretation:  Negative       ANCA:   Recent Labs   Lab Test 03/10/15  1416   ANCA <1:20  Reference range: <1:20  (Note)  The ANCA IFA is <1:20; therefore, no further testing will  be performed.  INTERPRETIVE INFORMATION: Anti-Neutrophil Cyto Ab, IgG  Neutrophil Cytoplasmic Antibodies (C-ANCA = granular  cytoplasmic staining, P-ANCA = perinuclear staining) are  found in the serum of over 90 percent of patients with  certain necrotizing systemic vasculitides, and usually in  less than 5 percent of patients with collagen vascular  disease or arthritis.  Performed by [employer],  [address] [place] [id number] [phone]  [email or web address], Art Strickland MD, Lab. Director            IMAGING:   No Neuroimaging done.

## 2021-04-06 NOTE — PROGRESS NOTES
[facility]    Hospitalist Progress Note    Interval History   - Doing well today-- worked with PT, appetite is improving, he thinks strength is improving. Still on 4L O2    Assessment & Plan   Summary: Mr. Goodrich is a 80 year old male admitted on 3/19/2021 with significant history for myasthenia gravis, HFpEF, COPD, HTN, MDD, HAZEL, prostate cancer who was initially admitted on 3/19/2021 from TCU for acute hypercapnic respiratory failure requiring emergent intubation on admission. He was recently admitted and discharged (3/4-3/15) for acute hypoxic and hypercapnic respiratory failure requiring intubation (3/6/21-3/11/21) 2/2 myasthenia gravis exacerbation, diastolic CHF exacerbation. Previously, pt received IVIG and steroid treatment and was followed by N[facility]. He was extubated on 3/22 and transferred to hospitalist service 3/23. Subsequently experienced worsening encephalopathy and was found to have worsening respiratory acidosis despite BiPAP treatment and was transferred back to ICU on 3/29. Intubated due to persistent hypercarbia. Palliative care saw the patient on 3/30 and it was decided to pursue HD catheter (IR), begin plasmapheresis (nephrology), use BiPAP after extubation, and re-intubate if needed. HD catheter placed 3/31 and plasmapheresis started. Course complicated by hematuria due to alberto trauma. He was able to be extubated to AVAPS BIPAP on 4/3.  Pt remains stable, transfer of care to hospitalist service 4/5/21 with plan to transfer to neuro floor.        Recurrent myasthenic crisis, resulting in CNS failure with acute hypoxic hypercapnic respiratory failure - suspect multifactorial etiology including recent taper of steroids and UTI, improving  Acute metabolic encephalopathy, resolved  H/o myasthenia gravis, diagnosed in 2005. Had been stable on mycophenolate and had not needed steroids for about 10 years until recent hospitalization 3/2021. Recent hospitalization (3/4-3/15) with  myasthenia gravis flare with associated acute respiratory failure (intubated 3/6-3/11). Treated with IVIG and steroids. Discharged to TCU. Steroids were slowly being tapered after discharge.   Presented from TCU 3/19 with weakness and found to be in acute hypercarbic respiratory failure. Intubated in ED and admitted to ICU. Was also found with evidence of UTI and pneumonia which were treated (see below). Neurology consulted. Treated with IVIG 3/20-3/22. Predisone increased. Improved and able to be extubated 3/22. Started on pyridostigmine 3/26. Pulmonology consulted 3/25 to assist with obtaining nocturnal NIPPV. On 3/27, still weak overall and not at baseline.  Subsequently experienced worsening encephalopathy and was found to have worsening respiratory acidosis despite BiPAP treatment and was transferred back to ICU on 3/29. Intubated due to persistent hypercarbia.  - Appreciate Neurology involvement   - Continue mycophenolate 1000 mg qam and 500 mg at bedtime; prednisone 60 mg daily (no taper until outpatient follow-up); and pyridostigmine 60 mg q8h; per Neurology rec's.  - Appreciate Nephrology involvement   - Continue plasma exchange per nephrology/NCC, total 5 treatments to end around 4/9/2021  - Continue Bactrim DS 3x/week for PCP prophylaxis while on high dose steroids, immunosuppression    Recurrent acute hypoxic hypercarbic respiratory failure, suspect multifactorial including myasthenia gravis flare, MSSA pneumonia and acute diastolic CHF exacerbation, improving.  - Continue BID pulmonary mechanics with NIF and VC.   - Continue IS q2h while awake.  - Would continue to use AVAPS Bipap nightly (likely for weeks-months, if not longer), and with naps/on and off throughout the day today to support ventilation   - Continue O2 while awake, wean as able.     MSSA pneumonia, suspect HCAP, resolved.  Recent hospitalization as above. Initial presentation from TCU 3/19 as above. On initial evaluation 3/19, was  afebrile and hemodynamically stable; WBC and lactate normal, VBG showed pCO2 128. CXR 3/19 showed bibasilar atelectasis or infiltrate with small pleural effusions. Initially started on broad antibiotics including Zosyn on admit. Sputum 3/20 grew MSSA. Antibiotics narrowed to Unasyn 3/25.  Completed Unasyn/amoxicillin therapy.     Possible acute on chronic decompensated diastolic CHF exacerbation, question related to recent high dose steroids, resolved exacerbation.  Hypertension (benign essential).  [PTA: furosemide 20 mg BID; metoprolol 12.5 mg BID.]  Echocardiogram 3/4/2021 showed LVEF of 65-70%, no regional wall motion abnormalities; mildly decreased RV systolic function; mild to moderate mitral regurgitation, mild to moderate mitral stenosis. The patient reported a recent 20-pound weight gain in the last 1 month PTA following COVID vaccine. Treated with high dose steroids for myasthenia gravis as above.   Initial presentation 3/19 as above. NTP-BNP 3996 3/19. Was given IV furosemide this hospitalization. He had metabolic alkalosis that was treated with acetazolamide, stopped 3/21. Repeat echo 3/23 showed LVEF 55-60%, grade 1 diastolic dysfunction, no regional wall motion abnormalities; RV OK; trace MR, mild MS.  - Holding PTA furosemide.  - Holding PTA metoprolol 12.5 mg BID in setting of recent hypotension.  - Monitor i/o's, daily wts.     Complicated catheter associated E. Coli UTI, treated.  Chronic indwelling alberto due to difficulty voiding, large pannus (placed previous hospitalization).  Recent hematuria 2/2 alberto trauma, resolved.   History of prostate cancer s/p cryotherapy.  Alberto placed during previous hospitalization because of difficulty voiding. UA 3/19 grossly abnormal. Previously obtained UC from 3/18 showed E. Coli. Was treated with antibiotics including ceftriaxone and other antibiotics as above.  UTI felt to be adequately treated.  - Chronic alberto (16 F Coude), replace every 2-3 weeks, leave in  at discharge.   - Outpatient urology follow up needed with Dr. Charles in 2-4 weeks from 4/3 with PSA  - Hand alberto irrigation of hematuria recurs (recently required CBI)     Anemia, suspect from multiple phlebotomies and also chronic component  Thrombocytopenia, unclear etiology, possibly medication effect.  Hgb 13.1 on admit 3/19. No overt clinical signs of major bleeding.   - Monitor CBC.  - Consider prbc transfusion if hgb </= 7.0 or if significant bleeding with hemodynamic instability or if symptomatic  - Consider platelet transfusion if needs a procedure and less than 50,000; or if less than 10,000.     Hypophosphatemia, hypokalemia.  - Continue PRN electrolyte replacement.     Pressure ulcer, right posterior thigh.  WOC RN consulted.  - Continue local wound cares.     Malnutrition.    - Level of malnutrition: Non-Severe   - Based on: reduced intake, mild (or greater) subcutaneous fat loss, mild (or greater) muscle loss   - Speech therapy consulted after extubation, currently tolerating dysphagia diet; had received TF while intubated.      Physical deconditioning 2/2 critical illness.  - Will consult PT/OT      COVID-19 testing 3/4, 3/11, 3/19 - negative.     Stable/Resolved  Trop elevation, suspect demand ischemia (type 2 NSTEMI).    Morbid obesity: Body mass index is 40.58 kg/m .    Mild metabolic alkalosis, likely secondary to a combination of diuresis and probable chronic hypercarbic respiratory failure, resolved.     Goals of care  Palliative care consulted, last saw pt on 4/2, following peripherally at this time.  Goals remain restorative at this time.  Appreciate consultant assistance.         DVT Prophylaxis: Pneumatic Compression Devices  Code Status: Full Code  PT/OT: ordered  Diet: Dysphagia Diet Level 3 Advanced Thin Liquids (water, ice chips, juice, milk gelatin, ice cream, etc)      Disposition: Expected discharge TBD, 4-5 days    Davi Jaramillo MD  Text Page  (7am to 6pm)  -Data reviewed  today: I reviewed all new labs and imaging results over the last 24 hours.    Physical Exam   Temp: 97.1  F (36.2  C) Temp src: Oral BP: 109/65 Pulse: 92   Resp: 16 SpO2: 98 % O2 Device: Nasal cannula Oxygen Delivery: 4 LPM  Vitals:    04/02/21 0600 04/04/21 0600 04/05/21 1300   Weight: 116.1 kg (256 lb) 120.3 kg (265 lb 3.4 oz) 120.3 kg (265 lb 3.4 oz)     Vital Signs with Ranges  Temp:  [97.1  F (36.2  C)-98.1  F (36.7  C)] 97.1  F (36.2  C)  Pulse:  [69-99] 92  Resp:  [16-18] 16  BP: (100-127)/(57-73) 109/65  FiO2 (%):  [40 %] 40 %  SpO2:  [97 %-99 %] 98 %  I/O last 3 completed shifts:  In: 363 [P.O.:340; I.V.:23]  Out: 1145 [Urine:1145]  O2 requirements: yes 4L    Constitutional: Obese male appears chronically ill  HEENT: Eyes nonicteric, oral mucosa moist  Cardiovascular: RRR, normal S1/2, no m/r/g  Respiratory: CTAB, no wheezing or crackles  Vascular: 1-2+ BLE pitting edema, R chest central line noted  GI: Normoactive bowel sounds, nontender, obese  Skin/Integumen: No rashes  Neuro/Psych: Appropriate affect and mood. A&Ox3, moves all extremities    Medications     dextrose       - MEDICATION INSTRUCTIONS -       - MEDICATION INSTRUCTIONS -       - MEDICATION INSTRUCTIONS -         acetaminophen  1,000 mg Oral or Feeding Tube TID     [Held by provider] heparin ANTICOAGULANT  5,000 Units Subcutaneous Q8H     [Held by provider] metoprolol tartrate  12.5 mg Oral BID     mycophenolate  1,000 mg Oral BID     predniSONE  60 mg Oral or Feeding Tube Daily     pyridostigmine  60 mg Oral TID     sulfamethoxazole-trimethoprim  1 tablet Oral or Feeding Tube Daily     cholecalciferol  100 mcg Oral or Feeding Tube Daily       Data   Recent Labs   Lab 04/06/21  0736 04/05/21  0634 04/04/21  0701   WBC 4.5 4.6 5.8   HGB 10.9* 10.7* 10.9*   MCV 92 90 89   * 95* 81*    138 139   POTASSIUM 3.6 4.1 4.0   CHLORIDE 101 101 101   CO2 37* 38* 37*   BUN 11 17 20   CR 0.50* 0.50* 0.49*   ANIONGAP <1* <1* 1*   NOAH 8.6 8.3*  8.6   GLC 71 70 77       Imaging:   No results found for this or any previous visit (from the past 24 hour(s)).

## 2021-04-06 NOTE — PROGRESS NOTES
Pt placed on Bipap with AVAP settings for overnight and tolerated well. RT will continue to follow and support.

## 2021-04-06 NOTE — PROGRESS NOTES
Assessment and Plan:   Plasma exchange: #3 of 5 runs yesterday.   1 volume exchange  3600 ml 5% albumin  4 grams calcium  Next run will be 4/7.   R CVC                Interval History:   Rep failure: on bipap at night.        Myasthenia gravis:  On prednisone and cellcept. On septra prophylaxis. Also on mestinon.            Review of Systems:   No problems on pharesis.           Medications:       acetaminophen  1,000 mg Oral or Feeding Tube TID     [Held by provider] heparin ANTICOAGULANT  5,000 Units Subcutaneous Q8H     [Held by provider] metoprolol tartrate  12.5 mg Oral BID     mycophenolate  1,000 mg Oral BID     predniSONE  60 mg Oral or Feeding Tube Daily     pyridostigmine  60 mg Oral TID     sulfamethoxazole-trimethoprim  1 tablet Oral or Feeding Tube Daily     cholecalciferol  100 mcg Oral or Feeding Tube Daily       dextrose       - MEDICATION INSTRUCTIONS -       - MEDICATION INSTRUCTIONS -       - MEDICATION INSTRUCTIONS -       Current active medications and PTA medications reviewed, see medication list for details.            Physical Exam:   Vitals were reviewed  Patient Vitals for the past 24 hrs:   BP Temp Temp src Pulse Resp SpO2 Height Weight   04/06/21 0817 109/65 97.1  F (36.2  C) Oral 92 16 98 % -- --   04/06/21 0300 109/61 97.5  F (36.4  C) Oral 85 16 99 % -- --   04/05/21 2340 119/65 97.7  F (36.5  C) Oral 79 16 97 % -- --   04/05/21 1953 100/57 98.1  F (36.7  C) Oral 95 16 98 % -- --   04/05/21 1630 -- -- -- -- -- 97 % -- --   04/05/21 1545 124/62 -- -- 85 18 -- -- --   04/05/21 1530 122/60 -- -- 86 18 -- -- --   04/05/21 1515 105/70 -- -- 88 18 -- -- --   04/05/21 1500 124/73 -- -- 92 18 -- -- --   04/05/21 1445 126/70 -- -- 85 18 -- -- --   04/05/21 1430 125/67 -- -- 69 18 -- -- --   04/05/21 1415 115/68 -- -- 78 18 -- -- --   04/05/21 1400 127/70 -- -- 72 18 -- -- --   04/05/21 1345 116/61 -- -- 79 18 -- -- --   04/05/21 1330 121/66 -- -- 79 18 -- -- --   04/05/21 1300 116/61  "98  F (36.7  C) Oral 79 18 -- 1.778 m (5' 10\") 120.3 kg (265 lb 3.4 oz)   21 1140 121/61 97.8  F (36.6  C) Oral 99 18 97 % -- --       Temp:  [97.1  F (36.2  C)-98.1  F (36.7  C)] 97.1  F (36.2  C)  Pulse:  [69-99] 92  Resp:  [16-18] 16  BP: (100-127)/(57-73) 109/65  FiO2 (%):  [40 %] 40 %  SpO2:  [97 %-99 %] 98 %    Temperatures:  Current - Temp: 97.1  F (36.2  C); Max - Temp  Av.7  F (36.5  C)  Min: 97.1  F (36.2  C)  Max: 98.1  F (36.7  C)  Respiration range: Resp  Av.5  Min: 16  Max: 18  Pulse range: Pulse  Av.9  Min: 69  Max: 99  Blood pressure range: Systolic (24hrs), Av , Min:100 , Max:127   ; Diastolic (24hrs), Av, Min:57, Max:73    Pulse oximetry range: SpO2  Av.7 %  Min: 97 %  Max: 99 %    I/O last 3 completed shifts:  In: 363 [P.O.:340; I.V.:23]  Out: 1145 [Urine:1145]      Intake/Output Summary (Last 24 hours) at 2021 1039  Last data filed at 2021 0543  Gross per 24 hour   Intake 23 ml   Output 950 ml   Net -927 ml       Alert, NC O2  Lungs with clear ant BS  Cor RRR nl S1 S2 no M  LE no edema  R CVC non-tender, mild surrounding ecchymosis       Wt Readings from Last 4 Encounters:   21 120.3 kg (265 lb 3.4 oz)   21 128.3 kg (282 lb 12.8 oz)   03/15/21 136.3 kg (300 lb 7.8 oz)   20 136.1 kg (300 lb)          Data:          Lab Results   Component Value Date     2021     2021     2021      Lab Results   Component Value Date    CHLORIDE 101 2021    CHLORIDE 101 2021    CHLORIDE 101 2021    Lab Results   Component Value Date    BUN 11 2021    BUN 17 2021    BUN 20 2021      Lab Results   Component Value Date    POTASSIUM 3.6 2021    POTASSIUM 4.1 2021    POTASSIUM 4.0 2021    Lab Results   Component Value Date    CO2 37 2021    CO2 38 2021    CO2 37 2021    Lab Results   Component Value Date    CR 0.50 2021    CR 0.50 2021    CR " 0.49 04/04/2021        Recent Labs   Lab Test 04/06/21  0736 04/05/21  0634 04/04/21  0701   WBC 4.5 4.6 5.8   HGB 10.9* 10.7* 10.9*   HCT 37.2* 36.2* 35.9*   MCV 92 90 89   * 95* 81*     Recent Labs   Lab Test 03/23/21  0419 03/22/21  0436 03/21/21  0451 05/23/14  1615 05/23/14  1615   AST 22 15 17   < > 19   ALT 30 22 23   < > 13   ALKPHOS 42 42 45   < > 64   BILITOTAL 0.4 0.7 1.0   < > 0.5   BILICONJ  --   --   --   --  0.0    < > = values in this interval not displayed.       Recent Labs   Lab Test 04/05/21  0634 04/02/21  0550 03/31/21  0421   MAG 1.8 1.7 1.8     Recent Labs   Lab Test 04/05/21  0634 04/02/21  0550 03/31/21  0421   PHOS 2.6 1.8* 2.5     Recent Labs   Lab Test 04/06/21  0736 04/05/21  0634 04/04/21  0701   NOAH 8.6 8.3* 8.6       Lab Results   Component Value Date    NAOH 8.6 04/06/2021     Lab Results   Component Value Date    WBC 4.5 04/06/2021    HGB 10.9 (L) 04/06/2021    HCT 37.2 (L) 04/06/2021    MCV 92 04/06/2021     (L) 04/06/2021     Lab Results   Component Value Date     04/06/2021    POTASSIUM 3.6 04/06/2021    CHLORIDE 101 04/06/2021    CO2 37 (H) 04/06/2021    GLC 71 04/06/2021     Lab Results   Component Value Date    BUN 11 04/06/2021    CR 0.50 (L) 04/06/2021     Lab Results   Component Value Date    MAG 1.8 04/05/2021     Lab Results   Component Value Date    PHOS 2.6 04/05/2021       Creatinine   Date Value Ref Range Status   04/06/2021 0.50 (L) 0.66 - 1.25 mg/dL Final   04/05/2021 0.50 (L) 0.66 - 1.25 mg/dL Final   04/04/2021 0.49 (L) 0.66 - 1.25 mg/dL Final   04/02/2021 0.50 (L) 0.66 - 1.25 mg/dL Final   04/01/2021 0.58 (L) 0.66 - 1.25 mg/dL Final   03/31/2021 0.64 (L) 0.66 - 1.25 mg/dL Final       Attestation:  I have reviewed today's vital signs, notes, medications, labs and imaging.     Darius Wallace MD

## 2021-04-06 NOTE — PROGRESS NOTES
CLINICAL NUTRITION SERVICES - REASSESSMENT NOTE      Malnutrition:  (3/21)  % Weight Loss:  Weight loss does not meet criteria for malnutrition (fluids)  % Intake:  <75% for > 7 days (non-severe malnutrition)  Subcutaneous Fat Loss:  Orbital region mild depletion  Muscle Loss:  Temporal region mild-moderate depletion and Clavicle bone region mild depletion  Fluid Retention:  None noted     Malnutrition Diagnosis: Non-Severe malnutrition  In Context of:  Acute illness or injury  Chronic illness or disease       EVALUATION OF PROGRESS TOWARD GOALS   Diet:  DD3, thin liquids  SLP is following  Per flow sheets, eating 100%.    Nutrition Support:  Discontinued when pt extubated 4/3      NEW FINDINGS:   Vitals:    03/20/21 0215 03/21/21 0350 03/22/21 0400 03/23/21 0200   Weight: 127.7 kg (281 lb 8.4 oz) 125.2 kg (276 lb) 128.5 kg (283 lb 4.7 oz) 127.4 kg (280 lb 13.9 oz)    03/24/21 0600 03/29/21 0600 03/30/21 0400 03/31/21 1150   Weight: 128.3 kg (282 lb 13.6 oz) 131.1 kg (289 lb) 132 kg (291 lb 0.1 oz) 132 kg (291 lb 0.1 oz)    04/02/21 0600 04/04/21 0600 04/05/21 1300   Weight: 116.1 kg (256 lb) 120.3 kg (265 lb 3.4 oz) 120.3 kg (265 lb 3.4 oz)         Previous Goals (4/2):   EN to meet % estimated needs while intubated   Evaluation: Met    Previous Nutrition Diagnosis:   None identified      CURRENT NUTRITION DIAGNOSIS  No nutrition diagnosis identified at this time     INTERVENTIONS  Recommendations / Nutrition Prescription  Diet per SLP    Implementation  No intervention at this time    Goals  Pt will continue to consume at least 75% of meals      MONITORING AND EVALUATION:  Progress towards goals will be monitored and evaluated per protocol and Practice Guidelines

## 2021-04-06 NOTE — PLAN OF CARE
Pt here with Myasthenia Gravis exacerbation with respiratory distress. A&O x4. Neuros intact except for weakness on LE and numbness on left eros. VSS. Pt on 4L O2. Stable respiratory-wise. Tele: NSR with bundle branch block. DD3  with thin liquids. Takes pills crushed with applesauce. Beckett catheter with pink-tinged urine output. Up with A2 with lift. Denies pain. Pt scoring green on the Aggression Stop Light Tool.

## 2021-04-06 NOTE — PLAN OF CARE
Pt here with myasthenia gravis exacerbation with respiratory distress. Pt A&O x4. Neuros intact. Pt denies pain. Pt moving extremities, BLE weaker. VSS, pt wearing BiPAP while sleeping, 4l/NC while awake. Pt on DD3/Thin liquids. Takes pills crushed in applesauce. Pt incontinent of stool x2 during shift. Beckett patent, draining pasquale urine. Discharge pending.     Pt moved rooms, belongings with pt, clothing, cell home, laptop, plastic bags of clothes, book, glasses, razor and brief case.   Pt greenlight on stoplight aggression tool.

## 2021-04-07 NOTE — CONSULTS
[facility] [place]  [facility]    Neurology Consultation    Thanh Goodrich MRN# [id number]   YOB: 1940 Age: 80 year old    Code Status:Full Code   Date of Admission: 3/19/2021  Date of Consult:04/07/2021                                                                                       Assessment and Plan:                                         #.  Myasthenia gravis    He has had long-term myasthenia gravis and had been in myasthenic crisis at this admission.  Currently he doing much better than admission. Today having his 4th PE and has been feeling much better. his CellCept has been increased to 1000 mg bid, prednisone 60 mg a day and pyridostigmine 60 mg 3 times a day have been continued as he responded well to them.    Will follow.     Vy Hernandez MD  Neurologist  [facility]  Office [phone]        ----------------------------------------------------------------------------------  ----------------------------------------------------------------------------------  Reason for consult: I was asked by Dr. Zavala to evaluate this patient for Myasthenia Gravis. So far he has been followed by Neurocritical team while he was intubate in the ICU.        Chief Complaint:   Exacerbation of Myasthenia Gravis  History is obtained from the patient / chart       History of Present Illness:   This patient is a 80 year old male who presents with exacerbation of his myasthenia gravis presenting with hypercapnia and respiratory failure.  He had been in the ICU intubated and today was transferred to the floor.    In the recent past he has had similar presentation and had received IVIG for 5 days and was discharged on 60 mg/day of steroids with the plan to taper off the milligrams a week.  While he was going to the taper he started having difficulty with breathing, feeling fatigued out having low-volume voice and became more somnolent.  On arrival he  was found to be hypercarbic and was intubated.  He has had 3 PEs and the 4th Plasma Exchange (PE) currently underway.  Since he has had the 3 plasma exchanges he has done well and has improved.  He has been extubated and transferred to the floor.    His myasthenic symptoms have mostly been shortness of breath, double vision and low volume of his voice.  He has never developed dysphagia or ptosis. He says that his symptoms are much better today.     Today he was able to stand up and sit on a chair.     Currently as an outpatient he has been followed by  with [facility].         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     Cellulitis of left leg 9/23/2012     Depressive disorder 2020     Edema 7/7/2009     History of steroid therapy 2/22/2010     Hyperlipidemia LDL goal <130 10/31/2010     HYPERTENSION 7/8/2003     Incontinence of urine 10/25/2010     Leg ulcer (H) 9/20/2012     MALIGN NEOPL PROSTATE-3/07 4/2/2007    T1C, [place] 8, initial PSA 39, s/p radiation seed implants 2007      Myasthenia gravis (H)      OBESITY 7/8/2003     Osteoporosis 8/18/2009    Refused bisphosphonates 2011      PVC's (premature ventricular contractions) 11/23/2011     RIGHT OCCIPITAL PAIN 7/8/2003     ROTATOR CUFF SYND NOS- RT 9/19/2005     Skin nodule 3/18/2013     ulcer left shin 10/8/2007     Uncomplicated asthma 1944?    childhood only         Past Surgical History:     Past Surgical History:   Procedure Laterality Date     BIOPSY  2013?    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 [employer]     Employer: [employer]   Social Needs     Financial resource strain: Not on file     Food insecurity     Worry: Not on file     Inability: Not on file     Transportation needs     Medical: Not on file     Non-medical: Not on file   Tobacco Use     Smoking status: Never Smoker     Smokeless tobacco: Never Used   Substance and Sexual Activity     Alcohol use: Yes     Alcohol/week: 0.0 standard drinks     Comment: very rarely     Drug use: No     Sexual activity: Not Currently     Partners: Female   Lifestyle     Physical activity     Days per week: Not on file     Minutes per session: Not on file     Stress: Not on file   Relationships     Social connections     Talks on phone: Not on file     Gets together: Not on file     Attends Mosque service: Not on file     Active member of club or organization: Not on file     Attends meetings of clubs or organizations: Not on file     Relationship status: Not on file     Intimate partner violence     Fear of current or ex partner: Not on file     Emotionally abused: Not on file     Physically abused: Not on file     Forced sexual activity: Not on file   Other Topics Concern     Parent/sibling w/ CABG, MI or angioplasty before 65F 55M? No   Social History Narrative    The patient has a 0 pk yr tobacco hx.  He has no active use.  Alcohol use is rare alcoholic drinks per week.  He denies use of recreational drugs.          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        The patient is single.  Has 0 children.        Hot Tub Exposure: NO    Recent Travel: NO     Hx of incarceration:  NO    Bird Exposure:   NO    Animal Exposure:  3 Cats    Inhalation Exposure:  + asbestos exposure in past             Patient denies smoking, no significant alcohol intake, denies illicit drugs use       Family History:     Family History   Problem Relation Age of Onset     Hypertension Mother      Depression Mother      Substance Abuse  Mother         alcohol     Hypertension Father      Cancer Father         Prostate     Prostate Cancer Father      Substance Abuse Father         alcohol     Obesity Father      Diabetes Maternal Grandmother      C.A.D. Maternal Grandmother      Cerebrovascular Disease Maternal Grandfather      Cancer Paternal Uncle         Prostate     Prostate Cancer Other      Reviewed and not felt to be contributory.        Home Medications:     Prior to Admission Medications   Prescriptions Last Dose Informant Patient Reported? Taking?   Cholecalciferol 100 MCG (4000 UT) CAPS 3/19/2021 at am  Yes Yes   Sig: Take 4,000 Units by mouth daily   Multiple Vitamins-Minerals (MULTIVITAMIN ADULTS 50+) TABS 3/19/2021 at am  Yes Yes   Sig: Take 1 tablet by mouth daily    acetaminophen (TYLENOL) 325 MG tablet 3/19/2021 at 0125  Yes Yes   Sig: Take 650 mg by mouth every 4 hours as needed for mild pain   calcium carbonate 600 mg-vitamin D 400 units (CALTRATE) 600-400 MG-UNIT per tablet 3/19/2021 at am  Yes Yes   Sig: Take 1 tablet by mouth daily   furosemide (LASIX) 20 MG tablet 3/19/2021 at 1200  No Yes   Sig: Take 1 tablet (20 mg) by mouth 2 times daily   metoprolol tartrate (LOPRESSOR) 25 MG tablet 3/19/2021 at am  No Yes   Sig: Take 0.5 tablets (12.5 mg) by mouth 2 times daily   mycophenolate (GENERIC EQUIVALENT) 500 MG tablet 3/19/2021 at am  Yes Yes   Sig: Take 1,000 mg by mouth every morning   mycophenolate (GENERIC EQUIVALENT) 500 MG tablet 3/18/2021 at hs  Yes Yes   Sig: Take 500 mg by mouth At Bedtime    potassium chloride ER (KLOR-CON M) 10 MEQ CR tablet 3/19/2021 at pm  No Yes   Sig: Take 2 tablets (20 mEq) by mouth 2 times daily   predniSONE (DELTASONE) 50 MG tablet 3/19/2021 at am-50mg  No Yes   Sig: Take 1 tablet (50 mg) by mouth daily Take 50 mg until 3/19.  Then decrease by 10 mg every 7 days starting on 3/20      Facility-Administered Medications: None          Allergy:     Allergies   Allergen Reactions     Ciprofloxacin  Other (See Comments)     Contraindicated for myasthenia gravis patients     Procainamide Other (See Comments)     Contraindicated for myasthenia gravis patients     Quinidine Other (See Comments)     Contraindicated for myasthenia gravis patients     Quinine Other (See Comments)     Contraindicated for myasthenia gravis patients          Inpatient Medications:   Scheduled Meds:    acetaminophen  1,000 mg Oral TID     [START ON 4/8/2021] furosemide  20 mg Oral Daily     [Held by provider] heparin ANTICOAGULANT  5,000 Units Subcutaneous Q8H     [Held by provider] metoprolol tartrate  12.5 mg Oral BID     mycophenolate  1,000 mg Oral BID     predniSONE  60 mg Oral or Feeding Tube Daily     pyridostigmine  60 mg Oral TID     sulfamethoxazole-trimethoprim  1 tablet Oral or Feeding Tube Daily     cholecalciferol  100 mcg Oral or Feeding Tube Daily     PRN Meds: sodium chloride 0.9%, albuterol, bisacodyl, artificial tears ophthalmic solution, dextrose, glucose **OR** dextrose **OR** glucagon, EPINEPHrine, heparin Lock (1000 units/mL High concentration), labetalol, miconazole, - MEDICATION INSTRUCTIONS -, ondansetron **OR** ondansetron, - MEDICATION INSTRUCTIONS -, phenol, prochlorperazine **OR** prochlorperazine **OR** prochlorperazine, senna-docusate **OR** senna-docusate        Review of Systems    The Review of Systems is negative other than noted in the HPI  A comprehensive review of  10 systems was performed and found to be negative except as described in this note  CONSTITUTIONAL: negative for fever, chills, change in weight  INTEGUMENTARY/SKIN: no rash or obvious new lesions  ENT/MOUTH: no sore throat, new sinus pain or nasal drainage, no neck mass noted  RESP: No pleuretic pain, No cough, no hemoptysis, No SOB   CV: negative for chest pain, palpitations or peripheral edema  GI: no nausea, vomiting, change in stools  : no dysuria or hematuria  MUSCULOSKELETAL: no myalgias, arthralgias or join efffusion  ENDOCRINE:  "no history of polyuria, polydyspsia or symptoms of thyroid dysfunction  PSYCHIATRIC: no change in mood stable  LYMPHATIC: no new lymphadenopathy  HEME: no bleeding or easy bruisability  NEURO: see HPI       Physical Exam:   Physical Exam   Vitals:  Height:5' 10\"  Weight:265 lbs 3.41 oz   Temp: 97.3  F (36.3  C) Temp src: Oral BP: 137/82 Pulse: 97   Resp: 18 SpO2: 98 % O2 Device: Nasal cannula Oxygen Delivery: 2 LPM  General Appearance:  No acute distress  Neuro:       Mental Status Exam:    He is alert and oriented x3       Cranial Nerves:   His visual fields are normal bilaterally, pupils are equal round reactive to light, extraocular movements are full without any nystagmus and no double vision.  No sensory loss of the face no weakness of the jaw no facial asymmetry no palatal weakness tongue is midline there is no weakness of the neck flexors sternomastoid and shoulder shrug are normal bilaterally           Motor:   There is no weakness proximally in the upper extremities distally there is slight weakness of the .  There is proximal and distal mild to moderate weakness in her legs.  Strength in the lower ext is about a 4/5.        Reflexes: Depressed all over       Sensory: No sensory deficits                   Coordination:   No incoordination of the upper extremities.  Lower extremity coordination could not be palpated because of the weakness       Gait: Currently bedbound          Data:   ROUTINE IP LABS   CBC RESULTS:     Recent Labs   Lab 04/07/21 0726 04/06/21  0736 04/05/21  0634   WBC 4.6 4.5 4.6   RBC 4.03* 4.06* 4.02*   HGB 10.9* 10.9* 10.7*   HCT 37.5* 37.2* 36.2*   * 115* 95*     Basic Metabolic Panel:   Recent Labs   Lab Test 04/07/21 0726 04/06/21  0736 04/05/21  0634    138 138   POTASSIUM 3.9 3.6 4.1   CHLORIDE 100 101 101   CO2 41* 37* 38*   BUN 10 11 17   CR 0.50* 0.50* 0.50*   GLC 70 71 70   NOAH 8.8 8.6 8.3*     Liver panel:  Recent Labs   Lab Test 03/23/21  0419 " 03/22/21  0436 03/21/21  0451 03/20/21  0602 03/19/21  2217   PROTTOTAL 6.7* 6.1* 6.0* 5.8* 6.7*   ALBUMIN 2.1* 2.0* 2.2* 2.4* 2.8*   BILITOTAL 0.4 0.7 1.0 0.7 0.4   ALKPHOS 42 42 45 48 59   AST 22 15 17 21 18   ALT 30 22 23 27 33     INR:  Recent Labs   Lab Test 04/07/21  0726   INR 1.20*      Lipid Profile:  Recent Labs   Lab Test 12/16/20  1455 07/05/19  1558 08/08/18  1245 06/14/17  1511 04/18/16  1518 11/22/13  1523   CHOL 141 174 175 184  --  196   HDL 70 57 53 63 57 61   LDL 60 105* 104* 106* 116* 117   TRIG 55 59 90 76  --  88   CHOLHDLRATIO  --   --   --   --   --  3.2     Thyroid Panel:  Recent Labs   Lab Test 04/18/16  1518   TSH 1.42      Vitamin B12: No lab results found.   Vitamin D level:   Recent Labs   Lab Test 08/08/18  1245 09/10/14  1545   VITDT 101* 88*     A1C: No lab results found.  Troponin I:   Recent Labs   Lab Test 03/29/21  1700 03/29/21  1247 03/29/21  0550 03/19/21  2217 03/04/21  1629 03/04/21  1235 03/04/21  0320   TROPI 0.022 0.023 0.028 0.060* <0.015 <0.015 <0.015     CRP inflammation:   Recent Labs   Lab Test 03/10/15  1416 01/05/15  1459 12/29/14  1255 09/10/14  1545 08/05/13  1410   CRP 43.0* 66.0* 13.0* 13.8* <5.0     ESR:   Recent Labs   Lab Test 03/10/15  1416 12/29/14  1255 09/10/14  1545   SED 20 15 13       ANIL:   Recent Labs   Lab Test 08/05/13  1416   MK <1.0 Interpretation:  Negative       ANCA:   Recent Labs   Lab Test 03/10/15  1416   ANCA <1:20  Reference range: <1:20  (Note)  The ANCA IFA is <1:20; therefore, no further testing will  be performed.  INTERPRETIVE INFORMATION: Anti-Neutrophil Cyto Ab, IgG  Neutrophil Cytoplasmic Antibodies (C-ANCA = granular  cytoplasmic staining, P-ANCA = perinuclear staining) are  found in the serum of over 90 percent of patients with  certain necrotizing systemic vasculitides, and usually in  less than 5 percent of patients with collagen vascular  disease or arthritis.  Performed by [facility],  [address] [place] [id number]  [phone]  [email or web address], Art Strickland MD, Lab. Director           IMAGING:   No Neuroimaging done.

## 2021-04-07 NOTE — PROGRESS NOTES
Speech Language Therapy Discharge Summary    Reason for therapy discharge:    All goals and outcomes met, no further needs identified.    Progress towards therapy goal(s). See goals on Care Plan in [facility] electronic health record for goal details.  Goals met    Therapy recommendation(s):    No further therapy is recommended.

## 2021-04-07 NOTE — PROGRESS NOTES
[facility]    Hospitalist Progress Note    Interval History   - No acute changes today. Another apheresis today. Remains fecally incontinent--encouraged use of commode with lift    Assessment & Plan   Summary: Mr. Goodrich is a 80 year old male admitted on 3/19/2021 with significant history for myasthenia gravis, HFpEF, COPD, HTN, MDD, HAZEL, prostate cancer who was initially admitted on 3/19/2021 from TCU for acute hypercapnic respiratory failure requiring emergent intubation on admission. He was recently admitted and discharged (3/4-3/15) for acute hypoxic and hypercapnic respiratory failure requiring intubation (3/6/21-3/11/21) 2/2 myasthenia gravis exacerbation, diastolic CHF exacerbation. Previously, pt received IVIG and steroid treatment and was followed by NCC. He was extubated on 3/22 and transferred to hospitalist service 3/23. Subsequently experienced worsening encephalopathy and was found to have worsening respiratory acidosis despite BiPAP treatment and was transferred back to ICU on 3/29. Intubated due to persistent hypercarbia. Palliative care saw the patient on 3/30, patient decided to pursue HD catheter (IR), begin plasmapheresis (nephrology), use BiPAP after extubation, and re-intubate if needed. HD catheter placed 3/31 and plasmapheresis started. Course complicated by hematuria due to alberto trauma. He was able to be extubated to AVAPS BIPAP on 4/3.  Pt remains stable, transfer of care to hospitalist service 4/5/21.     Recurrent myasthenic crisis  Acute metabolic encephalopathy, resolved  H/o myasthenia gravis, diagnosed in 2005. Had been stable on mycophenolate and had not needed steroids for about 10 years until recent hospitalization 3/2021. Recent hospitalization (3/4-3/15) with myasthenia gravis flare with associated acute respiratory failure (intubated 3/6-3/11). Treated with IVIG and steroids. Discharged to TCU. Steroids were slowly being tapered after  discharge.   Presented from TCU 3/19 with weakness and found to be in acute hypercarbic respiratory failure. Intubated in ED and admitted to ICU. Was also found with evidence of UTI and pneumonia which were treated (see below). Neurology consulted. Treated with IVIG 3/20-3/22. Predisone increased. Improved and able to be extubated 3/22. Started on pyridostigmine 3/26. Pulmonology consulted 3/25 to assist with obtaining nocturnal NIPPV. On 3/27, still weak overall and not at baseline.  Subsequently experienced worsening encephalopathy and was found to have worsening respiratory acidosis despite BiPAP treatment and was transferred back to ICU on 3/29. Intubated due to persistent hypercarbia. Extubated back to Bipap on 4/3 and now on Bipap as needed and at bedtime.  - Appreciate Neurology involvement   - Continue mycophenolate 1000 mg qam and 500 mg at bedtime; prednisone 60 mg daily (no taper until outpatient follow-up); and pyridostigmine 60 mg q8h; per Neurology rec's.  - Appreciate Nephrology involvement   - Continue plasma exchange per nephrology/NCC, total 5 treatments to end around 4/9/2021  - Continue Bactrim DS 3x/week for PCP prophylaxis while on high dose steroids, immunosuppression    Recurrent acute hypoxic and hypercarbic respiratory failure, improved  Suspect multifactorial including myasthenia gravis flare, MSSA pneumonia and acute diastolic CHF exacerbation.  - Continue BID pulmonary mechanics with NIF and VC.   - Continue IS q2h while awake.  - Would continue to use AVAPS Bipap nightly and with naps  - Continue O2 while awake, wean as able.     Acute normocytic anemia  Thrombocytopenia  Suspect from multiple phlebotomies and also chronic component. Thrombocytopenia stable >100s. Hgb 13.1 on admit 3/19. No overt clinical signs of major bleeding.   - Monitor CBC    Hypophosphatemia, hypokalemia.  - Continue PRN electrolyte replacement.     Pressure ulcer, right posterior thigh.  WOC RN consulted.  -  Continue local wound cares.    Goals of care  Palliative care consulted, last saw pt on 4/2, following peripherally at this time.  Goals remain restorative at this time.  Appreciate consultant assistance.       Chronic/Stable/Resolved    MSSA pneumonia, suspect HCAP, resolved.  Recent hospitalization as above. Initial presentation from TCU 3/19 as above. On initial evaluation 3/19, was afebrile and hemodynamically stable; WBC and lactate normal, VBG showed pCO2 128. CXR 3/19 showed bibasilar atelectasis or infiltrate with small pleural effusions. Initially started on broad antibiotics including Zosyn on admit. Sputum 3/20 grew MSSA. Antibiotics narrowed to Unasyn 3/25.  Completed Unasyn/amoxicillin therapy.     Possible acute on chronic decompensated diastolic CHF exacerbation, resolved  Hypertension (benign essential).  [PTA: furosemide 20 mg BID; metoprolol 12.5 mg BID.]  Echocardiogram 3/4/2021 showed LVEF of 65-70%, no regional wall motion abnormalities; mildly decreased RV systolic function; mild to moderate mitral regurgitation, mild to moderate mitral stenosis. The patient reported a recent 20-pound weight gain in the last 1 month PTA following COVID vaccine. Treated with high dose steroids for myasthenia gravis as above.    Initial presentation 3/19 as above. NTP-BNP 3996 3/19. Was given IV furosemide this hospitalization. He had metabolic alkalosis that was treated with acetazolamide, stopped 3/21. Repeat echo 3/23 showed LVEF 55-60%, grade 1 diastolic dysfunction, no regional wall motion abnormalities; RV OK; trace MR, mild MS.  - Restart furosemide 20mg daily tomorrow  - Holding PTA metoprolol 12.5 mg BID in setting of recent hypotension.  - Monitor i/o's, daily wts.     Complicated catheter associated E. Coli UTI, treated  Chronic indwelling alberto due to difficulty voiding, large pannus (placed previous hospitalization)  Recent hematuria 2/2 alberto trauma, resolved   History of prostate cancer s/p  cryotherapy  Alberto placed during previous hospitalization because of difficulty voiding. UA 3/19 grossly abnormal. Previously obtained UC from 3/18 showed E. Coli. Was treated with antibiotics including ceftriaxone and other antibiotics as above.  UTI felt to be adequately treated.  - Chronic alberto (16 F Coude), replace every 2-3 weeks, leave in at discharge.   - Outpatient urology follow up needed with Dr. Charles in 2-4 weeks from 4/3 with PSA  - Hand alberto irrigation of hematuria recurs (recently required CBI)    Nonsevere malnutrition  - Level of malnutrition: Non-Severe   - Based on: reduced intake, mild (or greater) subcutaneous fat loss, mild (or greater) muscle loss   - Speech therapy consulted after extubation, currently tolerating dysphagia diet; had received TF while intubated.      Physical deconditioning 2/2 critical illness.  - Will consult PT/OT      COVID-19 testing 3/4, 3/11, 3/19, 4/6 - negative.     Trop elevation, suspect demand ischemia (type 2 NSTEMI).    Morbid obesity: Body mass index is 40.58 kg/m .    Mild metabolic alkalosis, likely secondary to a combination of diuresis and probable chronic hypercarbic respiratory failure, resolved    DVT Prophylaxis: Pneumatic Compression Devices  Code Status: Full Code  PT/OT: ordered  Diet: Dysphagia Diet Level 3 Advanced Thin Liquids (water, ice chips, juice, milk gelatin, ice cream, etc)      Disposition: Expected discharge TBD, ~2-3 days    Davi Jaramillo MD  Text Page  (7am to 6pm)  -Data reviewed today: I reviewed all new labs and imaging results over the last 24 hours.    Physical Exam   Temp: 97.6  F (36.4  C) Temp src: Oral BP: 127/70 Pulse: 91   Resp: 18 SpO2: 96 % O2 Device: Nasal cannula Oxygen Delivery: 3 LPM  Vitals:    04/02/21 0600 04/04/21 0600 04/05/21 1300   Weight: 116.1 kg (256 lb) 120.3 kg (265 lb 3.4 oz) 120.3 kg (265 lb 3.4 oz)     Vital Signs with Ranges  Temp:  [97.2  F (36.2  C)-98.1  F (36.7  C)] 97.6  F (36.4   C)  Pulse:  [67-93] 91  Resp:  [14-18] 18  BP: (100-127)/(60-76) 127/70  SpO2:  [93 %-100 %] 96 %  I/O last 3 completed shifts:  In: -   Out: 1700 [Urine:1700]  O2 requirements: yes 4L    Constitutional: Obese male appears chronically ill  HEENT: Eyes nonicteric, oral mucosa moist  Cardiovascular: RRR, normal S1/2, no m/r/g  Respiratory: CTAB, no wheezing or crackles  Vascular: 1-2+ BLE pitting edema, R chest central line noted  GI: Normoactive bowel sounds, nontender, obese  Skin/Integumen: No rashes  Neuro/Psych: Appropriate affect and mood. A&Ox3, moves all extremities    Medications     dextrose       - MEDICATION INSTRUCTIONS -       - MEDICATION INSTRUCTIONS -         acetaminophen  1,000 mg Oral TID     [Held by provider] heparin ANTICOAGULANT  5,000 Units Subcutaneous Q8H     [Held by provider] metoprolol tartrate  12.5 mg Oral BID     mycophenolate  1,000 mg Oral BID     predniSONE  60 mg Oral or Feeding Tube Daily     pyridostigmine  60 mg Oral TID     sulfamethoxazole-trimethoprim  1 tablet Oral or Feeding Tube Daily     cholecalciferol  100 mcg Oral or Feeding Tube Daily       Data   Recent Labs   Lab 04/07/21  0726 04/06/21  0736 04/05/21  0634   WBC 4.6 4.5 4.6   HGB 10.9* 10.9* 10.7*   MCV 93 92 90   * 115* 95*   INR 1.20*  --   --     138 138   POTASSIUM 3.9 3.6 4.1   CHLORIDE 100 101 101   CO2 41* 37* 38*   BUN 10 11 17   CR 0.50* 0.50* 0.50*   ANIONGAP <1* <1* <1*   NOAH 8.8 8.6 8.3*   GLC 70 71 70       Imaging:   No results found for this or any previous visit (from the past 24 hour(s)).

## 2021-04-07 NOTE — PLAN OF CARE
A&Ox4. Generalized weakness. Denies numbness and tingling. VSS. Tele. DD3 thin liquid diet. Up with lift. Repo q2h. Sophy patent. No BM. Denies pain. Pt scoring green on the aggression stoplight tool. Pt encouraged to get out of bed into chair, pt refused. Dressing on LLE CDI. Plan for plasma exchange tomorrow 4/5. Discharge pending.

## 2021-04-07 NOTE — PLAN OF CARE
Here with MG exacerbation. Alert and oriented x4. VSS. O2 titrated down to 2 L NC, maintaining O2 sat >94%. 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 Neuros unchanged with intermittent double vision reported when looking to left or right. Trace generalized edema. Lung sounds clear. Beckett patent with adequate o/p. Incontinent of stool. Light rash to mathieu-area and beneath panus - cleaned with soap and water, anti-fungal powder and interdry applied. Diet advanced to regular. Up to reclining chair for 2 hrs. Transfers with asst of 2 and lift. Having 4/5 PLEX now with plan for last PLEX 4/9. Belongings at the bedside.

## 2021-04-07 NOTE — PROGRESS NOTES
Indication:  MG     #4 of 5 runs        1 volume exchange  3600 ml 5% albumin     4 grams calcium     Stable run    Fibrinogen is low this AM.    He will need some supplemental cryoprecipitate as his fibrinogen will be lower after apheresis today.          Next apheresis 4/9/21     Tito Rosario MD  [facility] - Nephrology  [phone]

## 2021-04-07 NOTE — PLAN OF CARE
Pt here with MG exacerbation. A&O x4. Neuros with generalized weakness, numbness to top of L foot, double vision in peripherals. VSS, on AVAPS machine overnight. Tele SR w/ 1st degree AVB and BBB. DD3 diet, thin liquids. Takes pills crushed in applesauce. Up with lift. T/R overnight. Beckett present, draining pasquale urine. Denies pain. Pt scoring green on the Aggression Stop Light Tool. Plan for 4/5 PLEX treatment today.

## 2021-04-07 NOTE — PLAN OF CARE
Brief [id number] note. Here with MG exacerbation. Plan for 4/5 plasma pheresis 4/7. Alert and oriented x4. VSS. O2 titrated down to 3 L NC. Tele SR with a BBB. Neuros intact. Generalized weakness. LEs<UEs. Baseline numbness superior aspect of L foot. Small wound on R anterior leg - dressing changed earlier in the day per WOC orders. Lung sounds diminished. Repositioned q 2 hrs. Offered/encouraged pt to get up to reclining chair - repeatedly refused. Tolerating DD3 diet.

## 2021-04-07 NOTE — PROGRESS NOTES
Apheresis Treatment    Treatment in room 708 using [employer] [id number] apheresis machine. Consent verified.  Treatment number: 4/5    Height: 178cm  Weight: 120kg  HCT: 37%  Inlet speed: 110-120ml/min  ACDA ratio: 10:1  Fluid balance: 100%  Access: right subclavian  Post treatment line dwell: Heparin 1,000units/ml, red port 1700 units (1.7mls), blue port 1700 units (1.7mls).  Replacement product: 4000ml 5% human albumin (changed due to changes in pt hematocrit)  Electrolyte replacement: Calcium gluconate 4 grams in NS - total 90mls, piggybacked into return lines, infused over time of treatment.  Premedications: none ordered    Treatment Notes: unremarkable - no issues    Treatment completed as ordered, VSS, see EPIC flowsheet for run data.    Next treatment: Friday 4/9/2021

## 2021-04-08 NOTE — PROGRESS NOTES
[facility]    Hospitalist Progress Note    Interval History   - No complaints today. Oxygen needs stable. Reports he slept poorly due to issues with BiPap.  - Possible discharge 1-2 days pending completion of plasmapheresis    Assessment & Plan   Summary: Mr. Goodrich is a 80 year old male admitted on 3/19/2021 with significant history for myasthenia gravis, HFpEF, COPD, HTN, MDD, HAZEL, prostate cancer who was initially admitted on 3/19/2021 from TCU for acute hypercapnic respiratory failure requiring emergent intubation on admission. He was recently admitted and discharged (3/4-3/15) for acute hypoxic and hypercapnic respiratory failure requiring intubation (3/6/21-3/11/21) 2/2 myasthenia gravis exacerbation, diastolic CHF exacerbation. Previously, pt received IVIG and steroid treatment and was followed by NCC. He was extubated on 3/22 and transferred to hospitalist service 3/23. Subsequently experienced worsening encephalopathy and was found to have worsening respiratory acidosis despite BiPAP treatment and was transferred back to ICU on 3/29. Intubated due to persistent hypercarbia. Palliative care saw the patient on 3/30, patient decided to pursue HD catheter (IR), begin plasmapheresis (nephrology), use BiPAP after extubation, and re-intubate if needed. HD catheter placed 3/31 and plasmapheresis started. Course complicated by hematuria due to alberto trauma. He was able to be extubated to AVAPS BIPAP on 4/3. Pt remains stable, transfer of care to hospitalist service 4/5/21.     Recurrent myasthenic crisis  Acute metabolic encephalopathy, resolved  H/o myasthenia gravis, diagnosed in 2005. Had been stable on mycophenolate and had not needed steroids for about 10 years until recent hospitalization 3/2021. Recent hospitalization (3/4-3/15) with myasthenia gravis flare with associated acute respiratory failure (intubated 3/6-3/11). Treated with IVIG and steroids. Discharged to TCU. Steroids were  slowly being tapered after discharge.   Presented from TCU 3/19 with weakness and found to be in acute hypercarbic respiratory failure. Intubated in ED and admitted to ICU. Was also found with evidence of UTI and pneumonia which were treated (see below). Neurology consulted. Treated with IVIG 3/20-3/22. Predisone increased. Improved and able to be extubated 3/22. Started on pyridostigmine 3/26. Pulmonology consulted 3/25 to assist with obtaining nocturnal NIPPV. On 3/27, still weak overall and not at baseline.  Subsequently experienced worsening encephalopathy and was found to have worsening respiratory acidosis despite BiPAP treatment and was transferred back to ICU on 3/29. Intubated due to persistent hypercarbia. Extubated back to Bipap on 4/3 and now on Bipap as needed and at bedtime.  - Appreciate Neurology involvement   - Continue mycophenolate 1000 mg qam and 500 mg at bedtime; prednisone 60 mg daily (no taper until outpatient follow-up); and pyridostigmine 60 mg q8h; per Neurology rec's.  - Appreciate Nephrology involvement   - Continue plasma exchange per nephrology/NCC, total 5 treatments to end around 4/9/2021  - Continue Bactrim DS 3x/week for PCP prophylaxis while on high dose steroids, immunosuppression    Recurrent acute hypoxic and hypercarbic respiratory failure, improved  Suspect multifactorial including myasthenia gravis flare, MSSA pneumonia and acute diastolic CHF exacerbation.  - Continue BID pulmonary mechanics with NIF and VC.   - Continue IS q2h while awake.  - Would continue to use AVAPS Bipap nightly and with naps  - Continue O2 while awake, wean as able.     Acute normocytic anemia  Thrombocytopenia  Suspect from multiple phlebotomies and also chronic component. Thrombocytopenia stable >100s. Hgb 13.1 on admit 3/19. No overt clinical signs of major bleeding.   - Monitor CBC    Hypophosphatemia, hypokalemia.  - Continue PRN electrolyte replacement.     Pressure ulcer, right posterior  thigh.  WOC RN consulted.  - Continue local wound cares.    Goals of care  Palliative care consulted, last saw pt on 4/2, following peripherally at this time.  Goals remain restorative at this time.  Appreciate consultant assistance.       Chronic/Stable/Resolved    MSSA pneumonia, suspect HCAP, resolved.  Recent hospitalization as above. Initial presentation from TCU 3/19 as above. On initial evaluation 3/19, was afebrile and hemodynamically stable; WBC and lactate normal, VBG showed pCO2 128. CXR 3/19 showed bibasilar atelectasis or infiltrate with small pleural effusions. Initially started on broad antibiotics including Zosyn on admit. Sputum 3/20 grew MSSA. Antibiotics narrowed to Unasyn 3/25.  Completed Unasyn/amoxicillin therapy.     Possible acute on chronic decompensated diastolic CHF exacerbation, resolved  Hypertension (benign essential).  [PTA: furosemide 20 mg BID; metoprolol 12.5 mg BID.]  Echocardiogram 3/4/2021 showed LVEF of 65-70%, no regional wall motion abnormalities; mildly decreased RV systolic function; mild to moderate mitral regurgitation, mild to moderate mitral stenosis. The patient reported a recent 20-pound weight gain in the last 1 month PTA following COVID vaccine. Treated with high dose steroids for myasthenia gravis as above.    Initial presentation 3/19 as above. NTP-BNP 3996 3/19. Was given IV furosemide this hospitalization. He had metabolic alkalosis that was treated with acetazolamide, stopped 3/21. Repeat echo 3/23 showed LVEF 55-60%, grade 1 diastolic dysfunction, no regional wall motion abnormalities; RV OK; trace MR, mild MS.  - Restart furosemide 20mg daily tomorrow  - Holding PTA metoprolol 12.5 mg BID in setting of recent hypotension.  - Monitor i/o's, daily wts.     Complicated catheter associated E. Coli UTI, treated  Chronic indwelling alberto due to difficulty voiding, large pannus (placed previous hospitalization)  Recent hematuria 2/2 alberto trauma, resolved    History of prostate cancer s/p cryotherapy  Alberto placed during previous hospitalization because of difficulty voiding. UA 3/19 grossly abnormal. Previously obtained UC from 3/18 showed E. Coli. Was treated with antibiotics including ceftriaxone and other antibiotics as above.  UTI felt to be adequately treated.  - Chronic alberto (16 F Coude), replace every 2-3 weeks, leave in at discharge.   - Outpatient urology follow up needed with Dr. Charles in 2-4 weeks from 4/3 with PSA  - Hand alberto irrigation of hematuria recurs (recently required CBI)    Nonsevere malnutrition  - Level of malnutrition: Non-Severe   - Based on: reduced intake, mild (or greater) subcutaneous fat loss, mild (or greater) muscle loss   - Speech therapy consulted after extubation, currently tolerating dysphagia diet; had received TF while intubated.      Physical deconditioning 2/2 critical illness.  - Will consult PT/OT      COVID-19 testing 3/4, 3/11, 3/19, 4/6 - negative.     Trop elevation, suspect demand ischemia (type 2 NSTEMI).    Morbid obesity: Body mass index is 40.58 kg/m .    Mild metabolic alkalosis, likely secondary to a combination of diuresis and probable chronic hypercarbic respiratory failure, resolved    DVT Prophylaxis: Pneumatic Compression Devices  Code Status: Full Code  PT/OT: ordered  Diet: Combination Diet Regular Diet Adult; Thin Liquids (water, ice chips, juice, milk, gelatin, ice cream, etc)      Disposition: Expected discharge to TCU in 1-2 days, pending one final plasmapheresis    Davi Jaramillo MD  Text Page  (7am to 6pm)  -Data reviewed today: I reviewed all new labs and imaging results over the last 24 hours.    Physical Exam   Temp: 97.4  F (36.3  C) Temp src: Oral BP: 124/62 Pulse: 80   Resp: 18 SpO2: 100 % O2 Device: Nasal cannula Oxygen Delivery: 2 LPM  Vitals:    04/04/21 0600 04/05/21 1300 04/07/21 1450   Weight: 120.3 kg (265 lb 3.4 oz) 120.3 kg (265 lb 3.4 oz) 120.3 kg (265 lb 3.4 oz)     Vital Signs  with Ranges  Temp:  [97  F (36.1  C)-98.1  F (36.7  C)] 97.4  F (36.3  C)  Pulse:  [] 80  Resp:  [16-18] 18  BP: (108-144)/(62-83) 124/62  SpO2:  [95 %-100 %] 100 %  I/O last 3 completed shifts:  In: 359 [I.V.:15]  Out: 1300 [Urine:1300]  O2 requirements: yes 4L    Constitutional: Obese male appears chronically ill  HEENT: Eyes nonicteric, oral mucosa moist  Cardiovascular: RRR, normal S1/2, no m/r/g  Respiratory: CTAB, no wheezing or crackles  Vascular: 1-2+ BLE pitting edema, R chest central line noted  GI: Normoactive bowel sounds, nontender, obese  Skin/Integumen: No rashes  Neuro/Psych: Appropriate affect and mood. A&Ox3, moves all extremities    Medications     dextrose       - MEDICATION INSTRUCTIONS -       - MEDICATION INSTRUCTIONS -         acetaminophen  1,000 mg Oral TID     furosemide  20 mg Oral Daily     [Held by provider] heparin ANTICOAGULANT  5,000 Units Subcutaneous Q8H     [Held by provider] metoprolol tartrate  12.5 mg Oral BID     mycophenolate  1,000 mg Oral BID     predniSONE  60 mg Oral or Feeding Tube Daily     pyridostigmine  60 mg Oral TID     sulfamethoxazole-trimethoprim  1 tablet Oral or Feeding Tube Daily     cholecalciferol  100 mcg Oral or Feeding Tube Daily       Data   Recent Labs   Lab 04/08/21  0723 04/07/21  0726 04/06/21  0736   WBC 4.8 4.6 4.5   HGB 10.2* 10.9* 10.9*   MCV 92 93 92   * 129* 115*   INR  --  1.20*  --     139 138   POTASSIUM 4.1 3.9 3.6   CHLORIDE 101 100 101   CO2 42* 41* 37*   BUN 12 10 11   CR 0.54* 0.50* 0.50*   ANIONGAP <1* <1* <1*   NOAH 8.3* 8.8 8.6   GLC 71 70 71       Imaging:   No results found for this or any previous visit (from the past 24 hour(s)).

## 2021-04-08 NOTE — PROGRESS NOTES
Pt here with myasthenia gravis exacerbation, acute chronic respiratory failure. A&O x 4. Neuros intact, states slight numbness in left toes. VSS, on 2L O2 maintaining O2 sat in high 90's. Tele SR with BBB. Regular diet, thin liquids. Beckett catheter patent and collecting well, output documented. Incontinent of bowel, large BM on shift, formed.  Has rash in abdominal folds and groin area, baby powder, barrier cream, and interdry utilized.  Takes pills whole with water. Up with lift. Generalized pain 2/10. Cryoprecipitate transfusion started on shift at 50 ml/hr and increased to 150 ml/hr without complication, vitals monitored and stable, will finish on the next shift who will CTM.  Pt scoring green on the Aggression Stop Light Tool. Discharge plan pending.

## 2021-04-08 NOTE — PLAN OF CARE
A&O. VSS. Cpap overnight. Denies pain. Neuro's intact except numbness to top of left foot. Tele SR with BBB. Beckett with pasquale urine. PO encouraged. Repositioned in bed q2h.

## 2021-04-08 NOTE — PROVIDER NOTIFICATION
[address]    Pt has order to receive cryoprecipitate transfusion unit but has no signed blood consent form, please visit to review with pt.    Thanks,  AMIANH El  [phone]

## 2021-04-08 NOTE — CONSULTS
[facility] [place]  [facility]    Neurology Consultation    Thanh Goodrich MRN# [id number]   YOB: 1940 Age: 80 year old    Code Status:Full Code   Date of Admission: 3/19/2021  Date of Consult:04/08/2021                                                                                       Assessment and Plan:                                         #.  Myasthenia gravis    He has had long-term myasthenia gravis and had been in myasthenic crisis at this admission.  Currently he doing much better than admission. Yesterday had his 4th PE and has been feeling much better. his CellCept has been increased to 1000 mg bid, prednisone 60 mg a day and pyridostigmine 60 mg 3 times a day have been continued as he responded well to them.  Had cryoprecipitate infused today.   NIF to be done qd  Will follow.     Vy Hernandez MD  Neurologist  [facility]  Office [phone]        ----------------------------------------------------------------------------------  ----------------------------------------------------------------------------------  Reason for consult: I was asked by Dr. Zavala to evaluate this patient for Myasthenia Gravis. So far he has been followed by Neurocritical team while he was intubate in the ICU.        Chief Complaint:   Exacerbation of Myasthenia Gravis  History is obtained from the patient / chart       History of Present Illness:   This patient is a 80 year old male who presents with exacerbation of his myasthenia gravis presenting with hypercapnia and respiratory failure.  He had been in the ICU intubated and today was transferred to the floor.    In the recent past he has had similar presentation and had received IVIG for 5 days and was discharged on 60 mg/day of steroids with the plan to taper off the milligrams a week.  While he was going to the taper he started having difficulty with breathing, feeling fatigued out having  low-volume voice and became more somnolent.  On arrival he was found to be hypercarbic and was intubated.  His 5th PLEX is scheduled for tomorrow.  Since he has had the 4 plasma exchanges he has done well and has improved.  He has been extubated and transferred to the floor.    His myasthenic symptoms have mostly been shortness of breath, double vision and low volume of his voice.  He has never developed dysphagia or ptosis. He says that his symptoms are much better today.     Today he was able to stand up and sit on a chair.     Currently as an outpatient he has been followed by  with [facility].         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     Cellulitis of left leg 9/23/2012     Depressive disorder 2020     Edema 7/7/2009     History of steroid therapy 2/22/2010     Hyperlipidemia LDL goal <130 10/31/2010     HYPERTENSION 7/8/2003     Incontinence of urine 10/25/2010     Leg ulcer (H) 9/20/2012     MALIGN NEOPL PROSTATE-3/07 4/2/2007    T1C, Shaina 8, initial PSA 39, s/p radiation seed implants 2007      Myasthenia gravis (H)      OBESITY 7/8/2003     Osteoporosis 8/18/2009    Refused bisphosphonates 2011      PVC's (premature ventricular contractions) 11/23/2011     RIGHT OCCIPITAL PAIN 7/8/2003     ROTATOR CUFF SYND NOS- RT 9/19/2005     Skin nodule 3/18/2013     ulcer left shin 10/8/2007     Uncomplicated asthma 1944?    childhood only         Past Surgical History:     Past Surgical History:   Procedure Laterality Date     BIOPSY  2013?    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     Employer: [employer] later [employer]   Social Needs     Financial resource strain: Not on file     Food insecurity     Worry: Not on file     Inability: Not on file     Transportation needs     Medical: Not on file     Non-medical: Not on file   Tobacco Use     Smoking status: Never Smoker     Smokeless tobacco: Never Used   Substance and Sexual Activity     Alcohol use: Yes     Alcohol/week: 0.0 standard drinks     Comment: very rarely     Drug use: No     Sexual activity: Not Currently     Partners: Female   Lifestyle     Physical activity     Days per week: Not on file     Minutes per session: Not on file     Stress: Not on file   Relationships     Social connections     Talks on phone: Not on file     Gets together: Not on file     Attends Denominational service: Not on file     Active member of club or organization: Not on file     Attends meetings of clubs or organizations: Not on file     Relationship status: Not on file     Intimate partner violence     Fear of current or ex partner: Not on file     Emotionally abused: Not on file     Physically abused: Not on file     Forced sexual activity: Not on file   Other Topics Concern     Parent/sibling w/ CABG, MI or angioplasty before 65F 55M? No   Social History Narrative    The patient has a 0 pk yr tobacco hx.  He has no active use.  Alcohol use is rare alcoholic drinks per week.  He denies use of recreational drugs.          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        The patient is single.  Has 0 children.        Hot Tub Exposure: NO    Recent Travel: NO     Hx of incarceration:  NO    Bird Exposure:   NO    Animal Exposure:  3 Cats    Inhalation Exposure:  + asbestos exposure in past             Patient denies smoking, no significant alcohol intake, denies illicit drugs use       Family History:     Family History   Problem Relation Age of Onset     Hypertension Mother      Depression  Mother      Substance Abuse Mother         alcohol     Hypertension Father      Cancer Father         Prostate     Prostate Cancer Father      Substance Abuse Father         alcohol     Obesity Father      Diabetes Maternal Grandmother      C.A.D. Maternal Grandmother      Cerebrovascular Disease Maternal Grandfather      Cancer Paternal Uncle         Prostate     Prostate Cancer Other      Reviewed and not felt to be contributory.        Home Medications:     Prior to Admission Medications   Prescriptions Last Dose Informant Patient Reported? Taking?   Cholecalciferol 100 MCG (4000 UT) CAPS 3/19/2021 at am  Yes Yes   Sig: Take 4,000 Units by mouth daily   Multiple Vitamins-Minerals (MULTIVITAMIN ADULTS 50+) TABS 3/19/2021 at am  Yes Yes   Sig: Take 1 tablet by mouth daily    acetaminophen (TYLENOL) 325 MG tablet 3/19/2021 at 0125  Yes Yes   Sig: Take 650 mg by mouth every 4 hours as needed for mild pain   calcium carbonate 600 mg-vitamin D 400 units (CALTRATE) 600-400 MG-UNIT per tablet 3/19/2021 at am  Yes Yes   Sig: Take 1 tablet by mouth daily   furosemide (LASIX) 20 MG tablet 3/19/2021 at 1200  No Yes   Sig: Take 1 tablet (20 mg) by mouth 2 times daily   metoprolol tartrate (LOPRESSOR) 25 MG tablet 3/19/2021 at am  No Yes   Sig: Take 0.5 tablets (12.5 mg) by mouth 2 times daily   mycophenolate (GENERIC EQUIVALENT) 500 MG tablet 3/19/2021 at am  Yes Yes   Sig: Take 1,000 mg by mouth every morning   mycophenolate (GENERIC EQUIVALENT) 500 MG tablet 3/18/2021 at hs  Yes Yes   Sig: Take 500 mg by mouth At Bedtime    potassium chloride ER (KLOR-CON M) 10 MEQ CR tablet 3/19/2021 at pm  No Yes   Sig: Take 2 tablets (20 mEq) by mouth 2 times daily   predniSONE (DELTASONE) 50 MG tablet 3/19/2021 at am-50mg  No Yes   Sig: Take 1 tablet (50 mg) by mouth daily Take 50 mg until 3/19.  Then decrease by 10 mg every 7 days starting on 3/20      Facility-Administered Medications: None          Allergy:     Allergies   Allergen  Reactions     Ciprofloxacin Other (See Comments)     Contraindicated for myasthenia gravis patients     Procainamide Other (See Comments)     Contraindicated for myasthenia gravis patients     Quinidine Other (See Comments)     Contraindicated for myasthenia gravis patients     Quinine Other (See Comments)     Contraindicated for myasthenia gravis patients          Inpatient Medications:   Scheduled Meds:    acetaminophen  1,000 mg Oral TID     furosemide  20 mg Oral Daily     [Held by provider] heparin ANTICOAGULANT  5,000 Units Subcutaneous Q8H     [Held by provider] metoprolol tartrate  12.5 mg Oral BID     mycophenolate  1,000 mg Oral BID     predniSONE  60 mg Oral or Feeding Tube Daily     pyridostigmine  60 mg Oral TID     sulfamethoxazole-trimethoprim  1 tablet Oral or Feeding Tube Daily     cholecalciferol  100 mcg Oral or Feeding Tube Daily     PRN Meds: sodium chloride 0.9%, albuterol, bisacodyl, artificial tears ophthalmic solution, dextrose, glucose **OR** dextrose **OR** glucagon, EPINEPHrine, heparin Lock (1000 units/mL High concentration), labetalol, miconazole, - MEDICATION INSTRUCTIONS -, ondansetron **OR** ondansetron, - MEDICATION INSTRUCTIONS -, phenol, prochlorperazine **OR** prochlorperazine **OR** prochlorperazine, senna-docusate **OR** senna-docusate        Review of Systems    The Review of Systems is negative other than noted in the HPI  A comprehensive review of  10 systems was performed and found to be negative except as described in this note  CONSTITUTIONAL: negative for fever, chills, change in weight  INTEGUMENTARY/SKIN: no rash or obvious new lesions  ENT/MOUTH: no sore throat, new sinus pain or nasal drainage, no neck mass noted  RESP: No pleuretic pain, No cough, no hemoptysis, No SOB   CV: negative for chest pain, palpitations or peripheral edema  GI: no nausea, vomiting, change in stools  : no dysuria or hematuria  MUSCULOSKELETAL: no myalgias, arthralgias or join  "efffusion  ENDOCRINE: no history of polyuria, polydyspsia or symptoms of thyroid dysfunction  PSYCHIATRIC: no change in mood stable  LYMPHATIC: no new lymphadenopathy  HEME: no bleeding or easy bruisability  NEURO: see HPI       Physical Exam:   Physical Exam   Vitals:  Height:5' 10\"  Weight:265 lbs 3.41 oz   Temp: 97.5  F (36.4  C) Temp src: Oral BP: 110/62 Pulse: 96   Resp: 18 SpO2: 100 % O2 Device: Nasal cannula Oxygen Delivery: 2 LPM  General Appearance:  No acute distress  Neuro:       Mental Status Exam:    He is alert and oriented x3       Cranial Nerves:   His visual fields are normal bilaterally, pupils are equal round reactive to light, extraocular movements are full without any nystagmus and no double vision.  No sensory loss of the face no weakness of the jaw no facial asymmetry no palatal weakness tongue is midline there is no weakness of the neck flexors sternomastoid and shoulder shrug are normal bilaterally           Motor:   There is no weakness proximally in the upper extremities distally there is slight weakness of the .  There is proximal and distal mild to moderate weakness in her legs.  Strength in the lower ext is about a 4/5.        Reflexes: Depressed all over       Sensory: No sensory deficits                   Coordination:   No incoordination of the upper extremities.  Lower extremity coordination could not be palpated because of the weakness       Gait: Currently bedbound          Data:   ROUTINE IP LABS   CBC RESULTS:     Recent Labs   Lab 04/08/21 0723 04/07/21  0726 04/06/21  0736   WBC 4.8 4.6 4.5   RBC 3.81* 4.03* 4.06*   HGB 10.2* 10.9* 10.9*   HCT 35.2* 37.5* 37.2*   * 129* 115*     Basic Metabolic Panel:   Recent Labs   Lab Test 04/08/21 0723 04/07/21  0726 04/06/21  0736    139 138   POTASSIUM 4.1 3.9 3.6   CHLORIDE 101 100 101   CO2 42* 41* 37*   BUN 12 10 11   CR 0.54* 0.50* 0.50*   GLC 71 70 71   NOAH 8.3* 8.8 8.6     Liver panel:  Recent Labs   Lab Test " 03/23/21  0419 03/22/21  0436 03/21/21  0451 03/20/21  0602 03/19/21  2217   PROTTOTAL 6.7* 6.1* 6.0* 5.8* 6.7*   ALBUMIN 2.1* 2.0* 2.2* 2.4* 2.8*   BILITOTAL 0.4 0.7 1.0 0.7 0.4   ALKPHOS 42 42 45 48 59   AST 22 15 17 21 18   ALT 30 22 23 27 33     INR:  Recent Labs   Lab Test 04/07/21  0726   INR 1.20*      Lipid Profile:  Recent Labs   Lab Test 12/16/20  1455 07/05/19  1558 08/08/18  1245 06/14/17  1511 04/18/16  1518 11/22/13  1523   CHOL 141 174 175 184  --  196   HDL 70 57 53 63 57 61   LDL 60 105* 104* 106* 116* 117   TRIG 55 59 90 76  --  88   CHOLHDLRATIO  --   --   --   --   --  3.2     Thyroid Panel:  Recent Labs   Lab Test 04/18/16  1518   TSH 1.42      Vitamin B12: No lab results found.   Vitamin D level:   Recent Labs   Lab Test 08/08/18  1245 09/10/14  1545   VITDT 101* 88*     A1C: No lab results found.  Troponin I:   Recent Labs   Lab Test 03/29/21  1700 03/29/21  1247 03/29/21  0550 03/19/21  2217 03/04/21  1629 03/04/21  1235 03/04/21  0320   TROPI 0.022 0.023 0.028 0.060* <0.015 <0.015 <0.015     CRP inflammation:   Recent Labs   Lab Test 03/10/15  1416 01/05/15  1459 12/29/14  1255 09/10/14  1545 08/05/13  1410   CRP 43.0* 66.0* 13.0* 13.8* <5.0     ESR:   Recent Labs   Lab Test 03/10/15  1416 12/29/14  1255 09/10/14  1545   SED 20 15 13       ANIL:   Recent Labs   Lab Test 08/05/13  1416   MK <1.0 Interpretation:  Negative       ANCA:   Recent Labs   Lab Test 03/10/15  1416   ANCA <1:20  Reference range: <1:20  (Note)  The ANCA IFA is <1:20; therefore, no further testing will  be performed.  INTERPRETIVE INFORMATION: Anti-Neutrophil Cyto Ab, IgG  Neutrophil Cytoplasmic Antibodies (C-ANCA = granular  cytoplasmic staining, P-ANCA = perinuclear staining) are  found in the serum of over 90 percent of patients with  certain necrotizing systemic vasculitides, and usually in  less than 5 percent of patients with collagen vascular  disease or arthritis.  Performed by [employer],  [address] [place], [place] [address]  [place] [address] [phone]  [email or web address], Art Strickland MD, Lab. Director           IMAGING:   No Neuroimaging done.

## 2021-04-08 NOTE — PLAN OF CARE
Here with MG exacerbation. Alert and oriented x4. VSS on 2 L NC. 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 Neuros unchanged with intermittent double vision reported when looking to left or right. Trace generalized edema. Lung sounds clear. Beckett patent with adequate o/p. Incontinent of stool. Light rash to mathieu-area and beneath panus - cleaned with soap and water, anti-fungal powder and interdry applied. Tolerating regular diet. Up to reclining chair for 1 hrs. Transfers with asst of 2 and lift. Plan for 5/5 PLEX 4/9. Belongings at the bedside.

## 2021-04-08 NOTE — PROVIDER NOTIFICATION
Paged by RN that patient needed to be consented for blood products given order for cryoprecipitate. I presented to patient's room and discussed with him the risks/benefits of receiving this transfusion. He was unsure about it. I gave him the option to defer to discuss this with the ordering team, though indicated that may delay his care. After more discussion, he opted to proceed. He reported that all of his questions were answered. Consent signed by him and myself and given to RN.    Iggy Zambrano MD, MPH  Internal Medicine

## 2021-04-09 NOTE — PLAN OF CARE
Pt here with MG exacerbation. Alert, oriented x4. Neuros intact ex: double vision when looking towards the left or right side, numbness to the LLE - top of foot, and generalized weakness with the BLE's scoring a 4/5. VSS on CPAP overnight. Tele SR with 1st degree AVB and BBB. Regular diet, thin liquids. Takes pills whole. Beckett in place with adequate output, no BM overnight. Up with A2 using the lift, T/R Q2H. Denies pain. Pt scoring green on the Aggression Stop Light Tool. Plan for final PLEX treatment today. Discharge pending.

## 2021-04-09 NOTE — PROGRESS NOTES
[facility]  Palliative Care Sign-Off Note    Palliative Care was consulted for goals of care. At this time the patient does not have any needs we are actively addressing, and we are signing off.    However we know their condition may change -- please re-consult us if we can be helpful at any time in the future.     Thank you for the opportunity to be involved in the care of this patient.    Nilam Chakraborty MD  Palliative Medicine  Pager [phone]

## 2021-04-09 NOTE — PLAN OF CARE
"/68 (BP Location: Left arm)   Pulse 93   Temp 98.3  F (36.8  C) (Oral)   Resp 16   Ht 1.778 m (5' 10\")   Wt 120.3 kg (265 lb 3.4 oz)   SpO2 93%   BMI 38.05 kg/m      Nursing shift note  Summary: Patient in with Myesthenia Gravis exacerbation  Alertness: AOx4  Neuro: Intact  Cardiac: WDL Tele: SR BBB  Resp: WDL  GI: WDL 1 episode of incontinence  : Beckett in place  CMS: Intact  Mobility: Lift  Pain: Has pain on bridge of nose from CPAP device  Diet: Regular  Skin: WDL, some blanchable redness on bottom  Other Important Info: q2 turns    Behavior & Aggression Tool color:  -None    Pt's belongings:     (Belongings from admission day present in pt's room)    Home medications:   -None  "

## 2021-04-09 NOTE — PLAN OF CARE
PT: Attempted to see patient at scheduled time. Patient with another provider. Will attempt this afternoon.    Attempted session this afternoon, pt undergoing PLEX at bedside, will reschedule PT.

## 2021-04-09 NOTE — CONSULTS
[facility] [place]  [facility]    Neurology Consultation    Thanh Goodrich MRN# [id number]   YOB: 1940 Age: 80 year old    Code Status:Full Code   Date of Admission: 3/19/2021  Date of Consult:04/09/2021                                                                                       Assessment and Plan:                                         #.  Myasthenia gravis    He has had long-term myasthenia gravis and had been in myasthenic crisis at this admission.  Currently he doing much better than admission. Today getting his 5th PLEX. his CellCept has been increased to 1000 mg bid, prednisone 60 mg a day and pyridostigmine 60 mg 3 times a day have been continued as he responded well to them.    Seems to be doing better and plan to discharge to a care facility when available    Vy Hernandez MD  Neurologist  [facility] Neurology  Office [phone]        ----------------------------------------------------------------------------------  ----------------------------------------------------------------------------------  Reason for consult: I was asked by Dr. Zavala to evaluate this patient for Myasthenia Gravis. So far he has been followed by Neurocritical team while he was intubate in the ICU.        Chief Complaint:   Exacerbation of Myasthenia Gravis  History is obtained from the patient / chart       History of Present Illness:   This patient is a 80 year old male who presents with exacerbation of his myasthenia gravis presenting with hypercapnia and respiratory failure.  He had been in the ICU intubated and today was transferred to the floor.    In the recent past he has had similar presentation and had received IVIG for 5 days and was discharged on 60 mg/day of steroids with the plan to taper off the milligrams a week.  While he was going to the taper he started having difficulty with breathing, feeling fatigued out having low-volume voice and  became more somnolent.  On arrival he was found to be hypercarbic and was intubated.  His 5th PLEX is going on right now  Since he has had the 4 plasma exchanges he has done well and has improved.      His myasthenic symptoms have mostly been shortness of breath, double vision and low volume of his voice.  He has never developed dysphagia or ptosis. He says that his symptoms are much better today.     He has been feeling well. No dysphagia and volume of voice is good with minimal diplopia in the lateral gazes.     Currently as an outpatient he has been followed by  with [facility].         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     Cellulitis of left leg 9/23/2012     Depressive disorder 2020     Edema 7/7/2009     History of steroid therapy 2/22/2010     Hyperlipidemia LDL goal <130 10/31/2010     HYPERTENSION 7/8/2003     Incontinence of urine 10/25/2010     Leg ulcer (H) 9/20/2012     MALIGN NEOPL PROSTATE-3/07 4/2/2007    T1C, [place] 8, initial PSA 39, s/p radiation seed implants 2007      Myasthenia gravis (H)      OBESITY 7/8/2003     Osteoporosis 8/18/2009    Refused bisphosphonates 2011      PVC's (premature ventricular contractions) 11/23/2011     RIGHT OCCIPITAL PAIN 7/8/2003     ROTATOR CUFF SYND NOS- RT 9/19/2005     Skin nodule 3/18/2013     ulcer left shin 10/8/2007     Uncomplicated asthma 1944?    childhood only         Past Surgical History:     Past Surgical History:   Procedure Laterality Date     BIOPSY  2013?    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[place]     Employer: [employer]   Social Needs     Financial resource strain: Not on file     Food insecurity     Worry: Not on file     Inability: Not on file     Transportation needs     Medical: Not on file     Non-medical: Not on file   Tobacco Use     Smoking status: Never Smoker     Smokeless tobacco: Never Used   Substance and Sexual Activity     Alcohol use: Yes     Alcohol/week: 0.0 standard drinks     Comment: very rarely     Drug use: No     Sexual activity: Not Currently     Partners: Female   Lifestyle     Physical activity     Days per week: Not on file     Minutes per session: Not on file     Stress: Not on file   Relationships     Social connections     Talks on phone: Not on file     Gets together: Not on file     Attends Mandaeism service: Not on file     Active member of club or organization: Not on file     Attends meetings of clubs or organizations: Not on file     Relationship status: Not on file     Intimate partner violence     Fear of current or ex partner: Not on file     Emotionally abused: Not on file     Physically abused: Not on file     Forced sexual activity: Not on file   Other Topics Concern     Parent/sibling w/ CABG, MI or angioplasty before 65F 55M? No   Social History Narrative    The patient has a 0 pk yr tobacco hx.  He has no active use.  Alcohol use is rare alcoholic drinks per week.  He denies use of recreational drugs.          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        The patient is single.  Has 0 children.        Hot Tub Exposure: NO    Recent Travel: NO     Hx of incarceration:  NO    Bird Exposure:   NO    Animal Exposure:  3 Cats    Inhalation Exposure:  + asbestos exposure in past             Patient denies smoking, no significant alcohol intake, denies illicit drugs use       Family History:     Family History   Problem Relation Age of Onset     Hypertension Mother      Depression Mother      Substance Abuse  Mother         alcohol     Hypertension Father      Cancer Father         Prostate     Prostate Cancer Father      Substance Abuse Father         alcohol     Obesity Father      Diabetes Maternal Grandmother      C.A.D. Maternal Grandmother      Cerebrovascular Disease Maternal Grandfather      Cancer Paternal Uncle         Prostate     Prostate Cancer Other      Reviewed and not felt to be contributory.        Home Medications:     Prior to Admission Medications   Prescriptions Last Dose Informant Patient Reported? Taking?   Cholecalciferol 100 MCG (4000 UT) CAPS 3/19/2021 at am  Yes Yes   Sig: Take 4,000 Units by mouth daily   Multiple Vitamins-Minerals (MULTIVITAMIN ADULTS 50+) TABS 3/19/2021 at am  Yes Yes   Sig: Take 1 tablet by mouth daily    acetaminophen (TYLENOL) 325 MG tablet 3/19/2021 at 0125  Yes Yes   Sig: Take 650 mg by mouth every 4 hours as needed for mild pain   calcium carbonate 600 mg-vitamin D 400 units (CALTRATE) 600-400 MG-UNIT per tablet 3/19/2021 at am  Yes Yes   Sig: Take 1 tablet by mouth daily   furosemide (LASIX) 20 MG tablet 3/19/2021 at 1200  No Yes   Sig: Take 1 tablet (20 mg) by mouth 2 times daily   metoprolol tartrate (LOPRESSOR) 25 MG tablet 3/19/2021 at am  No Yes   Sig: Take 0.5 tablets (12.5 mg) by mouth 2 times daily   mycophenolate (GENERIC EQUIVALENT) 500 MG tablet 3/19/2021 at am  Yes Yes   Sig: Take 1,000 mg by mouth every morning   mycophenolate (GENERIC EQUIVALENT) 500 MG tablet 3/18/2021 at hs  Yes Yes   Sig: Take 500 mg by mouth At Bedtime    potassium chloride ER (KLOR-CON M) 10 MEQ CR tablet 3/19/2021 at pm  No Yes   Sig: Take 2 tablets (20 mEq) by mouth 2 times daily   predniSONE (DELTASONE) 50 MG tablet 3/19/2021 at am-50mg  No Yes   Sig: Take 1 tablet (50 mg) by mouth daily Take 50 mg until 3/19.  Then decrease by 10 mg every 7 days starting on 3/20      Facility-Administered Medications: None          Allergy:     Allergies   Allergen Reactions     Ciprofloxacin  Other (See Comments)     Contraindicated for myasthenia gravis patients     Procainamide Other (See Comments)     Contraindicated for myasthenia gravis patients     Quinidine Other (See Comments)     Contraindicated for myasthenia gravis patients     Quinine Other (See Comments)     Contraindicated for myasthenia gravis patients          Inpatient Medications:   Scheduled Meds:    acetaminophen  1,000 mg Oral TID     furosemide  20 mg Oral Daily     [Held by provider] heparin ANTICOAGULANT  5,000 Units Subcutaneous Q8H     [Held by provider] metoprolol tartrate  12.5 mg Oral BID     mycophenolate  1,000 mg Oral BID     predniSONE  60 mg Oral or Feeding Tube Daily     pyridostigmine  60 mg Oral TID     sulfamethoxazole-trimethoprim  1 tablet Oral or Feeding Tube Daily     cholecalciferol  100 mcg Oral or Feeding Tube Daily     PRN Meds: sodium chloride 0.9%, sodium chloride 0.9%, albuterol, bisacodyl, calcium gluconate, calcium gluconate, artificial tears ophthalmic solution, dextrose, glucose **OR** dextrose **OR** glucagon, diphenhydrAMINE, EPINEPHrine, heparin Lock (1000 units/mL High concentration), heparin Lock (1000 units/mL High concentration), labetalol, miconazole, - MEDICATION INSTRUCTIONS -, ondansetron **OR** ondansetron, - MEDICATION INSTRUCTIONS -, phenol, prochlorperazine **OR** prochlorperazine **OR** prochlorperazine, senna-docusate **OR** senna-docusate        Review of Systems    The Review of Systems is negative other than noted in the HPI  A comprehensive review of  10 systems was performed and found to be negative except as described in this note  CONSTITUTIONAL: negative for fever, chills, change in weight  INTEGUMENTARY/SKIN: no rash or obvious new lesions  ENT/MOUTH: no sore throat, new sinus pain or nasal drainage, no neck mass noted  RESP: No pleuretic pain, No cough, no hemoptysis, No SOB   CV: negative for chest pain, palpitations or peripheral edema  GI: no nausea, vomiting, change in  "stools  : no dysuria or hematuria  MUSCULOSKELETAL: no myalgias, arthralgias or join efffusion  ENDOCRINE: no history of polyuria, polydyspsia or symptoms of thyroid dysfunction  PSYCHIATRIC: no change in mood stable  LYMPHATIC: no new lymphadenopathy  HEME: no bleeding or easy bruisability  NEURO: see HPI       Physical Exam:   Physical Exam   Vitals:  Height:5' 10\"  Weight:265 lbs 3.41 oz   Temp: 97.8  F (36.6  C) Temp src: Oral BP: 126/71 Pulse: 107   Resp: 18 SpO2: 96 % O2 Device: Nasal cannula Oxygen Delivery: 2 LPM  General Appearance:  No acute distress  Neuro:       Mental Status Exam:    He is alert and oriented x3       Cranial Nerves:   His visual fields are normal bilaterally, pupils are equal round reactive to light, extraocular movements are full without any nystagmus and no double vision.  No sensory loss of the face no weakness of the jaw no facial asymmetry no palatal weakness tongue is midline there is no weakness of the neck flexors sternomastoid and shoulder shrug are normal bilaterally           Motor:   There is no weakness proximally in the upper extremities distally there is slight weakness of the .  There is proximal and distal mild to moderate weakness in her legs.  Strength in the lower ext is about a 4/5.        Reflexes: Depressed all over       Sensory: No sensory deficits                   Coordination:   No incoordination of the upper extremities.  Lower extremity coordination could not be palpated because of the weakness       Gait: Currently bedbound          Data:   ROUTINE IP LABS   CBC RESULTS:     Recent Labs   Lab 04/09/21  0743 04/08/21  0723 04/07/21  0726   WBC 6.8 4.8 4.6   RBC 4.17* 3.81* 4.03*   HGB 11.2* 10.2* 10.9*   HCT 38.5* 35.2* 37.5*   * 118* 129*     Basic Metabolic Panel:   Recent Labs   Lab Test 04/09/21  0743 04/08/21  0723 04/07/21  0726    140 139   POTASSIUM 3.9 4.1 3.9   CHLORIDE 99 101 100   CO2 42* 42* 41*   BUN 13 12 10   CR 0.52* " 0.54* 0.50*   GLC 70 71 70   NOAH 8.8 8.3* 8.8     Liver panel:  Recent Labs   Lab Test 03/23/21  0419 03/22/21  0436 03/21/21  0451 03/20/21  0602 03/19/21  2217   PROTTOTAL 6.7* 6.1* 6.0* 5.8* 6.7*   ALBUMIN 2.1* 2.0* 2.2* 2.4* 2.8*   BILITOTAL 0.4 0.7 1.0 0.7 0.4   ALKPHOS 42 42 45 48 59   AST 22 15 17 21 18   ALT 30 22 23 27 33     INR:  Recent Labs   Lab Test 04/09/21  0743 04/07/21  0726   INR 1.10 1.20*      Lipid Profile:  Recent Labs   Lab Test 12/16/20  1455 07/05/19  1558 08/08/18  1245 06/14/17  1511 04/18/16  1518 11/22/13  1523   CHOL 141 174 175 184  --  196   HDL 70 57 53 63 57 61   LDL 60 105* 104* 106* 116* 117   TRIG 55 59 90 76  --  88   CHOLHDLRATIO  --   --   --   --   --  3.2     Thyroid Panel:  Recent Labs   Lab Test 04/18/16  1518   TSH 1.42      Vitamin B12: No lab results found.   Vitamin D level:   Recent Labs   Lab Test 08/08/18  1245 09/10/14  1545   VITDT 101* 88*     A1C: No lab results found.  Troponin I:   Recent Labs   Lab Test 03/29/21  1700 03/29/21  1247 03/29/21  0550 03/19/21  2217 03/04/21  1629 03/04/21  1235 03/04/21  0320   TROPI 0.022 0.023 0.028 0.060* <0.015 <0.015 <0.015     CRP inflammation:   Recent Labs   Lab Test 03/10/15  1416 01/05/15  1459 12/29/14  1255 09/10/14  1545 08/05/13  1410   CRP 43.0* 66.0* 13.0* 13.8* <5.0     ESR:   Recent Labs   Lab Test 03/10/15  1416 12/29/14  1255 09/10/14  1545   SED 20 15 13       ANIL:   Recent Labs   Lab Test 08/05/13  1416   MK <1.0 Interpretation:  Negative       ANCA:   Recent Labs   Lab Test 03/10/15  1416   ANCA <1:20  Reference range: <1:20  (Note)  The ANCA IFA is <1:20; therefore, no further testing will  be performed.  INTERPRETIVE INFORMATION: Anti-Neutrophil Cyto Ab, IgG  Neutrophil Cytoplasmic Antibodies (C-ANCA = granular  cytoplasmic staining, P-ANCA = perinuclear staining) are  found in the serum of over 90 percent of patients with  certain necrotizing systemic vasculitides, and usually in  less than 5 percent  of patients with collagen vascular  disease or arthritis.  Performed by [facility],  [address] [place] [id number] [phone]  [email or web address], Art Strickland MD, Lab. Director           IMAGING:   No Neuroimaging done.

## 2021-04-09 NOTE — PROGRESS NOTES
Apheresis Treatment -     Treatment in room 708 Consent verified.  Treatment number: 5/5    Height: 5ft 10in  Weight: 265 lbs  HCT: 38%  Inlet speed: 100  ACDA ratio: 10:1  Fluid balance: 100%   Access: RIJ  Post treatment line dwell: Heparin 1,000units/ml, red port 1600 units (1.6mls), blue port 1600 units (1.6mls).  Replacement product: 5% albumin  Electrolyte replacement: Calcium Gluconate 4grams in NS - total 90mls, piggybacked into return lines, infused over time of treatment.  Premedications: none    Treatment Notes: Patient tolerated the treatment well with no complications.    Treatment completed as ordered, VSS, see EPIC flowsheet for run data.    Next treatment: Final treatment unless neurology should order more

## 2021-04-09 NOTE — PROGRESS NOTES
Indication:  MG     #5 of 5 runs        1 volume exchange  3600 ml 5% albumin     4 grams calcium     Stable run     Fibrinogen is low again this AM. This is due to loss of fibrinogen along with all plasma proteins in apheresis.  His liver is not manufacturing enough to keep up.  So he needs a dose of cryoprecipitate again post apheresis.      He can have his line out next week if no further PLEX treatments are needed.       Tito Rosario MD

## 2021-04-09 NOTE — PLAN OF CARE
Pt here with MG crisis. A&O. Neuros intact, weaker on the BLE. VSS. Tele NSR. Regular diet, thin liquids. Takes pills whole. Up with A2/lift. Beckett in place. 2 Bms this shift. Denies pain. Pt scoring green on the Aggression Stop Light Tool. Plan for 5/5 PE tomorrow. Discharge recommendation to TCU, pending.  Patient Belongings remain with patient  No home medications

## 2021-04-09 NOTE — PROGRESS NOTES
[facility]    Hospitalist Progress Note    Interval History   - Undergoing plasmapheresis in the room currently, with possible fibrinogen transfusion later today  - Possible discharge tomorrow pending nephrology and neurology clearance  - Patient has been on BiPap at night but given improvement with plasmapheresis, will trial CPAP tonight to see if he tolerates, and check VBG in AM. If patient requires only CPAP this will simplify discharge    Assessment & Plan   Summary: Mr. Goodrich is a 80 year old male admitted on 3/19/2021 with significant history for myasthenia gravis, HFpEF, COPD, HTN, MDD, HAZEL, prostate cancer who was initially admitted on 3/19/2021 from TCU for acute hypercapnic respiratory failure requiring emergent intubation on admission. He was recently admitted and discharged (3/4-3/15) for acute hypoxic and hypercapnic respiratory failure requiring intubation (3/6/21-3/11/21) 2/2 myasthenia gravis exacerbation, diastolic CHF exacerbation. Previously, pt received IVIG and steroid treatment and was followed by NCC. He was extubated on 3/22 and transferred to hospitalist service 3/23. Subsequently experienced worsening encephalopathy and was found to have worsening respiratory acidosis despite BiPAP treatment and was transferred back to ICU on 3/29. Intubated due to persistent hypercarbia. Palliative care saw the patient on 3/30, patient decided to pursue HD catheter (IR), begin plasmapheresis (nephrology), use BiPAP after extubation, and re-intubate if needed. HD catheter placed 3/31 and plasmapheresis started. Course complicated by hematuria due to alberto trauma. He was able to be extubated to AVAPS BIPAP on 4/3. Pt remains stable, transfer of care to hospitalist service 4/5/21. Completing 5 out of 5 plasmapheresis on 4/9/2021.     Recurrent myasthenic crisis  Acute metabolic encephalopathy, resolved  H/o myasthenia gravis, diagnosed in 2005. Had been stable on mycophenolate and  had not needed steroids for about 10 years until recent hospitalization 3/2021. Recent hospitalization (3/4-3/15) with myasthenia gravis flare with associated acute respiratory failure (intubated 3/6-3/11). Treated with IVIG and steroids. Discharged to TCU. Steroids were slowly being tapered after discharge.   Presented from TCU 3/19 with weakness and found to be in acute hypercarbic respiratory failure. Intubated in ED and admitted to ICU. Was also found with evidence of UTI and pneumonia which were treated (see below). Neurology consulted. Treated with IVIG 3/20-3/22. Predisone increased. Improved and able to be extubated 3/22. Started on pyridostigmine 3/26. Pulmonology consulted 3/25 to assist with obtaining nocturnal NIPPV. On 3/27, still weak overall and not at baseline.  Subsequently experienced worsening encephalopathy and was found to have worsening respiratory acidosis despite BiPAP treatment and was transferred back to ICU on 3/29. Intubated due to persistent hypercarbia. Extubated back to Bipap on 4/3 and now on Bipap as needed and at bedtime.    Completing 5 out of 5 plasmapheresis on 4/9/2021. Ongoing respiratory  - Appreciate Neurology involvement   - Continue mycophenolate 1000 mg qam and 500 mg at bedtime; prednisone 60 mg daily (no taper until outpatient follow-up); and pyridostigmine 60 mg q8h; per Neurology rec's.  - Appreciate Nephrology involvement   - Continue plasma exchange per nephrology/NCC, total 5 treatments to end around 4/9/2021  - Continue Bactrim DS 3x/week for PCP prophylaxis while on high dose steroids, immunosuppression    Recurrent acute hypoxic and hypercarbic respiratory failure, improved  Suspect multifactorial including myasthenia gravis flare, MSSA pneumonia and acute diastolic CHF exacerbation.  - Continue BID pulmonary mechanics with NIF and VC.   - Continue IS q2h while awake.  - Patient has been on BiPap at night but given improvement with plasmapheresis, will trial CPAP  tonight to see if he tolerates, and check VBG in AM. If patient requires only CPAP this will simplify discharge. If unsuccessful, return to AVAPs at night  - Wean O2 as able     Acute normocytic anemia  Thrombocytopenia  Suspect from multiple phlebotomies and also chronic component. Thrombocytopenia stable >100s. Hgb 13.1 on admit 3/19. No overt clinical signs of major bleeding.   - Monitor CBC    Hypophosphatemia, hypokalemia.  - Continue PRN electrolyte replacement.     Pressure ulcer, right posterior thigh.  WOC RN consulted.  - Continue local wound cares.    Goals of care  Palliative care consulted, last saw pt on 4/2, following peripherally at this time.  Goals remain restorative at this time.  Appreciate consultant assistance.       Chronic/Stable/Resolved    MSSA pneumonia, suspect HCAP, resolved.  Recent hospitalization as above. Initial presentation from TCU 3/19 as above. On initial evaluation 3/19, was afebrile and hemodynamically stable; WBC and lactate normal, VBG showed pCO2 128. CXR 3/19 showed bibasilar atelectasis or infiltrate with small pleural effusions. Initially started on broad antibiotics including Zosyn on admit. Sputum 3/20 grew MSSA. Antibiotics narrowed to Unasyn 3/25.  Completed Unasyn/amoxicillin therapy.     Possible acute on chronic decompensated diastolic CHF exacerbation, resolved  Hypertension (benign essential).  [PTA: furosemide 20 mg BID; metoprolol 12.5 mg BID.]  Echocardiogram 3/4/2021 showed LVEF of 65-70%, no regional wall motion abnormalities; mildly decreased RV systolic function; mild to moderate mitral regurgitation, mild to moderate mitral stenosis. The patient reported a recent 20-pound weight gain in the last 1 month PTA following COVID vaccine. Treated with high dose steroids for myasthenia gravis as above.    Initial presentation 3/19 as above. NTP-BNP 3996 3/19. Was given IV furosemide this hospitalization. He had metabolic alkalosis that was treated with  acetazolamide, stopped 3/21. Repeat echo 3/23 showed LVEF 55-60%, grade 1 diastolic dysfunction, no regional wall motion abnormalities; RV OK; trace MR, mild MS.  - Restart furosemide 20mg daily tomorrow  - Holding PTA metoprolol 12.5 mg BID in setting of recent hypotension.  - Monitor i/o's, daily wts.     Complicated catheter associated E. Coli UTI, treated  Chronic indwelling alberto due to difficulty voiding, large pannus (placed previous hospitalization)  Recent hematuria 2/2 alberto trauma, resolved   History of prostate cancer s/p cryotherapy  Alberto placed during previous hospitalization because of difficulty voiding. UA 3/19 grossly abnormal. Previously obtained UC from 3/18 showed E. Coli. Was treated with antibiotics including ceftriaxone and other antibiotics as above.  UTI felt to be adequately treated.  - Chronic alberto (16 F Coude), replace every 2-3 weeks, leave in at discharge.   - Outpatient urology follow up needed with Dr. Charles in 2-4 weeks from 4/3 with PSA  - Hand alberto irrigation of hematuria recurs (recently required CBI)    Nonsevere malnutrition  - Level of malnutrition: Non-Severe   - Based on: reduced intake, mild (or greater) subcutaneous fat loss, mild (or greater) muscle loss   - Speech therapy consulted after extubation, currently tolerating dysphagia diet; had received TF while intubated.      Physical deconditioning 2/2 critical illness.  - Will consult PT/OT      COVID-19 testing 3/4, 3/11, 3/19, 4/6 - negative.     Trop elevation, suspect demand ischemia (type 2 NSTEMI).    Morbid obesity: Body mass index is 40.58 kg/m .    Mild metabolic alkalosis, likely secondary to a combination of diuresis and probable chronic hypercarbic respiratory failure, resolved    DVT Prophylaxis: Pneumatic Compression Devices  Code Status: Full Code  PT/OT: ordered  Diet: Combination Diet Regular Diet Adult; Thin Liquids (water, ice chips, juice, milk, gelatin, ice cream, etc)      Disposition: Expected  discharge to TCU in 1-2 days    Davi Jaramillo MD  Text Page  (7am to 6pm)  -Data reviewed today: I reviewed all new labs and imaging results over the last 24 hours.    Physical Exam   Temp: 97.7  F (36.5  C) Temp src: Oral BP: 122/69 Pulse: 101   Resp: 18 SpO2: 96 % O2 Device: Nasal cannula Oxygen Delivery: 2 LPM  Vitals:    04/04/21 0600 04/05/21 1300 04/07/21 1450   Weight: 120.3 kg (265 lb 3.4 oz) 120.3 kg (265 lb 3.4 oz) 120.3 kg (265 lb 3.4 oz)     Vital Signs with Ranges  Temp:  [97.3  F (36.3  C)-98.7  F (37.1  C)] 97.7  F (36.5  C)  Pulse:  [] 101  Resp:  [16-18] 18  BP: (117-142)/(63-91) 122/69  SpO2:  [92 %-97 %] 96 %  I/O last 3 completed shifts:  In: 420 [P.O.:420]  Out: 1725 [Urine:1725]  O2 requirements: yes 4L    Constitutional: Obese male appears chronically ill  HEENT: Eyes nonicteric, oral mucosa moist  Cardiovascular: RRR, normal S1/2, no m/r/g  Respiratory: CTAB, no wheezing or crackles  Vascular: 1-2+ BLE pitting edema, R chest central line noted  GI: Normoactive bowel sounds, nontender, obese  Skin/Integumen: No rashes  Neuro/Psych: Appropriate affect and mood. A&Ox3, moves all extremities    Medications     dextrose       - MEDICATION INSTRUCTIONS -       - MEDICATION INSTRUCTIONS -         acetaminophen  1,000 mg Oral TID     furosemide  20 mg Oral Daily     [Held by provider] heparin ANTICOAGULANT  5,000 Units Subcutaneous Q8H     [Held by provider] metoprolol tartrate  12.5 mg Oral BID     mycophenolate  1,000 mg Oral BID     predniSONE  60 mg Oral or Feeding Tube Daily     pyridostigmine  60 mg Oral TID     sulfamethoxazole-trimethoprim  1 tablet Oral or Feeding Tube Daily     cholecalciferol  100 mcg Oral or Feeding Tube Daily       Data   Recent Labs   Lab 04/09/21  0743 04/08/21  0723 04/07/21  0726   WBC 6.8 4.8 4.6   HGB 11.2* 10.2* 10.9*   MCV 92 92 93   * 118* 129*   INR 1.10  --  1.20*    140 139   POTASSIUM 3.9 4.1 3.9   CHLORIDE 99 101 100   CO2 42* 42*  41*   BUN 13 12 10   CR 0.52* 0.54* 0.50*   ANIONGAP <1* <1* <1*   NOAH 8.8 8.3* 8.8   GLC 70 71 70       Imaging:   No results found for this or any previous visit (from the past 24 hour(s)).

## 2021-04-10 NOTE — PROGRESS NOTES
[facility]    Medicine Progress Note - Hospitalist Service       Date of Admission:  3/19/2021  Assessment & Plan       Mr. Goodrich is a 80 year old male admitted on 3/19/2021 with significant history for myasthenia gravis, HFpEF, COPD, HTN, MDD, HAZEL, prostate cancer who was initially admitted on 3/19/2021 from TCU for acute hypercapnic respiratory failure requiring emergent intubation on admission. He was recently admitted and discharged (3/4-3/15) for acute hypoxic and hypercapnic respiratory failure requiring intubation (3/6/21-3/11/21) 2/2 myasthenia gravis exacerbation, diastolic CHF exacerbation. Previously, pt received IVIG and steroid treatment and was followed by NCC. He was extubated on 3/22 and transferred to hospitalist service 3/23. Subsequently experienced worsening encephalopathy and was found to have worsening respiratory acidosis despite BiPAP treatment and was transferred back to ICU on 3/29. Intubated due to persistent hypercarbia. Palliative care saw the patient on 3/30, patient decided to pursue HD catheter (IR), begin plasmapheresis (nephrology), use BiPAP after extubation, and re-intubate if needed. HD catheter placed 3/31 and plasmapheresis started. Course complicated by hematuria due to alberto trauma. He was able to be extubated to AVAPS BIPAP on 4/3. Pt remains stable, transfer of care to hospitalist service 4/5/21. Completed 5 out of 5 sessions of plasmapheresis on 4/9/2021.    Recurrent myasthenic crisis  Acute metabolic encephalopathy, resolved  H/o myasthenia gravis, diagnosed in 2005. Had been stable on mycophenolate and had not needed steroids for about 10 years until recent hospitalization 3/2021. Recent hospitalization (3/4-3/15) with myasthenia gravis flare with associated acute respiratory failure (intubated 3/6-3/11). Treated with IVIG and steroids. Discharged to TCU. Steroids were slowly being tapered after discharge.  Presented from TCU 3/19 with weakness  and found to be in acute hypercarbic respiratory failure. Intubated in ED and admitted to ICU. Was also found with evidence of UTI and pneumonia which were treated (see below). Neurology consulted. Treated with IVIG 3/20-3/22. Predisone increased. Improved and able to be extubated 3/22. Started on pyridostigmine 3/26. Pulmonology consulted 3/25 to assist with obtaining nocturnal NIPPV. On 3/27, still weak overall and not at baseline.  Subsequently experienced worsening encephalopathy and was found to have worsening respiratory acidosis despite BiPAP treatment and was transferred back to ICU on 3/29. Intubated due to persistent hypercarbia. Extubated back to Bipap on 4/3 and now on Bipap as needed and at bedtime.    Neurology following, appreciate their assistance.    Completed 5 out of 5 plasmapheresis on 4/9/2021, appreciate nephrology as    Continue mycophenolate 1000 mg qam and 500 mg at bedtime; prednisone 60 mg daily (no taper until outpatient follow-up); and pyridostigmine 60 mg q8h; per Neurology recommendations.    Continue Bactrim DS 3x/week for PCP prophylaxis while on high dose steroids, immunosuppression.    Recurrent acute hypoxic and hypercarbic respiratory failure, improved  Mild metabolic alkalosis, likely secondary to a combination of diuresis and probable chronic hypercarbic respiratory failure,  Suspect multifactorial including myasthenia gravis flare, MSSA pneumonia and acute diastolic CHF exacerbation.    Continue BID pulmonary mechanics with NIF and VC.     Continue IS q2h while awake.    Patient has been on BiPap at night. Given improvement with plasmapheresis, tried CPAP on 4/9, VBG in AM shows significantly elevated PCO2.    Will ask pulmonology to evaluate him again and assist with management going forward.    Acute normocytic anemia  Thrombocytopenia  Suspect from multiple phlebotomies and also chronic component. Thrombocytopenia stable >100s. Hgb 13.1 on admit 3/19. No overt clinical  signs of major bleeding.     Labs stable/improved on 4/10/21.    Hypophosphatemia, hypokalemia.    Continue PRN electrolyte replacement.    Pressure ulcer, right posterior thigh  WOC RN consulted.    Continue local wound cares.    Goals of care  Palliative care consulted, last saw pt on 4/2, following peripherally at this time.  Goals remain restorative at this time.  Appreciate consultant assistance.       MSSA pneumonia, suspect HCAP, resolved.  Recent hospitalization as above. Initial presentation from TCU 3/19 as above. On initial evaluation 3/19, was afebrile and hemodynamically stable; WBC and lactate normal, VBG showed pCO2 128. CXR 3/19 showed bibasilar atelectasis or infiltrate with small pleural effusions. Initially started on broad antibiotics including Zosyn on admit. Sputum 3/20 grew MSSA. Antibiotics narrowed to Unasyn 3/25.  Completed Unasyn/amoxicillin therapy.    Possible acute on chronic decompensated diastolic CHF exacerbation, resolved  Hypertension (benign essential).  [PTA: furosemide 20 mg BID; metoprolol 12.5 mg BID.]  Echocardiogram 3/4/2021 showed LVEF of 65-70%, no regional wall motion abnormalities; mildly decreased RV systolic function; mild to moderate mitral regurgitation, mild to moderate mitral stenosis. The patient reported a recent 20-pound weight gain in the last 1 month PTA following COVID vaccine. Treated with high dose steroids for myasthenia gravis as above.   Initial presentation 3/19 as above. NTP-BNP 3996 3/19. Was given IV furosemide this hospitalization. He had metabolic alkalosis that was treated with acetazolamide, stopped 3/21. Repeat echo 3/23 showed LVEF 55-60%, grade 1 diastolic dysfunction, no regional wall motion abnormalities; RV OK; trace MR, mild MS.    Restarted furosemide 20mg daily on 4/8/21.    Holding PTA metoprolol 12.5 mg BID in setting of recent hypotension.    Monitor i/o's, daily wts.    Complicated catheter associated E. Coli UTI, treated  Chronic  indwelling alberto due to difficulty voiding, large pannus (placed previous hospitalization)  Recent hematuria 2/2 alberto trauma, resolved  History of prostate cancer s/p cryotherapy  Alberto placed during previous hospitalization because of difficulty voiding. UA 3/19 grossly abnormal. Previously obtained UC from 3/18 showed E. Coli. Was treated with antibiotics including ceftriaxone and other antibiotics as above.  UTI felt to be adequately treated.    Chronic alberto (16 F Coude), replace every 2-3 weeks, leave in at discharge.    Outpatient urology follow up needed with Dr. Charles in 2-4 weeks from 4/3 with PSA.    Hand alberto irrigation of hematuria recurs (recently required CBI).    Nonsevere malnutrition  Level of malnutrition: Non-Severe  Based on: reduced intake, mild (or greater) subcutaneous fat loss, mild (or greater) muscle loss     Speech therapy consulted after extubation, currently tolerating dysphagia diet; had received TF while intubated.    Physical deconditioning 2/2 critical illness.    PT/OT consulted.    COVID-19 PCR testing 3/4, 3/11, 3/19, 4/6 - negative.     Trop elevation, suspect demand ischemia (type 2 NSTEMI)    Morbid obesity    Body mass index is 40.58 kg/m .    Increases all cause mortality.    Follow-up with PCP.     Diet: Combination Diet Regular Diet Adult; Thin Liquids (water, ice chips, juice, milk, gelatin, ice cream, etc)    DVT Prophylaxis: Pneumatic Compression Devices  Alberto Catheter: in place, indication: Gross Hematuria, Strict 1-2 Hour I&O  Code Status: Full Code           Disposition Plan   Expected discharge: Likely to TCU in the next few days pending bed availability and consultant recommendations.  Entered: Darius Hopson MD 04/10/2021, 11:30 AM       The patient's care was discussed with the Bedside Nurse and Patient.    Darius Hopson MD  Hospitalist Service  [facility]  Contact information available via [facility]  [place]/Directory    ______________________________________________________________________    Interval History   Thanh Goodrich feels okay this morning.  Slept well last night.  No new complaints or concerns for me.  Denies fevers, chest pain, nausea, abdominal pain.  Discussed VBG results and plan for pulmonary consultation.  Discussed eventual discharge plans.    Data reviewed today: I reviewed all medications, new labs and imaging results over the last 24 hours. I personally reviewed no images or EKG's today.    Physical Exam   Vital Signs: Temp: 98.1  F (36.7  C) Temp src: Oral BP: 139/81 Pulse: 76   Resp: 18 SpO2: 95 % O2 Device: Nasal cannula Oxygen Delivery: 2 LPM  Weight: 267 lbs 0 oz  Constitutional: awake, alert, cooperative, no apparent distress, laying in bed  Respiratory: clear to auscultation bilaterally, no crackles or wheezing  Cardiovascular: regular rate and rhythm, normal S1 and S2, no murmur noted  GI: normal bowel sounds, soft, non-distended, non-tender  Skin: warm, dry  Musculoskeletal: 1+ bilateral lower extremity pitting edema present  Neurologic: awake, alert, oriented to name, place and time    Data   Recent Labs   Lab 04/10/21  0809 04/09/21  0743 04/08/21  0723 04/07/21  0726   WBC 6.8 6.8 4.8 4.6   HGB 11.3* 11.2* 10.2* 10.9*   MCV 94 92 92 93    132* 118* 129*   INR  --  1.10  --  1.20*    139 140 139   POTASSIUM 3.6 3.9 4.1 3.9   CHLORIDE 98 99 101 100   CO2 44* 42* 42* 41*   BUN 12 13 12 10   CR 0.54* 0.52* 0.54* 0.50*   ANIONGAP <1* <1* <1* <1*   NOAH 8.7 8.8 8.3* 8.8   * 70 71 70     Medications     dextrose       - MEDICATION INSTRUCTIONS -       - MEDICATION INSTRUCTIONS -         acetaminophen  1,000 mg Oral TID     furosemide  20 mg Oral Daily     [Held by provider] heparin ANTICOAGULANT  5,000 Units Subcutaneous Q8H     [Held by provider] metoprolol tartrate  12.5 mg Oral BID     mycophenolate  1,000 mg Oral BID     predniSONE  60 mg Oral or Feeding Tube  Daily     pyridostigmine  60 mg Oral TID     sulfamethoxazole-trimethoprim  1 tablet Oral or Feeding Tube Daily     cholecalciferol  100 mcg Oral or Feeding Tube Daily

## 2021-04-10 NOTE — PROVIDER NOTIFICATION
MD paged: [phone] Thanh Goodrich. ABG results back from trial of CPAP. PCO2 82, PO2 24, Bicarb 47. Thanks! Tito BURR [phone]

## 2021-04-10 NOTE — PLAN OF CARE
[id number]  VSS on CPAP- ABGs this a.m.. Tele NSR 1 1d AVB. Denies SOB or pain. Suction at bedside for clear, small amount of production. A/O x4. Neuro unchanged w Demetri LE 4/5. Numbness to left foot. Did not state double vision peripherally. Alberto w good, Lummi urine output. Panus cleaned, alberto cleaned. Interdry placed between folds. Declined turns at beginning of shift but turned w 2A and lift. Tolerating PO.

## 2021-04-10 NOTE — PLAN OF CARE
Pt here with MG exacerbation with respiratory distress. A&O x4. Neuros intact except for numbness on top of left foot and double vision when looking side to side, BLE 4/5 strength. VSS, Tele SR with 1st degree and BBB, PVC . Pt received 10 units of cryoprecipitate after completing 5th plex today. Beckett patent draining pasquale urine, bag changed today. Pt on regular diet, able to take some pills whole, prefers pills crushed in applesauce.     Pt greenlight on stoplight aggression tool.   Pt belongings at bed side

## 2021-04-10 NOTE — PROGRESS NOTES
Care Management Follow Up    Length of Stay (days): 21    Expected Discharge Date: 04/10/21(TCU)     Concerns to be Addressed:       Patient plan of care discussed at interdisciplinary rounds: Yes    Anticipated Discharge Disposition:  TCU  SW prepared to send referrals out pending determination of pt's needs at discharge.  Previously, pt was recommended to have [facility] at discharge which affects where pt would be able to discharge to for rehab.  Pt had previously requested [facility] but they do not accept pt's with Trilogy machines     Anticipated Discharge Services:  TCU TBD  Anticipated Discharge DME:  [facility]ilogy perhaps. Pt being evaluated by pulmonary     Patient/family educated on Medicare website which has current facility and service quality ratings:  previouly  Education Provided on the Discharge Plan:  no  Patient/Family in Agreement with the Plan: discharge plan not finalized but TCU recommended  Referrals Placed by CM/SW:  SW to send referrals once known if pt  Will require breathing machine at discharge.  Private pay costs discussed: Not applicable  Additional Information:    VALERY Quiñonez  [facility]  [phone]

## 2021-04-10 NOTE — PLAN OF CARE
Pt here with MG exacerbation with respiratory distress. A&O x4. Neuros intact except for numbness reported on top of L foot and double vision when looking side to side. VSS, tele sinus with 1st AV and BBB. Pt denies pain. Pt on regular diet. Pt prefers to takes pills crushed in applesauce. Pt on BiPAP tonight with VBGs recheck in AM. Beckett patent and draining pasquale urine.     Pt greenlight on stoplight aggression tool.   Pt belongings at bedside.

## 2021-04-10 NOTE — PLAN OF CARE
"/81 (BP Location: Left arm)   Pulse 76   Temp 98.1  F (36.7  C) (Oral)   Resp 18   Ht 1.778 m (5' 10\")   Wt 121.1 kg (267 lb)   SpO2 95%   BMI 38.31 kg/m      Nursing shift note  Summary: Patient in with Myesthenia Gravis exacerbation  Alertness: Alert x4  Neuro: Intact, but double vision when looking to peripheries, also numbness in left foot  Cardiac: WDL TELE: SR BBB  Resp: Patient gets short of breath when talking. On 2L maintaining sats well.  GI: WDL, good appetite  : Beckett WDL  CMS: Intact  Mobility: Lift  Pain: None  Diet: Regular, takes meds crushed in applesauce  Skin: Not bad on coccyx. Can't find WOC on thigh listed in chart    Behavior & Aggression Tool color:  -Green    Pt's belongings:     (Belongings from admission day present in pt's room)    Home medications:   -None  "

## 2021-04-10 NOTE — CONSULTS
[facility] [place]  [facility]    Neurology Consultation    Thanh Goodrich MRN# [id number]   YOB: 1940 Age: 80 year old    Code Status:Full Code   Date of Admission: 3/19/2021  Date of Consult:04/10/2021                                                                                       Assessment and Plan:                                         #.  Myasthenia gravis    He has had long-term myasthenia gravis and had been in myasthenic crisis at this admission.  Currently he doing much better than admission. Yesterday was his 5th and final PLEX. Seems to have remained stable but his Venous gases are showing high PCO2 after being on CPAP overnight. Maybe he needs evaluation by Pulmonary to suggest a different delivery method overnight. It will limit the places he can go to for transitional care.     CellCept has been increased to 1000 mg bid, prednisone 60 mg a day and pyridostigmine 60 mg 3 times a day have been continued as he responded well to them.      Seems to be doing better neurologically except for his overnight gases.      Plan to continue current medical management and discharge to a care facility when available    Vy Hernandez MD  Neurologist  [facility]  Office [phone]        ----------------------------------------------------------------------------------  ----------------------------------------------------------------------------------  Reason for consult: I was asked by Dr. Zavala to evaluate this patient for Myasthenia Gravis. So far he has been followed by Neurocritical team while he was intubate in the ICU.        Chief Complaint:   Exacerbation of Myasthenia Gravis  History is obtained from the patient / chart       History of Present Illness:   This patient is a 80 year old male who presents with exacerbation of his myasthenia gravis presenting with hypercapnia and respiratory failure.  He had been in the ICU  intubated and today was transferred to the floor.    In the recent past he has had similar presentation and had received IVIG for 5 days and was discharged on 60 mg/day of steroids with the plan to taper off the milligrams a week.  While he was going to the taper he started having difficulty with breathing, feeling fatigued out having low-volume voice and became more somnolent.  On arrival he was found to be hypercarbic and was intubated.  Has had 5 PLEX treatments, the last one being on 4/9/2021 and he has done well and has improved.      His myasthenic symptoms have mostly been shortness of breath, double vision and low volume of his voice.  He has never developed dysphagia or ptosis. He says that his symptoms are much better today.     He has been feeling well. No dysphagia and volume of voice is good with minimal diplopia in the lateral gazes.     Last night was tried on CPAP and his VBGs show a high PCO2 82, PO2 24, Bicarb 47.    Currently as an outpatient he has been followed by  with [facility].         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     Cellulitis of left leg 9/23/2012     Depressive disorder 2020     Edema 7/7/2009     History of steroid therapy 2/22/2010     Hyperlipidemia LDL goal <130 10/31/2010     HYPERTENSION 7/8/2003     Incontinence of urine 10/25/2010     Leg ulcer (H) 9/20/2012     MALIGN NEOPL PROSTATE-3/07 4/2/2007    T1C, [place] 8, initial PSA 39, s/p radiation seed implants 2007      Myasthenia gravis (H)      OBESITY 7/8/2003     Osteoporosis 8/18/2009    Refused bisphosphonates 2011      PVC's (premature ventricular contractions) 11/23/2011     RIGHT OCCIPITAL PAIN 7/8/2003     ROTATOR CUFF SYND NOS- RT 9/19/2005     Skin nodule 3/18/2013     ulcer left shin 10/8/2007     Uncomplicated asthma 1944?    childhood only         Past Surgical History:     Past Surgical History:    Procedure Laterality Date     BIOPSY  2013?    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 [employer]     Employer: [employer]   Social Needs     Financial resource strain: Not on file     Food insecurity     Worry: Not on file     Inability: Not on file     Transportation needs     Medical: Not on file     Non-medical: Not on file   Tobacco Use     Smoking status: Never Smoker     Smokeless tobacco: Never Used   Substance and Sexual Activity     Alcohol use: Yes     Alcohol/week: 0.0 standard drinks     Comment: very rarely     Drug use: No     Sexual activity: Not Currently     Partners: Female   Lifestyle     Physical activity     Days per week: Not on file     Minutes per session: Not on file     Stress: Not on file   Relationships     Social connections     Talks on phone: Not on file     Gets together: Not on file     Attends Voodoo service: Not on file     Active member of club or organization: Not on file     Attends meetings of clubs or organizations: Not on file     Relationship status: Not on file     Intimate partner violence     Fear of current or ex partner: Not on file     Emotionally abused: Not on file     Physically abused: Not on file     Forced sexual activity: Not on file   Other Topics Concern     Parent/sibling w/ CABG, MI or angioplasty before 65F 55M? No   Social History Narrative    The patient has a 0 pk yr tobacco hx.  He has no active use.  Alcohol use is rare alcoholic drinks per week.  He denies use of recreational drugs.          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        The patient is  single.  Has 0 children.        Hot Tub Exposure: NO    Recent Travel: NO     Hx of incarceration:  NO    Bird Exposure:   NO    Animal Exposure:  3 Cats    Inhalation Exposure:  + asbestos exposure in past             Patient denies smoking, no significant alcohol intake, denies illicit drugs use       Family History:     Family History   Problem Relation Age of Onset     Hypertension Mother      Depression Mother      Substance Abuse Mother         alcohol     Hypertension Father      Cancer Father         Prostate     Prostate Cancer Father      Substance Abuse Father         alcohol     Obesity Father      Diabetes Maternal Grandmother      C.A.D. Maternal Grandmother      Cerebrovascular Disease Maternal Grandfather      Cancer Paternal Uncle         Prostate     Prostate Cancer Other      Reviewed and not felt to be contributory.        Home Medications:     Prior to Admission Medications   Prescriptions Last Dose Informant Patient Reported? Taking?   Cholecalciferol 100 MCG (4000 UT) CAPS 3/19/2021 at am  Yes Yes   Sig: Take 4,000 Units by mouth daily   Multiple Vitamins-Minerals (MULTIVITAMIN ADULTS 50+) TABS 3/19/2021 at am  Yes Yes   Sig: Take 1 tablet by mouth daily    acetaminophen (TYLENOL) 325 MG tablet 3/19/2021 at 0125  Yes Yes   Sig: Take 650 mg by mouth every 4 hours as needed for mild pain   calcium carbonate 600 mg-vitamin D 400 units (CALTRATE) 600-400 MG-UNIT per tablet 3/19/2021 at am  Yes Yes   Sig: Take 1 tablet by mouth daily   furosemide (LASIX) 20 MG tablet 3/19/2021 at 1200  No Yes   Sig: Take 1 tablet (20 mg) by mouth 2 times daily   metoprolol tartrate (LOPRESSOR) 25 MG tablet 3/19/2021 at am  No Yes   Sig: Take 0.5 tablets (12.5 mg) by mouth 2 times daily   mycophenolate (GENERIC EQUIVALENT) 500 MG tablet 3/19/2021 at am  Yes Yes   Sig: Take 1,000 mg by mouth every morning   mycophenolate (GENERIC EQUIVALENT) 500 MG tablet 3/18/2021 at hs  Yes Yes   Sig: Take 500 mg by mouth At  Bedtime    potassium chloride ER (KLOR-CON M) 10 MEQ CR tablet 3/19/2021 at pm  No Yes   Sig: Take 2 tablets (20 mEq) by mouth 2 times daily   predniSONE (DELTASONE) 50 MG tablet 3/19/2021 at am-50mg  No Yes   Sig: Take 1 tablet (50 mg) by mouth daily Take 50 mg until 3/19.  Then decrease by 10 mg every 7 days starting on 3/20      Facility-Administered Medications: None          Allergy:     Allergies   Allergen Reactions     Ciprofloxacin Other (See Comments)     Contraindicated for myasthenia gravis patients     Procainamide Other (See Comments)     Contraindicated for myasthenia gravis patients     Quinidine Other (See Comments)     Contraindicated for myasthenia gravis patients     Quinine Other (See Comments)     Contraindicated for myasthenia gravis patients          Inpatient Medications:   Scheduled Meds:    acetaminophen  1,000 mg Oral TID     furosemide  20 mg Oral Daily     [Held by provider] heparin ANTICOAGULANT  5,000 Units Subcutaneous Q8H     [Held by provider] metoprolol tartrate  12.5 mg Oral BID     mycophenolate  1,000 mg Oral BID     predniSONE  60 mg Oral or Feeding Tube Daily     pyridostigmine  60 mg Oral TID     sulfamethoxazole-trimethoprim  1 tablet Oral or Feeding Tube Daily     cholecalciferol  100 mcg Oral or Feeding Tube Daily     PRN Meds: sodium chloride 0.9%, albuterol, bisacodyl, calcium gluconate, artificial tears ophthalmic solution, dextrose, glucose **OR** dextrose **OR** glucagon, diphenhydrAMINE, EPINEPHrine, heparin Lock (1000 units/mL High concentration), labetalol, miconazole, - MEDICATION INSTRUCTIONS -, ondansetron **OR** ondansetron, - MEDICATION INSTRUCTIONS -, phenol, prochlorperazine **OR** prochlorperazine **OR** prochlorperazine, senna-docusate **OR** senna-docusate        Review of Systems    The Review of Systems is negative other than noted in the HPI  A comprehensive review of  10 systems was performed and found to be negative except as described in this  "note  CONSTITUTIONAL: negative for fever, chills, change in weight  INTEGUMENTARY/SKIN: no rash or obvious new lesions  ENT/MOUTH: no sore throat, new sinus pain or nasal drainage, no neck mass noted  RESP: No pleuretic pain, No cough, no hemoptysis, No SOB   CV: negative for chest pain, palpitations or peripheral edema  GI: no nausea, vomiting, change in stools  : no dysuria or hematuria  MUSCULOSKELETAL: no myalgias, arthralgias or join efffusion  ENDOCRINE: no history of polyuria, polydyspsia or symptoms of thyroid dysfunction  PSYCHIATRIC: no change in mood stable  LYMPHATIC: no new lymphadenopathy  HEME: no bleeding or easy bruisability  NEURO: see HPI       Physical Exam:   Physical Exam   Vitals:  Height:5' 10\"  Weight:267 lbs 0 oz   Temp: 98.1  F (36.7  C) Temp src: Oral BP: 139/81 Pulse: 76   Resp: 18 SpO2: 95 % O2 Device: Nasal cannula Oxygen Delivery: 2 LPM  General Appearance:  No acute distress  Neuro:       Mental Status Exam:    He is alert and oriented x3       Cranial Nerves:   His visual fields are normal bilaterally, pupils are equal round reactive to light, extraocular movements are full without any nystagmus and no double vision.  No sensory loss of the face no weakness of the jaw no facial asymmetry no palatal weakness tongue is midline there is no weakness of the neck flexors sternomastoid and shoulder shrug are normal bilaterally           Motor:   There is no weakness proximally in the upper extremities distally there is slight weakness of the .  There is proximal and distal mild to moderate weakness in her legs.  Strength in the lower ext is about a 4/5.        Reflexes: Depressed all over       Sensory: No sensory deficits                   Coordination:   No incoordination of the upper extremities.  Lower extremity coordination could not be palpated because of the weakness       Gait: Currently bedbound          Data:   ROUTINE IP LABS   CBC RESULTS:     Recent Labs   Lab " 04/10/21  0809 04/09/21  0743 04/08/21  0723   WBC 6.8 6.8 4.8   RBC 4.12* 4.17* 3.81*   HGB 11.3* 11.2* 10.2*   HCT 38.6* 38.5* 35.2*    132* 118*     Basic Metabolic Panel:   Recent Labs   Lab Test 04/10/21  0809 04/09/21  0743 04/08/21  0723    139 140   POTASSIUM 3.6 3.9 4.1   CHLORIDE 98 99 101   CO2 44* 42* 42*   BUN 12 13 12   CR 0.54* 0.52* 0.54*   * 70 71   NOAH 8.7 8.8 8.3*     Liver panel:  Recent Labs   Lab Test 03/23/21  0419 03/22/21  0436 03/21/21  0451 03/20/21  0602 03/19/21  2217   PROTTOTAL 6.7* 6.1* 6.0* 5.8* 6.7*   ALBUMIN 2.1* 2.0* 2.2* 2.4* 2.8*   BILITOTAL 0.4 0.7 1.0 0.7 0.4   ALKPHOS 42 42 45 48 59   AST 22 15 17 21 18   ALT 30 22 23 27 33     INR:  Recent Labs   Lab Test 04/09/21  0743 04/07/21  0726   INR 1.10 1.20*      Lipid Profile:  Recent Labs   Lab Test 12/16/20  1455 07/05/19  1558 08/08/18  1245 06/14/17  1511 04/18/16  1518 11/22/13  1523   CHOL 141 174 175 184  --  196   HDL 70 57 53 63 57 61   LDL 60 105* 104* 106* 116* 117   TRIG 55 59 90 76  --  88   CHOLHDLRATIO  --   --   --   --   --  3.2     Thyroid Panel:  Recent Labs   Lab Test 04/18/16  1518   TSH 1.42      Vitamin B12: No lab results found.   Vitamin D level:   Recent Labs   Lab Test 08/08/18  1245 09/10/14  1545   VITDT 101* 88*     A1C: No lab results found.  Troponin I:   Recent Labs   Lab Test 03/29/21  1700 03/29/21  1247 03/29/21  0550 03/19/21  2217 03/04/21  1629 03/04/21  1235 03/04/21  0320   TROPI 0.022 0.023 0.028 0.060* <0.015 <0.015 <0.015     CRP inflammation:   Recent Labs   Lab Test 03/10/15  1416 01/05/15  1459 12/29/14  1255 09/10/14  1545 08/05/13  1410   CRP 43.0* 66.0* 13.0* 13.8* <5.0     ESR:   Recent Labs   Lab Test 03/10/15  1416 12/29/14  1255 09/10/14  1545   SED 20 15 13       ANIL:   Recent Labs   Lab Test 08/05/13  1416   MK <1.0 Interpretation:  Negative       ANCA:   Recent Labs   Lab Test 03/10/15  1416   ANCA <1:20  Reference range: <1:20  (Note)  The ANCA IFA is  <1:20; therefore, no further testing will  be performed.  INTERPRETIVE INFORMATION: Anti-Neutrophil Cyto Ab, IgG  Neutrophil Cytoplasmic Antibodies (C-ANCA = granular  cytoplasmic staining, P-ANCA = perinuclear staining) are  found in the serum of over 90 percent of patients with  certain necrotizing systemic vasculitides, and usually in  less than 5 percent of patients with collagen vascular  disease or arthritis.  Performed by [facility],  [address] [place] [id number] [phone]  [email or web address], Art Strickland MD, Lab. Director           IMAGING:   No Neuroimaging done.

## 2021-04-10 NOTE — PROGRESS NOTES
"PULMONOLOGY PROGRESS NOTE    Date of Admission: 3/19/2021    CC/Reason for Hospital visit: respiratory failure, myasthenia gravis  SUBJECTIVE      Chart reviewed.  Pulmonary consultation done earlier this hospitalization by Dr. Coronel on 3/25/2021, last seen in Pulmonary follow-up 3/27/2021.  Hospital course complicated by recurrent respiratory failure despite BiPAP treatment, for which the patient required intubation and mechanical ventilatory support.  He was extubated to AVAPS BiPAP on 4/3 and transferred out of the ICU on 4/5.  He has completed plasmapheresis for treatment of his myasthenia gravis.  Discharge planning is now in process, notes indicate discharge to TCU in the next few days; we are now asked to see the patient again regarding Trilogy NIV as an outpatient.    ROS: A Problem Pertinent review of systems was negative except for items noted in HPI.  Past Medical, Family, and Social/Substance History has been reviewed: No interval changes.    OBJECTIVE   Vital signs:  Temp: 97.8  F (36.6  C) Temp src: Oral BP: 124/73 Pulse: 106   Resp: 18 SpO2: 94 % O2 Device: Nasal cannula Oxygen Delivery: 2 LPM Height: 177.8 cm (5' 10\") Weight: 121.1 kg (267 lb)  Estimated body mass index is 38.31 kg/m  as calculated from the following:    Height as of this encounter: 1.778 m (5' 10\").    Weight as of this encounter: 121.1 kg (267 lb).        I/O last 3 completed shifts:  In: 470 [P.O.:270; I.V.:20]  Out: 2400 [Urine:2400]      CONSTITUTIONAL/GENERAL APPEARANCE: Alert male. No Apparent Distress.  PSYCHIATRIC: Pleasant and appropriate mood and affect. Oriented x 3.  EARS, NOSE,THROAT,MOUTH: External ears and nose overall normal. Normal oral mucosa.   NECK: Neck appearance normal. No neck masses and the thyroid is not enlarged.   RESPIRATORY: Non-labored effort. Decreased BS anteriorly.  CARDIOVASCULAR: S1, S2, regular rate and rhythm.      LABORATORY ASSESSMENT    Arterial Blood Gas  Recent Labs   Lab 04/10/21  0809 "   O2PER 2.5L     CBC  Recent Labs   Lab 04/10/21  0809 04/09/21  0743 04/08/21  0723 04/07/21  0726   WBC 6.8 6.8 4.8 4.6   RBC 4.12* 4.17* 3.81* 4.03*   HGB 11.3* 11.2* 10.2* 10.9*   HCT 38.6* 38.5* 35.2* 37.5*   MCV 94 92 92 93   MCH 27.4 26.9 26.8 27.0   MCHC 29.3* 29.1* 29.0* 29.1*   RDW 21.2* 20.9* 20.4* 20.3*    132* 118* 129*     BMP  Recent Labs   Lab 04/10/21  0809 04/09/21  0743 04/08/21  0723 04/07/21  0726    139 140 139   POTASSIUM 3.6 3.9 4.1 3.9   CHLORIDE 98 99 101 100   NOAH 8.7 8.8 8.3* 8.8   CO2 44* 42* 42* 41*   BUN 12 13 12 10   CR 0.54* 0.52* 0.54* 0.50*   * 70 71 70     INR  Recent Labs   Lab 04/09/21  0743 04/07/21  0726   INR 1.10 1.20*      BNPNo lab results found in last 7 days.  VENOUS BLOOD GASES  Recent Labs   Lab 04/10/21  0809   PHV 7.37   PCO2V 82*   PO2V 24*   HCO3V 47*   STARR 18.0       Additional labs and/or comments:    IMAGING      CXR 3/29 -  IMPRESSION: Endotracheal tube 3 cm above the gabino. Enteric tube with tip in the fundus of the stomach. Heart size and pulmonary vascularity within normal limits. Mild patchy right mid and lower lung infiltrate. Slight linear scarring or atelectasis   left lung base. Aortic calcification. No significant bony abnormalities.      PFT & OTHER TESTING       ASSESSMENT / PLAN      Pulmonary Diagnoses:  CHF I50.9  Hypoxemia R09.02  Obesity E66.9  Resp fail chronic J96.10  SOB R06.02  Myasthenia gravis    Additional COVID-19 diagnoses:  Concern of possible exposure to COVID-19, Now RULED OUT Z03.818    ASSESSMENT: 80 year old male admitted 3/19/2021 with significant history for myasthenia gravis, HFpEF, COPD, HTN, MDD, HAZEL, prostate cancer who was initially admitted on 3/19/2021 from TCU for acute hypercapnic respiratory failure requiring emergent intubation on admission. He was recently admitted and discharged (3/4-3/15) for acute hypoxic and hypercapnic respiratory failure requiring intubation (3/6/21-3/11/21) 2/2 myasthenia  gravis exacerbation, diastolic CHF exacerbation. He was extubated on 3/22 but subsequently developed worsening encephalopathy and was found to have worsening respiratory acidosis despite BiPAP treatment and was transferred back to ICU on 3/29. Intubated due to persistent hypercarbia. Palliative care saw the patient on 3/30, patient decided to pursue plasmapheresis.  He was able to be extubated to AVAPS BIPAP on 4/3. Completed 5 out of 5 sessions of plasmapheresis on 4/9/2021.  Patient has been on BiPap at night; given improvement with plasmapheresis, tried CPAP on 4/9, VBG in AM shows significantly elevated PCO2.  Patient clearly requires ventilatory support, recommend changing back to BiPAP for tonight with a repeat VBG in the morning.    PLAN:  1. Adjust oxygen, keep SaO2 > 90%  2. BiPAP 12/6 tonight.  3. VBG in AM while still on BiPAP.  4. Myasthenia rx per Neuro.      Maurice Srinivasan M.D.    [facility]  [phone]   (pager)  [phone]   (office)

## 2021-04-11 NOTE — PROGRESS NOTES
[facility]    Medicine Progress Note - Hospitalist Service       Date of Admission:  3/19/2021  Assessment & Plan       Mr. Goodrich is a 80 year old male admitted on 3/19/2021 with significant history for myasthenia gravis, HFpEF, COPD, HTN, MDD, HAZEL, prostate cancer who was initially admitted on 3/19/2021 from TCU for acute hypercapnic respiratory failure requiring emergent intubation on admission. He was recently admitted and discharged (3/4-3/15) for acute hypoxic and hypercapnic respiratory failure requiring intubation (3/6/21-3/11/21) 2/2 myasthenia gravis exacerbation, diastolic CHF exacerbation. Previously, pt received IVIG and steroid treatment and was followed by [facility]. He was extubated on 3/22 and transferred to hospitalist service 3/23. Subsequently experienced worsening encephalopathy and was found to have worsening respiratory acidosis despite BiPAP treatment and was transferred back to ICU on 3/29. Intubated due to persistent hypercarbia. Palliative care saw the patient on 3/30, patient decided to pursue HD catheter (IR), begin plasmapheresis (nephrology), use BiPAP after extubation, and re-intubate if needed. HD catheter placed 3/31 and plasmapheresis started. Course complicated by hematuria due to alberto trauma. He was able to be extubated to AVAPS BIPAP on 4/3. Pt remains stable, transfer of care to hospitalist service 4/5/21. Completed 5 out of 5 sessions of plasmapheresis on 4/9/2021.    Recurrent myasthenic crisis  Acute metabolic encephalopathy, resolved  H/o myasthenia gravis, diagnosed in 2005. Had been stable on mycophenolate and had not needed steroids for about 10 years until recent hospitalization 3/2021. Recent hospitalization (3/4-3/15) with myasthenia gravis flare with associated acute respiratory failure (intubated 3/6-3/11). Treated with IVIG and steroids. Discharged to TCU. Steroids were slowly being tapered after discharge.  Presented from TCU 3/19 with weakness  and found to be in acute hypercarbic respiratory failure. Intubated in ED and admitted to ICU. Was also found with evidence of UTI and pneumonia which were treated (see below). Neurology consulted. Treated with IVIG 3/20-3/22. Predisone increased. Improved and able to be extubated 3/22. Started on pyridostigmine 3/26. Pulmonology consulted 3/25 to assist with obtaining nocturnal NIPPV. On 3/27, still weak overall and not at baseline.  Subsequently experienced worsening encephalopathy and was found to have worsening respiratory acidosis despite BiPAP treatment and was transferred back to ICU on 3/29. Intubated due to persistent hypercarbia. Extubated back to Bipap on 4/3 and now on Bipap as needed and at bedtime.    Neurology following, appreciate their assistance.    Completed 5 out of 5 plasmapheresis on 4/9/2021, appreciate nephrology as    Continue mycophenolate 1000 mg qam and 500 mg at bedtime; prednisone 60 mg daily (no taper until outpatient follow-up); and pyridostigmine 60 mg q8h; per Neurology recommendations.    Continue Bactrim DS 3x/week for PCP prophylaxis while on high dose steroids, immunosuppression.    Recurrent acute hypoxic and hypercarbic respiratory failure, improved  Mild metabolic alkalosis, likely secondary to a combination of diuresis and probable chronic hypercarbic respiratory failure,  Suspect multifactorial including myasthenia gravis flare, MSSA pneumonia and acute diastolic CHF exacerbation.    Continue BID pulmonary mechanics with NIF and VC.     Continue IS q2h while awake.    Patient has been on BiPap at night. Given improvement with plasmapheresis, tried CPAP on 4/9, VBG next AM showed significantly elevated PCO2.    Pulmonology following, appreciate their assistance.    Placed on overnight BiPAP again on 4/10-4/11. VBG still with elevated PCO2.    Acute normocytic anemia  Thrombocytopenia  Suspect from multiple phlebotomies and also chronic component. Thrombocytopenia stable  >100s. Hgb 13.1 on admit 3/19. No overt clinical signs of major bleeding.     Labs stable/improved on 4/10/21.    Hypophosphatemia, hypokalemia.    Continue PRN electrolyte replacement.    Pressure ulcer, right posterior thigh  WOC RN consulted.    Continue local wound cares.    Goals of care  Palliative care consulted, last saw pt on 4/2, following peripherally at this time.    Goals remain restorative at this time.    Appreciate consultant assistance.       MSSA pneumonia, suspect HCAP, resolved.  Recent hospitalization as above. Initial presentation from TCU 3/19 as above. On initial evaluation 3/19, was afebrile and hemodynamically stable; WBC and lactate normal, VBG showed pCO2 128. CXR 3/19 showed bibasilar atelectasis or infiltrate with small pleural effusions. Initially started on broad antibiotics including Zosyn on admit. Sputum 3/20 grew MSSA. Antibiotics narrowed to Unasyn 3/25.  Completed Unasyn/amoxicillin therapy.    Possible acute on chronic decompensated diastolic CHF exacerbation, resolved  Hypertension (benign essential).  [PTA: furosemide 20 mg BID; metoprolol 12.5 mg BID.]  Echocardiogram 3/4/2021 showed LVEF of 65-70%, no regional wall motion abnormalities; mildly decreased RV systolic function; mild to moderate mitral regurgitation, mild to moderate mitral stenosis. The patient reported a recent 20-pound weight gain in the last 1 month PTA following COVID vaccine. Treated with high dose steroids for myasthenia gravis as above.   Initial presentation 3/19 as above. NTP-BNP 3996 3/19. Was given IV furosemide this hospitalization. He had metabolic alkalosis that was treated with acetazolamide, stopped 3/21. Repeat echo 3/23 showed LVEF 55-60%, grade 1 diastolic dysfunction, no regional wall motion abnormalities; RV OK; trace MR, mild MS.    Restarted furosemide 20mg daily on 4/8/21.    Restart PTA metoprolol 12.5 mg BID (was on hold due to low blood pressures, which have resolved).    Monitor  i/o's, daily weights.    Complicated catheter associated E. Coli UTI, treated  Chronic indwelling alberto due to difficulty voiding, large pannus (placed previous hospitalization)  Recent hematuria 2/2 alberto trauma, resolved  History of prostate cancer s/p cryotherapy  Alberto placed during previous hospitalization because of difficulty voiding. UA 3/19 grossly abnormal. Previously obtained UC from 3/18 showed E. Coli. Was treated with antibiotics including ceftriaxone and other antibiotics as above.  UTI felt to be adequately treated.    Chronic alberto (16 F Coude), replace every 2-3 weeks, leave in at discharge.    Outpatient urology follow up needed with Dr. Charles in 2-4 weeks from 4/3 with PSA.    Hand alberto irrigation of hematuria recurs (recently required CBI).    Nonsevere malnutrition  Level of malnutrition: Non-Severe  Based on: reduced intake, mild (or greater) subcutaneous fat loss, mild (or greater) muscle loss     Speech therapy consulted after extubation, currently tolerating dysphagia diet; had received TF while intubated.    Physical deconditioning 2/2 critical illness.    PT/OT consulted.    COVID-19 PCR testing 3/4, 3/11, 3/19, 4/6 - negative.     Trop elevation, suspect demand ischemia (type 2 NSTEMI)    Morbid obesity    Body mass index is 40.58 kg/m .    Increases all cause mortality.    Follow-up with PCP.     Diet: Combination Diet Regular Diet Adult; Thin Liquids (water, ice chips, juice, milk, gelatin, ice cream, etc)    DVT Prophylaxis: Pneumatic Compression Devices  Alberto Catheter: in place, indication: Gross Hematuria, Strict 1-2 Hour I&O  Code Status: Full Code           Disposition Plan   Expected discharge: likely discharge to TCU in the next few days pending bed availability and consultant recommendations  Entered: Darius Hopson MD 04/11/2021, 9:43 AM       The patient's care was discussed with the Bedside Nurse and Patient.    Darius Hopson MD  Hospitalist [facility]  [facility]  Contact information available via [facility] Paging/Directory    ______________________________________________________________________    Interval History   Thanh Goodrich feels OK this morning. Slept well last night. Denies fevers, chest pain, shortness of breath, nausea, abdominal pain. Had questions about discharge plans and BiPAP.    Data reviewed today: I reviewed all medications, new labs and imaging results over the last 24 hours. I personally reviewed no images or EKG's today.    Physical Exam   Vital Signs: Temp: 97.8  F (36.6  C) Temp src: Oral BP: 137/68 Pulse: 90   Resp: 16 SpO2: 97 % O2 Device: None (Room air) Oxygen Delivery: 2 LPM  Weight: 267 lbs 0 oz  Constitutional: awake, alert, cooperative, no apparent distress, laying in bed  Respiratory: clear to auscultation bilaterally, no crackles or wheezing  Cardiovascular: regular rate and rhythm, normal S1 and S2, no murmur noted  GI: normal bowel sounds, soft, non-distended, non-tender  Skin: warm, dry  Musculoskeletal: 1+ lower extremity pitting edema present  Neurologic: awake, alert, oriented to name, place and time    Data   Recent Labs   Lab 04/11/21  0738 04/10/21  0809 04/09/21  0743 04/07/21  0726 04/07/21  0726   WBC 6.1 6.8 6.8   < > 4.6   HGB 11.2* 11.3* 11.2*   < > 10.9*   MCV 94 94 92   < > 93    154 132*   < > 129*   INR  --   --  1.10  --  1.20*    140 139   < > 139   POTASSIUM 3.8 3.6 3.9   < > 3.9   CHLORIDE 97 98 99   < > 100   CO2 44* 44* 42*   < > 41*   BUN 16 12 13   < > 10   CR 0.52* 0.54* 0.52*   < > 0.50*   ANIONGAP <1* <1* <1*   < > <1*   NOAH 8.5 8.7 8.8   < > 8.8   GLC 72 100* 70   < > 70    < > = values in this interval not displayed.     Medications     dextrose       - MEDICATION INSTRUCTIONS -       - MEDICATION INSTRUCTIONS -         acetaminophen  1,000 mg Oral TID     furosemide  20 mg Oral Daily     [Held by provider] heparin ANTICOAGULANT  5,000 Units Subcutaneous Q8H     [Held by  provider] metoprolol tartrate  12.5 mg Oral BID     mycophenolate  1,000 mg Oral BID     predniSONE  60 mg Oral or Feeding Tube Daily     pyridostigmine  60 mg Oral TID     sulfamethoxazole-trimethoprim  1 tablet Oral or Feeding Tube Daily     cholecalciferol  100 mcg Oral or Feeding Tube Daily

## 2021-04-11 NOTE — PROVIDER NOTIFICATION
IV team paged: [phone] Thanh Goodrich. Patient has central line which was heparin locked on the 9th. Anything we should be doing with it? Thanks! Tito BURR [phone]

## 2021-04-11 NOTE — CONSULTS
[facility] [place]  [facility]    Neurology Consultation    Thanh Goodrich MRN# [id number]   YOB: 1940 Age: 80 year old    Code Status:Full Code   Date of Admission: 3/19/2021  Date of follow up:04/11/2021                                                                                       Assessment and Plan:                                         #.  Myasthenia gravis    He has had long-term myasthenia gravis and had been in myasthenic crisis at this admission.  Currently he doing much better than admission. His 5th and final PLEX was on 4/9/21. Seems to have remained stable but his Venous gases are showing high PCO2 for two days in a row, after being on CPAP overnight. Pulmonary service has evaluated him and appreciate their input and plan of care with changing his CPAPto BiPAP today and repeating VBGs tomorrow.    CellCept has been increased to 1000 mg bid, prednisone 60 mg a day and pyridostigmine 60 mg 3 times a day have been continued as he responded well to them.      Seems to be doing better neurologically except for his overnight gases.      Plan to continue current medical management and discharge to a care facility when available    Vy Hernandez MD  Neurologist  [facility] Neurology  Office [phone]        ----------------------------------------------------------------------------------  ----------------------------------------------------------------------------------  Reason for consult: I was asked by Dr. Zavala to evaluate this patient for Myasthenia Gravis. So far he has been followed by Neurocritical team while he was intubate in the ICU.        Chief Complaint:   Exacerbation of Myasthenia Gravis  History is obtained from the patient / chart       History of Present Illness:   This patient is a 80 year old male who presents with exacerbation of his myasthenia gravis presenting with hypercapnia and respiratory failure.  He had  been in the ICU intubated and today was transferred to the floor.    In the recent past he has had similar presentation and had received IVIG for 5 days and was discharged on 60 mg/day of steroids with the plan to taper off the milligrams a week.  While he was going to the taper he started having difficulty with breathing, feeling fatigued out having low-volume voice and became more somnolent.  On arrival he was found to be hypercarbic and was intubated.  Has had 5 PLEX treatments, the last one being on 4/9/2021 and he has done well and has improved.      His myasthenic symptoms have mostly been shortness of breath, double vision and low volume of his voice.  He has never developed dysphagia or ptosis. He says that his symptoms are much better today.     He has been feeling well. No dysphagia and volume of voice is good with minimal diplopia in the lateral gazes.     Is being tried on CPAP and his VBGs on 4/10/21 show a high PCO2 82, PO2 24, Bicarb 47. On 4/11/21 the PCO2 is elevated at 85. PO2 24 and bicarb 47.     Currently as an outpatient he has been followed by  with [facility].         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     Cellulitis of left leg 9/23/2012     Depressive disorder 2020     Edema 7/7/2009     History of steroid therapy 2/22/2010     Hyperlipidemia LDL goal <130 10/31/2010     HYPERTENSION 7/8/2003     Incontinence of urine 10/25/2010     Leg ulcer (H) 9/20/2012     MALIGN NEOPL PROSTATE-3/07 4/2/2007    T1C, Shaina 8, initial PSA 39, s/p radiation seed implants 2007      Myasthenia gravis (H)      OBESITY 7/8/2003     Osteoporosis 8/18/2009    Refused bisphosphonates 2011      PVC's (premature ventricular contractions) 11/23/2011     RIGHT OCCIPITAL PAIN 7/8/2003     ROTATOR CUFF SYND NOS- RT 9/19/2005     Skin nodule 3/18/2013     ulcer left shin 10/8/2007     Uncomplicated asthma 1944?     childhood only         Past Surgical History:     Past Surgical History:   Procedure Laterality Date     BIOPSY  2013?    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 [place]     Employer: [address] [place] [address]   Social Needs     Financial resource strain: Not on file     Food insecurity     Worry: Not on file     Inability: Not on file     Transportation needs     Medical: Not on file     Non-medical: Not on file   Tobacco Use     Smoking status: Never Smoker     Smokeless tobacco: Never Used   Substance and Sexual Activity     Alcohol use: Yes     Alcohol/week: 0.0 standard drinks     Comment: very rarely     Drug use: No     Sexual activity: Not Currently     Partners: Female   Lifestyle     Physical activity     Days per week: Not on file     Minutes per session: Not on file     Stress: Not on file   Relationships     Social connections     Talks on phone: Not on file     Gets together: Not on file     Attends Hoahaoism service: Not on file     Active member of club or organization: Not on file     Attends meetings of clubs or organizations: Not on file     Relationship status: Not on file     Intimate partner violence     Fear of current or ex partner: Not on file     Emotionally abused: Not on file     Physically abused: Not on file     Forced sexual activity: Not on file   Other Topics Concern     Parent/sibling w/ CABG, MI or angioplasty before 65F 55M? No   Social History Narrative    The patient has a 0 pk yr tobacco hx.  He has no active use.  Alcohol use is rare alcoholic drinks per week.  He denies use of recreational drugs.          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        The patient is single.  Has 0 children.        Hot Tub Exposure: NO    Recent Travel: NO     Hx of incarceration:  NO    Bird Exposure:   NO    Animal Exposure:  3 Cats    Inhalation Exposure:  + asbestos exposure in past             Patient denies smoking, no significant alcohol intake, denies illicit drugs use       Family History:     Family History   Problem Relation Age of Onset     Hypertension Mother      Depression Mother      Substance Abuse Mother         alcohol     Hypertension Father      Cancer Father         Prostate     Prostate Cancer Father      Substance Abuse Father         alcohol     Obesity Father      Diabetes Maternal Grandmother      C.A.D. Maternal Grandmother      Cerebrovascular Disease Maternal Grandfather      Cancer Paternal Uncle         Prostate     Prostate Cancer Other      Reviewed and not felt to be contributory.        Home Medications:     Prior to Admission Medications   Prescriptions Last Dose Informant Patient Reported? Taking?   Cholecalciferol 100 MCG (4000 UT) CAPS 3/19/2021 at am  Yes Yes   Sig: Take 4,000 Units by mouth daily   Multiple Vitamins-Minerals (MULTIVITAMIN ADULTS 50+) TABS 3/19/2021 at am  Yes Yes   Sig: Take 1 tablet by mouth daily    acetaminophen (TYLENOL) 325 MG tablet 3/19/2021 at 0125  Yes Yes   Sig: Take 650 mg by mouth every 4 hours as needed for mild pain   calcium carbonate 600 mg-vitamin D 400 units (CALTRATE) 600-400 MG-UNIT per tablet 3/19/2021 at am  Yes Yes   Sig: Take 1 tablet by mouth daily   furosemide (LASIX) 20 MG tablet 3/19/2021 at 1200  No Yes   Sig: Take 1 tablet (20 mg) by mouth 2 times daily   metoprolol tartrate (LOPRESSOR) 25 MG tablet 3/19/2021 at am  No Yes   Sig: Take 0.5 tablets (12.5 mg) by mouth 2 times daily   mycophenolate (GENERIC EQUIVALENT) 500 MG tablet 3/19/2021 at am  Yes Yes   Sig: Take 1,000 mg by mouth every morning   mycophenolate (GENERIC EQUIVALENT)  500 MG tablet 3/18/2021 at hs  Yes Yes   Sig: Take 500 mg by mouth At Bedtime    potassium chloride ER (KLOR-CON M) 10 MEQ CR tablet 3/19/2021 at pm  No Yes   Sig: Take 2 tablets (20 mEq) by mouth 2 times daily   predniSONE (DELTASONE) 50 MG tablet 3/19/2021 at am-50mg  No Yes   Sig: Take 1 tablet (50 mg) by mouth daily Take 50 mg until 3/19.  Then decrease by 10 mg every 7 days starting on 3/20      Facility-Administered Medications: None          Allergy:     Allergies   Allergen Reactions     Ciprofloxacin Other (See Comments)     Contraindicated for myasthenia gravis patients     Procainamide Other (See Comments)     Contraindicated for myasthenia gravis patients     Quinidine Other (See Comments)     Contraindicated for myasthenia gravis patients     Quinine Other (See Comments)     Contraindicated for myasthenia gravis patients          Inpatient Medications:   Scheduled Meds:    acetaminophen  1,000 mg Oral TID     furosemide  20 mg Oral Daily     [Held by provider] heparin ANTICOAGULANT  5,000 Units Subcutaneous Q8H     metoprolol tartrate  12.5 mg Oral BID     mycophenolate  1,000 mg Oral BID     predniSONE  60 mg Oral or Feeding Tube Daily     pyridostigmine  60 mg Oral TID     sulfamethoxazole-trimethoprim  1 tablet Oral or Feeding Tube Daily     cholecalciferol  100 mcg Oral or Feeding Tube Daily     PRN Meds: sodium chloride 0.9%, albuterol, bisacodyl, calcium gluconate, artificial tears ophthalmic solution, dextrose, glucose **OR** dextrose **OR** glucagon, diphenhydrAMINE, EPINEPHrine, heparin Lock (1000 units/mL High concentration), labetalol, miconazole, - MEDICATION INSTRUCTIONS -, ondansetron **OR** ondansetron, - MEDICATION INSTRUCTIONS -, phenol, prochlorperazine **OR** prochlorperazine **OR** prochlorperazine, senna-docusate **OR** senna-docusate        Review of Systems    The Review of Systems is negative other than noted in the HPI  A comprehensive review of  10 systems was performed and  "found to be negative except as described in this note  CONSTITUTIONAL: negative for fever, chills, change in weight  INTEGUMENTARY/SKIN: no rash or obvious new lesions  ENT/MOUTH: no sore throat, new sinus pain or nasal drainage, no neck mass noted  RESP: No pleuretic pain, No cough, no hemoptysis, No SOB   CV: negative for chest pain, palpitations or peripheral edema  GI: no nausea, vomiting, change in stools  : no dysuria or hematuria  MUSCULOSKELETAL: no myalgias, arthralgias or join efffusion  ENDOCRINE: no history of polyuria, polydyspsia or symptoms of thyroid dysfunction  PSYCHIATRIC: no change in mood stable  LYMPHATIC: no new lymphadenopathy  HEME: no bleeding or easy bruisability  NEURO: see HPI       Physical Exam:   Physical Exam   Vitals:  Height:5' 10\"  Weight:267 lbs 0 oz   Temp: 98.7  F (37.1  C) Temp src: Oral BP: 121/63 Pulse: 95   Resp: 16 SpO2: 96 % O2 Device: Nasal cannula Oxygen Delivery: 2 LPM  General Appearance:  No acute distress  Neuro:       Mental Status Exam:    He is alert and oriented x3       Cranial Nerves:   His visual fields are normal bilaterally, pupils are equal round reactive to light, extraocular movements are full without any nystagmus and no double vision.  No sensory loss of the face no weakness of the jaw no facial asymmetry no palatal weakness tongue is midline there is no weakness of the neck flexors sternomastoid and shoulder shrug are normal bilaterally           Motor:   There is no weakness proximally in the upper extremities distally there is slight weakness of the .  There is proximal and distal mild to moderate weakness in her legs.  Strength in the lower ext is about a 4/5.        Reflexes: Depressed all over       Sensory: No sensory deficits                   Coordination:   No incoordination of the upper extremities.  Lower extremity coordination could not be palpated because of the weakness       Gait: Currently bedbound          Data:   ROUTINE IP " LABS   CBC RESULTS:     Recent Labs   Lab 04/11/21  0738 04/10/21  0809 04/09/21  0743   WBC 6.1 6.8 6.8   RBC 4.17* 4.12* 4.17*   HGB 11.2* 11.3* 11.2*   HCT 39.0* 38.6* 38.5*    154 132*     Basic Metabolic Panel:   Recent Labs   Lab Test 04/11/21  0738 04/10/21  0809 04/09/21  0743    140 139   POTASSIUM 3.8 3.6 3.9   CHLORIDE 97 98 99   CO2 44* 44* 42*   BUN 16 12 13   CR 0.52* 0.54* 0.52*   GLC 72 100* 70   NOAH 8.5 8.7 8.8     Liver panel:  Recent Labs   Lab Test 03/23/21  0419 03/22/21  0436 03/21/21  0451 03/20/21  0602 03/19/21  2217   PROTTOTAL 6.7* 6.1* 6.0* 5.8* 6.7*   ALBUMIN 2.1* 2.0* 2.2* 2.4* 2.8*   BILITOTAL 0.4 0.7 1.0 0.7 0.4   ALKPHOS 42 42 45 48 59   AST 22 15 17 21 18   ALT 30 22 23 27 33     INR:  Recent Labs   Lab Test 04/09/21  0743 04/07/21  0726   INR 1.10 1.20*      Lipid Profile:  Recent Labs   Lab Test 12/16/20  1455 07/05/19  1558 08/08/18  1245 06/14/17  1511 04/18/16  1518 11/22/13  1523   CHOL 141 174 175 184  --  196   HDL 70 57 53 63 57 61   LDL 60 105* 104* 106* 116* 117   TRIG 55 59 90 76  --  88   CHOLHDLRATIO  --   --   --   --   --  3.2     Thyroid Panel:  Recent Labs   Lab Test 04/18/16  1518   TSH 1.42      Vitamin B12: No lab results found.   Vitamin D level:   Recent Labs   Lab Test 08/08/18  1245 09/10/14  1545   VITDT 101* 88*     A1C: No lab results found.  Troponin I:   Recent Labs   Lab Test 03/29/21  1700 03/29/21  1247 03/29/21  0550 03/19/21  2217 03/04/21  1629 03/04/21  1235 03/04/21  0320   TROPI 0.022 0.023 0.028 0.060* <0.015 <0.015 <0.015     CRP inflammation:   Recent Labs   Lab Test 03/10/15  1416 01/05/15  1459 12/29/14  1255 09/10/14  1545 08/05/13  1410   CRP 43.0* 66.0* 13.0* 13.8* <5.0     ESR:   Recent Labs   Lab Test 03/10/15  1416 12/29/14  1255 09/10/14  1545   SED 20 15 13       ANIL:   Recent Labs   Lab Test 08/05/13  1416   MK <1.0 Interpretation:  Negative       ANCA:   Recent Labs   Lab Test 03/10/15  1416   ANCA <1:20  Reference  range: <1:20  (Note)  The ANCA IFA is <1:20; therefore, no further testing will  be performed.  INTERPRETIVE INFORMATION: Anti-Neutrophil Cyto Ab, IgG  Neutrophil Cytoplasmic Antibodies (C-ANCA = granular  cytoplasmic staining, P-ANCA = perinuclear staining) are  found in the serum of over 90 percent of patients with  certain necrotizing systemic vasculitides, and usually in  less than 5 percent of patients with collagen vascular  disease or arthritis.  Performed by [facility],  [address] [place] [id number] [phone]  [email or web address], Art Strickland MD, Lab. Director           IMAGING:   No Neuroimaging done.

## 2021-04-11 NOTE — PLAN OF CARE
"/68 (BP Location: Left arm)   Pulse 90   Temp 97.8  F (36.6  C) (Oral)   Resp 16   Ht 1.778 m (5' 10\")   Wt 121.1 kg (267 lb)   SpO2 97%   BMI 38.31 kg/m      Nursing shift note  Summary: Mysethenia Gravis exacerbation  Alertness: Alert x4  Neuro: Intact. Slight numbness in left foot. Double vision in peripheries. baseline  Cardiac: tele: sinus rhythm BBB  Resp: On 2L o2  GI: WDL  : Beckett in place  CMS: WDL  Mobility: Lift  Pain: None  Diet: Regular  Skin: Didn't check coccyx  Other Important Info: Paged IV team about central line which has been heparin locked since the 9th    Behavior & Aggression Tool color:  -Green    Pt's belongings:     (Belongings from admission day present in pt's room)    Home medications:   -None  "

## 2021-04-11 NOTE — PROGRESS NOTES
"PULMONOLOGY PROGRESS NOTE    Date of Admission: 3/19/2021    CC/Reason for Hospital visit: respiratory failure, myasthenia gravis  SUBJECTIVE      Events of last night reviewed. On BiPAP overnight. Stable night. No new respiratory problems. States breathing is about the same today.    ROS: A Problem Pertinent review of systems was negative except for items noted in HPI.  Past Medical, Family, and Social/Substance History has been reviewed: No interval changes.    OBJECTIVE   Vital signs:  Temp: 98.7  F (37.1  C) Temp src: Oral BP: 121/63 Pulse: 95   Resp: 16 SpO2: 96 % O2 Device: Nasal cannula Oxygen Delivery: 2 LPM Height: 177.8 cm (5' 10\") Weight: 121.1 kg (267 lb)  Estimated body mass index is 38.31 kg/m  as calculated from the following:    Height as of this encounter: 1.778 m (5' 10\").    Weight as of this encounter: 121.1 kg (267 lb).        I/O last 3 completed shifts:  In: -   Out: 2400 [Urine:2400]      CONSTITUTIONAL/GENERAL APPEARANCE: Alert male. No Apparent Distress.  PSYCHIATRIC: Pleasant and appropriate mood and affect. Oriented x 3.  EARS, NOSE,THROAT,MOUTH: External ears and nose overall normal. Normal oral mucosa.   NECK: Neck appearance normal. No neck masses and the thyroid is not enlarged.   RESPIRATORY: Non-labored effort. Decreased BS anteriorly, no change.  CARDIOVASCULAR: S1, S2, regular rate and rhythm.      LABORATORY ASSESSMENT    Arterial Blood Gas  Recent Labs   Lab 04/11/21  0738 04/10/21  0809   O2PER 2.5L 2.5L     CBC  Recent Labs   Lab 04/11/21  0738 04/10/21  0809 04/09/21  0743 04/08/21  0723   WBC 6.1 6.8 6.8 4.8   RBC 4.17* 4.12* 4.17* 3.81*   HGB 11.2* 11.3* 11.2* 10.2*   HCT 39.0* 38.6* 38.5* 35.2*   MCV 94 94 92 92   MCH 26.9 27.4 26.9 26.8   MCHC 28.7* 29.3* 29.1* 29.0*   RDW 21.8* 21.2* 20.9* 20.4*    154 132* 118*     BMP  Recent Labs   Lab 04/11/21  0738 04/10/21  0809 04/09/21  0743 04/08/21  0723    140 139 140   POTASSIUM 3.8 3.6 3.9 4.1   CHLORIDE 97 98 " 99 101   NOAH 8.5 8.7 8.8 8.3*   CO2 44* 44* 42* 42*   BUN 16 12 13 12   CR 0.52* 0.54* 0.52* 0.54*   GLC 72 100* 70 71     INR  Recent Labs   Lab 04/09/21  0743 04/07/21  0726   INR 1.10 1.20*      BNPNo lab results found in last 7 days.  VENOUS BLOOD GASES  Recent Labs   Lab 04/11/21  0738 04/10/21  0809   PHV 7.35 7.37   PCO2V 85* 82*   PO2V 24* 24*   HCO3V 47* 47*   STARR 17.2 18.0       Additional labs and/or comments: VBG unchanged.    IMAGING      CXR 3/29 -  IMPRESSION: Endotracheal tube 3 cm above the gabino. Enteric tube with tip in the fundus of the stomach. Heart size and pulmonary vascularity within normal limits. Mild patchy right mid and lower lung infiltrate. Slight linear scarring or atelectasis   left lung base. Aortic calcification. No significant bony abnormalities.      PFT & OTHER TESTING       ASSESSMENT / PLAN      Pulmonary Diagnoses:  CHF I50.9  Hypoxemia R09.02  Obesity E66.9  Resp fail chronic J96.10  SOB R06.02  Myasthenia gravis    Additional COVID-19 diagnoses:  Concern of possible exposure to COVID-19, Now RULED OUT Z03.818    ASSESSMENT: 80 year old male admitted 3/19/2021 with significant history for myasthenia gravis, HFpEF, COPD, HTN, MDD, HAZEL, prostate cancer who was initially admitted on 3/19/2021 from TCU for acute hypercapnic respiratory failure requiring emergent intubation on admission. He was recently admitted and discharged (3/4-3/15) for acute hypoxic and hypercapnic respiratory failure requiring intubation (3/6/21-3/11/21) 2/2 myasthenia gravis exacerbation, diastolic CHF exacerbation. He was extubated on 3/22 but subsequently developed worsening encephalopathy and was found to have worsening respiratory acidosis despite BiPAP treatment and was transferred back to ICU on 3/29. Intubated due to persistent hypercarbia. Palliative care saw the patient on 3/30, patient decided to pursue plasmapheresis.  He was able to be extubated to AVAPS BIPAP on 4/3. Completed 5 out of 5  sessions of plasmapheresis on 4/9/2021.  Patient had been on BiPAP at night; given improvement with plasmapheresis, tried CPAP on 4/9, VBG in AM showed significantly elevated PCO2.  Repeat trial of BiPAP 4/10 on settings 12/6 showed no change in VBG.  Will try AVAPS setting tonight and see if VBG improved in the morning.    PLAN:  1. Adjust oxygen, keep SaO2 89-92%.  2. Change BiPAP to AVAPS with a target tidal volume of 450 mL and a backup rate of 12 breaths/min.  3. VBG in AM while still on AVAPS.  4. Myasthenia rx per Neuro.  5. D/C planning.    Dr. Christianson will be following the patient starting tomorrow.      Maurice Srinivasan M.D.    [facility]  [phone]   (pager)  [phone]   (office)

## 2021-04-11 NOTE — PLAN OF CARE
"Summary: MG exacerbation  Neuro: Double vision when looking sideways, generalized weakness but otherwise intact  Cardiac: SR with BBB  Resp: on bi-pap overnight  GI: Bowel sounds active no BM during shift  : Beckett with adequate output  CMS: intact  Mobility: Lift  Pain: Denies  Diet: Regular   Plan: TCU when able, needs pulmonology consult    Patient refuses repositioning intermittently    /72 (BP Location: Left arm)   Pulse 77   Temp 97.6  F (36.4  C) (Oral)   Resp 18   Ht 1.778 m (5' 10\")   Wt 121.1 kg (267 lb)   SpO2 99%   BMI 38.31 kg/m       "

## 2021-04-12 NOTE — PROGRESS NOTES
"PULMONOLOGY PROGRESS NOTE    Date of Admission: 3/19/2021    CC/Reason for Hospital visit: respiratory failure, myasthenia gravis  SUBJECTIVE      Events of last night reviewed. On BiPAP overnight. Stable night. No new respiratory problems. States breathing is about the same today.    ROS: A Problem Pertinent review of systems was negative except for items noted in HPI.  Past Medical, Family, and Social/Substance History has been reviewed: No interval changes.    OBJECTIVE   Vital signs:  Temp: 97.8  F (36.6  C) Temp src: Oral BP: 127/74 Pulse: 87   Resp: 18 SpO2: 95 % O2 Device: Nasal cannula Oxygen Delivery: 2 LPM Height: 177.8 cm (5' 10\") Weight: 121.1 kg (267 lb)  Estimated body mass index is 38.31 kg/m  as calculated from the following:    Height as of this encounter: 1.778 m (5' 10\").    Weight as of this encounter: 121.1 kg (267 lb).        I/O last 3 completed shifts:  In: -   Out: 2525 [Urine:2525]      CONSTITUTIONAL/GENERAL APPEARANCE: Alert male. No Apparent Distress.  PSYCHIATRIC: Pleasant and appropriate mood and affect. Oriented x 3.  EARS, NOSE,THROAT,MOUTH: External ears and nose overall normal. Normal oral mucosa.   NECK: Neck appearance normal. No neck masses and the thyroid is not enlarged.   RESPIRATORY: Non-labored effort. Decreased BS anteriorly, no change.  CARDIOVASCULAR: S1, S2, regular rate and rhythm.      LABORATORY ASSESSMENT    Arterial Blood Gas  Recent Labs   Lab 04/12/21  0600 04/11/21  0738 04/10/21  0809   O2PER 31% 2.5L 2.5L     CBC  Recent Labs   Lab 04/12/21  0600 04/11/21  0738 04/10/21  0809 04/09/21  0743   WBC 5.7 6.1 6.8 6.8   RBC 3.87* 4.17* 4.12* 4.17*   HGB 10.4* 11.2* 11.3* 11.2*   HCT 36.0* 39.0* 38.6* 38.5*   MCV 93 94 94 92   MCH 26.9 26.9 27.4 26.9   MCHC 28.9* 28.7* 29.3* 29.1*   RDW 21.4* 21.8* 21.2* 20.9*    162 154 132*     BMP  Recent Labs   Lab 04/12/21  0600 04/11/21  0738 04/10/21  0809 04/09/21  0743    138 140 139   POTASSIUM 4.2 3.8 3.6 3.9 "   CHLORIDE 96 97 98 99   NOAH 8.4* 8.5 8.7 8.8   CO2 45* 44* 44* 42*   BUN 17 16 12 13   CR 0.56* 0.52* 0.54* 0.52*   GLC 70 72 100* 70     INR  Recent Labs   Lab 04/09/21  0743 04/07/21  0726   INR 1.10 1.20*      BNPNo lab results found in last 7 days.  VENOUS BLOOD GASES  Recent Labs   Lab 04/12/21  0600 04/11/21  0738 04/10/21  0809   PHV 7.37 7.35 7.37   PCO2V 83* 85* 82*   PO2V 32 24* 24*   HCO3V 48* 47* 47*   STARR 18.8 17.2 18.0       Additional labs and/or comments: VBG unchanged.    IMAGING      CXR 3/29 -  IMPRESSION: Endotracheal tube 3 cm above the gabino. Enteric tube with tip in the fundus of the stomach. Heart size and pulmonary vascularity within normal limits. Mild patchy right mid and lower lung infiltrate. Slight linear scarring or atelectasis   left lung base. Aortic calcification. No significant bony abnormalities.      PFT & OTHER TESTING       ASSESSMENT / PLAN      Pulmonary Diagnoses:  CHF I50.9  Hypoxemia R09.02  Obesity E66.9  Resp fail chronic J96.10  SOB R06.02  Myasthenia gravis    Additional COVID-19 diagnoses:  Concern of possible exposure to COVID-19, Now RULED OUT Z03.818    ASSESSMENT: 80 year old male admitted 3/19/2021 with significant history for myasthenia gravis, HFpEF, COPD, HTN, MDD, HAZEL, prostate cancer who was initially admitted on 3/19/2021 from TCU for acute hypercapnic respiratory failure requiring emergent intubation on admission. He was recently admitted and discharged (3/4-3/15) for acute hypoxic and hypercapnic respiratory failure requiring intubation (3/6/21-3/11/21) 2/2 myasthenia gravis exacerbation, diastolic CHF exacerbation. He was extubated on 3/22 but subsequently developed worsening encephalopathy and was found to have worsening respiratory acidosis despite BiPAP treatment and was transferred back to ICU on 3/29. Intubated due to persistent hypercarbia. Palliative care saw the patient on 3/30, patient decided to pursue plasmapheresis.  He was able to be  extubated to AVAPS on 4/3. Completed 5 out of 5 sessions of plasmapheresis on 4/9/2021.  Patient had been on BiPAP at night; given improvement with plasmapheresis, tried CPAP on 4/9, VBG in AM showed significantly elevated PCO2.  4/12 pH normal with elevated pCO2 consisted with chronic hypercarbic resp failure. Do not expect that his pH will normalize and would aim for normal pH.      PLAN:  1. Adjust oxygen, keep SaO2 89-92%.  2. Continue AVAPS with a target tidal volume of 450 mL and a backup rate of 12 breaths/min. IPAP 12-20. EPAP min 8.  3. Will need AVAPS at TCU and outpt follow-up. SW will need to coordinate with TCU for facility that can accomodate AVAPS.   4. Myasthenia rx per Neuro.  5. He will need outpt follow-up for management of his AVAPS with either Sleep Provider or Neurology. Pt indicated his preference for neurology, would defer to neuro if that is practical.   6. VBG in am  7. Will follow        Darius Christianson  [facility]  Office: [phone]  Pager: [phone]

## 2021-04-12 NOTE — PROGRESS NOTES
Care Management Follow Up      Length of Stay (days): 23    Expected Discharge Date: 04/10/21(TCU)     Concerns to be Addressed:       Patient plan of care discussed at interdisciplinary rounds: Yes    Anticipated Discharge Disposition:  TCU that takes pt's on AVAPS     Anticipated Discharge Services:  Inpatient rehab   Anticipated Discharge DME:      Patient/family educated on Medicare website which has current facility and service quality ratings:  yes  Education Provided on the Discharge Plan:  Pt will require TCU for more medically complex pt's. which limits choices of TCU  Patient/Family in Agreement with the Plan:  yes    Referrals Placed by CM/SW:  SW sent referrals to  Four TCU's that indicate they accept bipap or trilogy pt's   Private pay costs discussed: not at this time  Additional Information:      VALERY Quiñonez  [facility]  [phone]

## 2021-04-12 NOTE — PLAN OF CARE
"/67 (BP Location: Left arm)   Pulse 95   Temp 98  F (36.7  C) (Oral)   Resp 18   Ht 1.778 m (5' 10\")   Wt 121.1 kg (267 lb)   SpO2 96%   BMI 38.31 kg/m      Nursing shift note  Summary: In with Myesthenia Gravis exacerbation and respiratory failure  Alertness: Alert x4  Neuro: No changes. Numbness in left foot, double vision in peripheries  Cardiac: Tele shows no changes. Sinus rhythm with bundle branch block  Resp: On 2L o2. ABGs available from last nights AVAPS run, no significant changes. Will trial AVAPS again tonight  GI: WDL, obese  : Beckett WDL  CMS: Intact  Mobility: Total care  Pain: No pain  Diet: Regular  Skin: WDL  Other Important Info:     Behavior & Aggression Tool color:  -None    Pt's belongings:     (Belongings from admission day present in pt's room)    Home medications:   -None  "

## 2021-04-12 NOTE — PLAN OF CARE
Pt here for MG exacerbation. A&Ox4. VSS on Bipap overnight. Denies pain. Neuros intact ex double vision when looking off to left or right side, numbness on top of Left foot. And generalized weakness in BLE 4/5. Tele SR w/BBB. Refuses repo at times. Beckett cath in place. Takes pills crushed with applesauce, regular diet. No bm overnight. A+2 and lift. LS diminished. Labs: Abnormal EBG's see results, Critical lab values of CO2 83 and Bicarb 48, MD paged at 06:52. Pt scoring green on aggression stoplight tool. Discharge pending. Continue to monitor.

## 2021-04-12 NOTE — PROGRESS NOTES
MD Notification    Notified Person: MD    Notified Person Name: Dr. Porras     Notification Date/Time: 4/12 06:52    Notification Interaction: Web page    Purpose of Notification: FYI critical lab results CO2 83 and Bicarb 48.     Orders Received:    Comments:

## 2021-04-12 NOTE — PLAN OF CARE
Pt here with Mysethenia Gravis exacerbation with respiratory distress. Pt A&O x4. Neuros intact except for double vision when looking side to side and numbness on the top of L foot. VSS, Tele SR with BBB. Pt on 2L via NC. Pt using IS. New AVAPS setting for tonight when sleeping with blood gas recheck in AM while still on BiPAP. Pt eating regular diet, prefers to take pills crushed in applesauce when able. Denies pain. Pt incontinent of stool x2 during shift. Pt repositioned, at times pt refusing repositioning in bed. Skin intact, scab noted on chest.     Pt greenlight on stoplight aggression tool.   Pt belongings at bedside.

## 2021-04-12 NOTE — PROGRESS NOTES
" Renal Medicine Progress Note            Assessment/Plan:     MG flare: Completed 5/5 apheresis treatment. Neurology will let us know if more apheresis treatment is needed. RIJ tunneled will need to be removed if no further apheresis is needed. I discussed case with neurology and patient.          Interval History:     Afebrile. VSS. Pt says weakness in the legs is still there.           Medications and Allergies:       acetaminophen  1,000 mg Oral TID     furosemide  20 mg Oral Daily     [Held by provider] heparin ANTICOAGULANT  5,000 Units Subcutaneous Q8H     metoprolol tartrate  12.5 mg Oral BID     mycophenolate  1,000 mg Oral BID     predniSONE  60 mg Oral or Feeding Tube Daily     pyridostigmine  60 mg Oral TID     sulfamethoxazole-trimethoprim  1 tablet Oral or Feeding Tube Daily     cholecalciferol  100 mcg Oral or Feeding Tube Daily        Allergies   Allergen Reactions     Ciprofloxacin Other (See Comments)     Contraindicated for myasthenia gravis patients     Procainamide Other (See Comments)     Contraindicated for myasthenia gravis patients     Quinidine Other (See Comments)     Contraindicated for myasthenia gravis patients     Quinine Other (See Comments)     Contraindicated for myasthenia gravis patients            Physical Exam:   Vitals were reviewed   , Blood pressure 118/67, pulse 95, temperature 98  F (36.7  C), temperature source Oral, resp. rate 18, height 1.778 m (5' 10\"), weight 121.1 kg (267 lb), SpO2 96 %.    Wt Readings from Last 3 Encounters:   04/10/21 121.1 kg (267 lb)   03/17/21 128.3 kg (282 lb 12.8 oz)   03/15/21 136.3 kg (300 lb 7.8 oz)       Intake/Output Summary (Last 24 hours) at 4/12/2021 1310  Last data filed at 4/12/2021 0656  Gross per 24 hour   Intake --   Output 1425 ml   Net -1425 ml       GENERAL APPEARANCE: pleasant, NAD, alert  HEENT:  Eyes/ears/nose/neck grossly normal  RESP: Nonlabor.  CV: RRR, nl S1/S2  ABDOMEN: soft, NT  EXTREMITIES/SKIN: no rashes/lesions on " observed skin. + edema  RIJ tunneled CVC CDI         Data:     CBC RESULTS:     Recent Labs   Lab 04/12/21  0600 04/11/21  0738 04/10/21  0809 04/09/21  0743 04/08/21  0723 04/07/21  0726   WBC 5.7 6.1 6.8 6.8 4.8 4.6   RBC 3.87* 4.17* 4.12* 4.17* 3.81* 4.03*   HGB 10.4* 11.2* 11.3* 11.2* 10.2* 10.9*   HCT 36.0* 39.0* 38.6* 38.5* 35.2* 37.5*    162 154 132* 118* 129*       Basic Metabolic Panel:  Recent Labs   Lab 04/12/21  0600 04/11/21  0738 04/10/21  0809 04/09/21  0743 04/08/21  0723 04/07/21  0726    138 140 139 140 139   POTASSIUM 4.2 3.8 3.6 3.9 4.1 3.9   CHLORIDE 96 97 98 99 101 100   CO2 45* 44* 44* 42* 42* 41*   BUN 17 16 12 13 12 10   CR 0.56* 0.52* 0.54* 0.52* 0.54* 0.50*   GLC 70 72 100* 70 71 70   NOAH 8.4* 8.5 8.7 8.8 8.3* 8.8       INR  Recent Labs   Lab 04/09/21  0743 04/07/21  0726   INR 1.10 1.20*      Attestation:   I have reviewed today's relevant vital signs, notes, medications, labs and imaging.    Fly Grigsby MD  [facility] - Nephrology  Office phone :[phone]  Pager: [phone]

## 2021-04-12 NOTE — PROGRESS NOTES
[facility]    Medicine Progress Note - Hospitalist Service       Date of Admission:  3/19/2021  Assessment & Plan       Mr. Goodrich is a 80 year old male admitted on 3/19/2021 with significant history for myasthenia gravis, HFpEF, COPD, HTN, MDD, HAZEL, prostate cancer who was initially admitted on 3/19/2021 from TCU for acute hypercapnic respiratory failure requiring emergent intubation on admission. He was recently admitted and discharged (3/4-3/15) for acute hypoxic and hypercapnic respiratory failure requiring intubation (3/6/21-3/11/21) 2/2 myasthenia gravis exacerbation, diastolic CHF exacerbation. Previously, pt received IVIG and steroid treatment and was followed by [facility]. He was extubated on 3/22 and transferred to hospitalist service 3/23. Subsequently experienced worsening encephalopathy and was found to have worsening respiratory acidosis despite BiPAP treatment and was transferred back to ICU on 3/29. Intubated due to persistent hypercarbia. Palliative care saw the patient on 3/30, patient decided to pursue HD catheter (IR), begin plasmapheresis (nephrology), use BiPAP after extubation, and re-intubate if needed. HD catheter placed 3/31 and plasmapheresis started. Course complicated by hematuria due to alberto trauma. He was able to be extubated to AVAPS BIPAP on 4/3. Pt remains stable, transfer of care to hospitalist service 4/5/21. Completed 5 out of 5 sessions of plasmapheresis on 4/9/2021.    Recurrent myasthenic crisis  Acute metabolic encephalopathy, resolved  H/o myasthenia gravis, diagnosed in 2005. Had been stable on mycophenolate and had not needed steroids for about 10 years until recent hospitalization 3/2021. Recent hospitalization (3/4-3/15) with myasthenia gravis flare with associated acute respiratory failure (intubated 3/6-3/11). Treated with IVIG and steroids. Discharged to TCU. Steroids were slowly being tapered after discharge.  Presented from TCU 3/19 with weakness  and found to be in acute hypercarbic respiratory failure. Intubated in ED and admitted to ICU. Was also found with evidence of UTI and pneumonia which were treated (see below). Neurology consulted. Treated with IVIG 3/20-3/22. Predisone increased. Improved and able to be extubated 3/22. Started on pyridostigmine 3/26. Pulmonology consulted 3/25 to assist with obtaining nocturnal NIPPV. On 3/27, still weak overall and not at baseline.  Subsequently experienced worsening encephalopathy and was found to have worsening respiratory acidosis despite BiPAP treatment and was transferred back to ICU on 3/29. Intubated due to persistent hypercarbia. Extubated back to Bipap on 4/3 and now on Bipap as needed and at bedtime.    Neurology following, appreciate their assistance.    Completed 5 out of 5 plasmapheresis on 4/9/2021, appreciate nephrology assistance. If no further plasmapheresis planned, consider removing tunneled dialysis catheter.    Continue mycophenolate 1000 mg qam and 500 mg at bedtime; prednisone 60 mg daily (no taper until outpatient follow-up); and pyridostigmine 60 mg q8h; per Neurology recommendations.    Continue Bactrim DS 3x/week for PCP prophylaxis while on high dose steroids, immunosuppression.    Recurrent acute hypoxic and hypercarbic respiratory failure, improved  Mild metabolic alkalosis, likely secondary to a combination of diuresis and probable chronic hypercarbic respiratory failure,  Suspect multifactorial including myasthenia gravis flare, MSSA pneumonia and acute diastolic CHF exacerbation.    Continue BID pulmonary mechanics with NIF and VC.     Continue IS q2h while awake.    Patient has been on BiPap at night. Given improvement with plasmapheresis, tried CPAP on 4/9, VBG next AM showed significantly elevated PCO2.    Pulmonology following, appreciate their assistance.    Plan to continue AVAPS at night per pulmonology. Recheck VBG in AM.    Acute normocytic  anemia  Thrombocytopenia  Suspect from multiple phlebotomies and also chronic component. Thrombocytopenia stable >100s. Hgb 13.1 on admit 3/19. No overt clinical signs of major bleeding.     Labs stable/improved on 4/12/21.    Hypophosphatemia, hypokalemia.    Continue PRN electrolyte replacement.    Pressure ulcer, right posterior thigh  WOC RN consulted.    Continue local wound cares.    Goals of care  Palliative care consulted, last saw pt on 4/2, following peripherally at this time.    Goals remain restorative at this time.    Appreciate consultant assistance.       MSSA pneumonia, suspect HCAP, resolved.  Recent hospitalization as above. Initial presentation from TCU 3/19 as above. On initial evaluation 3/19, was afebrile and hemodynamically stable; WBC and lactate normal, VBG showed pCO2 128. CXR 3/19 showed bibasilar atelectasis or infiltrate with small pleural effusions. Initially started on broad antibiotics including Zosyn on admit. Sputum 3/20 grew MSSA. Antibiotics narrowed to Unasyn 3/25.  Completed Unasyn/amoxicillin therapy.    Possible acute on chronic decompensated diastolic CHF exacerbation, resolved  Hypertension (benign essential).  [PTA: furosemide 20 mg BID; metoprolol 12.5 mg BID.]  Echocardiogram 3/4/2021 showed LVEF of 65-70%, no regional wall motion abnormalities; mildly decreased RV systolic function; mild to moderate mitral regurgitation, mild to moderate mitral stenosis. The patient reported a recent 20-pound weight gain in the last 1 month PTA following COVID vaccine. Treated with high dose steroids for myasthenia gravis as above.   Initial presentation 3/19 as above. NTP-BNP 3996 3/19. Was given IV furosemide this hospitalization. He had metabolic alkalosis that was treated with acetazolamide, stopped 3/21. Repeat echo 3/23 showed LVEF 55-60%, grade 1 diastolic dysfunction, no regional wall motion abnormalities; RV OK; trace MR, mild MS.    Restarted furosemide 20mg daily on  4/8/21.    Restart PTA metoprolol 12.5 mg BID on 4/11/21 (was on hold due to low blood pressures, which have resolved).    Monitor i/o's, daily weights.    Complicated catheter associated E. Coli UTI, treated  Chronic indwelling alberto due to difficulty voiding, large pannus (placed previous hospitalization)  Recent hematuria 2/2 alberto trauma, resolved  History of prostate cancer s/p cryotherapy  Alberto placed during previous hospitalization because of difficulty voiding. UA 3/19 grossly abnormal. Previously obtained UC from 3/18 showed E. Coli. Was treated with antibiotics including ceftriaxone and other antibiotics as above.  UTI felt to be adequately treated.    Chronic alberto (16 F Coude), replace every 2-3 weeks, leave in at discharge.    Outpatient urology follow up needed with Dr. Charles in 2-4 weeks from 4/3 with PSA.    Hand alberto irrigation of hematuria recurs (recently required CBI).    Nonsevere malnutrition  Level of malnutrition: Non-Severe  Based on: reduced intake, mild (or greater) subcutaneous fat loss, mild (or greater) muscle loss     Speech therapy consulted after extubation, currently tolerating dysphagia diet; had received TF while intubated.    Physical deconditioning 2/2 critical illness.    PT/OT consulted.    COVID-19 PCR testing 3/4, 3/11, 3/19, 4/6 - negative.     Trop elevation, suspect demand ischemia (type 2 NSTEMI)    Morbid obesity    Body mass index is 40.58 kg/m .    Increases all cause mortality.    Follow-up with PCP.     Diet: Combination Diet Regular Diet Adult; Thin Liquids (water, ice chips, juice, milk, gelatin, ice cream, etc)    DVT Prophylaxis: Pneumatic Compression Devices  Alberto Catheter: in place, indication: Gross Hematuria, Strict 1-2 Hour I&O  Code Status: Full Code           Disposition Plan   Expected discharge: possibly to TCU later this week pending clinical stability, consultant recommendations, and bed availability  Entered: Darius Hopson MD 04/12/2021,  12:28 PM       The patient's care was discussed with the Bedside Nurse and Patient.    Darius Hopson MD  Hospitalist Service  [facility]  Contact information available via [facility] Paging/Directory    ______________________________________________________________________    Interval History   Thanh Goodrich was seen this afternoon. He feels a little weaker today. Had a little more shortness of breath this morning, but back to normal now. Denies fevers, chest pain, nausea, abdominal pain.    Data reviewed today: I reviewed all medications, new labs and imaging results over the last 24 hours. I personally reviewed no images or EKG's today.    Physical Exam   Vital Signs: Temp: 98  F (36.7  C) Temp src: Oral BP: 118/67 Pulse: 95   Resp: 18 SpO2: 96 % O2 Device: Nasal cannula Oxygen Delivery: 2 LPM  Weight: 267 lbs 0 oz  Constitutional: awake, alert, cooperative, no apparent distress, laying in bed  Respiratory: clear to auscultation bilaterally, no crackles or wheezing  Cardiovascular: regular rate and rhythm, normal S1 and S2, no murmur noted  GI: normal bowel sounds, soft, non-distended, non-tender  Skin: warm, dry  Musculoskeletal: no lower extremity pitting edema present  Neurologic: awake, alert, oriented to name, place and time    Data   Recent Labs   Lab 04/12/21  0600 04/11/21  0738 04/10/21  0809 04/09/21  0743 04/07/21  0726 04/07/21  0726   WBC 5.7 6.1 6.8 6.8   < > 4.6   HGB 10.4* 11.2* 11.3* 11.2*   < > 10.9*   MCV 93 94 94 92   < > 93    162 154 132*   < > 129*   INR  --   --   --  1.10  --  1.20*    138 140 139   < > 139   POTASSIUM 4.2 3.8 3.6 3.9   < > 3.9   CHLORIDE 96 97 98 99   < > 100   CO2 45* 44* 44* 42*   < > 41*   BUN 17 16 12 13   < > 10   CR 0.56* 0.52* 0.54* 0.52*   < > 0.50*   ANIONGAP <1* <1* <1* <1*   < > <1*   NOAH 8.4* 8.5 8.7 8.8   < > 8.8   GLC 70 72 100* 70   < > 70    < > = values in this interval not displayed.     Medications     dextrose        - MEDICATION INSTRUCTIONS -       - MEDICATION INSTRUCTIONS -       - MEDICATION INSTRUCTIONS -       - MEDICATION INSTRUCTIONS -         acetaminophen  1,000 mg Oral TID     furosemide  20 mg Oral Daily     [Held by provider] heparin ANTICOAGULANT  5,000 Units Subcutaneous Q8H     metoprolol tartrate  12.5 mg Oral BID     mycophenolate  1,000 mg Oral BID     predniSONE  60 mg Oral or Feeding Tube Daily     pyridostigmine  60 mg Oral TID     sulfamethoxazole-trimethoprim  1 tablet Oral or Feeding Tube Daily     cholecalciferol  100 mcg Oral or Feeding Tube Daily

## 2021-04-13 NOTE — PROGRESS NOTES
"PULMONOLOGY PROGRESS NOTE    Date of Admission: 3/19/2021    CC/Reason for Hospital visit: respiratory failure, myasthenia gravis  SUBJECTIVE      Events of last night reviewed. On AVAPS overnight. Stable night. No new respiratory problems. States breathing is about the same today. Dispo planning ongoing    ROS: A Problem Pertinent review of systems was negative except for items noted in HPI.  Past Medical, Family, and Social/Substance History has been reviewed: No interval changes.    OBJECTIVE   Vital signs:  Temp: 97.2  F (36.2  C) Temp src: Oral BP: 114/64 Pulse: 79   Resp: 18 SpO2: 95 % O2 Device: Nasal cannula Oxygen Delivery: 2 LPM Height: 177.8 cm (5' 10\") Weight: 121.1 kg (267 lb)  Estimated body mass index is 38.31 kg/m  as calculated from the following:    Height as of this encounter: 1.778 m (5' 10\").    Weight as of this encounter: 121.1 kg (267 lb).        I/O last 3 completed shifts:  In: -   Out: 1750 [Urine:1750]      CONSTITUTIONAL/GENERAL APPEARANCE: Alert male. No Apparent Distress.  PSYCHIATRIC: Pleasant and appropriate mood and affect. Oriented x 3.  EARS, NOSE,THROAT,MOUTH: External ears and nose overall normal. Normal oral mucosa.   NECK: Neck appearance normal. No neck masses and the thyroid is not enlarged.   RESPIRATORY: Non-labored effort. Decreased BS anteriorly, no change.  CARDIOVASCULAR: S1, S2, regular rate and rhythm.      LABORATORY ASSESSMENT    Arterial Blood Gas  Recent Labs   Lab 04/13/21  0813 04/12/21  0600 04/11/21  0738 04/10/21  0809   O2PER 2.5L 31% 2.5L 2.5L     CBC  Recent Labs   Lab 04/13/21  0813 04/12/21  0600 04/11/21  0738 04/10/21  0809   WBC 6.3 5.7 6.1 6.8   RBC 4.01* 3.87* 4.17* 4.12*   HGB 11.0* 10.4* 11.2* 11.3*   HCT 38.1* 36.0* 39.0* 38.6*   MCV 95 93 94 94   MCH 27.4 26.9 26.9 27.4   MCHC 28.9* 28.9* 28.7* 29.3*   RDW 21.3* 21.4* 21.8* 21.2*    152 162 154     BMP  Recent Labs   Lab 04/13/21  0813 04/12/21  0600 04/11/21  0738 04/10/21  0809   NA " 137 137 138 140   POTASSIUM 4.1 4.2 3.8 3.6   CHLORIDE 95 96 97 98   NOAH 8.6 8.4* 8.5 8.7   CO2 >45* 45* 44* 44*   BUN 16 17 16 12   CR 0.56* 0.56* 0.52* 0.54*   GLC 66* 70 72 100*     INR  Recent Labs   Lab 04/09/21  0743 04/07/21  0726   INR 1.10 1.20*      BNPNo lab results found in last 7 days.  VENOUS BLOOD GASES  Recent Labs   Lab 04/13/21  0813 04/12/21  0600 04/11/21  0738 04/10/21  0809   PHV 7.37 7.37 7.35 7.37   PCO2V 87* 83* 85* 82*   PO2V 19* 32 24* 24*   HCO3V 50* 48* 47* 47*   STARR 21.0 18.8 17.2 18.0       Additional labs and/or comments: VBG unchanged.    IMAGING      CXR 3/29 -  IMPRESSION: Endotracheal tube 3 cm above the gabino. Enteric tube with tip in the fundus of the stomach. Heart size and pulmonary vascularity within normal limits. Mild patchy right mid and lower lung infiltrate. Slight linear scarring or atelectasis   left lung base. Aortic calcification. No significant bony abnormalities.      PFT & OTHER TESTING       ASSESSMENT / PLAN      Pulmonary Diagnoses:  CHF I50.9  Hypoxemia R09.02  Obesity E66.9  Resp fail chronic J96.10  SOB R06.02  Myasthenia gravis    Additional COVID-19 diagnoses:  Concern of possible exposure to COVID-19, Now RULED OUT Z03.818    ASSESSMENT: 80 year old male admitted 3/19/2021 with significant history for myasthenia gravis, HFpEF, COPD, HTN, MDD, HAZEL, prostate cancer who was initially admitted on 3/19/2021 from TCU for acute hypercapnic respiratory failure requiring emergent intubation on admission. He was recently admitted and discharged (3/4-3/15) for acute hypoxic and hypercapnic respiratory failure requiring intubation (3/6/21-3/11/21) 2/2 myasthenia gravis exacerbation, diastolic CHF exacerbation. He was extubated on 3/22 but subsequently developed worsening encephalopathy and was found to have worsening respiratory acidosis despite BiPAP treatment and was transferred back to ICU on 3/29. Intubated due to persistent hypercarbia. Palliative care saw the  patient on 3/30, patient decided to pursue plasmapheresis.  He was able to be extubated to AVAPS on 4/3. Completed 5 out of 5 sessions of plasmapheresis on 4/9/2021.  Patient had been on BiPAP at night; given improvement with plasmapheresis, tried CPAP on 4/9, VBG in AM showed significantly elevated PCO2.  4/12 pH normal with elevated pCO2 consisted with chronic hypercarbic resp failure. Do not expect that his pH will normalize and would aim for normal pH.      PLAN:  1. Adjust oxygen, keep SaO2 89-92%.  2. Continue AVAPS with a target tidal volume of 450 mL and a backup rate of 12 breaths/min. IPAP 12-20. EPAP min 8.  3. Will need AVAPS at TCU and outpt follow-up. SW will need to coordinate with TCU for facility that can accomodate AVAPS.   4. Myasthenia rx per Neuro.  5. He will need outpt follow-up for management of his AVAPS via Neurology. Will defer to neuro regarding scheduling follow-up.  6. No further suggestions, will sign off. Please call if needed      Darius Christianson  [facility]  Office: [phone]  Pager: [phone]

## 2021-04-13 NOTE — PROGRESS NOTES
Care Management Follow Up    Length of Stay (days): 24    Expected Discharge Date: 04/14/21     Concerns to be Addressed:       Patient plan of care discussed at interdisciplinary rounds: Yes    Anticipated Discharge Disposition:       Anticipated Discharge Services:    Anticipated Discharge DME:      Patient/family educated on Medicare website which has current facility and service quality ratings:    Education Provided on the Discharge Plan:    Patient/Family in Agreement with the Plan:      Referrals Placed by CM/SW:    Private pay costs discussed: transportation costs, if applicable    Additional Information:    Pt has been accepted today at [facility], per Maddison . Maddison requests orders asap so they can order the Bi-Pap for pt (A-Vaps). REJI updated charge RN.  Pt may require a HE stretcher due to 02 needs/inability to self administer.  REJI left  for [facility] who reportedly may have had a bed for pt Wed/Thursday.      VALERY Hawthorne   [facility]  [phone]    UPDATE@1026: REJI spoke w/bedside RN who will contact Respiratory to put in A-Vaps parameters. Once that is charted, REJI can page physician for orders that will need to be faxed to [facility] to allow them to order the Bi-Pap and have at the facility when pt arrives.  Pt reportedly needs a HE bariatric stretcher due to inability to self administer 02 and Trunk weakness. Per request from [facility], REJI set up a stretcher for: 4pm. PCS completed (trunk weakness due to MGravia, unable to self administer 02), faxed to  and placed on chart.  REJI updated Charge RN and [facility].    UPDATE@1144: REJI updated patient of plan in place and pt became extremely upset, stating he was accepted at [facility] for Wednesday and he will appeal if he does not go there. REJI explained that the [facility] called this morning stating they now had no opening until Thursday. Since [facility] had accepted pt, [facility] stated  they would not be holding a spot.    REJI explained to pt that he is medically ready and has been accepted at a facility. Pt stated he would appeal unless [facility] (due to higher medicare rating)could guarantee him a spot later this week if [facility]  would transfer him.   REJI left  for [facility] TCU admissions. Awaiting call back. [facility] has no appropriate bed.     UPDATE@1223: [facility] no longer has a bed available and does not anticipate any openings until next week. REJI left  for  supervisor to discuss the situation.   [facility] who will review referrals sent yesterday and call REJI back.     UPDATE@1312: [facility] cannot accommodate pt's needs, requests referral be sent to: [facility] (they have respiratory), which was sent thru DOD. Pt requests all Medicare rating sheets to review, which was done.     UPDATE@9153: Pt has decided to discharge to [facility] at 4pm. Awaiting orders.

## 2021-04-13 NOTE — PROGRESS NOTES
Pt here with Mysethenia Gravis exacerbation with respiratory distress. Pt A&O x4. Neuros intact except for double vision when looking side to side and numbness on the top of L foot. VSS, Tele SR with BBB. Pt on 2L via NC. Pt using IS. AVAPS setting for tonight when sleeping. Regular diet, thin liquids. Denies pain. Refused Tylenol. Pt incontinent of stool x2 during shift. Beckett present, patent.Pt repositioned/ turned Q2 hours.The Port on R chest to be removed by dialysis nurse before discharge.     Pt greenlight on stoplight aggression tool.   Pt belongings at bedside.

## 2021-04-13 NOTE — PROGRESS NOTES
Placed call to VALERY Shelley, to follow up with BIPAP referral and discharge planning. Left voicemail asking for return call to [phone]    Brittaney Feldman RRT  [facility]  Main line [phone]

## 2021-04-13 NOTE — PLAN OF CARE
"PT: Pt wanting to finish breakfast, states \"I have some things to finish before I do PT today.\" Politely requests PT to hold this am.  "

## 2021-04-13 NOTE — PROGRESS NOTES
RADIOLOGY PROCEDURE NOTE  Patient name: Thanh Goodrich  MRN: [id number]  : 1940    Pre-procedure diagnosis: tunneled CVC line in place, no longer needs apheresis  Post-procedure diagnosis: Same    Procedure Date/Time: 2021  9:53 AM  Procedure: removal of right internal jugular tunneled CVC   Estimated blood loss: None  Specimen(s) collected with description: none  The patient tolerated the procedure well with no immediate complications.  Significant findings:none    See imaging dictation for procedural details.    Provider name: Augusta Box PA-C  Assistant(s):None

## 2021-04-13 NOTE — PLAN OF CARE
PT: Pt discharging to TCU this afternoon. Reports feeling a bit frustrated with not getting his first choice of location. Requests no PT this day.    Physical Therapy Discharge Summary    Reason for therapy discharge:    Discharged to transitional care facility.    Progress towards therapy goal(s). See goals on Care Plan in [facility] electronic health record for goal details.  Goals partially met.  Barriers to achieving goals:   limited tolerance for therapy and Goals with progress, pt motivated and works hard.    Therapy recommendation(s):    Continued therapy is recommended.  Rationale/Recommendations:  Pt will benefit from continued skilled PT services to progress independence and safety with functional mobility. Pt is below baseline with mobility, requiring 1-2 for bed mobility, 2-3 assist for standing trials.

## 2021-04-13 NOTE — PROGRESS NOTES
Care Management Discharge Note    Discharge Date: 04/13/21       Discharge Disposition:      Discharge Services:      Discharge DME:      Discharge Transportation: health plan transportation    Private pay costs discussed: transportation costs, if applicable. Pt aware there may be OOP costs thru his plan for stretcher and accepts this    PAS Confirmation Code:  [id number]  Patient/family educated on Medicare website which has current facility and service quality ratings:  Yes    Education Provided on the Discharge Plan:  Yes  Persons Notified of Discharge Plans: Yes  Patient/Family in Agreement with the Plan:  Yes    Handoff Referral Completed: Yes    Additional Information:    SW faxed orders and PAS to facility. No further SW interventions anticipated at this time.  PAS-RR    D: Per DHS regulation, SW completed and submitted PAS-RR to [place] [employer] Direct Connect via the Senior LinkAge Line.  PAS-RR confirmation # is : [id number]    P: Further questions may be directed to Senior LinkAge Line at #[phone], option #4 for PAS-RR staff.          VALERY Hawthorne   [facility]  [phone]

## 2021-04-13 NOTE — PLAN OF CARE
OT- Pt preparing for discharge to TCU. Will defer further OT to next level of care.     Occupational Therapy Discharge Summary    Reason for therapy discharge:    Discharged to transitional care facility.    Progress towards therapy goal(s). [facility] for goal details.  Goals not met.  Barriers to achieving goals:   discharge from facility.    Therapy recommendation(s):    Continued therapy is recommended.  Rationale/Recommendations:  Skilled OT to address independence in I/ADLs.

## 2021-04-13 NOTE — PROGRESS NOTES
Respiratory Care note - Patient ventilator settings: Mode: AVAPs, Rate: 12, Tidal volume: 500 mL, EPAP 8 and 30%, Min Pressure 10, Max Pressure 35.   Patient wears O2/NC @ 2 LPM during the day.   NIF -38, VC 1020 mL.

## 2021-04-13 NOTE — PROVIDER NOTIFICATION
"Paged Dr. Sheehan, \"Another critical. Carbon dioxide >45.\"   called back promptly. No interventions at this time. Updated him on pulmonology recs.   "

## 2021-04-13 NOTE — PROGRESS NOTES
"CLINICAL NUTRITION SERVICES - REASSESSMENT NOTE      Malnutrition: (3/21)  % Weight Loss:  Weight loss does not meet criteria for malnutrition (fluids)  % Intake:  <75% for > 7 days (non-severe malnutrition)  Subcutaneous Fat Loss:  Orbital region mild depletion  Muscle Loss:  Temporal region mild-moderate depletion and Clavicle bone region mild depletion  Fluid Retention:  None noted     Malnutrition Diagnosis: Non-Severe malnutrition  In Context of:  Acute illness or injury  Chronic illness or disease       EVALUATION OF PROGRESS TOWARD GOALS   Diet:  Regular diet (advanced on 4/7)    Intake/Tolerance:  Visited with patient this morning via telephone.  He states that he \"seems to be\" eating well, appetite it good.  Some of the food isn't of his liking in regards to taste but overall ok.  He also noted they \"skimped\" this morning on the sugar sub (ordered two boxes of cereal and only sent one sugar sub) but noted that the nurses were able to locate more on the floor.    He has been eating % of meals per flowsheets.  He also confirmed that he was able to eat 100% of breakfast today and dinner last night -->  Dinner was meatloaf, mashed potatoes, ice cream, OJ, apple juice, apple pie, and grapes.  Breakfast this morning consisted of Corn Flakes x 2, milk, grapes, banana bread, OJ, and hot cocoa.       NEW FINDINGS:   Weight on 4/10 was 121.5 kg (down overall but patient has been diuresing on Lasix)    Final PLEX was on 4/9  Discharge once able to find a TCU that takes patient's specific Bipap needs     Previous Goals (4/16):   Pt will continue to consume at least 75% of meals  Evaluation: Met    Previous Nutrition Diagnosis (4/16):   No nutrition diagnosis identified at this time   Evaluation: No change      CURRENT NUTRITION DIAGNOSIS  No nutrition diagnosis identified at this time    INTERVENTIONS  Recommendations / Nutrition Prescription  Continue regular diet as tolerated    Implementation  None "     Goals  Patient will continue to consume at least % of meals     MONITORING AND EVALUATION:  Progress towards goals will be monitored and evaluated per protocol and Practice Guidelines    Zarina Rivera RD, LD, CNSC   Clinical Dietitian - [facility]

## 2021-04-13 NOTE — PLAN OF CARE
"Reason for Admission: myasthenia gravis exacerbation with respiratory distress    Cognitive/Mentation: A/Ox 4  Neuros/CMS: Intact ex generalized weakness & baseline numbness on top of L foot  VS: stable. /64   Pulse 79   Temp 97.2  F (36.2  C) (Oral)   Resp 18   Ht 1.778 m (5' 10\")   Wt 121.1 kg (267 lb)   SpO2 95%   BMI 38.31 kg/m    GI: BS active, + flatus, last BM today. Incontinent--but aware of incontinence.  : Beckett for retention.   Pulmonary: LS diminished.  Pain: Denies.     Drains: L hand PIV. Central tunneled cath removed today (Dressing CDI)  Skin: bruised, scaly skin  Activity: Assits x 2 with lift. Refused repositioning. Offered to get up to chair--declined  Diet: Regular with thin liquids. Takes pills wholes/crushed.     Therapies recs: TCU  Discharge: today potentially to [facility]--awaiting     End of shift summary: Pulmonary signed off.  BG was low this AM but improved after breakfast. Declined bath. VBGs abnormal--MDs aware.     "

## 2021-04-13 NOTE — PLAN OF CARE
Pt here with Myasthenia Gravis exacerbation with respiratory distress.. A&O x4. Neuros intact except for weakness on LE, baseline numbness at the top of the L foot and double vision when looking to the sides. VSS. Pt with alberto catheter with adequate UO. Repositions done; intermittently refusing repositioning. AVAP on overnight. Tele SR with bundle branch block. Regular diet with thin liquids. Takes small pills whole and big pills crushed. Up with A2 with lift. Denies pain. Pt scoring green on the Aggression Stop Light Tool.

## 2021-04-13 NOTE — PROGRESS NOTES
"[facility] RESPIRATORY NOTE           Date of Admission: 3/19/2021     Date of Intubation (most recent): 3/29/21     Reason for Mechanical Ventilation: MG     /68 (BP Location: Left arm)   Pulse 66   Temp 97.5  F (36.4  C) (Axillary)   Resp 20   Ht 1.778 m (5' 10\")   Wt 121.1 kg (267 lb)   SpO2 97%   BMI 38.31 kg/m       Continue to wear BiPAP at NOC.Pt tolerating well.  "

## 2021-04-13 NOTE — DISCHARGE SUMMARY
[facility]    Discharge Summary  Hospitalist    Date of Admission:  3/19/2021  Date of Discharge:  4/13/2021  Discharging Provider: Darius Hopson MD  Date of Service (when I saw the patient): 04/13/21    Discharge Diagnoses   Recurrent myasthenic crisis  Acute metabolic encephalopathy, resolved  Recurrent acute hypoxic and hypercarbic respiratory failure, improved  Mild metabolic alkalosis, likely secondary to a combination of diuresis and probable chronic hypercarbic respiratory failure,  Acute normocytic anemia  Thrombocytopenia  Hypophosphatemia, hypokalemia.  Pressure ulcer, right posterior thigh  MSSA pneumonia, suspect HCAP, resolved  Acute on chronic decompensated diastolic CHF exacerbation, resolved  Hypertension (benign essential)  Complicated catheter associated E. Coli UTI, treated  Chronic indwelling alberto due to difficulty voiding, large pannus (placed previous hospitalization)  Recent hematuria 2/2 alberto trauma, resolved  History of prostate cancer s/p cryotherapy  Nonsevere malnutrition  Physical deconditioning 2/2 critical illness  COVID-19 PCR testing 3/4, 3/11, 3/19, 4/6 - negative  Type 2 NSTEMI in setting of critical illness  Morbid obesity    History of Present Illness   Thanh Goodrich is a 80 year old male admitted on 3/19/2021 with significant history for myasthenia gravis, HFpEF, COPD, HTN, MDD, HAZEL, prostate cancer who was initially admitted on 3/19/2021 from TCU for acute hypercapnic respiratory failure requiring emergent intubation on admission. He was recently admitted and discharged (3/4-3/15) for acute hypoxic and hypercapnic respiratory failure requiring intubation (3/6/21-3/11/21) 2/2 myasthenia gravis exacerbation, diastolic CHF exacerbation. Previously, pt received IVIG and steroid treatment and was followed by [facility]. He was extubated on 3/22 and transferred to hospitalist service 3/23. Subsequently experienced worsening encephalopathy and was found to  have worsening respiratory acidosis despite BiPAP treatment and was transferred back to ICU on 3/29. Intubated due to persistent hypercarbia. Palliative care saw the patient on 3/30, patient decided to pursue HD catheter (IR), begin plasmapheresis (nephrology), use BiPAP after extubation, and re-intubate if needed. HD catheter placed 3/31 and plasmapheresis started. Course complicated by hematuria due to alberto trauma. He was able to be extubated to AVAPS BIPAP on 4/3. Pt remains stable, transfer of care to hospitalist service 4/5/21. Completed 5 out of 5 sessions of plasmapheresis on 4/9/2021.    Hospital Course   Thanh Goodrich was admitted on 3/19/2021.  The following problems were addressed during his hospitalization:    Recurrent myasthenic crisis  Acute metabolic encephalopathy, resolved  H/o myasthenia gravis, diagnosed in 2005. Had been stable on mycophenolate and had not needed steroids for about 10 years until recent hospitalization 3/2021. Recent hospitalization (3/4-3/15) with myasthenia gravis flare with associated acute respiratory failure (intubated 3/6-3/11). Treated with IVIG and steroids. Discharged to TCU. Steroids were slowly being tapered after discharge.  Presented from TCU 3/19 with weakness and found to be in acute hypercarbic respiratory failure. Intubated in ED and admitted to ICU. Was also found with evidence of UTI and pneumonia which were treated (see below). Neurology consulted. Treated with IVIG 3/20-3/22. Predisone increased. Improved and able to be extubated 3/22. Started on pyridostigmine 3/26. Pulmonology consulted 3/25 to assist with obtaining nocturnal NIPPV. On 3/27, still weak overall and not at baseline.  Subsequently experienced worsening encephalopathy and was found to have worsening respiratory acidosis despite BiPAP treatment and was transferred back to ICU on 3/29. Intubated due to persistent hypercarbia. Extubated back to Bipap on 4/3 and now on AVAPS as needed and at  bedtime.    Neurology following, appreciate their assistance.    Completed 5 out of 5 plasmapheresis sessions on 4/9/2021, appreciate nephrology assistance. Tunneled catheter was removed on 4/13/21.    Continue mycophenolate 1000 mg BID (dose increased from PTA); prednisone 60 mg daily (no taper until outpatient follow-up); and pyridostigmine 60 mg q8h per Neurology recommendations.    Continue Bactrim SS daily for PCP prophylaxis while on high dose steroids, immunosuppression.    Follow-up with Neurology in 1-2 weeks for myasthenia follow-up.    Discharge to TCU today for ongoing therapies.    Recurrent acute hypoxic and hypercarbic respiratory failure, improved  Mild metabolic alkalosis, likely secondary to a combination of diuresis and probable chronic hypercarbic respiratory failure,  Suspect multifactorial including myasthenia gravis flare, MSSA pneumonia and acute diastolic CHF exacerbation.    Continue IS q2h while awake.    Patient has been on BiPap at night. Given improvement with plasmapheresis, tried CPAP on 4/9, VBG next AM showed significantly elevated PCO2.    Pulmonology following, appreciate their assistance.    Recommend to continue AVAPS at night as ordered below.    Respiratory Care note on 4/13/21:    Patient ventilator settings: Mode: AVAPs, Rate: 12, Tidal volume: 500 mL, EPAP 8 and 30%, Min Pressure 10, Max Pressure 35.    Patient wears O2/NC @ 2 LPM during the day.    NIF -38, VC 1020 mL.     Follow-up with Neurology in 1-2 weeks. If any issues, consider outpatient follow-up with pulmonology.    Acute normocytic anemia  Thrombocytopenia  Suspect from multiple phlebotomies and also chronic component. Thrombocytopenia stable >100s. Hgb 13.1 on admit 3/19. No overt clinical signs of major bleeding.     Labs stable/improved on day of discharge. Hemoglobin 11.0, platelets 169.    Hypophosphatemia, hypokalemia.    Continue PRN electrolyte replacement.    Pressure ulcer, right posterior thigh  WOC  RN consulted.    Continue local wound cares.    Goals of care  Palliative care consulted, last saw pt on 4/2, following peripherally at this time.    Goals remain restorative at this time.    Appreciate consultant assistance.       MSSA pneumonia, suspect HCAP, resolved.  Recent hospitalization as above. Initial presentation from TCU 3/19 as above. On initial evaluation 3/19, was afebrile and hemodynamically stable; WBC and lactate normal, VBG showed pCO2 128. CXR 3/19 showed bibasilar atelectasis or infiltrate with small pleural effusions. Initially started on broad antibiotics including Zosyn on admit. Sputum 3/20 grew MSSA. Antibiotics narrowed to Unasyn 3/25.  Completed course of antibiotics while in the hospital.    Acute on chronic decompensated diastolic CHF exacerbation, resolved  Hypertension (benign essential).  [PTA: furosemide 20 mg BID; metoprolol 12.5 mg BID.]  Echocardiogram 3/4/2021 showed LVEF of 65-70%, no regional wall motion abnormalities; mildly decreased RV systolic function; mild to moderate mitral regurgitation, mild to moderate mitral stenosis. The patient reported a recent 20-pound weight gain in the last 1 month PTA following COVID vaccine. Treated with high dose steroids for myasthenia gravis as above.   Initial presentation 3/19 as above. NTP-BNP 3996 3/19. Was given IV furosemide this hospitalization. He had metabolic alkalosis that was treated with acetazolamide, stopped 3/21. Repeat echo 3/23 showed LVEF 55-60%, grade 1 diastolic dysfunction, no regional wall motion abnormalities; RV OK; trace MR, mild MS.    Restarted furosemide 20mg daily on 4/8/21. This is a decrease from his PTA dose. Monitor at TCU and consider increasing furosemide if needed.    Restarted PTA metoprolol 12.5 mg BID on 4/11/21 (was on hold due to low blood pressures, which have resolved).    Monitor daily weights at TCU. Weight was 267 pounds on 4/10/21.    Complicated catheter associated E. Coli  UTI, treated  Chronic indwelling alberto due to difficulty voiding, large pannus (placed previous hospitalization)  Recent hematuria 2/2 alberto trauma, resolved  History of prostate cancer s/p cryotherapy  Alberto placed during previous hospitalization because of difficulty voiding. UA 3/19 grossly abnormal. Previously obtained UC from 3/18 showed E. Coli. Was treated with antibiotics including ceftriaxone and other antibiotics as above.  UTI felt to be adequately treated.    Chronic alberto (16 F Coude), replace every 2-3 weeks, leave in at discharge.    Outpatient urology follow up needed with Dr. Charles in 2-4 weeks from 4/3 with PSA.    Hand alberto irrigation of hematuria recurs (recently required CBI).    Nonsevere malnutrition  Level of malnutrition: Non-Severe  Based on: reduced intake, mild (or greater) subcutaneous fat loss, mild (or greater) muscle loss     Speech therapy consulted after extubation, initially started on dysphagia diet. Now tolerating regular diet.    Physical deconditioning 2/2 critical illness    PT/OT consulted.    Discharge to TCU for ongoing therapies.    COVID-19 PCR testing 3/4, 3/11, 3/19, 4/6 - negative     Type 2 NSTEMI in setting of critical illness    Morbid obesity    Body mass index is 40.58 kg/m .    Increases all cause mortality.    Follow-up with PCP.    Darius Hopson MD    Significant Results and Procedures    Intubation and mechanical ventilation  Tunneled catheter placement and plasma excgange    Pending Results   None    Code Status   Full Code       Primary Care Physician   Martinez Toledo    Physical Exam   Temp: 97.4  F (36.3  C) Temp src: Oral BP: 125/58 Pulse: 89   Resp: 18 SpO2: 96 % O2 Device: Nasal cannula Oxygen Delivery: 2 LPM  Vitals:    04/05/21 1300 04/07/21 1450 04/10/21 0509   Weight: 120.3 kg (265 lb 3.4 oz) 120.3 kg (265 lb 3.4 oz) 121.1 kg (267 lb)     Vital Signs with Ranges  Temp:  [97.4  F (36.3  C)-97.9  F (36.6  C)] 97.4  F (36.3  C)  Pulse:   [] 89  Resp:  [16-20] 18  BP: (109-135)/(58-77) 125/58  FiO2 (%):  [30 %] 30 %  SpO2:  [93 %-97 %] 96 %  I/O last 3 completed shifts:  In: -   Out: 1750 [Urine:1750]    Constitutional: awake, alert, cooperative, no apparent distress, laying in bed with the head of the bed up  Respiratory: clear to auscultation bilaterally, no crackles or wheezing  Cardiovascular: regular rate and rhythm, normal S1 and S2, no murmur noted  GI: normal bowel sounds, soft, non-distended, non-tender  Skin: warm, dry  Musculoskeletal: no lower extremity pitting edema present  Neurologic: awake, alert, oriented to name, place and time, bilateral lower extremity weakness 4/5, sensory is intact    Discharge Disposition   Discharged to short-term care facility  Condition at discharge: Stable    Consultations This Hospital Stay   PHARMACY TO DOSE VANCO  NEUROLOGY IP CONSULT  NEUROLOGY IP CONSULT  NUTRITION SERVICES ADULT IP CONSULT  PHARMACY IP CONSULT  WOUND OSTOMY CONTINENCE NURSE  IP CONSULT  PHYSICAL THERAPY ADULT IP CONSULT  OCCUPATIONAL THERAPY ADULT IP CONSULT  NEUROLOGY IP CONSULT  CARE MANAGEMENT / SOCIAL WORK IP CONSULT  PULMONARY IP CONSULT  PULMONARY IP CONSULT  NEUROLOGY IP CONSULT  PHYSICAL THERAPY ADULT IP CONSULT  OCCUPATIONAL THERAPY ADULT IP CONSULT  INTENSIVIST IP CONSULT  NUTRITION SERVICES ADULT IP CONSULT  NEUROLOGY IP CONSULT  PALLIATIVE CARE ADULT IP CONSULT  PHARMACY IP CONSULT  NEPHROLOGY IP CONSULT  UROLOGY IP CONSULT  SWALLOW EVAL SPEECH PATH AT BEDSIDE IP CONSULT  SWALLOW EVAL SPEECH PATH AT BEDSIDE IP CONSULT  NEUROLOGY IP CONSULT  PHYSICAL THERAPY ADULT IP CONSULT  OCCUPATIONAL THERAPY ADULT IP CONSULT  SOCIAL WORK IP CONSULT  PULMONARY IP CONSULT  PHYSICAL THERAPY ADULT IP CONSULT  OCCUPATIONAL THERAPY ADULT IP CONSULT    Time Spent on this Encounter   IDarius MD, personally saw the patient today and spent greater than 30 minutes discharging this patient.    Discharge Orders      General info  for SNF    Length of Stay Estimate: Short Term Care: Estimated # of Days <30  Condition at Discharge: Improving  Level of care:skilled   Rehabilitation Potential: Good  Admission H&P remains valid and up-to-date: Yes  Recent Chemotherapy: N/A  Use Nursing Home Standing Orders: Yes     Mantoux instructions    Give two-step Mantoux (PPD) Per Facility Policy Yes     Reason for your hospital stay    Myasthenia gravis crisis     Daily weights    Call Provider for weight gain of more than 2 pounds per day or 5 pounds per week.     Beckett catheter    To straight gravity drainage. 16 F Coude catheter. Hand irrigate as needed for hematuria. Change catheter every 2 weeks and PRN for leaking or decreased uring output with signs of bladder distention. DO NOT change catheter without a specific MD order IF diagnosis of benign prostatic hypertrophy (BPH), neurogenic bladder, or other urological conditions.     Follow Up and recommended labs and tests    Follow up with Nursing home physician within one week.  The following labs/tests are recommended: CBC and BMP.  Follow up with Dr. [facility] ([facility]) in 1-3 weeks for urinary retention follow-up with PSA prior to the appointment.  Follow up with  with [facility] in 1-2 weeks for myasthenia follow-up.     Activity - Up with nursing assistance     Full Code     Physical Therapy Adult Consult    Evaluate and treat as clinically indicated.    Reason:  Deconditioning, mysathenia gravis     Occupational Therapy Adult Consult    Evaluate and treat as clinically indicated.    Reason:  Deconditioning, myasthenia gravis     Fall precautions     Non Invasive Ventilator    AVAPS.    30% FiO2 at night.    Vent Documentation:   In effort to reduce and/or prevent hospitalizations and emergency room visits, we are recommending a(n) Non Invasive Ventilator for home use. Thanh JUAN Palaciossaul has had numerous hospitalizations recently due to chronic respiratory  failure. The patient would benefit from Non Invasive Ventilator with portability for continuous support at night.    I, the undersigned, certify that the above prescribed supplies are medically necessary for this patient and is both reasonable and necessary in reference to accepted standards of medical and necessary in reference to accepted standards of medical practice in the treatment of this patient's condition and is not prescribed as a convenience.     Oxygen Adult/Peds    Oxygen Documentation:   I certify that this patient, Thanh Goodrich has been under my care (or a nurse practitioner or physican's assistant working with me). This is the face-to-face encounter for oxygen medical necessity.      Thanh Goodrich is now in a chronic stable state and continues to require supplemental oxygen. Patient has continued oxygen desaturation due to Chronic Respiratory Failure with Hypoxia J93.11.    Alternative treatment(s) tried or considered and deemed clinically infective for treatment of mysthenia gravis with chronic respiratory failure with hypoxia include steroids, pulmonary toileting and pulmonary rehab.  If portability is ordered, is the patient mobile within the home? yes    **Patients who qualify for home O2 coverage under the CMS guidelines require ABG tests or O2 sat readings obtained closest to, but no earlier than 2 days prior to the discharge, as evidence of the need for home oxygen therapy. Testing must be performed while patient is in the chronic stable state. See notes for O2 sats.**     Advance Diet as Tolerated    Follow this diet upon discharge: Combination Diet Regular Diet Adult; Thin Liquids (water, ice chips, juice, milk, gelatin, ice cream, etc).  Eat only when fully alert, upright, slow rate of intake.     Discharge Medications   Current Discharge Medication List      START taking these medications    Details   pyridostigmine (MESTINON) 60 MG tablet Take 1 tablet (60 mg) by mouth 3 times  daily  Qty:      Associated Diagnoses: Myasthenia gravis (H)      sulfamethoxazole-trimethoprim (BACTRIM) 400-80 MG tablet 1 tablet by Oral or Feeding Tube route daily  Qty:      Associated Diagnoses: Need for pneumocystis prophylaxis         CONTINUE these medications which have CHANGED    Details   furosemide (LASIX) 20 MG tablet Take 1 tablet (20 mg) by mouth daily  Qty:      Associated Diagnoses: Chronic diastolic congestive heart failure (H)      mycophenolate (GENERIC EQUIVALENT) 500 MG tablet Take 2 tablets (1,000 mg) by mouth 2 times daily  Qty:      Associated Diagnoses: Myasthenia gravis (H)      predniSONE (DELTASONE) 20 MG tablet Take 3 tablets (60 mg) by mouth daily  Qty:      Associated Diagnoses: Myasthenia gravis (H)         CONTINUE these medications which have NOT CHANGED    Details   acetaminophen (TYLENOL) 325 MG tablet Take 650 mg by mouth every 4 hours as needed for mild pain      calcium carbonate 600 mg-vitamin D 400 units (CALTRATE) 600-400 MG-UNIT per tablet Take 1 tablet by mouth daily      Cholecalciferol 100 MCG (4000 UT) CAPS Take 4,000 Units by mouth daily      metoprolol tartrate (LOPRESSOR) 25 MG tablet Take 0.5 tablets (12.5 mg) by mouth 2 times daily  Qty:      Associated Diagnoses: Essential hypertension, benign      Multiple Vitamins-Minerals (MULTIVITAMIN ADULTS 50+) TABS Take 1 tablet by mouth daily       potassium chloride ER (KLOR-CON M) 10 MEQ CR tablet Take 2 tablets (20 mEq) by mouth 2 times daily    Associated Diagnoses: Heart failure with preserved ejection fraction, NYHA class I (H)           Allergies   Allergies   Allergen Reactions     Ciprofloxacin Other (See Comments)     Contraindicated for myasthenia gravis patients     Procainamide Other (See Comments)     Contraindicated for myasthenia gravis patients     Quinidine Other (See Comments)     Contraindicated for myasthenia gravis patients     Quinine Other (See Comments)     Contraindicated for myasthenia gravis  patients     Data   Most Recent 3 CBC's:  Recent Labs   Lab Test 04/13/21  0813 04/12/21  0600 04/11/21  0738   WBC 6.3 5.7 6.1   HGB 11.0* 10.4* 11.2*   MCV 95 93 94    152 162      Most Recent 3 BMP's:  Recent Labs   Lab Test 04/13/21  0813 04/12/21  0600 04/11/21  0738    137 138   POTASSIUM 4.1 4.2 3.8   CHLORIDE 95 96 97   CO2 >45* 45* 44*   BUN 16 17 16   CR 0.56* 0.56* 0.52*   ANIONGAP Not Calculated <1* <1*   NOAH 8.6 8.4* 8.5   GLC 66* 70 72     Most Recent 2 LFT's:  Recent Labs   Lab Test 03/23/21  0419 03/22/21  0436   AST 22 15   ALT 30 22   ALKPHOS 42 42   BILITOTAL 0.4 0.7     Most Recent INR's and Anticoagulation Dosing History:  Anticoagulation Dose History     Recent Dosing and Labs Latest Ref Rng & Units 2/4/2007 11/8/2010 9/22/2012 4/7/2021 4/9/2021    INR 0.86 - 1.14 0.97 0.95 1.00 1.20(H) 1.10        Most Recent 3 Troponin's:  Recent Labs   Lab Test 03/29/21  1700 03/29/21  1247 03/29/21  0550   TROPI 0.022 0.023 0.028     Most Recent 6 Bacteria Isolates From Any Culture (See EPIC Reports for Culture Details):  Recent Labs   Lab Test 03/20/21  1640 03/18/21  2217 03/04/21  2250 03/04/21  2242 03/04/21  2123 09/09/19  1707   CULT Moderate growth  Staphylococcus aureus  * >100,000 colonies/mL  Escherichia coli  * No growth No growth No growth Heavy growth  Enterococcus faecalis  *  Moderate growth  Coagulase negative Staphylococcus  Susceptibility testing not routinely done  *     Results for orders placed or performed during the hospital encounter of 03/19/21   US Lower Extremity Arterial Duplex Left    Narrative    PRELIMINARY REPORT - Final read in the a.m.    EXAM: US LOWER EXTREMITY ARTERIAL DUPLEX LEFT  LOCATION: [facility]  DATE/TIME: 3/19/2021 11:27 PM    INDICATION: Cold limb.    COMPARISON: None.    TECHNIQUE: Duplex utilizing 2D gray-scale imaging, Doppler interrogation with color-flow and spectral waveform analysis.    FINDINGS: Blood flow is visualized  throughout the major arteries of the left lower extremity. Unremarkable Doppler waveform evaluation of the major arteries of the left lower extremity. Evaluation of the peroneal artery is limited due to atherosclerotic   calcification.    LEFT LOWER EXTREMITY ARTERIAL ASSESSMENT:  Common femoral artery: 96 cm/s  Profunda femoris artery: 69 cm/s  SFA (proximal): 91 cm/s  SFA (mid): 65 cm/s  SFA (distal): 52 cm/s  Popliteal artery: 45 cm/s  Posterior tibial artery: 39 cm/s      Impression    IMPRESSION: No convincing evidence of significant stenosis or occlusion of the major arteries of the left lower extremity.    Preliminary Interpretation Dictated By: Leodan Fernandez MD  Date: 3/20/2021      Agree with preliminary report. No significant arterial insufficiency.   XR Chest Port 1 View    Narrative    EXAM: XR CHEST PORT 1 VW  LOCATION: [facility]  DATE/TIME: 3/19/2021 11:22 PM    INDICATION: Weakness  COMPARISON: 03/12/2021      Impression    IMPRESSION: Endotracheal tube 3.5 cm above gabino. Enteric tube tip in stomach. Sidehole in distal esophagus. Elevated right hemidiaphragm. Bibasilar atelectasis or infiltrate and small effusions. Atherosclerotic aorta.   XR Chest Port 1 View    Narrative    CHEST ONE VIEW  3/23/2021 1:19 PM     HISTORY: Hypoxemia.    COMPARISON: March 19, 2021      Impression    IMPRESSION: Mild bibasilar atelectasis and/or infiltrate. Question  elevated right hemidiaphragm. Low lung volumes bilaterally.    SAHRA GARZA MD   XR Chest Port 1 View    Narrative    EXAM: XR CHEST PORT 1 VW  LOCATION: [facility]  DATE/TIME: 3/29/2021 4:20 AM    INDICATION: Intubation.  COMPARISON: 3/23/2021.      Impression    IMPRESSION: Endotracheal tube 3 cm above the gabion. Enteric tube with tip in the fundus of the stomach. Heart size and pulmonary vascularity within normal limits. Mild patchy right mid and lower lung infiltrate. Slight linear scarring or atelectasis   left lung  base. Aortic calcification. No significant bony abnormalities.             IR CVC Tunnel Placement > 5 Yrs of Age    Narrative    INTERVENTIONAL RADIOLOGY CVC TUNNEL PLACEMENT > 5 YRS OF AGE  March 31, 2021 11:23 AM     CLINICAL HISTORY/INDICATION: HD catheter for plasmapheresis in  myasthenia gravis.    PROCEDURES PERFORMED:   1. Ultrasound-guided right internal jugular venotomy.  2. Tunneled catheter placement.    MEDICATIONS: 1% lidocaine    CONTRAST: None.    FLUORO: 0.4 minutes.     IMAGES/DOSE: 5.3 mGy.    CONSENT: Following a discussion of the risks, benefits, indications  and alternatives to treatment, appropriate informed consent was  obtained.      TIMEOUT: A timeout was performed per universal protocol policy to  ensure correct patient, site, and procedure to be performed.     CENTRAL LINE STATEMENT: The patient was brought to the interventional  radiology suite and placed supine on the table. The patients right  neck and anterior chest region were prepped and draped in a sterile  fashion for tunneled line placement. The procedure was performed using  maximal Sterile Barrier Technique Utilized: cap and mask and sterile  gown and sterile gloves and sterile full body drape and hand hygiene  and skin preparation 2% chlorhexidine for cutaneous antisepsis (or  acceptable alternative antiseptics). Sterile Ultrasound Technique  Utilized ?Sterile gel AND sterile probe covers.     PROCEDURE AND FINDINGS: This central line was performed in accordance  with the central line bundle with the exception of vein selection.  Following a discussion of the risks, benefits, indications and  alternatives to treatment, appropriate informed consent was obtained.   The patient was brought to the interventional radiology suite and  placed supine on the table. The patients right neck and anterior chest  region were prepped and draped in a sterile fashion for tunneled line  placement. A timeout was performed per universal protocol  policy to  ensure correct patient, site, and procedure to be performed.      Ultrasound was utilized to evaluate the veins of the right neck and a  permanent record of the image was obtained which demonstrates the  internal jugular vein to be patent. Under direct ultrasound guidance,  1% Lidocaine was infiltrated and access was gained easily into the low  lateral aspect of the internal jugular vein utilizing micropuncture  technique. The wire was advanced easily under fluoroscopic guidance  and the tract was serially dilated and a peel away sheath placed. A  subcutaneous tunnel was then created over the anterior/superior chest  wall using local anesthesia and blunt dissection. Under fluoroscopic  guidance, a 14.5 Cuban dual lumen 19 cm cuffed catheter was then  pulled through the tunnel and was advanced through the peel away  sheath. A final placement film demonstrates the catheter tip at the  cavoatrial junction with the patient supine. All of the ports flush  and aspirate without difficulty following placement.  The catheter was  secured in position. The small incision at the base of the neck was  closed uneventfully. A sterile dressing was applied.     Throughout the procedure, the patient was monitored by a radiology  nurse for cardiac rhythm which remained stable. The patient tolerated  the procedure well and left the interventional radiology suite in  stable condition.      Impression    IMPRESSION: Uneventful placement of a right internal jugular vein 14.5  Cuban dual lumen 19 cm tunneled [place] catheter, as described  using ultrasound and fluoroscopic guidance. This catheter is indicated  to be use for hemodialysis or pheresis.    AMANDA RIVAS DO   Echo Limited    Narrative    [id number]  [id number]  [id number]  [id number]^VU^PATRIA     [facility]  Echocardiography Laboratory  [address] [place] [address]     Name: DAVIDSON GONZALEZ JUAN  MRN: [id number]  : 1940  Study Date:  2021 01:53 PM  Age: 80 yrs  Gender: Male  Patient Location: [facility]  Reason For Study: Respiratory Failure  Ordering Physician: PATRIA DUNN  Referring Physician: Martinez Toledo  Performed By: Demetri Hodges RDCS     BSA: 2.4 m2  Height: 70 in  Weight: 280 lb  HR: 75  BP: 153/77 mmHg  ______________________________________________________________________________  Procedure  Limited Portable Echo Adult. Optison (NDC #[id number]) given intravenously.  ______________________________________________________________________________  Interpretation Summary     The visual ejection fraction is estimated at 55-60%.  Grade I or early diastolic dysfunction.  Normal left ventricular wall motion  The right ventricle is normal in size and function.  There is mild mitral stenosis.  There is moderate mitral annular calcification.  The ascending aorta is Mildly dilated.  ______________________________________________________________________________  Left Ventricle  The visual ejection fraction is estimated at 55-60%. Grade I or early  diastolic dysfunction. Normal left ventricular wall motion.     Right Ventricle  The right ventricle is normal in size and function.     Mitral Valve  There is moderate mitral annular calcification. There is trace mitral  regurgitation. The mean mitral valve gradient is 4.0 mmHg. There is mild  mitral stenosis.     Tricuspid Valve  The right ventricular systolic pressure is approximated at 29.8 mmHg plus the  right atrial pressure. There is trace tricuspid regurgitation.     Aortic Valve  There is trace aortic regurgitation.     Vessels  The ascending aorta is Mildly dilated.     Rhythm  Sinus rhythm was noted.  ______________________________________________________________________________  MMode/2D Measurements & Calculations  asc Aorta Diam: 4.3 cm     Doppler Measurements & Calculations  MV max P.7 mmHg  MV mean P.0 mmHg  MV V2 VTI: 39.9 cm  TR max luz: 273.0 cm/sec  TR max P.8 mmHg      ______________________________________________________________________________  Report approved by: Jason Rodgers 03/23/2021 03:40 PM

## 2021-04-14 NOTE — PROGRESS NOTES
[place] [facility]  PRIMARY CARE PROVIDER AND CLINIC:  Martinez Toledo MD, [address] / [place] [address]  Chief Complaint   Patient presents with     [facility] F/U     [facility] Medical Record Number:  [id number]  Place of Service where encounter took place:  [facility] (U) [[id number]]    Thanh Goodrich  is a 80 year old  (1940), admitted to the above facility from  [facility]. Hospital stay 3/19/21 through 4/13/21..  Admitted to this facility for  rehab, medical management and nursing care.    HPI:    HPI information obtained from: facility chart records, facility staff, patient report and [facility] chart review.   Brief Summary of Hospital Course: PMH myasthenia gravis, HFpEF, COPD, HTN, MDD, prostate cancer. He was recently admitted (3/4/21 to 3/15/21) for respiratory failure requiring intubation secondary to myasthenia gravis exacerbation and CHF exacerbation. He was discharge to [facility]. On 3/19/21 he was sent back to the ED due to respiratory failure and required emergent intubation. He was treated for CHF exacerbation. He was also treated for UTI and pneumonia. He was extubated 3/22, but then reintubated 3/29 due to worsening encephalopathy and hypercarbia. Ultimately, he will now require AVAPS at night. He was seen by Palliative care and he elected for ongoing restorative care. He had a HD catheter placed and received 5 sessions of plasmapheresis. He has a chronic indwelling catheter, had some issues with hematuria.    Updates on Status Since Skilled nursing Admission:   Patient does not have any acute medical concerns at the moment, but he is unhappy with his transfer here. He was supposed to go to [facility]. He does not like this facility or the food so far. He did not sleep well last night because his BiPap was not here yet (awaiting delivery from [facility]). He has not spoken to the  yet, but  he is advised that she can look into transferring him to [facility]. He is currently on 2L oxygen, no acute SOB. He was just finishing his work with PT for the day. He was able to transfer to the wheelchair using EZ-Stand, but then was too weak to get back into bed with it, so they are getting a Noel.       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 He also has no past medical history of Arthritis, Cerebral infarction (H), Congestive heart failure (H), COPD (chronic obstructive pulmonary disease) (H), Diabetes (H), History of blood transfusion, or Thyroid disease.  PAST SURGICAL HISTORY:   has a past surgical history that includes Prostate Cancer Cryotherapy (2007); Tonsillectomy (1945); biopsy (2013?); and IR CVC Tunnel Placement > 5 Yrs of Age (3/31/2021).  FAMILY HISTORY: family history includes C.A.D. in his maternal grandmother; Cancer in his father and paternal uncle; Cerebrovascular Disease in his maternal grandfather; Depression in his mother; Diabetes in his maternal grandmother; Hypertension in his father and mother; Obesity in his father; Prostate Cancer in his father and another family member; Substance Abuse in his father  "and mother.  SOCIAL HISTORY:   reports that he has never smoked. He has never used smokeless tobacco. He reports current alcohol use. He reports that he does not use drugs.    Post Discharge Medication Reconciliation Status: discharge medications reconciled, continue medications without change    Current Outpatient Medications   Medication Sig Dispense Refill     acetaminophen (TYLENOL) 325 MG tablet Take 650 mg by mouth every 4 hours as needed for mild pain       calcium carbonate 600 mg-vitamin D 400 units (CALTRATE) 600-400 MG-UNIT per tablet Take 1 tablet by mouth daily       Cholecalciferol 100 MCG (4000 UT) CAPS Take 4,000 Units by mouth daily       furosemide (LASIX) 20 MG tablet Take 1 tablet (20 mg) by mouth daily       metoprolol tartrate (LOPRESSOR) 25 MG tablet Take 0.5 tablets (12.5 mg) by mouth 2 times daily       Multiple Vitamins-Minerals (MULTIVITAMIN ADULTS 50+) TABS Take 1 tablet by mouth daily        mycophenolate (GENERIC EQUIVALENT) 500 MG tablet Take 2 tablets (1,000 mg) by mouth 2 times daily       potassium chloride ER (KLOR-CON M) 10 MEQ CR tablet Take 1 tablet (10 mEq) by mouth daily       predniSONE (DELTASONE) 20 MG tablet Take 3 tablets (60 mg) by mouth daily       pyridostigmine (MESTINON) 60 MG tablet Take 1 tablet (60 mg) by mouth 3 times daily       sulfamethoxazole-trimethoprim (BACTRIM) 400-80 MG tablet 1 tablet by Oral or Feeding Tube route daily         ROS:  10 point ROS of systems including Constitutional, Eyes, Respiratory, Cardiovascular, Gastroenterology, Genitourinary, Integumentary, Musculoskeletal, Psychiatric were all negative except for pertinent positives noted in my HPI.    Vitals:  BP (!) 142/79   Pulse 71   Temp 98  F (36.7  C)   Resp 19   Ht 1.778 m (5' 10\")   Wt 121.1 kg (267 lb)   SpO2 98%   BMI 38.31 kg/m    Exam:  GENERAL APPEARANCE:  Alert, in no distress  ENT:  Mouth and posterior oropharynx normal, moist mucous membranes, normal hearing acuity  EYES: "  EOM, conjunctivae, lids, pupils and irises normal  RESP:  respiratory effort and palpation of chest normal, no respiratory distress, diminished breath sounds bases  CV:  Palpation and auscultation of heart done , regular rate and rhythm, no murmur, rub, or gallop  ABDOMEN:  normal bowel sounds, soft, nontender, no hepatosplenomegaly or other masses  SKIN:  Inspection of skin and subcutaneous tissue baseline, Palpation of skin and subcutaneous tissue baseline  PSYCH:  oriented X 3, normal insight, judgement and memory    Lab/Diagnostic data:  Recent labs in [facility] reviewed by me today.       ASSESSMENT/PLAN:  (J96.22) Acute on chronic respiratory failure with hypercapnia (H)  (primary encounter diagnosis)  Comment: Currently stable, but at risk for worsening without BiPap. This will be arriving today. He could even use it during the day if had any acute SOB  Plan: Continue current POC with no changes at this time and adjustments as needed.    (G70.00) Myasthenia gravis (H)  Comment: Given his prolonged acute illness, he is at risk for worsening again, so need to monitor closely  Plan: Continue current POC with no changes at this time and adjustments as needed. Monitor for changes in respiratory status, weakness    (R53.81) Physical deconditioning  Comment: Due to severe illness. Expect he will require several weeks of rehab  Plan: PT/OT eval and treat, discharge planning per their recommendations.    (I50.32) Chronic diastolic heart failure (H)  Comment: Discharge weight from hospital was 267 lbs. If his weight were to start climbing above 270 lbs, would increase lasix.   Plan: Continue current medications: lasix. Monitor weights q day. Call provider if greater than 270 pounds. Monitor for shortness of breath, wheezing, increasing lower extremity edema and change in activity tolerance.     (I10) Benign essential hypertension  Comment: Fair control. To avoid risk of hypotension, falls, dizziness and tissue  hypoperfusion, recommend  BP goal is < 150/90mmHg.  Plan: Continue current POC with no changes at this time and adjustments as needed.    (R33.9) Urinary retention  Comment: Chronic  Plan: Continue current POC with no changes at this time and adjustments as needed.      Electronically signed by:  ANIYA Kilpatrick [facility]  Phone: [phone]

## 2021-04-14 NOTE — PROGRESS NOTES
Called [facility] and left message with , Yennifer, to let her know [facility] has all needed documentation for setup of home NIV after discharge from TCU. Asked TCU to contact [facility] 2 days prior to discharge to arrange for home setup of his equipment.     Brittaney Feldman, RRT  [facility]  Main line [phone]

## 2021-04-14 NOTE — LETTER
4/14/2021        RE: Thanh Goodrich  [address]  [place] [address]        [place] [facility]  PRIMARY CARE PROVIDER AND CLINIC:  Martinez Toledo MD, [address] / [place] [address]  Chief Complaint   Patient presents with     [facility] F/U     [facility] Medical Record Number:  [id number]  Place of Service where encounter took place:  [facility] (U) [[id number]]    Thanh Goodrich  is a 80 year old  (1940), admitted to the above facility from  [facility]. Hospital stay 3/19/21 through 4/13/21..  Admitted to this facility for  rehab, medical management and nursing care.    HPI:    HPI information obtained from: facility chart records, facility staff, patient report and [facility] chart review.   Brief Summary of Hospital Course: PMH myasthenia gravis, HFpEF, COPD, HTN, MDD, prostate cancer. He was recently admitted (3/4/21 to 3/15/21) for respiratory failure requiring intubation secondary to myasthenia gravis exacerbation and CHF exacerbation. He was discharge to [facility]. On 3/19/21 he was sent back to the ED due to respiratory failure and required emergent intubation. He was treated for CHF exacerbation. He was also treated for UTI and pneumonia. He was extubated 3/22, but then reintubated 3/29 due to worsening encephalopathy and hypercarbia. Ultimately, he will now require AVAPS at night. He was seen by Palliative care and he elected for ongoing restorative care. He had a HD catheter placed and received 5 sessions of plasmapheresis. He has a chronic indwelling catheter, had some issues with hematuria.    Updates on Status Since Skilled nursing Admission:   Patient does not have any acute medical concerns at the moment, but he is unhappy with his transfer here. He was supposed to go to [facility]. He does not like this facility or the food so far. He did not sleep well last night because his BiPap  was not here yet (awaiting delivery from [facility]). He has not spoken to the  yet, but he is advised that she can look into transferring him to [facility]. He is currently on 2L oxygen, no acute SOB. He was just finishing his work with PT for the day. He was able to transfer to the wheelchair using EZ-Stand, but then was too weak to get back into bed with it, so they are getting a Noel.       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 He also has no past medical history of Arthritis, Cerebral infarction (H), Congestive heart failure (H), COPD (chronic obstructive pulmonary disease) (H), Diabetes (H), History of blood transfusion, or Thyroid disease.  PAST SURGICAL HISTORY:   has a past surgical history that includes Prostate Cancer Cryotherapy (2007); Tonsillectomy (1945); biopsy (2013?); and IR CVC Tunnel Placement > 5 Yrs of Age (3/31/2021).  FAMILY HISTORY: family history includes C.A.D. in his maternal grandmother; Cancer in his father and paternal uncle; Cerebrovascular Disease in his maternal grandfather; Depression in his mother; Diabetes in his maternal grandmother; Hypertension in his father and  "mother; Obesity in his father; Prostate Cancer in his father and another family member; Substance Abuse in his father and mother.  SOCIAL HISTORY:   reports that he has never smoked. He has never used smokeless tobacco. He reports current alcohol use. He reports that he does not use drugs.    Post Discharge Medication Reconciliation Status: discharge medications reconciled, continue medications without change    Current Outpatient Medications   Medication Sig Dispense Refill     acetaminophen (TYLENOL) 325 MG tablet Take 650 mg by mouth every 4 hours as needed for mild pain       calcium carbonate 600 mg-vitamin D 400 units (CALTRATE) 600-400 MG-UNIT per tablet Take 1 tablet by mouth daily       Cholecalciferol 100 MCG (4000 UT) CAPS Take 4,000 Units by mouth daily       furosemide (LASIX) 20 MG tablet Take 1 tablet (20 mg) by mouth daily       metoprolol tartrate (LOPRESSOR) 25 MG tablet Take 0.5 tablets (12.5 mg) by mouth 2 times daily       Multiple Vitamins-Minerals (MULTIVITAMIN ADULTS 50+) TABS Take 1 tablet by mouth daily        mycophenolate (GENERIC EQUIVALENT) 500 MG tablet Take 2 tablets (1,000 mg) by mouth 2 times daily       potassium chloride ER (KLOR-CON M) 10 MEQ CR tablet Take 1 tablet (10 mEq) by mouth daily       predniSONE (DELTASONE) 20 MG tablet Take 3 tablets (60 mg) by mouth daily       pyridostigmine (MESTINON) 60 MG tablet Take 1 tablet (60 mg) by mouth 3 times daily       sulfamethoxazole-trimethoprim (BACTRIM) 400-80 MG tablet 1 tablet by Oral or Feeding Tube route daily         ROS:  10 point ROS of systems including Constitutional, Eyes, Respiratory, Cardiovascular, Gastroenterology, Genitourinary, Integumentary, Musculoskeletal, Psychiatric were all negative except for pertinent positives noted in my HPI.    Vitals:  BP (!) 142/79   Pulse 71   Temp 98  F (36.7  C)   Resp 19   Ht 1.778 m (5' 10\")   Wt 121.1 kg (267 lb)   SpO2 98%   BMI 38.31 kg/m    Exam:  GENERAL APPEARANCE:  " Alert, in no distress  ENT:  Mouth and posterior oropharynx normal, moist mucous membranes, normal hearing acuity  EYES:  EOM, conjunctivae, lids, pupils and irises normal  RESP:  respiratory effort and palpation of chest normal, no respiratory distress, diminished breath sounds bases  CV:  Palpation and auscultation of heart done , regular rate and rhythm, no murmur, rub, or gallop  ABDOMEN:  normal bowel sounds, soft, nontender, no hepatosplenomegaly or other masses  SKIN:  Inspection of skin and subcutaneous tissue baseline, Palpation of skin and subcutaneous tissue baseline  PSYCH:  oriented X 3, normal insight, judgement and memory    Lab/Diagnostic data:  Recent labs in [facility] reviewed by me today.       ASSESSMENT/PLAN:  (J96.22) Acute on chronic respiratory failure with hypercapnia (H)  (primary encounter diagnosis)  Comment: Currently stable, but at risk for worsening without BiPap. This will be arriving today. He could even use it during the day if had any acute SOB  Plan: Continue current POC with no changes at this time and adjustments as needed.    (G70.00) Myasthenia gravis (H)  Comment: Given his prolonged acute illness, he is at risk for worsening again, so need to monitor closely  Plan: Continue current POC with no changes at this time and adjustments as needed. Monitor for changes in respiratory status, weakness    (R53.81) Physical deconditioning  Comment: Due to severe illness. Expect he will require several weeks of rehab  Plan: PT/OT eval and treat, discharge planning per their recommendations.    (I50.32) Chronic diastolic heart failure (H)  Comment: Discharge weight from hospital was 267 lbs. If his weight were to start climbing above 270 lbs, would increase lasix.   Plan: Continue current medications: lasix. Monitor weights q day. Call provider if greater than 270 pounds. Monitor for shortness of breath, wheezing, increasing lower extremity edema and change in activity tolerance.     (I10)  Benign essential hypertension  Comment: Fair control. To avoid risk of hypotension, falls, dizziness and tissue hypoperfusion, recommend  BP goal is < 150/90mmHg.  Plan: Continue current POC with no changes at this time and adjustments as needed.    (R33.9) Urinary retention  Comment: Chronic  Plan: Continue current POC with no changes at this time and adjustments as needed.      Electronically signed by:  ANIYA Kilpatrick [facility]  Phone: [phone]

## 2021-04-14 NOTE — LETTER
[facility]   To:   [facility]          Please give to facility    From:   Richy Fofana RN Care Coordinator [facility]     Patient Name:  Thanh Goodrich  YOB: 1940   Admit date: 4/13/21      *Information Needed:  Please contact me when the patient will discharge (or if they will move to long term care)- include the discharge date, disposition, and main diagnosis   - If the patient is discharged with home care services, please provide the name of the agency    Also- Please inform me if a care conference is being held.   Phone, Fax or Email with information       Thank You,   Richy Fofana, RN  Clinic Care Coordinator  [facility]  [facility] [place] [place] [facility]  Ph: [phone]    [email or web address]

## 2021-04-14 NOTE — PROGRESS NOTES
Clinic Care Coordination Contact  Care Coordination Transition Communication    Referral Source: IP Handoff    Clinical Data: Patient was hospitalized at [facility] from 3/19/21 to 4/13/21 with diagnosis of myasthenia gravis crisis.     Transition to Facility:              Facility Name: [facility] [place]              Contact name and phone number/fax: [phone]    Fax sent to facility containing CC RN contact information and request for notification when patient discharging.    Plan: RN/SW Care Coordinator will await notification from facility staff informing RN/SW Care Coordinator of patient's discharge plans/needs. RN/SW Care Coordinator will review chart and outreach to facility staff every 4 weeks and as needed.     Rihcy Fofana RN  Clinic Care Coordinator  [facility]  [facility]  Ph: [phone]

## 2021-04-16 NOTE — PROGRESS NOTES
[facility]  PRIMARY CARE PROVIDER AND CLINIC:  Martinez Toledo MD, [address] / [place] [address]  Chief Complaint   Patient presents with     [facility] F/U     [place] Medical Record Number:  [id number]  Place of Service where encounter took place:  [facility]  (U) [[id number]]    Thanh Goodrich  is a 80 year old  (1940), admitted to the above facility from [facility] [place]..  Admitted to this facility for  rehab, medical management and nursing care.    HPI:    HPI information obtained from: facility chart records, facility staff, patient report, [facility] chart review and Care Everywhere [facility] chart review.   Brief Summary of Hospital Course:     Patient transferred to this TCU from [facility] in [place] for ongoing acute rehab and medical management. Has had recent back to back hospitalizations respiratory failure 2/2 myasthenia and CHF exacerbations wherein he has required intubation. At this point AVAPS has been recommended at night and patient was transferred with orders for this. Reportedly the respiratory company is en route with the machine.He was seen by Palliative care and he elected for ongoing restorative care. He had a HD catheter placed and received 5 sessions of plasmapheresis. He has a chronic indwelling catheter, had some issues with hematuria. PMH reviewed.    Updates on Status Since Skilled nursing Admission:     Today patient is found in room. Alert, calm, NAD. Mainly wanting to discuss and ensure ventilator arrives and is set up correctly. Reports breathing is currently at baseline. Discussed recent medical history and hospitalizations as well as rehab routines, goals and plan of care.   Answered patients questions.     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 He also has no past medical history of Arthritis, Cerebral infarction (H), Congestive heart failure (H), COPD (chronic obstructive pulmonary disease) (H), Diabetes (H), History of blood transfusion, or Thyroid disease.  PAST SURGICAL HISTORY:   has a past surgical history that includes Prostate Cancer Cryotherapy (2007); Tonsillectomy (1945); biopsy (2013?); and IR CVC Tunnel Placement > 5 Yrs of Age (3/31/2021).  FAMILY HISTORY: family history includes C.A.D. in his maternal grandmother; Cancer in his father and paternal uncle; Cerebrovascular Disease in his maternal grandfather; Depression in his mother; Diabetes in his maternal grandmother; Hypertension in his father and mother; Obesity in his father; Prostate Cancer in his father and another family member; Substance Abuse in his father and mother.  SOCIAL HISTORY:   reports that he has never smoked. He has never used smokeless tobacco. He reports current alcohol use. He reports that he does not use drugs.    Post Discharge Medication Reconciliation Status: discharge medications reconciled and changed, per note/orders    Current Outpatient Medications   Medication Sig Dispense Refill     acetaminophen (TYLENOL) 325 MG tablet Take 650 mg by mouth every 4 hours as needed for mild pain       calcium carbonate 600 mg-vitamin D 400  "units (CALTRATE) 600-400 MG-UNIT per tablet Take 1 tablet by mouth daily       Cholecalciferol 100 MCG (4000 UT) CAPS Take 4,000 Units by mouth daily       furosemide (LASIX) 20 MG tablet Take 1 tablet (20 mg) by mouth daily       metoprolol tartrate (LOPRESSOR) 25 MG tablet Take 0.5 tablets (12.5 mg) by mouth 2 times daily       Multiple Vitamins-Minerals (MULTIVITAMIN ADULTS 50+) TABS Take 1 tablet by mouth daily        mycophenolate (GENERIC EQUIVALENT) 500 MG tablet Take 2 tablets (1,000 mg) by mouth 2 times daily       potassium chloride ER (KLOR-CON M) 10 MEQ CR tablet Take 1 tablet (10 mEq) by mouth daily       predniSONE (DELTASONE) 20 MG tablet Take 3 tablets (60 mg) by mouth daily       pyridostigmine (MESTINON) 60 MG tablet Take 1 tablet (60 mg) by mouth 3 times daily       sulfamethoxazole-trimethoprim (BACTRIM) 400-80 MG tablet 1 tablet by Oral or Feeding Tube route daily             ROS:  10 point ROS of systems including Constitutional, Eyes, Respiratory, Cardiovascular, Gastroenterology, Genitourinary, Integumentary, Musculoskeletal, Psychiatric were all negative except for pertinent positives noted in my HPI.    Vitals:  /65   Pulse 76   Temp 98.2  F (36.8  C)   Resp 17   Ht 1.778 m (5' 10\")   Wt 121.1 kg (267 lb)   SpO2 98%   BMI 38.31 kg/m    Exam:  GENERAL APPEARANCE: Alert, in no distress   ENT: Mouth and posterior oropharynx normal, moist mucous membranes   EYES: EOM, conjunctivae, lids, pupils and irises normal   NECK: No adenopathy,masses or thyromegaly   RESP: respiratory effort slight increased and palpation of chest normal, Lung sounds decreased throughout (resp shallow), O2 on 5LNc  CV: VS as above- no edema   ABDOMEN: normal bowel sounds, soft, nontender, no hepatosplenomegaly or other masses   : palpation of bladder WNL   - Beckett catheter in place draining clear yellow urine  M/S: Gait and station normal   Digits and nails normal - MONTEZ  SKIN: Inspection of skin and " subcutaneous tissue baseline, Palpation of skin and subcutaneous tissue baseline,   NEURO: Cranial nerves 2-12 are tested and grossly at patient's baseline, Examination of sensation by touch normal   PSYCH: oriented X 3, affect and mood normal             Lab/Diagnostic data:  Recent labs in [facility] reviewed by me today.     ASSESSMENT/PLAN:  Acute on chronic respiratory failure with hypercapnia (H)  Recurrent 2/2 below- requiring recurrent intubation- notable increased WOB- VS/sats currently stable  - seen by pulmonology inpatient who recommended AVAPS (new) at HS. RT on way to set this up per pulmonology orders  - pulmonary toilet  - VS per unit protocol  - this equipment and orders will need to be set up for patient at home, this is to be coordinated per neurology/pulmonology  Chronic diastolic heart failure (H)  Benign essential hypertension  -EF 55-60% -appears euvolemic  - continue on lasix, metoprolol  - follow weights  Myasthenia gravis (H)  - recurrent resp failure as outlined above. - Required plasmapharesis inpatient- now AVAPS as above  - continues on mycophenolate, prednisone and pyridostgmine  - has f/u with neurology 4/27  - PT/OT  - supportive cares and treatments    Physical deconditioning  - 2/2 above, acute rehab  - supportive cares and tx  - ongoing discharge planning, SW follow and care conferences per unit protocol    Urinary retention  - has chronic indwelling catheter- nsg managing per unit protocol  - f/w urology as directed    -Written on unit        Electronically signed by:  ANIYA Hernandes CNP

## 2021-04-16 NOTE — LETTER
4/16/2021        RE: Thanh Goodrich  [address]  [place] [address]        [facility]  PRIMARY CARE PROVIDER AND CLINIC:  Martinez Toledo MD, [address] / [place] [address]  Chief Complaint   Patient presents with     [facility] F/U     [place] Medical Record Number:  [id number]  Place of Service where encounter took place:  [facility]  (U) [[id number]]    Thanh Goodrich  is a 80 year old  (1940), admitted to the above facility from [facility]..  Admitted to this facility for  rehab, medical management and nursing care.    HPI:    HPI information obtained from: facility chart records, facility staff, patient report, [facility] chart review and Care Everywhere [facility] chart review.   Brief Summary of Hospital Course:     Patient transferred to this TCU from [facility] in [place] for ongoing acute rehab and medical management. Has had recent back to back hospitalizations respiratory failure 2/2 myasthenia and CHF exacerbations wherein he has required intubation. At this point AVAPS has been recommended at night and patient was transferred with orders for this. Reportedly the respiratory company is en route with the machine.He was seen by Palliative care and he elected for ongoing restorative care. He had a HD catheter placed and received 5 sessions of plasmapheresis. He has a chronic indwelling catheter, had some issues with hematuria. PMH reviewed.    Updates on Status Since Skilled nursing Admission:     Today patient is found in room. Alert, calm, NAD. Mainly wanting to discuss and ensure ventilator arrives and is set up correctly. Reports breathing is currently at baseline. Discussed recent medical history and hospitalizations as well as rehab routines, goals and plan of care.   Answered patients questions.     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 He also has no past medical history of Arthritis, Cerebral infarction (H), Congestive heart failure (H), COPD (chronic obstructive pulmonary disease) (H), Diabetes (H), History of blood transfusion, or Thyroid disease.  PAST SURGICAL HISTORY:   has a past surgical history that includes Prostate Cancer Cryotherapy (2007); Tonsillectomy (1945); biopsy (2013?); and IR CVC Tunnel Placement > 5 Yrs of Age (3/31/2021).  FAMILY HISTORY: family history includes C.A.D. in his maternal grandmother; Cancer in his father and paternal uncle; Cerebrovascular Disease in his maternal grandfather; Depression in his mother; Diabetes in his maternal grandmother; Hypertension in his father and mother; Obesity in his father; Prostate Cancer in his father and another family member; Substance Abuse in his father and mother.  SOCIAL HISTORY:   reports that he has never smoked. He has never used smokeless tobacco. He reports current alcohol use. He reports that he does not use drugs.    Post Discharge Medication Reconciliation Status: discharge medications reconciled and changed, per note/orders    Current Outpatient Medications   Medication Sig Dispense Refill     acetaminophen (TYLENOL)  "325 MG tablet Take 650 mg by mouth every 4 hours as needed for mild pain       calcium carbonate 600 mg-vitamin D 400 units (CALTRATE) 600-400 MG-UNIT per tablet Take 1 tablet by mouth daily       Cholecalciferol 100 MCG (4000 UT) CAPS Take 4,000 Units by mouth daily       furosemide (LASIX) 20 MG tablet Take 1 tablet (20 mg) by mouth daily       metoprolol tartrate (LOPRESSOR) 25 MG tablet Take 0.5 tablets (12.5 mg) by mouth 2 times daily       Multiple Vitamins-Minerals (MULTIVITAMIN ADULTS 50+) TABS Take 1 tablet by mouth daily        mycophenolate (GENERIC EQUIVALENT) 500 MG tablet Take 2 tablets (1,000 mg) by mouth 2 times daily       potassium chloride ER (KLOR-CON M) 10 MEQ CR tablet Take 1 tablet (10 mEq) by mouth daily       predniSONE (DELTASONE) 20 MG tablet Take 3 tablets (60 mg) by mouth daily       pyridostigmine (MESTINON) 60 MG tablet Take 1 tablet (60 mg) by mouth 3 times daily       sulfamethoxazole-trimethoprim (BACTRIM) 400-80 MG tablet 1 tablet by Oral or Feeding Tube route daily             ROS:  10 point ROS of systems including Constitutional, Eyes, Respiratory, Cardiovascular, Gastroenterology, Genitourinary, Integumentary, Musculoskeletal, Psychiatric were all negative except for pertinent positives noted in my HPI.    Vitals:  /65   Pulse 76   Temp 98.2  F (36.8  C)   Resp 17   Ht 1.778 m (5' 10\")   Wt 121.1 kg (267 lb)   SpO2 98%   BMI 38.31 kg/m    Exam:  GENERAL APPEARANCE: Alert, in no distress   ENT: Mouth and posterior oropharynx normal, moist mucous membranes   EYES: EOM, conjunctivae, lids, pupils and irises normal   NECK: No adenopathy,masses or thyromegaly   RESP: respiratory effort slight increased and palpation of chest normal, Lung sounds decreased throughout (resp shallow), O2 on 5LNc  CV: VS as above- no edema   ABDOMEN: normal bowel sounds, soft, nontender, no hepatosplenomegaly or other masses   : palpation of bladder WNL   - Beckett catheter in place draining " clear yellow urine  M/S: Gait and station normal   Digits and nails normal - MONTEZ  SKIN: Inspection of skin and subcutaneous tissue baseline, Palpation of skin and subcutaneous tissue baseline,   NEURO: Cranial nerves 2-12 are tested and grossly at patient's baseline, Examination of sensation by touch normal   PSYCH: oriented X 3, affect and mood normal             Lab/Diagnostic data:  Recent labs in [facility] reviewed by me today.     ASSESSMENT/PLAN:  Acute on chronic respiratory failure with hypercapnia (H)  Recurrent 2/2 below- requiring recurrent intubation- notable increased WOB- VS/sats currently stable  - seen by pulmonology inpatient who recommended AVAPS (new) at HS. RT on way to set this up per pulmonology orders  - pulmonary toilet  - VS per unit protocol  - this equipment and orders will need to be set up for patient at home, this is to be coordinated per neurology/pulmonology  Chronic diastolic heart failure (H)  Benign essential hypertension  -EF 55-60% -appears euvolemic  - continue on lasix, metoprolol  - follow weights  Myasthenia gravis (H)  - recurrent resp failure as outlined above. - Required plasmapharesis inpatient- now AVAPS as above  - continues on mycophenolate, prednisone and pyridostgmine  - has f/u with neurology 4/27  - PT/OT  - supportive cares and treatments    Physical deconditioning  - 2/2 above, acute rehab  - supportive cares and tx  - ongoing discharge planning, SW follow and care conferences per unit protocol    Urinary retention  - has chronic indwelling catheter- nsg managing per unit protocol  - f/w urology as directed    -Written on unit        Electronically signed by:  ANIYA Hernandes CNP                             Sincerely,        ANIYA Hernandes CNP

## 2021-04-20 NOTE — LETTER
4/20/2021        RE: Thanh Goodrich  [address]  [place] [address]        Thanh Goodrich is a 80 year old male seen April 20, 2021 at [facility] TCU where he was admitted after two [facility] hospitalizations.   He was initially admitted 3/4-15 for a myasthenia gravis exacerbation requiring intubation and treated for diastolic CHF exacerbation. He was treated with IVIG, prednisone, pyridostigmine and mycophenolate.      He discharged to [facility] TCU, but readmitted to [facility] 3/19-4/13 for recurrent myasthenia crisis needing emergent intubation.   He was extubated, but became encephalopathic and hypercapnic with worsening respiratory acidosis and re-intubated 3/29   HD catheter was placed and he had 5 runs of plasmapheresis.   Improved and extubated again, started on BiPAP, which he continues to use at night.   Treated for E coli CAUTI, and for MSSA HCAP during hospitalization.  Pt eventually improved and transferred to TCU for ongoing recovery and Rehab.          Today pt is seen in his room resting abed after working with therapies this morning.   He is on 2 L/min O2 by nasal canula, and wearing an O2sat monitor around his neck.   He was able to sit edge of bed with therapist assist, did leg exercises but did not attempt standing.  O2sats remained >90% during therapy     Requires a Noel lift for transfers out of bed   He has a Beckett catheter because of difficulty voiding under large pannus.        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     Cellulitis of left leg 9/23/2012     Depressive disorder 2020     Edema 7/7/2009     History of steroid therapy 2/22/2010     Hyperlipidemia LDL goal <130 10/31/2010     HYPERTENSION 7/8/2003     Incontinence of urine 10/25/2010     Leg ulcer (H) 9/20/2012     MALIGN NEOPL PROSTATE-3/07 4/2/2007    T1C, Shaina 8, initial PSA 39, s/p radiation seed implants 2007      Myasthenia gravis (H)       OBESITY 7/8/2003     Osteoporosis 8/18/2009    Refused bisphosphonates 2011      PVC's (premature ventricular contractions) 11/23/2011     RIGHT OCCIPITAL PAIN 7/8/2003     ROTATOR CUFF SYND NOS- RT 9/19/2005     Skin nodule 3/18/2013     ulcer left shin 10/8/2007     Uncomplicated asthma 1944?    childhood only       Past Surgical History:   Procedure Laterality Date     BIOPSY  2013?    dealt with     IR CVC TUNNEL PLACEMENT > 5 YRS OF AGE  3/31/2021     IR CVC TUNNEL REMOVAL RIGHT  4/13/2021     Prostate Cancer Cryotherapy  2007     TONSILLECTOMY  1945       Family History   Problem Relation Age of Onset     Hypertension Mother      Depression Mother      Substance Abuse Mother         alcohol     Hypertension Father      Cancer Father         Prostate     Prostate Cancer Father      Substance Abuse Father         alcohol     Obesity Father      Diabetes Maternal Grandmother      C.A.D. Maternal Grandmother      Cerebrovascular Disease Maternal Grandfather      Cancer Paternal Uncle         Prostate     Prostate Cancer Other        Social History     Tobacco Use     Smoking status: Never Smoker     Smokeless tobacco: Never Used   Substance Use Topics     Alcohol use: Yes     Alcohol/week: 0.0 standard drinks     Comment: very rarely      SH:  Lives alone, apartment in [place].    His cousin Gaby is first contact.   Pt is retired from work for [employer].     Review Of Systems  Skin: negative   Eyes: impaired vision  Ears/Nose/Throat: hearing loss  Respiratory: as above  Cardiovascular: dyspnea on exertion, lower extremity edema and exercise intolerance  Gastrointestinal: negative  Genitourinary: with indwelling Beckett catheter      Musculoskeletal: generalized weakness, Noel lift bed to   Neurologic: myasthenia gravis  Psychiatric: negative  Hematologic/Lymphatic/Immunologic: negative  Endocrine: negative      GENERAL APPEARANCE: alert and no distress  /82   Pulse 77   Temp 97.9  " F (36.6  C)   Resp 18   Ht 1.778 m (5' 10\")   Wt 122.3 kg (269 lb 9.6 oz)   SpO2 96%   BMI 38.68 kg/m     HEENT: normocephalic, no lesion or abnormalities  NECK: no adenopathy, no asymmetry, masses, or scars and thyroid normal to palpation  RESP: decreased BS, no wheeze   Using accessory muscles of respiration.      CV: regular rate and rhythm, normal S1 S2  ABDOMEN: obese, soft, nontender, no HSM or masses and bowel sounds normal  MS: extremities normal- no gross deformities noted, no evidence of inflammation in joints, FROM in all extremities.  SKIN: no suspicious lesions or rashes  NEURO: extremity weakness, diffuse    PSYCH: affect okay  LYMPHATICS: No cervical,  or supraclavicular nodes     Last Comprehensive Metabolic Panel:  Sodium   Date Value Ref Range Status   04/13/2021 137 133 - 144 mmol/L Final     Potassium   Date Value Ref Range Status   04/13/2021 4.1 3.4 - 5.3 mmol/L Final     Chloride   Date Value Ref Range Status   04/13/2021 95 94 - 109 mmol/L Final     Carbon Dioxide   Date Value Ref Range Status   04/13/2021 >45 (HH) 20 - 32 mmol/L Final     Comment:     Critical Value called to and read back by  YAHAIRA TRAYLOR RN IN 73 AT 0858 EJV       Anion Gap   Date Value Ref Range Status   04/13/2021 Not Calculated 3 - 14 mmol/L Final     Glucose   Date Value Ref Range Status   04/13/2021 66 (L) 70 - 99 mg/dL Final     Urea Nitrogen   Date Value Ref Range Status   04/13/2021 16 7 - 30 mg/dL Final     Creatinine   Date Value Ref Range Status   04/13/2021 0.56 (L) 0.66 - 1.25 mg/dL Final     GFR Estimate   Date Value Ref Range Status   04/13/2021 >90 >60 mL/min/[1.73_m2] Final     Comment:     Non  GFR Calc  Starting 12/18/2018, serum creatinine based estimated GFR (eGFR) will be   calculated using the Chronic Kidney Disease Epidemiology Collaboration   (CKD-EPI) equation.       Calcium   Date Value Ref Range Status   04/13/2021 8.6 8.5 - 10.1 mg/dL Final     Lab Results   Component Value " Date    AST 22 03/23/2021      ALBUMIN 2.1 03/23/2021     Lab Results   Component Value Date    PROTTOTAL 6.7 03/23/2021      Lab Results   Component Value Date    ALKPHOS 42 03/23/2021     Lab Results   Component Value Date    WBC 6.3 04/13/2021      HGB 11.0 04/13/2021      MCV 95 04/13/2021       04/13/2021      ECHO 3/4/2021   Interpretation Summary  The visual ejection fraction is estimated at 55-60%.  Grade I or early diastolic dysfunction.  Normal left ventricular wall motion     The right ventricle is normal in size and function.  There is mild mitral stenosis.  There is moderate mitral annular calcification.   The ascending aorta is Mildly dilated.       IMP/PLAN:   (G70.00) Myasthenia gravis (H)   Comment: recurrent crisis requiring ICU care, intubation, plasmapheresis    Plan: mycophenolate 1000 mg bid, prednisone 60 mg/day, pyridostigmine 60 mg tid   Follow up with Neurology       (J96.22) Acute on chronic respiratory failure with hypercapnia (H)  Comment: with hypercapnia     Plan: O2 by NC 2 L/min, trying to avoid titrating flow rate up given high CO2   BiPAP at night, with naps and prn.       (R53.81) Physical deconditioning  Comment: after prolonged illness     Plan: PHYSICAL THERAPY / OCCUPATIONAL THERAPY for strengthening, transfers, ADLs, gait.   Discharge goal is return home alone, prior level of independence.        (I50.32) Chronic diastolic heart failure (H)  (I10) Benign essential hypertension  Comment:   BP Readings from Last 3 Encounters:   04/20/21 128/82   04/16/21 110/65   04/14/21 (!) 142/79      Pulse Readings from Last 4 Encounters:   04/20/21 77   04/16/21 76   04/14/21 71   04/13/21 90      Plan: metoprolol 12.5 mg bid and furosemide 20 mg/day    Follow weights, exam.  If improved volume status would decrease furosemide to avert contraction alkalosis as possible.        (Z97.8) Beckett catheter in place  Comment: due to difficulty voiding.     Plan: Look for opportunity for TOV  as he becomes more mobile.     He has an appointment with Dr Charles upcoming.         Camila Green MD         Sincerely,        Camila Green MD

## 2021-04-20 NOTE — PROGRESS NOTES
Thanh Goodrich is a 80 year old male seen April 20, 2021 at [facility]CU where he was admitted after two [facility] hospitalizations.   He was initially admitted 3/4-15 for a myasthenia gravis exacerbation requiring intubation and treated for diastolic CHF exacerbation. He was treated with IVIG, prednisone, pyridostigmine and mycophenolate.      He discharged to [facility], but readmitted to [facility] 3/19-4/13 for recurrent myasthenia crisis needing emergent intubation.   He was extubated, but became encephalopathic and hypercapnic with worsening respiratory acidosis and re-intubated 3/29   HD catheter was placed and he had 5 runs of plasmapheresis.   Improved and extubated again, started on BiPAP, which he continues to use at night.   Treated for E coli CAUTI, and for MSSA HCAP during hospitalization.  Pt eventually improved and transferred to TCU for ongoing recovery and Rehab.          Today pt is seen in his room resting abed after working with therapies this morning.   He is on 2 L/min O2 by nasal canula, and wearing an O2sat monitor around his neck.   He was able to sit edge of bed with therapist assist, did leg exercises but did not attempt standing.  O2sats remained >90% during therapy     Requires a Noel lift for transfers out of bed   He has a Beckett catheter because of difficulty voiding under large pannus.        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     Cellulitis of left leg 9/23/2012     Depressive disorder 2020     Edema 7/7/2009     History of steroid therapy 2/22/2010     Hyperlipidemia LDL goal <130 10/31/2010     HYPERTENSION 7/8/2003     Incontinence of urine 10/25/2010     Leg ulcer (H) 9/20/2012     MALIGN NEOPL PROSTATE-3/07 4/2/2007    T1C, Shaina 8, initial PSA 39, s/p radiation seed implants 2007      Myasthenia gravis (H)      OBESITY 7/8/2003     Osteoporosis 8/18/2009    Refused bisphosphonates 2011      PVC's (premature ventricular  "contractions) 11/23/2011     RIGHT OCCIPITAL PAIN 7/8/2003     ROTATOR CUFF SYND NOS- RT 9/19/2005     Skin nodule 3/18/2013     ulcer left shin 10/8/2007     Uncomplicated asthma 1944?    childhood only       Past Surgical History:   Procedure Laterality Date     BIOPSY  2013?    dealt with     IR CVC TUNNEL PLACEMENT > 5 YRS OF AGE  3/31/2021     IR CVC TUNNEL REMOVAL RIGHT  4/13/2021     Prostate Cancer Cryotherapy  2007     TONSILLECTOMY  1945       Family History   Problem Relation Age of Onset     Hypertension Mother      Depression Mother      Substance Abuse Mother         alcohol     Hypertension Father      Cancer Father         Prostate     Prostate Cancer Father      Substance Abuse Father         alcohol     Obesity Father      Diabetes Maternal Grandmother      C.A.D. Maternal Grandmother      Cerebrovascular Disease Maternal Grandfather      Cancer Paternal Uncle         Prostate     Prostate Cancer Other        Social History     Tobacco Use     Smoking status: Never Smoker     Smokeless tobacco: Never Used   Substance Use Topics     Alcohol use: Yes     Alcohol/week: 0.0 standard drinks     Comment: very rarely      SH:  Lives alone, apartment in [place].    His cousin Gaby is first contact.   Pt is retired from work for [employer].     Review Of Systems  Skin: negative   Eyes: impaired vision  Ears/Nose/Throat: hearing loss  Respiratory: as above  Cardiovascular: dyspnea on exertion, lower extremity edema and exercise intolerance  Gastrointestinal: negative  Genitourinary: with indwelling Beckett catheter      Musculoskeletal: generalized weakness, Noel lift bed to WC  Neurologic: myasthenia gravis  Psychiatric: negative  Hematologic/Lymphatic/Immunologic: negative  Endocrine: negative      GENERAL APPEARANCE: alert and no distress  /82   Pulse 77   Temp 97.9  F (36.6  C)   Resp 18   Ht 1.778 m (5' 10\")   Wt 122.3 kg (269 lb 9.6 oz)   SpO2 96%   BMI 38.68 kg/m   "   HEENT: normocephalic, no lesion or abnormalities  NECK: no adenopathy, no asymmetry, masses, or scars and thyroid normal to palpation  RESP: decreased BS, no wheeze   Using accessory muscles of respiration.      CV: regular rate and rhythm, normal S1 S2  ABDOMEN: obese, soft, nontender, no HSM or masses and bowel sounds normal  MS: extremities normal- no gross deformities noted, no evidence of inflammation in joints, FROM in all extremities.  SKIN: no suspicious lesions or rashes  NEURO: extremity weakness, diffuse    PSYCH: affect okay  LYMPHATICS: No cervical,  or supraclavicular nodes     Last Comprehensive Metabolic Panel:  Sodium   Date Value Ref Range Status   04/13/2021 137 133 - 144 mmol/L Final     Potassium   Date Value Ref Range Status   04/13/2021 4.1 3.4 - 5.3 mmol/L Final     Chloride   Date Value Ref Range Status   04/13/2021 95 94 - 109 mmol/L Final     Carbon Dioxide   Date Value Ref Range Status   04/13/2021 >45 (HH) 20 - 32 mmol/L Final     Comment:     Critical Value called to and read back by  YAHAIRA TRAYLOR RN IN 73 AT 0858 EJV       Anion Gap   Date Value Ref Range Status   04/13/2021 Not Calculated 3 - 14 mmol/L Final     Glucose   Date Value Ref Range Status   04/13/2021 66 (L) 70 - 99 mg/dL Final     Urea Nitrogen   Date Value Ref Range Status   04/13/2021 16 7 - 30 mg/dL Final     Creatinine   Date Value Ref Range Status   04/13/2021 0.56 (L) 0.66 - 1.25 mg/dL Final     GFR Estimate   Date Value Ref Range Status   04/13/2021 >90 >60 mL/min/[1.73_m2] Final     Comment:     Non  GFR Calc  Starting 12/18/2018, serum creatinine based estimated GFR (eGFR) will be   calculated using the Chronic Kidney Disease Epidemiology Collaboration   (CKD-EPI) equation.       Calcium   Date Value Ref Range Status   04/13/2021 8.6 8.5 - 10.1 mg/dL Final     Lab Results   Component Value Date    AST 22 03/23/2021      ALBUMIN 2.1 03/23/2021     Lab Results   Component Value Date    PROTTOTAL 6.7  03/23/2021      Lab Results   Component Value Date    ALKPHOS 42 03/23/2021     Lab Results   Component Value Date    WBC 6.3 04/13/2021      HGB 11.0 04/13/2021      MCV 95 04/13/2021       04/13/2021      ECHO 3/4/2021   Interpretation Summary  The visual ejection fraction is estimated at 55-60%.  Grade I or early diastolic dysfunction.  Normal left ventricular wall motion     The right ventricle is normal in size and function.  There is mild mitral stenosis.  There is moderate mitral annular calcification.   The ascending aorta is Mildly dilated.       IMP/PLAN:   (G70.00) Myasthenia gravis (H)   Comment: recurrent crisis requiring ICU care, intubation, plasmapheresis    Plan: mycophenolate 1000 mg bid, prednisone 60 mg/day, pyridostigmine 60 mg tid   Follow up with Neurology       (J96.22) Acute on chronic respiratory failure with hypercapnia (H)  Comment: with hypercapnia     Plan: O2 by NC 2 L/min, trying to avoid titrating flow rate up given high CO2   BiPAP at night, with naps and prn.       (R53.81) Physical deconditioning  Comment: after prolonged illness     Plan: PHYSICAL THERAPY / OCCUPATIONAL THERAPY for strengthening, transfers, ADLs, gait.   Discharge goal is return home alone, prior level of independence.        (I50.32) Chronic diastolic heart failure (H)  (I10) Benign essential hypertension  Comment:   BP Readings from Last 3 Encounters:   04/20/21 128/82   04/16/21 110/65   04/14/21 (!) 142/79      Pulse Readings from Last 4 Encounters:   04/20/21 77   04/16/21 76   04/14/21 71   04/13/21 90      Plan: metoprolol 12.5 mg bid and furosemide 20 mg/day    Follow weights, exam.  If improved volume status would decrease furosemide to avert contraction alkalosis as possible.        (Z97.8) Beckett catheter in place  Comment: due to difficulty voiding.     Plan: Look for opportunity for TOV as he becomes more mobile.     He has an appointment with Dr Charles upcoming.         Camila Green MD

## 2021-04-21 NOTE — LETTER
4/21/2021        RE: Thanh Goodrich  [address]  [place] [address]        [facility]  [place] Medical Record Number:  [id number]  Place of Service where encounter took place:  [facility]  ([facility]) [[id number]]  Chief Complaint   Patient presents with     RECHECK       HPI:    Thanh Goodrich  is a 80 year old (1940), who is being seen today for an episodic care visit.  HPI information obtained from: facility chart records, facility staff, patient report and [facility] chart review. Today's concern is:  Patient in U for acute rehab and medical management following hospitalization for respiratory failure 2/2 myasthenia and CHF exacerbations. Is O2 dependent and on AVAPS at night. Has been working with rehab therapists and has remained medically stable.     Past Medical and Surgical History reviewed in Epic today.    Alert, calm, NAD. Reports breathing Is baseline. VS reviewed    MEDICATIONS:      Current Outpatient Medications   Medication Sig Dispense Refill     acetaminophen (TYLENOL) 325 MG tablet Take 650 mg by mouth every 4 hours as needed for mild pain       calcium carbonate 600 mg-vitamin D 400 units (CALTRATE) 600-400 MG-UNIT per tablet Take 1 tablet by mouth daily       Cholecalciferol 100 MCG (4000 UT) CAPS Take 4,000 Units by mouth daily       furosemide (LASIX) 20 MG tablet Take 1 tablet (20 mg) by mouth daily       metoprolol tartrate (LOPRESSOR) 25 MG tablet Take 0.5 tablets (12.5 mg) by mouth 2 times daily       Multiple Vitamins-Minerals (MULTIVITAMIN ADULTS 50+) TABS Take 1 tablet by mouth daily        mycophenolate (GENERIC EQUIVALENT) 500 MG tablet Take 2 tablets (1,000 mg) by mouth 2 times daily       potassium chloride ER (KLOR-CON M) 10 MEQ CR tablet Take 1 tablet (10 mEq) by mouth daily       predniSONE (DELTASONE) 20 MG tablet Take 3 tablets (60 mg) by mouth daily       pyridostigmine (MESTINON) 60 MG tablet Take 1  "tablet (60 mg) by mouth 3 times daily       sulfamethoxazole-trimethoprim (BACTRIM) 400-80 MG tablet 1 tablet by Oral or Feeding Tube route daily           REVIEW OF SYSTEMS:  4 point ROS including Respiratory, CV, GI and , other than that noted in the HPI,  is negative    Objective:  /72   Pulse 73   Temp 98.1  F (36.7  C)   Resp 16   Ht 1.778 m (5' 10\")   Wt 124.1 kg (273 lb 8 oz)   SpO2 94%   BMI 39.24 kg/m    Exam:    Resp: Visibly increased WOB (baseline) O2 on per NC  CV: VS as above  Abd- soft, nontender, BS +  Musc- MONTEZ- weakness  Psych- alert, calm, pleasant      Labs:   Recent labs in [facility] reviewed by me today.     ASSESSMENT/PLAN:  Acute on chronic respiratory failure with hypercapnia (H)  Recurrent 2/2 below- requiring recurrent intubation- notable increased WOB- VS/sats currently stable  - seen by pulmonology inpatient who recommended AVAPS (new) at . RT on way to set this up per pulmonology orders  - pulmonary toilet  - VS per unit protocol  - this equipment and orders will need to be set up for patient at home, this is to be coordinated per neurology/pulmonology  Chronic diastolic heart failure (H)  Benign essential hypertension  -EF 55-60% -appears euvolemic  - continue on lasix, metoprolol  - follow weights  Myasthenia gravis (H)  - recurrent resp failure as outlined above. - Required plasmapharesis inpatient- now AVAPS as above  - continues on mycophenolate, prednisone and pyridostgmine  - PT/OT  - supportive cares and treatments     Physical deconditioning  - 2/2 above, acute rehab  - supportive cares and tx  - ongoing discharge planning, SW follow and care conferences per unit protocol     Urinary retention  - has chronic indwelling catheter- nsg managing per unit protocol  - f/w urology as directed         Electronically signed by:  ANIYA Hernandes CNP                   Sincerely,        ANIYA Hernandes CNP    "

## 2021-04-21 NOTE — PROGRESS NOTES
[facility]  [place] Medical Record Number:  [id number]  Place of Service where encounter took place:  [facility]  ([facility]) [[id number]]  Chief Complaint   Patient presents with     RECHECK       HPI:    Thanh Goodrich  is a 80 year old (1940), who is being seen today for an episodic care visit.  HPI information obtained from: facility chart records, facility staff, patient report and [facility] chart review. Today's concern is:  Patient in TCU for acute rehab and medical management following hospitalization for respiratory failure 2/2 myasthenia and CHF exacerbations. Is O2 dependent and on AVAPS at night. Has been working with rehab therapists and has remained medically stable.     Past Medical and Surgical History reviewed in Epic today.    Alert, calm, NAD. Reports breathing Is baseline. VS reviewed    MEDICATIONS:      Current Outpatient Medications   Medication Sig Dispense Refill     acetaminophen (TYLENOL) 325 MG tablet Take 650 mg by mouth every 4 hours as needed for mild pain       calcium carbonate 600 mg-vitamin D 400 units (CALTRATE) 600-400 MG-UNIT per tablet Take 1 tablet by mouth daily       Cholecalciferol 100 MCG (4000 UT) CAPS Take 4,000 Units by mouth daily       furosemide (LASIX) 20 MG tablet Take 1 tablet (20 mg) by mouth daily       metoprolol tartrate (LOPRESSOR) 25 MG tablet Take 0.5 tablets (12.5 mg) by mouth 2 times daily       Multiple Vitamins-Minerals (MULTIVITAMIN ADULTS 50+) TABS Take 1 tablet by mouth daily        mycophenolate (GENERIC EQUIVALENT) 500 MG tablet Take 2 tablets (1,000 mg) by mouth 2 times daily       potassium chloride ER (KLOR-CON M) 10 MEQ CR tablet Take 1 tablet (10 mEq) by mouth daily       predniSONE (DELTASONE) 20 MG tablet Take 3 tablets (60 mg) by mouth daily       pyridostigmine (MESTINON) 60 MG tablet Take 1 tablet (60 mg) by mouth 3 times daily       sulfamethoxazole-trimethoprim (BACTRIM) 400-80 MG tablet 1 tablet by  "Oral or Feeding Tube route daily           REVIEW OF SYSTEMS:  4 point ROS including Respiratory, CV, GI and , other than that noted in the HPI,  is negative    Objective:  /72   Pulse 73   Temp 98.1  F (36.7  C)   Resp 16   Ht 1.778 m (5' 10\")   Wt 124.1 kg (273 lb 8 oz)   SpO2 94%   BMI 39.24 kg/m    Exam:    Resp: Visibly increased WOB (baseline) O2 on per NC  CV: VS as above  Abd- soft, nontender, BS +  Musc- MONTEZ- weakness  Psych- alert, calm, pleasant      Labs:   Recent labs in [facility] reviewed by me today.     ASSESSMENT/PLAN:  Acute on chronic respiratory failure with hypercapnia (H)  Recurrent 2/2 below- requiring recurrent intubation- notable increased WOB- VS/sats currently stable  - seen by pulmonology inpatient who recommended AVAPS (new) at HS. RT on way to set this up per pulmonology orders  - pulmonary toilet  - VS per unit protocol  - this equipment and orders will need to be set up for patient at home, this is to be coordinated per neurology/pulmonology  Chronic diastolic heart failure (H)  Benign essential hypertension  -EF 55-60% -appears euvolemic  - continue on lasix, metoprolol  - follow weights  Myasthenia gravis (H)  - recurrent resp failure as outlined above. - Required plasmapharesis inpatient- now AVAPS as above  - continues on mycophenolate, prednisone and pyridostgmine  - PT/OT  - supportive cares and treatments     Physical deconditioning  - 2/2 above, acute rehab  - supportive cares and tx  - ongoing discharge planning, SW follow and care conferences per unit protocol     Urinary retention  - has chronic indwelling catheter- nsg managing per unit protocol  - f/w urology as directed         Electronically signed by:  ANIYA Hernandes CNP             "

## 2021-04-29 NOTE — PROGRESS NOTES
Clinic Care Coordination Contact  Care Team Conversations    CC RN spoke with reception at [facility] (ph: [phone]); they confirmed patient remains in their TCU.  Call was transferred to TCU REJI Perez; CC RN left voicemail message for TCU SW requesting return call with any patient status updates and/or information about discharge planning.  CC RN will await return call.    UPDATE at 1528: CC RN received return voicemail message from TCU REJI Pedraza (ph: [phone]) who updated that at this time there are no discharge plans in place for patient; they will be having a care conference next week 5/5/21 and invited CC RN to attend if able.  CC RN returned call, had to leave voicemail; thanked REJI for update and request follow-up call following care conference as CC RN unable to attend.    Richy Fofana, RN  Clinic Care Coordinator  [facility]  [facility]  Ph: [phone]

## 2021-04-30 NOTE — PROGRESS NOTES
[facility]  [place] Medical Record Number:  [id number]  Place of Service where encounter took place:  [facility]  ([facility]) [[id number]]  Chief Complaint   Patient presents with     RECHECK       HPI:    Thanh Goodrich  is a 80 year old (1940), who is being seen today for an episodic care visit.  HPI information obtained from: facility chart records, facility staff, patient report and [facility] chart review. Today's concern is:      Patient in TCU for acute rehab and medical management following hospitalization for respiratory failure 2/2 myasthenia and CHF exacerbations. Is O2 dependent and on AVAPS at night. Has been working with rehab therapists and has remained medically stable.   Past Medical and Surgical History reviewed in Epic today.    Found in bed. Alert, slight anxious. Is waiting for staff to come in and transfer him to w/. 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 Reports breathing is baseline.     MEDICATIONS:      Current Outpatient Medications   Medication Sig Dispense Refill     acetaminophen (TYLENOL) 325 MG tablet Take 650 mg by mouth every 4 hours as needed for mild pain       calcium carbonate 600 mg-vitamin D 400 units (CALTRATE) 600-400 MG-UNIT per tablet Take 1 tablet by mouth daily       Cholecalciferol 100 MCG (4000 UT) CAPS Take 4,000 Units by mouth daily       furosemide (LASIX) 20 MG tablet Take 1 tablet (20 mg) by mouth daily       metoprolol tartrate (LOPRESSOR) 25 MG tablet Take 0.5 tablets (12.5 mg) by mouth 2 times daily       Multiple Vitamins-Minerals (MULTIVITAMIN ADULTS 50+) TABS Take 1 tablet by mouth daily        mycophenolate (GENERIC EQUIVALENT) 500 MG tablet Take 2 tablets (1,000 mg) by mouth 2 times daily       potassium chloride ER (KLOR-CON M) 10 MEQ CR tablet Take 1 tablet (10 mEq) by mouth daily       predniSONE (DELTASONE) 20 MG tablet Take 3 tablets (60 mg) by mouth daily    "    pyridostigmine (MESTINON) 60 MG tablet Take 1 tablet (60 mg) by mouth 3 times daily       sulfamethoxazole-trimethoprim (BACTRIM) 400-80 MG tablet 1 tablet by Oral or Feeding Tube route daily           REVIEW OF SYSTEMS:  4 point ROS including Respiratory, CV, GI and , other than that noted in the HPI,  is negative    Objective:  /69   Pulse 67   Temp 97.4  F (36.3  C)   Resp 16   Ht 1.778 m (5' 10\")   Wt 124.1 kg (273 lb 9.6 oz)   SpO2 96%   BMI 39.26 kg/m    Exam:    Resp: Effort baseline, uses some accessory muscles to breath- O2 on per NC  CV: VS as above  Abd- soft, nontender, BS +  Musc- MONTEZ  Psych- alert, calm, pleasant      Labs:   Recent labs in [facility] reviewed by me today.     ASSESSMENT/PLAN:  Acute on chronic respiratory failure with hypercapnia (H)  Recurrent 2/2 below- requiring recurrent intubation- notable increased WOB- VS/sats currently stable  - seen by pulmonology inpatient who recommended AVAPS (new) at . RT on way to set this up per pulmonology orders  - pulmonary toilet  - VS per unit protocol  - this equipment and orders will need to be set up for patient at home, this is to be coordinated per neurology/pulmonology  Chronic diastolic heart failure (H)  Benign essential hypertension  -EF 55-60% -appears euvolemic  - continue on lasix, metoprolol  - follow weights  Myasthenia gravis (H)  - recurrent resp failure as outlined above. - Required plasmapharesis inpatient- now AVAPS as above  - continues on mycophenolate, prednisone and pyridostgmine  - PT/OT  - supportive cares and treatments     Physical deconditioning  - 2/2 above, acute rehab  - supportive cares and tx  - ongoing discharge planning, SW follow and care conferences per unit protocol     Urinary retention  - has chronic indwelling catheter- nsg managing per unit protocol  - f/w urology as directed             Electronically signed by:  ANIYA Hernandes CNP             "

## 2021-04-30 NOTE — LETTER
4/30/2021        RE: Thanh Goodrich  [address]  [place] [address]        [facility]  [place] Medical Record Number:  [id number]  Place of Service where encounter took place:  [facility]  ([facility]) [[id number]]  Chief Complaint   Patient presents with     RECHECK       HPI:    Thanh Goodrich  is a 80 year old (1940), who is being seen today for an episodic care visit.  HPI information obtained from: facility chart records, facility staff, patient report and [facility] chart review. Today's concern is:      Patient in U for acute rehab and medical management following hospitalization for respiratory failure 2/2 myasthenia and CHF exacerbations. Is O2 dependent and on AVAPS at night. Has been working with rehab therapists and has remained medically stable.   Past Medical and Surgical History reviewed in [facility] today.    Found in bed. Alert, slight anxious. Is waiting for staff to come in and transfer him to w/c. 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 Reports breathing is baseline.     MEDICATIONS:      Current Outpatient Medications   Medication Sig Dispense Refill     acetaminophen (TYLENOL) 325 MG tablet Take 650 mg by mouth every 4 hours as needed for mild pain       calcium carbonate 600 mg-vitamin D 400 units (CALTRATE) 600-400 MG-UNIT per tablet Take 1 tablet by mouth daily       Cholecalciferol 100 MCG (4000 UT) CAPS Take 4,000 Units by mouth daily       furosemide (LASIX) 20 MG tablet Take 1 tablet (20 mg) by mouth daily       metoprolol tartrate (LOPRESSOR) 25 MG tablet Take 0.5 tablets (12.5 mg) by mouth 2 times daily       Multiple Vitamins-Minerals (MULTIVITAMIN ADULTS 50+) TABS Take 1 tablet by mouth daily        mycophenolate (GENERIC EQUIVALENT) 500 MG tablet Take 2 tablets (1,000 mg) by mouth 2 times daily       potassium chloride ER (KLOR-CON M) 10 MEQ CR tablet Take 1  "tablet (10 mEq) by mouth daily       predniSONE (DELTASONE) 20 MG tablet Take 3 tablets (60 mg) by mouth daily       pyridostigmine (MESTINON) 60 MG tablet Take 1 tablet (60 mg) by mouth 3 times daily       sulfamethoxazole-trimethoprim (BACTRIM) 400-80 MG tablet 1 tablet by Oral or Feeding Tube route daily           REVIEW OF SYSTEMS:  4 point ROS including Respiratory, CV, GI and , other than that noted in the HPI,  is negative    Objective:  /69   Pulse 67   Temp 97.4  F (36.3  C)   Resp 16   Ht 1.778 m (5' 10\")   Wt 124.1 kg (273 lb 9.6 oz)   SpO2 96%   BMI 39.26 kg/m    Exam:    Resp: Effort baseline, uses some accessory muscles to breath- O2 on per NC  CV: VS as above  Abd- soft, nontender, BS +  Musc- MONTEZ  Psych- alert, calm, pleasant      Labs:   Recent labs in [facility] reviewed by me today.     ASSESSMENT/PLAN:  Acute on chronic respiratory failure with hypercapnia (H)  Recurrent 2/2 below- requiring recurrent intubation- notable increased WOB- VS/sats currently stable  - seen by pulmonology inpatient who recommended AVAPS (new) at HS. RT on way to set this up per pulmonology orders  - pulmonary toilet  - VS per unit protocol  - this equipment and orders will need to be set up for patient at home, this is to be coordinated per neurology/pulmonology  Chronic diastolic heart failure (H)  Benign essential hypertension  -EF 55-60% -appears euvolemic  - continue on lasix, metoprolol  - follow weights  Myasthenia gravis (H)  - recurrent resp failure as outlined above. - Required plasmapharesis inpatient- now AVAPS as above  - continues on mycophenolate, prednisone and pyridostgmine  - PT/OT  - supportive cares and treatments     Physical deconditioning  - 2/2 above, acute rehab  - supportive cares and tx  - ongoing discharge planning, SW follow and care conferences per unit protocol     Urinary retention  - has chronic indwelling catheter- nsg managing per unit protocol  - f/w urology as " directed             Electronically signed by:  ANIYA Hernandes CNP                   Sincerely,        ANIYA Hernandes CNP

## 2021-05-06 NOTE — TELEPHONE ENCOUNTER
[facility] Nurse Triage Telephone Note    Provider: Opal Henry NP  Facility:    [facility]       Facility Type:  TCU    Caller: Yennifer  Call Back Number: [phone]    Allergies:    Allergies   Allergen Reactions     Ciprofloxacin Other (See Comments)     Contraindicated for myasthenia gravis patients     Procainamide Other (See Comments)     Contraindicated for myasthenia gravis patients     Quinidine Other (See Comments)     Contraindicated for myasthenia gravis patients     Quinine Other (See Comments)     Contraindicated for myasthenia gravis patients        Reason for call: Pt admitted to our facility 4/16/21. I see 2 sets of K orders which are conflicting & want to clarify. One states K 10mEq every day, the other states K20 mEq BID.(Which he has been on). Is on Lasix 10mg every day. Last BMP 4/19/21 K+4.3 NNO after that.  Also concerned pt on Prednisone 60mg every day without taper orders.    Verbal Order/Direction given by Provider: Cont K 20 BID, Check BMP in am & NP to clarify doses after gets results.    Provider giving Order:  Sara Chadwick NP    Verbal Order given to: Yennifer Pike RN

## 2021-05-07 NOTE — PROGRESS NOTES
Clinic Care Coordination Contact  Care Team Conversations    CC RN sent e-mail to REJI Pedraza at [facility] TCU to request updates on patient's status and discharge disposition following care conference held on 5/5/21.    ANUJA RN received reply e-mail from U SW; she updated that patient will be transitioning into their Long Term Care on Monday 5/10/21 and will then be followed by Dr. Camila Green and NP Sara Chadwick.    ANUJA RN updated patient's Care Team per updates from TCU REJI.  No further patient outreaches will be made at this time as patient's needs will be met by Long Term Care facility.    Richy Fofana, RN  Clinic Care Coordinator  [facility]  [facility]  Ph: [phone]

## 2021-05-10 NOTE — LETTER
5/10/2021        RE: Thanh Goodrich  [address]  [place] [address]        [facility]  [place] Medical Record Number:  [id number]  Place of Service where encounter took place:  [facility]  ([facility]) [[id number]]  Chief Complaint   Patient presents with     RECHECK       HPI:    Thanh Goodrich  is a 80 year old (1940), who is being seen today for an episodic care visit.  HPI information obtained from: facility chart records, facility staff, patient report and [facility] chart review. Today's concern is:    Patient in TCU for acute rehab and medical management following hospitalization for respiratory failure 2/2 myasthenia and CHF exacerbations. Is O2 dependent and on AVAPS at night. Has been working with rehab therapists making slow gains and has remained medically stable. Will be transferring to LTC today.    Found in room. Alert, calm, NAD. Reports breathing is baseline. Is wondering about specific time of transfer.   Past Medical and Surgical History reviewed in Epic today.    MEDICATIONS:      Current Outpatient Medications   Medication Sig Dispense Refill     acetaminophen (TYLENOL) 325 MG tablet Take 650 mg by mouth every 4 hours as needed for mild pain       calcium carbonate 600 mg-vitamin D 400 units (CALTRATE) 600-400 MG-UNIT per tablet Take 1 tablet by mouth daily       Cholecalciferol 100 MCG (4000 UT) CAPS Take 4,000 Units by mouth daily       furosemide (LASIX) 20 MG tablet Take 1 tablet (20 mg) by mouth daily       metoprolol tartrate (LOPRESSOR) 25 MG tablet Take 0.5 tablets (12.5 mg) by mouth 2 times daily       Multiple Vitamins-Minerals (MULTIVITAMIN ADULTS 50+) TABS Take 1 tablet by mouth daily        mycophenolate (GENERIC EQUIVALENT) 500 MG tablet Take 2 tablets (1,000 mg) by mouth 2 times daily       potassium chloride ER (KLOR-CON M) 10 MEQ CR tablet Take 1 tablet (10 mEq) by mouth daily       predniSONE (DELTASONE) 20 MG  "tablet Take 3 tablets (60 mg) by mouth daily       pyridostigmine (MESTINON) 60 MG tablet Take 1 tablet (60 mg) by mouth 3 times daily       sulfamethoxazole-trimethoprim (BACTRIM) 400-80 MG tablet 1 tablet by Oral or Feeding Tube route daily           REVIEW OF SYSTEMS:  4 point ROS including Respiratory, CV, GI and , other than that noted in the HPI,  is negative    Objective:  BP (!) 147/76   Pulse 68   Temp 98.1  F (36.7  C)   Resp 19   Ht 1.778 m (5' 10\")   Wt 123.1 kg (271 lb 4.8 oz)   SpO2 96%   BMI 38.93 kg/m    Exam:    Resp: Effort increased but baseline, O2 on per NC  CV: VS as above  Abd- soft, nontender, BS +  Musc- MONTEZ  Psych- alert, calm, pleasant      Labs:   Recent labs in [facility] reviewed by me today.     ASSESSMENT/PLAN:  Acute on chronic respiratory failure with hypercapnia (H)  Recurrent 2/2 below- requiring recurrent intubation- notable increased WOB- VS/sats currently stable  - seen by pulmonology inpatient who recommended AVAPS (new) at HS which patient has been wearing in TCU. Also continues on chronic O2 per NC  - pulmonary toilet  - VS per unit protocol    -Chronic diastolic heart failure (H)  Benign essential hypertension  -EF 55-60% -appears euvolemic  - continue on lasix, metoprolol  - follow weights  Myasthenia gravis (H)  - recurrent resp failure as outlined above. - Required plasmapharesis inpatient- now AVAPS as above. CL removed 4/25  - continues on mycophenolate, prednisone and pyridostgmine  - PT/OT  - supportive cares and treatments     Physical deconditioning  - 2/2 above, acute rehab  - supportive cares and tx  - transferring to LTC to continue therapy at slower pace  - ongoing discharge planning, SW follow and care conferences per unit protocol     Urinary retention  - has chronic indwelling catheter- nsg managing per unit protocol  - f/w urology as directed    Hypokalemia  - K+ WNL now  - reduce KCL to 20 meq daily  - recheck BMP one week.               Electronically " signed by:  ANIYA Hernandes CNP                   Sincerely,        NAIYA Hernandes CNP

## 2021-05-10 NOTE — PROGRESS NOTES
[facility]  [place] Medical Record Number:  [id number]  Place of Service where encounter took place:  [facility]  ([facility]) [[id number]]  Chief Complaint   Patient presents with     RECHECK       HPI:    Thanh Goodrich  is a 80 year old (1940), who is being seen today for an episodic care visit.  HPI information obtained from: facility chart records, facility staff, patient report and [facility] chart review. Today's concern is:    Patient in TCU for acute rehab and medical management following hospitalization for respiratory failure 2/2 myasthenia and CHF exacerbations. Is O2 dependent and on AVAPS at night. Has been working with rehab therapists making slow gains and has remained medically stable. Will be transferring to LTC today.    Found in room. Alert, calm, NAD. Reports breathing is baseline. Is wondering about specific time of transfer.   Past Medical and Surgical History reviewed in Epic today.    MEDICATIONS:      Current Outpatient Medications   Medication Sig Dispense Refill     acetaminophen (TYLENOL) 325 MG tablet Take 650 mg by mouth every 4 hours as needed for mild pain       calcium carbonate 600 mg-vitamin D 400 units (CALTRATE) 600-400 MG-UNIT per tablet Take 1 tablet by mouth daily       Cholecalciferol 100 MCG (4000 UT) CAPS Take 4,000 Units by mouth daily       furosemide (LASIX) 20 MG tablet Take 1 tablet (20 mg) by mouth daily       metoprolol tartrate (LOPRESSOR) 25 MG tablet Take 0.5 tablets (12.5 mg) by mouth 2 times daily       Multiple Vitamins-Minerals (MULTIVITAMIN ADULTS 50+) TABS Take 1 tablet by mouth daily        mycophenolate (GENERIC EQUIVALENT) 500 MG tablet Take 2 tablets (1,000 mg) by mouth 2 times daily       potassium chloride ER (KLOR-CON M) 10 MEQ CR tablet Take 1 tablet (10 mEq) by mouth daily       predniSONE (DELTASONE) 20 MG tablet Take 3 tablets (60 mg) by mouth daily       pyridostigmine (MESTINON) 60 MG tablet Take 1 tablet (60  "mg) by mouth 3 times daily       sulfamethoxazole-trimethoprim (BACTRIM) 400-80 MG tablet 1 tablet by Oral or Feeding Tube route daily           REVIEW OF SYSTEMS:  4 point ROS including Respiratory, CV, GI and , other than that noted in the HPI,  is negative    Objective:  BP (!) 147/76   Pulse 68   Temp 98.1  F (36.7  C)   Resp 19   Ht 1.778 m (5' 10\")   Wt 123.1 kg (271 lb 4.8 oz)   SpO2 96%   BMI 38.93 kg/m    Exam:    Resp: Effort increased but baseline, O2 on per NC  CV: VS as above  Abd- soft, nontender, BS +  Musc- MONTEZ  Psych- alert, calm, pleasant      Labs:   Recent labs in [facility] reviewed by me today.     ASSESSMENT/PLAN:  Acute on chronic respiratory failure with hypercapnia (H)  Recurrent 2/2 below- requiring recurrent intubation- notable increased WOB- VS/sats currently stable  - seen by pulmonology inpatient who recommended AVAPS (new) at HS which patient has been wearing in TCU. Also continues on chronic O2 per NC  - pulmonary toilet  - VS per unit protocol    -Chronic diastolic heart failure (H)  Benign essential hypertension  -EF 55-60% -appears euvolemic  - continue on lasix, metoprolol  - follow weights  Myasthenia gravis (H)  - recurrent resp failure as outlined above. - Required plasmapharesis inpatient- now AVAPS as above. CL removed 4/25  - continues on mycophenolate, prednisone and pyridostgmine  - PT/OT  - supportive cares and treatments     Physical deconditioning  - 2/2 above, acute rehab  - supportive cares and tx  - transferring to LTC to continue therapy at slower pace  - ongoing discharge planning, SW follow and care conferences per unit protocol     Urinary retention  - has chronic indwelling catheter- nsg managing per unit protocol  - f/w urology as directed    Hypokalemia  - K+ WNL now  - reduce KCL to 20 meq daily  - recheck BMP one week.               Electronically signed by:  ANIYA Hernandes CNP             "

## 2021-05-12 NOTE — TELEPHONE ENCOUNTER
[facility] Nurse Triage Telephone Note    Provider: Sara Chadwick NP  Facility: [facility]      Facility Type:  LTC    Caller: Wander  Call Back Number: [phone]    Allergies:    Allergies   Allergen Reactions     Ciprofloxacin Other (See Comments)     Contraindicated for myasthenia gravis patients     Procainamide Other (See Comments)     Contraindicated for myasthenia gravis patients     Quinidine Other (See Comments)     Contraindicated for myasthenia gravis patients     Quinine Other (See Comments)     Contraindicated for myasthenia gravis patients        Reason for call: Pt moved from TCU to our LTC unit today. Had no urine output from alberto today. When removed alberto was difficult & a lot of urine output some bleeding. Inserted new alberto without difficulty & is draining clear now. VSS    Verbal Order/Direction given by Provider: Monitor    Provider giving Order:  Sara Chadwick NP    Verbal Order given to: Wandre Pike RN

## 2021-05-12 NOTE — PROGRESS NOTES
[facility]  PRIMARY CARE PROVIDER AND CLINIC:  Sara Chadwick, ANIYA CNP, [address] / [place] [address]  Chief Complaint   Patient presents with     [facility]     [place] Medical Record Number:  [id number]  Place of Service where encounter took place:  [facility]  () [[id number]]    Thanh Goodrich  is a 80 year old  (1940), admitted to the above facility from [facility] TCU..  Admitted to this facility for  rehab, medical management and nursing care.    HPI:    HPI information obtained from: facility chart records, facility staff, patient report and [facility] chart review.   Brief Summary of Hospital Course: Thanh Goodrich is 80 year old male admitted initially to the TCU at [facility] after 2 recent hospitalizations for myasthenia gravis. He was initially admitted 3/4-3/15 for myasthenia gravis exacerbation and required intubation, along with exacerbation of diastolic CHF. He was discharged to [facility] TCU, but then was hospitalized again from 3/19-4/13 for another exacerbation of myasthenia gravis, again requiring intubation. He received 5 sessions of plasmapheresis. AVAPS BIPAP was recommended at night. He had some hematuria due to alberto trauma. While at TCU he made some progress with therapy, but it has been slow. He was transferred to LTC and his goal is still to return home where he lives alone in an independent apartment. Per patient report he is still requiring max assist of 2 for transfers, toileting and bed mobility.      Updates on Status Since Skilled nursing Admission: Since transferred to LTC his alberto was removed and replaced due to decreased output. Nursing reports some trauma with removal and had small amount of bleeding. Alberto now draining well. Reports his breathing is ok. Is using 2L of oxygen, as well as AVAPS at night. Reports bowels moving well, but he is incontinent, which is newer. Denies abdominal pain/ cramping. 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 He also has no past medical history of Arthritis, Cerebral infarction (H), Congestive heart failure (H), COPD (chronic obstructive pulmonary disease) (H), Diabetes (H), History of blood transfusion, or Thyroid disease.  PAST SURGICAL HISTORY:   has a past surgical history that includes Prostate Cancer Cryotherapy (2007); Tonsillectomy (1945); biopsy (2013?); IR CVC Tunnel Placement > 5 Yrs of Age (3/31/2021); and IR CVC Tunnel Removal Right (4/13/2021).  FAMILY HISTORY: family history includes C.A.D. in his maternal grandmother; Cancer in his father and paternal uncle; Cerebrovascular Disease in his maternal grandfather; Depression in his mother; Diabetes in his maternal grandmother; Hypertension in his father and mother; Obesity in his father; Prostate Cancer in his father and another family member; Substance Abuse in his father and mother.  SOCIAL HISTORY:   reports that he has never smoked. He has never used smokeless tobacco. He reports current alcohol use. He reports that he does not use drugs.    Post  "Discharge Medication Reconciliation Status: discharge medications reconciled and changed, per note/orders    Current Outpatient Medications   Medication Sig Dispense Refill     acetaminophen (TYLENOL) 325 MG tablet Take 650 mg by mouth every 4 hours as needed for mild pain       calcium carbonate 600 mg-vitamin D 400 units (CALTRATE) 600-400 MG-UNIT per tablet Take 1 tablet by mouth daily       Cholecalciferol 100 MCG (4000 UT) CAPS Take 4,000 Units by mouth daily       furosemide (LASIX) 20 MG tablet Take 1 tablet (20 mg) by mouth daily       metoprolol tartrate (LOPRESSOR) 25 MG tablet Take 0.5 tablets (12.5 mg) by mouth 2 times daily       Multiple Vitamins-Minerals (MULTIVITAMIN ADULTS 50+) TABS Take 1 tablet by mouth daily        mycophenolate (GENERIC EQUIVALENT) 500 MG tablet Take 2 tablets (1,000 mg) by mouth 2 times daily       potassium chloride ER (KLOR-CON M) 10 MEQ CR tablet Take 2 tablets (20 mEq) by mouth daily       predniSONE (DELTASONE) 20 MG tablet Take 3 tablets (60 mg) by mouth daily       pyridostigmine (MESTINON) 60 MG tablet Take 1 tablet (60 mg) by mouth 3 times daily       sulfamethoxazole-trimethoprim (BACTRIM) 400-80 MG tablet 1 tablet by Oral or Feeding Tube route daily         ROS:  10 point ROS of systems including Constitutional, Eyes, Respiratory, Cardiovascular, Gastroenterology, Genitourinary, Integumentary, Musculoskeletal, Psychiatric were all negative except for pertinent positives noted in my HPI.    Vitals:  /72   Pulse 72   Temp 99  F (37.2  C)   Resp 18   Ht 1.778 m (5' 10\")   Wt 123.1 kg (271 lb 4.8 oz)   SpO2 99%   BMI 38.93 kg/m    Exam:  GENERAL APPEARANCE:  Alert, in NAD  HEENT: normocephalic, moist mucous membranes, nose without drainage or crusting  RESP:  respiratory effort using some accessory muscles, lungs diminished, on 2L of oxygen    CV: auscultation of heart done, rate and rhythm regular. no murmur, no rub or gallop.   ABDOMEN: + bowel sounds, " soft, nontender, no grimacing or guarding with palpation.  M/S: no lower extremity edema  NEURO: cranial nerves 2-12 grossly intact and at patient's baseline  PSYCH: oriented x 3, affect and mood normal      Lab/Diagnostic data:  Labs done in SNF are in [facility]. Please refer to them using EPIC/Care Everywhere. and Recent labs in [facility] reviewed by me today.     ASSESSMENT/PLAN:  (G70.00) Myasthenia gravis (H)  (primary encounter diagnosis)  Comment: with multiple hospitalizations for exacerbations as above- required intubation and plasmapheresis inpatient  Plan: Continue MMF 1000 mg BID, pyridostigmine 60 mg TID, prednisone 60 mg daily. Continue bactrim for PCP prophy. CBC, BMP 5/17. AVAPS, Follow up with neurology/ pulmonology.    (J96.22) Acute on chronic respiratory failure with hypercapnia (H)  Comment: with intubation inpatient secondary to myasthenia gravis  -respiratory status stable   -on 2L of oxygen  -AVAPS at night  Plan: Continue oxygen and titrate down as able to keep saturations >90%. Try to avoid titration up given high CO2. AVAPS with sleep. BMP 5/17. Follow up with neurology/pulmonology.     (I50.32) Chronic diastolic heart failure (H)  (I10) Benign essential hypertension  Comment: with recent exacerbation- seems euvolemic now; SBP mostly <150, HR 60-80s  Plan: Continue lasix 20 mg daily, metoprolol 12.5 mg BID. If volume status remoains stable try reduction in lasix dose. Daily weights. Notify provider with weight gain >2 lbs in a day, >5 lbs in on week. 2 gram Na diet. VS per policy. Adjust medications as needed.    (R33.9) Urinary retention  (Z97.8) Alberto catheter in place  Comment: recently replaced as above, is draining fine now; some very mild bleeding around alberto site secondary to trauma   Plan: Nursing to continue alberto cares. Monitor to ensure no further bleeding around alberto site. Follow up with urology    (R53.81) Physical deconditioning  Comment: Acute, secondary to recent  hospitalization, medical conditions as above  -still requiring assist of 2.  Plan: Encourage participation in physical therapy/occupational therapy for strengthening and deconditioning. Patient goal is to discharge home when strength improves        Electronically signed by:  ANIYA Ortiz CNP

## 2021-05-12 NOTE — LETTER
5/12/2021        RE: Thanh Goodrich  [address]  [facility] [address]        [facility]  PRIMARY CARE PROVIDER AND CLINIC:  ANIYA Ortiz CNP, [address] [facility] [address] / [place] [address]  Chief Complaint   Patient presents with     Establish Care     [facility] Medical Record Number:  [id number]  Place of Service where encounter took place:  [facility]  () [[id number]]    Thanh Goodrich  is a 80 year old  (1940), admitted to the above facility from [facility] TCU..  Admitted to this facility for  rehab, medical management and nursing care.    HPI:    HPI information obtained from: facility chart records, facility staff, patient report and [facility] chart review.   Brief Summary of Hospital Course: Thanh Goodrich is 80 year old male admitted initially to the TCU at [facility] after 2 recent hospitalizations for myasthenia gravis. He was initially admitted 3/4-3/15 for myasthenia gravis exacerbation and required intubation, along with exacerbation of diastolic CHF. He was discharged to [facility] TCU, but then was hospitalized again from 3/19-4/13 for another exacerbation of myasthenia gravis, again requiring intubation. He received 5 sessions of plasmapheresis. AVAPS BIPAP was recommended at night. He had some hematuria due to alberto trauma. While at TCU he made some progress with therapy, but it has been slow. He was transferred to LTC and his goal is still to return home where he lives alone in an independent apartment. Per patient report he is still requiring max assist of 2 for transfers, toileting and bed mobility.      Updates on Status Since Skilled nursing Admission: Since transferred to LTC his alberto was removed and replaced due to decreased output. Nursing reports some trauma with removal and had small amount of bleeding. Alberto now draining well. Reports his breathing is ok. Is using 2L of oxygen, as well as AVAPS at  night. Reports bowels moving well, but he is incontinent, which is newer. Denies abdominal pain/ cramping. 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 He also has no past medical history of Arthritis, Cerebral infarction (H), Congestive heart failure (H), COPD (chronic obstructive pulmonary disease) (H), Diabetes (H), History of blood transfusion, or Thyroid disease.  PAST SURGICAL HISTORY:   has a past surgical history that includes Prostate Cancer Cryotherapy (2007); Tonsillectomy (1945); biopsy (2013?); IR CVC Tunnel Placement > 5 Yrs of Age (3/31/2021); and IR CVC Tunnel Removal Right (4/13/2021).  FAMILY HISTORY: family history includes C.A.D. in his maternal grandmother; Cancer in his father and paternal uncle; Cerebrovascular Disease in his maternal grandfather; Depression in his mother; Diabetes in his maternal grandmother; Hypertension in his father and mother; Obesity in his father; Prostate Cancer in his father and another family member; Substance Abuse in his father and mother.  SOCIAL HISTORY:   reports that he has never smoked. He  "has never used smokeless tobacco. He reports current alcohol use. He reports that he does not use drugs.    Post Discharge Medication Reconciliation Status: discharge medications reconciled and changed, per note/orders    Current Outpatient Medications   Medication Sig Dispense Refill     acetaminophen (TYLENOL) 325 MG tablet Take 650 mg by mouth every 4 hours as needed for mild pain       calcium carbonate 600 mg-vitamin D 400 units (CALTRATE) 600-400 MG-UNIT per tablet Take 1 tablet by mouth daily       Cholecalciferol 100 MCG (4000 UT) CAPS Take 4,000 Units by mouth daily       furosemide (LASIX) 20 MG tablet Take 1 tablet (20 mg) by mouth daily       metoprolol tartrate (LOPRESSOR) 25 MG tablet Take 0.5 tablets (12.5 mg) by mouth 2 times daily       Multiple Vitamins-Minerals (MULTIVITAMIN ADULTS 50+) TABS Take 1 tablet by mouth daily        mycophenolate (GENERIC EQUIVALENT) 500 MG tablet Take 2 tablets (1,000 mg) by mouth 2 times daily       potassium chloride ER (KLOR-CON M) 10 MEQ CR tablet Take 2 tablets (20 mEq) by mouth daily       predniSONE (DELTASONE) 20 MG tablet Take 3 tablets (60 mg) by mouth daily       pyridostigmine (MESTINON) 60 MG tablet Take 1 tablet (60 mg) by mouth 3 times daily       sulfamethoxazole-trimethoprim (BACTRIM) 400-80 MG tablet 1 tablet by Oral or Feeding Tube route daily         ROS:  10 point ROS of systems including Constitutional, Eyes, Respiratory, Cardiovascular, Gastroenterology, Genitourinary, Integumentary, Musculoskeletal, Psychiatric were all negative except for pertinent positives noted in my HPI.    Vitals:  /72   Pulse 72   Temp 99  F (37.2  C)   Resp 18   Ht 1.778 m (5' 10\")   Wt 123.1 kg (271 lb 4.8 oz)   SpO2 99%   BMI 38.93 kg/m    Exam:  GENERAL APPEARANCE:  Alert, in NAD  HEENT: normocephalic, moist mucous membranes, nose without drainage or crusting  RESP:  respiratory effort using some accessory muscles, lungs diminished, on 2L of oxygen    CV: " auscultation of heart done, rate and rhythm regular. no murmur, no rub or gallop.   ABDOMEN: + bowel sounds, soft, nontender, no grimacing or guarding with palpation.  M/S: no lower extremity edema  NEURO: cranial nerves 2-12 grossly intact and at patient's baseline  PSYCH: oriented x 3, affect and mood normal      Lab/Diagnostic data:  Labs done in SNF are in [facility]. Please refer to them using [employer] Everywhere. and Recent labs in [facility] reviewed by me today.     ASSESSMENT/PLAN:  (G70.00) Myasthenia gravis (H)  (primary encounter diagnosis)  Comment: with multiple hospitalizations for exacerbations as above- required intubation and plasmapheresis inpatient  Plan: Continue MMF 1000 mg BID, pyridostigmine 60 mg TID, prednisone 60 mg daily. Continue bactrim for PCP prophy. CBC, BMP 5/17. AVAPS, Follow up with neurology/ pulmonology.    (J96.22) Acute on chronic respiratory failure with hypercapnia (H)  Comment: with intubation inpatient secondary to myasthenia gravis  -respiratory status stable   -on 2L of oxygen  -AVAPS at night  Plan: Continue oxygen and titrate down as able to keep saturations >90%. Try to avoid titration up given high CO2. AVAPS with sleep. BMP 5/17. Follow up with neurology/pulmonology.     (I50.32) Chronic diastolic heart failure (H)  (I10) Benign essential hypertension  Comment: with recent exacerbation- seems euvolemic now; SBP mostly <150, HR 60-80s  Plan: Continue lasix 20 mg daily, metoprolol 12.5 mg BID. If volume status remoains stable try reduction in lasix dose. Daily weights. Notify provider with weight gain >2 lbs in a day, >5 lbs in on week. 2 gram Na diet. VS per policy. Adjust medications as needed.    (R33.9) Urinary retention  (Z97.8) Alberto catheter in place  Comment: recently replaced as above, is draining fine now; some very mild bleeding around alberto site secondary to trauma   Plan: Nursing to continue alberto cares. Monitor to ensure no further bleeding around alberto  site. Follow up with urology    (R53.81) Physical deconditioning  Comment: Acute, secondary to recent hospitalization, medical conditions as above  -still requiring assist of 2.  Plan: Encourage participation in physical therapy/occupational therapy for strengthening and deconditioning. Patient goal is to discharge home when strength improves        Electronically signed by:  ANIYA Ortiz CNP                           Sincerely,        ANIYA Ortiz CNP

## 2021-05-24 NOTE — TELEPHONE ENCOUNTER
[facility] Nurse Triage Telephone Note    Provider: Sara Chadwick NP  Facility: [facility]   Facility Type:  [facility]    Caller: Christiana  Call Back Number: [phone]    Allergies   Allergen Reactions     Ciprofloxacin Other (See Comments)     Contraindicated for myasthenia gravis patients     Procainamide Other (See Comments)     Contraindicated for myasthenia gravis patients     Quinidine Other (See Comments)     Contraindicated for myasthenia gravis patients     Quinine Other (See Comments)     Contraindicated for myasthenia gravis patients       Reason for call: Nurse noted pt has dark red colored urine and sediment in catheter bag. Pt does not c/o any discomfort or UTI symptoms. Pushing fluids, good output of 600cc today. Pt on Bactrim every day pro-phylactically. Not on anticoagulants.   Vitals: BP:  135/68  P:: 71  R:: 18  SPO2: 94% O2 at 2 L/min Temp.:  98.0  Wt: 274.8  NP last noted that BMP and CBC were done on 5/17/21. Pt sees urology.      Verbal Order/Direction given by Provider: Update urology. BMP and CBC on 5/25/21.    Provider giving Order:  Sara Chadwick NP    Verbal Order given to: Christiana Simmons RN

## 2021-05-25 NOTE — TELEPHONE ENCOUNTER
[facility] Nurse Triage Telephone Note    Provider: Sara Chadwick NP  Facility: [facility]  Facility Type:  [facility]    Caller: Wander  Call Back Number: [phone]    Allergies:    Allergies   Allergen Reactions     Ciprofloxacin Other (See Comments)     Contraindicated for myasthenia gravis patients     Procainamide Other (See Comments)     Contraindicated for myasthenia gravis patients     Quinidine Other (See Comments)     Contraindicated for myasthenia gravis patients     Quinine Other (See Comments)     Contraindicated for myasthenia gravis patients        Reason for call: Nurse called to report a critical CO2 level of >45.      Verbal Order/Direction given by Provider: Recheck BMP in 1 week.    Provider giving Order:  Sara Chadwick NP    Verbal Order given to: Moreno Navarrete RN

## 2021-06-01 NOTE — TELEPHONE ENCOUNTER
[facility] Nurse Triage Telephone Note    Provider: Sara Chadwick NP  Facility: [facility]   Facility Type:  TCU    Caller: Mitzy  Call Back Number: [phone]    Allergies   Allergen Reactions     Ciprofloxacin Other (See Comments)     Contraindicated for myasthenia gravis patients     Procainamide Other (See Comments)     Contraindicated for myasthenia gravis patients     Quinidine Other (See Comments)     Contraindicated for myasthenia gravis patients     Quinine Other (See Comments)     Contraindicated for myasthenia gravis patients       Reason for call: BMP result today, follow up to critical CO2 lab 45 last week. CO2 today 44. Chl 92; anion gap 2. On O2 2LPM o2 sats staying 90-95%    Verbal Order/Direction given by Provider: Taper O2 during the day to keep sats >92%.    Provider giving Order:  Sara Chadwick NP    Verbal Order given to: Mitzy Simmons RN

## 2021-06-01 NOTE — LETTER
"    6/1/2021        RE: Thanh Goodrich  [address]  [facility] [address]        Thanh Goodrich is a 80 year old male [facility] (admit 4/2021) seen to follow up MG.  He is seen in his room up to , reports he is \"not well in a way.\"   Notes he is holding extra fluid and asks that his furosemide be increased back to bid.  It had been decreased to once daily secondary to very high CO2 levels.     He remains on O2 by nasal canula at 2 L/min, and sleeping 8 hours/night with his AVAPS BiPAP.       Pt had two March 2021 [facility] hospitalizations:   He was initially admitted 3/4-15 for a myasthenia gravis exacerbation requiring intubation and treated for diastolic CHF exacerbation. He was treated with IVIG, prednisone, pyridostigmine and mycophenolate.      He discharged to [facility] TCU, but readmitted to [facility] 3/19-4/13 for recurrent myasthenia crisis needing emergent intubation.   He was extubated, but became encephalopathic and hypercapnic with worsening respiratory acidosis and re-intubated 3/29   HD catheter was placed and he had 5 runs of plasmapheresis.   Improved and extubated again, started on BiPAP, which he continues to use at night.   Treated for E coli CAUTI, and for MSSA HCAP during hospitalization.  Pt eventually improved and transferred to TCU.  He has made very slow gains, still assist of two persons to transfer out of bed and assist with ADLs.           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     Cellulitis of left leg 9/23/2012     Depressive disorder 2020     Edema 7/7/2009     History of steroid therapy 2/22/2010     Hyperlipidemia LDL goal <130 10/31/2010     HYPERTENSION 7/8/2003     Incontinence of urine 10/25/2010     Leg ulcer (H) 9/20/2012     MALIGN NEOPL PROSTATE-3/07 4/2/2007    T1C, [place] 8, initial PSA 39, s/p radiation seed implants 2007      Myasthenia " "gravis (H)      OBESITY 7/8/2003     Osteoporosis 8/18/2009    Refused bisphosphonates 2011      PVC's (premature ventricular contractions) 11/23/2011     RIGHT OCCIPITAL PAIN 7/8/2003     ROTATOR CUFF SYND NOS- RT 9/19/2005     Skin nodule 3/18/2013     ulcer left shin 10/8/2007     Uncomplicated asthma 1944?    childhood only       Past Surgical History:   Procedure Laterality Date     BIOPSY  2013?    dealt with     IR CVC TUNNEL PLACEMENT > 5 YRS OF AGE  3/31/2021     IR CVC TUNNEL REMOVAL RIGHT  4/13/2021     Prostate Cancer Cryotherapy  2007     TONSILLECTOMY  1945     SH:  Lives alone, apartment in [place].    His cousin Gaby is first contact.   Pt is retired from work for [employer].   Non smoker    Review Of Systems: has indwelling Beckett catheter secondary to mobility issues  Wt Readings from Last 5 Encounters:   06/01/21 123.3 kg (271 lb 12.8 oz)   05/12/21 123.1 kg (271 lb 4.8 oz)   05/10/21 123.1 kg (271 lb 4.8 oz)   04/30/21 124.1 kg (273 lb 9.6 oz)   04/21/21 124.1 kg (273 lb 8 oz)       GENERAL APPEARANCE: alert and no distress  /72   Pulse 77   Temp 97.8  F (36.6  C)   Resp 21   Ht 1.778 m (5' 10\")   Wt 123.3 kg (271 lb 12.8 oz)   SpO2 91%   BMI 39.00 kg/m     HEENT: normocephalic, no lesion or abnormalities  NECK: no adenopathy, no asymmetry, masses, or scars and thyroid normal to palpation  RESP: decreased BS, no wheeze  +scattered crackles    CV: regular rate and rhythm, normal S1 S2, distant  ABDOMEN: obese, soft, nontender, no HSM or masses and bowel sounds normal  MS: extremities with 1+ edema  SKIN: no suspicious lesions or rashes  NEURO: extremities with diffuse weakness  PSYCH: affect okay  LYMPHATICS: No cervical,  or supraclavicular nodes      Labs reviewed     ECHO 3/4/2021   Interpretation Summary  The visual ejection fraction is estimated at 55-60%.  Grade I or early diastolic dysfunction.  Normal left ventricular wall motion     The right ventricle " is normal in size and function.  There is mild mitral stenosis.  There is moderate mitral annular calcification.   The ascending aorta is Mildly dilated.       IMP/PLAN:   (G70.00) Myasthenia gravis (H)   Comment: recurrent crisis requiring ICU care, intubation, plasmapheresis    Plan: mycophenolate 1000 mg bid, prednisone 60 mg/day, pyridostigmine 60 mg tid   Follow up with Neurology       (J96.22) Acute on chronic respiratory failure with hypercapnia (H)  Comment: with hypercapnia, CO2 >45  Plan: O2 by NC 2 L/min, trying to avoid titrating flow rate up   BiPAP at night, with naps and prn.       (R53.81) Physical deconditioning  Comment: after prolonged illness     Plan: PHYSICAL THERAPY / OCCUPATIONAL THERAPY for strengthening, transfers, ADLs, gait.   Making slow progress  Discharge goal is return home alone, prior level of independence.        (I50.32) Chronic diastolic heart failure (H)  (I10) Benign essential hypertension  Comment:   BP Readings from Last 3 Encounters:   06/01/21 118/72   05/12/21 121/72   05/10/21 (!) 147/76      Pulse Readings from Last 4 Encounters:   06/01/21 77   05/12/21 72   05/10/21 68   04/30/21 67      Plan: metoprolol 12.5 mg bid   Increase furosemide to 20 mg bid, with K+ replacement.   Follow weights, exam and BMP        (Z97.8) Beckett catheter in place  Comment: due to difficulty voiding.     Plan: Look for opportunity for TOV as he becomes more mobile.     He has an appointment with Dr Charles upcoming.         Camila Green MD         Sincerely,        Camila Green MD

## 2021-06-03 NOTE — TELEPHONE ENCOUNTER
[facility] Nurse Triage Telephone Note    Provider: Sara Chadwick NP  Facility: [facility]   Facility Type:  [facility]    Caller: Mariah  Call Back Number: [phone]    Allergies   Allergen Reactions     Ciprofloxacin Other (See Comments)     Contraindicated for myasthenia gravis patients     Procainamide Other (See Comments)     Contraindicated for myasthenia gravis patients     Quinidine Other (See Comments)     Contraindicated for myasthenia gravis patients     Quinine Other (See Comments)     Contraindicated for myasthenia gravis patients       Reason for call: Nurse calling in chest xray result:      Verbal Order/Direction given by Provider: discontinue current Lasix order, Lasix 40mg tonight, Lasix 40mg every day on 6/4, 6/5, then on 6/6 Lasix 20mg every day, Check BMP on 6/7/21  Potassium Chl 40mEq Fri, Sat, then on 6/6 start Potassium Chl 20mEq every day   Call if notable weight gain or change in condition from baseline.    Provider giving Order:  Sara Chadwick NP    Verbal Order given to: Mariah Simmons RN

## 2021-06-03 NOTE — TELEPHONE ENCOUNTER
[facility] Nurse Triage Telephone Note    Provider: Sara Chadwick NP  Facility: [facility]   Facility Type:  [facility]    Caller: Christiana  Call Back Number: [phone]    Allergies   Allergen Reactions     Ciprofloxacin Other (See Comments)     Contraindicated for myasthenia gravis patients     Procainamide Other (See Comments)     Contraindicated for myasthenia gravis patients     Quinidine Other (See Comments)     Contraindicated for myasthenia gravis patients     Quinine Other (See Comments)     Contraindicated for myasthenia gravis patients       Reason for call: BMP lab result; critical value CO2 >45. Pt is SOB, faint wheezing noted in left upper lobe. Pt is fatigues and refusing therapy today. RR 21 and O2 sat 94% on 2LPM - recheck of O2 sat dropped to 89% on 2LPM, RR 21-22.     Verbal Order/Direction given by Provider: Chest xray STAT for wheezing    Provider giving Order:  Sara Chadwick NP    Verbal Order given to: Christiana Simmons RN

## 2021-06-04 NOTE — PROGRESS NOTES
Joyce Goodrich  1940  1) Albuterol neb QID PRN for SOB, wheezing.  Sara Chadwick, APRN CNP on 6/4/2021 at 11:53 AM

## 2021-06-04 NOTE — PROGRESS NOTES
"Thanh Goodrich is a 80 year old male seen June 1, 2021 at [facility] where he has resided for 2 months (admit 4/2021) seen to follow up MG.  He is seen in his room up to , reports he is \"not well in a way.\"   Notes he is holding extra fluid and asks that his furosemide be increased back to bid.  It had been decreased to once daily secondary to very high CO2 levels.     He remains on O2 by nasal canula at 2 L/min, and sleeping 8 hours/night with his AVAPS BiPAP.       Pt had two March 2021 [facility] hospitalizations:   He was initially admitted 3/4-15 for a myasthenia gravis exacerbation requiring intubation and treated for diastolic CHF exacerbation. He was treated with IVIG, prednisone, pyridostigmine and mycophenolate.      He discharged to [facility] TCU, but readmitted to [facility] 3/19-4/13 for recurrent myasthenia crisis needing emergent intubation.   He was extubated, but became encephalopathic and hypercapnic with worsening respiratory acidosis and re-intubated 3/29   HD catheter was placed and he had 5 runs of plasmapheresis.   Improved and extubated again, started on BiPAP, which he continues to use at night.   Treated for E coli CAUTI, and for MSSA HCAP during hospitalization.  Pt eventually improved and transferred to TCU.  He has made very slow gains, still assist of two persons to transfer out of bed and assist with ADLs.           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     Cellulitis of left leg 9/23/2012     Depressive disorder 2020     Edema 7/7/2009     History of steroid therapy 2/22/2010     Hyperlipidemia LDL goal <130 10/31/2010     HYPERTENSION 7/8/2003     Incontinence of urine 10/25/2010     Leg ulcer (H) 9/20/2012     MALIGN NEOPL PROSTATE-3/07 4/2/2007    T1C, [place] 8, initial PSA 39, s/p radiation seed implants 2007      Myasthenia gravis (H)      OBESITY 7/8/2003     Osteoporosis 8/18/2009    Refused bisphosphonates 2011      PVC's " "(premature ventricular contractions) 11/23/2011     RIGHT OCCIPITAL PAIN 7/8/2003     ROTATOR CUFF SYND NOS- RT 9/19/2005     Skin nodule 3/18/2013     ulcer left shin 10/8/2007     Uncomplicated asthma 1944?    childhood only       Past Surgical History:   Procedure Laterality Date     BIOPSY  2013?    dealt with     IR CVC TUNNEL PLACEMENT > 5 YRS OF AGE  3/31/2021     IR CVC TUNNEL REMOVAL RIGHT  4/13/2021     Prostate Cancer Cryotherapy  2007     TONSILLECTOMY  1945     SH:  Lives alone, apartment in [place].    His cousin Gaby is first contact.   Pt is retired from work for [employer].   Non smoker    Review Of Systems: has indwelling Beckett catheter secondary to mobility issues  Wt Readings from Last 5 Encounters:   06/01/21 123.3 kg (271 lb 12.8 oz)   05/12/21 123.1 kg (271 lb 4.8 oz)   05/10/21 123.1 kg (271 lb 4.8 oz)   04/30/21 124.1 kg (273 lb 9.6 oz)   04/21/21 124.1 kg (273 lb 8 oz)       GENERAL APPEARANCE: alert and no distress  /72   Pulse 77   Temp 97.8  F (36.6  C)   Resp 21   Ht 1.778 m (5' 10\")   Wt 123.3 kg (271 lb 12.8 oz)   SpO2 91%   BMI 39.00 kg/m     HEENT: normocephalic, no lesion or abnormalities  NECK: no adenopathy, no asymmetry, masses, or scars and thyroid normal to palpation  RESP: decreased BS, no wheeze  +scattered crackles    CV: regular rate and rhythm, normal S1 S2, distant  ABDOMEN: obese, soft, nontender, no HSM or masses and bowel sounds normal  MS: extremities with 1+ edema  SKIN: no suspicious lesions or rashes  NEURO: extremities with diffuse weakness  PSYCH: affect okay  LYMPHATICS: No cervical,  or supraclavicular nodes      Labs reviewed     ECHO 3/4/2021   Interpretation Summary  The visual ejection fraction is estimated at 55-60%.  Grade I or early diastolic dysfunction.  Normal left ventricular wall motion     The right ventricle is normal in size and function.  There is mild mitral stenosis.  There is moderate mitral annular " calcification.   The ascending aorta is Mildly dilated.       IMP/PLAN:   (G70.00) Myasthenia gravis (H)   Comment: recurrent crisis requiring ICU care, intubation, plasmapheresis    Plan: mycophenolate 1000 mg bid, prednisone 60 mg/day, pyridostigmine 60 mg tid   Follow up with Neurology       (J96.22) Acute on chronic respiratory failure with hypercapnia (H)  Comment: with hypercapnia, CO2 >45  Plan: O2 by NC 2 L/min, trying to avoid titrating flow rate up   BiPAP at night, with naps and prn.       (R53.81) Physical deconditioning  Comment: after prolonged illness     Plan: PHYSICAL THERAPY / OCCUPATIONAL THERAPY for strengthening, transfers, ADLs, gait.   Making slow progress  Discharge goal is return home alone, prior level of independence.        (I50.32) Chronic diastolic heart failure (H)  (I10) Benign essential hypertension  Comment:   BP Readings from Last 3 Encounters:   06/01/21 118/72   05/12/21 121/72   05/10/21 (!) 147/76      Pulse Readings from Last 4 Encounters:   06/01/21 77   05/12/21 72   05/10/21 68   04/30/21 67      Plan: metoprolol 12.5 mg bid   Increase furosemide to 20 mg bid, with K+ replacement.   Follow weights, exam and BMP        (Z97.8) Beckett catheter in place  Comment: due to difficulty voiding.     Plan: Look for opportunity for TOV as he becomes more mobile.     He has an appointment with Dr Charles upcoming.         Camila Green MD

## 2021-06-08 NOTE — TELEPHONE ENCOUNTER
[facility] Nurse Triage Telephone Note    Provider: Sara Chadwick APRN CNP  Facility: [facility]   Facility Type:  LTC    Caller: Melissa/Wander  Call Back Number: [phone]    Allergies:    Allergies   Allergen Reactions     Ciprofloxacin Other (See Comments)     Contraindicated for myasthenia gravis patients     Procainamide Other (See Comments)     Contraindicated for myasthenia gravis patients     Quinidine Other (See Comments)     Contraindicated for myasthenia gravis patients     Quinine Other (See Comments)     Contraindicated for myasthenia gravis patients        Reason for call: Nursing called this am stating that the pt's O2 sats were dropping in the night from the 80's-90's range. Pt has been A & O per baseline.   They had noticed that his bipap face mask had a rip in it and wouldn't deal all the way, they had placed O2 per NC on the pt and also was given a neb and requested cough syrup.   Now this am the pt is up and O2 is 92-93% on 2L. Pt refused his wt this am. Pt is also refusing to wear his O2.    Verbal Order/Direction given by Provider: Call and get the mask repaired ASAP. wts 3x weekly. PT is able to be off of the O2 while up if maintaining O2 sats.     Provider giving Order:  Sara Chadwick APRN CNP    Verbal Order given to: Wander Butler RN

## 2021-06-09 PROBLEM — J96.21 ACUTE ON CHRONIC RESPIRATORY FAILURE WITH HYPOXIA AND HYPERCAPNIA (H): Status: ACTIVE | Noted: 2021-01-01

## 2021-06-09 PROBLEM — I26.99 PULMONARY EMBOLISM (H): Status: ACTIVE | Noted: 2021-01-01

## 2021-06-09 PROBLEM — J96.22 ACUTE ON CHRONIC RESPIRATORY FAILURE WITH HYPOXIA AND HYPERCAPNIA (H): Status: ACTIVE | Noted: 2021-01-01

## 2021-06-09 NOTE — ED NOTES
Bed: ED12  Expected date:   Expected time:   Means of arrival:   Comments:  Efrain Chase SOB eta 1602

## 2021-06-09 NOTE — ED PROVIDER NOTES
History   Chief Complaint:  Shortness of Breath       The history is provided by the patient.      Thanh Goodrich is a 80 year old male with history of hypertension and hyperlipidemia who presents with shortness of breath. The patient comes from a nursing facility. Notes worsening CO2 retention and increased leg swelling, although he is unsure when this began. Also, he is not sure when his alberto catheter was last changed. He uses a nasal cannula during the day and BiPAP or CPAP during the night. Denies fever, chest pain, cough, vomiting, diarrhea, and abdominal pain. He is wheelchair bound.     Review of Systems   Constitutional: Negative for fever.   Respiratory: Positive for shortness of breath. Negative for cough.    Cardiovascular: Positive for leg swelling.   Gastrointestinal: Negative for abdominal pain, diarrhea and vomiting.   All other systems reviewed and are negative.    Allergies:  Ciprofloxacin  Procainamide  Quinidine  Quinine    Medications:  albuterol  Cholecalciferol   furosemide   metoprolol tartrate   mycophenolate  potassium chloride ER   prednisone   pyridostigmine   sulfamethoxazole-trimethoprim    Past Medical History:    Atypical mycobacteria infection   Cellulitis of left leg    Depressive disorder   Edema   Hyperlipidemia   hypertension   Incontinence of urine   Leg ulcer   Obesity   Malign neopl prostate   Myasthenia gravis   Osteoporosis   PVC  Skin nodule   Rotator cuff syndrome   Asthma   Metabolic encephalopathy   Acute on chronic respiratory failure with hypercapnia   dilated aortic root     Past Surgical History:    Biopsy   IR CVC tunnel placement   IR CVC tunnel removal right   Tonsillectomy   Prostate cancer cryotherapy     Family History:    Mother: hypertension , depression, substance abuse  Father: hypertension, prostate cancer, substance abuse, obesity     Social History:  PCP: Sara Chadwick   Lives in nursing facility.     Physical Exam     Patient Vitals for the past  "24 hrs:   BP Temp Temp src Pulse Resp SpO2 Height Weight   06/09/21 2315 131/64 -- -- 71 19 93 % -- --   06/09/21 2300 139/61 98.4  F (36.9  C) -- 69 -- 100 % -- 127.7 kg (281 lb 8.4 oz)   06/09/21 2203 -- -- -- -- -- 97 % -- --   06/09/21 2200 -- -- -- -- -- 96 % -- --   06/09/21 2100 (!) 119/97 -- -- 75 -- -- -- --   06/09/21 2000 116/84 -- -- 65 -- 95 % -- --   06/09/21 1900 110/80 -- -- 93 -- -- -- --   06/09/21 1800 109/82 -- -- 137 -- -- -- --   06/09/21 1715 (!) 136/100 -- -- 85 -- 98 % -- --   06/09/21 1613 -- -- -- -- -- 93 % -- --   06/09/21 1609 103/80 97.7  F (36.5  C) Oral 62 16 (!) 86 % 1.778 m (5' 10\") 127.9 kg (282 lb)       Physical Exam  VS: Reviewed per above  HENT: Mucous membranes moist  EYES: sclera anicteric  CV: Rate as noted, distant heart tones   RESP: Effort normal. distant breath sounds  GI: obese abdomen, no tenderness/rebound/guarding  : indwelling alberto catheter  NEURO: Alert, moving all extremities  MSK: No deformity of the extremities  SKIN: Warm and dry      Emergency Department Course     ECG:  ECG taken at 1643  Sinus rhythm with premature atrial complexes and premature ventricular complexes or fusion complexes  Right bundle branch block   Inferior infarct, age undetermined   Increased ectopy  Abnormal ECG  No significant change when compared to EKG dated 3/19/21.   Rate 77 bpm. MS interval 168 ms. QRS duration 128 ms. QT/QTc 402/454 ms. P-R-T axes 20 -5 13.    Imaging:  CT Chest Pulmonary Embolism w Contrast  Abnormal  IMPRESSION:  1.  Positive for small volume of acute pulmonary emboli in right upper  lobe segmental and subsegmental pulmonary arteries.  2.  Right lower lobe atelectasis with secretions in the right lower  lobe bronchus.  3.  Mild pulmonary edema.  4.  Incidental 2 mm diameter patent ductus arteriosus, of doubtful  clinical significance.  5.  Stable mildly aneurysmal thoracic aorta measuring 4.4 cm..    [Critical Result: Pulmonary embolism]    Finding was " identified on 6/9/2021 6:36 PM.     Dr. Rico was contacted by me on 6/9/2021 6:54 PM and verbalized  understanding of the critical result.     Reading per radiology      XR Chest Port 1 View  IMPRESSION: New small bilateral pleural effusions and bibasilar  atelectasis/consolidation. Low lung volumes. No pneumothorax.    Reading per radiology    Laboratory:  CBC: WBC 5.7, HGB 13.0 (L) ,    BMP: chloride 90 (L) , Glucose 135 (H) ,carbon dioxide >45 (HH),  Creatinine 0.64 (L)  o/w WNL  D dimer qualitative: 1.1 (H)   ISTAT gases lactate darin POCT : pH: 7.32, PCO2: 108 (HH) , PO2: 21 (L) , Bicarbonate: 56 (HH) , O2 sat: 26, lactic acid 1.0  ISTAT gases lactate darin POCT: pH: 7.40, PCO2: 91 (HH) , PO2: 24 (L) , Bicarbonate: 56 (HH) , O2 sat: 36, lactic acid 0.7  Symptomatic Influenza A/B & SARS-CoV2 (COVID19) Virus PCR Multiplex: Negative    Nt probnp inpatient: 938   Troponin (Collected 1622): 0.020  Phosphorus: 2.7  Blood cultures pending x2    Procedures    Emergency Department Course:    Reviewed:  I reviewed nursing notes, vitals, past medical history and care everywhere    Assessments:  1630 I obtained history and examined the patient as noted above.   2035 I checked the patient and explain findings.  2237 I returned to recheck the patient.    Consults:   1855 : I spoke to Riya Pandey MD of the Radiology service from [facility] regarding patient's presentation, findings, and plan of care.    1935 : I spoke with Dr. Cortez of the Intensivist service from [facility] regarding patient's presentation, findings, and plan of care.    Interventions:  1935 Heparin 8,000 units IV   1935 Heparin 1,800 units/hr IV   1955 Solu-medrol 125mg IV     Disposition:  The patient was admitted to the hospital under the care of Dr. Cortez.     Impression & Plan     Medical Decision Making:  Patient presents to the ER for evaluation of increasing CO2 on lab work and possible shortness of breath.  On  arrival vital signs are reassuring.  SPO2 is high 90s on 2 L per nasal cannula.  Per chart review patient has complicated history including chronic hypercapnic respiratory failure and myasthenia gravis having required IVIG and steroids at last admission due to concern for myasthenic crisis contributing to respiratory failure.  EKG today with sinus rhythm with premature complexes.  There is a right bundle branch block.  No specific acute ischemic changes.  A single troponin is negative.  D-dimer was elevated prompting CT pulmonary angiogram that showed evidence of small volume acute PE in the right upper lobe.  There is also mild pulmonary edema.  No obvious infiltrate.  Heparin was started.  VBG showed evidence of worsening hypercarbic respiratory failure.  He was placed on BiPAP with improvement in PCO2 from 108-91.  Mental status was intact throughout and thus I did not feel that intubation was indicated at this juncture.  Initially I spoke with hospitalist team who recommended I speak with intensivist service for admission.  In discussion with intensivist service, plan for empiric steroids now given unclear contribution of MG to his current respiratory failure exacerbation and neurology evaluation in the morning.  Patient remained stable in the ER over many hours/serial reassessments while awaiting ICU bed.    Critical Care Time: was 50 minutes for this patient excluding procedures    Covid-19  Thanh Goodrich was evaluated during a global COVID-19 pandemic, which necessitated consideration that the patient might be at risk for infection with the SARS-CoV-2 virus that causes COVID-19.   Applicable protocols for evaluation were followed during the patient's care.   COVID-19 was considered as part of the patient's evaluation. The plan for testing is:  a test was obtained during this visit.    Diagnosis:    ICD-10-CM    1. Acute on chronic respiratory failure with hypoxia and hypercapnia (H)  J96.21 Blood culture     J96.22 Glucose by meter     Glucose by meter   2. Other pulmonary embolism without acute cor pulmonale, unspecified chronicity (H)  I26.99    3. History of myasthenia gravis  Z86.69        Scribe Disclosure:  I, Ella Harkinsedward, am serving as a scribe at 4:27 PM on 6/9/2021 to document services personally performed by Shakeel Rico MD based on my observations and the provider's statements to me.        Shakeel Rico MD  06/09/21 2725

## 2021-06-09 NOTE — LETTER
6/9/2021        RE: Thanh Goodrich  [address]  [place] [address]        [facility]  [facility] Medical Record Number:  [id number]  Place of Service where encounter took place:  [facility]  () [[id number]]  Chief Complaint   Patient presents with     Nursing Home Acute       HPI:    Thanh Goodrich  is a 80 year old (1940), who is being seen today for an episodic care visit.  HPI information obtained from: facility chart records, facility staff, patient report and [facility] chart review.     Brief Summary of Hospital Course: Thanh Goodrich is 80 year old male admitted initially to the TCU at [facility] after 2 recent hospitalizations for myasthenia gravis. He was initially admitted 3/4-3/15 for myasthenia gravis exacerbation and required intubation, along with exacerbation of diastolic CHF. He was discharged to [facility] TCU, but then was hospitalized again from 3/19-4/13 for another exacerbation of myasthenia gravis, again requiring intubation. He received 5 sessions of plasmapheresis. AVAPS BIPAP was recommended at night. He had some hematuria due to alberto trauma. While at TCU he made some progress with therapy, but it has been slow. He was transferred to LTC and his goal is still to return home where he lives alone in an independent apartment. Per patient report he is still requiring max assist of 2 for transfers, toileting and bed mobility.     Seen today for confusion, ongoing elevated CO2, change in respiratory status. With increased fluid recently. Lasix dose increased to BID 1 week ago and then to 40 mg daily for several days after chest xray obtained 6/3 that showed small left pleural effusion and basilar atelectasis. Also noted to have some wheezing and was recently started on albuterol. He has had some occasional desaturations down to upper 80s requiring titration of oxygen, as well as occasional increased respiratory rate on  "and off over past few days. His biPAP had a tear in it and was repaired yesterday. Nursing tells me today he did wear it overnight but the overnight nurse found it on the floor.     Today he is not following conversation like he has in the past. Unable to answer my questions appropriately. Is visibly SOB and using accessory muscles to breath. Oxygen saturations 92% this morning on 2L. RR 22. Also noted that he has some blood in his alberto.     Past Medical and Surgical History reviewed in Epic today.    MEDICATIONS:  Current Outpatient Medications   Medication Sig Dispense Refill     acetaminophen (TYLENOL) 325 MG tablet Take 650 mg by mouth every 4 hours as needed for mild pain       calcium carbonate 600 mg-vitamin D 400 units (CALTRATE) 600-400 MG-UNIT per tablet Take 1 tablet by mouth daily       Cholecalciferol 100 MCG (4000 UT) CAPS Take 4,000 Units by mouth daily       furosemide (LASIX) 20 MG tablet Take 1 tablet (20 mg) by mouth daily       metoprolol tartrate (LOPRESSOR) 25 MG tablet Take 0.5 tablets (12.5 mg) by mouth 2 times daily       Multiple Vitamins-Minerals (MULTIVITAMIN ADULTS 50+) TABS Take 1 tablet by mouth daily        mycophenolate (GENERIC EQUIVALENT) 500 MG tablet Take 2 tablets (1,000 mg) by mouth 2 times daily       potassium chloride ER (KLOR-CON M) 10 MEQ CR tablet Take 2 tablets (20 mEq) by mouth daily       predniSONE (DELTASONE) 20 MG tablet Take 3 tablets (60 mg) by mouth daily       pyridostigmine (MESTINON) 60 MG tablet Take 1 tablet (60 mg) by mouth 3 times daily       sulfamethoxazole-trimethoprim (BACTRIM) 400-80 MG tablet 1 tablet by Oral or Feeding Tube route daily       REVIEW OF SYSTEMS:  Limited secondary to confusion but today pt reports as above in HPI    Objective:  BP (!) 140/71   Pulse 66   Temp 97.3  F (36.3  C)   Resp 20   Ht 1.778 m (5' 10\")   Wt 123.1 kg (271 lb 4.8 oz)   SpO2 92%   BMI 38.93 kg/m    Exam:  GENERAL APPEARANCE:  Alert  HEENT: normocephalic, " nose without drainage or crusting  RESP:  using accessory muscles to breath, is visibly SOB, on 2L of oxygen,  Lung sounds with wheezing  CV: auscultation of heart done, rate and rhythm regular. no murmur, no rub or gallop.   M/S:  generalized lower extremity edema  PSYCH: cognition not at baseline    Labs:   Labs done in SNF are in [facility]. Please refer to them using EPIC/Care Everywhere. and Recent labs in EPIC reviewed by me today.     ASSESSMENT/PLAN:  SOB (shortness of breath)  Confusion  Elevated CO2 level  -Unable to manage safely at TCU due to respiratory distress. Send to ED for evaluation. With concerns he is having exacerbation of myasthenia gravis      Electronically signed by:  ANIYA Ortiz CNP               Sincerely,        ANIYA Ortiz CNP

## 2021-06-09 NOTE — ED TRIAGE NOTES
Shortness of breath for 3-4 days, occ nebulizer helped. Last 3 blood draws with elevated Co2 level. Baseline with 2 L oxygen via NC.

## 2021-06-09 NOTE — PATIENT INSTRUCTIONS
Joyce Goodrich  1940  1) Send to [facility] ED for evaluation due to respiratory distress, confusion, elevated CO2.   ANIYA Ortiz CNP on 6/9/2021 at 1:25 PM

## 2021-06-09 NOTE — PROGRESS NOTES
[facility]  [place] Medical Record Number:  [id number]  Place of Service where encounter took place:  [facility]  () [[id number]]  Chief Complaint   Patient presents with     Nursing Home Acute       HPI:    Thanh Goodrich  is a 80 year old (1940), who is being seen today for an episodic care visit.  HPI information obtained from: facility chart records, facility staff, patient report and [facility] chart review.     Brief Summary of Hospital Course: Thanh Goodrich is 80 year old male admitted initially to the TCU at [facility] after 2 recent hospitalizations for myasthenia gravis. He was initially admitted 3/4-3/15 for myasthenia gravis exacerbation and required intubation, along with exacerbation of diastolic CHF. He was discharged to [facility] TCU, but then was hospitalized again from 3/19-4/13 for another exacerbation of myasthenia gravis, again requiring intubation. He received 5 sessions of plasmapheresis. AVAPS BIPAP was recommended at night. He had some hematuria due to alberto trauma. While at TCU he made some progress with therapy, but it has been slow. He was transferred to LTC and his goal is still to return home where he lives alone in an independent apartment. Per patient report he is still requiring max assist of 2 for transfers, toileting and bed mobility.     Seen today for confusion, ongoing elevated CO2, change in respiratory status. With increased fluid recently. Lasix dose increased to BID 1 week ago and then to 40 mg daily for several days after chest xray obtained 6/3 that showed small left pleural effusion and basilar atelectasis. Also noted to have some wheezing and was recently started on albuterol. He has had some occasional desaturations down to upper 80s requiring titration of oxygen, as well as occasional increased respiratory rate on and off over past few days. His biPAP had a tear in it and was repaired yesterday. Nursing tells me today he  "did wear it overnight but the overnight nurse found it on the floor.     Today he is not following conversation like he has in the past. Unable to answer my questions appropriately. Is visibly SOB and using accessory muscles to breath. Oxygen saturations 92% this morning on 2L. RR 22. Also noted that he has some blood in his alberto.     Past Medical and Surgical History reviewed in Epic today.    MEDICATIONS:  Current Outpatient Medications   Medication Sig Dispense Refill     acetaminophen (TYLENOL) 325 MG tablet Take 650 mg by mouth every 4 hours as needed for mild pain       calcium carbonate 600 mg-vitamin D 400 units (CALTRATE) 600-400 MG-UNIT per tablet Take 1 tablet by mouth daily       Cholecalciferol 100 MCG (4000 UT) CAPS Take 4,000 Units by mouth daily       furosemide (LASIX) 20 MG tablet Take 1 tablet (20 mg) by mouth daily       metoprolol tartrate (LOPRESSOR) 25 MG tablet Take 0.5 tablets (12.5 mg) by mouth 2 times daily       Multiple Vitamins-Minerals (MULTIVITAMIN ADULTS 50+) TABS Take 1 tablet by mouth daily        mycophenolate (GENERIC EQUIVALENT) 500 MG tablet Take 2 tablets (1,000 mg) by mouth 2 times daily       potassium chloride ER (KLOR-CON M) 10 MEQ CR tablet Take 2 tablets (20 mEq) by mouth daily       predniSONE (DELTASONE) 20 MG tablet Take 3 tablets (60 mg) by mouth daily       pyridostigmine (MESTINON) 60 MG tablet Take 1 tablet (60 mg) by mouth 3 times daily       sulfamethoxazole-trimethoprim (BACTRIM) 400-80 MG tablet 1 tablet by Oral or Feeding Tube route daily       REVIEW OF SYSTEMS:  Limited secondary to confusion but today pt reports as above in HPI    Objective:  BP (!) 140/71   Pulse 66   Temp 97.3  F (36.3  C)   Resp 20   Ht 1.778 m (5' 10\")   Wt 123.1 kg (271 lb 4.8 oz)   SpO2 92%   BMI 38.93 kg/m    Exam:  GENERAL APPEARANCE:  Alert  HEENT: normocephalic, nose without drainage or crusting  RESP:  using accessory muscles to breath, is visibly SOB, on 2L of oxygen,  " Lung sounds with wheezing  CV: auscultation of heart done, rate and rhythm regular. no murmur, no rub or gallop.   M/S:  generalized lower extremity edema  PSYCH: cognition not at baseline    Labs:   Labs done in SNF are in [place] EPIC. Please refer to them using EPIC/Care Everywhere. and Recent labs in EPIC reviewed by me today.     ASSESSMENT/PLAN:  SOB (shortness of breath)  Confusion  Elevated CO2 level  -Unable to manage safely at TCU due to respiratory distress. Send to ED for evaluation. With concerns he is having exacerbation of myasthenia gravis      Electronically signed by:  ANIYA Ortiz CNP

## 2021-06-10 NOTE — PHARMACY-ADMISSION MEDICATION HISTORY
Pharmacy Medication History  Admission medication history interview status for the 6/9/2021  admission is complete. See EPIC admission navigator for prior to admission medications     Location of Interview: Outside patient room but on unit  Medication history sources: [facility] ([facility])    Significant changes made to the medication list:  Deleted old Abx, SMX/TMP from April     In the past week, patient estimated taking medication this percent of the time: greater than 90%    Additional medication history information:       Medication reconciliation completed by provider prior to medication history? No    Time spent in this activity: 15 minutes    Prior to Admission medications    Medication Sig Last Dose Taking? Auth Provider   acetaminophen (TYLENOL) 325 MG tablet Take 650 mg by mouth every 4 hours as needed for mild pain Past Week at PRN Yes Unknown, Entered By History   albuterol (PROVENTIL) (2.5 MG/3ML) 0.083% neb solution Take 1 vial (2.5 mg) by nebulization 4 times daily as needed for shortness of breath / dyspnea or wheezing 6/9/2021 at PRN Yes Sara Chadwick APRN CNP   calcium carbonate 600 mg-vitamin D 400 units (CALTRATE) 600-400 MG-UNIT per tablet Take 1 tablet by mouth daily 6/9/2021 at AM Yes Unknown, Entered By History   Cholecalciferol 100 MCG (4000 UT) CAPS Take 4,000 Units by mouth daily 6/9/2021 at AM Yes Unknown, Entered By History   furosemide (LASIX) 20 MG tablet Take 1 tablet (20 mg) by mouth daily 6/9/2021 at 0800 Yes Darius Hopson MD   guaiFENesin (ROBITUSSIN) 100 MG/5ML SYRP Take 10 mLs by mouth every 4 hours as needed for cough 2 day regimen started 6/8/2021 6/8/2021 at PRN Yes Unknown, Entered By History   metoprolol tartrate (LOPRESSOR) 25 MG tablet Take 0.5 tablets (12.5 mg) by mouth 2 times daily 6/9/2021 at 0800 Yes David Mcclain, DO   Multiple Vitamins-Minerals (MULTIVITAMIN ADULTS 50+) TABS Take 1 tablet by mouth daily  6/9/2021 at AM Yes  Reported, Patient   mycophenolate (GENERIC EQUIVALENT) 500 MG tablet Take 2 tablets (1,000 mg) by mouth 2 times daily 6/9/2021 at 0800 Yes Darius Hopson MD   potassium chloride ER (KLOR-CON M) 10 MEQ CR tablet Take 2 tablets (20 mEq) by mouth daily 6/9/2021 at 0800 Yes Sara Chadwick APRN CNP   predniSONE (DELTASONE) 20 MG tablet Take 3 tablets (60 mg) by mouth daily 6/9/2021 at 0800 Yes Darius Hopson MD   pyridostigmine (MESTINON) 60 MG tablet Take 1 tablet (60 mg) by mouth 3 times daily  Patient taking differently: Take 60 mg by mouth 3 times daily 0800, 1200, 2000 6/9/2021 at 1200 Yes Darius Hopson MD       The information provided in this note is only as accurate as the sources available at the time of update(s)

## 2021-06-10 NOTE — PROGRESS NOTES
"Critical Care  Note      06/10/2021    Name: Thanh Goodrich MRN#: [id number]   Age: 80 year old YOB: 1940     [facility] Day# 1  ICU DAY # 1               Problem List:   Active Problems:    Acute on chronic respiratory failure with hypoxia and hypercapnia (H)    Pulmonary embolism (H)           Summary/Hospital Course:     Thanh Goodrich is a 80-year-old male with history of myasthenia gravis, HFpEF, HTN, HLD, asthma and prostate cancer s/p resection (2007) who presented from a nursing facility with shortness of breath.     Patient has chronic respiratory failure requiring oxygen during the day and BiPAP/CPAP at night. Baseline pCO2 appears to be ~80. He was recently admitted 3/4-3/15 and then 3/19-4/13 for respiratory failure due to myasthenic crisis and heart failure exacerbation. He was intubated both times and treated with IVIG and steroids.     Today he presented to the ED today with shortness of breath and leg swelling. He was afebrile and vitally stable on 2L O2 NC. Workup notable for VBG pH 7.32/ pCO2 108/ pO2 21/ bicarb 56, NT Pro-, Trop 0.02, lactate 1.0, and WBC wnl. CXR with low lung volume, bilateral pleural effusions and bibasilar atelectasis/consolidation. Chest CT with pulmonary emboli in RUL and pulmonary edema. Patient was started on BiPAP with improvement in pH 7.4 and pCO2 91. Patient was transferred to the ICU for further monitoring.     Upon admission to the ICU, the patient was vitally stable on 5L of O2 via NC. He was conversating, able to move his head and upper extremities, and had no signs of acute respiratory distress.     6/10:  Today he tells me he is doing well.  He denies shortness of breath; says his breathing feels \"normal\".  He also denies chest pain, dizzyness, lightheadedness.        Assessment and plan :     Thanh Goodrich IS a 80 year old male admitted on 6/9/2021 for acute on chronic hypercapnic respiratory failure and pulmonary emboli.     I have personally " reviewed the daily labs, imaging studies, cultures and discussed the case with referring physician and consulting physicians.     My assessment and plan by system for this patient is as follows:    Neurology/Psychiatry:   1. Hx of Myasthenia Gravis - Low concern for myasthenic crisis at this time. Patient appears to have baseline strength and no increased work of breathing.  Plan  - Revert to home regimen of prednisone, mycophenolate and mestinon    Cardiovascular:   1. Pulmonary emboli - in RUL on CTA chest -  Likely cause for patient's respiratory changes yesterday along with pulmonary edema.  Underlying immobility would be sufficient to explain this (WC bound at baseline).  On heparin drip.  Transition to oral AC (pharmacy liaison consult placed).  LE duplex negative.  2. Hx of HFpEF and c/f acute exacerbation/acute pulmonary edema - EF 55-60% (03/2021), diuresed well with lasix 20 iv; now reverting to home dose of 20 po.  Additional diuresis above home dosing on prn basis.  3. Hx of HTN - PTA lasix and metoprolol  Plan  - Heparin gtt  - Heparin unfractioned anti-Xa level  - continue PTA metoprolol and lasix  - Monitor daily weights  - Strict I/Os    Pulmonary/Ventilator Management:   1. Acute on chronic hypercapnic respiratory failure, most likely 2/2 pulmonary emboli - pCO2 improving with BiPAP.  Now resolved to his chronic levels of retention with home bipap overnight.   2. Hx of asthma  Plan  - BiPAP when sleeping   - Baseline pco2 level seems to be 90s  - Maintain O2 sats > 90%   - Monitor VBG  - PTA albuterol neb PRN    GI and Nutrition :   - regular diet, per last discharge summary pt is ok for this as long as he is fully alert, sitting upright and eating slowly.    Renal/Fluids/Electrolytes:   1. Acute on chronic respiratory acidosis    2. Hypervolemia, likely 2/2 to HFpEF exacerbation   Plan  - Diuresis and BiPAP as above  - monitor function and electrolytes as needed with replacement per ICU  protocols  - generally avoid nephrotoxic agents such as NSAID, IV contrast unless specifically required  - adjust medications as needed for renal clearance  - follow I/O's as appropriate    Infectious Disease:   Afebrile and no leukocytosis, so low likelihood a pneumonia or other infectious process is contributing to presentation.   Plan  - Follow-up on blood cultures    Endocrine:   - ICU insulin protocol, goal sugar <180    Hematology/Oncology:   1.Mild anemia - improved from baseline (10-11), no signs of active bleeding  Plan  - Daily CBC     IV/Access:   1. Venous access - PIV x1  2. Arterial access - none  Plan  - central access not required and necessary at this time    ICU Prophylaxis:   1. DVT: on heparin gtt  2. Feeding - reg diet  3. Family Update: pt himself at bedside  4. 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     Cellulitis of left leg 9/23/2012     Depressive disorder 2020     Edema 7/7/2009     History of steroid therapy 2/22/2010     Hyperlipidemia LDL goal <130 10/31/2010     HYPERTENSION 7/8/2003     Incontinence of urine 10/25/2010     Leg ulcer (H) 9/20/2012     MALIGN NEOPL PROSTATE-3/07 4/2/2007    T1C, Shaina 8, initial PSA 39, s/p radiation seed implants 2007      Myasthenia gravis (H)      OBESITY 7/8/2003     Osteoporosis 8/18/2009    Refused bisphosphonates 2011      PVC's (premature ventricular contractions) 11/23/2011     RIGHT OCCIPITAL PAIN 7/8/2003     ROTATOR CUFF SYND NOS- RT 9/19/2005     Skin nodule 3/18/2013     ulcer left shin 10/8/2007     Uncomplicated asthma 1944?    childhood only     Past Surgical History:   Procedure Laterality Date     BIOPSY  2013?    dealt with     IR CVC TUNNEL PLACEMENT > 5 YRS OF AGE  3/31/2021     IR CVC TUNNEL REMOVAL RIGHT  4/13/2021     Prostate Cancer Cryotherapy  2007     TONSILLECTOMY  1945     Social History     Socioeconomic History      Marital status: Single     Spouse name: Not on file     Number of children: Not on file     Years of education: Not on file     Highest education level: Not on file   Occupational History     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 [place]     Employer: [employer]   Social Needs     Financial resource strain: Not on file     Food insecurity     Worry: Not on file     Inability: Not on file     Transportation needs     Medical: Not on file     Non-medical: Not on file   Tobacco Use     Smoking status: Never Smoker     Smokeless tobacco: Never Used   Substance and Sexual Activity     Alcohol use: Yes     Alcohol/week: 0.0 standard drinks     Comment: very rarely     Drug use: No     Sexual activity: Not Currently     Partners: Female   Lifestyle     Physical activity     Days per week: Not on file     Minutes per session: Not on file     Stress: Not on file   Relationships     Social connections     Talks on phone: Not on file     Gets together: Not on file     Attends [employer] service: Not on file     Active member of club or organization: Not on file     Attends meetings of clubs or organizations: Not on file     Relationship status: Not on file     Intimate partner violence     Fear of current or ex partner: Not on file     Emotionally abused: Not on file     Physically abused: Not on file     Forced sexual activity: Not on file   Other Topics Concern     Parent/sibling w/ CABG, MI or angioplasty before 65F 55M? No   Social History Narrative    The patient has a 0 pk yr tobacco hx.  He has no active use.  Alcohol use is rare alcoholic drinks per week.  He denies use of recreational drugs.          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        The patient is single.  Has 0 children.        Hot Tub Exposure: NO    Recent Travel: NO     Hx of incarceration:  NO    Bird Exposure:   NO    Animal Exposure:  3 Cats    Inhalation Exposure:  + asbestos exposure in past                Allergies   Allergen  Reactions     Ciprofloxacin Other (See Comments)     Contraindicated for myasthenia gravis patients     Procainamide Other (See Comments)     Contraindicated for myasthenia gravis patients     Quinidine Other (See Comments)     Contraindicated for myasthenia gravis patients     Quinine Other (See Comments)     Contraindicated for myasthenia gravis patients              Key Medications:       methylPREDNISolone  125 mg Intravenous Q24H     metoprolol tartrate  25 mg Oral BID     mycophenolate  1,000 mg Oral BID     pantoprazole  40 mg Oral QAM AC     pyridostigmine  60 mg Oral Q8H ALEXUS     sulfamethoxazole-trimethoprim  1 tablet Oral Daily       heparin 1,500 Units/hr (06/10/21 0750)     - MEDICATION INSTRUCTIONS -          Home Meds  No current facility-administered medications on file prior to encounter.   acetaminophen (TYLENOL) 325 MG tablet, Take 650 mg by mouth every 4 hours as needed for mild pain  albuterol (PROVENTIL) (2.5 MG/3ML) 0.083% neb solution, Take 1 vial (2.5 mg) by nebulization 4 times daily as needed for shortness of breath / dyspnea or wheezing  calcium carbonate 600 mg-vitamin D 400 units (CALTRATE) 600-400 MG-UNIT per tablet, Take 1 tablet by mouth daily  Cholecalciferol 100 MCG (4000 UT) CAPS, Take 4,000 Units by mouth daily  furosemide (LASIX) 20 MG tablet, Take 1 tablet (20 mg) by mouth daily  [] guaiFENesin (ROBITUSSIN) 100 MG/5ML SYRP, Take 10 mLs by mouth every 4 hours as needed for cough 2 day regimen started 2021  metoprolol tartrate (LOPRESSOR) 25 MG tablet, Take 0.5 tablets (12.5 mg) by mouth 2 times daily  Multiple Vitamins-Minerals (MULTIVITAMIN ADULTS 50+) TABS, Take 1 tablet by mouth daily   mycophenolate (GENERIC EQUIVALENT) 500 MG tablet, Take 2 tablets (1,000 mg) by mouth 2 times daily  potassium chloride ER (KLOR-CON M) 10 MEQ CR tablet, Take 2 tablets (20 mEq) by mouth daily  predniSONE (DELTASONE) 20 MG tablet, Take 3 tablets (60 mg) by mouth daily  pyridostigmine  (MESTINON) 60 MG tablet, Take 1 tablet (60 mg) by mouth 3 times daily (Patient taking differently: Take 60 mg by mouth 3 times daily 0800, 1200, 2000)               Physical Examination:   Temp:  [97.7  F (36.5  C)-98.5  F (36.9  C)] 98.5  F (36.9  C)  Pulse:  [] 65  Resp:  [12-39] 12  BP: ()/() 154/74  FiO2 (%):  [28 %-40 %] 40 %  SpO2:  [81 %-100 %] 97 %  No intake or output data in the 24 hours ending 06/09/21 2030  Wt Readings from Last 4 Encounters:   06/10/21 126.7 kg (279 lb 5.2 oz)   06/09/21 123.1 kg (271 lb 4.8 oz)   06/01/21 123.3 kg (271 lb 12.8 oz)   05/12/21 123.1 kg (271 lb 4.8 oz)     BP - Mean:  [] 100  FiO2 (%): 40 %  Resp: 12    No lab results found in last 7 days.    GEN: no acute distress, laying in bed  HEENT: head ncat, sclera anicteric   NECK:  supple  PULM: no increased work of breathing, on 5L O2 via NC, clear to auscultation bilaterally, limited air movement  CV/COR: RRR, S1S2 normal, no gallop,  No rub, no murmur  ABD: soft nontender, large pannus  EXT:  Bilateral lower extremity pitting edema to knees, warm and well perfused   NEURO: alert, speech fluent, grossly intact, lifts head, arm movement symmetric  SKIN: lower extremity skin changes c/w chronic lymphedema  LINES: clean, dry intact         Data:   All data and imaging reviewed     ROUTINE ICU LABS (Last four results)  CMP  Recent Labs   Lab 06/09/21  1622      POTASSIUM 4.9   CHLORIDE 90*   CO2 >45*   ANIONGAP Not Calculated   *   BUN 16   CR 0.64*   GFRESTIMATED >90   GFRESTBLACK >90   NOAH 9.1   PHOS 2.7     CBC  Recent Labs   Lab 06/09/21  1622   WBC 5.7   RBC 4.29*   HGB 13.0*   HCT 45.0   *   MCH 30.3   MCHC 28.9*   RDW 15.0        INRNo lab results found in last 7 days.  Arterial Blood GasNo lab results found in last 7 days.    All cultures:  Recent Labs   Lab 06/09/21  1717 06/09/21  1653   CULT No growth after 8 hours No growth after 13 hours     Recent Results (from the  past 24 hour(s))   XR Chest Port 1 View    Narrative    XR CHEST PORT 1 VIEW 6/9/2021 4:58 PM    HISTORY: hypercapnea    COMPARISON: 3/29/2021      Impression    IMPRESSION: New small bilateral pleural effusions and bibasilar  atelectasis/consolidation. Low lung volumes. No pneumothorax.    DANA ONEAL MD   CT Chest Pulmonary Embolism w Contrast   Result Value    Radiologist flags Pulmonary embolism (AA)    Narrative    CT CHEST PULMONARY EMBOLISM W CONTRAST 6/9/2021 6:32 PM    CLINICAL HISTORY: chest pain, elevated d-dimer  TECHNIQUE: CT angiogram chest during arterial phase injection IV  contrast. 2D and 3D MIP reconstructions were performed by the CT  technologist. Dose reduction techniques were used.     CONTRAST: 83 mL of Isovue 370    COMPARISON: 3/4/2021    FINDINGS:  ANGIOGRAM CHEST: Filling defects in right upper lobe segmental and  subsegmental pulmonary arteries (series 5, image [id number] and coronal  series 9, image 54 and 76), new since 3/4/2021, compatible with  pulmonary emboli. Thoracic aorta is negative for dissection. No CT  evidence of right heart strain.    LUNGS AND PLEURA: Increased atelectasis of the right lower lobe with  layering secretions in the right lower lobe bronchus. Increased  atelectasis of the right middle lobe and left lower lobe at the lung  base. Few areas of mild groundglass opacities in the upper lobes  suggestive of pulmonary edema. No pleural effusions. No suspicious  pulmonary masses in the aerated lungs.    MEDIASTINUM/AXILLAE: No lymphadenopathy. Small, 2 mm diameter patent  ductus arteriosus (series 5, image 45). Aneurysmal thoracic aorta  measuring 4.4 cm. Mild coronary artery calcifications. No pericardial  effusion.    UPPER ABDOMEN: The visualized portion of the upper abdomen is  unremarkable..    MUSCULOSKELETAL: No concerning bone lesions.      Impression    IMPRESSION:  1.  Positive for small volume of acute pulmonary emboli in right upper  lobe segmental and  subsegmental pulmonary arteries.  2.  Right lower lobe atelectasis with secretions in the right lower  lobe bronchus.  3.  Mild pulmonary edema.  4.  Incidental 2 mm diameter patent ductus arteriosus, of doubtful  clinical significance.  5.  Stable mildly aneurysmal thoracic aorta measuring 4.4 cm..    [Critical Result: Pulmonary embolism]    Finding was identified on 6/9/2021 6:36 PM.     Dr. Rico was contacted by me on 6/9/2021 6:54 PM and verbalized  understanding of the critical result.     DANA ONEAL MD

## 2021-06-10 NOTE — PROGRESS NOTES
SPIRITUAL HEALTH SERVICES  SPIRITUAL ASSESSMENT Progress Note  [facility] ICU     REFERRAL SOURCE:  Consult    I visited with Rigoberto earlier this morning, known to me from many visits in previous hospital stays. He was sleepy this morning and shared he'd welcome a visit at a later time. I wasn't able to follow up again later in the day, so will refer to a follow up tomorrow.     PLAN: I will ask unit  to visit Rigoberto tomorrow.     Dottie Samuels  Associate    Phone: [phone]  Pager: [phone]

## 2021-06-10 NOTE — PROVIDER NOTIFICATION
MD Notification    Notified Person: MD    Notified Person Name: Geneva Cortez    Notification Date/Time: 6/10 0650    Notification Interaction: face to face    Purpose of Notification: critical PCO2 98, bicarb 54    Orders Received: may adjust bipap settings

## 2021-06-10 NOTE — CONSULTS
Discharge Pharmacy Test Claim    Ran test scripts for eliquis and xarelto through pt's [employer] Medicare Part D insurance. Both DOACs are covered with an initial copay of $445. Pt has $400 remaining in their deductible. Once met, refills will be $45/mo. [place] pharmacy has one-time use 30-day free trial vouchers for eliquis and xarelto.      Belia Goldsmith 6/7 - 6/11  [facility] Pharm[facility] Liaison  Ph: [phone] Pager: [phone]

## 2021-06-10 NOTE — CONSULTS
"[facility]  Consult Note - Hospitalist Service     Date of Admission:  6/9/2021  Consult Requested by: Dr. Villegas  Reason for Consult: Continue care of patient after discharge from ICU    Assessment & Plan   Thanh Goodrich is a 80 year old male admitted on 6/9/2021. He has a PMH most remarkable for myasthenia gravis, HFpEF, asthma, and chronic hypercapenic and hypoxic respiratory failure requiring supplemental oxygen (2L) at home. He was admitted to the ICU yesterday for acute on chronic hypoxic and hypercapenic respiratory failure secondary to a new pulmonary embolism, and is now being discharged to the inpatient hospital medicine service.    1. Acute on Chronic Hypoxic Respiratory Failure  2. Acute on Chronic Hypercapnic Respiratory Failure.   3. Heart Failure with Preserved Ejection fraction, acute exacerbation (moderate).  4. Right Upper Lobe Acute Pulmonary Embolism, Most likely the acute issues were caused by a combination of acute pulmonary embolism and hypervolemia in the setting of HFpEF. PE was probably caused by patient's current living situation ([facility] bound at baseline). While this would be considered a \"provoked\" PE, he likely will need lifelong anticoagulation as the provoking factor will not be able to be eliminated.  - Echocardiogram--to evaluate RV function and volume status.  - On exam today patient was hypervolemic--I increased patient's home lasix from 20 mg to 40 mg daily which will start tomorrow. Will give an additional dose of IV lasix today; day team tomorrow to reassess response and adjust as needed  - Patient is receiving heparin infusion--Pharmacy consult placed to determine optimal outpatient anticoagulation.  - Patient is a chronic CO2 retainer, chart review suggests his baseline CO2 is in the mid 80's; will get another VBG tomorrow morning around 10AM to reassess    5. History of myasthenia gravis; appears baseline function right now   - Was given dose of " solumedrol last night; doesn't appear to be in myasthenic crisis. ICU staff placed patient back on home regimen, will continue this for now.    Chronic Issues  1. History of Asthma - Home inhalers  2. Chronic Normocytic Anemia  3. Benign Essential HTN  4. Chronic Indwelling Beckett Catheter    Diet: General   DVT: On anticoagulation (heparin for now)  Disposition: To the internal medicine service, needs to get back on home oxygen regimen and oral anticoagulant. Probably will need another 1-2 days of diuresis. Will have PT/OT evaluate living situation. Internal Medicine Service will now assume care of patient.  Code: Full Code, Discussed with Patient     The patient's care was discussed with the Bedside Nurse and Patient.    Jose Luis Jovel MD PhD  [facility]  Contact information available via [facility] Paging/Directory    ______________________________________________________________________    Chief Complaint   Hypoxia/Hypercapenia    History is obtained from the patient    History of Present Illness   Thanh Goodrich is a 80 year old male admitted on 6/9/2021. He has a PMH most remarkable for myasthenia gravis, HFpEF, asthma, and chronic hypercapenic and hypoxic respiratory failure requiring supplemental oxygen (2L) at home. He was admitted to the ICU yesterday for acute on chronic hypoxic and hypercapenic respiratory failure secondary to a new pulmonary embolism, and is now being discharged to the inpatient hospital medicine service.    He tells me that he thinks he is feeling better than yesterday--back to his baseline. He notes he has swelling of the lower extremities that has been there for several weeks now. He is hoping to get back to his residence as he enjoys it and is paying to have the bed held.    No fevers, chills, chest pain, acute shortness of breath, abdominal pain, nausea, vomiting, diarrhea. No constipation, no dizziness, light-headedness, syncope, or presyncope.    Review  of Systems   The 10 point Review of Systems is negative other than noted in the HPI or here.    Past Medical History    I have reviewed this patient's medical history and updated it with pertinent information if needed.   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     Cellulitis of left leg 9/23/2012     Depressive disorder 2020     Edema 7/7/2009     History of steroid therapy 2/22/2010     Hyperlipidemia LDL goal <130 10/31/2010     HYPERTENSION 7/8/2003     Incontinence of urine 10/25/2010     Leg ulcer (H) 9/20/2012     MALIGN NEOPL PROSTATE-3/07 4/2/2007    T1C, Shaina 8, initial PSA 39, s/p radiation seed implants 2007      Myasthenia gravis (H)      OBESITY 7/8/2003     Osteoporosis 8/18/2009    Refused bisphosphonates 2011      PVC's (premature ventricular contractions) 11/23/2011     RIGHT OCCIPITAL PAIN 7/8/2003     ROTATOR CUFF SYND NOS- RT 9/19/2005     Skin nodule 3/18/2013     ulcer left shin 10/8/2007     Uncomplicated asthma 1944?    childhood only       Past Surgical History   I have reviewed this patient's surgical history and updated it with pertinent information if needed.  Past Surgical History:   Procedure Laterality Date     BIOPSY  2013?    dealt with     IR CVC TUNNEL PLACEMENT > 5 YRS OF AGE  3/31/2021     IR CVC TUNNEL REMOVAL RIGHT  4/13/2021     Prostate Cancer Cryotherapy  2007     TONSILLECTOMY  1945       Social History   I have reviewed this patient's social history and updated it with pertinent information if needed.  Social History     Tobacco Use     Smoking status: Never Smoker     Smokeless tobacco: Never Used   Substance Use Topics     Alcohol use: Yes     Alcohol/week: 0.0 standard drinks     Comment: very rarely     Drug use: No       Family History   I have reviewed this patient's family history and updated it with pertinent information if needed.  Family History   Problem Relation Age of Onset     Hypertension Mother       Depression Mother      Substance Abuse Mother         alcohol     Hypertension Father      Cancer Father         Prostate     Prostate Cancer Father      Substance Abuse Father         alcohol     Obesity Father      Diabetes Maternal Grandmother      C.A.D. Maternal Grandmother      Cerebrovascular Disease Maternal Grandfather      Cancer Paternal Uncle         Prostate     Prostate Cancer Other        Medications   I have reviewed this patient's current medications    Allergies   Allergies   Allergen Reactions     Ciprofloxacin Other (See Comments)     Contraindicated for myasthenia gravis patients     Procainamide Other (See Comments)     Contraindicated for myasthenia gravis patients     Quinidine Other (See Comments)     Contraindicated for myasthenia gravis patients     Quinine Other (See Comments)     Contraindicated for myasthenia gravis patients       Physical Exam   Vital Signs: Temp: 98.5  F (36.9  C) Temp src: Axillary BP: (!) 154/74 Pulse: 65   Resp: 12 SpO2: 97 % O2 Device: BiPAP/CPAP Oxygen Delivery: 5 LPM  Weight: 279 lbs 5.17 oz    General Appearance: Well appearing, no distress  Eyes: ARTURO, EOMI  HEENT: NC, AT. MMM. JVD 15 cm  Respiratory: Bilateral faint crackles, normal work of breathing  Cardiovascular: Regular Rate and Rhythm, normal S1, S2. No murmurs, rubs, gallops  GI: Soft, non-tender, non-distended  Lymph/Hematologic: No asymetric swelling. Moderate bilateral pitting edema. No bruising.  Genitourinary: Deferred  Skin: No rashes, lesions, wounds.  Musculoskeletal: Warm, well perfused  Neurologic: AOx4, CNII-XII intact.  Psychiatric: Euthymic. Mood appropriate.     Data   I personally reviewed the chest x-ray image(s) showing bilateral pleural effusions.

## 2021-06-10 NOTE — H&P
Critical Care  Note      06/09/2021    Name: Thanh Goodrich MRN#: [id number]   Age: 80 year old YOB: 1940     Hsptl Day# 0  ICU DAY # 0    MV DAY # N/A             Problem List:   Active Problems:    Acute on chronic respiratory failure with hypoxia and hypercapnia (H)    Pulmonary embolism (H)           Summary/Hospital Course:   Thanh Goodrich is a 80-year-old male with history of myasthenia gravis, HFpEF, HTN, HLD, asthma and prostate cancer s/p resection (2007) who presented from a nursing facility with shortness of breath.     Patient has chronic respiratory failure requiring oxygen during the day and BiPAP/CPAP at night. Baseline pCO2 appears to be ~80. He was recently admitted 3/4-3/15 and then 3/19-4/13 for respiratory failure due to myasthenic crisis and heart failure exacerbation. He was intubated both times and treated with IVIG and steroids.     Today he presented to the ED today with shortness of breath and leg swelling. He was afebrile and vitally stable on 2L O2 NC. Workup notable for VBG pH 7.32/ pCO2 108/ pO2 21/ bicarb 56, NT Pro-, Trop 0.02, lactate 1.0, and WBC wnl. CXR with low lung volume, bilateral pleural effusions and bibasilar atelectasis/consolidation. Chest CT with pulmonary emboli in RUL and pulmonary edema. Patient was started on BiPAP with improvement in pH 7.4 and pCO2 91. Patient was transferred to the ICU for further monitoring.     Upon admission to the ICU, the patient was vitally stable on 5L of O2 via NC. He was conversating, able to move his head and upper extremities, and had no signs of acute respiratory distress.         Assessment and plan :     Thanh Goodrich IS a 80 year old male admitted on 6/9/2021 for acute on chronic hypercapnic respiratory failure and pulmonary emboli.     I have personally reviewed the daily labs, imaging studies, cultures and discussed the case with referring physician and consulting physicians.     My assessment and plan by  system for this patient is as follows:    Neurology/Psychiatry:   1. Hx of Myasthenia Gravis - Low concern for myasthenic crisis at this time. Patient appears to have baseline strength and no increased work of breathing.  Plan  - Solumedrol 125 mg daily  - PTA tylenol PRN for pain    Cardiovascular:   1. Pulmonary emboli - in RUL on CTA chest   2. Hx of HFpEF and c/f acute exacerbation - EF 55-60% (03/2021), NT Pro-BNP is equivocal (938) though likely hypervolemic given pulmonary edema on CT chest and BLE edema on exam   3. Hx of HTN - PTA lasix and metoprolol  Plan  - LE U/S Dopplers to assess for DVT   - Heparin gtt  - Heparin unfractioned anti-Xa level  - IV lasix 20 mg x1   - continue PTA metoprolol and lasix  - Monitor daily weights  - Strict I/Os    Pulmonary/Ventilator Management:   1. Acute on chronic hypercapnic respiratory failure, most likely 2/2 pulmonary emboli - pCO2 improving with BiPAP   2. Hx of asthma  Plan  - BiPAP when sleeping   - Goal pCO2 ~80   - Maintain O2 sats > 90%   - Monitor VBG  - PTA albuterol neb PRN    GI and Nutrition :   - NPO except medications at this time, likely advance as tolerated tomorrow    Renal/Fluids/Electrolytes:   1. Acute on chronic respiratory acidosis   2. Hypervolemia, likely 2/2 to HFpEF exacerbation   Plan  - Diuresis and BiPAP as above  - monitor function and electrolytes as needed with replacement per ICU protocols  - generally avoid nephrotoxic agents such as NSAID, IV contrast unless specifically required  - adjust medications as needed for renal clearance  - follow I/O's as appropriate    Infectious Disease:   Afebrile and no leukocytosis, so low likelihood a pneumonia or other infectious process is contributing to presentation.   Plan  - Follow-up on blood cultures    Endocrine:   - ICU insulin protocol, goal sugar <180    Hematology/Oncology:   1.Mild anemia - improved from baseline (10-11), no signs of active bleeding  Plan  - Daily CBC     IV/Access:    1. Venous access - PIV x1  2. Arterial access - none  Plan  - central access not required and necessary at this time      ICU Prophylaxis:   1. DVT: on heparin gtt  2. VAP: HOB 30 degrees, chlorhexidine rinse  3. Stress Ulcer: PPI  4. Restraints: Not required at this time  5. Wound care  - N/A  6. Feeding - NPO except medications  7. Family Update: Yes, called cousin Gaby  8. Disposition - ICU care        Key goals for next 24 hours:   1. Goal pCO2 ~80  2. Heparin gtt  3. U/S lower extremities to assess for DVT  4. 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     Cellulitis of left leg 9/23/2012     Depressive disorder 2020     Edema 7/7/2009     History of steroid therapy 2/22/2010     Hyperlipidemia LDL goal <130 10/31/2010     HYPERTENSION 7/8/2003     Incontinence of urine 10/25/2010     Leg ulcer (H) 9/20/2012     MALIGN NEOPL PROSTATE-3/07 4/2/2007    T1C, Shaina 8, initial PSA 39, s/p radiation seed implants 2007      Myasthenia gravis (H)      OBESITY 7/8/2003     Osteoporosis 8/18/2009    Refused bisphosphonates 2011      PVC's (premature ventricular contractions) 11/23/2011     RIGHT OCCIPITAL PAIN 7/8/2003     ROTATOR CUFF SYND NOS- RT 9/19/2005     Skin nodule 3/18/2013     ulcer left shin 10/8/2007     Uncomplicated asthma 1944?    childhood only     Past Surgical History:   Procedure Laterality Date     BIOPSY  2013?    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 [employer]     Employer: [address] [place] [address]   Social Needs     Financial  resource strain: Not on file     Food insecurity     Worry: Not on file     Inability: Not on file     Transportation needs     Medical: Not on file     Non-medical: Not on file   Tobacco Use     Smoking status: Never Smoker     Smokeless tobacco: Never Used   Substance and Sexual Activity     Alcohol use: Yes     Alcohol/week: 0.0 standard drinks     Comment: very rarely     Drug use: No     Sexual activity: Not Currently     Partners: Female   Lifestyle     Physical activity     Days per week: Not on file     Minutes per session: Not on file     Stress: Not on file   Relationships     Social connections     Talks on phone: Not on file     Gets together: Not on file     Attends Orthodox service: Not on file     Active member of club or organization: Not on file     Attends meetings of clubs or organizations: Not on file     Relationship status: Not on file     Intimate partner violence     Fear of current or ex partner: Not on file     Emotionally abused: Not on file     Physically abused: Not on file     Forced sexual activity: Not on file   Other Topics Concern     Parent/sibling w/ CABG, MI or angioplasty before 65F 55M? No   Social History Narrative    The patient has a 0 pk yr tobacco hx.  He has no active use.  Alcohol use is rare alcoholic drinks per week.  He denies use of recreational drugs.          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        The patient is single.  Has 0 children.        Hot Tub Exposure: NO    Recent Travel: NO     Hx of incarceration:  NO    Bird Exposure:   NO    Animal Exposure:  3 Cats    Inhalation Exposure:  + asbestos exposure in past                Allergies   Allergen Reactions     Ciprofloxacin Other (See Comments)     Contraindicated for myasthenia gravis patients     Procainamide Other (See Comments)     Contraindicated for myasthenia gravis patients     Quinidine Other (See Comments)     Contraindicated for myasthenia gravis patients     Quinine  Other (See Comments)     Contraindicated for myasthenia gravis patients              Key Medications:       heparin 1,800 Units/hr (06/09/21 1935)        Home Meds  No current facility-administered medications on file prior to encounter.   acetaminophen (TYLENOL) 325 MG tablet, Take 650 mg by mouth every 4 hours as needed for mild pain  albuterol (PROVENTIL) (2.5 MG/3ML) 0.083% neb solution, Take 1 vial (2.5 mg) by nebulization 4 times daily as needed for shortness of breath / dyspnea or wheezing  calcium carbonate 600 mg-vitamin D 400 units (CALTRATE) 600-400 MG-UNIT per tablet, Take 1 tablet by mouth daily  Cholecalciferol 100 MCG (4000 UT) CAPS, Take 4,000 Units by mouth daily  furosemide (LASIX) 20 MG tablet, Take 1 tablet (20 mg) by mouth daily  guaiFENesin (ROBITUSSIN) 100 MG/5ML SYRP, Take 10 mLs by mouth every 4 hours as needed for cough 2 day regimen started 6/8/2021  metoprolol tartrate (LOPRESSOR) 25 MG tablet, Take 0.5 tablets (12.5 mg) by mouth 2 times daily  Multiple Vitamins-Minerals (MULTIVITAMIN ADULTS 50+) TABS, Take 1 tablet by mouth daily   mycophenolate (GENERIC EQUIVALENT) 500 MG tablet, Take 2 tablets (1,000 mg) by mouth 2 times daily  potassium chloride ER (KLOR-CON M) 10 MEQ CR tablet, Take 2 tablets (20 mEq) by mouth daily  predniSONE (DELTASONE) 20 MG tablet, Take 3 tablets (60 mg) by mouth daily  pyridostigmine (MESTINON) 60 MG tablet, Take 1 tablet (60 mg) by mouth 3 times daily (Patient taking differently: Take 60 mg by mouth 3 times daily 0800, 1200, 2000)               Physical Examination:   Temp:  [97.3  F (36.3  C)-97.7  F (36.5  C)] 97.7  F (36.5  C)  Pulse:  [] 65  Resp:  [16-20] 16  BP: (103-140)/() 116/84  SpO2:  [86 %-98 %] 95 %  No intake or output data in the 24 hours ending 06/09/21 2030  Wt Readings from Last 4 Encounters:   06/09/21 127.9 kg (282 lb)   06/09/21 123.1 kg (271 lb 4.8 oz)   06/01/21 123.3 kg (271 lb 12.8 oz)   05/12/21 123.1 kg (271 lb 4.8 oz)      BP - Mean:  [] 96  Resp: 16    No lab results found in last 7 days.    GEN: no acute distress, laying in bed  HEENT: head ncat, sclera anicteric   PULM: no increased work of breathing, on 5L O2 via NC, clear to auscultation bilaterally, limited air movement  CV/COR: RRR, S1S2 normal, no gallop,  No rub, no murmur  ABD: soft nontender, large pannus  EXT:  Bilateral lower extremity pitting edema to knees, warm and well perfused   NEURO: alert, speech fluent, grossly intact, lifts head, arm movement symmetric  SKIN: lower extremity skin changes c/w chronic lymphedema  LINES: clean, dry intact         Data:   All data and imaging reviewed     ROUTINE ICU LABS (Last four results)  CMP  Recent Labs   Lab 06/09/21  1622      POTASSIUM 4.9   CHLORIDE 90*   CO2 >45*   ANIONGAP Not Calculated   *   BUN 16   CR 0.64*   GFRESTIMATED >90   GFRESTBLACK >90   NOAH 9.1   PHOS 2.7     CBC  Recent Labs   Lab 06/09/21  1622   WBC 5.7   RBC 4.29*   HGB 13.0*   HCT 45.0   *   MCH 30.3   MCHC 28.9*   RDW 15.0        INRNo lab results found in last 7 days.  Arterial Blood GasNo lab results found in last 7 days.    All cultures:  No results for input(s): CULT in the last 168 hours.  Recent Results (from the past 24 hour(s))   XR Chest Port 1 View    Narrative    XR CHEST PORT 1 VIEW 6/9/2021 4:58 PM    HISTORY: hypercapnea    COMPARISON: 3/29/2021      Impression    IMPRESSION: New small bilateral pleural effusions and bibasilar  atelectasis/consolidation. Low lung volumes. No pneumothorax.    DANA ONEAL MD   CT Chest Pulmonary Embolism w Contrast   Result Value    Radiologist flags Pulmonary embolism (AA)    Narrative    CT CHEST PULMONARY EMBOLISM W CONTRAST 6/9/2021 6:32 PM    CLINICAL HISTORY: chest pain, elevated d-dimer  TECHNIQUE: CT angiogram chest during arterial phase injection IV  contrast. 2D and 3D MIP reconstructions were performed by the CT  technologist. Dose reduction techniques were  used.     CONTRAST: 83 mL of Isovue 370    COMPARISON: 3/4/2021    FINDINGS:  ANGIOGRAM CHEST: Filling defects in right upper lobe segmental and  subsegmental pulmonary arteries (series 5, image [id number] and coronal  series 9, image 54 and 76), new since 3/4/2021, compatible with  pulmonary emboli. Thoracic aorta is negative for dissection. No CT  evidence of right heart strain.    LUNGS AND PLEURA: Increased atelectasis of the right lower lobe with  layering secretions in the right lower lobe bronchus. Increased  atelectasis of the right middle lobe and left lower lobe at the lung  base. Few areas of mild groundglass opacities in the upper lobes  suggestive of pulmonary edema. No pleural effusions. No suspicious  pulmonary masses in the aerated lungs.    MEDIASTINUM/AXILLAE: No lymphadenopathy. Small, 2 mm diameter patent  ductus arteriosus (series 5, image 45). Aneurysmal thoracic aorta  measuring 4.4 cm. Mild coronary artery calcifications. No pericardial  effusion.    UPPER ABDOMEN: The visualized portion of the upper abdomen is  unremarkable..    MUSCULOSKELETAL: No concerning bone lesions.      Impression    IMPRESSION:  1.  Positive for small volume of acute pulmonary emboli in right upper  lobe segmental and subsegmental pulmonary arteries.  2.  Right lower lobe atelectasis with secretions in the right lower  lobe bronchus.  3.  Mild pulmonary edema.  4.  Incidental 2 mm diameter patent ductus arteriosus, of doubtful  clinical significance.  5.  Stable mildly aneurysmal thoracic aorta measuring 4.4 cm..    [Critical Result: Pulmonary embolism]    Finding was identified on 6/9/2021 6:36 PM.     Dr. Rico was contacted by me on 6/9/2021 6:54 PM and verbalized  understanding of the critical result.     MD Denice RINCON, MS4      Billing: This patient is critically ill: Yes. Total critical care time today 30 min.            Attestation:  I, Suzan Cortez MD, saw, examined and treated  this patient with the resident and agree with the resident and/or medical student's findings and plan of care as documented in the note.       I personally reviewed vital signs, medications, labs and imaging.     Key findings and management of/for: acute on chronic hypoxic/hypercarbia-BiPap-increase to 15/5. New onset PE-treating with heparin, Myasthenia Gravis-does not appear to be a flare up: consult neurology, Steroids and home meds, Pulm edema-diurese now and resume home diuretic       Evaluation and management time exclusive of procedures was 30 minutes critical care time including:  examination with the ICU team, discussion of the patient's condition with other physicians and members of the care team, reviewing all data related to the patient, and time utilizing the EMR for documentation of this patient's care.     Suzan Cortez MD  Date of Service (when I saw the patient): June 9, 2021

## 2021-06-11 NOTE — PROGRESS NOTES
"[facility]    Medicine Progress Note - Hospitalist Service        Date of Admission:  6/9/2021  4:05 PM    Assessment & Plan:   Thanh Goodrich is a 80 year old male admitted on 6/9/2021. He has a PMH of myasthenia gravis, HFpEF, asthma, and chronic hypercapenic and hypoxic respiratory failure requiring supplemental oxygen (2L) at home. He was admitted to the ICU for acute on chronic hypoxic and hypercapenic respiratory failure secondary to a new pulmonary embolism, and is now being transferred to the inpatient hospital medicine service.     Acute on Chronic Hypoxic and hypercapneic Respiratory Failure  Heart Failure with Preserved Ejection fraction, acute exacerbation.  Right Upper Lobe Acute Pulmonary Embolism  -Most likely the acute issues were caused by a combination of acute pulmonary embolism and hypervolemia in the setting of HFpEF. PE was probably caused by patient's current living situation (WC bound at baseline). While this would be considered a \"provoked\" PE, he likely will need lifelong anticoagulation as the provoking factor will not be able to be eliminated.  -Echocardiogram shows normal LV systolic function, right ventricle is borderline dilated with normal RV function.  -Continue heparin drip until this evening, discussed with the patient and reviewed cost for NOACs with patient, he is okay with switching to one of the NOACs.  Will transition to Xarelto later this evening.  -Continue Lasix at 40 mg p.o. daily  -Oxygen have been weaned down to 5 L, however blood gas checked earlier this morning showed worsening PCO2, now up to 125 with a pH of 7.28  -Placed back on BiPAP, 40%/16/8  -Recheck ABG in 3 hours, anticipate continuing BiPAP through the evening and night tonight     History of myasthenia gravis  -appears at baseline function right now   -Was given dose of solumedrol initially, does not appear to be in myasthenic crisis.  -Continue prior to admission CellCept, prednisone and " "Mestinon     Chronic Issues  1. History of Asthma -continue Home inhalers  2. Chronic Normocytic Anemia  3. Benign Essential HTN  4. Chronic Indwelling Beckett Catheter     Diet: Regular Diet Adult     DVT Prophylaxis: Heparin drip  Beckett Catheter: in place, indication: Retention  Code Status: Full Code     Disposition Plan    Expected discharge: 2 - 3 days, recommended to prior living arrangement .   Entered: Timothy Claire MD 06/11/2021, 9:20 AM         Critical care time spent 35 minutes    The patient's care was discussed with the Bedside Nurse and Patient.    Timothy Claire MD  Hospitalist Service  [facility]    ______________________________________________________________________    Interval History   Overall feels much better compared to admission.  Oxygen requirement now at 5 L.  No chest pain.  Denies significant dyspnea.    Data reviewed today: I reviewed all medications, new labs and imaging results over the last 24 hours. I personally reviewed the EKG tracing showing Normal sinus rhythm..    Physical Exam   Vital signs:  Temp: 98  F (36.7  C) Temp src: Oral BP: 120/60 Pulse: 67   Resp: 22 SpO2: 100 % O2 Device: Nasal cannula Oxygen Delivery: 5 LPM Height: 177.8 cm (5' 10\") Weight: 125.8 kg (277 lb 5.4 oz)  Estimated body mass index is 39.79 kg/m  as calculated from the following:    Height as of this encounter: 1.778 m (5' 10\").    Weight as of this encounter: 125.8 kg (277 lb 5.4 oz).      Wt Readings from Last 2 Encounters:   06/11/21 125.8 kg (277 lb 5.4 oz)   06/09/21 123.1 kg (271 lb 4.8 oz)       Gen: AAOX3, NAD, comfortable  HEENT:  no pallor  Resp: Decreased air entry bilaterally, no active wheeze.  Normal effort of breathing  CVS: RRR, no murmur  Abd/GI: Soft, non-tender. BS- normoactive.   Skin: Warm, dry no rashes  MSK: 1-2+ edema with chronic venous discoloration  Neuro- CN- intact. No focal deficits.      Data   Recent Labs   Lab 06/11/21  0437 06/10/21  2029 " 21  1622   WBC 7.0  --  5.7   HGB 12.1*  --  13.0*   *  --  105*     --  177    134 135   POTASSIUM 3.8 3.9 4.9   CHLORIDE 87* 86* 90*   CO2 >45* >45* >45*   BUN 17 18 16   CR 0.62* 0.62* 0.64*   ANIONGAP Not Calculated Not Calculated Not Calculated   NOAH 8.6 9.0 9.1   * 142* 135*   ALBUMIN 2.4*  --   --    PROTTOTAL 5.2*  --   --    BILITOTAL 0.4  --   --    ALKPHOS 42  --   --    ALT 27  --   --    AST 24  --   --    TROPI  --   --  0.020       Recent Results (from the past 24 hour(s))   Echo Limited    Narrative    [id number]  [id number]  [id number]  [id number]^GONZALO^JESUS^MANNY     [facility]  Echocardiography Laboratory  [address] [place] [id number]     Name: DAVIDSON GONZALEZ  MRN: [id number]  : 1940  Study Date: 06/10/2021 03:27 PM  Age: 80 yrs  Gender: Male  Patient Location: [facility]  Reason For Study: Pulmonary Emboli  Ordering Physician: Jesus Jovel MD  Referring Physician: Jesus Jovel MD  Performed By: JESSI Rock     BSA: 2.4 m2  Height: 70 in  Weight: 279 lb  HR: 85  BP: 135/56 mmHg  ______________________________________________________________________________  Procedure  Limited Portable Echo Adult. Optison (NDC #4454-1986) given intravenously.  ______________________________________________________________________________  Interpretation Summary     Technically difficult exam. Echo contrast agent used.     The left ventricle is normal in size. Left ventricular systolic function is  normal. The visual ejection fraction is estimated at 60-65%.  The right ventricle is borderline dilated with normal RV function.  There is mild mitral stenosis. Mean gradient 4mmHg.  The ascending aorta is Mildly dilated 4.2cm.     No significant change in comparison to the echo report from 6/10/21.     ______________________________________________________________________________  Left Ventricle  The left ventricle is normal in  size. There is borderline concentric left  ventricular hypertrophy. Left ventricular systolic function is normal. The  visual ejection fraction is estimated at 60-65%. Left ventricular diastolic  function is indeterminate. No regional wall motion abnormalities noted.     Right Ventricle  The right ventricle is borderline dilated. The right ventricular systolic  function is normal.     Atria  The left atrium is mildly dilated. Right atrial size is normal.     Mitral Valve  There is moderate to severe mitral annular calcification. There is mild mitral  stenosis. The mean mitral valve gradient is 4.4 mmHg.     Tricuspid Valve  There is trace tricuspid regurgitation.     Aortic Valve  The aortic valve is not well visualized. There is trace aortic regurgitation.  No hemodynamically significant valvular aortic stenosis.     Pulmonic Valve  The pulmonic valve is not well visualized. Normal function by Doppler  evaluation.     Vessels  The ascending aorta is Mildly dilated.     Pericardium  There is no pericardial effusion.     ______________________________________________________________________________  MMode/2D Measurements & Calculations  IVSd: 1.1 cm  LVIDd: 5.5 cm  LVIDs: 3.8 cm  LVPWd: 1.1 cm  FS: 30.0 %  LV mass(C)d: 233.1 grams  LV mass(C)dI: 96.9 grams/m2     Ao root diam: 3.7 cm  asc Aorta Diam: 4.2 cm  LVOT diam: 2.1 cm  LVOT area: 3.5 cm2  LA Volume (BP): 70.9 ml  LA Volume Index (BP): 29.5 ml/m2  RWT: 0.39     Doppler Measurements & Calculations  MV max PG: 10.2 mmHg  MV mean P.4 mmHg  MV V2 VTI: 49.6 cm  MVA(VTI): 2.0 cm2  LV V1 max P.0 mmHg  LV V1 max: 132.5 cm/sec  LV V1 VTI: 27.4 cm     SV(LVOT): 96.9 ml  SI(LVOT): 40.3 ml/m2  PA acc time: 0.11 sec     ______________________________________________________________________________  Report approved by: MD Aspen Valdivia 06/10/2021 05:32 PM           Medications     heparin 800 Units/hr (21 0641)     - MEDICATION INSTRUCTIONS -          calcium carbonate 600 mg-vitamin D 400 units  1 tablet Oral Daily     furosemide  40 mg Oral Daily     metoprolol tartrate  12.5 mg Oral BID     multivitamin w/minerals  1 tablet Oral Daily     mycophenolate  1,000 mg Oral BID     pantoprazole  40 mg Oral QAM AC     predniSONE  60 mg Oral Daily     pyridostigmine  60 mg Oral TID     vitamin D3  4,000 Units Oral Daily

## 2021-06-11 NOTE — PROGRESS NOTES
"[facility] Progress Note  [facility] ICU    Follow-Up visit per 's request.    Short visit with Ptnt Rigoberto, as said it was hard for him to talk; he mentioned he is hoping they \"get to the bottom of this.\"     Upon inquiry, Rigoberto said he \"can always use prayer.\"     I offered emotional support and said a prayer for healing and discernment.    Rigoberto welcomes  visits. SH will follow.    Bonnie Silverman  Chaplain Resident  "

## 2021-06-11 NOTE — CONSULTS
"BRIEF NUTRITION ASSESSMENT      REASON FOR ASSESSMENT:  Thanh Goodrich is a 80 year old male seen by Registered Dietitian for Nutrition Admission Risk Screen Received -   Have you recently lost weight without trying in the last 6 months ? - \"no sure\"  Have you been eating poorly due to a decreased appetite ?- \"No\"      NUTRITION HISTORY:  Deferred  Pt is known to us from March admission. At that time he was on TF while intubated; at the time of discontinue he was eating well.    CURRENT DIET AND INTAKE:  Diet:  Regular  Intake has not been documented but he is ordering full meals.                ANTHROPOMETRICS:  Height: 5' 10\"  Weight:  277 lbs 5.42 oz (125.8 kg)  Body mass index is 39.79 kg/m .   Weight Status: Obesity Grade II BMI 35-39.9  IBW:  75.4 kg +/- 10%  %IBW: 167%  Weight History: His wt is down only 3# in the past 3 months. When he was here in March/April, his wt was up due to fluid then he was diuresed.  Wt Readings from Last 15 Encounters:   06/11/21 125.8 kg (277 lb 5.4 oz)   06/09/21 123.1 kg (271 lb 4.8 oz)   06/01/21 123.3 kg (271 lb 12.8 oz)   05/12/21 123.1 kg (271 lb 4.8 oz)   05/10/21 123.1 kg (271 lb 4.8 oz)   04/30/21 124.1 kg (273 lb 9.6 oz)   04/21/21 124.1 kg (273 lb 8 oz)   04/20/21 122.3 kg (269 lb 9.6 oz)   04/16/21 121.1 kg (267 lb)   04/14/21 121.1 kg (267 lb)   04/10/21 121.1 kg (267 lb)   03/17/21 128.3 kg (282 lb 12.8 oz)   03/15/21 136.3 kg (300 lb 7.8 oz)   12/16/20 136.1 kg (300 lb)   10/08/20 130.2 kg (287 lb)       LABS:  Labs noted    MALNUTRITION:  Visual Nutrition Focused Physical Assessment (NFPA) not completed due to restrictions on face-to-face patient care during COVID-19 Pandemic. Do not suspect muscle/fat losses.    NUTRITION INTERVENTION:  Nutrition Diagnosis:  No nutrition diagnosis at this time.    Implementation:  Nutrition Education:  Per Provider order if indicated    FOLLOW UP/MONITORING:   Will re-evaluate in 7 - 10 days, or sooner, if " re-consulted.

## 2021-06-11 NOTE — PROGRESS NOTES
Tucker's test performed prior to radial ABG draw. Collateral circulation confirmed. ABG drawn post BiPAP removal. Pt was on 4L NC. sats at 90%. With poor perfusion.       Kvng Danielson, RT

## 2021-06-11 NOTE — PLAN OF CARE
A&Ox4. VSS on 5L NC/BIPAP while sleeping. Denies pain. Heparin infusing at 800, hep10a recheck at 1230. Beckett patent with adequate output. Tolerating regular diet. Calls appropriately, slept between cares overnight. Refused repositioning, educated patient on pressure injuries and weight shifting in bed.

## 2021-06-12 NOTE — PLAN OF CARE
Pt VS remain stable overnight, tolerating Bipap. A&Ox4, PERRL, moves all extremities purposefully. Decrease in urine output overnight, irrigated alberto to help with sediment build up. Heparin gtt stopped at shift change, PO xarelto started. Refused repositioning overnight, educated pt on risk of skin break down without repositioning. Will continue to monitor.

## 2021-06-12 NOTE — PROGRESS NOTES
"[facility]    Medicine Progress Note - Hospitalist Service        Date of Admission:  6/9/2021  4:05 PM    Assessment & Plan:   Thanh Goodrich is a 80 year old male admitted on 6/9/2021. He has a PMH of myasthenia gravis, HFpEF, asthma, and chronic hypercapenic and hypoxic respiratory failure requiring supplemental oxygen (2L) at home. He was admitted to the ICU for acute on chronic hypoxic and hypercapenic respiratory failure secondary to a new pulmonary embolism, and is now being transferred to the inpatient hospital medicine service.     Acute on Chronic Hypoxic and hypercapneic Respiratory Failure  Heart Failure with Preserved Ejection fraction, acute exacerbatio.  Right Upper Lobe Acute Pulmonary Embolism  -Most likely the acute issues were caused by a combination of acute pulmonary embolism and hypervolemia in the setting of HFpEF. PE was probably caused by patient's current living situation (WC bound at baseline). While this would be considered a \"provoked\" PE, he likely will need lifelong anticoagulation as the provoking factor will not be able to be eliminated.  -Echocardiogram shows normal LV systolic function, right ventricle is borderline dilated with normal RV function.  -Transition to Xarelto on 6/11.  -Continue Lasix at 40 mg p.o. daily  -After initial improvement, PCO2 worsened yesterday afternoon and was placed back on BiPAP, hypercapnic respiratory failure much improved  -Repeat ABG after discontinuation of BiPAP this morning is stable.  Continue BiPAP at night.  Transfer to floor today.     History of myasthenia gravis  -appears at baseline function right now   -Was given dose of solumedrol initially, does not appear to be in myasthenic crisis.  -Continue prior to admission CellCept, prednisone and Mestinon     Chronic Issues  1. History of Asthma -continue Home inhalers  2. Chronic Normocytic Anemia  3. Benign Essential HTN  4. Chronic Indwelling Beckett Catheter     Diet: Regular " "Diet Adult     DVT Prophylaxis: Heparin drip  Beckett Catheter: in place, indication: Other (Comment)(Chronic indwelling)  Code Status: Full Code     Disposition Plan    Expected discharge: 2 - 3 days, recommended to prior living arrangement .   Entered: Timothy Claire MD 06/12/2021, 10:39 AM       The patient's care was discussed with the Bedside Nurse and Patient.    Timothy Claire MD  Hospitalist Service  [facility]    ______________________________________________________________________    Interval History   Patient feels better after being on BiPAP yesterday afternoon and overnight.  Denies significant dyspnea.  Still requiring oxygen around 5 L.  Minimal cough.    Data reviewed today: I reviewed all medications, new labs and imaging results over the last 24 hours. I personally reviewed the EKG tracing showing Normal sinus rhythm..    Physical Exam   Vital signs:  Temp: 98  F (36.7  C) Temp src: Oral BP: 132/71 Pulse: 66   Resp: 19 SpO2: 96 % O2 Device: Nasal cannula Oxygen Delivery: 5 LPM Height: 177.8 cm (5' 10\") Weight: 126.8 kg (279 lb 8.7 oz)  Estimated body mass index is 40.11 kg/m  as calculated from the following:    Height as of this encounter: 1.778 m (5' 10\").    Weight as of this encounter: 126.8 kg (279 lb 8.7 oz).      Wt Readings from Last 2 Encounters:   06/12/21 126.8 kg (279 lb 8.7 oz)   06/09/21 123.1 kg (271 lb 4.8 oz)       Gen: AAOX3, NAD, comfortable  HEENT:  no pallor  Resp: Decreased air entry bilaterally, no active wheeze.  Normal effort of breathing  CVS: RRR, no murmur  Abd/GI: Soft, non-tender. BS- normoactive.   Skin: Warm, dry no rashes  MSK: 1-2+ edema with chronic venous discoloration  Neuro- CN- intact. No focal deficits.      Data   Recent Labs   Lab 06/12/21  0501 06/11/21  0437 06/10/21  2029 06/09/21  1622   WBC 7.0 7.0  --  5.7   HGB 12.1* 12.1*  --  13.0*   * 102*  --  105*    188  --  177   NA  --  134 134 135   POTASSIUM  --  3.8 3.9 " 4.9   CHLORIDE  --  87* 86* 90*   CO2  --  >45* >45* >45*   BUN  --  17 18 16   CR  --  0.62* 0.62* 0.64*   ANIONGAP  --  Not Calculated Not Calculated Not Calculated   NOAH  --  8.6 9.0 9.1   GLC  --  124* 142* 135*   ALBUMIN  --  2.4*  --   --    PROTTOTAL  --  5.2*  --   --    BILITOTAL  --  0.4  --   --    ALKPHOS  --  42  --   --    ALT  --  27  --   --    AST  --  24  --   --    TROPI  --   --   --  0.020       No results found for this or any previous visit (from the past 24 hour(s)).  Medications     - MEDICATION INSTRUCTIONS -         calcium carbonate 600 mg-vitamin D 400 units  1 tablet Oral Daily     furosemide  40 mg Oral Daily     metoprolol tartrate  12.5 mg Oral BID     multivitamin w/minerals  1 tablet Oral Daily     mycophenolate  1,000 mg Oral BID     pantoprazole  40 mg Oral QAM AC     predniSONE  60 mg Oral Daily     pyridostigmine  60 mg Oral TID     rivaroxaban ANTICOAGULANT  15 mg Oral BID w/meals     vitamin D3  4,000 Units Oral Daily

## 2021-06-12 NOTE — PROGRESS NOTES
Pt stayed on BIPAP 14/7 R18, 40% through the night. BBS:clear/Diminished SPO2 96%.Pt tolerated well.

## 2021-06-12 NOTE — PLAN OF CARE
DATE & TIME: 06/12/21 1179-0516   Cognitive Concerns/ Orientation : A&Ox4  BEHAVIOR & AGGRESSION TOOL COLOR: Green  CIWA SCORE:   ABNL VS/O2: VSS on 4L NC (2L baseline)  MOBILITY: Lift  PAIN MANAGMENT: Denies  DIET: Reg  BOWEL/BLADDER: Chronic Beckett[employer] of bowel  ABNL LAB/BG: pCO2 94 (baseline is 80) Bicarb 56  DRAIN/DEVICES: PIV SL Beckett in place  TELEMETRY RHYTHM:  SKIN: Lazaro, Bruised, scabs, excoriation to rectum and mathieu area.   TESTS/PROCEDURES:   D/C DATE: Pending  OTHER IMPORTANT INFO: Bipap at night. Beckett has to be irrigated at times if there is low output. Pt refuses T/R unless he needs to be cleaned up.

## 2021-06-12 NOTE — PROGRESS NOTES
Tucker's test performed prior to radial ABG draw. Collateral circulation confirmed.  Site:Left radial  Device:5L NC  6/12/2021  Keila Maciel RT

## 2021-06-13 NOTE — PLAN OF CARE
DATE & TIME: 06/13/21 [id number]             Cognitive Concerns/ Orientation : A&Ox4  BEHAVIOR & AGGRESSION TOOL COLOR: Green  CIWA SCORE:   ABNL VS/O2: VSS on 3L NC (2L baseline)  MOBILITY: Lift  PAIN MANAGMENT: Denies  DIET: Reg  BOWEL/BLADDER: Chronic Beckett[employer] of bowel  ABNL LAB/BG: pCO2 114-87 (baseline is 80) Bicarb 59-60 MD notified  DRAIN/DEVICES: PIV SL Beckett in place  TELEMETRY RHYTHM:  SKIN: Lzaaro, Bruised, scabs, excoriation to rectum and mathieu area.   TESTS/PROCEDURES: Bipap on for 3 hours this morning for pCO2 levels.  D/C DATE: Pending  OTHER IMPORTANT INFO: Bipap at night and during naps. Pt refuses T/R unless he needs to be cleaned up.

## 2021-06-13 NOTE — PROGRESS NOTES
"[facility]    Medicine Progress Note - Hospitalist Service        Date of Admission:  6/9/2021  4:05 PM    Assessment & Plan:   Thanh Goodrich is a 80 year old male admitted on 6/9/2021. He has a PMH of myasthenia gravis, HFpEF, asthma, and chronic hypercapenic and hypoxic respiratory failure requiring supplemental oxygen (2L) at home. He was admitted to the ICU for acute on chronic hypoxic and hypercapenic respiratory failure secondary to a new pulmonary embolism, and is now being transferred to the inpatient hospital medicine service.     Acute on Chronic Hypoxic and hypercapneic Respiratory Failure  Heart Failure with Preserved Ejection fraction, acute exacerbatio.  Right Upper Lobe Acute Pulmonary Embolism  -Most likely the acute issues were caused by a combination of acute pulmonary embolism and hypervolemia in the setting of HFpEF. PE was probably caused by patient's current living situation (WC bound at baseline). While this would be considered a \"provoked\" PE, he likely will need lifelong anticoagulation as the provoking factor will not be able to be eliminated.  -Echocardiogram shows normal LV systolic function, right ventricle is borderline dilated with normal RV function.  -Transitioned to Xarelto on 6/11.  -Continue Lasix at 40 mg p.o. daily  -PCO2 has been up and down, very sensitive to rising during naps specially without noninvasive ventilation  -VBG this morning showed a PCO2 of 114, patient apparently did not use BiPAP during naps.  -Blood gas looks much better after 3 hours of BiPAP use.  Discontinue BiPAP for now  -Continue BiPAP at nighttime and during naps during the day  -VBG 2 hours after BiPAP removal tomorrow morning  -Currently on 4 L oxygen, wean as able.     History of myasthenia gravis  -appears at baseline function right now   -Was given dose of solumedrol initially, does not appear to be in myasthenic crisis.  -Continue prior to admission CellCept, prednisone and " "Mestinon     Chronic Issues  1. History of Asthma -continue Home inhalers  2. Chronic Normocytic Anemia  3. Benign Essential HTN  4. Chronic Indwelling Beckett Catheter     Diet: Regular Diet Adult     DVT Prophylaxis: Heparin drip  Beckett Catheter: in place, indication: Other (Comment)(Chronic)  Code Status: Full Code     Disposition Plan    Expected discharge: 6/14, back to TCU if PCO2 is stable and respiratory status improved  Entered: Timothy Claire MD 06/13/2021, 11:22 AM       The patient's care was discussed with the Bedside Nurse and Patient.    Timothy Claire MD  Hospitalist Service  [facility]    ______________________________________________________________________    Interval History   Patient denies significant dyspnea however PCO2 up again this morning, now improved with 3 hours of BiPAP.  Denies cough.  No fever.    Data reviewed today: I reviewed all medications, new labs and imaging results over the last 24 hours. I personally reviewed the EKG tracing showing Normal sinus rhythm..    Physical Exam   Vital signs:  Temp: 97.8  F (36.6  C) Temp src: Oral BP: 136/63 Pulse: 62   Resp: 18 SpO2: 99 % O2 Device: BiPAP/CPAP Oxygen Delivery: 4 LPM Height: 177.8 cm (5' 10\") Weight: 128.9 kg (284 lb 2.8 oz)  Estimated body mass index is 40.77 kg/m  as calculated from the following:    Height as of this encounter: 1.778 m (5' 10\").    Weight as of this encounter: 128.9 kg (284 lb 2.8 oz).      Wt Readings from Last 2 Encounters:   06/13/21 128.9 kg (284 lb 2.8 oz)   06/09/21 123.1 kg (271 lb 4.8 oz)       Gen: AAOX3, NAD, comfortable  HEENT:  no pallor  Resp: Decreased air entry bilaterally, no active wheeze.  Normal effort of breathing  CVS: RRR, no murmur  Abd/GI: Soft, non-tender. BS- normoactive.   Skin: Warm, dry no rashes  MSK: 1-2+ edema with chronic venous discoloration  Neuro- CN- intact. No focal deficits.      Data   Recent Labs   Lab 06/13/21  0807 06/12/21  0501 06/11/21  0437 " 06/10/21  2029 06/09/21  1622   WBC 8.2 7.0 7.0  --  5.7   HGB 12.3* 12.1* 12.1*  --  13.0*   * 103* 102*  --  105*    176 188  --  177   NA  --   --  134 134 135   POTASSIUM  --   --  3.8 3.9 4.9   CHLORIDE  --   --  87* 86* 90*   CO2  --   --  >45* >45* >45*   BUN  --   --  17 18 16   CR  --   --  0.62* 0.62* 0.64*   ANIONGAP  --   --  Not Calculated Not Calculated Not Calculated   NOAH  --   --  8.6 9.0 9.1   GLC  --   --  124* 142* 135*   ALBUMIN  --   --  2.4*  --   --    PROTTOTAL  --   --  5.2*  --   --    BILITOTAL  --   --  0.4  --   --    ALKPHOS  --   --  42  --   --    ALT  --   --  27  --   --    AST  --   --  24  --   --    TROPI  --   --   --   --  0.020       No results found for this or any previous visit (from the past 24 hour(s)).  Medications     - MEDICATION INSTRUCTIONS -       - MEDICATION INSTRUCTIONS -       - MEDICATION INSTRUCTIONS -         calcium carbonate 600 mg-vitamin D 400 units  1 tablet Oral Daily     furosemide  40 mg Oral Daily     metoprolol tartrate  12.5 mg Oral BID     multivitamin w/minerals  1 tablet Oral Daily     mycophenolate  1,000 mg Oral BID     predniSONE  60 mg Oral Daily     pyridostigmine  60 mg Oral TID     rivaroxaban ANTICOAGULANT  15 mg Oral BID w/meals     vitamin D3  4,000 Units Oral Daily

## 2021-06-13 NOTE — PLAN OF CARE
Summary: Transfer from ICU yesterday, admitted with hypoxia d/t acute PE  DATE & TIME: 6/12 1900 - 0730   Cognitive Concerns/ Orientation : A/O x4   BEHAVIOR & AGGRESSION TOOL COLOR: Green  CIWA SCORE: n/a   ABNL VS/O2: VSS, O2 4LPM NC during day (baseline 2LPM); Bipap NOC  MOBILITY: TC, refuses repositioning  PAIN MANAGMENT: Denies  DIET: Regular  BOWEL/BLADDER: Incontinent bowel, chronic alberto  ABNL LAB/BG: ABGs yesterday: pCO2 94, pO2 97, bicarb 56  DRAIN/DEVICES: PIV SL, alberto  TELEMETRY RHYTHM: n/a  SKIN: scattered bruises & scabs, red blanchable bottom & mathieu-area  TESTS/PROCEDURES: CT 6/9 showed acute PE, echo 6/10 unremarkable  D/C DAY/GOALS/PLACE: return to previous SNF pending progress  OTHER IMPORTANT INFO: Pt started po anticoagulants 6/11; weaned off continuous Bipap 6/12

## 2021-06-14 NOTE — PLAN OF CARE
DATE & TIME: 06/13/21 1900 - 0730           Cognitive Concerns/ Orientation : A&Ox4  BEHAVIOR & AGGRESSION TOOL COLOR: Green  CIWA SCORE: n/a  ABNL VS/O2: VSS; 3L NC (2L baseline) when awake, Bipap when asleep  MOBILITY: Lift  PAIN MANAGMENT: Denies  DIET: Reg  BOWEL/BLADDER: Chronic Beckett, draining well, Inc of bowel  ABNL LAB/BG: pCO2 114-87 (baseline is 80) Bicarb 59-60 MD notified  DRAIN/DEVICES: PIV SL; Beckett in place  TELEMETRY RHYTHM: n/a  SKIN: Lazaro, Bruised, scabs, excoriation to rectum and mathieu area.   TESTS/PROCEDURES: none new  D/C DATE: Per MD note, patient could discharge today if respiratory status is stable  OTHER IMPORTANT INFO: Pt refuses T/R unless he needs to be cleaned up. Low UO, urine dark. Fluids encouraged

## 2021-06-14 NOTE — CONSULTS
Care Management Initial Consult    General Information  Assessment completed with: Patient, Other, ([facility])  Type of CM/SW Visit: Initial Assessment    Primary Care Provider verified and updated as needed:     Readmission within the last 30 days:        Reason for Consult: discharge planning  Advance Care Planning:            Communication Assessment  Patient's communication style: spoken language (English or Bilingual)    Hearing Difficulty or Deaf: no   Wear Glasses or Blind: yes    Cognitive  Cognitive/Neuro/Behavioral: WDL                      Living Environment:   People in home:       Current living Arrangements:        Able to return to prior arrangements: yes  Living Arrangement Comments: (lives on a long term care unit at [facility])    Family/Social Support:  Care provided by:    Provides care for:                  Description of Support System:           Current Resources:   Patient receiving home care services:       Community Resources:    Equipment currently used at home: walker, rolling  Supplies currently used at home:      Employment/Financial:  Employment Status:          Financial Concerns:             Lifestyle & Psychosocial Needs:        Socioeconomic History     Marital status: Single     Spouse name: Not on file     Number of children: Not on file     Years of education: Not on file     Highest education level: Not on file   Occupational History     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 [employer]     Employer: [address] [place] [address]     Tobacco Use     Smoking status: Never Smoker     Smokeless tobacco: Never Used   Substance and Sexual Activity     Alcohol use: Yes     Alcohol/week: 0.0 standard drinks     Comment: very rarely     Drug use: No     Sexual activity: Not Currently     Partners: Female       Functional Status:  Prior to admission patient needed assistance:              Mental Health Status:  Mental Health Status: (no history noted)       Chemical Dependency Status:  Chemical Dependency  Status: (no history noted)             Values/Beliefs:  Spiritual, Cultural Beliefs, Adventist Practices, Values that affect care:                 Additional Information:See Care Transition discharge note    Angie Dill, NARENDRASW

## 2021-06-14 NOTE — DISCHARGE SUMMARY
"[facility]    Discharge Summary  Hospitalist    Date of Admission:  6/9/2021  Date of Discharge:  6/14/2021  Discharging Provider: Timothy Claire MD    Discharge Diagnoses      Right Upper Lobe Acute Pulmonary Embolism.  Acute on Chronic Hypoxic and hypercapneic Respiratory Failure  Heart Failure with Preserved Ejection fraction, acute exacerbation.  History of myasthenia gravis     Chronic Issues  1. History of Asthma -continue Home inhalers  2. Chronic Normocytic Anemia  3. Benign Essential HTN  4. Chronic Indwelling Beckett Catheter      Hospital Course:    Thanh Goodrich is a 80 year old male admitted on 6/9/2021. He has a PMH of myasthenia gravis, HFpEF, asthma, and chronic hypercapenic and hypoxic respiratory failure requiring supplemental oxygen (2L) at home. He was admitted to the ICU for acute on chronic hypoxic and hypercapenic respiratory failure secondary to a new pulmonary embolism, and is now being transferred to the inpatient hospital medicine service.     Acute on Chronic Hypoxic and hypercapneic Respiratory Failure  Heart Failure with Preserved Ejection fraction, acute exacerbatio.  Right Upper Lobe Acute Pulmonary Embolism  -Most likely the acute issues were caused by a combination of acute pulmonary embolism and hypervolemia in the setting of HFpEF. PE was probably caused by patient's current living situation (WC bound at baseline). While this would be considered a \"provoked\" PE, he likely will need lifelong anticoagulation as the provoking factor will not be able to be eliminated.  -Echocardiogram shows normal LV systolic function, right ventricle is borderline dilated with normal RV function.  -Initially was placed on heparin drip, transitioned to Xarelto on 6/11.  -Continue Lasix at 20 mg p.o. daily  -PCO2  up and down, very sensitive to rising during naps specially without noninvasive ventilation  -VBG now around his baseline has markedly compensated " PCO2/bicarb  -Continue BiPAP at nighttime and during naps during the day  -Oxygen weaned to his baseline of 2-3 L.    History of myasthenia gravis  -appears at baseline function right now   -Was given dose of solumedrol initially, does not appear to be in myasthenic crisis.  -Continue prior to admission CellCept, prednisone and Mestinon     Chronic Issues  1. History of Asthma -continue Home inhalers  2. Chronic Normocytic Anemia  3. Benign Essential HTN  4. Chronic Indwelling Beckett Catheter-change during this hospitalization.    Timothy Claire MD    Significant Results and Procedures   See below    Pending Results     Unresulted Labs Ordered in the Past 30 Days of this Admission     Date and Time Order Name Status Description    6/9/2021 1622 Blood culture Preliminary     6/9/2021 1622 Blood culture Preliminary           Code Status   Full Code       Primary Care Physician   Sara Chadwick    Physical Exam   Temp: 97.5  F (36.4  C) Temp src: Oral BP: 125/73 Pulse: 71   Resp: 16 SpO2: 95 % O2 Device: Nasal cannula Oxygen Delivery: 2 LPM    Constitutional: AAOX3, NAD  Neck- Supple, Good ROM, No JVD  Respiratory: CTA B/L, Normal WOB  Cardiovascular: RRR, No murmur  GI: Soft, Non- tender, BS- normoactive  MSK: 1+ edema  Neuro: CN- grossly intact    Discharge Disposition   Discharged to long-term care facility  Condition at discharge: Stable    Consultations This Hospital Stay   PHARMACY IP CONSULT  PHARMACY IP CONSULT  SPIRITUAL HEALTH SERVICES IP CONSULT  PHARMACY LIAISON FOR MEDICATION COVERAGE CONSULT  PHYSICAL THERAPY ADULT IP CONSULT  OCCUPATIONAL THERAPY ADULT IP CONSULT    Time Spent on this Encounter   I, Timothy Claire MD, personally saw the patient today and spent greater than 30 minutes discharging this patient.    Discharge Orders      General info for SNF    Length of Stay Estimate: Short Term Care: Estimated # of Days <30  Condition at Discharge: Improving  Level of care:skilled   Rehabilitation  Potential: Good  Admission H&P remains valid and up-to-date: Yes  Recent Chemotherapy: N/A  Use Nursing Home Standing Orders: N/A     Mantoux instructions    Give two-step Mantoux (PPD) Per Facility Policy Yes     Follow Up and recommended labs and tests    Follow up with Nursing home physician.     Activity - Up with nursing assistance     Additional Discharge Instructions    Continue BiPAP at night and during naps during the day; per previous setting, 10/5     Full Code     Physical Therapy Adult Consult    Evaluate and treat as clinically indicated.    Reason:     Occupational Therapy Adult Consult    Evaluate and treat as clinically indicated.    Reason:     Fall precautions     Oxygen Adult/Peds     Advance Diet as Tolerated    Follow this diet upon discharge: Orders Placed This Encounter      Regular Diet Adult       Discharge Medications   Current Discharge Medication List      START taking these medications    Details   rivaroxaban ANTICOAGULANT (XARELTO) 20 MG TABS tablet 15 mg twice daily with food for 18 days, then 20 mg once daily with food (NOTE: will need 36, 15 mg tablets the first time this is filled)  Qty: 60 tablet, Refills: 1    Comments: Future refills by primary care physician or cardiologist  Associated Diagnoses: Multiple subsegmental pulmonary emboli without acute cor pulmonale (H)         CONTINUE these medications which have NOT CHANGED    Details   acetaminophen (TYLENOL) 325 MG tablet Take 650 mg by mouth every 4 hours as needed for mild pain      albuterol (PROVENTIL) (2.5 MG/3ML) 0.083% neb solution Take 1 vial (2.5 mg) by nebulization 4 times daily as needed for shortness of breath / dyspnea or wheezing    Associated Diagnoses: SOB (shortness of breath)      calcium carbonate 600 mg-vitamin D 400 units (CALTRATE) 600-400 MG-UNIT per tablet Take 1 tablet by mouth daily      Cholecalciferol 100 MCG (4000 UT) CAPS Take 4,000 Units by mouth daily      furosemide (LASIX) 20 MG tablet Take 1  tablet (20 mg) by mouth daily  Qty:      Associated Diagnoses: Chronic diastolic congestive heart failure (H)      metoprolol tartrate (LOPRESSOR) 25 MG tablet Take 0.5 tablets (12.5 mg) by mouth 2 times daily  Qty:      Associated Diagnoses: Essential hypertension, benign      Multiple Vitamins-Minerals (MULTIVITAMIN ADULTS 50+) TABS Take 1 tablet by mouth daily       mycophenolate (GENERIC EQUIVALENT) 500 MG tablet Take 2 tablets (1,000 mg) by mouth 2 times daily  Qty:      Associated Diagnoses: Myasthenia gravis (H)      potassium chloride ER (KLOR-CON M) 10 MEQ CR tablet Take 2 tablets (20 mEq) by mouth daily    Associated Diagnoses: Heart failure with preserved ejection fraction, NYHA class I (H)      predniSONE (DELTASONE) 20 MG tablet Take 3 tablets (60 mg) by mouth daily  Qty:      Associated Diagnoses: Myasthenia gravis (H)      pyridostigmine (MESTINON) 60 MG tablet Take 1 tablet (60 mg) by mouth 3 times daily  Qty:      Associated Diagnoses: Myasthenia gravis (H)         STOP taking these medications       guaiFENesin (ROBITUSSIN) 100 MG/5ML SYRP Comments:   Reason for Stopping:             Allergies   Allergies   Allergen Reactions     Ciprofloxacin Other (See Comments)     Contraindicated for myasthenia gravis patients     Procainamide Other (See Comments)     Contraindicated for myasthenia gravis patients     Quinidine Other (See Comments)     Contraindicated for myasthenia gravis patients     Quinine Other (See Comments)     Contraindicated for myasthenia gravis patients     Data   Most Recent 3 CBC's:  Recent Labs   Lab Test 06/14/21  0801 06/13/21  0807 06/12/21  0501   WBC 9.2 8.2 7.0   HGB 12.0* 12.3* 12.1*   * 103* 103*    163 176      Most Recent 3 BMP's:  Recent Labs   Lab Test 06/11/21  0437 06/10/21  2029 06/09/21  1622    134 135   POTASSIUM 3.8 3.9 4.9   CHLORIDE 87* 86* 90*   CO2 >45* >45* >45*   BUN 17 18 16   CR 0.62* 0.62* 0.64*   ANIONGAP Not Calculated Not  Calculated Not Calculated   NOAH 8.6 9.0 9.1   * 142* 135*     Most Recent 2 LFT's:  Recent Labs   Lab Test 06/11/21  0437 03/23/21  0419   AST 24 22   ALT 27 30   ALKPHOS 42 42   BILITOTAL 0.4 0.4     Most Recent INR's and Anticoagulation Dosing History:  Anticoagulation Dose History     Recent Dosing and Labs Latest Ref Rng & Units 2/4/2007 11/8/2010 9/22/2012 4/7/2021 4/9/2021    INR 0.86 - 1.14 0.97 0.95 1.00 1.20(H) 1.10        Most Recent 3 Troponin's:  Recent Labs   Lab Test 06/09/21  1622 03/29/21  1700 03/29/21  1247   TROPI 0.020 0.022 0.023     Most Recent Cholesterol Panel:  Recent Labs   Lab Test 12/16/20  1455   CHOL 141   LDL 60   HDL 70   TRIG 55     Most Recent 6 Bacteria Isolates From Any Culture (See EPIC Reports for Culture Details):  Recent Labs   Lab Test 06/09/21  1717 06/09/21  1653 03/20/21  1640 03/18/21  2217 03/04/21  2250 03/04/21  2242   CULT No growth after 5 days No growth after 5 days Moderate growth  Staphylococcus aureus  * >100,000 colonies/mL  Escherichia coli  * No growth No growth     Most Recent TSH, T4 and A1c Labs:  Recent Labs   Lab Test 04/18/16  1518   TSH 1.42       Results for orders placed or performed during the hospital encounter of 06/09/21   XR Chest Port 1 View    Narrative    XR CHEST PORT 1 VIEW 6/9/2021 4:58 PM    HISTORY: hypercapnea    COMPARISON: 3/29/2021      Impression    IMPRESSION: New small bilateral pleural effusions and bibasilar  atelectasis/consolidation. Low lung volumes. No pneumothorax.    DANA ONEAL MD   CT Chest Pulmonary Embolism w Contrast     Value    Radiologist flags Pulmonary embolism (AA)    Narrative    CT CHEST PULMONARY EMBOLISM W CONTRAST 6/9/2021 6:32 PM    CLINICAL HISTORY: chest pain, elevated d-dimer  TECHNIQUE: CT angiogram chest during arterial phase injection IV  contrast. 2D and 3D MIP reconstructions were performed by the CT  technologist. Dose reduction techniques were used.     CONTRAST: 83 mL of Isovue  370    COMPARISON: 3/4/2021    FINDINGS:  ANGIOGRAM CHEST: Filling defects in right upper lobe segmental and  subsegmental pulmonary arteries (series 5, image 54-56 and coronal  series 9, image 54 and 76), new since 3/4/2021, compatible with  pulmonary emboli. Thoracic aorta is negative for dissection. No CT  evidence of right heart strain.    LUNGS AND PLEURA: Increased atelectasis of the right lower lobe with  layering secretions in the right lower lobe bronchus. Increased  atelectasis of the right middle lobe and left lower lobe at the lung  base. Few areas of mild groundglass opacities in the upper lobes  suggestive of pulmonary edema. No pleural effusions. No suspicious  pulmonary masses in the aerated lungs.    MEDIASTINUM/AXILLAE: No lymphadenopathy. Small, 2 mm diameter patent  ductus arteriosus (series 5, image 45). Aneurysmal thoracic aorta  measuring 4.4 cm. Mild coronary artery calcifications. No pericardial  effusion.    UPPER ABDOMEN: The visualized portion of the upper abdomen is  unremarkable..    MUSCULOSKELETAL: No concerning bone lesions.      Impression    IMPRESSION:  1.  Positive for small volume of acute pulmonary emboli in right upper  lobe segmental and subsegmental pulmonary arteries.  2.  Right lower lobe atelectasis with secretions in the right lower  lobe bronchus.  3.  Mild pulmonary edema.  4.  Incidental 2 mm diameter patent ductus arteriosus, of doubtful  clinical significance.  5.  Stable mildly aneurysmal thoracic aorta measuring 4.4 cm..    [Critical Result: Pulmonary embolism]    Finding was identified on 6/9/2021 6:36 PM.     Dr. Rico was contacted by me on 6/9/2021 6:54 PM and verbalized  understanding of the critical result.     DANA ONEAL MD   US Lower Extremity Venous Duplex Bilateral    Narrative    VENOUS ULTRASOUND BILATERAL LOWER EXTREMITIES  6/10/2021 8:49 AM     HISTORY: Pulmonary emboli on CT chest, bilateral lower extremity  swelling, assess for deep vein  thrombosis.    COMPARISON: 2019    TECHNIQUE: Color Doppler and spectral waveform analysis performed  throughout the deep veins of both lower extremities.    FINDINGS: Both common femoral, proximal greater saphenous, femoral,  and popliteal veins demonstrate normal blood flow, compression, and  augmentation. The visualized posterior tibial and peroneal veins  appear patent, though somewhat difficult to visualize.      Impression    IMPRESSION: Negative for deep venous thrombosis in the visible deep  veins of both lower extremities.    JENIFFER CHICAS MD   Echo Limited    Narrative    [id number]  [id number]  [id number]  [id number]^GONZALO^JESUS^MANNY     [facility]  Echocardiography Laboratory  [address] [place] [id number]     Name: DAVIDSON GONZALEZ  MRN: [id number]  : 1940  Study Date: 06/10/2021 03:27 PM  Age: 80 yrs  Gender: Male  Patient Location: [facility]  Reason For Study: Pulmonary Emboli  Ordering Physician: Jesus Jovel MD  Referring Physician: Jesus Jovel MD  Performed By: JESSI Rock     BSA: 2.4 m2  Height: 70 in  Weight: 279 lb  HR: 85  BP: 135/56 mmHg  ______________________________________________________________________________  Procedure  Limited Portable Echo Adult. Optison (NDC #8605-9882) given intravenously.  ______________________________________________________________________________  Interpretation Summary     Technically difficult exam. Echo contrast agent used.     The left ventricle is normal in size. Left ventricular systolic function is  normal. The visual ejection fraction is estimated at 60-65%.  The right ventricle is borderline dilated with normal RV function.  There is mild mitral stenosis. Mean gradient 4mmHg.  The ascending aorta is Mildly dilated 4.2cm.     No significant change in comparison to the echo report from 6/10/21.     ______________________________________________________________________________  Left  Ventricle  The left ventricle is normal in size. There is borderline concentric left  ventricular hypertrophy. Left ventricular systolic function is normal. The  visual ejection fraction is estimated at 60-65%. Left ventricular diastolic  function is indeterminate. No regional wall motion abnormalities noted.     Right Ventricle  The right ventricle is borderline dilated. The right ventricular systolic  function is normal.     Atria  The left atrium is mildly dilated. Right atrial size is normal.     Mitral Valve  There is moderate to severe mitral annular calcification. There is mild mitral  stenosis. The mean mitral valve gradient is 4.4 mmHg.     Tricuspid Valve  There is trace tricuspid regurgitation.     Aortic Valve  The aortic valve is not well visualized. There is trace aortic regurgitation.  No hemodynamically significant valvular aortic stenosis.     Pulmonic Valve  The pulmonic valve is not well visualized. Normal function by Doppler  evaluation.     Vessels  The ascending aorta is Mildly dilated.     Pericardium  There is no pericardial effusion.     ______________________________________________________________________________  MMode/2D Measurements & Calculations  IVSd: 1.1 cm  LVIDd: 5.5 cm  LVIDs: 3.8 cm  LVPWd: 1.1 cm  FS: 30.0 %  LV mass(C)d: 233.1 grams  LV mass(C)dI: 96.9 grams/m2     Ao root diam: 3.7 cm  asc Aorta Diam: 4.2 cm  LVOT diam: 2.1 cm  LVOT area: 3.5 cm2  LA Volume (BP): 70.9 ml  LA Volume Index (BP): 29.5 ml/m2  RWT: 0.39     Doppler Measurements & Calculations  MV max PG: 10.2 mmHg  MV mean P.4 mmHg  MV V2 VTI: 49.6 cm  MVA(VTI): 2.0 cm2  LV V1 max P.0 mmHg  LV V1 max: 132.5 cm/sec  LV V1 VTI: 27.4 cm     SV(LVOT): 96.9 ml  SI(LVOT): 40.3 ml/m2  PA acc time: 0.11 sec     ______________________________________________________________________________  Report approved by: MD Aspen Valdivia 06/10/2021 05:32 PM

## 2021-06-14 NOTE — PROGRESS NOTES
Care Management Discharge Note    Discharge Date: 06/14/21  Expected Time of Departure: 1800    Discharge Disposition: Long Term Care    Discharge Services: None    Discharge DME: None    Discharge Transportation: ([facility] )    Private pay costs discussed: transportation costs    PAS Confirmation Code: (n/a)  Patient/family educated on Medicare website which has current facility and service quality ratings: (not needed)    Education Provided on the Discharge Plan:  yes  Persons Notified of Discharge Plans: patiemt  Patient/Family in Agreement with the Plan: yes    Handoff Referral Completed: Yes    Additional Information:  Spoke with patient about transportation.  He feels able to sit up in a w/c for transportation.  [facility] is able to transport via median at 1800 (6pm)  Patient notified of the cost.  [facility] admission notified and discharge orders sent via In Basket.        NARENDRA RamosSW

## 2021-06-14 NOTE — PLAN OF CARE
DATE & TIME: 06/14/21 1300          Cognitive Concerns/ Orientation : A&Ox4  BEHAVIOR & AGGRESSION TOOL COLOR: Green  CIWA SCORE: n/a  ABNL VS/O2: VSS; 2L NC when awake, which is his baseline Bipap when asleep  MOBILITY: Lift  PAIN MANAGMENT: Denies  DIET: Reg  BOWEL/BLADDER: Chronic Beckett, draining dark pasquale urine. Had one incont soft brown stool.  ABNL LAB/BG: pCO2 88 (baseline is 80) Bicarb 58  DRAIN/DEVICES: PIV SL; Beckett in place  TELEMETRY RHYTHM: n/a  SKIN: Lazaro, Bruised, scabs, excoriation to rectum and mathieu area. Lg raised bruised appearing area on right shin. Skin on feet peeling.  TESTS/PROCEDURES: none  D/C DATE: Plan is for discharge back to [facility] today.  OTHER IMPORTANT INFO: Pt refuses T/R unless he needs to be cleaned up. Low UO, urine dark. Fluids encouraged. 1-2+ edema omar LE's.

## 2021-06-15 NOTE — TELEPHONE ENCOUNTER
"[facility] Nurse Triage Telephone Note    Provider: ANIYA Mendieta CNP  Facility: [facility]  Facility Type:  LTC    Caller: Christiana   Call Back Number: [phone]    Allergies:    Allergies   Allergen Reactions     Ciprofloxacin Other (See Comments)     Contraindicated for myasthenia gravis patients     Procainamide Other (See Comments)     Contraindicated for myasthenia gravis patients     Quinidine Other (See Comments)     Contraindicated for myasthenia gravis patients     Quinine Other (See Comments)     Contraindicated for myasthenia gravis patients        Reason for call:   Pt re-admitted yesterday with discharge Dx's of   Right Upper Lobe Acute Pulmonary Embolism.   Acute on Chronic Hypoxic and hypercapneic Respiratory Failure   Heart Failure with Preserved Ejection fraction, acute exacerbation.   History of myasthenia gravis     Pt was started newly on Xeralto     Pt has a large bruise noted on his Rt shin which the hospital was aware of and noted it to be raised, nursing is calling to report that there is a raised area in the middle of the bruise on the shin 4x6x1.5cm (bruised area is 22 x 13cm) and nursing wanted NP to be aware-- the area is painful,    Pt also had a Bactrim s/s every day order prior to going to the hospital for Pneumocystis Prophylaxis and he came back without that on his orders- per the discharge summary it doesn't note to discontinue or continue the medication-- so not sure if they even knew he was on it prior.     Lastly the nurse reported that he has a new \"gruntful\" cough, no sob Vitals: BP:  103/23  P:: 101  R:: 20  SPO2: 94% R/A Temp.:  afebrile     Could possibly be related to his PE.      Verbal Order/Direction given by Provider: Resume previous Bactrim 400/80mg 1 tab every day for Pneumocystis Prophylaxis, NP to see the pt tomorrow.     Provider giving Order:  ANIYA Mendieta CNP    Verbal Order given to: Christiana Butler RN      "

## 2021-06-16 NOTE — PATIENT INSTRUCTIONS
Orders  Thanh Goodrich  1940  1) Melatonin 3 mg at bedtime for sleep. If not effective update provider for dose adjustment  2) Discontinue order to send to [facility] ED for evaluation.  3) Ok to update mucinex ER order to BID PRN. Diagnosis:congestion  4) Nursing to monitor bleeding closely around alberto insertion site to ensure it stops. Update provider if persistance or worsening. discontinue order when bleeding stops  5) CBC, BMP 6/18. Diagnosis: hematoma, CHF  6) Nurse to outline area of bruise and hematoma on RLE. Monitor closely for worsening and signs of infection.   7) Wound doctor consult for hematoma of RLE evaluation   ANIYA Ortiz CNP on 6/17/2021 at 9:08 AM

## 2021-06-16 NOTE — PROGRESS NOTES
[place] [facility]  PRIMARY CARE PROVIDER AND CLINIC:  Sara Chadwick, ANIYA CNP, [address] / [place] [address]  Chief Complaint   Patient presents with     Hospital F/U     [place] Medical Record Number:  [id number]  Place of Service where encounter took place:  [facility]  () [[id number]]    Thanh Goodrich  is a 80 year old  (1940), returned to the above facility from  [facility]. Hospital stay 6/9/21 - 6/14/21. .  Admitted to this facility for  rehab, medical management and nursing care.    HPI:    HPI information obtained from: facility chart records, facility staff, patient report and [facility] chart review.     PMH significant for myasthenia gravis, athma, chronic respiratory failure on 2L of oxygen, chronic diastolic heart failure, HTN, chronic alberto. He was admitted to TCU earlier this year after two hospitalizations in March for myasthenia gravis exacerbations, as well as CHF exacerbation, CAUTI and HCAP. He made slow gains in TCU and was transferred to LTC. His goal is to continue to get stronger to be able to discharge back home where he lives alone.    Brief Summary of Hospital Course: Sent to ED for SOB and was found to have right upper lobe pulmonary embolism and acute exacerbation of diastolic heart failure. ECHO showed right ventricle borderline dilated with normal RV function and normal LV systolic function. He was diuresed. Also started on heparin drip and transitioned to xarelto. They are recommending lifelong anticoagulation Oxygen was titrated to baseline and PCO2 back to baseline. He was discharged back to LTC    Updates on Status Since Skilled nursing Admission: Today he was seen for hospital follow up. He reports he is feeling much better. He has a few concerns today. Nursing changed alberto catheter last night and was noted to have some bleeding from catheter site- no hematuria in alberto. Denies any pain. Had similar bleeding the last  time the alberto catheter was changed. Also with hematoma to RLE that he developed in the hospital. Denies any SOB is on 2L of oxygen today. Is using BIPAP at night. Denies CP. Reports bowels moving. Nurse reports she heard patient coughing yesterday, but not today. Rigoberto expresses that he wants to work on gaining his strength back now that he is feeling better.     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 He also has no past medical history of Arthritis, Cerebral infarction (H), Congestive heart failure (H), COPD (chronic obstructive pulmonary disease) (H), Diabetes (H), History of blood transfusion, or Thyroid disease.  PAST SURGICAL HISTORY:   has a past surgical history that includes Prostate Cancer Cryotherapy (2007); Tonsillectomy (1945); biopsy (2013?); IR CVC Tunnel Placement > 5 Yrs of Age (3/31/2021); and IR CVC Tunnel Removal Right (4/13/2021).  FAMILY HISTORY: family history includes C.A.D. in his maternal grandmother; Cancer in his father and paternal uncle; Cerebrovascular Disease in his maternal grandfather; Depression in his mother; Diabetes in his maternal grandmother; Hypertension in his  father and mother; Obesity in his father; Prostate Cancer in his father and another family member; Substance Abuse in his father and mother.  SOCIAL HISTORY:   reports that he has never smoked. He has never used smokeless tobacco. He reports current alcohol use. He reports that he does not use drugs.    Post Discharge Medication Reconciliation Status: discharge medications reconciled and changed, per note/orders    Current Outpatient Medications   Medication Sig Dispense Refill     acetaminophen (TYLENOL) 325 MG tablet Take 650 mg by mouth every 4 hours as needed for mild pain       albuterol (PROVENTIL) (2.5 MG/3ML) 0.083% neb solution Take 1 vial (2.5 mg) by nebulization 4 times daily as needed for shortness of breath / dyspnea or wheezing       calcium carbonate 600 mg-vitamin D 400 units (CALTRATE) 600-400 MG-UNIT per tablet Take 1 tablet by mouth daily       Cholecalciferol 100 MCG (4000 UT) CAPS Take 4,000 Units by mouth daily       furosemide (LASIX) 20 MG tablet Take 1 tablet (20 mg) by mouth daily       guaiFENesin (MUCINEX) 600 MG 12 hr tablet Take 2 tablets (1,200 mg) by mouth 2 times daily as needed for congestion       metoprolol tartrate (LOPRESSOR) 25 MG tablet Take 0.5 tablets (12.5 mg) by mouth 2 times daily       Multiple Vitamins-Minerals (MULTIVITAMIN ADULTS 50+) TABS Take 1 tablet by mouth daily        mycophenolate (GENERIC EQUIVALENT) 500 MG tablet Take 2 tablets (1,000 mg) by mouth 2 times daily       potassium chloride ER (KLOR-CON M) 10 MEQ CR tablet Take 2 tablets (20 mEq) by mouth daily       predniSONE (DELTASONE) 20 MG tablet Take 3 tablets (60 mg) by mouth daily       pyridostigmine (MESTINON) 60 MG tablet Take 1 tablet (60 mg) by mouth 3 times daily (Patient taking differently: Take 60 mg by mouth 3 times daily 0800, 1200, 2000)       rivaroxaban ANTICOAGULANT (XARELTO) 20 MG TABS tablet 15 mg twice daily with food for 18 days, then 20 mg once daily with food (NOTE: will need 36, 15  "mg tablets the first time this is filled) 60 tablet 1     sulfamethoxazole-trimethoprim (BACTRIM) 400-80 MG tablet Take 1 tablet by mouth daily         ROS:  10 point ROS of systems including Constitutional, Eyes, Respiratory, Cardiovascular, Gastroenterology, Genitourinary, Integumentary, Musculoskeletal, Psychiatric were all negative except for pertinent positives noted in my HPI.    Vitals:  /72   Pulse 89   Temp 98.4  F (36.9  C)   Resp 18   Ht 1.778 m (5' 10\")   Wt 126.7 kg (279 lb 6.4 oz)   SpO2 96%   BMI 40.09 kg/m    Exam:  GENERAL APPEARANCE:  Alert, in NAD  HEENT: normocephalic, moist mucous membranes, nose without drainage or crusting  RESP:  respiratory effort normal, no respiratory distress, Lung sounds clear with some crackles to bases, patient is on 2L of oxygen  CV: auscultation of heart done, rate and rhythm regular. no murmur, no rub or gallop.   ABDOMEN: + bowel sounds, soft, nontender, no grimacing or guarding with palpation.  M/S: no lower extremity edema; +2 left pedal pulse   : small amount of blood noted in mathieu area coming from urethra around alberto catheter; urine in alberto bag is clear and yellow- no hematuria  SKIN:  Inspection and palpation of skin and subcutaneous tissue: skin warm, dry without rashes; large hematoma to right shin surrounded by large bruise- purple/blue/red in color- no increased warmth or signs of infection  NEURO: cranial nerves 2-12 grossly intact and at patient's baseline; able to move extremities freely; affect and mood normal    Lab/Diagnostic data:  Labs done in SNF are in [place] EPIC. Please refer to them using [employer]Care Everywhere. and Recent labs in EPIC reviewed by me today.     ASSESSMENT/PLAN:  (I26.94) Multiple subsegmental pulmonary emboli without acute cor pulmonale (H)  (primary encounter diagnosis)  Comment: acute, provoked PE secondary to wheelchair bound   -recommending lifelong anticoagulation  -ECHO 6/10/21 showed  EF 60-65% right " ventricle borderline dilated with normal RV function and normal LV systolic function.   -initially started on heparin drip and then transitioned to xarelto 6/11  Plan: Continue xarelto 15 mg BID through 7/2, then start 20 mg daily. Monitor respiratory status.     (J96.21, J96.22) Acute on chronic respiratory failure with hypoxia and hypercapnia (H)  Comment: acute on chronic, admitted to ICU and initially required BiPAP and 5L of oxygen which was tapered down to baseline oxygen during hospital stay  -respiratory status stable today on 2L of oxygen   Plan: Continue oxygen and titrate down as able to keep saturations >90%. Try to avoid titration up given high CO2. Reviewed with nursing staff today. Continue BiPAP with sleep. BMP 6/17. Follow up with neurology/pulmonology.     (I50.33) Acute on chronic diastolic heart failure (H)  (I10) Benign essential hypertension  Comment: with recent exacerbation- seems euvolemic today; /72, 128/68, 125/62; HR 89, 98, 101  Plan: Continue lasix 20 mg daily, metoprolol 12.5 mg BID. Daily weights. Notify provider with weight gain >2 lbs in a day, >5 lbs in on week. 2 gram Na diet. VS per policy. Adjust medications as needed.    (Z86.369) History of Myasthenia gravis   Comment: with multiple hospitalizations for exacerbations in March that required required intubation and plasmapheresis inpatient  -no sign of exacerbation during recent hospitalization  Plan: Continue MMF 1000 mg BID, pyridostigmine 60 mg TID, prednisone 60 mg daily. Continue bactrim for PCP prophy. Jayesh VEGA, Follow up with neurology/ pulmonology.    (T14.8XXA) Hematoma   Comment: no signs of infection, in setting of anticoagulation  Plan: Nurse to outline area of bruise and hematoma. Monitor closely for worsening and signs of infection. Wound doctor consult.     (R33.9) Urinary retention  (Z97.8) Beckett catheter in place  Comment: replaced last night and is draining without hematuria- there is some mild  bleeding around alberto sight that I suspect is secondary to trauma from replacement  Plan: Nursing to continue alberto cares. Nursing to monitor bleeding closely around alberto insertion site to ensure it stops. CBC 6/17. Follow up with urology.    (R53.81) Physical deconditioning  Comment: Acute, secondary to hospitalization, medical conditions listed above  -requiring dylan lift for transfers now  -patient is motivated to gain strength and his goal is to return home  Plan: Encourage participation in physical therapy/occupational therapy for strengthening and deconditioning.       Total time spent with patient visit at the skilled nursing facility was 35 min including patient visit and review of past records. Greater than 50% of total time spent with counseling and coordinating care due to recent hospitalization and discussion with nursing staff today about patient concerns and plan for above problems including lab follow up, admission and medication orders. Counseling patient about labs completed inpatient, review of current medications, answering questions about patient concerns as above and discussion regarding plan of care including lab follow up.    Electronically signed by:  ANIYA Ortiz CNP         The health plan new enrollment has happened. I have reviewed the  MDS, the preventative needs,  and facility care plan. The level of care is appropriate. I have reviewed the code status/advanced directives.

## 2021-06-16 NOTE — LETTER
6/16/2021        RE: Thanh Goodrich  [facility]  [id number] Replaced by [facility] [address] [place] [address]        [place] [facility]  PRIMARY CARE PROVIDER AND CLINIC:  Sara Chadwick, ANIYA CNP, [address] [facility] [address] / [place] [address]  Chief Complaint   Patient presents with     Hospital F/U     [place] Medical Record Number:  [id number]  Place of Service where encounter took place:  [facility]  () [[id number]]    Thanh Goodrich  is a 80 year old  (1940), returned to the above facility from  [facility]. Hospital stay 6/9/21 - 6/14/21. .  Admitted to this facility for  rehab, medical management and nursing care.    HPI:    HPI information obtained from: facility chart records, facility staff, patient report and [facility] chart review.     PMH significant for myasthenia gravis, athma, chronic respiratory failure on 2L of oxygen, chronic diastolic heart failure, HTN, chronic alberto. He was admitted to TCU earlier this year after two hospitalizations in March for myasthenia gravis exacerbations, as well as CHF exacerbation, CAUTI and HCAP. He made slow gains in TCU and was transferred to LTC. His goal is to continue to get stronger to be able to discharge back home where he lives alone.    Brief Summary of Hospital Course: Sent to ED for SOB and was found to have right upper lobe pulmonary embolism and acute exacerbation of diastolic heart failure. ECHO showed right ventricle borderline dilated with normal RV function and normal LV systolic function. He was diuresed. Also started on heparin drip and transitioned to xarelto. They are recommending lifelong anticoagulation Oxygen was titrated to baseline and PCO2 back to baseline. He was discharged back to LTC    Updates on Status Since Skilled nursing Admission: Today he was seen for hospital follow up. He reports he is feeling much better. He has a few concerns today. Nursing changed alberto catheter last night and was  noted to have some bleeding from catheter site- no hematuria in alberto. Denies any pain. Had similar bleeding the last time the alberto catheter was changed. Also with hematoma to RLE that he developed in the hospital. Denies any SOB is on 2L of oxygen today. Is using BIPAP at night. Denies CP. Reports bowels moving. Nurse reports she heard patient coughing yesterday, but not today. Rigoberto expresses that he wants to work on gaining his strength back now that he is feeling better.     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 He also has no past medical history of Arthritis, Cerebral infarction (H), Congestive heart failure (H), COPD (chronic obstructive pulmonary disease) (H), Diabetes (H), History of blood transfusion, or Thyroid disease.  PAST SURGICAL HISTORY:   has a past surgical history that includes Prostate Cancer Cryotherapy (2007); Tonsillectomy (1945); biopsy (2013?); IR CVC Tunnel Placement > 5 Yrs of Age (3/31/2021); and IR CVC Tunnel Removal Right (4/13/2021).  FAMILY HISTORY: family history includes C.A.D. in his maternal grandmother; Cancer in his father and paternal uncle; Cerebrovascular  Disease in his maternal grandfather; Depression in his mother; Diabetes in his maternal grandmother; Hypertension in his father and mother; Obesity in his father; Prostate Cancer in his father and another family member; Substance Abuse in his father and mother.  SOCIAL HISTORY:   reports that he has never smoked. He has never used smokeless tobacco. He reports current alcohol use. He reports that he does not use drugs.    Post Discharge Medication Reconciliation Status: discharge medications reconciled and changed, per note/orders    Current Outpatient Medications   Medication Sig Dispense Refill     acetaminophen (TYLENOL) 325 MG tablet Take 650 mg by mouth every 4 hours as needed for mild pain       albuterol (PROVENTIL) (2.5 MG/3ML) 0.083% neb solution Take 1 vial (2.5 mg) by nebulization 4 times daily as needed for shortness of breath / dyspnea or wheezing       calcium carbonate 600 mg-vitamin D 400 units (CALTRATE) 600-400 MG-UNIT per tablet Take 1 tablet by mouth daily       Cholecalciferol 100 MCG (4000 UT) CAPS Take 4,000 Units by mouth daily       furosemide (LASIX) 20 MG tablet Take 1 tablet (20 mg) by mouth daily       guaiFENesin (MUCINEX) 600 MG 12 hr tablet Take 2 tablets (1,200 mg) by mouth 2 times daily as needed for congestion       metoprolol tartrate (LOPRESSOR) 25 MG tablet Take 0.5 tablets (12.5 mg) by mouth 2 times daily       Multiple Vitamins-Minerals (MULTIVITAMIN ADULTS 50+) TABS Take 1 tablet by mouth daily        mycophenolate (GENERIC EQUIVALENT) 500 MG tablet Take 2 tablets (1,000 mg) by mouth 2 times daily       potassium chloride ER (KLOR-CON M) 10 MEQ CR tablet Take 2 tablets (20 mEq) by mouth daily       predniSONE (DELTASONE) 20 MG tablet Take 3 tablets (60 mg) by mouth daily       pyridostigmine (MESTINON) 60 MG tablet Take 1 tablet (60 mg) by mouth 3 times daily (Patient taking differently: Take 60 mg by mouth 3 times daily 0800, 1200, 2000)       rivaroxaban ANTICOAGULANT  "(XARELTO) 20 MG TABS tablet 15 mg twice daily with food for 18 days, then 20 mg once daily with food (NOTE: will need 36, 15 mg tablets the first time this is filled) 60 tablet 1     sulfamethoxazole-trimethoprim (BACTRIM) 400-80 MG tablet Take 1 tablet by mouth daily         ROS:  10 point ROS of systems including Constitutional, Eyes, Respiratory, Cardiovascular, Gastroenterology, Genitourinary, Integumentary, Musculoskeletal, Psychiatric were all negative except for pertinent positives noted in my HPI.    Vitals:  /72   Pulse 89   Temp 98.4  F (36.9  C)   Resp 18   Ht 1.778 m (5' 10\")   Wt 126.7 kg (279 lb 6.4 oz)   SpO2 96%   BMI 40.09 kg/m    Exam:  GENERAL APPEARANCE:  Alert, in NAD  HEENT: normocephalic, moist mucous membranes, nose without drainage or crusting  RESP:  respiratory effort normal, no respiratory distress, Lung sounds clear with some crackles to bases, patient is on 2L of oxygen  CV: auscultation of heart done, rate and rhythm regular. no murmur, no rub or gallop.   ABDOMEN: + bowel sounds, soft, nontender, no grimacing or guarding with palpation.  M/S: no lower extremity edema; +2 left pedal pulse   : small amount of blood noted in mathieu area coming from urethra around alberto catheter; urine in alberto bag is clear and yellow- no hematuria  SKIN:  Inspection and palpation of skin and subcutaneous tissue: skin warm, dry without rashes; large hematoma to right shin surrounded by large bruise- purple/blue/red in color- no increased warmth or signs of infection  NEURO: cranial nerves 2-12 grossly intact and at patient's baseline; able to move extremities freely; affect and mood normal    Lab/Diagnostic data:  Labs done in SNF are in [facility]. Please refer to them using [employer] Everywhere. and Recent labs in EPIC reviewed by me today.     ASSESSMENT/PLAN:  (I26.94) Multiple subsegmental pulmonary emboli without acute cor pulmonale (H)  (primary encounter diagnosis)  Comment: acute, " provoked PE secondary to wheelchair bound   -recommending lifelong anticoagulation  -ECHO 6/10/21 showed  EF 60-65% right ventricle borderline dilated with normal RV function and normal LV systolic function.   -initially started on heparin drip and then transitioned to xarelto 6/11  Plan: Continue xarelto 15 mg BID through 7/2, then start 20 mg daily. Monitor respiratory status.     (J96.21, J96.22) Acute on chronic respiratory failure with hypoxia and hypercapnia (H)  Comment: acute on chronic, admitted to ICU and initially required BiPAP and 5L of oxygen which was tapered down to baseline oxygen during hospital stay  -respiratory status stable today on 2L of oxygen   Plan: Continue oxygen and titrate down as able to keep saturations >90%. Try to avoid titration up given high CO2. Reviewed with nursing staff today. Continue BiPAP with sleep. BMP 6/17. Follow up with neurology/pulmonology.     (I50.33) Acute on chronic diastolic heart failure (H)  (I10) Benign essential hypertension  Comment: with recent exacerbation- seems euvolemic today; /72, 128/68, 125/62; HR 89, 98, 101  Plan: Continue lasix 20 mg daily, metoprolol 12.5 mg BID. Daily weights. Notify provider with weight gain >2 lbs in a day, >5 lbs in on week. 2 gram Na diet. VS per policy. Adjust medications as needed.    (Z86.369) History of Myasthenia gravis   Comment: with multiple hospitalizations for exacerbations in March that required required intubation and plasmapheresis inpatient  -no sign of exacerbation during recent hospitalization  Plan: Continue MMF 1000 mg BID, pyridostigmine 60 mg TID, prednisone 60 mg daily. Continue bactrim for PCP prophy. Jayesh VEGA, Follow up with neurology/ pulmonology.    (T14.8XXA) Hematoma   Comment: no signs of infection, in setting of anticoagulation  Plan: Nurse to outline area of bruise and hematoma. Monitor closely for worsening and signs of infection. Wound doctor consult.     (R33.9) Urinary  retention  (Z97.8) Alberto catheter in place  Comment: replaced last night and is draining without hematuria- there is some mild bleeding around alberto sight that I suspect is secondary to trauma from replacement  Plan: Nursing to continue alberto cares. Nursing to monitor bleeding closely around alberto insertion site to ensure it stops. CBC 6/17. Follow up with urology.    (R53.81) Physical deconditioning  Comment: Acute, secondary to hospitalization, medical conditions listed above  -requiring dylan lift for transfers now  -patient is motivated to gain strength and his goal is to return home  Plan: Encourage participation in physical therapy/occupational therapy for strengthening and deconditioning.       Total time spent with patient visit at the skilled nursing facility was 35 min including patient visit and review of past records. Greater than 50% of total time spent with counseling and coordinating care due to recent hospitalization and discussion with nursing staff today about patient concerns and plan for above problems including lab follow up, admission and medication orders. Counseling patient about labs completed inpatient, review of current medications, answering questions about patient concerns as above and discussion regarding plan of care including lab follow up.    Electronically signed by:  ANIYA Ortiz CNP                       Sincerely,        ANIYA Ortiz CNP

## 2021-06-18 NOTE — TELEPHONE ENCOUNTER
[facility] Nurse Triage Telephone Note    Provider: ANIYA Mendieta CNP  Facility: [facility]  Facility Type:  [facility]    Caller: Marleni  Call Back Number: [phone]    Allergies:    Allergies   Allergen Reactions     Ciprofloxacin Other (See Comments)     Contraindicated for myasthenia gravis patients     Procainamide Other (See Comments)     Contraindicated for myasthenia gravis patients     Quinidine Other (See Comments)     Contraindicated for myasthenia gravis patients     Quinine Other (See Comments)     Contraindicated for myasthenia gravis patients        Reason for call: Nurse called to report lab results.      Verbal Order/Direction given by Provider: No new orders    Provider giving Order:  ANIYA Mendieta CNP    Verbal Order given to: Marleni Navarrete RN

## 2021-06-20 NOTE — TELEPHONE ENCOUNTER
Refusing CPAP, going into respiratory distress, has been instructed on use of CPAP an that without may cause death, states today he will refuse it and might die, full code, does not appears suicidal, more of a fixation on health concerns.  -approach and make sure he wants CPR, code status  -suggest DNR if does not want intervention  -contact family regarding status  -encourage CPAP use, safety checks overnight

## 2021-06-21 NOTE — LETTER
"    6/21/2021        RE: Thanh Goodrich  [facility]  [address] [place] [address]        [facility]  [place] [facility] Record Number:  [id number]  Place of Service where encounter took place:  [facility]  () [[id number]]  Chief Complaint   Patient presents with     RECHECK       HPI:    hTanh Goodrich  is a 80 year old (1940), who is being seen today for an episodic care visit.  HPI information obtained from: facility chart records, facility staff, patient report and [facility] chart review.    PMH significant for myasthenia gravis, athma, chronic respiratory failure on 2L of oxygen, chronic diastolic heart failure, HTN, chronic alberto. He was admitted to TCU earlier this year after two hospitalizations in March for myasthenia gravis exacerbations, as well as CHF exacerbation, CAUTI and HCAP. He made slow gains in TCU and was transferred to LTC. His goal is to continue to get stronger to be able to discharge back home where he lives alone. Was recently hospitalized for acute PE and acute CHF exacerbation.     Seen today at request of patient and nursing facility. As patient over the weekend was making comments like \"he would be better off dead.\" He denies suicidal thoughts or feelings, but reports he is very overwhelmed with life and changes right now. He had to rehome his cat and is thinking about selling his home. He reports feeling very down and sad. He is interested in starting antidepressant as well as ACP referral.     Also reports increased lower extremity edema, overall feeling like he is accumulating fluid again. Weight also trending up. Denies increased SOB.    Past Medical and Surgical History reviewed in Epic today.    MEDICATIONS:  Current Outpatient Medications   Medication Sig Dispense Refill     furosemide (LASIX) 20 MG tablet Take 2 tablets (40 mg) by mouth daily       potassium chloride ER (KLOR-CON M) 10 MEQ CR tablet Take 4 " "tablets (40 mEq) by mouth daily       sertraline (ZOLOFT) 25 MG tablet Take 1 tablet (25 mg) by mouth daily       acetaminophen (TYLENOL) 325 MG tablet Take 650 mg by mouth every 4 hours as needed for mild pain       albuterol (PROVENTIL) (2.5 MG/3ML) 0.083% neb solution Take 1 vial (2.5 mg) by nebulization 4 times daily as needed for shortness of breath / dyspnea or wheezing       calcium carbonate 600 mg-vitamin D 400 units (CALTRATE) 600-400 MG-UNIT per tablet Take 1 tablet by mouth daily       Cholecalciferol 100 MCG (4000 UT) CAPS Take 4,000 Units by mouth daily       guaiFENesin (MUCINEX) 600 MG 12 hr tablet Take 2 tablets (1,200 mg) by mouth 2 times daily as needed for congestion       metoprolol tartrate (LOPRESSOR) 25 MG tablet Take 0.5 tablets (12.5 mg) by mouth 2 times daily       Multiple Vitamins-Minerals (MULTIVITAMIN ADULTS 50+) TABS Take 1 tablet by mouth daily        mycophenolate (GENERIC EQUIVALENT) 500 MG tablet Take 2 tablets (1,000 mg) by mouth 2 times daily       predniSONE (DELTASONE) 20 MG tablet Take 3 tablets (60 mg) by mouth daily       pyridostigmine (MESTINON) 60 MG tablet Take 1 tablet (60 mg) by mouth 3 times daily (Patient taking differently: Take 60 mg by mouth 3 times daily 0800, 1200, 2000)       rivaroxaban ANTICOAGULANT (XARELTO) 20 MG TABS tablet 15 mg twice daily with food for 18 days, then 20 mg once daily with food (NOTE: will need 36, 15 mg tablets the first time this is filled) 60 tablet 1     sulfamethoxazole-trimethoprim (BACTRIM) 400-80 MG tablet Take 1 tablet by mouth daily       REVIEW OF SYSTEMS:  4 point ROS including Respiratory, CV, GI and , other than that noted in the HPI,  is negative    Objective:  BP (!) 143/89   Pulse 65   Temp 97.2  F (36.2  C)   Resp 18   Ht 1.778 m (5' 10\")   Wt 126.7 kg (279 lb 6.4 oz)   SpO2 97%   BMI 40.09 kg/m    Exam:  GENERAL APPEARANCE:  Alert, in NAD  HEENT: normocephalic, moist mucous membranes, nose without drainage or " crusting  RESP:  respiratory effort normal, no respiratory distress, patient is on RA.  M/S: +1 pitting edema to bilateral lower extremities  SKIN:  Inspection and palpation of skin and subcutaneous tissue: skin warm, dry without rashes; hematoma/ bruise to RLE shrinking in size  NEURO: cranial nerves 2-12 grossly intact and at patient's baseline  PSYCH: oriented x 3, affect and mood down and tearful    Labs:   Labs done in SNF are in [facility]. Please refer to them using EPIC/Care Everywhere. and Recent labs in [facility] reviewed by me today.     ASSESSMENT/PLAN:  (I50.33) Acute on chronic diastolic heart failure (H)  (I10) Benign essential hypertension  Comment: with recent exacerbation- seems euvolemic today; /89, 134/80, 140/80  Plan: Increase lasix 40 mg daily. Increase potassium to 40 meq daily. Continue metoprolol 12.5 mg BID. Daily weights. Lymph therapy for swelling. Notify provider with weight gain >2 lbs in a day, >5 lbs in on week. 2 gram Na diet. VS per policy. Adjust medications as needed    (F32.9) Reactive depression  Comment: acute with many life changes going on  -denies suicidal thoughts and ideations  Plan: Start sertraline 25 mg daily. BMP 6/30. ACP referral placed. Monitor mood closely.     (J96.21, J96.22) Chronic respiratory failure with hypoxia and hypercapnia (H)  Comment: chronic, respiratory status stable today on 2L of oxygen which is his baseline  Plan: Continue oxygen and titrate down as able to keep saturations >90%. Try to avoid titration up given high CO2. Reviewed with nursing staff today. Continue BiPAP with sleep. BMP 6/24. Follow up with neurology/pulmonology.     (Z86.369) History of Myasthenia gravis   Comment: with multiple hospitalizations for exacerbations in March that required required intubation and plasmapheresis inpatient  -no sign of exacerbation   Plan: Continue MMF 1000 mg BID, pyridostigmine 60 mg TID, prednisone 60 mg daily. Continue bactrim for PCP prophy.  Jayesh VEGA, Follow up with neurology/ pulmonology.    Electronically signed by:  ANIYA Ortiz CNP            Sincerely,        ANIYA Ortiz CNP

## 2021-06-21 NOTE — PATIENT INSTRUCTIONS
Joyce Goodrich  1940  1) Start sertraline 25 mg daily. Diagnosis: depression  2) BMP 6/30. Diagnosis: depression  3) ACP referral for depression and coping  4) Increase lasix to 40 mg daily. Diagnosis: CHF  5) BMP 6/24. Diagnosis: CHF  6) Increase potassium to 40 meq po daily. DIAGNOSIS: CHF  7) Lymph therapy for edema  ANIYA Ortiz CNP on 6/21/2021 at 1:47 PM       Declined

## 2021-06-21 NOTE — PROGRESS NOTES
"[facility]  [place] [facility] Record Number:  [id number]  Place of Service where encounter took place:  [facility]  () [[id number]]  Chief Complaint   Patient presents with     RECHECK       HPI:    Thanh Goodrich  is a 80 year old (1940), who is being seen today for an episodic care visit.  HPI information obtained from: facility chart records, facility staff, patient report and [facility] chart review.    PMH significant for myasthenia gravis, athma, chronic respiratory failure on 2L of oxygen, chronic diastolic heart failure, HTN, chronic alberto. He was admitted to TCU earlier this year after two hospitalizations in March for myasthenia gravis exacerbations, as well as CHF exacerbation, CAUTI and HCAP. He made slow gains in TCU and was transferred to LTC. His goal is to continue to get stronger to be able to discharge back home where he lives alone. Was recently hospitalized for acute PE and acute CHF exacerbation.     Seen today at request of patient and nursing facility. As patient over the weekend was making comments like \"he would be better off dead.\" He denies suicidal thoughts or feelings, but reports he is very overwhelmed with life and changes right now. He had to rehome his cat and is thinking about selling his home. He reports feeling very down and sad. He is interested in starting antidepressant as well as ACP referral.     Also reports increased lower extremity edema, overall feeling like he is accumulating fluid again. Weight also trending up. Denies increased SOB.    Past Medical and Surgical History reviewed in Epic today.    MEDICATIONS:  Current Outpatient Medications   Medication Sig Dispense Refill     furosemide (LASIX) 20 MG tablet Take 2 tablets (40 mg) by mouth daily       potassium chloride ER (KLOR-CON M) 10 MEQ CR tablet Take 4 tablets (40 mEq) by mouth daily       sertraline (ZOLOFT) 25 MG tablet Take 1 tablet (25 mg) by mouth daily       " "acetaminophen (TYLENOL) 325 MG tablet Take 650 mg by mouth every 4 hours as needed for mild pain       albuterol (PROVENTIL) (2.5 MG/3ML) 0.083% neb solution Take 1 vial (2.5 mg) by nebulization 4 times daily as needed for shortness of breath / dyspnea or wheezing       calcium carbonate 600 mg-vitamin D 400 units (CALTRATE) 600-400 MG-UNIT per tablet Take 1 tablet by mouth daily       Cholecalciferol 100 MCG (4000 UT) CAPS Take 4,000 Units by mouth daily       guaiFENesin (MUCINEX) 600 MG 12 hr tablet Take 2 tablets (1,200 mg) by mouth 2 times daily as needed for congestion       metoprolol tartrate (LOPRESSOR) 25 MG tablet Take 0.5 tablets (12.5 mg) by mouth 2 times daily       Multiple Vitamins-Minerals (MULTIVITAMIN ADULTS 50+) TABS Take 1 tablet by mouth daily        mycophenolate (GENERIC EQUIVALENT) 500 MG tablet Take 2 tablets (1,000 mg) by mouth 2 times daily       predniSONE (DELTASONE) 20 MG tablet Take 3 tablets (60 mg) by mouth daily       pyridostigmine (MESTINON) 60 MG tablet Take 1 tablet (60 mg) by mouth 3 times daily (Patient taking differently: Take 60 mg by mouth 3 times daily 0800, 1200, 2000)       rivaroxaban ANTICOAGULANT (XARELTO) 20 MG TABS tablet 15 mg twice daily with food for 18 days, then 20 mg once daily with food (NOTE: will need 36, 15 mg tablets the first time this is filled) 60 tablet 1     sulfamethoxazole-trimethoprim (BACTRIM) 400-80 MG tablet Take 1 tablet by mouth daily       REVIEW OF SYSTEMS:  4 point ROS including Respiratory, CV, GI and , other than that noted in the HPI,  is negative    Objective:  BP (!) 143/89   Pulse 65   Temp 97.2  F (36.2  C)   Resp 18   Ht 1.778 m (5' 10\")   Wt 126.7 kg (279 lb 6.4 oz)   SpO2 97%   BMI 40.09 kg/m    Exam:  GENERAL APPEARANCE:  Alert, in NAD  HEENT: normocephalic, moist mucous membranes, nose without drainage or crusting  RESP:  respiratory effort normal, no respiratory distress, patient is on RA.  M/S: +1 pitting edema to " bilateral lower extremities  SKIN:  Inspection and palpation of skin and subcutaneous tissue: skin warm, dry without rashes; hematoma/ bruise to RLE shrinking in size  NEURO: cranial nerves 2-12 grossly intact and at patient's baseline  PSYCH: oriented x 3, affect and mood down and tearful    Labs:   Labs done in SNF are in [place] EPIC. Please refer to them using EPIC/Care Everywhere. and Recent labs in EPIC reviewed by me today.     ASSESSMENT/PLAN:  (I50.33) Acute on chronic diastolic heart failure (H)  (I10) Benign essential hypertension  Comment: with recent exacerbation- seems euvolemic today; /89, 134/80, 140/80  Plan: Increase lasix 40 mg daily. Increase potassium to 40 meq daily. Continue metoprolol 12.5 mg BID. Daily weights. Lymph therapy for swelling. Notify provider with weight gain >2 lbs in a day, >5 lbs in on week. 2 gram Na diet. VS per policy. Adjust medications as needed    (F32.9) Reactive depression  Comment: acute with many life changes going on  -denies suicidal thoughts and ideations  Plan: Start sertraline 25 mg daily. BMP 6/30. ACP referral placed. Monitor mood closely.     (J96.21, J96.22) Chronic respiratory failure with hypoxia and hypercapnia (H)  Comment: chronic, respiratory status stable today on 2L of oxygen which is his baseline  Plan: Continue oxygen and titrate down as able to keep saturations >90%. Try to avoid titration up given high CO2. Reviewed with nursing staff today. Continue BiPAP with sleep. BMP 6/24. Follow up with neurology/pulmonology.     (Z86.067) History of Myasthenia gravis   Comment: with multiple hospitalizations for exacerbations in March that required required intubation and plasmapheresis inpatient  -no sign of exacerbation   Plan: Continue MMF 1000 mg BID, pyridostigmine 60 mg TID, prednisone 60 mg daily. Continue bactrim for PCP prophy. Jayesh VEGA, Follow up with neurology/ pulmonology.    Electronically signed by:  ANIYA Ortiz  CNP

## 2021-06-24 NOTE — TELEPHONE ENCOUNTER
[facility] Nurse Triage Telephone Note    Provider: ANIYA Mendieta CNP  Facility: [facility]   Facility Type:  [facility]    Caller: Mariah  Call Back Number: [phone]    Allergies   Allergen Reactions     Ciprofloxacin Other (See Comments)     Contraindicated for myasthenia gravis patients     Procainamide Other (See Comments)     Contraindicated for myasthenia gravis patients     Quinidine Other (See Comments)     Contraindicated for myasthenia gravis patients     Quinine Other (See Comments)     Contraindicated for myasthenia gravis patients       Reason for call: BMP results    Verbal Order/Direction given by Provider: NINOO    Provider giving Order:  ANIYA Mendieta CNP    Verbal Order given to: Mariah Simmons RN

## 2021-06-24 NOTE — TELEPHONE ENCOUNTER
"I received a phone call from Rigoberto today concerned that he is \"filling up with fluid again.\" I asked nursing to do an assessment and update me.    The nursing assessment is as follows:  LS clear, no sob, no cough, 2+ pitting edema bilateral lower extremities. Weight after breakfast today was 282.8 lb. Previous weights: 6/15 279.8, 5/30 271.8, 5/24 274.8.  Vitals: BP:  151/90  P: 72  R:: 18  SPO2: 97% O2 at 2   L/min Temp.:  97.1        He is currently receiving lasix 40mg daily and potassium 40meq daily     Plan: Suspect ongoing CHF exacerbation- as edema is worse than when I last saw him and his weight is increasing. Patient reports therapy has fitted him with compression to legs.  Orders sent to facility for the following  -add metolazone 2.5 mg Friday (6/25) and Monday (6/28) 30 minutes prior to lasix.  - On Friday (6/25) and Monday (6/28) he should also get an extra 40 meq of potassium.   -[facility] 6/28.   -Patient also requested not be woken before 7AM. U asked nursing to please retime any medications given earlier to 7AM.     I reviewed plan with patient today via telephone and he will update nursing staff if any further concerns.     "

## 2021-06-26 NOTE — TELEPHONE ENCOUNTER
Page RE: skin issue; bariatric resident, shower today, abdominal folds linear open area, has moisture wicking (interdry-type) that wants to use, has calmoseptin on now but will get pasty.  -nystatin BID with 4x4 gauze layer in crease until healed

## 2021-06-28 NOTE — TELEPHONE ENCOUNTER
[facility] Nurse Triage Telephone Note    Provider: ANIYA Mendieta CNP  Facility: [facility]  Facility Type:  [facility]    Caller: Christiana  Call Back Number: [phone]    Allergies:    Allergies   Allergen Reactions     Ciprofloxacin Other (See Comments)     Contraindicated for myasthenia gravis patients     Procainamide Other (See Comments)     Contraindicated for myasthenia gravis patients     Quinidine Other (See Comments)     Contraindicated for myasthenia gravis patients     Quinine Other (See Comments)     Contraindicated for myasthenia gravis patients        Reason for call:   Nursing is updating that the pt has had an increase in brown sediment in his alberto catheter bag, afebrile, using prn flush orders. No s/s of UTI at this time. Currently on Bactrim for prophylactic.     Verbal Order/Direction given by Provider:   Have nurses encourage fluids, monitor temp. Call back with fever or change in status    Provider giving Order:  ANIYA Britt CNP     Verbal Order given to: Christiana Butler RN

## 2021-06-28 NOTE — TELEPHONE ENCOUNTER
[facility] Nurse Triage Telephone Note    Provider: ANIYA Mendieta CNP  Facility: [facility]  Facility Type:  [facility]    Caller: Mariah  Call Back Number: [phone]    Allergies:    Allergies   Allergen Reactions     Ciprofloxacin Other (See Comments)     Contraindicated for myasthenia gravis patients     Procainamide Other (See Comments)     Contraindicated for myasthenia gravis patients     Quinidine Other (See Comments)     Contraindicated for myasthenia gravis patients     Quinine Other (See Comments)     Contraindicated for myasthenia gravis patients        Reason for call:  (6/24 132), K+3.3 (3.6), Bun 10, Cr 0.59, GFR >90.   Was order from 6/24 to give Extra dose Metolazone 2.5mg Fri 6/25 & Mon 6/28 along with Extra K+ 40mEq both days.     Verbal Order/Direction given by Provider: Make sure on 1500cc fluid restriction. Recheck BMP 6/30.    Provider giving Order:  ANIYA Britt CNP     Verbal Order given to: Mariah Pike RN

## 2021-06-29 NOTE — TELEPHONE ENCOUNTER
Patient has some dark red blood in alberto bag, no c/o pain or discomfort, vitals stable.   Order: monitor and update NP in AM, call if condition deteriorates

## 2021-07-07 NOTE — PATIENT INSTRUCTIONS
Orders  Thanh Goodrich  1940  1) Start spironolactone 25 mg daily. Diagnosis: CHF  2) BMP 7/12. Diagnosis: CHF  3) Discontinue potassium.   4) UA/UC STAT due to hematuria (please be sure to change out alberto catheter prior to collection)  5) Patient has many questions about how he would go out for appointment if needed. Can you please have the nurse manager stop in and talk to him about how it would work if he needs to go out for a urology visit?   6) CBC, folic acid level, B12 level 7/12. Diagnosis: anemia  7) dietician referral to meet with patient per his request to help with healthy food choices/ weight loss plan  ANIYA Ortiz CNP on 7/7/2021 at 3:04 PM

## 2021-07-07 NOTE — PROGRESS NOTES
[facility]  [facility] Record Number:  [id number]  Place of Service where encounter took place:  [facility]  () [[id number]]  Chief Complaint   Patient presents with     Nursing Home Acute       HPI:    Thanh Goodrich  is a 80 year old (1940), who is being seen today for an episodic care visit.  HPI information obtained from: facility chart records, facility staff, patient report and [facility] chart review.    PMH significant for myasthenia gravis, athma, chronic respiratory failure on 2L of oxygen, chronic diastolic heart failure, HTN, chronic alberto. He was admitted to TCU earlier this year after two hospitalizations in March for myasthenia gravis exacerbations, as well as CHF exacerbation, CAUTI and HCAP. He made slow gains in TCU and was transferred to LTC. His goal is to continue to get stronger to be able to discharge back home where he lives alone. Was recently hospitalized for acute PE and acute CHF exacerbation.     Seen today for episodic follow up. Patient continues to feel like he is accumulating fluid. Reports lower legs improved, but is now collecting in abdomen and thighs. Reports breathing is stable. Denies any changes to respiratory status. Is on 2L of oxygen. Has had some elevated BP recently. He is concerned about losing weight. Therapy ended and he is requiring dylan lift. Would like to meet with dietician to review healthy food choices. Also with ongoing hematuria. No pain associated. Reports he follows with Dr. Charles urologist. Has many questions about going out for appointments today. Does not want me to schedule appointment for this yet. Has paperwork that he needs filled out.     Past Medical and Surgical History reviewed in Epic today.    MEDICATIONS:   Medications reviewed and reconciled, but patient inpatient at time of note completion and full medication list did not pull into chart.  Current Outpatient Medications   Medication Sig Dispense  "Refill     melatonin 3 MG tablet Take 1 tablet (3 mg) by mouth nightly as needed for sleep       nystatin (MYCOSTATIN) 730132 UNIT/GM external powder Apply topically 2 times daily       spironolactone (ALDACTONE) 25 MG tablet Take 1 tablet (25 mg) by mouth daily         REVIEW OF SYSTEMS:  4 point ROS including Respiratory, CV, GI and , other than that noted in the HPI,  is negative    Objective:  /86   Pulse 69   Temp 97.3  F (36.3  C)   Resp 18   Ht 1.778 m (5' 10\")   Wt 126.3 kg (278 lb 8 oz)   SpO2 97%   BMI 39.96 kg/m    Exam:  GENERAL APPEARANCE:  Alert, in NAD  HEENT: normocephalic, moist mucous membranes, nose without drainage or crusting  RESP:  respiratory effort normal, no respiratory distress, Lung sounds clear, patient is on 2L of oxygen  CV: auscultation of heart done, rate and rhythm regular.   ABDOMEN: + bowel sounds, soft, nontender, no grimacing or guarding with palpation. Pitting edema to abdomen  M/S: wheelchair dependent, dylan for transfers; pitting edema to thighs  NEURO: cranial nerves 2-12 grossly intact and at patient's baseline  PSYCH: oriented x 3, affect and mood anxious    Labs:   [facility]. Please refer to them using [employer]/Care Everywhere. and Recent labs in [facility] reviewed by me today.     ASSESSMENT/PLAN:  (I50.33) Acute on chronic diastolic heart failure (H)  (I10) Benign essential hypertension  Comment: with ongoing edema- legs improved, but pitting edema to abdomen and thighs  BP elevated: /88, 146/84, 156/90, 188/76  Plan: Start spironolactone 25 mg daily, continue lasix 40 mg daily. Discontinue potassium supplementation. BMP 7/12. Continue metoprolol 12.5 mg BID. Daily weights. Notify provider with weight gain >2 lbs in a day, >5 lbs in on week. 2 gram Na diet. Continue tubi . Continue fluid restriction. VS per policy. Adjust medications as needed    (R31.9) Hematuria  (R33.9) Urinary retention  (Z97.8) Beckett catheter in " place  (Z85.46) History of prostate cancer s/p cryotherapy  Comment: with hematuria that is ongoing  -no sign of UTI, but no UA/UC done to rule out with this change  -likely is related to anticoagulation  -discussed follow up with urology- follows with Dr. Charles and is due for follow up. Patient has many concerns about how it will work going out for appointment and wants to wait to get these answered before I have facility schedule appointment  Plan: UA/UC to rule out UTI. Nursing to continue alberto cares and catheter flushes PRN. CBC 7/12. Follow up with urology. Asked nurse manager to follow up with patient to address his concerns/questiions.    (D64.9)  Anemia  Comment: mild, macrocytic  -with hematuria  Plan: CBC, folic acid level, B12 level 7/12.     (I26.94) Multiple subsegmental pulmonary emboli without acute cor pulmonale (H)   Comment: acute, provoked PE secondary to wheelchair bound   -recommending lifelong anticoagulation  -ECHO 6/10/21 showed  EF 60-65% right ventricle borderline dilated with normal RV function and normal LV systolic function.   Plan: Continue xarelto 20 mg daily. Monitor respiratory status.     (J96.21, J96.22) Chronic respiratory failure with hypoxia and hypercapnia (H)  Comment: chronic, respiratory status stable on 2L of oxygen which is baseline   Plan: Continue oxygen and titrate down as able to keep saturations >90%. Try to avoid titration up given high CO2. Continue BiPAP with sleep. BMP 7/12. Follow up with neurology/pulmonology.     (F32.9) Reactive depression  Comment: acute with many life changes that are contributing to depression including move to LTC  -started on sertraline at the end of June  Plan: Continue sertraline 25 mg daily. ACP following. Monitor mood.     (Z86.369) History of Myasthenia gravis   Comment: with multiple hospitalizations for exacerbations in March that required required intubation and plasmapheresis inpatient  -no sign of exacerbation   Plan: Continue  "MMF 1000 mg BID, pyridostigmine 60 mg TID, prednisone 60 mg daily. Continue bactrim for PCP prophy. Continue AVAPS, Follow up with neurology/ pulmonology.    (E66.01,  Z68.39) Class 2 severe obesity with serious comorbidity and body mass index (BMI) of 39.0 to 39.9 in adult, unspecified obesity type (H)  Comment: puts patient at significant risk for exacerbation of chronic diseases above, also with limited mobility and impacts his recovery  Estimated body mass index is 39.96 kg/m  as calculated from the following:    Height as of this encounter: 1.778 m (5' 10\").    Weight as of this encounter: 126.3 kg (278 lb 8 oz).  Plan: Patient motivated to lose weight. Referral for dietician to meet with patient and make healthy food choices.     Total time spent with patient visit at the skilled nursing facility was 37 minutes including patient visit and review of past records. Greater than 50% of total time spent with counseling and coordinating care including coordinating care with facility staff including discussion with multiple members of nursing staff to update on the above plan as well as discussion to have his questions answered. Counseling patient on the above problems, follow up plan and labs, completing paperwork for LTC insurance, as well as follow up with patient after visit via phone call to answer additional questions he had.     Electronically signed by:  ANIYA Ortiz CNP       "

## 2021-07-07 NOTE — LETTER
7/7/2021        RE: Thanh Goodrich  [facility]  [address] [place] [address]        [facility]  [facility] Record Number:  [id number]  Place of Service where encounter took place:  [facility]  () [[id number]]  Chief Complaint   Patient presents with     Nursing Home Acute       HPI:    Thanh Goodrich  is a 80 year old (1940), who is being seen today for an episodic care visit.  HPI information obtained from: facility chart records, facility staff, patient report and [facility] chart review.    PMH significant for myasthenia gravis, athma, chronic respiratory failure on 2L of oxygen, chronic diastolic heart failure, HTN, chronic alberto. He was admitted to TCU earlier this year after two hospitalizations in March for myasthenia gravis exacerbations, as well as CHF exacerbation, CAUTI and HCAP. He made slow gains in TCU and was transferred to LTC. His goal is to continue to get stronger to be able to discharge back home where he lives alone. Was recently hospitalized for acute PE and acute CHF exacerbation.     Seen today for episodic follow up. Patient continues to feel like he is accumulating fluid. Reports lower legs improved, but is now collecting in abdomen and thighs. Reports breathing is stable. Denies any changes to respiratory status. Is on 2L of oxygen. Has had some elevated BP recently. He is concerned about losing weight. Therapy ended and he is requiring dylan lift. Would like to meet with dietician to review healthy food choices. Also with ongoing hematuria. No pain associated. Reports he follows with Dr. Charles urologist. Has many questions about going out for appointments today. Does not want me to schedule appointment for this yet. Has paperwork that he needs filled out.     Past Medical and Surgical History reviewed in Epic today.    MEDICATIONS:   Medications reviewed and reconciled, but patient inpatient at time of note  "completion and full medication list did not pull into chart.  Current Outpatient Medications   Medication Sig Dispense Refill     melatonin 3 MG tablet Take 1 tablet (3 mg) by mouth nightly as needed for sleep       nystatin (MYCOSTATIN) 602740 UNIT/GM external powder Apply topically 2 times daily       spironolactone (ALDACTONE) 25 MG tablet Take 1 tablet (25 mg) by mouth daily         REVIEW OF SYSTEMS:  4 point ROS including Respiratory, CV, GI and , other than that noted in the HPI,  is negative    Objective:  /86   Pulse 69   Temp 97.3  F (36.3  C)   Resp 18   Ht 1.778 m (5' 10\")   Wt 126.3 kg (278 lb 8 oz)   SpO2 97%   BMI 39.96 kg/m    Exam:  GENERAL APPEARANCE:  Alert, in NAD  HEENT: normocephalic, moist mucous membranes, nose without drainage or crusting  RESP:  respiratory effort normal, no respiratory distress, Lung sounds clear, patient is on 2L of oxygen  CV: auscultation of heart done, rate and rhythm regular.   ABDOMEN: + bowel sounds, soft, nontender, no grimacing or guarding with palpation. Pitting edema to abdomen  M/S: wheelchair dependent, dylan for transfers; pitting edema to thighs  NEURO: cranial nerves 2-12 grossly intact and at patient's baseline  PSYCH: oriented x 3, affect and mood anxious    Labs:   Labs done in SNF are in [facility]. Please refer to them using [employer]/Care Everywhere. and Recent labs in [facility] reviewed by me today.     ASSESSMENT/PLAN:  (I50.33) Acute on chronic diastolic heart failure (H)  (I10) Benign essential hypertension  Comment: with ongoing edema- legs improved, but pitting edema to abdomen and thighs  BP elevated: /88, 146/84, 156/90, 188/76  Plan: Start spironolactone 25 mg daily, continue lasix 40 mg daily. Discontinue potassium supplementation. BMP 7/12. Continue metoprolol 12.5 mg BID. Daily weights. Notify provider with weight gain >2 lbs in a day, >5 lbs in on week. 2 gram Na diet. Continue tubi . Continue fluid restriction. VS per " policy. Adjust medications as needed    (R31.9) Hematuria  (R33.9) Urinary retention  (Z97.8) Alberto catheter in place  (Z85.46) History of prostate cancer s/p cryotherapy  Comment: with hematuria that is ongoing  -no sign of UTI, but no UA/UC done to rule out with this change  -likely is related to anticoagulation  -discussed follow up with urology- follows with Dr. Charles and is due for follow up. Patient has many concerns about how it will work going out for appointment and wants to wait to get these answered before I have facility schedule appointment  Plan: UA/UC to rule out UTI. Nursing to continue alberto cares and catheter flushes PRN. CBC 7/12. Follow up with urology. Asked nurse manager to follow up with patient to address his concerns/questiions.    (D64.9)  Anemia  Comment: mild, macrocytic  -with hematuria  Plan: CBC, folic acid level, B12 level 7/12.     (I26.94) Multiple subsegmental pulmonary emboli without acute cor pulmonale (H)   Comment: acute, provoked PE secondary to wheelchair bound   -recommending lifelong anticoagulation  -ECHO 6/10/21 showed  EF 60-65% right ventricle borderline dilated with normal RV function and normal LV systolic function.   Plan: Continue xarelto 20 mg daily. Monitor respiratory status.     (J96.21, J96.22) Chronic respiratory failure with hypoxia and hypercapnia (H)  Comment: chronic, respiratory status stable on 2L of oxygen which is baseline   Plan: Continue oxygen and titrate down as able to keep saturations >90%. Try to avoid titration up given high CO2. Continue BiPAP with sleep. BMP 7/12. Follow up with neurology/pulmonology.     (F32.9) Reactive depression  Comment: acute with many life changes that are contributing to depression including move to LTC  -started on sertraline at the end of June  Plan: Continue sertraline 25 mg daily. ACP following. Monitor mood.     (Z86.369) History of Myasthenia gravis   Comment: with multiple hospitalizations for exacerbations in  "March that required required intubation and plasmapheresis inpatient  -no sign of exacerbation   Plan: Continue MMF 1000 mg BID, pyridostigmine 60 mg TID, prednisone 60 mg daily. Continue bactrim for PCP prophy. Continue AVAPS, Follow up with neurology/ pulmonology.    (E66.01,  Z68.39) Class 2 severe obesity with serious comorbidity and body mass index (BMI) of 39.0 to 39.9 in adult, unspecified obesity type (H)  Comment: puts patient at significant risk for exacerbation of chronic diseases above, also with limited mobility and impacts his recovery  Estimated body mass index is 39.96 kg/m  as calculated from the following:    Height as of this encounter: 1.778 m (5' 10\").    Weight as of this encounter: 126.3 kg (278 lb 8 oz).  Plan: Patient motivated to lose weight. Referral for dietician to meet with patient and make healthy food choices.     Total time spent with patient visit at the skilled nursing facility was 37 minutes including patient visit and review of past records. Greater than 50% of total time spent with counseling and coordinating care including coordinating care with facility staff including discussion with multiple members of nursing staff to update on the above plan as well as discussion to have his questions answered. Counseling patient on the above problems, follow up plan and labs, completing paperwork for LTC insurance, as well as follow up with patient after visit via phone call to answer additional questions he had.     Electronically signed by:  ANIYA Ortiz CNP             Sincerely,        ANIYA Ortiz CNP    "

## 2021-07-08 NOTE — TELEPHONE ENCOUNTER
[facility] Nurse Triage Telephone Note    Provider: ANYIA Mendieta CNP  Facility: [facility]   Facility Type:  [facility]    Caller: Mariah  Call Back Number: [phone]    Allergies   Allergen Reactions     Ciprofloxacin Other (See Comments)     Contraindicated for myasthenia gravis patients     Procainamide Other (See Comments)     Contraindicated for myasthenia gravis patients     Quinidine Other (See Comments)     Contraindicated for myasthenia gravis patients     Quinine Other (See Comments)     Contraindicated for myasthenia gravis patients       Reason for call: UA result; UC pending      Verbal Order/Direction given by Provider: Wait for final UC result    Provider giving Order:  ANIYA Mendieta CNP    Verbal Order given to: Mariah Simmons RN

## 2021-07-10 PROBLEM — J18.9 PNEUMONIA OF LEFT LOWER LOBE DUE TO INFECTIOUS ORGANISM: Status: ACTIVE | Noted: 2021-01-01

## 2021-07-10 PROBLEM — J45.901 ASTHMA EXACERBATION: Status: ACTIVE | Noted: 2021-01-01

## 2021-07-10 NOTE — PROGRESS NOTES
A BiPAP of 16/8 @ 40% was applied to the pt via the mask for an increase in WOB and/or SOB.  The bridge of the nose has gel pad in place. Pt is tolerating it well. Will continue to monitor and assess the pt's current respiratory status and needs.    Buddy Mckenzie, RT on 7/10/2021 at 12:27 AM

## 2021-07-10 NOTE — PROGRESS NOTES
"SPIRITUAL HEALTH SERVICES Progress Note  [facility] ICU    Writer met with Rigoberto per  consult. He told me of the medical events that have led up to this hospital stay. Rigoberto feels he was a doctor's \"guinea pig\" for a treatment to his disease. Writer offered emotional support and prayer. SHS remains available as needed.      Jackelin Zepeda  Chaplain Resident  "

## 2021-07-10 NOTE — PLAN OF CARE
ICU End of Shift Summary.  For vital signs and complete assessments, please see documentation flowsheets.     Pertinent assessments:  patient off of bipap since this am.  NC 5L.  Productive cough. Afebrile. Lungs dim.  On abx.   Awaiting UC- urine odorous and slightly blood tinged/sediment.   Major Shift Events: none   Plan (Upcoming Events): continue bipap with naps and at bedtime.   Discharge/Transfer Needs:  pt to go back to [facility].  Bed held by family at this time.     Bedside Shift Report Completed : yes  Bedside Safety Check Completed: yes

## 2021-07-10 NOTE — ED NOTES
Bed: ED12  Expected date:   Expected time:   Means of arrival:   Comments:  Bv1 - sob, AMS   Patient projectile vomited. RN spoke with Dr. Claire regarding risk for aspiration. New orders for antiemetic, NPO, and SLP evaluation. RN also updated regarding Critical K+ 2.7, Dr. Claire read back Critical K+ 2.7. New orders received. Dayshift RN updated.

## 2021-07-10 NOTE — ED PROVIDER NOTES
History   Chief Complaint:  Altered Mental Status       HPI   Thanh Goodrich is an anticoagulated 80 year old male with history of asthma, hyperlipidemia, hypertension, and PE, who presents with an altered mental status. Per EMS, staff at [facility], where the patient lives, noticed increased shortness of breath, altered mental status, and pale skin coloration. His oxygen saturation was in the low-mid 80's this evening on his chronic oxygen of 1-2 liters. Staff at his living facility administered an albuterol nebulizer. Ems also gave him a nebulizer upon arrival. At baseline, the patient is able to stand up and hold a conversation. He was last normal two hours ago at around 2200. En route to the ED, vitals were stable.  Here in the ED, the patient denies chest pain and abdominal pain.     Review of Systems   Respiratory: Positive for shortness of breath.    Cardiovascular: Negative for chest pain.   Gastrointestinal: Negative for abdominal pain.   Skin: Positive for pallor.   Psychiatric/Behavioral: Positive for confusion.   All other systems reviewed and are negative.    Allergies:  Ciprofloxacin  Procainamide  Quinidine    Medications:  Proventil  Lasix  Metoprolol  Mycophenolate  Potassium chloride  Prednisone  Xarelto  Zoloft    Past Medical History:    Atypical mycobacterial infection  Cellulitis of left lower leg  Depressive disorder  History of steroid therapy  Hyperlipidemia  Hypertension  Incontinence of urine  Leg ulcer  Malignant neoplasm of prostate  Myasthenia gravis  Osteoporosis  PVC's  Asthma  PE  Metabolic encephalopathy  Myasthenic crisis  Morbid obesity    Past Surgical History:    CVC tunnel placement  CVC tunnel removal  Prostate cancer cryotherapy  Tonsillectomy     Family History:    Hypertension, mother  Depression, mother  Substance abuse, mother  Hypertension, father  Prostate cancer, father    Social History:  Arrives via EMS  Lives at [facility]    Physical Exam      Patient Vitals for the past 24 hrs:   BP Temp Temp src Pulse Resp SpO2   07/10/21 0245 103/55 -- -- 72 23 93 %   07/10/21 0230 101/64 -- -- 78 26 94 %   07/10/21 0215 97/59 -- -- 73 22 97 %   07/10/21 0200 106/58 -- -- 74 22 98 %   07/10/21 0130 98/66 -- -- 75 21 95 %   07/10/21 0115 97/54 -- -- 75 19 98 %   07/10/21 0100 104/60 -- -- 76 22 94 %   07/10/21 0045 95/63 -- -- 77 28 95 %   07/10/21 0033 -- 97.8  F (36.6  C) Axillary -- -- --   07/10/21 0030 112/81 -- -- 79 24 94 %   07/10/21 0015 103/72 -- -- 81 25 94 %   07/10/21 0000 139/78 -- -- 80 -- 96 %   07/09/21 2352 139/83 -- -- -- -- --   07/09/21 2349 -- -- -- 70 26 92 %       Physical Exam  General: Sitting up in bed  Eyes:  The pupils are equal and round    Conjunctivae and sclerae are normal  ENT:    Wearing a mask  Neck:  Normal range of motion  CV:  Regular rate, regular rhythm     Skin warm and well perfused   Resp:  Tachypnea    No cough heard    Diminished breath sounds on left side    No wheezing heard  GI:  Abdomen is soft, there is no rigidity    No distension    No rebound tenderness     No abdominal tenderness    Beckett catheter in place  MS:  Mild bilateral LE edema  Skin:  No rash or acute skin lesions noted  Neuro:   Slightly lethargic on arrival to ED    Oriented x3    Speech is normal and fluent.    Face is symmetric.     Moves all extremities equally    SILT on bilateral UE/LE  Psych: Normal affect.  Appropriate interactions.    Emergency Department Course   ECG  ECG taken at 0011, ECG read at 0018  Normal sinus rhythm  Inferior infarct, age undetermined  Abnormal ECG  No significant changes as compared to prior, dated 06/09/2021.  Rate 79 bpm. NC interval 208 ms. QRS duration 106 ms. QT/QTc 376/431 ms. P-R-T axes * -25 -23.     Imaging:    XR Chest Port 1 View  Normal heart size and pulmonary vascularity allowing for shallow inspiration. Mild patchy infiltrate or atelectasis left lung base with low-grade pneumonitis not excluded.  Clinical correlation. Right lung is clear. Questionable small left   pleural effusion. Aortic calcification. No significant bony abnormalities.    Read per radiology    Laboratory:    CBC: WBC 7.4, HGB 12.5 (L),     CMP:  (L), chloride 88 (L), carbon dioxide >45 (H!!), glucose 139 (H), albumin 2.9 (L), protein total 5.5 (L) o/w WNL (Creatinine 0.57 (L))      Troponin (Collected 2353): <0.015    BNP: 862    Procalcitonin: in process    Symptomatic Influenza A/B & SARS-CoV2 (COVID19) Virus PCR Multiplex: negative     Blood cultures pending x2    Urine culture aerobic bacterial: in process    UA with microscopic: blood large (A), albumin 70 (A), leukocyte esterase small (A), wbc/hpf 60 (H), rbc/hpf >182 (H), mucous present (A) o/w WNL     ISTAT gases lactate venous POCT: pH: 7.33, PCO2: 93 (H), PO2: 46, Bicarbonate: 49, O2 sat venous 74, lactic acid 1.2     Emergency Department Course:    Reviewed:  I reviewed nursing notes, vitals, past medical history and care everywhere    Assessments:  2352 I obtained history and examined the patient as noted above.   0102 I rechecked the patient and explained findings. He is alert and comfortable on BIPAP     Consults:   0118 AM - Discussed the patient with Dr. Tenisha MD, who will admit the patient for further evaluation and treatment.       Interventions:  0141 Solu medrol 125 mg IV  0142 Zosyn 4.5 g IV  0212 Zithromax 500 mg IV    Disposition:  The patient was admitted to the hospital under the care of Dr. Tenisha MD.       Impression & Plan     Medical Decision Making:  Thanh Goodrich is a 80 year old male who presented to the ED with AMS. Hypoxic on arrival to ED requiring mask as still hypoxic on 5-6 L of oxygen. VBG with severe hypercapnia though chronic problem for him. Patient mildly lethargic on arrival to ED but seems improved  On bipap.. Non focal exam and doubt CVA. Improved mentation on bipap and alert. Doubt intra-cranial pathology. Chest xray with  possible pneumonia so gave antibiotics. Report of wheezing by EMS though none in ED. Gave solumedrol given this and chronically on steroids for myasthenia. UA abnormal though from cath specimen. Culture in process. Improved in ED though on bipap so went to ICU. Last hospital visit notes very sensitive to CO2 changes, sleeping/naps, etc causing AMS. Doubt worsening PE as appears to be on anticoagulation per medication list. Discussed with hospitalist for admission.    Critical Care Time: was 30 minutes for this patient excluding procedures    Covid-19  Thanh Goodrich was evaluated during a global COVID-19 pandemic, which necessitated consideration that the patient might be at risk for infection with the SARS-CoV-2 virus that causes COVID-19.   Applicable protocols for evaluation were followed during the patient's care.   COVID-19 was considered as part of the patient's evaluation. The plan for testing is:  a test was obtained during this visit.    Diagnosis:    ICD-10-CM    1. Acute on chronic respiratory failure with hypoxia and hypercapnia (H)  J96.21 Symptomatic SARS-CoV-2 COVID-19 Virus (Coronavirus) by PCR    J96.22    2. Pneumonia of left lower lobe due to infectious organism  J18.9      Scribe Disclosure:  I, Linden Ivy, am serving as a scribe at 11:52 PM on 7/9/2021 to document services personally performed by Dr. Johnny MD, based on my observations and the provider's statements to me.            Suzan Turner MD  07/10/21 [id number]

## 2021-07-10 NOTE — PLAN OF CARE
ICU End of Shift Summary.  For vital signs and complete assessments, please see documentation flowsheets.     Pertinent assessments:   Patient admitted to the ICU after shortness of breath at nursing home. Oxygenation poor, requiring BiPAP to maintain SaO2 >90. BP, temp, RR, normal. Lung sounds coarse, diminished throughout. Bowels hypoactive. Neuro A/O x 2, intermittent confusion - reorientable. Pulses weak but palpable radially and pedally . Chronic alberto with good output, tea colored and malodorous.   Major Shift Events: admission  Plan (Upcoming Events): wean from BiPAP  Discharge/Transfer Needs: improve oxygenation status    Bedside Shift Report Completed : yes  Bedside Safety Check Completed: yes

## 2021-07-10 NOTE — PROVIDER NOTIFICATION
07/10/21 0312   CPAP/BiPAP/Settings   IPAP/EPAP (cmH2O) 16/8   Rate (breaths/min) 18   Oxygen (%) 30     Pt transported to [facility] on BIPAP without incident.RNX1 RTX1. Gecko pad placed on bridge of nose.

## 2021-07-10 NOTE — CONSULTS
Care Management Initial Consult    General Information  Assessment completed with: Care Team Member, (Nicci BHANDARI at [facility])  Type of CM/SW Visit: Initial Assessment    Primary Care Provider verified and updated as needed: Yes   Readmission within the last 30 days: previous discharge plan unsuccessful   Return Category: Exacerbation of disease  Reason for Consult: care coordination/care conference, discharge planning  Advance Care Planning: Advance Care Planning Reviewed: no concerns identified          Communication Assessment  Patient's communication style: spoken language (English or Bilingual)    Hearing Difficulty or Deaf: yes   Wear Glasses or Blind: yes    Cognitive  Cognitive/Neuro/Behavioral: .WDL except, orientation  Level of Consciousness: confused  Arousal Level: opens eyes spontaneously  Orientation: disoriented to, place  Mood/Behavior: calm, cooperative, behavior appropriate to situation  Best Language: 0 - No aphasia  Speech: fluent    Living Environment:   People in home: facility resident     Current living Arrangements: residential facility  Name of Facility: ([facility])   Able to return to prior arrangements: yes       Family/Social Support:  Care provided by: other (see comments)  Provides care for: no one, unable/limited ability to care for self  Marital Status: Single  Other (specify)          Description of Support System:           Current Resources:   Patient receiving home care services: No     Community Resources: None  Equipment currently used at home: lift device, wheelchair, manual  Supplies currently used at home:      Employment/Financial:  Employment Status: retired        Financial Concerns: No concerns identified           Lifestyle & Psychosocial Needs:        Socioeconomic History     Marital status: Single     Spouse name: Not on file     Number of children: Not on file     Years of education: Not on file     Highest education level: Not on file   Occupational History      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 [employer]     Employer: [employer]     Tobacco Use     Smoking status: Never Smoker     Smokeless tobacco: Never Used   Substance and Sexual Activity     Alcohol use: Yes     Alcohol/week: 0.0 standard drinks     Comment: very rarely     Drug use: No     Sexual activity: Not Currently     Partners: Female       Functional Status:  Prior to admission patient needed assistance:   Dependent ADLs:: Ambulation-walker, Bathing, Dressing, Grooming, Positioning, Transfers  Dependent IADLs:: Cleaning, Cooking, Laundry, Shopping, Meal Preparation, Medication Management, Transportation       Mental Health Status:  Mental Health Status: Other (see comment)(unable to assess at this time)       Chemical Dependency Status:  Chemical Dependency Status: No Current Concerns             Values/Beliefs:  Spiritual, Cultural Beliefs, Mosque Practices, Values that affect care: no               Additional Information:  Pt identified as high risk for readmission, URR 35 %. Pt admitted with acute on chronic respiratory failure. Per chart review, pt currently on BiPAP and is confused.  Pt is resident at [facility].  CTS spoke with THONY Villeda at [facility]  p [phone] who confirmed that pt is a resident at LT.  He is a bariatric pt requiring assist of 2 for all transfers. He intermittently will utilize PT/OT services (not compliant).  He uses 2l 02 at baseline and CPAP at HS (not compliant with CPAP). Christiana said she needs to confirm bedhold with family. CM will continue to follow for care coordination/discharge planning.     Tari Street RN, BSN, PHN, CTS  Care Coordinator  [facility]  [phone]      Addendum [phone]  Pt's cousin, Gaby Martinez,listed as emergency contact called.  She said she is his health care agent, but our records do not indicate that.  She emailed me a legal document that I forwarded to honoring [employer] team, requesting that they contact Gaby if that document does not meet  criteria to confirm her health care agent status.  Gaby confirmed she authorized a bedhold at [facility].

## 2021-07-10 NOTE — ED NOTES
DATE:  7/10/2021   TIME OF RECEIPT FROM LAB:  0045  LAB TEST:  CO2  LAB VALUE:  >45  RESULTS GIVEN WITH READ-BACK TO (PROVIDER):  Dr Turner  TIME LAB VALUE REPORTED TO PROVIDER:   0046

## 2021-07-10 NOTE — ED NOTES
[facility]  ED Nurse Handoff Report    Thanh Goodrich is a 80 year old male   ED Chief complaint: Altered Mental Status  . ED Diagnosis:   Final diagnoses:   None     Allergies:   Allergies   Allergen Reactions     Ciprofloxacin Other (See Comments)     Contraindicated for myasthenia gravis patients     Procainamide Other (See Comments)     Contraindicated for myasthenia gravis patients     Quinidine Other (See Comments)     Contraindicated for myasthenia gravis patients     Quinine Other (See Comments)     Contraindicated for myasthenia gravis patients       Code Status: Full Code  Activity level - Baseline/Home:  Total Care. Activity Level - Current:   Total Care. Lift room needed: Yes. Bariatric: No   Needed: No   Isolation: No. Infection: Not Applicable.     Vital Signs:   Vitals:    07/10/21 0015 07/10/21 0030 07/10/21 0033 07/10/21 0045   BP: 103/72 112/81     Pulse: 81 79  77   Resp: 25 24  28   Temp:   97.8  F (36.6  C)    TempSrc:   Axillary    SpO2: 94% 94%  95%       Cardiac Rhythm:  ,      Pain level:    Patient confused: Yes. Patient Falls Risk: Yes.   Elimination Status: Has voided (Indwelling cath)  Patient Report - Initial Complaint: SOB/Altered mental status. Focused Assessment:  Thanh Goodrich is an anticoagulated 80 year old male with history of asthma, hyperlipidemia, hypertension, and PE, who presents with an altered mental status. Per EMS, staff at [facility], where the patient lives, noticed increased shortness of breath, altered mental status, and pale skin coloration. His oxygen saturation was in the mid 80's this evening. Staff at his living facility administered an albuterol nebulizer. Ems also gave him a nebulizer upon arrival. At baseline, the patient is able to stand up and hold a conversation. He was last normal two hours ago at around 2200. En route to the ED, vitals were stable. He still seemed confused, but was able to state what city he was  located in. Here in the ED, the patient denies chest pain and abdominal pain.      Tests Performed: Labs, UA. Abnormal Results:  Labs Ordered and Resulted from Time of ED Arrival Up to the Time of Departure from the ED   CBC WITH PLATELETS DIFFERENTIAL - Abnormal; Notable for the following components:       Result Value    RBC Count 4.02 (*)     Hemoglobin 12.5 (*)     Hematocrit 39.9 (*)     MCHC 31.3 (*)     All other components within normal limits   COMPREHENSIVE METABOLIC PANEL - Abnormal; Notable for the following components:    Sodium 130 (*)     Chloride 88 (*)     Carbon Dioxide >45 (*)     Glucose 139 (*)     Creatinine 0.57 (*)     Albumin 2.9 (*)     Protein Total 5.5 (*)     All other components within normal limits   ISTAT  GASES LACTATE BOBBY POCT - Abnormal; Notable for the following components:    PCO2 Venous 93 (*)     Bicarbonate Venous 49 (*)     All other components within normal limits   TROPONIN I   NT PROBNP INPATIENT   ROUTINE UA WITH MICROSCOPIC   SARS-COV-2 (COVID-19) VIRUS RT-PCR   BLOOD CULTURE   BLOOD CULTURE   URINE CULTURE AEROBIC BACTERIAL      XR Chest Port 1 View   Final Result   IMPRESSION: Normal heart size and pulmonary vascularity allowing for shallow inspiration. Mild patchy infiltrate or atelectasis left lung base with low-grade pneumonitis not excluded. Clinical correlation. Right lung is clear. Questionable small left    pleural effusion. Aortic calcification. No significant bony abnormalities.       Treatments provided: See MAR  Family Comments: Family not present in ED  OBS brochure/video discussed/provided to patient:  N/A  ED Medications: Medications - No data to display  Drips infusing:  No  For the majority of the shift, the patient's behavior Green. Interventions performed were N/A.    Sepsis treatment initiated: No     Patient tested for COVID 19 prior to admission: YES    ED Nurse Name/Phone Number: Nilam Zuniga RN,   12:49 AM

## 2021-07-10 NOTE — PROGRESS NOTES
[facility] Intensive Care Progress Note    Date of Service (when I saw the patient): 07/10/2021     Assessment & Plan   Thanh Goodrich is a 80 year old male who was admitted on 7/9/2021 with acute on chronic respiratory failure, possible pneumonia.     Neurology:  myasthenia gravis on cell cept with episode of respiratory failure 3 mo ago requiring intubation and PLEX and steroids. On mestinon 60 tid. Not clear from notes but might have never tapered the post-discharge prednisone 60 daily.  But has other causes of hypercapnia including obesity and EDDIE. Patient thinks his muscle strength is about the same.  Non ambulatory at baseline.    P: continue cell cept and mestinon   Decrease solumedrol to 40 mg daily until can take po then decrease to 30 mg prednisone     Cardiovascular:  H/o a  Fib  On xarelto also for PE.    BNP ok   -    Pulmonary:       Acute on chronic hypercapneic respiratory failure needing bipap--18/6  40% gives  and RR 20.  H/o severe hypercapnia up to 120 needing intubation. Possible LLL  pneumonia with small lung volumes bilateral. Also severe obesity.  No PFTs done.  I don't think this is a COPD exacerbation requiring high doses of steroids  P: intermittent bipap to maintain VBG pH > 7.25  Antibiotics  Decrease solumedrol to 40 mg daily until can take po     -    FiO2 (%): 40 %  Resp: 18      Renal:  Chronic alberto with pyuria.  Cr low seconday to malnutrition and low muscle mass.   -    ID:       Possible pneumonia, UTI versus colonization.  [facility] resident.    P: zosyn and azithro   Check procalcitonin   -    GI  -    Nutrition : takes oral but low albumin   -    Endocrine:  Glucoses ok   -    Heme/Onc: normal WBC   -    DVT Prophylaxis: xarelto   GI Prophylaxis: Not indicated    Restraints: Restraints for medical healing needed: NO    Family update by me today: No    Hernandez Connors    Time Spent on this Encounter   Billing:  I spent 35   minutes bedside and on the inpatient unit today managing the critical care of Thanh Goodrich in relation to the issues listed in this note.    Main Plans for Today   Bipap  Antibiotics  Mestinon      Interval History   Admitted from ED with respiratory failure     Physical Exam   Temp: 96.6  F (35.9  C) Temp src: Temporal Temp  Min: 96.6  F (35.9  C)  Max: 97.8  F (36.6  C) BP: 127/59 Pulse: 71   Resp: 18 SpO2: 92 % O2 Device: BiPAP/CPAP    Vitals:    07/10/21 0312   Weight: 123.3 kg (271 lb 13.2 oz)     I/O last 3 completed shifts:  In: -   Out: 505 [Urine:505]    Neurologic:  Opens eyes to voice and follows commands. Can lift head off bed,, keep eyes open , lift arms off bed.    Cardiovascular:  RR   Respiratory: tolerating bipap, lungs clear   GI:  Central obesity, chronic alberto with irritation, draining cloudy urine   Skin/Extremities: muslce atrophy, 1+ edema LE   Lines: No erythema or discharge at entry site for No invasive lines  Current lines are required for patient management    Medications     - MEDICATION INSTRUCTIONS -         azithromycin  250 mg Oral QPM     furosemide  40 mg Oral Daily     insulin aspart  1-6 Units Subcutaneous Q4H     ipratropium - albuterol 0.5 mg/2.5 mg/3 mL  3 mL Nebulization 4x daily     methylPREDNISolone  62.5 mg Intravenous Q12H     metoprolol tartrate  12.5 mg Oral BID     miconazole with skin protectant   Topical BID     mycophenolate  1,000 mg Oral BID     piperacillin-tazobactam  3.375 g Intravenous Q6H     potassium chloride ER  20 mEq Oral Daily     [Held by provider] predniSONE  60 mg Oral Daily     pyridostigmine  60 mg Oral TID     rivaroxaban ANTICOAGULANT  20 mg Oral Daily with supper       Data   Recent Labs   Lab 07/09/21  2353   WBC 7.4   HGB 12.5*   MCV 99      *   POTASSIUM 4.2   CHLORIDE 88*   CO2 >45*   BUN 14   CR 0.57*   ANIONGAP Not Calculated   NOAH 8.8   *   ALBUMIN 2.9*   PROTTOTAL 5.5*   BILITOTAL 0.8   ALKPHOS 51   ALT 52   AST 30    TROPI 0.015     Recent Results (from the past 24 hour(s))   XR Chest Port 1 View    Narrative    EXAM: XR CHEST PORT 1 VIEW  LOCATION: [facility]  DATE/TIME: 7/10/2021 12:10 AM    INDICATION: Altered mental status.  COMPARISON: 6/9/2021.      Impression    IMPRESSION: Normal heart size and pulmonary vascularity allowing for shallow inspiration. Mild patchy infiltrate or atelectasis left lung base with low-grade pneumonitis not excluded. Clinical correlation. Right lung is clear. Questionable small left   pleural effusion. Aortic calcification. No significant bony abnormalities.

## 2021-07-10 NOTE — ED TRIAGE NOTES
Pt to ER with c/o SOB altered mental status, staff at [facility] was concerned for his pale look about him , sats were initially in the 80s staff gave alb neb, EMS gave neb as well pt more responsive

## 2021-07-10 NOTE — PHARMACY-ADMISSION MEDICATION HISTORY
Admission medication history interview status for this patient is complete. See [facility] admission navigator for allergy information, prior to admission medications and immunization status.     Medication history interview done, indicate source(s): [facility] [phone] MAR  Medication history resources (including written lists, pill bottles, clinic record):[facility] [employer], MAR  Pharmacy:     Changes made to PTA medication list:  Added: spironolactone, melatonin, nystatin powder  Deleted: potassium  Changed: guaifenesin 1200 mg bid prn ---> 600 mg bid prn; xarelto 15 mg bid x18 then 20 mg daily ---> 20 mg daily    Actions taken by pharmacist (provider contacted, etc):Used MAR from [facility] to verify med list.     Additional medication history information:None    Medication reconciliation/reorder completed by provider prior to medication history?  Y   (Y/N)     For patients on insulin therapy: N     Prior to Admission medications    Medication Sig Last Dose Taking? Auth Provider   albuterol (PROVENTIL) (2.5 MG/3ML) 0.083% neb solution Take 1 vial (2.5 mg) by nebulization 4 times daily as needed for shortness of breath / dyspnea or wheezing 7/10/2021 at 0058 Yes Sara Chadwick APRN CNP   calcium carbonate 600 mg-vitamin D 400 units (CALTRATE) 600-400 MG-UNIT per tablet Take 1 tablet by mouth daily 7/9/2021 at am Yes Unknown, Entered By History   Cholecalciferol 100 MCG (4000 UT) CAPS Take 4,000 Units by mouth daily 7/9/2021 at am Yes Unknown, Entered By History   furosemide (LASIX) 20 MG tablet Take 2 tablets (40 mg) by mouth daily 7/9/2021 at 0800 Yes Sara Chadwick APRN CNP   melatonin 3 MG tablet Take 3 mg by mouth At Bedtime 7/9/2021 at 2000 Yes Unknown, Entered By History   metoprolol tartrate (LOPRESSOR) 25 MG tablet Take 0.5 tablets (12.5 mg) by mouth 2 times daily 7/9/2021 at 2000 Yes David Mcclain, DO   Multiple Vitamins-Minerals (MULTIVITAMIN ADULTS 50+) TABS Take 1  tablet by mouth daily  7/9/2021 at am Yes Reported, Patient   mycophenolate (GENERIC EQUIVALENT) 500 MG tablet Take 2 tablets (1,000 mg) by mouth 2 times daily 7/9/2021 at 2000 Yes Darius Hopson MD   nystatin (MYCOSTATIN) 795834 UNIT/GM external powder Apply to abdominal folds topically 2 times daily. 7/9/2021 at pm Yes Unknown, Entered By History   predniSONE (DELTASONE) 20 MG tablet Take 3 tablets (60 mg) by mouth daily 7/9/2021 at 0800 Yes Darius Hopson MD   pyridostigmine (MESTINON) 60 MG tablet Take 1 tablet (60 mg) by mouth 3 times daily  Patient taking differently: Take 60 mg by mouth 3 times daily 0800, 1200, 2000 7/9/2021 at 2000 Yes Darius Hopson MD   rivaroxaban ANTICOAGULANT (XARELTO) 20 MG TABS tablet Take 20 mg by mouth every morning 7/9/2021 at 0800 Yes Unknown, Entered By History   sertraline (ZOLOFT) 25 MG tablet Take 1 tablet (25 mg) by mouth daily 7/9/2021 at 0800 Yes Sara Chadwick APRN CNP   spironolactone (ALDACTONE) 25 MG tablet Take 1 tablet (25 mg) by mouth daily 7/9/2021 at 0800 Yes Sara Chadwick APRN CNP   sulfamethoxazole-trimethoprim (BACTRIM) 400-80 MG tablet Take 1 tablet by mouth daily 7/9/2021 at 0800 Yes Sara Chadwick APRN CNP   acetaminophen (TYLENOL) 325 MG tablet Take 650 mg by mouth every 4 hours as needed for mild pain More than a month at Unknown time  Unknown, Entered By History   guaiFENesin (MUCINEX) 600 MG 12 hr tablet Take 600 mg by mouth 2 times daily as needed for congestion More than a month at Unknown time  Unknown, Entered By History

## 2021-07-10 NOTE — PROGRESS NOTES
[facility]  Hospitalist Progress Note  Tere Stern MD 07/10/21  Text Page  Pager: [phone] (7am-6pm)    Reason for Stay (Diagnosis): acute on chronic hypoxic and hypercarbic respiratory failure         Assessment and Plan:      Summary of Stay: Thanh Goodrich is a 80 year old male with past medical history of myasthenia gravis, EDDIE (non compliant with CPAP), chronic hypoxic respiratory failure (2 L supplemental oxygen at baseline), chronic alberto catheter, recent PE (6/2021) on Xarelto, chronic diastolic CHF who resides in LTC who was admitted on 7/9/2021 with somnolence and hypoxia and was found to have acute on chronic hypoxic and hypercapnic respiratory failure requiring BiPAP therapy.  Transitioned to nasal cannula this morning.  Can transfer out of ICU later today as long as he remains stable.    Problem List/Assessment and Plan:     Acute on Chronic Hypoxic and Hypercarbic Respiratory Failure: at baseline on 2L oxygen.  He does have EDDIE but non compliant with BiPAP.  Also has underlying obesity hypoventilation syndrome.  He was hospitalized in 3/2021 as below for Myasthenia flares which required intubation as well as IVIG and steroids.  Currently does not feel that he is having a flare.  VBG showed CO2 of 93 on admission (has been up to 120 in the past).  CXR showed mild patchy infiltrates vs atelectasis of the left lung base.  - continue BiPAP therapy, wean to supplemental oxygen as able  - Needs to wear BiPAP at night  - Continue Azithromycin and Zosyn  - Check Procalcitonin  - Received solumedrol 40 mg IV this morning, see below for Prednisone dosing due to Myasthenia Gravis    Possible PNA: Presented with respiratory failure as above.  CXR with mild infiltrate.  No fever or leukocytosis.  On Azithromycin and Zosyn. Procalcitonin pending.    Chronic Diastolic CHF: Started on Spironolactone 25 mg QD on 7/7 in addition to Lasix 40 mg QD. Last TTE (3/2021) with EF 55-60% and no RWMA with  mildly decreased RV systolic function and grade I diastolic dysfunction.  BNP and troponin not elevated.  Weight currently 271 pounds, weight was 279 lbs on 7/7. No evidence of acute exacerbation.  - Continue PTA Lasix, Spironolactone, Metoprolol    Myasthenia Gravis: Diagnosed in 2005.  Hospitalized 3/2021 for flare associated with respiratory failure requiring intubation, treated with IVIG and steroids.  Discharged to TCU and again hospitalized in 3/2021 for respiratory failure and weakness leading to intubation, again treated with IVIG and Prednisone.  Had been on Cellcept and was started on Pyridostigmine on 3/26 as well.  He does not feel that he is having an exacerbation of his myasthenia.  At his hospital discharge on 4/2021 he was on Prednisone 60 mg QD with plans to wean this down when he followed up with Neurology.  Does not appear to have done this, was still on 60 mg QD.  - Continue Mestinon, Cellcept  - Decrease Prednisone to 30 mg QD starting tomorrow  - continue Bactrim for prophylaxis  - needs to follow up with Neurology in the outpatient setting    History of PE: Diagnosed with PE in 6/2021. Continue Xarelto.    History of Prostate Cancer with Chronic Alberto Catheter with Possible UTI: Underwent cryotherapy.  Follows with Dr. Charles with Urology.  Reportedly has alberto catheter due to difficulty voiding due to large pannus. UA with hematuria and 60 WBC.  Culture pending.  On antibiotics as above. Certainly could have colonization with his chronic alberto.    Morbid Obesity: Complicates cares.    Depression: Continue PTA Zoloft.    Diet: Combination Diet Regular Diet Adult; 2 gm NA Diet    DVT Prophylaxis: Xarelto  Alberto Catheter: PRESENT, indication: Strict 1-2 Hour I&O chronic alberto  Code Status: Full Code      Disposition Plan   Expected discharge: 2-3 days, recommended to prior living arrangement once respiratory status improved.  Entered: Tere Stern MD 07/10/2021, 8:38 AM       The patient's  "care was discussed with the Bedside Nurse, Care Coordinator/ and Patient.    Hospitalist Service  [facility]          Interval History (Subjective):      Patient was discussed with his bedside nurse this morning. He remains on BiPAP.  He is awake and able to talk to me.  Denies CP, nausea, emesis, abdominal pain.  He has not recently had fevers, chills or cough.  He feels that the BiPAP is helping his breathing.  He does feel tired today.                  Physical Exam:      Last Vital Signs:  /59   Pulse 71   Temp 96.6  F (35.9  C) (Temporal)   Resp 18   Ht 1.803 m (5' 11\")   Wt 123.3 kg (271 lb 13.2 oz)   SpO2 92%   BMI 37.91 kg/m      General: Alert, awake, no acute distress.  HEENT: Normocephalic and atraumatic, eyes anicteric and without scleral injection, EOMI, face symmetric, MMM.  Cardiac: RRR, normal S1, S2. No m/g/r, 2+ pitting LE edema.  Pulmonary: Normal chest rise, increased work of breathing currently with BiPAP in place. Decreased breath sounds diffusely, no wheezing or crackles  Abdomen: soft, non-tender, non-distended.  Normoactive bowel sounds, no guarding or rebound tenderness.  Extremities: no deformities.  Warm, well perfused. Poor muscle tone of lower extremities with contractures  Skin: Chronic venous stasis changes bilaterally.  Warm and Dry.  Neuro: No focal deficits.  Speech clear.  Diffusely weak (baseline, uses lift at baseline)  Psych: Alert and oriented x3. Appropriate affect.         Medications:      All current medications were reviewed with changes reflected in problem list.         Data:      All new lab and imaging data was reviewed.   Labs:  Recent Labs   Lab 07/09/21  2353   WBC 7.4   HGB 12.5*   HCT 39.9*   MCV 99        Recent Labs   Lab 07/09/21  2353   *   POTASSIUM 4.2   CHLORIDE 88*   CO2 >45*   ANIONGAP Not Calculated   *   BUN 14   CR 0.57*   GFRESTIMATED >90   GFRESTBLACK >90   NOAH 8.8   PROTTOTAL 5.5* "   ALBUMIN 2.9*   BILITOTAL 0.8   ALKPHOS 51   AST 30   ALT 52      Imaging:   Recent Results (from the past 24 hour(s))   XR Chest Port 1 View    Narrative    EXAM: XR CHEST PORT 1 VIEW  LOCATION: [facility]  DATE/TIME: 7/10/2021 12:10 AM    INDICATION: Altered mental status.  COMPARISON: 6/9/2021.      Impression    IMPRESSION: Normal heart size and pulmonary vascularity allowing for shallow inspiration. Mild patchy infiltrate or atelectasis left lung base with low-grade pneumonitis not excluded. Clinical correlation. Right lung is clear. Questionable small left   pleural effusion. Aortic calcification. No significant bony abnormalities.       Tere Stern MD

## 2021-07-10 NOTE — H&P
[facility]  History and Physical   Hospitalist Service    Ren Steven MD    Thanh Goodrich MRN# [id number]   YOB: 1940 Age: 80 year old      Date of Admission:  7/9/2021           Assessment and Plan:   Summary:  Thanh Goodrich is a 80-year-old male with history of myasthenia gravis, atrial fibrillation, chronic anticoagulation with Xarelto, obesity, asthma, peripheral edema, cellulitis, leg ulcer, obstructive sleep apnea (noncompliant with CPAP), chronic respiratory failure on supplemental oxygen (1 to 2 L/min), atypical Mycobacterium infection, depression, hypertension, dyslipidemia, osteoporosis, and prostate cancer. He presented to the [facility] emergency department from [facility] for evaluation of altered mental status and hypoxia. Review of recent nursing home notes shows that he has been noncompliant with CPAP and often ends up in respiratory distress. When paramedics arrived he was noted to be hypoxic with oxygen saturations in the low to mid 80% range. Emergency department evaluation showed hypoxia with need for BiPAP. He was afebrile and vital signs were otherwise unremarkable. CBC was unremarkable. Basic metabolic panel showed sodium of 130 and bicarb greater than 45 but was otherwise unremarkable. Liver function tests, lactic acid, BNP, troponin, and testing for COVID-19 was negative. Urinalysis showed 60 white blood cells, large blood, small leukocyte esterase, greater than 182 red blood cells. Procalcitonin is pending. Urine and blood cultures are pending. Chest x-ray suggested patchy left lower lobe infiltrate. Thanh was given Zosyn and Zithromax as well as a nebulized breathing treatment. He was kept on BiPAP. I was asked to admit him to the intensive care unit with acute on chronic respiratory failure with hypoxia and hypercapnia due to pneumonia.    Problem list:    Acute on chronic hypoxic and hypercapnic respiratory  failure  Asthma with acute exacerbation  Obesity  Obstructive sleep apnea, noncompliant with CPAP  Left lower lung pneumonia  Possible urinary tract infection (UA equivocal)  -Admit as inpatient to the ICU  -Continue BiPAP. Keep n.p.o. for now but likely will be able to come off and eat this morning  -Once no longer needing BiPAP will still need CPAP with sleep  -DuoNebs 4 times daily and albuterol nebs every 2 hours as needed  -Continue Zosyn and Zithromax  -Continue Solu-Medrol 60 mg IV every 12 hours  -I have ordered Novolog sliding scale insulin in case it is needed  -Repeat labs tomorrow morning  -Follow blood and urine cultures    Myasthenia gravis, without obvious acute exacerbation  -Continue prior to admission prednisone, Mestinon, and mycophenolate    Obesity  Chronic edema  Chronic ulcers on leg  -Continue prior to admission Lasix and potassium supplement    Hypertension  -Continue prior to admission Lasix and metoprolol    Atrial fibrillation  -Continue prior to admission metoprolol and Xarelto    Prostate cancer    CODE STATUS: Full code  DVT prophylaxis: Prior to admission Xarelto  Disposition: Admit as inpatient to ICU         Code Status:   Full Code         Primary Care Physician:   Sara Chadwick [phone]         Chief Complaint:   Altered mental status and hypoxia    History is obtained from Dr. Johnny Law, and the medical record         History of Present Illness:   Thanh BRICE Goodrich is a 80-year-old male with history of myasthenia gravis, atrial fibrillation, chronic anticoagulation with Xarelto, obesity, asthma, peripheral edema, cellulitis, leg ulcer, obstructive sleep apnea (noncompliant with CPAP), chronic respiratory failure on supplemental oxygen (1 to 2 L/min), atypical Mycobacterium infection, depression, hypertension, dyslipidemia, osteoporosis, and prostate cancer. He presented to the [facility] emergency department from [facility] for  evaluation of altered mental status and hypoxia. Review of recent nursing home notes shows that he has been noncompliant with CPAP and often ends up in respiratory distress. When paramedics arrived he was noted to be hypoxic with oxygen saturations in the low to mid 80% range. Emergency department evaluation showed hypoxia with need for BiPAP. He was afebrile and vital signs were otherwise unremarkable. CBC was unremarkable. Basic metabolic panel showed sodium of 130 and bicarb greater than 45 but was otherwise unremarkable. Liver function tests, lactic acid, BNP, troponin, and testing for COVID-19 was negative. Urinalysis showed 60 white blood cells, large blood, small leukocyte esterase, greater than 182 red blood cells. Procalcitonin is pending. Urine and blood cultures are pending. Chest x-ray suggested patchy left lower lobe infiltrate. Thanh was given Zosyn and Zithromax as well as a nebulized breathing treatment. He was kept on BiPAP. I was asked to admit him to the intensive care unit with acute on chronic respiratory failure with hypoxia and hypercapnia due to pneumonia.           Past Medical History:     Patient Active Problem List   Diagnosis     Myasthenic crisis (H)     MALIGN NEOPL PROSTATE-3/07     Osteoporosis     Hyperlipidemia LDL goal <130     PVC's (premature ventricular contractions)     Atypical mycobacterial infection     Pain of left scapula     Dilated aortic root (H)     History of mycobacterial infection     Benign essential hypertension     Morbid obesity -- BMI 40.6     Metabolic encephalopathy     Abnormal urinalysis     Acute on chronic respiratory failure with hypercapnia (H)     Acute on chronic respiratory failure with hypoxia and hypercapnia (H)     Pulmonary embolism (H)     Pneumonia of left lower lobe due to infectious organism     Asthma exacerbation      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     Cellulitis of left leg  9/23/2012     Depressive disorder 2020     Edema 7/7/2009     History of steroid therapy 2/22/2010     Hyperlipidemia LDL goal <130 10/31/2010     HYPERTENSION 7/8/2003     Incontinence of urine 10/25/2010     Leg ulcer (H) 9/20/2012     MALIGN NEOPL PROSTATE-3/07 4/2/2007    T1C, Girard 8, initial PSA 39, s/p radiation seed implants 2007      Myasthenia gravis (H)      OBESITY 7/8/2003     Osteoporosis 8/18/2009    Refused bisphosphonates 2011      PVC's (premature ventricular contractions) 11/23/2011     RIGHT OCCIPITAL PAIN 7/8/2003     ROTATOR CUFF SYND NOS- RT 9/19/2005     Skin nodule 3/18/2013     ulcer left shin 10/8/2007     Uncomplicated asthma 1944?    childhood only             Past Surgical History:     Past Surgical History:   Procedure Laterality Date     BIOPSY  2013?    dealt with     IR CVC TUNNEL PLACEMENT > 5 YRS OF AGE  3/31/2021     IR CVC TUNNEL REMOVAL RIGHT  4/13/2021     Prostate Cancer Cryotherapy  2007     TONSILLECTOMY  1945            Home Medications:     Prior to Admission medications    Medication Sig Last Dose Taking? Auth Provider   acetaminophen (TYLENOL) 325 MG tablet Take 650 mg by mouth every 4 hours as needed for mild pain   Unknown, Entered By History   albuterol (PROVENTIL) (2.5 MG/3ML) 0.083% neb solution Take 1 vial (2.5 mg) by nebulization 4 times daily as needed for shortness of breath / dyspnea or wheezing   Sara Chadwick APRN CNP   calcium carbonate 600 mg-vitamin D 400 units (CALTRATE) 600-400 MG-UNIT per tablet Take 1 tablet by mouth daily   Unknown, Entered By History   Cholecalciferol 100 MCG (4000 UT) CAPS Take 4,000 Units by mouth daily   Unknown, Entered By History   furosemide (LASIX) 20 MG tablet Take 2 tablets (40 mg) by mouth daily   Sara Chadwick APRN CNP   guaiFENesin (MUCINEX) 600 MG 12 hr tablet Take 2 tablets (1,200 mg) by mouth 2 times daily as needed for congestion   Sara Chadwick APRN CNP   metoprolol tartrate (LOPRESSOR) 25 MG  tablet Take 0.5 tablets (12.5 mg) by mouth 2 times daily   David Mcclain, DO   Multiple Vitamins-Minerals (MULTIVITAMIN ADULTS 50+) TABS Take 1 tablet by mouth daily    Reported, Patient   mycophenolate (GENERIC EQUIVALENT) 500 MG tablet Take 2 tablets (1,000 mg) by mouth 2 times daily   Darius Hopson MD   potassium chloride ER (KLOR-CON M) 10 MEQ CR tablet Take 4 tablets (40 mEq) by mouth daily   Sara Chadwick APRN CNP   predniSONE (DELTASONE) 20 MG tablet Take 3 tablets (60 mg) by mouth daily   Darius Hopson MD   pyridostigmine (MESTINON) 60 MG tablet Take 1 tablet (60 mg) by mouth 3 times daily  Patient taking differently: Take 60 mg by mouth 3 times daily 0800, 1200, 2000   Darius Hopson MD   rivaroxaban ANTICOAGULANT (XARELTO) 20 MG TABS tablet 15 mg twice daily with food for 18 days, then 20 mg once daily with food (NOTE: will need 36, 15 mg tablets the first time this is filled)   Timothy Claire MD   sertraline (ZOLOFT) 25 MG tablet Take 1 tablet (25 mg) by mouth daily   Sara Chadwick APRN CNP   sulfamethoxazole-trimethoprim (BACTRIM) 400-80 MG tablet Take 1 tablet by mouth daily   Sara Chadwick APRN CNP            Allergies:     Allergies   Allergen Reactions     Ciprofloxacin Other (See Comments)     Contraindicated for myasthenia gravis patients     Procainamide Other (See Comments)     Contraindicated for myasthenia gravis patients     Quinidine Other (See Comments)     Contraindicated for myasthenia gravis patients     Quinine Other (See Comments)     Contraindicated for myasthenia gravis patients            Social History:     Social History     Tobacco Use     Smoking status: Never Smoker     Smokeless tobacco: Never Used   Substance Use Topics     Alcohol use: Yes     Alcohol/week: 0.0 standard drinks     Comment: very rarely             Family History:     Family History   Problem Relation Age of Onset     Hypertension Mother       Depression Mother      Substance Abuse Mother         alcohol     Hypertension Father      Cancer Father         Prostate     Prostate Cancer Father      Substance Abuse Father         alcohol     Obesity Father      Diabetes Maternal Grandmother      C.A.D. Maternal Grandmother      Cerebrovascular Disease Maternal Grandfather      Cancer Paternal Uncle         Prostate     Prostate Cancer Other               Review of Systems:   The 10 point Review of Systems is negative other than as noted in the HPI.           Physical Exam:   Blood pressure 103/55, pulse 72, temperature 97.8  F (36.6  C), temperature source Axillary, resp. rate 23, SpO2 94 %.  0 lbs 0 oz      GENERAL: Pleasant and cooperative. No acute distress on BIPAP.  EYES: Pupils equal and round. No scleral erythema or icterus.  ENT: External ears are normal without deformity. Posterior oropharynx is without erythem, swelling, or exudate.  NECK: Supple. No masses or swelling. No tenderness. Thyroid is normal without mass or tenderness.  CHEST: Clear to auscultation. Normal breath sounds. No retractions.   CV: Regular rate and rhythm. No JVD. Pulses normal.  ABDOMEN: Bowel sounds present. No tenderness. No masses or hernia.  EXTREMETIES: No clubbing, cyanosis, or ischemia.  SKIN: Warm and dry to touch. No wounds or rashes.  NEUROLOGIC: Strength and sensation are normal. Deep tendon reflexes are normal. Cranial nerves are normal.             Data:   All new lab and imaging data was reviewed.     Results for orders placed or performed during the hospital encounter of 07/09/21 (from the past 24 hour(s))   CBC with platelets differential   Result Value Ref Range    WBC 7.4 4.0 - 11.0 10e9/L    RBC Count 4.02 (L) 4.4 - 5.9 10e12/L    Hemoglobin 12.5 (L) 13.3 - 17.7 g/dL    Hematocrit 39.9 (L) 40.0 - 53.0 %    MCV 99 78 - 100 fl    MCH 31.1 26.5 - 33.0 pg    MCHC 31.3 (L) 31.5 - 36.5 g/dL    RDW 13.3 10.0 - 15.0 %    Platelet Count 213 150 - 450 10e9/L    Diff  Method Automated Method     % Neutrophils 67.6 %    % Lymphocytes 23.4 %    % Monocytes 7.6 %    % Eosinophils 0.1 %    % Basophils 0.1 %    % Immature Granulocytes 1.2 %    Nucleated RBCs 0 0 /100    Absolute Neutrophil 5.0 1.6 - 8.3 10e9/L    Absolute Lymphocytes 1.7 0.8 - 5.3 10e9/L    Absolute Monocytes 0.6 0.0 - 1.3 10e9/L    Absolute Eosinophils 0.0 0.0 - 0.7 10e9/L    Absolute Basophils 0.0 0.0 - 0.2 10e9/L    Abs Immature Granulocytes 0.1 0 - 0.4 10e9/L    Absolute Nucleated RBC 0.0    Comprehensive metabolic panel   Result Value Ref Range    Sodium 130 (L) 133 - 144 mmol/L    Potassium 4.2 3.4 - 5.3 mmol/L    Chloride 88 (L) 94 - 109 mmol/L    Carbon Dioxide >45 (HH) 20 - 32 mmol/L    Anion Gap Not Calculated 3 - 14 mmol/L    Glucose 139 (H) 70 - 99 mg/dL    Urea Nitrogen 14 7 - 30 mg/dL    Creatinine 0.57 (L) 0.66 - 1.25 mg/dL    GFR Estimate >90 >60 mL/min/[1.73_m2]    GFR Estimate If Black >90 >60 mL/min/[1.73_m2]    Calcium 8.8 8.5 - 10.1 mg/dL    Bilirubin Total 0.8 0.2 - 1.3 mg/dL    Albumin 2.9 (L) 3.4 - 5.0 g/dL    Protein Total 5.5 (L) 6.8 - 8.8 g/dL    Alkaline Phosphatase 51 40 - 150 U/L    ALT 52 0 - 70 U/L    AST 30 0 - 45 U/L   Troponin I (now)   Result Value Ref Range    Troponin I ES 0.015 0.000 - 0.045 ug/L   BNP   Result Value Ref Range    N-Terminal Pro BNP Inpatient 862 0 - 1,800 pg/mL   ISTAT gases lactate darin POCT   Result Value Ref Range    Ph Venous 7.33 7.32 - 7.43 pH    PCO2 Venous 93 (HH) 40 - 50 mm Hg    PO2 Venous 46 25 - 47 mm Hg    Bicarbonate Venous 49 (HH) 21 - 28 mmol/L    O2 Sat Venous 74 %    Lactic Acid 1.2 0.7 - 2.1 mmol/L   XR Chest Port 1 View    Narrative    EXAM: XR CHEST PORT 1 VIEW  LOCATION: [facility]  DATE/TIME: 7/10/2021 12:10 AM    INDICATION: Altered mental status.  COMPARISON: 6/9/2021.      Impression    IMPRESSION: Normal heart size and pulmonary vascularity allowing for shallow inspiration. Mild patchy infiltrate or atelectasis left lung  base with low-grade pneumonitis not excluded. Clinical correlation. Right lung is clear. Questionable small left   pleural effusion. Aortic calcification. No significant bony abnormalities.   EKG 12-lead, tracing only   Result Value Ref Range    Interpretation ECG Click View Image link to view waveform and result    Blood culture    Specimen: Left Arm   Result Value Ref Range    Specimen Description Left Arm     Culture Micro No growth after 2 hours    Blood culture    Specimen: Blood   Result Value Ref Range    Specimen Description Blood     Special Requests Left Hand     Culture Micro No growth after 2 hours    Routine UA with microscopic   Result Value Ref Range    Color Urine Yellow     Appearance Urine Cloudy     Glucose Urine Negative NEG^Negative mg/dL    Bilirubin Urine Negative NEG^Negative    Ketones Urine Negative NEG^Negative mg/dL    Specific Gravity Urine 1.021 1.003 - 1.035    Blood Urine Large (A) NEG^Negative    pH Urine 7.0 5.0 - 7.0 pH    Protein Albumin Urine 70 (A) NEG^Negative mg/dL    Urobilinogen mg/dL Normal 0.0 - 2.0 mg/dL    Nitrite Urine Negative NEG^Negative    Leukocyte Esterase Urine Small (A) NEG^Negative    Source Catheterized Urine     WBC Urine 60 (H) 0 - 5 /HPF    RBC Urine >182 (H) 0 - 2 /HPF    Squamous Epithelial /HPF Urine 1 0 - 1 /HPF    Mucous Urine Present (A) NEG^Negative /LPF   Symptomatic SARS-CoV-2 COVID-19 Virus (Coronavirus) by PCR    Specimen: Nasopharyngeal   Result Value Ref Range    SARS-CoV-2 Virus Specimen Source Nasopharyngeal     SARS-CoV-2 PCR Result NEGATIVE     SARS-CoV-2 PCR Comment (Note)

## 2021-07-11 NOTE — PLAN OF CARE
ICU End of Shift Summary.  For vital signs and complete assessments, please see documentation flowsheets.     Pertinent assessments:   Patient vitally stable this shift. Alert and oriented, improved from time of admission. Respirations equal bilaterally, clear, and diminished in the bases. Patient dyspneic with exertion, but tolerated nasal cannula well during the day and resumed bipap at bedtime. Urine continues to be light brown, sedimentary and malodorous.   Major Shift Events: refused repositioning overnight despite repeated attempts/education encounters.  Turned at 6am      Informed Tele-hub of critical CO2 and HCO3, no change  Plan (Upcoming Events): wean from bipap, address infective processes, improve mobility and self-care/ADLs  Discharge/Transfer Needs: improve oxygenation status, address infective processes    Bedside Shift Report Completed : yes  Bedside Safety Check Completed: yes

## 2021-07-11 NOTE — PROVIDER NOTIFICATION
Vitally stable, NC on 2L. Denies pain and SOB. Beckett in place and patent. Adeqaute urine output, x1 BM.         Returned socks to pt.     Pt transferred to [facility] at 1505. x1 belongings bag send. Pt was vitally stable and transport and A & Ox4.

## 2021-07-11 NOTE — PLAN OF CARE
Care Management Discharge Note    Discharge Date: 07/11/2021  Expected Time of Departure: 2:45 HE stretcher    Discharge Disposition: Long Term Care    Discharge Services: Transportation Services    Discharge DME: Oxygen    Discharge Transportation: agency    Private pay costs discussed: transportation costs    PAS Confirmation Code:  NA  Patient/family educated on Medicare website which has current facility and service quality ratings:  NA    Education Provided on the Discharge Plan:  NA  Persons Notified of Discharge Plans: bedside nurse,  Patient/Family in Agreement with the Plan:  yes    Handoff Referral Completed: Yes    Additional Information:  Notifed that pt is discharge ready to return back to [facility].  Yesterday, cousin Gaby asked that transportation be arranged. HE stretcher transport was arranged for 2:45 today.  Bedside nurse, [facility], [facility] and pt cousin (called left ). PCS for was given to beside nurse and faxed to .  Orders faxed to [facility].  Tari Street RN, BSN, PHN, CTS  Care Coordinator  [facility]  [phone]

## 2021-07-11 NOTE — PROGRESS NOTES
A BiPAP of  16/8 @ 30% was applied to the pt via the mask for an home setting.  The bridge of the nose is intact. pad in place.Pt is tolerating it well. Will continue to monitor and assess the pt's current respiratory status and needs.

## 2021-07-11 NOTE — PROGRESS NOTES
DATE:  7/11/2021   TIME OF RECEIPT FROM LAB: 0427  LAB TEST:  CO2 and Bicarb  LAB VALUE:  88/49  RESULTS GIVEN WITH READ-BACK TO (PROVIDER):  Tele-Hub MD  TIME LAB VALUE REPORTED TO PROVIDER:   0427    Pt alert and oriented at this time. Has been on Bipap with sats > 92% since 2200. No change to POC at this time.

## 2021-07-11 NOTE — DISCHARGE SUMMARY
[facility]  Discharge Summary  Name: Thanh Goodrich    MRN: [id number]  YOB: 1940    Age: 80 year old  Date of Discharge:  7/11/2021  Date of Admission: 7/9/2021  Primary Care Provider: Sara Chadwick  Discharge Physician:  Tere Stern MD  Discharging Service:  Hospitalist      Discharge Diagnoses:  1. Acute on chronic hypoxic and hypercapnic respiratory failure  2. Possible pneumonia, ultimately not present   3. Chronic diastolic heart failure  4. Myasthenia gravis  5. History of PE  6. History of prostate cancer with chronic Alberto catheter with abnormal UA  7. Morbid obesity  8. Depression     Follow-ups Needed After Discharge   Routine follow-up with providers at long-term care  Follow-up with neurology regarding prednisone taper for myasthenia gravis    Unresulted Labs Ordered in the Past 30 Days of this Admission     No orders found from 10/16/2018 to 12/16/2018.        Hospital Course:  Thanh Goodrich is a 80 year old male with past medical history of myasthenia gravis, EDDIE (non compliant with CPAP), chronic hypoxic respiratory failure (2 L supplemental oxygen at baseline), chronic alberto catheter, recent PE (6/2021) on Xarelto, chronic diastolic CHF who resides in LTC who was admitted on 7/9/2021 with somnolence and hypoxia and was found to have acute on chronic hypoxic and hypercapnic respiratory failure requiring BiPAP therapy.   He rather quickly returned to his baseline respiratory status and will discharge back to long-term care.     Acute on Chronic Hypoxic and Hypercarbic Respiratory Failure: at baseline on 2L oxygen uses noninvasive ventilatory support at night, often is not completely compliant with this.  Also has underlying obesity hypoventilation syndrome.  He was hospitalized in 3/2021 as below for Myasthenia flares which required intubation as well as IVIG and steroids.  Currently does not feel that he is having a flare.  VBG showed CO2 of 93 on admission (has  been up to 120 in the past) which did improve closer to his baseline in the upper 80s by the day of discharge.  CXR showed mild patchy infiltrates vs atelectasis of the left lung base. He was initially treated with antibiotics but these were discontinued, see below. With BiPAP therapy he did improve. He used BiPAP overnight and the day of discharge was back on his supplemental oxygen at 2 L. Discussed the importance of using his noninvasive ventilator at night.     Possible PNA, ultimately no pneumonia: Presented with respiratory failure as above.  CXR with mild infiltrate.  No fever or leukocytosis. He was initially started on azithromycin and Zosyn given his respiratory failure. Procalcitonin returned negative. He has not had a cough and has no other symptoms of pneumonia. Antibiotics were discontinued as I do not feel that he has a true infection.     Chronic Diastolic CHF: Started on Spironolactone 25 mg QD on 7/7 in addition to Lasix 40 mg QD. Last TTE (3/2021) with EF 55-60% and no RWMA with mildly decreased RV systolic function and grade I diastolic dysfunction.  BNP and troponin not elevated.  Weight currently 271 pounds, weight was 279 lbs on 7/7. No evidence of acute exacerbation. He will continue his PTA medications upon discharge.     Myasthenia Gravis: Diagnosed in 2005.  Hospitalized 3/2021 for flare associated with respiratory failure requiring intubation, treated with IVIG and steroids.  Discharged to TCU and again hospitalized in 3/2021 for respiratory failure and weakness leading to intubation, again treated with IVIG and Prednisone.  Had been on Cellcept and was started on Pyridostigmine on 3/26 as well.  He does not feel that he is having an exacerbation of his myasthenia.  At his hospital discharge on 4/2021 he was on Prednisone 60 mg QD with plans to wean this down when he followed up with Neurology.  Does not appear to have done this, was still on 60 mg QD. His prednisone was decreased to 30  "mg daily. He was otherwise continued on his PTA medications. He needs to follow-up with neurology in the outpatient setting to continue to work on prednisone taper.     History of PE: Diagnosed with PE in 6/2021. Continue Xarelto.     History of Prostate Cancer with Chronic Alberto Catheter with pyuria: Underwent cryotherapy.  Follows with Dr. Charles with Urology.  Reportedly has alberto catheter due to difficulty voiding due to large pannus. UA with hematuria and 60 WBC.   As above antibiotics were discontinued. He is not having symptoms of a UTI.    Morbid Obesity: Complicates cares.     Depression: Continue PTA Zoloft.     Discharge Disposition:  Discharged to long-term care facility     Allergies:  Allergies   Allergen Reactions     Ciprofloxacin Other (See Comments)     Contraindicated for myasthenia gravis patients     Procainamide Other (See Comments)     Contraindicated for myasthenia gravis patients     Quinidine Other (See Comments)     Contraindicated for myasthenia gravis patients     Quinine Other (See Comments)     Contraindicated for myasthenia gravis patients        Condition on Discharge:  Discharge condition: Stable   Discharge vitals: Blood pressure 127/76, pulse 86, temperature 97.3  F (36.3  C), temperature source Axillary, resp. rate 20, height 1.803 m (5' 11\"), weight 122.8 kg (270 lb 11.6 oz), SpO2 100 %.   Code status on discharge: Full Code     History of Illness:  See detailed admission note for full details.    Physical Exam:  Blood pressure 127/76, pulse 86, temperature 97.3  F (36.3  C), temperature source Axillary, resp. rate 20, height 1.803 m (5' 11\"), weight 122.8 kg (270 lb 11.6 oz), SpO2 100 %.  Wt Readings from Last 1 Encounters:   07/11/21 122.8 kg (270 lb 11.6 oz)     General: Alert, awake, no acute distress.  HEENT: Normocephalic and atraumatic, eyes anicteric and without scleral injection, EOMI, face symmetric, MMM.  Cardiac: RRR, normal S1, S2. No m/g/r, 2+ pitting LE " edema.  Pulmonary: Normal chest rise, normal WOB. Decreased breath sounds diffusely, no wheezing or crackles  Abdomen: soft, non-tender, non-distended.  Normoactive bowel sounds, no guarding or rebound tenderness.  Extremities: no deformities.  Warm, well perfused. Poor muscle tone of lower extremities with contractures  Skin: Chronic venous stasis changes bilaterally.  Warm and Dry.  Neuro: No focal deficits.  Speech clear.  Diffusely weak (baseline, uses lift at baseline)  Psych: Alert and oriented x3. Appropriate affect.    Procedures other than Imaging:  Telemetry  Phlebotomy  BiPAP     Imaging:  Results for orders placed or performed during the hospital encounter of 07/09/21   XR Chest Port 1 View    Narrative    EXAM: XR CHEST PORT 1 VIEW  LOCATION: [facility]  DATE/TIME: 7/10/2021 12:10 AM    INDICATION: Altered mental status.  COMPARISON: 6/9/2021.      Impression    IMPRESSION: Normal heart size and pulmonary vascularity allowing for shallow inspiration. Mild patchy infiltrate or atelectasis left lung base with low-grade pneumonitis not excluded. Clinical correlation. Right lung is clear. Questionable small left   pleural effusion. Aortic calcification. No significant bony abnormalities.        Consultations:  Consultations This Hospital Stay   ADVANCE DIRECTIVE IP CONSULT  SPIRITUAL HEALTH SERVICES IP CONSULT  CARE MANAGEMENT / SOCIAL WORK IP CONSULT     Recent Lab Results:  Recent Labs   Lab 07/11/21  0411 07/09/21  2353   WBC 7.9 7.4   HGB 12.2* 12.5*   HCT 39.0* 39.9*   * 99    213     Recent Labs   Lab 07/11/21  1142 07/11/21  0705 07/11/21  0411 07/09/21  2353   NA  --   --  133 130*   POTASSIUM  --   --  4.0 4.2   CHLORIDE  --   --  89* 88*   CO2  --   --  >45* >45*   ANIONGAP  --   --  Not Calculated Not Calculated   * 67* 88 139*   BUN  --   --  18 14   CR  --   --  0.70 0.57*   GFRESTIMATED  --   --  >90 >90   GFRESTBLACK  --   --  >90 >90   NOAH  --   --  8.8 8.8      Recent Labs   Lab 07/09/21  2353   NTBNPI 862     Recent Labs   Lab 07/09/21  2353   TROPI 0.015          Pending Results:    Unresulted Labs Ordered in the Past 30 Days of this Admission     Date and Time Order Name Status Description    7/10/2021 12:26 AM Blood Culture Preliminary     7/10/2021 12:12 AM Blood Culture Preliminary     7/9/2021 11:59 PM Urine Culture Aerobic Bacterial Preliminary            Discharge Instructions and Follow-Up:   Discharge Orders      General info for SNF    Length of Stay Estimate: Long Term Care  Condition at Discharge: Stable  Level of care:skilled   Rehabilitation Potential: Fair  Admission H&P remains valid and up-to-date: Yes  Recent Chemotherapy: N/A  Use Nursing Home Standing Orders: Yes     Mantoux instructions    Give two-step Mantoux (PPD) Per Facility Policy Yes     Reason for your hospital stay    You were hospitalized for worsening respiratory issues which required BiPAP therapy.  You were initially treated with antibiotics but ultimately you were not found to have a pneumonia and no further antibiotics are needed.  It is imperative that you wear your BiPAP at night and with naps to prevent further respiratory issues.     Daily weights    Call Provider for weight gain of more than 2 pounds per day or 5 pounds per week.     Follow Up and recommended labs and tests    Prednisone was decreased from 60 mg every day to 30 mg every day.  Patient needs to follow up with Neurology in clinic to determine continued Prednisone taper for his myasthenia gravis.     Activity - Up with assistive device     Activity - Up with nursing assistance     Full Code     Oxygen Adult/Peds    Oxygen Documentation:   I certify that this patient, Thanh Goodrich has been under my care (or a nurse practitioner or physican's assistant working with me). This is the face-to-face encounter for oxygen medical necessity.      Thanh Goodrich is now in a chronic stable state and continues to require  supplemental oxygen. Patient has continued oxygen desaturation due to Chronic Respiratory Failure with Hypoxia J93.11.    Alternative treatment(s) tried or considered and deemed clinically infective for treatment of Chronic Respiratory Failure with Hypoxia J93.11 include nebulizers, inhalers, steroids, and pulmonary toileting.  If portability is ordered, is the patient mobile within the home? no    **Patients who qualify for home O2 coverage under the CMS guidelines require ABG tests or O2 sat readings obtained closest to, but no earlier than 2 days prior to the discharge, as evidence of the need for home oxygen therapy. Testing must be performed while patient is in the chronic stable state. See notes for O2 sats.**     Non Invasive Ventilator    Vent Documentation:   In effort to reduce and/or prevent hospitalizations and emergency room visits, we are recommending a(n) Non Invasive Ventilator for home use. Thanh Goodrich has had numerous hospitalizations recently due to chronic respiratory failure. The patient would benefit from Non Invasive Ventilator with portability for continuous support at night.    I, the undersigned, certify that the above prescribed supplies are medically necessary for this patient and is both reasonable and necessary in reference to accepted standards of medical and necessary in reference to accepted standards of medical practice in the treatment of this patient's condition and is not prescribed as a convenience.     Advance Diet as Tolerated    Follow this diet upon discharge: Orders Placed This Encounter      Combination Diet Regular Diet Adult; 2 gm NA Diet     Discharge Medications   Current Discharge Medication List      CONTINUE these medications which have CHANGED    Details   predniSONE (DELTASONE) 20 MG tablet Take 1.5 tablets (30 mg) by mouth daily    Associated Diagnoses: Myasthenia gravis (H)         CONTINUE these medications which have NOT CHANGED    Details   albuterol  (PROVENTIL) (2.5 MG/3ML) 0.083% neb solution Take 1 vial (2.5 mg) by nebulization 4 times daily as needed for shortness of breath / dyspnea or wheezing    Associated Diagnoses: SOB (shortness of breath)      calcium carbonate 600 mg-vitamin D 400 units (CALTRATE) 600-400 MG-UNIT per tablet Take 1 tablet by mouth daily      Cholecalciferol 100 MCG (4000 UT) CAPS Take 4,000 Units by mouth daily      furosemide (LASIX) 20 MG tablet Take 2 tablets (40 mg) by mouth daily    Associated Diagnoses: Chronic diastolic congestive heart failure (H)      melatonin 3 MG tablet Take 3 mg by mouth At Bedtime      metoprolol tartrate (LOPRESSOR) 25 MG tablet Take 0.5 tablets (12.5 mg) by mouth 2 times daily  Qty:      Associated Diagnoses: Essential hypertension, benign      Multiple Vitamins-Minerals (MULTIVITAMIN ADULTS 50+) TABS Take 1 tablet by mouth daily       mycophenolate (GENERIC EQUIVALENT) 500 MG tablet Take 2 tablets (1,000 mg) by mouth 2 times daily  Qty:      Associated Diagnoses: Myasthenia gravis (H)      nystatin (MYCOSTATIN) 073650 UNIT/GM external powder Apply to abdominal folds topically 2 times daily.      pyridostigmine (MESTINON) 60 MG tablet Take 1 tablet (60 mg) by mouth 3 times daily  Qty:      Associated Diagnoses: Myasthenia gravis (H)      rivaroxaban ANTICOAGULANT (XARELTO) 20 MG TABS tablet Take 20 mg by mouth every morning      sertraline (ZOLOFT) 25 MG tablet Take 1 tablet (25 mg) by mouth daily  Qty:      Associated Diagnoses: Reactive depression      spironolactone (ALDACTONE) 25 MG tablet Take 1 tablet (25 mg) by mouth daily    Associated Diagnoses: Acute on chronic diastolic heart failure (H)      sulfamethoxazole-trimethoprim (BACTRIM) 400-80 MG tablet Take 1 tablet by mouth daily      acetaminophen (TYLENOL) 325 MG tablet Take 650 mg by mouth every 4 hours as needed for mild pain      guaiFENesin (MUCINEX) 600 MG 12 hr tablet Take 600 mg by mouth 2 times daily as needed for congestion              Time Spent on this Encounter   I, Tere Stern MD, personally saw the patient today and spent greater than 30 minutes discharging this patient.    Tere Stern MD

## 2021-07-12 NOTE — PROGRESS NOTES
[facility]  PRIMARY CARE PROVIDER AND CLINIC:  Sara Chadwick, ANIYA CNP, [address] / [place] [address]  Chief Complaint   Patient presents with     Hospital F/U     [facility] Record Number:  [id number]  Place of Service where encounter took place:  [facility]  () [[id number]]    Thanh Goodrich  is a 80 year old  (1940), returned to the above facility from  [facility]. Hospital stay 7/9/21 - 7/11/21. .  Admitted to this facility for  rehab, medical management and nursing care.    HPI:    HPI information obtained from: facility chart records, facility staff, patient report and [facility] chart review.   Brief Summary of Hospital Course: PMH significant for myasthenia gravis, athma, chronic respiratory failure on 2L of oxygen, chronic diastolic heart failure, HTN, chronic alberto. He was admitted to TCU earlier this year after two hospitalizations in March for myasthenia gravis exacerbations, as well as CHF exacerbation, CAUTI and HCAP. He made slow gains in TCU and was transferred to LTC. His goal is to continue to get stronger to be able to discharge back home where he lives alone. Was recently hospitalized for acute PE and acute CHF exacerbation.    Hospitalized at above dates for acute on chronic respiratory failure. He improved with BiPAP therapy. Initially with concerns he had pneumonia, and received azithromycin and zosyn, but then procalcitonin was negative and he had no other signs of infection. Prednisone dose reduced to 30 mg daily in the hospital for his myasthenia gravis. Discharged back to facility.    Updates on Status Since Skilled nursing Admission: Nursing staff report overnight patient had desaturation in to the 80s while on BiPAP with oxygen bled into it. He received occasional nebs which helped to bring saturations up. This morning Rigoberto is on 4L of oxygen via NC. He is resting and he does not have his BiPAP on. He seems very  anxious today. He feels very down and anxious about his health. He is interested in increasing antidepressant today. He denies SOB.     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 He also has no past medical history of Arthritis, Cerebral infarction (H), Congestive heart failure (H), COPD (chronic obstructive pulmonary disease) (H), Diabetes (H), History of blood transfusion, or Thyroid disease.  PAST SURGICAL HISTORY:   has a past surgical history that includes Prostate Cancer Cryotherapy (2007); Tonsillectomy (1945); biopsy (2013?); IR CVC Tunnel Placement > 5 Yrs of Age (3/31/2021); and IR CVC Tunnel Removal Right (4/13/2021).  FAMILY HISTORY: family history includes C.A.D. in his maternal grandmother; Cancer in his father and paternal uncle; Cerebrovascular Disease in his maternal grandfather; Depression in his mother; Diabetes in his maternal grandmother; Hypertension in his father and mother; Obesity in his father; Prostate Cancer in his father and another family member; Substance Abuse in his father and mother.  SOCIAL HISTORY:   reports that he has never smoked. He has never used smokeless tobacco. He  reports current alcohol use. He reports that he does not use drugs.    Post Discharge Medication Reconciliation Status: discharge medications reconciled and changed, per note/orders    Current Outpatient Medications   Medication Sig Dispense Refill     acetaminophen (TYLENOL) 325 MG tablet Take 650 mg by mouth every 4 hours as needed for mild pain       albuterol (PROVENTIL) (2.5 MG/3ML) 0.083% neb solution Take 1 vial (2.5 mg) by nebulization 4 times daily as needed for shortness of breath / dyspnea or wheezing       calcium carbonate 600 mg-vitamin D 400 units (CALTRATE) 600-400 MG-UNIT per tablet Take 1 tablet by mouth daily       Cholecalciferol 100 MCG (4000 UT) CAPS Take 4,000 Units by mouth daily       furosemide (LASIX) 20 MG tablet Take 2 tablets (40 mg) by mouth daily       guaiFENesin (MUCINEX) 600 MG 12 hr tablet Take 600 mg by mouth 2 times daily as needed for congestion       melatonin 3 MG tablet Take 3 mg by mouth At Bedtime       metoprolol tartrate (LOPRESSOR) 25 MG tablet Take 0.5 tablets (12.5 mg) by mouth 2 times daily       Multiple Vitamins-Minerals (MULTIVITAMIN ADULTS 50+) TABS Take 1 tablet by mouth daily        mycophenolate (GENERIC EQUIVALENT) 500 MG tablet Take 2 tablets (1,000 mg) by mouth 2 times daily       nystatin (MYCOSTATIN) 007986 UNIT/GM external powder Apply to abdominal folds topically 2 times daily.       predniSONE (DELTASONE) 20 MG tablet Take 3 tablets (60 mg) by mouth daily       pyridostigmine (MESTINON) 60 MG tablet Take 1 tablet (60 mg) by mouth 3 times daily (Patient taking differently: Take 60 mg by mouth 3 times daily 0800, 1200, 2000)       rivaroxaban ANTICOAGULANT (XARELTO) 20 MG TABS tablet Take 20 mg by mouth every morning       sertraline (ZOLOFT) 25 MG tablet Take 2 tablets (50 mg) by mouth daily       spironolactone (ALDACTONE) 25 MG tablet Take 1 tablet (25 mg) by mouth daily       sulfamethoxazole-trimethoprim (BACTRIM) 400-80 MG tablet Take 1 tablet by  "mouth daily         ROS:  4 point ROS including Respiratory, CV, GI and , other than that noted in the HPI,  is negative    Vitals:  BP (!) 150/83   Pulse 77   Temp 96.3  F (35.7  C)   Resp 24   Ht 1.778 m (5' 10\")   Wt 126.3 kg (278 lb 8 oz)   SpO2 91%   BMI 39.96 kg/m    Exam:  GENERAL APPEARANCE:  Alert, anxious  HEENT: normocephalic, moist mucous membranes, nose without drainage or crusting  RESP:  respiratory effort increased, Lung sounds clear, patient is on 4L of oxygen via NC  CV: auscultation of heart done, rate and rhythm regular.   ABDOMEN: + bowel sounds, soft, nontender, no grimacing or guarding with palpation.   : alberto catheter draining clear, yellow urine  M/S: wheelchair dependent, dylan for transfers; +1 edema to thighs that is improving  NEURO: cranial nerves 2-12 grossly intact and at patient's baseline  PSYCH: oriented x 3, affect and mood-anxious and down today    Lab/Diagnostic data:  Labs done in SNF are in [facility]. Please refer to them using [facility] Everywhere. and Recent labs in [facility] reviewed by me today.     ASSESSMENT/PLAN:  (J96.21, J96.22) acute on chronic respiratory failure with hypoxia and hypercapnia (H)  Comment: acute on chronic, desated overnight, but improved with neb  -prednisone dose reduced in hospital  -appears SOB today, but is stable  Plan: Continue oxygen and titrate down as able to keep saturations >90%. Try to avoid titration up given high CO2. Continue BiPAP with sleep. Give STAT 30 mg prednisone now and increase back to 60 mg daily as below, Follow up with neurology-asked  to schedule. Monitor respiratory status closely.    (I50.32) Chronic diastolic heart failure (H)  (I10) Benign essential hypertension  Comment: edema is improving, no weight at facility since return, but weights inpatient trending down  -BP controlled: 124/80, 150/83, 120/72  -Started on spironolactone 7/7  Plan: Continue spironolactone 25 mg daily, continue lasix 40 mg " daily. BMP 7/16. Continue metoprolol 12.5 mg BID. Daily weights. Notify provider with weight gain >2 lbs in a day, >5 lbs in on week. 2 gram Na diet. Continue tubi . Continue fluid restriction. VS per policy. Adjust medications as needed    (G70.00) Myasthenia gravis without exacerbation (H)   Comment: with multiple hospitalizations for exacerbations in March that required required intubation and plasmapheresis inpatient  -prednisone dose reduced inpatient, but with ongoing respiratory issues since return to TCU  Plan: Give STAT dose prednisone 30 mg now. Increase prednisone 60 mg daily. Continue MMF 1000 mg BID, pyridostigmine 60 mg TID Continue bactrim for PCP prophy. Continue AVAPS, Follow up with neurology- asked  to schedule.    (F32.9) Reactive depression  Comment: acute with many life changes that are contributing to depression including move to LTC- increased symptoms of anxiety and depression today  -started on sertraline at the end of June  Plan: Increase sertraline 25 mg daily. BMP 7/16. ACP following. Monitor mood.     (I26.94) Multiple subsegmental pulmonary emboli without acute cor pulmonale (H)   Comment: acute, provoked PE secondary to wheelchair bound   -recommending lifelong anticoagulation  -ECHO 6/10/21 showed  EF 60-65% right ventricle borderline dilated with normal RV function and normal LV systolic function.   Plan: Continue xarelto 20 mg daily. Monitor respiratory status.     (R33.9) Urinary retention  (Z97.8) Alberto catheter in place  (Z85.46) History of prostate cancer s/p cryotherapy  Comment: with on and off hematuria. None currently  -likely is related to anticoagulation  Plan:  Nursing to continue alberto cares and catheter flushes PRN. Hgb PRN. Follow up with urology-asked  to schedule    (D64.9)  Anemia  Comment: mild, macrocytic- hgb today stable  -vitamin B12 and folic acid pending  Hemoglobin   Date Value Ref Range Status   07/12/2021 12.8 (L) 13.3 - 17.7 g/dL  "Final   07/11/2021 12.2 (L) 13.3 - 17.7 g/dL Final   07/09/2021 12.5 (L) 13.3 - 17.7 g/dL Final     Plan: Hgb PRN    (E66.01,  Z68.39) Class 2 severe obesity with serious comorbidity and body mass index (BMI) of 39.0 to 39.9 in adult, unspecified obesity type (H)  Comment: puts patient at significant risk for exacerbation of chronic diseases above, also with limited mobility and impacts his recovery  Estimated body mass index is 39.96 kg/m  as calculated from the following:    Height as of this encounter: 1.778 m (5' 10\").    Weight as of this encounter: 126.3 kg (278 lb 8 oz).  Plan: Patient motivated to lose weight. Referral for dietician to meet with patient and make healthy food choices was made last week.      Electronically signed by:  ANIYA Ortiz CNP                   "

## 2021-07-12 NOTE — PATIENT INSTRUCTIONS
Orders  Thanh Goodrich  1940  1) Increase sertraline to 50 mg daily. Diagnosis: depression  2) Increase prednisone dose back to 60 mg daily. First dose tomorrow (7/13). Diagnosis myasthenia gravis  3) Please schedule follow up with urology Dr. Charles for hematuria, history of prostate cancer  4) Please schedule follow up with his neurologist for myasthenia gravis. Please ask patient who he sees and where.   5) If patient gets sent to hospital he needs to go to [facility] or [facility]  6) [facility] 7/16. Diagnosis: CHF  Sara Chadwick, APRN CNP on 7/12/2021 at 2:29 PM

## 2021-07-12 NOTE — LETTER
7/12/2021        RE: Thanh Goodrich  [facility]  [address] [place] [address]        [facility]  PRIMARY CARE PROVIDER AND CLINIC:  Sara Chadwick, ANIYA CNP, [address] [facility] [address] / [place] [address]  Chief Complaint   Patient presents with     Hospital F/U     [facility] Record Number:  [id number]  Place of Service where encounter took place:  [facility]  () [[id number]]    Thanh Goodrich  is a 80 year old  (1940), returned to the above facility from  [facility]. Hospital stay 7/9/21 - 7/11/21. .  Admitted to this facility for  rehab, medical management and nursing care.    HPI:    HPI information obtained from: facility chart records, facility staff, patient report and [facility] chart review.   Brief Summary of Hospital Course: PMH significant for myasthenia gravis, athma, chronic respiratory failure on 2L of oxygen, chronic diastolic heart failure, HTN, chronic alberto. He was admitted to TCU earlier this year after two hospitalizations in March for myasthenia gravis exacerbations, as well as CHF exacerbation, CAUTI and HCAP. He made slow gains in TCU and was transferred to LTC. His goal is to continue to get stronger to be able to discharge back home where he lives alone. Was recently hospitalized for acute PE and acute CHF exacerbation.    Hospitalized at above dates for acute on chronic respiratory failure. He improved with BiPAP therapy. Initially with concerns he had pneumonia, and received azithromycin and zosyn, but then procalcitonin was negative and he had no other signs of infection. Prednisone dose reduced to 30 mg daily in the hospital for his myasthenia gravis. Discharged back to facility.    Updates on Status Since Skilled nursing Admission: Nursing staff report overnight patient had desaturation in to the 80s while on BiPAP with oxygen bled into it. He received occasional nebs which helped to bring  saturations up. This morning Rigobetro is on 4L of oxygen via NC. He is resting and he does not have his BiPAP on. He seems very anxious today. He feels very down and anxious about his health. He is interested in increasing antidepressant today. He denies SOB.     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 He also has no past medical history of Arthritis, Cerebral infarction (H), Congestive heart failure (H), COPD (chronic obstructive pulmonary disease) (H), Diabetes (H), History of blood transfusion, or Thyroid disease.  PAST SURGICAL HISTORY:   has a past surgical history that includes Prostate Cancer Cryotherapy (2007); Tonsillectomy (1945); biopsy (2013?); IR CVC Tunnel Placement > 5 Yrs of Age (3/31/2021); and IR CVC Tunnel Removal Right (4/13/2021).  FAMILY HISTORY: family history includes C.A.D. in his maternal grandmother; Cancer in his father and paternal uncle; Cerebrovascular Disease in his maternal grandfather; Depression in his mother; Diabetes in his maternal grandmother; Hypertension in his father and mother; Obesity in his father; Prostate Cancer in his father and another family member; Substance  Abuse in his father and mother.  SOCIAL HISTORY:   reports that he has never smoked. He has never used smokeless tobacco. He reports current alcohol use. He reports that he does not use drugs.    Post Discharge Medication Reconciliation Status: discharge medications reconciled and changed, per note/orders    Current Outpatient Medications   Medication Sig Dispense Refill     acetaminophen (TYLENOL) 325 MG tablet Take 650 mg by mouth every 4 hours as needed for mild pain       albuterol (PROVENTIL) (2.5 MG/3ML) 0.083% neb solution Take 1 vial (2.5 mg) by nebulization 4 times daily as needed for shortness of breath / dyspnea or wheezing       calcium carbonate 600 mg-vitamin D 400 units (CALTRATE) 600-400 MG-UNIT per tablet Take 1 tablet by mouth daily       Cholecalciferol 100 MCG (4000 UT) CAPS Take 4,000 Units by mouth daily       furosemide (LASIX) 20 MG tablet Take 2 tablets (40 mg) by mouth daily       guaiFENesin (MUCINEX) 600 MG 12 hr tablet Take 600 mg by mouth 2 times daily as needed for congestion       melatonin 3 MG tablet Take 3 mg by mouth At Bedtime       metoprolol tartrate (LOPRESSOR) 25 MG tablet Take 0.5 tablets (12.5 mg) by mouth 2 times daily       Multiple Vitamins-Minerals (MULTIVITAMIN ADULTS 50+) TABS Take 1 tablet by mouth daily        mycophenolate (GENERIC EQUIVALENT) 500 MG tablet Take 2 tablets (1,000 mg) by mouth 2 times daily       nystatin (MYCOSTATIN) 324496 UNIT/GM external powder Apply to abdominal folds topically 2 times daily.       predniSONE (DELTASONE) 20 MG tablet Take 3 tablets (60 mg) by mouth daily       pyridostigmine (MESTINON) 60 MG tablet Take 1 tablet (60 mg) by mouth 3 times daily (Patient taking differently: Take 60 mg by mouth 3 times daily 0800, 1200, 2000)       rivaroxaban ANTICOAGULANT (XARELTO) 20 MG TABS tablet Take 20 mg by mouth every morning       sertraline (ZOLOFT) 25 MG tablet Take 2 tablets (50 mg) by mouth daily       spironolactone (ALDACTONE) 25 MG  "tablet Take 1 tablet (25 mg) by mouth daily       sulfamethoxazole-trimethoprim (BACTRIM) 400-80 MG tablet Take 1 tablet by mouth daily         ROS:  4 point ROS including Respiratory, CV, GI and , other than that noted in the HPI,  is negative    Vitals:  BP (!) 150/83   Pulse 77   Temp 96.3  F (35.7  C)   Resp 24   Ht 1.778 m (5' 10\")   Wt 126.3 kg (278 lb 8 oz)   SpO2 91%   BMI 39.96 kg/m    Exam:  GENERAL APPEARANCE:  Alert, anxious  HEENT: normocephalic, moist mucous membranes, nose without drainage or crusting  RESP:  respiratory effort increased, Lung sounds clear, patient is on 4L of oxygen via NC  CV: auscultation of heart done, rate and rhythm regular.   ABDOMEN: + bowel sounds, soft, nontender, no grimacing or guarding with palpation.   : alberto catheter draining clear, yellow urine  M/S: wheelchair dependent, dylan for transfers; +1 edema to thighs that is improving  NEURO: cranial nerves 2-12 grossly intact and at patient's baseline  PSYCH: oriented x 3, affect and mood-anxious and down today    Lab/Diagnostic data:  Labs done in SNF are in [facility]. Please refer to them using [facility] Everywhere. and Recent labs in [facility] reviewed by me today.     ASSESSMENT/PLAN:  (J96.21, J96.22) acute on chronic respiratory failure with hypoxia and hypercapnia (H)  Comment: acute on chronic, desated overnight, but improved with neb  -prednisone dose reduced in hospital  -appears SOB today, but is stable  Plan: Continue oxygen and titrate down as able to keep saturations >90%. Try to avoid titration up given high CO2. Continue BiPAP with sleep. Give STAT 30 mg prednisone now and increase back to 60 mg daily as below, Follow up with neurology-asked  to schedule. Monitor respiratory status closely.    (I50.32) Chronic diastolic heart failure (H)  (I10) Benign essential hypertension  Comment: edema is improving, no weight at facility since return, but weights inpatient trending down  -BP " controlled: 124/80, 150/83, 120/72  -Started on spironolactone 7/7  Plan: Continue spironolactone 25 mg daily, continue lasix 40 mg daily. BMP 7/16. Continue metoprolol 12.5 mg BID. Daily weights. Notify provider with weight gain >2 lbs in a day, >5 lbs in on week. 2 gram Na diet. Continue tubi . Continue fluid restriction. VS per policy. Adjust medications as needed    (G70.00) Myasthenia gravis without exacerbation (H)   Comment: with multiple hospitalizations for exacerbations in March that required required intubation and plasmapheresis inpatient  -prednisone dose reduced inpatient, but with ongoing respiratory issues since return to TCU  Plan: Give STAT dose prednisone 30 mg now. Increase prednisone 60 mg daily. Continue MMF 1000 mg BID, pyridostigmine 60 mg TID Continue bactrim for PCP prophy. Continue AVAPS, Follow up with neurology- asked  to schedule.    (F32.9) Reactive depression  Comment: acute with many life changes that are contributing to depression including move to LTC- increased symptoms of anxiety and depression today  -started on sertraline at the end of June  Plan: Increase sertraline 25 mg daily. BMP 7/16. ACP following. Monitor mood.     (I26.94) Multiple subsegmental pulmonary emboli without acute cor pulmonale (H)   Comment: acute, provoked PE secondary to wheelchair bound   -recommending lifelong anticoagulation  -ECHO 6/10/21 showed  EF 60-65% right ventricle borderline dilated with normal RV function and normal LV systolic function.   Plan: Continue xarelto 20 mg daily. Monitor respiratory status.     (R33.9) Urinary retention  (Z97.8) Alberto catheter in place  (Z85.46) History of prostate cancer s/p cryotherapy  Comment: with on and off hematuria. None currently  -likely is related to anticoagulation  Plan:  Nursing to continue alberto cares and catheter flushes PRN. Hgb PRN. Follow up with urology-asked  to schedule    (D64.9)  Anemia  Comment: mild, macrocytic-  "hgb today stable  -vitamin B12 and folic acid pending  Hemoglobin   Date Value Ref Range Status   07/12/2021 12.8 (L) 13.3 - 17.7 g/dL Final   07/11/2021 12.2 (L) 13.3 - 17.7 g/dL Final   07/09/2021 12.5 (L) 13.3 - 17.7 g/dL Final     Plan: Hgb PRN    (E66.01,  Z68.39) Class 2 severe obesity with serious comorbidity and body mass index (BMI) of 39.0 to 39.9 in adult, unspecified obesity type (H)  Comment: puts patient at significant risk for exacerbation of chronic diseases above, also with limited mobility and impacts his recovery  Estimated body mass index is 39.96 kg/m  as calculated from the following:    Height as of this encounter: 1.778 m (5' 10\").    Weight as of this encounter: 126.3 kg (278 lb 8 oz).  Plan: Patient motivated to lose weight. Referral for dietician to meet with patient and make healthy food choices was made last week.      Electronically signed by:  ANIYA Ortiz CNP                         Sincerely,        ANIYA Ortiz CNP    "

## 2021-07-13 NOTE — TELEPHONE ENCOUNTER
[facility] Nurse Triage Telephone Note    Provider: NAIYA Mendieta CNP  Facility: [facility]  Facility Type:  [facility]    Caller: Christiana  Call Back Number: [phone]    Allergies:    Allergies   Allergen Reactions     Ciprofloxacin Other (See Comments)     Contraindicated for myasthenia gravis patients     Procainamide Other (See Comments)     Contraindicated for myasthenia gravis patients     Quinidine Other (See Comments)     Contraindicated for myasthenia gravis patients     Quinine Other (See Comments)     Contraindicated for myasthenia gravis patients        Reason for call:   Folate and B12- WNL        Verbal Order/Direction given by Provider: NNO    Provider giving Order:  ANIYA Mendieta CNP    Verbal Order given to: Christiana Butler RN

## 2021-07-14 NOTE — ED NOTES
Nurse called from sending care facility expressing desire to have pt evaluated at [facility] or [facility] related to his Myasthenia gravis. They report his specialist is at those facilities. Writer informed facility nurse that since pt is here at this time we will have MD evaluate and address needs appropriately, MD notified.

## 2021-07-14 NOTE — ED PROVIDER NOTES
History     Chief Complaint:  Shortness of Breath    The history is provided by the patient.      Thanh Goodrich is a 80 year old male with a history of hypertension, hyperlipidemia, CHF, prostate cancer, Myasthenia gravis, asthma, EDDIE (noncompliant with CPAP), pulmonary embolism currently anticoagulated on Xarelto and chronic hypercarbic respiratory failure requiring 2L of oxygen at baseline who presents with shortness of breath. The patient was recently admitted on 7/09 for respiratory distress requiring BiPAP, discharge summary below. The patient states that he has been feeling weak since his discharge and reports an oxygen saturation  in the 80's today at this his assisted living. Upon arrival patient was on 6L of oxygen per EMS. He reports some shortness of breath. He denies any fever.             Discharge Summary 7/11/2021  Thanh Goodrich is a 80 year old male with past medical history of myasthenia gravis, EDDIE (non compliant with CPAP), chronic hypoxic respiratory failure (2 L supplemental oxygen at baseline), chronic alberto catheter, recent PE (6/2021) on Xarelto, chronic diastolic CHF who resides in LTC who was admitted on 7/9/2021 with somnolence and hypoxia and was found to have acute on chronic hypoxic and hypercapnic respiratory failure requiring BiPAP therapy.   He rather quickly returned to his baseline respiratory status and will discharge back to long-term care.     Acute on Chronic Hypoxic and Hypercarbic Respiratory Failure: at baseline on 2L oxygen uses noninvasive ventilatory support at night, often is not completely compliant with this.  Also has underlying obesity hypoventilation syndrome.  He was hospitalized in 3/2021 as below for Myasthenia flares which required intubation as well as IVIG and steroids.  Currently does not feel that he is having a flare.  VBG showed CO2 of 93 on admission (has been up to 120 in the past) which did improve closer to his baseline in the upper 80s by the  day of discharge.  CXR showed mild patchy infiltrates vs atelectasis of the left lung base. He was initially treated with antibiotics but these were discontinued, see below. With BiPAP therapy he did improve. He used BiPAP overnight and the day of discharge was back on his supplemental oxygen at 2 L. Discussed the importance of using his noninvasive ventilator at night.     Possible PNA, ultimately no pneumonia: Presented with respiratory failure as above.  CXR with mild infiltrate.  No fever or leukocytosis. He was initially started on azithromycin and Zosyn given his respiratory failure. Procalcitonin returned negative. He has not had a cough and has no other symptoms of pneumonia. Antibiotics were discontinued as I do not feel that he has a true infection.     Chronic Diastolic CHF: Started on Spironolactone 25 mg QD on 7/7 in addition to Lasix 40 mg QD. Last TTE (3/2021) with EF 55-60% and no RWMA with mildly decreased RV systolic function and grade I diastolic dysfunction.  BNP and troponin not elevated.  Weight currently 271 pounds, weight was 279 lbs on 7/7. No evidence of acute exacerbation. He will continue his PTA medications upon discharge.     Myasthenia Gravis: Diagnosed in 2005.  Hospitalized 3/2021 for flare associated with respiratory failure requiring intubation, treated with IVIG and steroids.  Discharged to TCU and again hospitalized in 3/2021 for respiratory failure and weakness leading to intubation, again treated with IVIG and Prednisone.  Had been on Cellcept and was started on Pyridostigmine on 3/26 as well.  He does not feel that he is having an exacerbation of his myasthenia.  At his hospital discharge on 4/2021 he was on Prednisone 60 mg QD with plans to wean this down when he followed up with Neurology.  Does not appear to have done this, was still on 60 mg QD. His prednisone was decreased to 30 mg daily. He was otherwise continued on his PTA medications. He needs to follow-up with  neurology in the outpatient setting to continue to work on prednisone taper.     History of PE: Diagnosed with PE in 6/2021. Continue Xarelto.     History of Prostate Cancer with Chronic Alberto Catheter with pyuria: Underwent cryotherapy.  Follows with Dr. Charles with Urology.  Reportedly has alberto catheter due to difficulty voiding due to large pannus. UA with hematuria and 60 WBC.   As above antibiotics were discontinued. He is not having symptoms of a UTI.     Morbid Obesity: Complicates cares.     Depression: Continue PTA Zoloft.       Chest XR 7/10:  Normal heart size and pulmonary vascularity allowing for shallow inspiration. Mild patchy infiltrate or atelectasis left lung base with low-grade pneumonitis not excluded. Clinical correlation. Right lung is clear. Questionable small left pleural effusion. Aortic calcification. No significant bony abnormalities.    Review of Systems   Constitutional: Negative for fever.   HENT: Negative.    Respiratory: Positive for shortness of breath. Negative for cough.    Cardiovascular: Positive for leg swelling. Negative for chest pain and palpitations.   Neurological: Positive for weakness.   All other systems reviewed and are negative.        Allergies:  Ciprofloxacin  Procainamide  Quinidine  Quinine    Medications:    Albuterol   Lasix   Metoprolol   Prednisone   Mestinon   Xarelto   Zoloft   Spironolactone     Past Medical History:    Depression   Hyperlipidemia   Hypertension   Leg ulcer   Prostate cancer   Myasthenia gravis   OA   Asthma   Pulmonary embolism   Metabolic encephalopathy   Obesity   Dilated aortic root   Mycobacterial infection    CHF     Past Surgical History:    IR CVC tunnel placement   IR CVC tunnel removal   Prostate cancer   Tonsillectomy and adenoidectomy   Biopsy     Family History:    Hypertension- mother, father   Depression- mother   Alcohol abuse- mother, father   Prostate cancer- father     Social History:  Presents via EMS from assisted  living     Physical Exam     Patient Vitals for the past 24 hrs:   BP Temp Temp src Pulse Resp SpO2   07/14/21 1315 (!) 148/84 -- -- 105 -- 91 %   07/14/21 1300 134/85 -- -- 96 22 95 %   07/14/21 1245 135/72 -- -- 96 20 94 %   07/14/21 1230 130/87 -- -- 101 30 96 %   07/14/21 1215 121/72 -- -- 106 23 90 %   07/14/21 1200 102/76 -- -- 105 19 --   07/14/21 1145 -- -- -- 105 24 91 %   07/14/21 1130 (!) 148/89 -- -- 97 29 (!) 89 %   07/14/21 1115 (!) 142/75 -- -- 94 19 (!) 88 %   07/14/21 1100 (!) 151/73 -- -- 92 -- (!) 88 %   07/14/21 1045 (!) 148/79 -- -- 96 29 90 %   07/14/21 1030 138/76 -- -- 86 -- 97 %   07/14/21 1015 (!) 148/80 -- -- 87 -- 97 %   07/14/21 1000 (!) 141/73 -- -- 85 -- 95 %   07/14/21 0945 -- -- -- -- -- (!) 87 %   07/14/21 0931 (!) 145/82 97.4  F (36.3  C) Oral 80 20 97 %   07/14/21 0930 -- -- -- -- -- 94 %       Physical Exam  General: Patient is alert and cooperative.  Overweight, deconditioned.   HENT:  No nasal congestion or drainage.   Eyes: EOMI. Normal conjunctiva.  Neck:  Normal range of motion and appearance.   Cardiovascular:  Normal rate, regular rhythm and normal heart sounds.   Pulmonary/Chest:  Distant, slightly increased effort; no wheezing or crackles.   Abdominal: Soft. No distension or tenderness.     Musculoskeletal: symmetric lower leg pitting edema.   Neurological: oriented, no focal weakness.   Skin: Warm and dry. No rash or bruising.   Psychiatric: Normal mood and affect. Normal behavior and judgement.    Emergency Department Course   ECG:  ECG taken at 1011, ECG read at 1015  Sinus rhythm with premature atrial complexes   Right bundle branch blockage   Septal infarct, age undetermined   Inferior infarct, age undetermined    Rate 87 bpm. WV interval 172 ms. QRS duration 146 ms. QT/QTc 376/452 ms. P-R-T axes 45 -20 -7.     Imaging:    Chest XR:  Portable chest. Lungs are hypoaerated. Retrocardiac   opacity could indicate left pleural effusion or left lower lobe lung    consolidation, minimally changed. Probable trace right pleural   effusion not evident on prior study. Heart size is likely enlarged. No   Pneumothorax  Reading per radiology    Laboratory:    BMP: Na 131(L) Cl 87 (L) CO2 44 (H) anion gap <1 (L) o/w WNL (Creatinine 0.66)     CBC: WBC 6.8, HGB 13.1(L),      Troponin (Collected 1020): <0.015    BNP: 741     Blood gas venous 1020: pH: 7.35, PCO2: 88(HH), PO2: 25, Bicarbonate: 49(HH), Oxyhgb: 40(L), FIO2 2L NC, base excess 18.4(H)     Procedures:    Emergency Department Course:    Reviewed:  I reviewed nursing notes, vitals, past history and care everywhere    Assessments:  0951 I obtained history and examined the patient as noted above.     1127 I rechecked the patient and explained findings.     Consults:  1143 I spoke with NP at [facility] regarding patient's presentation, findings, and plan of care.     Disposition:  The patient was discharged to home.    Impression & Plan      Medical Decision Making:  Afebrile and hemodynamically stable 80-year-old male nursing home resident arrives via EMS due to concerns for low oxygen saturations.  Medical history is complex and notable for recent hospitalization from July 9 through July 11 with acute on chronic hypoxic hypercapnic respiratory failure.  He also has a history of chronic heart failure pulmonary embolism for which she is anticoagulated and myasthenia gravis.  His hospital discharge summary is listed above.  At baseline, he is on 2 L of oxygen and is noted that his oxygen saturations have been low despite this he has had no interval development of fever or chest discomfort.  Testing today has included repeat laboratory tests showing no interval changes.  He has a normal white blood cell count of 6.8, normal renal function testing, BNP, and undetectable troponin.  VBG shows a PCO2 of 88.  This is identical from discharge VBG on July 11 and consistent with his chronic hypercapnic respiratory failure.   Chest x-ray showing no interval change from hospitalization.  It was not felt to correlate with clinical pneumonia at that time or presently.  He does not feel as though his symptoms are related to his myasthenia gravis, which has in the past led to respiratory failure.  He is on physostigmine, CellCept, and prednisone for that.  Plans at time of discharge were for follow-up with neurology to work on prednisone taper, he was decreased from 60 to 30 mg during hospital course.  His oxygen saturations are for the most part in the low 90s on 4 L of oxygen, a slight increase from his typical baseline.  I contacted his nurse practitioner who is familiar with him at [facility] to update them on patient's test results.  He'll be seen tomorrow; i've recommended increasing O2 to 3-4L if needed, emphasized importance of overnight CPAP, suggest pulmonology referral, neuro f/u next available, return if worse, as needed.      Covid-19  Thanh Goodrich was evaluated during a global COVID-19 pandemic, which necessitated consideration that the patient might be at risk for infection with the SARS-CoV-2 virus that causes COVID-19.   Applicable protocols for evaluation were followed during the patient's care.     Diagnosis:    ICD-10-CM    1. Acute on chronic respiratory failure with hypoxia and hypercapnia (H)  J96.21     J96.22      Scribe Disclosure:  NATALIO, Nichelle Dominguez, am serving as a scribe at 9:40 AM on 7/14/2021 to document services personally performed by Marc Patterson MD based on my observations and the provider's statements to me.       Marc Patterson MD  07/14/21 [id number]

## 2021-07-14 NOTE — ED TRIAGE NOTES
"Pt presents via EMS from [facility]. Reported pt has his own pulse O2 meter and pt \"watches it and calls for help when he see's #'s in the 80's\". Pt arrives on 6 ltr O2 via N/C. Pt is A&O, ABC's intact.   "

## 2021-07-15 NOTE — PROGRESS NOTES
[facility] GERIATRIC SERVICES  PRIMARY CARE PROVIDER AND CLINIC:  Sara Chadwick, ANIYA CNP, [address] [facility] [address] / [place] [address]  Chief Complaint   Patient presents with     Hospital F/U      [facility] Medical Record Number:  [id number]  Place of Service where encounter took place:  [facility]  () [[id number]]    Thanh Goodrich  is a 80 year old  (1940), returned to the above facility from  [facility]. Hospital stay 7/14/21 - 5 hour ED stay. .   HPI:      PMH significant for myasthenia gravis, athma, chronic respiratory failure on 2L of oxygen, chronic diastolic heart failure, HTN, chronic alberto. He was admitted to TCU earlier this year after two hospitalizations in March for myasthenia gravis exacerbations, as well as CHF exacerbation, CAUTI and HCAP. He made slow gains in TCU and was transferred to LTC. His goal is to continue to get stronger to be able to discharge back home where he lives alone. Was recently hospitalized for acute PE and acute CHF exacerbation.     Hospitalized at [facility] from 7/9-7/11 for acute on chronic respiratory failure. He improved with BiPAP therapy. Initially with concerns he had pneumonia, and received azithromycin and zosyn, but then procalcitonin was negative and he had no other signs of infection. Prednisone dose reduced to 30 mg daily in the hospital for his myasthenia gravis. Discharged back to facility.     With ongoing desaturations, complaints of SOB upon return to LTC facility. Prednisone dose was increased back up to 60 mg daily. Appears anxiety and depression contributing as well zoloft dose was increased.     Most recently sent to ED on 7/14 for oxygen saturations in the 80's. Repeat VBG completed and was consistent with discharge VBG from previous hospitalization. Chest xray without any sings of pneumonia. Oxygen increased, CPAP use emphasized with patient- including daytime use, and he was discharged back to LTC.    Today Rigoberto  was seen his room. He is sitting in bed finishing breakfast. He has his own pulse oximeter on his finger and is watching it. He appears extremely anxious and confirms feeling very anxious. Reports feeling SOB. He is on 4L of oxygen at time of visit and saturations are in the low 90s. He is afebrile.    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     Cellulitis of left leg 9/23/2012     Congestive heart failure, unspecified HF chronicity, unspecified heart failure type (H) 7/17/2021     Depressive disorder 2020     Edema 7/7/2009     History of steroid therapy 2/22/2010     Hyperlipidemia LDL goal <130 10/31/2010     HYPERTENSION 7/8/2003     Incontinence of urine 10/25/2010     Leg ulcer (H) 9/20/2012     MALIGN NEOPL PROSTATE-3/07 4/2/2007    T1C, Shaina 8, initial PSA 39, s/p radiation seed implants 2007      Myasthenia gravis (H)      OBESITY 7/8/2003     Osteoporosis 8/18/2009    Refused bisphosphonates 2011      PVC's (premature ventricular contractions) 11/23/2011     RIGHT OCCIPITAL PAIN 7/8/2003     ROTATOR CUFF SYND NOS- RT 9/19/2005     Skin nodule 3/18/2013     ulcer left shin 10/8/2007     Uncomplicated asthma 1942?    childhood only      PAST SURGICAL HISTORY:   has a past surgical history that includes Prostate Cancer Cryotherapy (2007); Tonsillectomy (1945); biopsy (2013?); IR CVC Tunnel Placement > 5 Yrs of Age (3/31/2021); and IR CVC Tunnel Removal Right (4/13/2021).  FAMILY HISTORY: family history includes C.A.D. in his maternal grandmother;  Cancer in his father and paternal uncle; Cerebrovascular Disease in his maternal grandfather; Depression in his mother; Diabetes in his maternal grandmother; Hypertension in his father and mother; Obesity in his father; Prostate Cancer in his father and another family member; Substance Abuse in his father and mother.  SOCIAL HISTORY:   reports that he has never smoked. He has never used smokeless tobacco. He reports current alcohol use. He reports that he does not use drugs.  Patient's living condition: lives in a skilled nursing facility    Post Discharge Medication Reconciliation Status: discharge medications reconciled and changed, per note/orders      Current Outpatient Medications   Medication Sig     acetaminophen (TYLENOL) 325 MG tablet Take 650 mg by mouth every 4 hours as needed for mild pain     albuterol (PROVENTIL) (2.5 MG/3ML) 0.083% neb solution Take 1 vial (2.5 mg) by nebulization 4 times daily as needed for shortness of breath / dyspnea or wheezing     calcium carbonate 600 mg-vitamin D 400 units (CALTRATE) 600-400 MG-UNIT per tablet Take 1 tablet by mouth daily     Cholecalciferol 100 MCG (4000 UT) CAPS Take 4,000 Units by mouth daily     furosemide (LASIX) 20 MG tablet Take 2 tablets (40 mg) by mouth daily     guaiFENesin (MUCINEX) 600 MG 12 hr tablet Take 600 mg by mouth 2 times daily as needed for congestion     melatonin 3 MG tablet Take 3 mg by mouth At Bedtime     metoprolol tartrate (LOPRESSOR) 25 MG tablet Take 0.5 tablets (12.5 mg) by mouth 2 times daily     Multiple Vitamins-Minerals (MULTIVITAMIN ADULTS 50+) TABS Take 1 tablet by mouth daily      mycophenolate (GENERIC EQUIVALENT) 500 MG tablet Take 2 tablets (1,000 mg) by mouth 2 times daily     nystatin (MYCOSTATIN) 716224 UNIT/GM external powder Apply to abdominal folds topically 2 times daily.     predniSONE (DELTASONE) 20 MG tablet Take 3 tablets (60 mg) by mouth daily     pyridostigmine (MESTINON) 60 MG tablet Take 1 tablet (60 mg)  "by mouth 3 times daily (Patient taking differently: Take 60 mg by mouth 3 times daily 0800, 1200, 2000)     rivaroxaban ANTICOAGULANT (XARELTO) 20 MG TABS tablet Take 20 mg by mouth every morning     sertraline (ZOLOFT) 25 MG tablet Take 2 tablets (50 mg) by mouth daily     spironolactone (ALDACTONE) 25 MG tablet Take 1 tablet (25 mg) by mouth daily     sulfamethoxazole-trimethoprim (BACTRIM) 400-80 MG tablet Take 1 tablet by mouth daily   -Buspar 5 mg po BID for anxiety  -Hydroxyzine 25 mg QID PRN for anxiety      ROS:  4 point ROS including Respiratory, CV, GI and , other than that noted in the HPI,  is negative    Vitals:  /76   Pulse 84   Temp 98  F (36.7  C)   Resp 18   Ht 1.778 m (5' 10\")   Wt 126.1 kg (278 lb)   SpO2 98%   BMI 39.89 kg/m    Exam:  GENERAL APPEARANCE:  Alert, appears very anxious  HEENT: normocephalic, moist mucous membranes, nose without drainage or crusting  RESP:  respiratory effort increased, Lung sounds clear, patient is on 4L of oxygen via NC  CV: auscultation of heart done, rate and rhythm regular.   ABDOMEN: obese, + bowel sounds, soft, nontender, no grimacing or guarding with palpation.   : alberto catheter draining clear, yellow urine  M/S: wheelchair dependent, dylan for transfers; +1-2 edema to thighs  NEURO: cranial nerves 2-12 grossly intact and at patient's baseline  PSYCH: oriented x 3, affect and mood-anxious    Lab/Diagnostic data:  Labs done in SNF are in [facility]. Please refer to them using [facility]/Care Everywhere. and Recent labs in [facility] reviewed by me today.     ASSESSMENT/PLAN:  (J96.21, J96.22) acute on chronic respiratory failure with hypoxia and hypercapnia (H)  Comment: acute on chronic, with oxygen increased to 4L his saturations are in the low 90s  -appears SOB today, but suspect a large component of this is anxiety  -no cough, afebrile, lungs clear  Plan: Continue oxygen 2-4L and titrate down as able to keep saturations >90%. Try to avoid " titration up given chronic high CO2. Continue CPAP-discussed using during daytime and at night. Follow up with neurology ASAP. Consider pulmonology referral as well. Monitor respiratory status closely.    (F41.9) Anxiety  (F32.9) Reactive depression  Comment: acute, appears like anxiety is contributing to his SOB, also with many life changes that are contributing to depression including recent move to LTC  -started on sertraline at the end of June  Plan: Start Buspar 5 mg BID and Hydroxyzine 25 mg po QID PRN for anxiety. Noted initial PRN orders for non antipsychotic psychotropic medications are limited to 14 days. Start new psychotropic medication hydroxyzine x 14 days for sporadic anxiety. Nsg to update [facility] provider of effect prior to order expiration. Please offer patient essential oil lavender or sweet orange for anxiety. Continue sertraline 50 mg daily. ACP following- discussed teaching patient how to do guided imagery as technique for reducing anxiety. Monitor mood.     (I50.32) Chronic diastolic heart failure (H)  (I10) Benign essential hypertension  Comment: edema is stable, no recent weights obtained  -BP controlled: 138/76, 137/86, 138/74  -Started on spironolactone 7/7  Plan: Continue spironolactone 25 mg daily, continue lasix 40 mg daily. BMP 7/16. Continue metoprolol 12.5 mg BID. Daily weights. Notify provider with weight gain >2 lbs in a day, >5 lbs in on week. 2 gram Na diet. Continue tubi . Continue fluid restriction. VS per policy. Adjust medications as needed    (G70.00) Myasthenia gravis without exacerbation (H)   Comment: with multiple hospitalizations for exacerbations in March that required required intubation and plasmapheresis inpatient  Plan: Continue prednisone 60 mg daily. Continue MMF 1000 mg BID, pyridostigmine 60 mg TID Continue bactrim for PCP prophy. Continue CPAP. Follow up with neurology ASAP.    (I26.94) Multiple subsegmental pulmonary emboli without acute cor pulmonale (H)  "  Comment: acute, provoked PE secondary to wheelchair bound   -recommending lifelong anticoagulation  -ECHO 6/10/21 showed  EF 60-65% right ventricle borderline dilated with normal RV function and normal LV systolic function.   Plan: Continue xarelto 20 mg daily. Monitor respiratory status.     (R33.9) Urinary retention  (Z97.8) Alberto catheter in place  (Z85.46) History of prostate cancer s/p cryotherapy  Comment: with on and off hematuria. None currently  -likely is related to anticoagulation  Plan:  Nursing to continue alberto cares and catheter flushes PRN. Hgb PRN. Follow up with urology-asked  to schedule    (D64.9)  Anemia  Comment: mild, macrocytic- hgb today stable  -vitamin B12 and folic acid pending  Hemoglobin   Date Value Ref Range Status   07/12/2021 12.8 (L) 13.3 - 17.7 g/dL Final   07/11/2021 12.2 (L) 13.3 - 17.7 g/dL Final   07/09/2021 12.5 (L) 13.3 - 17.7 g/dL Final     Plan: Hgb PRN    (E66.01,  Z68.39) Class 2 severe obesity with serious comorbidity and body mass index (BMI) of 39.0 to 39.9 in adult, unspecified obesity type (H)  Comment: puts patient at significant risk for exacerbation of chronic diseases above, also with limited mobility and impacts his recovery  Estimated body mass index is 39.96 kg/m  as calculated from the following:    Height as of this encounter: 1.778 m (5' 10\").    Weight as of this encounter: 126.3 kg (278 lb 8 oz).  Plan: Patient motivated to lose weight. Referral for dietician to meet with patient and make healthy food choices was made last week.      Electronically signed by:  ANIYA Ortiz CNP                 "

## 2021-07-15 NOTE — LETTER
7/15/2021        RE: Thanh Goodrich  [facility]  [address] [place] [address]        [facility]  PRIMARY CARE PROVIDER AND CLINIC:  Sara Chadwick, ANIYA CNP, [address] / [place] [address]  Chief Complaint   Patient presents with     Hospital F/U      [place] Medical Record Number:  [id number]  Place of Service where encounter took place:  [facility]  () [[id number]]    Thanh Goodrich  is a 80 year old  (1940), returned to the above facility from  [facility]. Hospital stay 7/14/21 - 5 hour ED stay. .   HPI:      PMH significant for myasthenia gravis, athma, chronic respiratory failure on 2L of oxygen, chronic diastolic heart failure, HTN, chronic alberto. He was admitted to TCU earlier this year after two hospitalizations in March for myasthenia gravis exacerbations, as well as CHF exacerbation, CAUTI and HCAP. He made slow gains in TCU and was transferred to LTC. His goal is to continue to get stronger to be able to discharge back home where he lives alone. Was recently hospitalized for acute PE and acute CHF exacerbation.     Hospitalized at [facility] from 7/9-7/11 for acute on chronic respiratory failure. He improved with BiPAP therapy. Initially with concerns he had pneumonia, and received azithromycin and zosyn, but then procalcitonin was negative and he had no other signs of infection. Prednisone dose reduced to 30 mg daily in the hospital for his myasthenia gravis. Discharged back to facility.     With ongoing desaturations, complaints of SOB upon return to LTC facility. Prednisone dose was increased back up to 60 mg daily. Appears anxiety and depression contributing as well zoloft dose was increased.     Most recently sent to ED on 7/14 for oxygen saturations in the 80's. Repeat VBG completed and was consistent with discharge VBG from previous hospitalization. Chest xray without any sings of pneumonia. Oxygen  increased, CPAP use emphasized with patient- including daytime use, and he was discharged back to LTC.    Today Rigoberto was seen his room. He is sitting in bed finishing breakfast. He has his own pulse oximeter on his finger and is watching it. He appears extremely anxious and confirms feeling very anxious. Reports feeling SOB. He is on 4L of oxygen at time of visit and saturations are in the low 90s. He is afebrile.    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     Cellulitis of left leg 9/23/2012     Congestive heart failure, unspecified HF chronicity, unspecified heart failure type (H) 7/17/2021     Depressive disorder 2020     Edema 7/7/2009     History of steroid therapy 2/22/2010     Hyperlipidemia LDL goal <130 10/31/2010     HYPERTENSION 7/8/2003     Incontinence of urine 10/25/2010     Leg ulcer (H) 9/20/2012     MALIGN NEOPL PROSTATE-3/07 4/2/2007    T1C, Shaina 8, initial PSA 39, s/p radiation seed implants 2007      Myasthenia gravis (H)      OBESITY 7/8/2003     Osteoporosis 8/18/2009    Refused bisphosphonates 2011      PVC's (premature ventricular contractions) 11/23/2011     RIGHT OCCIPITAL PAIN 7/8/2003     ROTATOR CUFF SYND NOS- RT 9/19/2005     Skin nodule 3/18/2013     ulcer left shin 10/8/2007     Uncomplicated asthma 1942?    childhood only      PAST SURGICAL HISTORY:   has a past surgical history that includes Prostate Cancer Cryotherapy (2007); Tonsillectomy (1945); biopsy (2013?); IR CVC Tunnel Placement > 5 Yrs of Age (3/31/2021); and  IR CVC Tunnel Removal Right (4/13/2021).  FAMILY HISTORY: family history includes C.A.D. in his maternal grandmother; Cancer in his father and paternal uncle; Cerebrovascular Disease in his maternal grandfather; Depression in his mother; Diabetes in his maternal grandmother; Hypertension in his father and mother; Obesity in his father; Prostate Cancer in his father and another family member; Substance Abuse in his father and mother.  SOCIAL HISTORY:   reports that he has never smoked. He has never used smokeless tobacco. He reports current alcohol use. He reports that he does not use drugs.  Patient's living condition: lives in a skilled nursing facility    Post Discharge Medication Reconciliation Status: discharge medications reconciled and changed, per note/orders      Current Outpatient Medications   Medication Sig     acetaminophen (TYLENOL) 325 MG tablet Take 650 mg by mouth every 4 hours as needed for mild pain     albuterol (PROVENTIL) (2.5 MG/3ML) 0.083% neb solution Take 1 vial (2.5 mg) by nebulization 4 times daily as needed for shortness of breath / dyspnea or wheezing     calcium carbonate 600 mg-vitamin D 400 units (CALTRATE) 600-400 MG-UNIT per tablet Take 1 tablet by mouth daily     Cholecalciferol 100 MCG (4000 UT) CAPS Take 4,000 Units by mouth daily     furosemide (LASIX) 20 MG tablet Take 2 tablets (40 mg) by mouth daily     guaiFENesin (MUCINEX) 600 MG 12 hr tablet Take 600 mg by mouth 2 times daily as needed for congestion     melatonin 3 MG tablet Take 3 mg by mouth At Bedtime     metoprolol tartrate (LOPRESSOR) 25 MG tablet Take 0.5 tablets (12.5 mg) by mouth 2 times daily     Multiple Vitamins-Minerals (MULTIVITAMIN ADULTS 50+) TABS Take 1 tablet by mouth daily      mycophenolate (GENERIC EQUIVALENT) 500 MG tablet Take 2 tablets (1,000 mg) by mouth 2 times daily     nystatin (MYCOSTATIN) 235098 UNIT/GM external powder Apply to abdominal folds topically 2 times daily.     predniSONE  "(DELTASONE) 20 MG tablet Take 3 tablets (60 mg) by mouth daily     pyridostigmine (MESTINON) 60 MG tablet Take 1 tablet (60 mg) by mouth 3 times daily (Patient taking differently: Take 60 mg by mouth 3 times daily 0800, 1200, 2000)     rivaroxaban ANTICOAGULANT (XARELTO) 20 MG TABS tablet Take 20 mg by mouth every morning     sertraline (ZOLOFT) 25 MG tablet Take 2 tablets (50 mg) by mouth daily     spironolactone (ALDACTONE) 25 MG tablet Take 1 tablet (25 mg) by mouth daily     sulfamethoxazole-trimethoprim (BACTRIM) 400-80 MG tablet Take 1 tablet by mouth daily   -Buspar 5 mg po BID for anxiety  -Hydroxyzine 25 mg QID PRN for anxiety      ROS:  4 point ROS including Respiratory, CV, GI and , other than that noted in the HPI,  is negative    Vitals:  /76   Pulse 84   Temp 98  F (36.7  C)   Resp 18   Ht 1.778 m (5' 10\")   Wt 126.1 kg (278 lb)   SpO2 98%   BMI 39.89 kg/m    Exam:  GENERAL APPEARANCE:  Alert, appears very anxious  HEENT: normocephalic, moist mucous membranes, nose without drainage or crusting  RESP:  respiratory effort increased, Lung sounds clear, patient is on 4L of oxygen via NC  CV: auscultation of heart done, rate and rhythm regular.   ABDOMEN: obese, + bowel sounds, soft, nontender, no grimacing or guarding with palpation.   : alberto catheter draining clear, yellow urine  M/S: wheelchair dependent, dylan for transfers; +1-2 edema to thighs  NEURO: cranial nerves 2-12 grossly intact and at patient's baseline  PSYCH: oriented x 3, affect and mood-anxious    Lab/Diagnostic data:  Labs done in SNF [facility]. Please refer to them using frenting/Care Everywhere. and Recent labs in [facility] reviewed by me today.     ASSESSMENT/PLAN:  (J96.21, J96.22) acute on chronic respiratory failure with hypoxia and hypercapnia (H)  Comment: acute on chronic, with oxygen increased to 4L his saturations are in the low 90s  -appears SOB today, but suspect a large component of this is anxiety  -no " cough, afebrile, lungs clear  Plan: Continue oxygen 2-4L and titrate down as able to keep saturations >90%. Try to avoid titration up given chronic high CO2. Continue CPAP-discussed using during daytime and at night. Follow up with neurology ASAP. Consider pulmonology referral as well. Monitor respiratory status closely.    (F41.9) Anxiety  (F32.9) Reactive depression  Comment: acute, appears like anxiety is contributing to his SOB, also with many life changes that are contributing to depression including recent move to LTC  -started on sertraline at the end of June  Plan: Start Buspar 5 mg BID and Hydroxyzine 25 mg po QID PRN for anxiety. Noted initial PRN orders for non antipsychotic psychotropic medications are limited to 14 days. Start new psychotropic medication hydroxyzine x 14 days for sporadic anxiety. Nsg to update [facility] provider of effect prior to order expiration. Please offer patient essential oil lavender or sweet orange for anxiety. Continue sertraline 50 mg daily. ACP following- discussed teaching patient how to do guided imagery as technique for reducing anxiety. Monitor mood.     (I50.32) Chronic diastolic heart failure (H)  (I10) Benign essential hypertension  Comment: edema is stable, no recent weights obtained  -BP controlled: 138/76, 137/86, 138/74  -Started on spironolactone 7/7  Plan: Continue spironolactone 25 mg daily, continue lasix 40 mg daily. BMP 7/16. Continue metoprolol 12.5 mg BID. Daily weights. Notify provider with weight gain >2 lbs in a day, >5 lbs in on week. 2 gram Na diet. Continue tubi . Continue fluid restriction. VS per policy. Adjust medications as needed    (G70.00) Myasthenia gravis without exacerbation (H)   Comment: with multiple hospitalizations for exacerbations in March that required required intubation and plasmapheresis inpatient  Plan: Continue prednisone 60 mg daily. Continue MMF 1000 mg BID, pyridostigmine 60 mg TID Continue bactrim for PCP prophy. Continue  "CPAP. Follow up with neurology ASAP.    (I26.94) Multiple subsegmental pulmonary emboli without acute cor pulmonale (H)   Comment: acute, provoked PE secondary to wheelchair bound   -recommending lifelong anticoagulation  -ECHO 6/10/21 showed  EF 60-65% right ventricle borderline dilated with normal RV function and normal LV systolic function.   Plan: Continue xarelto 20 mg daily. Monitor respiratory status.     (R33.9) Urinary retention  (Z97.8) Alberto catheter in place  (Z85.46) History of prostate cancer s/p cryotherapy  Comment: with on and off hematuria. None currently  -likely is related to anticoagulation  Plan:  Nursing to continue alberto cares and catheter flushes PRN. Hgb PRN. Follow up with urology-asked  to schedule    (D64.9)  Anemia  Comment: mild, macrocytic- hgb today stable  -vitamin B12 and folic acid pending  Hemoglobin   Date Value Ref Range Status   07/12/2021 12.8 (L) 13.3 - 17.7 g/dL Final   07/11/2021 12.2 (L) 13.3 - 17.7 g/dL Final   07/09/2021 12.5 (L) 13.3 - 17.7 g/dL Final     Plan: Hgb PRN    (E66.01,  Z68.39) Class 2 severe obesity with serious comorbidity and body mass index (BMI) of 39.0 to 39.9 in adult, unspecified obesity type (H)  Comment: puts patient at significant risk for exacerbation of chronic diseases above, also with limited mobility and impacts his recovery  Estimated body mass index is 39.96 kg/m  as calculated from the following:    Height as of this encounter: 1.778 m (5' 10\").    Weight as of this encounter: 126.3 kg (278 lb 8 oz).  Plan: Patient motivated to lose weight. Referral for dietician to meet with patient and make healthy food choices was made last week.      Electronically signed by:  ANIYA Ortiz CNP                       Sincerely,        ANIYA Ortiz CNP    "

## 2021-07-15 NOTE — PATIENT INSTRUCTIONS
Orders   Thanh Goodrich   1940  1) Buspar 5 mg BID. Diagnosis: anxiety  2) Hydroxyzine 25 mg po QID PRN for anxiety. Please add end date of 2 weeks from today. Update provider prior to order expiration if patient is using and it is effective.  3) Discontinue order to update provider when CBC, folic acid,... are back  4) Please encourage patient to use CPAP during the day  5) Discontinue order for wound consult for hematoma on leg  6) Please schedule follow up with patient's neurologist for ASAP/  7) Please provide patient with lavendar or sweet orange aromatherapy for his anxiety if interested   8) ACP to please teach patient guided imagery if they are able to do so. (He already has ACP referral)  9) Does not look like patient has order for oxygen? Oxygen 2-4 L via NC. Titrate to keep oxygen saturations >90%- goal is lower 90s.   ANIYA Ortiz CNP on 7/15/2021 at 1:11 PM

## 2021-07-17 PROBLEM — J96.22 ACUTE AND CHRONIC RESPIRATORY FAILURE WITH HYPERCAPNIA (H): Status: ACTIVE | Noted: 2021-01-01

## 2021-07-17 PROBLEM — I50.9 CONGESTIVE HEART FAILURE, UNSPECIFIED HF CHRONICITY, UNSPECIFIED HEART FAILURE TYPE (H): Status: ACTIVE | Noted: 2021-01-01

## 2021-07-17 PROBLEM — J96.21 ACUTE ON CHRONIC RESPIRATORY FAILURE WITH HYPOXIA (H): Status: ACTIVE | Noted: 2021-01-01

## 2021-07-17 NOTE — PHARMACY-ADMISSION MEDICATION HISTORY
Pharmacy Medication History  Admission medication history interview status for the 7/17/2021  admission is complete. See EPIC admission navigator for prior to admission medications     Location of Interview: Phone  Medication history sources: WADE Umanzor ([facility]) and Care Everywhere    Significant changes made to the medication list:  Added:  -Buspirone 5 mg bid   -Hydroxyzine 25 mg q6h prn      In the past week, patient estimated taking medication this percent of the time: greater than 90%      Medication reconciliation completed by provider prior to medication history? Yes    Time spent in this activity: 30 minutes    Prior to Admission medications    Medication Sig Last Dose Taking? Auth Provider   acetaminophen (TYLENOL) 325 MG tablet Take 650 mg by mouth every 4 hours as needed for mild pain prn at prn Yes Unknown, Entered By History   albuterol (PROVENTIL) (2.5 MG/3ML) 0.083% neb solution Take 1 vial (2.5 mg) by nebulization 4 times daily as needed for shortness of breath / dyspnea or wheezing prn at prn Yes Sara Chadwick APRN CNP   busPIRone (BUSPAR) 5 MG tablet Take 5 mg by mouth 2 times daily 7/16/2021 at pm Yes Unknown, Entered By History   calcium carbonate 600 mg-vitamin D 400 units (CALTRATE) 600-400 MG-UNIT per tablet Take 1 tablet by mouth daily 7/16/2021 at am Yes Unknown, Entered By History   Cholecalciferol 100 MCG (4000 UT) CAPS Take 4,000 Units by mouth daily 7/16/2021 at am Yes Unknown, Entered By History   furosemide (LASIX) 20 MG tablet Take 2 tablets (40 mg) by mouth daily 7/16/2021 at Unknown time Yes Sara Chadwick APRN CNP   guaiFENesin (MUCINEX) 600 MG 12 hr tablet Take 600 mg by mouth 2 times daily as needed for congestion prn at prn Yes Unknown, Entered By History   hydrOXYzine (ATARAX) 25 MG tablet Take 25 mg by mouth every 6 hours as needed for itching prn at prn Yes Unknown, Entered By History   melatonin 3 MG tablet Take 3 mg by mouth At Bedtime  7/16/2021 at pm Yes Unknown, Entered By History   metoprolol tartrate (LOPRESSOR) 25 MG tablet Take 0.5 tablets (12.5 mg) by mouth 2 times daily 7/16/2021 at pm Yes David Mcclain, DO   Multiple Vitamins-Minerals (MULTIVITAMIN ADULTS 50+) TABS Take 1 tablet by mouth daily  7/16/2021 at am Yes Reported, Patient   mycophenolate (GENERIC EQUIVALENT) 500 MG tablet Take 2 tablets (1,000 mg) by mouth 2 times daily 7/16/2021 at pm Yes Darius Hopson MD   nystatin (MYCOSTATIN) 423152 UNIT/GM external powder Apply to abdominal folds topically 2 times daily. 7/16/2021 at pm Yes Unknown, Entered By History   predniSONE (DELTASONE) 20 MG tablet Take 3 tablets (60 mg) by mouth daily 7/16/2021 at am Yes Sara Chadwick APRN CNP   pyridostigmine (MESTINON) 60 MG tablet Take 1 tablet (60 mg) by mouth 3 times daily  Patient taking differently: Take 60 mg by mouth 3 times daily 0800, 1200, 2000 7/16/2021 at pm Yes Darius Hopson MD   rivaroxaban ANTICOAGULANT (XARELTO) 20 MG TABS tablet Take 20 mg by mouth every morning 7/16/2021 at am Yes Unknown, Entered By History   sertraline (ZOLOFT) 25 MG tablet Take 2 tablets (50 mg) by mouth daily 7/16/2021 at am Yes Sara Chadwick APRN CNP   spironolactone (ALDACTONE) 25 MG tablet Take 1 tablet (25 mg) by mouth daily 7/16/2021 at am Yes Sara Chadwick APRN CNP   sulfamethoxazole-trimethoprim (BACTRIM) 400-80 MG tablet Take 1 tablet by mouth daily 7/16/2021 at am Yes Sara Chadwick APRN CNP       The information provided in this note is only as accurate as the sources available at the time of update(s)

## 2021-07-17 NOTE — ED PROVIDER NOTES
History   Chief Complaint:  Shortness of Breath     The history is provided by the EMS personnel. The history is limited by the condition of the patient.      Thanh Goodrich is a 80 year old male on Xarelto with history of CHF, PE, hypertension, asthma, and  who presents with shortness of breath. Per EMS, Thanh is coming from his nursing home. The nurse started her shift at 0700 and found him in no distress and at his baseline. This was his last well known time. At 0715, 45 minutes ago, his O2 was in the 70-80's. His worse was in the 60's. EMS arrived and placed him on CPAP which improved his O2 sats to the low 80s. They gave him three sublingual nitrostats which lowered his blood pressure to the 90's systolic. Nurse informed EMS he complained of abdominal, had increased edema in his lower extremities, and blood in his urine. They place him on CPAP at 6L at the nursing home at night before bed.    Review of Systems   Unable to perform ROS: Acuity of condition     Allergies:  Ciprofloxacin  Procainamide  Quinidine  Quinine    Medications:  Albuterol   Lasix   Metoprolol   Prednisone   Xarelto   Zoloft     Past Medical History:    Atypical Mycobacterial Infection   Cellulitis of Left Leg   Depressive Disorder  Hypertension   Hyperlipidemia  Leg Ulcer  Prostate Cancer  Myasthenic Crisis   Myasthenia Gravis   PVC's  Uncomplicated Asthma   PE   CHF    Past Surgical History:    Biopsy   IR CVC Tunnel Placement Right   Tonsillectomy     Family History:    Mother - Hypertension, Depression, Substance Abuse  Father - Hypertension, Prostate Cancer, Alcohol Abuse    Social History:  Arrives via EMS from nursing home.     Physical Exam     Patient Vitals for the past 24 hrs:   BP Temp Temp src Pulse Resp SpO2 Weight   07/17/21 0930 (!) 153/85 -- -- 81 19 98 % --   07/17/21 0915 133/83 -- -- 79 17 97 % --   07/17/21 0900 (!) 113/91 -- -- 86 23 96 % --   07/17/21 0845 (!) 153/90 -- -- 87 27 98 % --   07/17/21 0815 (!) 143/84  -- -- 86 23 95 % --   07/17/21 0811 -- -- -- 92 22 93 % --   07/17/21 0810 -- 97.7  F (36.5  C) Temporal -- -- -- --   07/17/21 0808 (!) 148/99 -- -- 93 30 (!) 85 % 123.8 kg (272 lb 14.9 oz)       Physical Exam    Constitutional: Morbidly obese white male on CPAP. Tachypneic.   HENT: No signs of trauma.   Eyes: EOM are normal. Pupils are equal, round, and reactive to light.   Neck: Normal range of motion. No JVD present. No cervical adenopathy.  Cardiovascular: Regular rhythm.  Exam reveals no gallop and no friction rub.    No murmur heard.  Pulmonary/Chest: Diminished breath sounds. No wheezes, rhonchi or rales.   Abdominal: Soft. No tenderness. No rebound or guarding.   Musculoskeletal: 2+ edema lower extremities with venous stasis changes.  Genitourinary: Beckett catheter present.   Lymphadenopathy: No lymphadenopathy.   Neurological: Awake and alert. Unable to obtain orientation. No facial asymmetry. Moves all extremities.   Skin: Skin is warm and dry. No rash noted. No erythema.     Emergency Department Course   ECG  ECG taken at 0817, ECG read at 0817  Normal sinus rhythm   Right bundle branch   Inferior infarct, age undetermined   Rate 87 bpm. WI interval 152 ms. QRS duration 124 ms. QT/QTc 376/452 ms. P-R-T axes 20 -26 2.     Imaging:  XR Chest Port 1 View  Shallow inspiration accentuates heart size and pulmonary  vascularity. Hazy opacity throughout the left lung has increased since  the previous exam, and could be infectious in etiology. The right lung  is otherwise clear. No pneumothorax is identified. Aortic  calcification. There is likely a small left pleural effusion.   Reading per radiology    Laboratory:  CBC: WBC 6.6, HGB 13.2,   CMP: Glucose 137 (H), Creatinine 0.57 (L), Sodium 131 (L), Potassium 5.8 (H), CO2 >45 (VH), Anion Gap <1 (L), Protein Total 6.4 (L), Albumin 3.0 (L), o/w WNL    INR: 1.07  BNP: 3,369 (H)    Troponin (Collected 0822): 0.018  Lactic Acid: 1.0    Blood gas venous: pH:  "7.29 (L), PCO2: 104 (VH), PO2: 31, Bicarbonate: 50 (VH), FIO2 100, base excess 18.2 (H)     Symptomatic Influenza SARS-COVIS-19 Virus PCR: Negative   Blood Culture x2: Pending    Emergency Department Course:    Reviewed:  I reviewed nursing notes, vitals, past medical history and care everywhere    Assessments:  0800 EMS arrives and provided history.     Consults:   1013 I consulted with Dr. Jovel, on call Hospitalist, regarding the patient's history and presentation here in the emergency department.    Interventions:  0823 Nitroglycerin infusion    0825 Lasix 40 mg IV   0827 Solu-Cortef 100 mg IV   1051 Zosyn 3.375 g IV     Disposition:  The patient was admitted to the hospital under the care of Dr. Jovel.     Impression & Plan     Medical Decision Making:  Thanh Goodrich is a 80 year old male who comes from nursing facility with acute shortness of breath. He was seen on round earlier today and was fine but then surely afterwards began to getting short of breath and trapping his oxygen. He does use a CPAP at night. Nurses called paramedics, they placed him on oxygen, and they did give him nitroglycerin which did bring his blood pressure and has helped his breathing. On arrival here though, he is still tachypneic and slightly anxious. Patient has diminished lung sounds and has peripheral edema. He was brought into the stabilization room where he started on BiPAP and initiated on nitroglycerin drip. This controlled not only his blood pressure but helped with his breathing. He was given 40 of IV Lasix, and given his history of steroid use for Myasthenia. He was given 100 mg of decadron to prevent adrenal failure. CO2 was quite elevated. Oxygen ation is improving. Patient's history of chronic and acute respiratory failure seem to be repeated now along with his heart failure and an elevated BNP. Chest x-ray shows increased \"whiteness on the left\" which could be an infiltrate and given that I will have culture done " and cover him for infection. Lactate initially was normal. Patient has been discussed with the hospitalist and will admit him to CICU for further evaluation and nitroglycerin drip, BiPAP, and with lasix, hydrocortisone, and antibiotics on board. This is likely flash pulmonary edema, possible pneumonia, unlikely to be PE given the fact he is already on Xarelto    Critical Care Time: was 35 minutes for this patient excluding procedures    Covid-19  Thanh Goodrich was evaluated during a global COVID-19 pandemic, which necessitated consideration that the patient might be at risk for infection with the SARS-CoV-2 virus that causes COVID-19.   Applicable protocols for evaluation were followed during the patient's care. COVID-19 was considered as part of the patient's evaluation. The plan for testing is: a test was obtained during this visit.    Diagnosis:    ICD-10-CM    1. Acute and chronic respiratory failure with hypercapnia (H)  J96.22    2. Acute on chronic respiratory failure with hypoxia (H)  J96.21    3. Congestive heart failure, unspecified HF chronicity, unspecified heart failure type (H)  I50.9      Scribe Disclosure:  Amos LANCE, am serving as a scribe at 8:02 AM on 7/17/2021 to document services personally performed by Modesto Ocampo MD based on my observations and the provider's statements to me.            Modesto Ocampo MD  07/17/21 [id number]

## 2021-07-17 NOTE — ED NOTES
Bed: ST03  Expected date:   Expected time:   Means of arrival:   Comments:  [place] [facility] c-pap 80 m

## 2021-07-17 NOTE — PHARMACY-VANCOMYCIN DOSING SERVICE
"Pharmacy Vancomycin Initial Note  Date of Service 2021  Patient's  1940  80 year old, male    Indication: Sepsis    Current estimated CrCl = Estimated Creatinine Clearance: 141 mL/min (A) (based on SCr of 0.57 mg/dL (L)).    Creatinine for last 3 days  2021:  7:48 AM Creatinine 0.46 mg/dL  2021:  8:17 AM Creatinine 0.57 mg/dL    Recent Vancomycin Level(s) for last 3 days  No results found for requested labs within last 72 hours.      Vancomycin IV Administrations (past 72 hours)      No vancomycin orders with administrations in past 72 hours.                Nephrotoxins and other renal medications (From now, onward)    Start     Dose/Rate Route Frequency Ordered Stop    21 1700  piperacillin-tazobactam (ZOSYN) 4.5 g vial to attach to  mL bag     Note to Pharmacy: For [facility], [facility] and [facility]: For Zosyn-naive patients, use the \"Zosyn initial dose + extended infusion\" order panel.    4.5 g  over 30 Minutes Intravenous EVERY 6 HOURS 21 1452      21 1500  furosemide (LASIX) injection 40 mg      40 mg  over 1-3 Minutes Intravenous ONCE 21 1452            Contrast Orders - past 72 hours (72h ago, onward)    None          Loading dose: 2500 mg at 17:00 2021.  Regimen: 1250 mg IV every 12 hours.  Start time: 15:42 on 2021  Exposure target: AUC24 (range)400-600 mg/L.hr   AUC24,ss: 460 mg/L.hr  Probability of AUC24 > 400: 65 %  Ctrough,ss: 14.8 mg/L  Probability of Ctrough,ss > 20: 26 %  Probability of nephrotoxicity (Lodise KEITH ): 10 %          Plan:  1. Give Vancomycin Loading dose of 2500 mg x 1 then start vancomycin  1250 mg IV q12h.   2. Vancomycin monitoring method: AUC  3. Vancomycin therapeutic monitoring goal: 400-600 mg*h/L  4. Pharmacy will check vancomycin levels as appropriate in 1-3 Days.    5. Serum creatinine levels will be ordered every 48 hours.      Tito Uriostegui, [facility]    "

## 2021-07-17 NOTE — ED NOTES
Pt bipap mask repositioned. Pt sleeping comfortably in bed, VSS, breathing non labored and sating high 90s.

## 2021-07-17 NOTE — ED TRIAGE NOTES
Sudden onset of shortness of breath around 0715 this morning. Nurse found patient with oxygen saturation 70-80%. Arrived on CPAP. Reports history of CHF. Worsening edema, c/o abdominal pain.

## 2021-07-17 NOTE — H&P
[facility]    History and Physical - Hospitalist Service       Date of Admission:  7/17/2021    Assessment & Plan      Thanh Goodrich is a 80 year old male admitted on 7/17/2021. He has a PMH most remarkable for myasthenia gravis, HFpEF, asthma, and chronic hypercapenic and hypoxic respiratory failure requiring supplemental oxygen (2L) at home. He was admitted for acute on chronic hypoxic and hypercapenic respiratory failure, currently of unknown etiology.    1. Acute on Chronic Hypoxic and Hypercapnic Respiratory Failure. Patient is on 2L NC at baseline and his CO2 usually runs in the 80's. Differential diagnosis for patients acute decompensation includes heart failure exacerbation, pneumonia, versus myasthenia flare. Favor heart failure exacerbation at this point, labs not consistent with pneumonia, patient does not feel he is in myasthenia exacerbation. Exam is consistent with severe volume overload.  - BiPap for now  - Per ER, nitroglycerin seemed to help his shortness of breath the most, will continue this infusion for now as his evaluation is done; seems to be helping patient quite a bit.  - Had 100 mg hydrocortisone in the ER for potential myasthenia exacerbation, will continue to monitor  - Lasix 40 mg IV in the ER; will re-dose once he gets to the [place] and assess output. Held home lasix for now.  - Continue Zosyn/Vanc due to patient's tenuous state, can re-evaluate if these are needed tomorrow. Home bactrim held in the setting of Zosyn/Vanc.  - Echo  - Cards consult, patient is severely overloaded on exam with significantly elevated BNP compared to several days ago.  - Pro calcitonin, CRP    2. Hyperkalemia  - Recheck after patient gets to the floor  - Home spironolactone held for now    3. Hyponatremia. Chronic--probably reflect of volume status, will continue to monitor.    Chronic Issues  1. Myasthenia Gravis - home mycophenolate, prednisone, pyridostigmine  2. History of PE;  on Xarelto  3. Morbid Obesity  4. History of Prostate Cancer with Chronic Beckett Catheter  5. Depression - home buspirone, zoloft  6. Asthma - Continue albuterol nebs  7. Essential HTN - home metoprolol, spironolactone currently held     Diet:   General Diet  DVT Prophylaxis: Xarelto  Beckett Catheter: PRESENT, indication:    Central Lines: None  Code Status:   Full Code, confirmed with patient on admission    Risk Factors Present on Admission        # Hyperkalemia: K = 5.8 mmol/L (Ref range: 3.4 - 5.3 mmol/L) on admission, will monitor as appropriate  # Hyponatremia: Na = 131 mmol/L (Ref range: 133 - 144 mmol/L) on admission, will monitor as appropriate     # Coagulation Defect: home medication list includes an anticoagulant medication       Disposition Plan   Expected discharge: likely will need several days of cares here, will likely discharge back to prior living facility once respiratory failure treated and he is evaluated by PT/OT     The patient's care was discussed with the Bedside Nurse and Patient.    Jose Luis Jovel MD PhD  [facility]  Securely message with the Vocera Web Console (learn more here)  Text page via [facility] Paging/Directory      ______________________________________________________________________    Chief Complaint   Shortness of breath    History is obtained from the patient    History of Present Illness   Thanh Goodrich is a 80 year old male who has a PMH most remarkable for myasthenia gravis, HFpEF, asthma, and chronic hypercapenic and hypoxic respiratory failure requiring supplemental oxygen (2L) at home. He was admitted for acute on chronic hypoxic and hypercapenic respiratory failure, currently of unknown etiology.    Patient was most recently admitted to the hospital in 6/2021 with a PE; and then more recently this month for acute on chronic hypoxic and hypercapnic respiratory failure. He was discharged back to his LTAC and presented to the ER this AM via  EMS. Per report, the nurse who started her shift stated that he was fine when she started her shift but shortly thereafter found him in respiratory distress, requiring 6L of oxygen to maintain saturations. EMS was called who brought him into the ER.    Patient was evlauted in the ER and started on antibiotics, steroids, and diuretics. He is now on BiPap; per ER, he did best when he had nitroglycerin for blood pressure control. When I evaluated him at bedside he was comfortable on BiPap. Tells me that these symptoms have been worsening for several days--he has been gaining weight, having progressive difficulty with breathing and difficulty lying flat. No other symptoms--no fevers, chills, chest pain, nausea, vomiting, or diarrhea.    Review of Systems    The 10 point Review of Systems is negative other than noted in the HPI or here.    Past Medical History    I have reviewed this patient's medical history and updated it with pertinent information if needed.   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     Cellulitis of left leg 9/23/2012     Congestive heart failure, unspecified HF chronicity, unspecified heart failure type (H) 7/17/2021     Depressive disorder 2020     Edema 7/7/2009     History of steroid therapy 2/22/2010     Hyperlipidemia LDL goal <130 10/31/2010     HYPERTENSION 7/8/2003     Incontinence of urine 10/25/2010     Leg ulcer (H) 9/20/2012     MALIGN NEOPL PROSTATE-3/07 4/2/2007    T1C, Shaina 8, initial PSA 39, s/p radiation seed implants 2007      Myasthenia gravis (H)      OBESITY 7/8/2003     Osteoporosis 8/18/2009    Refused bisphosphonates 2011      PVC's (premature ventricular contractions) 11/23/2011     RIGHT OCCIPITAL PAIN 7/8/2003     ROTATOR CUFF SYND NOS- RT 9/19/2005     Skin nodule 3/18/2013     ulcer left shin 10/8/2007     Uncomplicated asthma 1944?    childhood only       Past Surgical History   I have reviewed this patient's surgical  history and updated it with pertinent information if needed.  Past Surgical History:   Procedure Laterality Date     BIOPSY  2013?    dealt with     IR CVC TUNNEL PLACEMENT > 5 YRS OF AGE  3/31/2021     IR CVC TUNNEL REMOVAL RIGHT  4/13/2021     Prostate Cancer Cryotherapy  2007     TONSILLECTOMY  1945       Social History   I have reviewed this patient's social history and updated it with pertinent information if needed.  Social History     Tobacco Use     Smoking status: Never Smoker     Smokeless tobacco: Never Used   Substance Use Topics     Alcohol use: Yes     Alcohol/week: 0.0 standard drinks     Comment: very rarely     Drug use: No       Family History   I have reviewed this patient's family history and updated it with pertinent information if needed.  Family History   Problem Relation Age of Onset     Hypertension Mother      Depression Mother      Substance Abuse Mother         alcohol     Hypertension Father      Cancer Father         Prostate     Prostate Cancer Father      Substance Abuse Father         alcohol     Obesity Father      Diabetes Maternal Grandmother      C.A.D. Maternal Grandmother      Cerebrovascular Disease Maternal Grandfather      Cancer Paternal Uncle         Prostate     Prostate Cancer Other        Prior to Admission Medications   Prior to Admission Medications   Prescriptions Last Dose Informant Patient Reported? Taking?   Cholecalciferol 100 MCG (4000 UT) CAPS  Nursing Home Yes No   Sig: Take 4,000 Units by mouth daily   Multiple Vitamins-Minerals (MULTIVITAMIN ADULTS 50+) TABS  Nursing Home Yes No   Sig: Take 1 tablet by mouth daily    acetaminophen (TYLENOL) 325 MG tablet  Nursing Home Yes No   Sig: Take 650 mg by mouth every 4 hours as needed for mild pain   albuterol (PROVENTIL) (2.5 MG/3ML) 0.083% neb solution  Nursing Home No No   Sig: Take 1 vial (2.5 mg) by nebulization 4 times daily as needed for shortness of breath / dyspnea or wheezing   calcium carbonate 600  mg-vitamin D 400 units (CALTRATE) 600-400 MG-UNIT per tablet  Nursing Home Yes No   Sig: Take 1 tablet by mouth daily   furosemide (LASIX) 20 MG tablet   No No   Sig: Take 2 tablets (40 mg) by mouth daily   guaiFENesin (MUCINEX) 600 MG 12 hr tablet   Yes No   Sig: Take 600 mg by mouth 2 times daily as needed for congestion   melatonin 3 MG tablet   Yes No   Sig: Take 3 mg by mouth At Bedtime   metoprolol tartrate (LOPRESSOR) 25 MG tablet  Nursing Home No No   Sig: Take 0.5 tablets (12.5 mg) by mouth 2 times daily   mycophenolate (GENERIC EQUIVALENT) 500 MG tablet  Nursing Home No No   Sig: Take 2 tablets (1,000 mg) by mouth 2 times daily   nystatin (MYCOSTATIN) 127131 UNIT/GM external powder   Yes No   Sig: Apply to abdominal folds topically 2 times daily.   predniSONE (DELTASONE) 20 MG tablet   No No   Sig: Take 3 tablets (60 mg) by mouth daily   pyridostigmine (MESTINON) 60 MG tablet  Nursing Home No No   Sig: Take 1 tablet (60 mg) by mouth 3 times daily   Patient taking differently: Take 60 mg by mouth 3 times daily 0800, 1200, 2000   rivaroxaban ANTICOAGULANT (XARELTO) 20 MG TABS tablet   Yes No   Sig: Take 20 mg by mouth every morning   sertraline (ZOLOFT) 25 MG tablet   No No   Sig: Take 2 tablets (50 mg) by mouth daily   spironolactone (ALDACTONE) 25 MG tablet   No No   Sig: Take 1 tablet (25 mg) by mouth daily   sulfamethoxazole-trimethoprim (BACTRIM) 400-80 MG tablet   No No   Sig: Take 1 tablet by mouth daily      Facility-Administered Medications: None     Allergies   Allergies   Allergen Reactions     Ciprofloxacin Other (See Comments)     Contraindicated for myasthenia gravis patients     Procainamide Other (See Comments)     Contraindicated for myasthenia gravis patients     Quinidine Other (See Comments)     Contraindicated for myasthenia gravis patients     Quinine Other (See Comments)     Contraindicated for myasthenia gravis patients       Physical Exam   Vital Signs: Temp: 98  F (36.7  C) Temp  src: Rectal BP: (!) 131/98 Pulse: 86   Resp: 16 SpO2: 98 % O2 Device: BiPAP/CPAP    Weight: 272 lbs 14.87 oz    General Appearance: Elderly, on BiPap  Eyes: ARTURO, EOMI  HEENT: NC, AT. MMM.   Respiratory: Crackles, normal work of breathing on bipap  Cardiovascular: Regular Rate and Rhythm, normal S1, S2. No murmurs, rubs, gallops  GI: Soft, non-tender, non-distended  Lymph/Hematologic: No asymetric swelling. Pitting edema. No bruising.  Genitourinary: Deferred  Skin: No rashes, lesions, wounds.  Musculoskeletal: Warm, well perfused  Neurologic: AOx4, CNII-XII intact.  Psychiatric: Euthymic. Mood appropriate.     Data   Data reviewed today: I reviewed all medications, new labs and imaging results over the last 24 hours. I personally reviewed the chest x-ray image(s) showing fluid overload.    Recent Labs   Lab 07/17/21  1121 07/17/21  0822 07/17/21  0821 07/17/21  0817 07/16/21  0748 07/14/21  1020 07/12/21  1112   WBC  --   --  6.6  --   --  6.8 8.2   HGB 14.3  --  13.2*  --   --  13.1* 12.8*   MCV  --   --  101*  --   --  102* 102*   PLT  --   --  239  --   --  220 219   INR  --   --  1.07  --   --   --   --      --   --  131* 132* 131* 132*   POTASSIUM 4.3  --   --  5.8* 3.8 4.9 3.4   CHLORIDE  --   --   --  88* 87* 87* 86*   CO2  --   --   --  >45* 42* 44* 44*   BUN  --   --   --  20 16 18 14   CR  --   --   --  0.57* 0.46* 0.66 0.65*   ANIONGAP  --   --   --  <1* 3 <1* 2*   NOAH  --   --   --  9.9 9.2 9.2 9.0   GLC  --   --   --  137* 91 79 106*   ALBUMIN  --   --   --  3.0*  --   --   --    PROTTOTAL  --   --   --  6.4*  --   --   --    BILITOTAL  --   --   --  0.6  --   --   --    ALKPHOS  --   --   --  50  --   --   --    ALT  --   --   --  56  --   --   --    AST  --   --   --  45  --   --   --    TROPONIN  --  0.018  --   --   --  <0.015  --

## 2021-07-17 NOTE — ED NOTES
DATE:  7/17/2021   TIME OF RECEIPT FROM LAB:  0842  LAB TEST:  Venous gas Co2, bicarb  LAB VALUE:  104, 50  RESULTS GIVEN WITH READ-BACK TO (PROVIDER):  Modesto Ocampo MD  TIME LAB VALUE REPORTED TO PROVIDER:   8:45 AM

## 2021-07-18 NOTE — PROGRESS NOTES
Plan of Care:  Continue to monitor and assess respiratory status while in ICU, wean from oxygen as tolerated per protocol.  Deliver respiratory medications as ordered per protocol.  Assess skin integrity at least once per shift.      Shift Notes:  Pt remained on BiPAP S/T 20/12 100% throughout most of my shift.  He had a couple desaturation events where he dropped down to 50-60% SpO2 with a good pleth and waveform on the monitor.  Pt was switched from a firemans mask to full face mask to reduce leaks and this seemed to help.  Liquicel and mepilex are in place for skin barriers and skin appears intact with no sign of redness.

## 2021-07-18 NOTE — PROGRESS NOTES
Patient  at 925. Family at bedside. Sent home with electronics including tablet and phone. Watch and  remain with patient.

## 2021-07-18 NOTE — PLAN OF CARE
Pt disoriented to place/situation. VSS on BiPAP- except tachypneic, RR 20's-30's. Tele: SR. Pt denies CP. Endorses back pain, improved with repositioning. TANNER. IV Lasix given with good UO. Chronic Beckett catheter in place. Nitroglycerin infusing. IV ABX given. Plan for echo and cards consult.

## 2021-07-18 NOTE — PROGRESS NOTES
Pt's family at bedside, pt requesting bipap removed and to be made comfortable.  Bipap off at 0605, orders placed for comfort care per MD.

## 2021-07-18 NOTE — DEATH PRONOUNCEMENT
MD DEATH PRONOUNCEMENT    Called to pronounce Thanh Goodrich dead.    Physical Exam: Unresponsive to noxious stimuli, Spontaneous respirations absent, Breath sounds absent, Carotid pulse absent, Heart sounds absent and Pupillary light reflex absent    Patient was pronounced dead at 1005: AM, 2021.    No data filed        Active Problems:    Acute and chronic respiratory failure with hypercapnia (H)    Acute on chronic respiratory failure with hypoxia (H)    Congestive heart failure, unspecified HF chronicity, unspecified heart failure type (H)       Infectious disease present?: NO    Communicable disease present? (examples: HIV, chicken pox, TB, Ebola, CJD) :  NO    Multi-drug resistant organism present? (example: MRSA): NO    Please consider an autopsy if any of the following exist:  NO Unexpected or unexplained death during or following any dental, medical, or surgical diagnostic treatment procedures.   NO Death of mother at or up to seven days after delivery.     NO All  and pediatric deaths.     NO Death where the cause is sufficiently obscure to delay completion of the death certificate.   NO Deaths in which autopsy would confirm a suspected illness/condition that would affect surviving family members or recipients of transplanted organs.     The following deaths must be reported to the 's Office:  NO A death that may be due entirely or in part to any factors other than natural disease (recent surgery, recent trauma, suspected abuse/neglect).   NO A death that may be an accident, suicide, or homicide.     NO Any sudden, unexpected death in which there is no prior history of significant heart disease or any other condition associated with sudden death.   NO A death under suspicious, unusual, or unexpected circumstances.    NO Any death which is apparently due to natural causes but in which the  does not have a personal physician familiar with the patient s medical history,  social, or environmental situation or the circumstances of the terminal event.   NO Any death apparently due to Sudden Infant Death Syndrome.     NO Deaths that occur during, in association with, or as consequences of a diagnostic, therapeutic, or anesthetic procedure.   NO Any death in which a fracture of a major bone has occurred within the past (6) six months.   NO A death of persons note seen by their physician within 120 days of demise.     NO Any death in which the  was an inmate of a public institution or was in the custody of Law Enforcement personnel.   NO  All unexpected deaths of children   NO Solid organ donors   NO Unidentified bodies   NO Deaths of persons whose bodies are to be cremated or otherwise disposed of so that the bodies will later be unavailable for examination;   NO Deaths unattended by a physician outside of a licensed healthcare facility or licensed residential hospice program   NO Deaths occurring within 24 hours of arrival to a health care facility if death is unexpected.    NO Deaths associated with the decedent s employment.   NO Deaths attributed to acts of terrorism.   NO Any death in which there is uncertainty as to whether it is a medical examiner s care should be discussed with the medical investigator.        Body disposition: Body released to the morgue.

## 2021-07-18 NOTE — PROGRESS NOTES
"Critical Care Updated Progress Note:  After arrival to the ICU, patient's BPAP FiO2 was able to be decreased to 50%, on 20/8 with Vt of around 500-550 mL. He was comfortable. See consult note.    Shortly thereafter his SpO2 did drop into the 70-80s, and he was again on FiO2 100%. EPAP increased to 12. With improved SpO2 to low to mid 80s.    I did discuss with Rigoberto that he would likely need a breathing tube again, and to be placed on mechanical ventilation. He stated that \"it was time to go then.\" I did discuss with him that he has had a long year, requiring multiple hospitalizations, and that at some point we would need to have a long discussion about things. He stated he did not want a ventilator.    I then clarified with him that this could mean that he would die, which he seemed okay with. We also discussed compressions and defibrillations, which he again declined.    He will be made DNR/DNI.    We will continue with current cares.    I did try to reach Gaby Martinez, and Moreno Martinez, but was unable. I did leave a message for them to call the unit when they receive the message.    Golden Klein, DO  "

## 2021-07-18 NOTE — PROGRESS NOTES
SPIRITUAL HEALTH SERVICES  SPIRITUAL ASSESSMENT Progress Note  [facility] ICU     REFERRAL SOURCE: Routine Consult    Visited with patient's family following death. Encouraged life review and offered grief support. I offered spiritual and emotional support through reflective listening that affirmed emotions, experience, and meaning. Offered assurance through prayer which incorporated conversational themes.    PLAN: [facility] remains available for support as requested.    Nhung Heard  Associate   Pager [phone]  Phone [phone]

## 2021-07-18 NOTE — PLAN OF CARE
Pt to transfer to ICU after RRT called for respiratory effort/decreased SaO2. Report called. Belongings sent.

## 2021-07-18 NOTE — DISCHARGE SUMMARY
[facility]    Death Summary - Hospitalist Service     Date of Admission:  2021  Date of Death: 2021  Discharging Provider: Seng Brambila DO    Discharge Diagnoses   Acute on Chronic Hypoxic and Hypercapnic Respiratory Failure  Community acquired pneumonia  Acute on chronic diastolic CHF  Myasthenia gravis  History of PE      Cause of death: hypoxemic respiratory failure, acute on chronic multifactorial primary due to acute on chronic diastolic CHF and community acquired pneumonia    Hospital Course   Mr. Goodrich was admitted for acute on chronic hypoxemic respiratory failure secondary to pneumonia, heart failure. He had recurrent hospitalizations during the year for same. He was admitted on nitroglycerins, IV diuretics, antibiotics, and bipap . Unfortunately, he did deteriorate and transferred to the ICU dependent on bipap. At this point, he was found to be progressively hypoxemic despite bipap, and declined to be intubated again. He was transitioned to comfort care and  shortly thereafter with family at the bedside      DO SULLY [facility]  ______________________________________________________________________      Significant Results and Procedures   Most Recent 3 CBC's:Recent Labs   Lab Test 21  1121 21  0821 21  1020 21  1112   WBC  --  6.6 6.8 8.2   HGB 14.3 13.2* 13.1* 12.8*   MCV  --  101* 102* 102*   PLT  --  239 220 219     Most Recent 3 BMP's:Recent Labs   Lab Test 21  0020 21  1659 21  1556 21  1528 21  1121 21  0817 21  0748 21  1020   NA  --   --   --   --  133 131* 132* 131*   POTASSIUM  --   --  3.9  --  4.3 5.8* 3.8 4.9   CHLORIDE  --   --   --   --   --  88* 87* 87*   CO2  --   --   --   --   --  >45* 42* 44*   BUN  --   --   --   --   --  20 16 18   CR  --   --   --  0.61*  --  0.57* 0.46* 0.66   ANIONGAP  --   --   --   --   --  <1* 3 <1*    NOAH  --   --   --   --   --  9.9 9.2 9.2   * 107*  --   --   --  137* 91 79     Most Recent 2 LFT's:Recent Labs   Lab Test 07/17/21  0817 07/09/21  2353   AST 45 30   ALT 56 52   ALKPHOS 50 51   BILITOTAL 0.6 0.8     Most Recent 3 INR's:Recent Labs   Lab Test 07/17/21  0821 04/09/21  0743 04/07/21  0726   INR 1.07 1.10 1.20*   ,   Results for orders placed or performed during the hospital encounter of 07/17/21   XR Chest Port 1 View    Narrative    CHEST ONE VIEW PORTABLE   7/17/2021 8:45 AM     HISTORY:  Shortness of breath.    COMPARISON: 7/14/2021.      Impression    IMPRESSION: Shallow inspiration accentuates heart size and pulmonary  vascularity. Hazy opacity throughout the left lung has increased since  the previous exam, and could be infectious in etiology. The right lung  is otherwise clear. No pneumothorax is identified. Aortic  calcification. There is likely a small left pleural effusion.     ERNESTO CRUZ MD         SYSTEM ID:  [id number]       Consultations This Hospital Stay   PHYSICAL THERAPY ADULT IP CONSULT  OCCUPATIONAL THERAPY ADULT IP CONSULT  PHARMACY TO DOSE VANCO  CARDIOLOGY IP CONSULT  SPIRITUAL HEALTH SERVICES IP CONSULT  INTENSIVIST IP CONSULT    Primary Care Physician   Sara Chadwick    Time Spent on this Encounter   I, Seng Brambila DO, personally saw the patient today and spent less than or equal to 30 minutes discharging this patient.

## 2021-07-18 NOTE — PROGRESS NOTES
Patient requested oxygen to be turned off. Patient comfortable and turned. Family at bedside. Emotional support provided. Will continue to monitor.

## 2021-07-18 NOTE — CONSULTS
[facility] Intensive Care Unit  Consult History and Physical  July 18, 2021  Thanh Goodrich MRN# [id number]   Age: 80 year old YOB: 1940     Date of Admission: 7/17/2021    Primary care provider: Sara Chadwick          Assessment and plan :   Thanh Goodrich is a 80 year old male admitted on 7/17/2021 for   Chief Complaint   Patient presents with     Shortness of Breath   . 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 Cardiovascular: known HFpEF, volume overloaded with pulmonary edema  - received 80 mg Lasix around admission  - will start 60 mg IV q 8 hours  - currently on BPAP, and will continue for now  - maintain NTG gtt for further afterload reduction    3. Pulmonary/Ventilator Management: Acute on chronic hypoxic/hypercapnic respiratory failure, with known OHS, EDDIE, neuromuscular disease, and now with again pulmonary edema  - continue on BPAP  - agree with zosyn given immune suppression  - ? Mucus plugging, but has not produced much mucus and CXR is more consistent with volume overload, as well as physical exam (edematous, POCUS with small pleural effusion and diffuse B-lines)  - again will diurese  - start QID albuterol nebs    4. GI and Nutrition : obesity  - start on pepcid for GI prophy  - NPO for now    5. Renal/Fluids/Electrolytes: .Assessment: No significant issues  - monitor electrolytes, UOP  - diuresis as tolerated  - hyperkalemia at admission, aldactone held    6. Infectious Disease: Immune suppressed with cellcept, steroids  - on Bactrim for PJP prophylaxis  - started on zosyn, will continue    7. Endocrine: no issues    8. Hematology/Oncology: no issues    10. IV/Access: peripheral IVs    11. Prophylaxis: pepcid for GI, on DOAC     12. Billing: total time spend providing critical care was 45  min         Chief Complaint/ Reason for ICU Admission and HPI     Admitted for :   Chief Complaint   Patient presents with     Shortness of Breath     History obtained from patient and chart  History of Present Illness: Mr. Goodrich is a 79 yo male who presents to [facility] for worsening shortness of breath. On review, he has been hospitalized 5 times since March 2021, sometimes requiring intubation, sometimes being maintained on BPAP. He is WC bound outside the hospital. He has had some cough, but nonproductive. He denies any weakness. He had reportedly been doing okay, but then became more dyspneic yesterday. He was placed on BPAP. After admission, he had an episode where his SpO2 dropped to the 50s, and was very slow to recover. He was thus transferred to ICU. After arrival, his SpO2 was 98% on 60% FiO2, 20/8 onBPAP. He was again comfortable.         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     Cellulitis of left leg 9/23/2012     Congestive heart failure, unspecified HF chronicity, unspecified heart failure type (H) 7/17/2021     Depressive disorder 2020     Edema 7/7/2009     History of steroid therapy 2/22/2010     Hyperlipidemia LDL goal <130 10/31/2010     HYPERTENSION 7/8/2003     Incontinence of urine 10/25/2010     Leg ulcer (H) 9/20/2012     MALIGN NEOPL PROSTATE-3/07 4/2/2007    T1C, [place] 8, initial PSA 39, s/p radiation seed implants 2007      Myasthenia gravis (H)      OBESITY 7/8/2003     Osteoporosis 8/18/2009    Refused bisphosphonates 2011      PVC's (premature ventricular contractions) 11/23/2011     RIGHT OCCIPITAL PAIN 7/8/2003     ROTATOR CUFF SYND NOS- RT 9/19/2005     Skin nodule 3/18/2013     ulcer left shin 10/8/2007     Uncomplicated asthma 1944?    childhood only            Past Surgical History:     Past Surgical History:   Procedure Laterality Date     BIOPSY  2013?    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 No   Social History Narrative    The patient has a 0 pk yr tobacco hx.  He has no active use.  Alcohol use is rare alcoholic drinks per week.  He denies use of recreational drugs.          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        The patient is single.  Has 0 children.        Hot Tub Exposure: NO    Recent Travel: NO     Hx of incarceration:  NO    Bird Exposure:   NO    Animal Exposure:  3 Cats    Inhalation Exposure:  + asbestos exposure in past             Social Determinants of Health     Financial Resource Strain:      Difficulty of Paying Living Expenses:    Food Insecurity:      Worried About Running Out of Food in the Last Year:      Ran Out of Food in the Last Year:    Transportation Needs:      Lack of Transportation (Medical):      Lack of Transportation (Non-Medical):    Physical Activity:      Days of Exercise per Week:      Minutes of Exercise per Session:    Stress:      Feeling of Stress :    Social Connections:      Frequency of Communication with Friends and Family:      Frequency of Social Gatherings with Friends and Family:      Attends Sabianism Services:      Active Member of Clubs  or Organizations:      Attends Club or Organization Meetings:      Marital Status:    Intimate Partner Violence:      Fear of Current or Ex-Partner:      Emotionally Abused:      Physically Abused:      Sexually Abused:      Smoking: No.   History   Smoking Status     Never Smoker   Smokeless Tobacco     Never Used     Alcohol: No  Social History    Substance and Sexual Activity      Alcohol use: Yes        Alcohol/week: 0.0 standard drinks        Comment: very rarely    Drug:  No   History   Drug Use No            Family History:     Family History   Problem Relation Age of Onset     Hypertension Mother      Depression Mother      Substance Abuse Mother         alcohol     Hypertension Father      Cancer Father         Prostate     Prostate Cancer Father      Substance Abuse Father         alcohol     Obesity Father      Diabetes Maternal Grandmother      C.A.D. Maternal Grandmother      Cerebrovascular Disease Maternal Grandfather      Cancer Paternal Uncle         Prostate     Prostate Cancer Other             Allergies:   Please see allergy list which was reviewed this admission.  This patient is allergic to is allergic to ciprofloxacin, procainamide, quinidine, and quinine.         Medications:     Current Facility-Administered Medications   Medication     acetaminophen (TYLENOL) tablet 650 mg    Or     acetaminophen (TYLENOL) Suppository 650 mg     albuterol (PROVENTIL) neb solution 2.5 mg     busPIRone (BUSPAR) tablet 5 mg     carboxymethylcellulose PF (REFRESH PLUS) 0.5 % ophthalmic solution 1 drop     famotidine (PEPCID) injection 20 mg     furosemide (LASIX) injection 60 mg     hydrOXYzine (ATARAX) tablet 25 mg     ipratropium - albuterol 0.5 mg/2.5 mg/3 mL (DUONEB) neb solution 3 mL     lidocaine (LMX4) cream     lidocaine 1 % 0.1-1 mL     melatonin tablet 1 mg     metoprolol tartrate (LOPRESSOR) half-tab 12.5 mg     miconazole (MICATIN) 2 % powder     mycophenolate (GENERIC EQUIVALENT) tablet 1,000 mg      nitroGLYcerin (NITROSTAT) sublingual tablet 0.4 mg     nitroGLYcerin 50 mg in D5W 250 mL (adult std) infusion CENTRAL     No lozenges or gum should be given while patient on BIPAP/AVAPS/AVAPS AE     ondansetron (ZOFRAN-ODT) ODT tab 4 mg    Or     ondansetron (ZOFRAN) injection 4 mg     Patient may continue current oral medications     piperacillin-tazobactam (ZOSYN) 4.5 g vial to attach to  mL bag     predniSONE (DELTASONE) tablet 60 mg     propofol (DIPRIVAN) infusion     pyridostigmine (MESTINON) tablet 60 mg     rivaroxaban ANTICOAGULANT (XARELTO) tablet 20 mg     sertraline (ZOLOFT) tablet 50 mg     sodium chloride (PF) 0.9% PF flush 3 mL     sodium chloride (PF) 0.9% PF flush 3 mL     vancomycin 1250 mg in 0.9% NaCl 250 mL intermittent infusion 1,250 mg            Review of Systems:   Complete ROS obtained, and negative other than HPI.         Physical Exam:   Vitals were reviewed  Temp:  [97.4  F (36.3  C)-98  F (36.7  C)] 97.6  F (36.4  C)  Pulse:  [] 90  Resp:  [14-43] 27  BP: (108-172)/(55-99) 122/76  FiO2 (%):  [60 %-100 %] 100 %  SpO2:  [58 %-100 %] 78 %    Intake/Output Summary (Last 24 hours) at 7/18/2021 0042  Last data filed at 7/17/2021 2240  Gross per 24 hour   Intake 250 ml   Output 3700 ml   Net -3450 ml       General:   Comfortable, no pain or respiratory distress  On BPAP   Neurologic:   Alert and oriented x 3  Moving all extremities   HEENT:   Head is atraumatic      Lungs:   Symmetrical chest shape and movements with each tidal breath  Symmetrical and normal breath sounds, no wheeze, ronchi, crackles, rub or bronchial breath sounds  Limited exam due to body habitus  POCUS demonstrates bilateral diffuse B-lines, small effusion, no consolidation   Cardiovascular:   Regular rate and rhythm  Limited exam due to body habitus   Abdomen:   Distended:  No. Bowel sound present and normal: Yes . Soft: Yes . Tender: No. Rebound:tendeness or guarding present No  Hernia present: No    Extremities:   Clubbing present: No, Edema present: Yes , Acrocyanosis present: No, Mottling present: No, Normal capillary refill   Skin:   Warm, dry.  No rash on limited exam. Areas of significant skin folds with some evidence of yeast             Ancillary Data:   All laboratory and imaging data reviewed.     ABG/vent data:   FiO2 (%): 100 %  Resp: 27    Recent Labs   Lab 07/17/21  2236 07/17/21  0820 07/14/21  1020 07/11/21  0411   PH 7.35  7.35  --   --   --    PCO2 99*  99*  --   --   --    PO2 45*  45*  --   --   --    HCO3 55*  55*  --   --   --    O2PER 100  100 100 2 40%Bipap        ROUTINE IP LABS (Last four results)  BMP  Recent Labs   Lab 07/18/21  0020 07/17/21  1659 07/17/21  1556 07/17/21  1528 07/17/21  1121 07/17/21  0817 07/16/21  0748 07/14/21  1020 07/12/21  1112   NA  --   --   --   --  133 131* 132* 131* 132*   POTASSIUM  --   --  3.9  --  4.3 5.8* 3.8 4.9 3.4   CHLORIDE  --   --   --   --   --  88* 87* 87* 86*   NOAH  --   --   --   --   --  9.9 9.2 9.2 9.0   CO2  --   --   --   --   --  >45* 42* 44* 44*   BUN  --   --   --   --   --  20 16 18 14   CR  --   --   --  0.61*  --  0.57* 0.46* 0.66 0.65*   * 107*  --   --   --  137* 91 79 106*     CBC  Recent Labs   Lab 07/17/21  1121 07/17/21  0821 07/14/21  1020 07/12/21  1112 07/11/21  0411   WBC  --  6.6 6.8 8.2 7.9   RBC  --  4.21* 4.20* 4.09* 3.83*   HGB 14.3 13.2* 13.1* 12.8* 12.2*   HCT 42 42.6 42.7 41.7 39.0*   MCV  --  101* 102* 102* 102*   MCH  --  31.4 31.2 31.3 31.9   MCHC  --  31.0* 30.7* 30.7* 31.3*   RDW  --  13.2 13.3 13.4 13.2   PLT  --  239 220 219 196     INR  Recent Labs   Lab 07/17/21  0821   INR 1.07       Other Lab Data:  NT Pro-BNP > 3000                                                     EKG results:   Performed on admission   Normal sinus rhythm, normal axis, no acute ischemic ST segment or T wave changes     Echocardiology:   Performed at [facility] previously: Date: 3/23/21        Results:    The  visual ejection fraction is estimated at 55-60%.  Grade I or early diastolic dysfunction.  Normal left ventricular wall motion  The right ventricle is normal in size and function.  There is mild mitral stenosis.  There is moderate mitral annular calcification.  The ascending aorta is Mildly dilated.     All imaging studies reviewed by me. CXR with low lung volumes, some central congestion, increased haziness on the left > right.    Golden Klein, DO July 18, 2021

## 2021-07-18 NOTE — CODE/RAPID RESPONSE
[facility]    House [facility]ID[facility] RRT Note  7/17/2021   Time Called: 2226    RRT called for: Respiratory distress    Assessment & Plan     Acute on chronic hypoxic hypercapnic respiratory failure multifactorial 2/2 HFpEF exacerbation, possible CAP with PMH myasthenia gravis (received steroids for possible myasthenia gravis flare, low suspicion), asthma, EDDIE, likely obesity hypoventilation syndrome, PE on chronic anticoagulation with historical intubations due to above.  - Upon arrival, pt lying in bed, awake but tired appearing, on full facemask Bipap with O2 sats 70s% on 20/10/100%.  Per nursing, pt had been resting in bed on 16/8/50% when pt acutely dropped O2 sats to the 40s (good waveform).  Nursing notes no air movement throughout L lung; at time of arrival, pt now with coarse lung sounds bilaterally, no wheezes noted.  Pt's HR 90s, SBP 140s-170s, RR 20s, afebrile.  On adjusted Bipap mask and settings, pt triggering Bipap 70s%, pulling TV 500s-600s, RR 20s, O2 sats took a long time to recover to high 80s.    INTERVENTIONS:  - Stat ABG  - Continuous Bipap; RT adjusting settings as deemed appropriate (18/8/100% at time of transfer)  - After discussion with Dr. Klein, intensivist, will transfer pt to ICU for further monitoring; will defer to intensivist if intubation deemed appropriate, does not require emergent intubation at this time.    - Pt has received 2 doses IV lasix today; continues on nitroglycerin infusion as well  - Pt continues on zosyn, vancomycin  - Pt continues on steroids  - Pt confirms FULL CODE    At the end of the RRT pt transferred to ICU    Discussed with and defer further cares to nursing and Dr. Klein, intensivst; greatly appreciate intensivist's expertise and further management of pt.    Interval History     Thanh Goodrich is a 80 year old male who was admitted on 7/17/2021 for respiratory failure.    Medical history significant for: Myasthenia gravis, HFpEF, asthma, EDDIE  not on CPAP, chronic hypercapnic hypoxic respiratory failure, PE on chronic anticoagulation, prostate cancer with chronic alberto catheter, depression, HTN    Code Status: Full Code    Allergies   Allergies   Allergen Reactions     Ciprofloxacin Other (See Comments)     Contraindicated for myasthenia gravis patients     Procainamide Other (See Comments)     Contraindicated for myasthenia gravis patients     Quinidine Other (See Comments)     Contraindicated for myasthenia gravis patients     Quinine Other (See Comments)     Contraindicated for myasthenia gravis patients       Physical Exam   Vital Signs with Ranges:  Temp:  [97.4  F (36.3  C)-98  F (36.7  C)] 97.6  F (36.4  C)  Pulse:  [79-96] 91  Resp:  [14-38] 29  BP: (108-153)/(55-99) 116/71  FiO2 (%):  [60 %-100 %] 70 %  SpO2:  [79 %-100 %] 95 %  I/O last 3 completed shifts:  In: 100 [IV Piggyback:100]  Out: 1650 [Urine:1650]    Constitutional: Pt lying in bed, awake, but tired appearing, on full facemask, shallow inspiratory effort  Neck: No upper airway wheezes or stridor noted  Pulmonary: Shallow inspiratory effort, tachypneic, coarse lung sounds throughout, no wheezes noted  Cardiovascular: Regular rate and rhythm, normal S1S2, no murmur, rub or gallop noted  GI: Round, soft, nondistended, nontender to palpation, no guarding or rebound tenderness noted  Skin/Integumen: Warm, dry, pale appearing, no mottling noted  Neuro: Awake, but tired appearing, PERRL, moving all extremities, able to make needs known, above to converse in short phrases  Psych:  Calm  Extremities: +3 pitting edema noted in BLE    Data       ABG:  Recent Labs   Lab 07/17/21  2236   PH 7.35  7.35   PCO2 99*  99*   PO2 45*  45*   HCO3 55*  55*   O2PER 100  100       IMAGING: (X-ray/CT/MRI)   Recent Results (from the past 24 hour(s))   XR Chest Port 1 View    Narrative    CHEST ONE VIEW PORTABLE   7/17/2021 8:45 AM     HISTORY:  Shortness of breath.    COMPARISON: 7/14/2021.      Impression     IMPRESSION: Shallow inspiration accentuates heart size and pulmonary  vascularity. Hazy opacity throughout the left lung has increased since  the previous exam, and could be infectious in etiology. The right lung  is otherwise clear. No pneumothorax is identified. Aortic  calcification. There is likely a small left pleural effusion.     ERNESTO CRUZ MD         SYSTEM ID:  [id number]       Time Spent on this Encounter   I spent 45 minutes of critical care time on the unit/floor managing the care of Thanh Goodrich. Upon evaluation, this patient had a high probability of imminent or life-threatening deterioration due to respiratory failure, which required my direct attention, intervention, and personal management. 100% of my time was spent at the bedside counseling the patient and/or coordinating care regarding services listed in this note.    ANIYA Buckley [facility]

## 2021-07-18 NOTE — PROGRESS NOTES
Critical Care updated:  Family now at bedside. Patient wishes to be be made comfortable. End of Life order set placed.     Critical care will sign off at this time. Please call if we can be of further assistance.    Golden Klein, DO

## 2021-07-18 NOTE — CONSULTS
Cardiology consult order received.    Patient has transitioned to comfort care.  I have spoken to ICU staff and I will defer seeing the patient.  Please call cardiology with any questions if goals of care changes.

## 2021-07-22 LAB — BACTERIA BLD CULT: NO GROWTH

## 2021-07-23 LAB — BACTERIA BLD CULT: NO GROWTH

## 2021-10-05 NOTE — PLAN OF CARE
Pt here with acute respiratory failure. Alert, oriented x4. Neuros: decreased strength, BLE's scoring a 3/5, BUE's a 4/5. VSS on 2L via NC. Lung sounds diminished. Regular diet with 2g Na+, thin liquids. Takes pills whole. Up with A2 usin the lift, T/R Q2H but intermittently refuses repositioninig. Skin with blamchable redness to coccyx, mepilex in place and L. Thigh with redness. Mild HA overnight, PRN tylenol administerd. Beckett in place with adequate output, no BM overnight. Pt scoring green on the Aggression Stop Light Tool. Discharge plan pending, possibly back to [facility].   pt confused

## 2021-12-28 NOTE — PROVIDER NOTIFICATION
DATE:  4/13/2021   TIME OF RECEIPT FROM LAB:  April 13, 2021 0830  LAB TEST:  PCO2 & Bicarb from VB  LAB VALUE:  PCO2 87. Bicarb 50.   RESULTS GIVEN WITH READ-BACK TO (PROVIDER): Dr. Hopson  TIME LAB VALUE REPORTED TO PROVIDER:   1744    ADDENDUM: Dr. Hopson called back promptly. Will waiting for pulmonary team to round. IF they are not by in a few hours. Reach out to the team     ADDENDUM2: Rounding pulmonology stated pt would be ok to discharge from their perspective       Unknown if ever smoked

## 2022-06-14 NOTE — LETTER
[facility]  [address] [facility]  [address]  [place]  [address]  Tel: [phone]    August 13, 2018    Thanh Goodrich  [address]   [facility] [address]        Princess Law are your lab reports from your recent office exam and I am happy to report that things are looking better than they did 1 year ago.    As we had repeated your myasthenia panel reports are improved.  The striated muscle antibody titer was lower.  The striated muscle antibody IgG was positive which we would have expected.  The acetylcholine modulating antibody was reduced from 1 year ago.    The vitamin D level was in the unbelievably good range.    Your comprehensive metabolic panel showed that your minerals and electrolytes, kidney function and liver function tests were all normal.  Your fasting blood sugar was normal as well.    Lipid panel showed that your total cholesterol was 175 and anything less than 200 is normal.  The triglycerides which are intimately related to carbohydrates were 90, anything less than 150 is normal and anything less than 100 is better.  The HDL or good cholesterol was 53 and anything greater than 40 is normal.  Your level from a year ago was 63 which suggest that you were doing more exercise or activity last time we had checked.  Most important value is the LDL or bad cholesterol which was 104 and anything less than 130 is normal, the lower than that value is the better off you are.    The PSA as a screen for prostate cancer shows that your PSA was undetectable which is very good.    Complete blood count showed that there is no sign of low blood or anemia.    I am glad to share these good reports with you.  If you have any questions please let me know.  We should plan on rechecking her blood pressure and your feeling of well-being in 6 months.    If you have any further questions or problems, please contact our office.      Sincerely,    Luis Oliver MD /  erlin          Enclosure: Lab Results  Resulted Orders   CBC with platelets   Result Value Ref Range    WBC 5.5 4.0 - 11.0 10e9/L    RBC Count 4.95 4.4 - 5.9 10e12/L    Hemoglobin 14.3 13.3 - 17.7 g/dL    Hematocrit 45.1 40.0 - 53.0 %    MCV 91 78 - 100 fl    MCH 28.9 26.5 - 33.0 pg    MCHC 31.7 31.5 - 36.5 g/dL    RDW 14.0 10.0 - 15.0 %    Platelet Count 226 150 - 450 10e9/L   Comprehensive metabolic panel   Result Value Ref Range    Sodium 139 133 - 144 mmol/L    Potassium 3.8 3.4 - 5.3 mmol/L    Chloride 99 94 - 109 mmol/L    Carbon Dioxide 32 20 - 32 mmol/L    Anion Gap 8 3 - 14 mmol/L    Glucose 91 70 - 99 mg/dL      Comment:      Fasting specimen    Urea Nitrogen 17 7 - 30 mg/dL    Creatinine 0.77 0.66 - 1.25 mg/dL    GFR Estimate >90 >60 mL/min/1.7m2      Comment:      Non  GFR Calc    GFR Estimate If Black >90 >60 mL/min/1.7m2      Comment:       GFR Calc    Calcium 9.2 8.5 - 10.1 mg/dL    Bilirubin Total 0.5 0.2 - 1.3 mg/dL    Albumin 3.8 3.4 - 5.0 g/dL    Protein Total 7.1 6.8 - 8.8 g/dL    Alkaline Phosphatase 61 40 - 150 U/L    ALT 16 0 - 70 U/L    AST 16 0 - 45 U/L   Lipid panel reflex to direct LDL Fasting   Result Value Ref Range    Cholesterol 175 <200 mg/dL    Triglycerides 90 <150 mg/dL      Comment:      Fasting specimen    HDL Cholesterol 53 >39 mg/dL    LDL Cholesterol Calculated 104 (H) <100 mg/dL      Comment:      Above desirable:  100-129 mg/dl  Borderline High:  130-159 mg/dL  High:             160-189 mg/dL  Very high:       >189 mg/dl      Non HDL Cholesterol 122 <130 mg/dL   Vitamin D Deficiency   Result Value Ref Range    Vitamin D Deficiency screening 101 (H) 20 - 75 ug/L      Comment:      Season, race, dietary intake, and treatment affect the concentration of   25-hydroxy-Vitamin D. Values may decrease during winter months and increase   during summer months. Values 20-29 ug/L may indicate Vitamin D insufficiency   and values <20 ug/L may indicate  Vitamin D deficiency.  Vitamin D determination is routinely performed by an immunoassay specific for   25 hydroxyvitamin D3.  If an individual is on vitamin D2 (ergocalciferol)   supplementation, please specify 25 OH vitamin D2 and D3 level determination by   LCMSMS test [id number].     Prostate spec antigen screen   Result Value Ref Range    PSA <0.01 0 - 4 ug/L      Comment:      Assay Method:  Chemiluminescence using Siemens Vista analyzer   Striated muscle antibody IgG   Result Value Ref Range    Striated Muscle Antibody IgG Detected (A) <1:40      Comment:      (Note)  Striated Muscle Antibodies, IgG detected. Titer results to   follow.  INTERPRETIVE DATA:  Striated Muscle Antibodies, IgG Screen  In the presence of acetylcholine receptor (AChR) antibody,   striated muscle antibodies, which bind in a   cross-striational pattern to skeletal and heart muscle   tissue sections, are associated with late-onset myasthenia   gravis (MG). Striated muscle antibodies recognize epitopes   on three major muscle proteins, including: titin, ryanodine   receptor (RyR) and Kv1.4 (an alpha subunit of voltage-gated   potassium channel [VGKC]). Isolated cases of striated   muscle antibodies may be seen in patients with certain   autoimmune diseases, rheumatic fever, myocardial   infarction, and following some cardiotomy procedures.  Test developed and characteristics determined by [facility]. See Compliance Statement A: [employer]  Performed by [facility],  [address] [place] [id number] [phone]  [email or web address], Forest Johnson MD, Lab. D  irector     Acetylcholine modulating antibody   Result Value Ref Range    AcetChol Modul Nadiya 58 (H) <=45 %      Comment:      (Note)  Repeated and Verified  INTERPRETIVE INFORMATION: Acetylcholine Modulating Ab   Negative ..........  0-45 percent modulating   Positive ..........  46 percent or greater modulating  Approximately 85-90 percent of patients with myasthenia    gravis (MG) express antibodies to the acetylcholine   receptor (AChR), which can be divided into binding,   blocking, and modulating antibodies. Binding antibody can   activate complement and lead to loss of AChR. Blocking   antibody may impair binding of acetylcholine to the   receptor, leading to poor muscle contraction. Modulating   antibody causes receptor endocytosis resulting in loss of   AChR expression, which correlates most closely with   clinical severity of disease. Approximately 10-15 percent   of individuals with confirmed myasthenia gravis have no   measurable binding, blocking, or modulating antibodies.  Test developed and characteristics determined by [facility]. See Compliance Statement B: [facility]/  Performed by   [facility],  [address] [facility],[place] [id number] [phone]  [email or web address], Forest Johnson MD, Lab. Director     Striated Muscle Antibody Titer   Result Value Ref Range    Striated Muscle Antibody IgG Titer 1:320 (A) <1:40      Comment:      (Note)  Performed by [facility],  [address] [facility],[place] [id number] [phone]  [email or web address], Forest Johnson MD, Lab. Director          No

## 2024-01-20 NOTE — PROGRESS NOTES
SHIFT SUMMARY     NEURO: AOx4. Uses written communication.   RESP: extubated to Bipap on AVAPS settings/ tolerating. No reports of SOB or tiredness. Weak but productive cough. Sats stable.   CARDIAC: NSR. BP stable.   GI/: Beckett, UOP adequate. Clear. Minimal bleeding from penis. Urine clear yellow/pasquale. Off TF due to extubation. Will see how extubation goes and visit possible swallow study vs. IV/TF nutrition tomorrow. No BM. Active bowel sounds. Refused bowel meds.   SOCIAL: cousinayan, updated several times today on plan of care via phone.   *Patient refused turns several times today as charted. Encouraged to move to prevent pressure injuries on coccyx & sacrum.     Miriam Kerr RN          Not applicable

## 2024-12-06 NOTE — CONSULTS
Called Jacques will get colonoscopy.    Neurology Consultation      Date of Admission:  3/26/2021        Assessment & Plan     Thanh Goodrich is a 80 year old male who was admitted on 3/19/2021. I was asked to see the patient for management of Myasthenia gravis.     # Myasthenia gravis since 2005 and recurrent flare ups/p IVIG 3 days 3/20-3/22/21  with clinical improvement but not to his baseline  Currently - + diplopia, dysphonia, proximal LE weakness ( patient reports 60-70% of his baseline strength)        -Continue to check VC, NIF: last value -28 and 1300 per note on 3/25   -Mycophenolate 1000/ 500mg/day  -Prednisone 60mg every day- no plan for tapering until seeing outpatient neurology follow up  -Will try mestinon 60mg TIDif there is no contraindication ( such as bradycardia or GI symptom)   - Continue PT  - Will consider PLEX later next week if no significant improvement        Briana Allen MD  [facility] Neurology  Office Phone [phone]           History of Present Illness     From discontinue summary/ prior note     Thanh Goodrich is an 80 year old male with history of myasthenia gravis, hypertension, prostate cancer s/p resection, morbid obesity, and chronic hyponatremia who presented 3/4/2021 with generalized weakness and hypoxic respiratory failure, intubated and transferred to ICU, treated with IVIG and steroids. Extubated 3/11/21. discharged on 3/15.      Then he was readmitted on 3/19 for recurrent exacerbation requiring reintubation for hypercarbic respiratory failure. Following his recent discharge his steroids were decreased from 60mg to 50 to 40mg.  He does not like the side effects of steroids.  He had been maintained on CellCept alone (100/500) for many years without symptoms.      He received another IVIG for 3 days on 3/20-3/22 and kept steroid to 60mg  Without tapering.until outpatient followup. He was extubated on 3/22 and now transferred to 7th floor.  Recovered to about 50-60% of his baseline      +diplopia, dysphonia- prox muscle  weakness leg> arm   No dysphagia, dysarthria or neck weakness,               Past Medical History       I have reviewed this patient's medical history and updated it with pertinent information if needed.        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     Cellulitis of left leg 9/23/2012     Depressive disorder 2020     Edema 7/7/2009     History of steroid therapy 2/22/2010     Hyperlipidemia LDL goal <130 10/31/2010     HYPERTENSION 7/8/2003     Incontinence of urine 10/25/2010     Leg ulcer (H) 9/20/2012     MALIGN NEOPL PROSTATE-3/07 4/2/2007     T1C, [place] 8, initial PSA 39, s/p radiation seed implants 2007      Myasthenia gravis (H)       OBESITY 7/8/2003     Osteoporosis 8/18/2009     Refused bisphosphonates 2011      PVC's (premature ventricular contractions) 11/23/2011     RIGHT OCCIPITAL PAIN 7/8/2003     ROTATOR CUFF SYND NOS- RT 9/19/2005     Skin nodule 3/18/2013     ulcer left shin 10/8/2007     Uncomplicated asthma 1944?     childhood only               Past Surgical History      I have reviewed this patient's surgical history and updated it with pertinent information if needed.        Past Surgical History:   Procedure Laterality Date     BIOPSY   2013?     dealt with     Prostate Cancer Cryotherapy   2007     TONSILLECTOMY   1945               Prior to Admission Medications      Prior to Admission Medications   Prescriptions Last Dose Informant Patient Reported? Taking?   Cholecalciferol 100 MCG (4000 UT) CAPS 3/19/2021 at am   Yes Yes   Sig: Take 4,000 Units by mouth daily   Multiple Vitamins-Minerals (MULTIVITAMIN ADULTS 50+) TABS 3/19/2021 at am   Yes Yes   Sig: Take 1 tablet by mouth daily    acetaminophen (TYLENOL) 325 MG tablet 3/19/2021 at 0125   Yes Yes   Sig: Take 650 mg by mouth every 4 hours as needed for mild pain   calcium carbonate 600 mg-vitamin D 400 units (CALTRATE) 600-400 MG-UNIT per tablet 3/19/2021 at am   Yes Yes   Sig: Take 1  tablet by mouth daily   furosemide (LASIX) 20 MG tablet 3/19/2021 at 1200   No Yes   Sig: Take 1 tablet (20 mg) by mouth 2 times daily   metoprolol tartrate (LOPRESSOR) 25 MG tablet 3/19/2021 at am   No Yes   Sig: Take 0.5 tablets (12.5 mg) by mouth 2 times daily   mycophenolate (GENERIC EQUIVALENT) 500 MG tablet 3/19/2021 at am   Yes Yes   Sig: Take 1,000 mg by mouth every morning   mycophenolate (GENERIC EQUIVALENT) 500 MG tablet 3/18/2021 at hs   Yes Yes   Sig: Take 500 mg by mouth At Bedtime    potassium chloride ER (KLOR-CON M) 10 MEQ CR tablet 3/19/2021 at pm   No Yes   Sig: Take 2 tablets (20 mEq) by mouth 2 times daily   predniSONE (DELTASONE) 50 MG tablet 3/19/2021 at am-50mg   No Yes   Sig: Take 1 tablet (50 mg) by mouth daily Take 50 mg until 3/19.  Then decrease by 10 mg every 7 days starting on 3/20      Facility-Administered Medications: None            Allergies            Allergies   Allergen Reactions     Ciprofloxacin Other (See Comments)       Contraindicated for myasthenia gravis patients     Procainamide Other (See Comments)       Contraindicated for myasthenia gravis patients     Quinidine Other (See Comments)       Contraindicated for myasthenia gravis patients     Quinine Other (See Comments)       Contraindicated for myasthenia gravis patients               Social History      I have reviewed this patient's social history and updated it with pertinent information if needed. Thanh Goodrich  reports that he has never smoked. He has never used smokeless tobacco. He reports current alcohol use. He reports that he does not use drugs.           Family History      I have reviewed this patient's family history and updated it with pertinent information if needed.         Family History   Problem Relation Age of Onset     Hypertension Mother       Depression Mother       Substance Abuse Mother           alcohol     Hypertension Father       Cancer Father           Prostate     Prostate Cancer Father        Substance Abuse Father           alcohol     Obesity Father       Diabetes Maternal Grandmother       C.A.D. Maternal Grandmother       Cerebrovascular Disease Maternal Grandfather       Cancer Paternal Uncle           Prostate     Prostate Cancer Other                 Review of Systems      The 10 point Review of Systems is negative other than noted in the HPI or here.      double vision/blurry+     Patient denies any LOC, HA, vision change  vision/ vision loss), dizziness, imbalance, nausea, vomiting, dysphagia, dysarthria, weakness, numbness, tingling, incontinence, saddle anesthesia, prior seizure, stroke, trauma, brain surgery, weight change, recent illness, bleeding, bruising, fever, diarrhea, chest pain or shortness breath           Physical Exam     Temp: 97.3  F (36.3  C) Temp src: Oral BP: 115/65 Pulse: 82   Resp: 18 SpO2: 96 % O2 Device: Nasal cannula Oxygen Delivery: 2 LPM  Vital Signs with Ranges  Temp:  [97.3  F (36.3  C)-98.8  F (37.1  C)] 97.3  F (36.3  C)  Pulse:  [] 82  Resp:  [17-18] 18  BP: (115-137)/(60-80) 115/65  SpO2:  [94 %-98 %] 96 %  282 lbs 13.6 oz     Limited due to telemedicine     General appearance:No acute distress   Neuro/Psych:Awake, alert, oriented *3    Cranial nerves: Grossly II-XII intact   Fundi/Optic disc: Not available  Extraocular movements intact. No nystagmus, Face appears symmetric. Facial sensation intact(cannot assess). Hearing intact to finger rub (cannot assess).   Motor strength: Upper can hold up arms without drift /Lower extremities at least 3/5 bilaterally,      Range of motion: Partially limited  Sensory: symmetric response to LT stimuli in both upper and lower extremities   Reflexes: Not available  Babinski/Clonus/Saab: Not available  Coordination: FTN /NAWAF intact- cannot perform tandem gait  Romberg  n/a  Gait: cannot stand up      EOM + binocular horizontal in all direction  Cheek puff  moderate   whistle +  Tongue/pharyngeal strength  Cough  moderate        This is a telemedicine visit that was performed with the originating site at [facility] and the distant site at provider s home in [place], [place]. Verbal consent was given to participate in video visit using [employer] real-time telemedicine video conference.  This visit occurred during the Coronavirus (COVID-19) Public Health Emergency and was requested by patient due to contact concerns.      I discussed with the patient the nature of our telemedicine visits, that:       I would evaluate the patient and recommend diagnostics and treatments based on my assessment and the exam is limited due to the nature of telemedicine visit.    Our sessions are not being recorded and that personal health information is protected    Our team would provide followup care in person if/when the patient needs it.      Patient identification was verified before the start of the encounter.        total time : 70 minutes  More than 50% of the time was spent on counseling on the Pathophysiology and differential diagnosis of the flare up fo maysthenia, the iside effect of mestinon nd other Risk factor management as well as discussing/coordinating care with hospital staffs.

## 2025-01-17 NOTE — PROGRESS NOTES
[facility] ICU RESPIRATORY NOTE        Date of Admission: 3/4/2021    Date of Intubation (most recent): 3/4/21    Reason for Mechanical Ventilation:Respiratory failure    Number of Days on Mechanical Ventilation: 3    Met Criteria for Spontaneous Breathing Trial: No    Reason for No Spontaneous Breathing Trial: Per MD    Significant Events Today: None    ABG Results:   Recent Labs   Lab 03/05/21  0230 03/04/21  2124   PH 7.54*  --    PCO2 36  --    PO2 168*  --    HCO3 31*  --    O2PER  --  Stefano       Current Vent Settings: Ventilation Mode: CMV/AC  (Continuous Mandatory Ventilation/ Assist Control)  FiO2 (%): 40 %  Rate Set (breaths/minute): 14 breaths/min  Tidal Volume Set (mL): 550 mL  PEEP (cm H2O): 5 cmH2O  Oxygen Concentration (%): 40 %  Resp: 13      Plan: Continue to monitor the patient on full support and wean as tolerated .    Bertha Ayon, RT     Never

## (undated) RX ORDER — HEPARIN SODIUM 1000 [USP'U]/ML
INJECTION, SOLUTION INTRAVENOUS; SUBCUTANEOUS
Status: DISPENSED
Start: 2021-01-01

## (undated) RX ORDER — LIDOCAINE HYDROCHLORIDE 10 MG/ML
INJECTION, SOLUTION INFILTRATION; PERINEURAL
Status: DISPENSED
Start: 2021-01-01